# Patient Record
Sex: MALE | Race: WHITE | Employment: OTHER | URBAN - METROPOLITAN AREA
[De-identification: names, ages, dates, MRNs, and addresses within clinical notes are randomized per-mention and may not be internally consistent; named-entity substitution may affect disease eponyms.]

---

## 2017-05-09 ENCOUNTER — OFFICE VISIT (OUTPATIENT)
Dept: INTERNAL MEDICINE CLINIC | Age: 82
End: 2017-05-09

## 2017-05-09 VITALS
SYSTOLIC BLOOD PRESSURE: 136 MMHG | HEART RATE: 60 BPM | BODY MASS INDEX: 29.09 KG/M2 | OXYGEN SATURATION: 97 % | WEIGHT: 181 LBS | DIASTOLIC BLOOD PRESSURE: 80 MMHG | HEIGHT: 66 IN

## 2017-05-09 DIAGNOSIS — I25.10 CORONARY ARTERIOSCLEROSIS: ICD-10-CM

## 2017-05-09 DIAGNOSIS — Z95.0 PACEMAKER: ICD-10-CM

## 2017-05-09 DIAGNOSIS — I10 ESSENTIAL HYPERTENSION: ICD-10-CM

## 2017-05-09 DIAGNOSIS — M25.552 LEFT HIP PAIN: ICD-10-CM

## 2017-05-09 DIAGNOSIS — R00.1 BRADYCARDIA: ICD-10-CM

## 2017-05-09 DIAGNOSIS — N40.1 BPH WITH OBSTRUCTION/LOWER URINARY TRACT SYMPTOMS: ICD-10-CM

## 2017-05-09 DIAGNOSIS — K21.9 GASTROESOPHAGEAL REFLUX DISEASE, ESOPHAGITIS PRESENCE NOT SPECIFIED: ICD-10-CM

## 2017-05-09 DIAGNOSIS — N13.8 BPH WITH OBSTRUCTION/LOWER URINARY TRACT SYMPTOMS: ICD-10-CM

## 2017-05-09 LAB
A/G RATIO: 1.4 (ref 1.1–2.2)
ALBUMIN SERPL-MCNC: 4 G/DL (ref 3.4–5)
ALP BLD-CCNC: 72 U/L (ref 40–129)
ALT SERPL-CCNC: 12 U/L (ref 10–40)
ANION GAP SERPL CALCULATED.3IONS-SCNC: 19 MMOL/L (ref 3–16)
AST SERPL-CCNC: 22 U/L (ref 15–37)
BASOPHILS ABSOLUTE: 0.1 K/UL (ref 0–0.2)
BASOPHILS RELATIVE PERCENT: 0.7 %
BILIRUB SERPL-MCNC: 0.7 MG/DL (ref 0–1)
BUN BLDV-MCNC: 31 MG/DL (ref 7–20)
CALCIUM SERPL-MCNC: 9.1 MG/DL (ref 8.3–10.6)
CHLORIDE BLD-SCNC: 99 MMOL/L (ref 99–110)
CHOLESTEROL, TOTAL: 140 MG/DL (ref 0–199)
CO2: 20 MMOL/L (ref 21–32)
CREAT SERPL-MCNC: 1.4 MG/DL (ref 0.8–1.3)
EOSINOPHILS ABSOLUTE: 0.1 K/UL (ref 0–0.6)
EOSINOPHILS RELATIVE PERCENT: 1.2 %
GFR AFRICAN AMERICAN: 57
GFR NON-AFRICAN AMERICAN: 47
GLOBULIN: 2.9 G/DL
GLUCOSE BLD-MCNC: 103 MG/DL (ref 70–99)
HCT VFR BLD CALC: 45.7 % (ref 40.5–52.5)
HDLC SERPL-MCNC: 63 MG/DL (ref 40–60)
HEMOGLOBIN: 14.8 G/DL (ref 13.5–17.5)
LDL CHOLESTEROL CALCULATED: 56 MG/DL
LYMPHOCYTES ABSOLUTE: 2.2 K/UL (ref 1–5.1)
LYMPHOCYTES RELATIVE PERCENT: 18.8 %
MCH RBC QN AUTO: 28.6 PG (ref 26–34)
MCHC RBC AUTO-ENTMCNC: 32.4 G/DL (ref 31–36)
MCV RBC AUTO: 88.3 FL (ref 80–100)
MONOCYTES ABSOLUTE: 1 K/UL (ref 0–1.3)
MONOCYTES RELATIVE PERCENT: 8.9 %
NEUTROPHILS ABSOLUTE: 8.2 K/UL (ref 1.7–7.7)
NEUTROPHILS RELATIVE PERCENT: 70.4 %
PDW BLD-RTO: 15.7 % (ref 12.4–15.4)
PLATELET # BLD: 156 K/UL (ref 135–450)
PMV BLD AUTO: 10.9 FL (ref 5–10.5)
POTASSIUM SERPL-SCNC: 4.7 MMOL/L (ref 3.5–5.1)
RBC # BLD: 5.17 M/UL (ref 4.2–5.9)
SODIUM BLD-SCNC: 138 MMOL/L (ref 136–145)
TOTAL PROTEIN: 6.9 G/DL (ref 6.4–8.2)
TRIGL SERPL-MCNC: 104 MG/DL (ref 0–150)
TSH SERPL DL<=0.05 MIU/L-ACNC: 3.64 UIU/ML (ref 0.27–4.2)
VLDLC SERPL CALC-MCNC: 21 MG/DL
WBC # BLD: 11.7 K/UL (ref 4–11)

## 2017-05-09 PROCEDURE — 99205 OFFICE O/P NEW HI 60 MIN: CPT | Performed by: INTERNAL MEDICINE

## 2017-05-09 PROCEDURE — 1036F TOBACCO NON-USER: CPT | Performed by: INTERNAL MEDICINE

## 2017-05-09 PROCEDURE — G8598 ASA/ANTIPLAT THER USED: HCPCS | Performed by: INTERNAL MEDICINE

## 2017-05-09 PROCEDURE — G8420 CALC BMI NORM PARAMETERS: HCPCS | Performed by: INTERNAL MEDICINE

## 2017-05-09 PROCEDURE — G8427 DOCREV CUR MEDS BY ELIG CLIN: HCPCS | Performed by: INTERNAL MEDICINE

## 2017-05-09 PROCEDURE — 4040F PNEUMOC VAC/ADMIN/RCVD: CPT | Performed by: INTERNAL MEDICINE

## 2017-05-09 PROCEDURE — 1123F ACP DISCUSS/DSCN MKR DOCD: CPT | Performed by: INTERNAL MEDICINE

## 2017-05-09 RX ORDER — FAMOTIDINE 20 MG/1
20 TABLET, FILM COATED ORAL DAILY
COMMUNITY
End: 2017-06-23

## 2017-05-09 RX ORDER — MULTIVIT-MIN/FA/LYCOPEN/LUTEIN .4-300-25
1 TABLET ORAL DAILY
COMMUNITY

## 2017-05-09 RX ORDER — AMLODIPINE BESYLATE 5 MG/1
5 TABLET ORAL DAILY
COMMUNITY
End: 2020-01-06 | Stop reason: SDUPTHER

## 2017-05-09 RX ORDER — ACETAMINOPHEN 160 MG
2000 TABLET,DISINTEGRATING ORAL DAILY
COMMUNITY

## 2017-05-09 RX ORDER — SOLIFENACIN SUCCINATE 5 MG/1
5 TABLET, FILM COATED ORAL DAILY
COMMUNITY
End: 2018-06-29 | Stop reason: ALTCHOICE

## 2017-05-09 RX ORDER — TAMSULOSIN HYDROCHLORIDE 0.4 MG/1
CAPSULE ORAL
Qty: 90 CAPSULE | Refills: 3 | Status: SHIPPED | OUTPATIENT
Start: 2017-05-09 | End: 2018-04-10 | Stop reason: SDUPTHER

## 2017-05-09 RX ORDER — MONTELUKAST SODIUM 10 MG/1
10 TABLET ORAL NIGHTLY
COMMUNITY
End: 2018-01-08

## 2017-05-09 RX ORDER — OLMESARTAN MEDOXOMIL 40 MG/1
40 TABLET ORAL DAILY
Refills: 2 | COMMUNITY
Start: 2017-03-04 | End: 2018-06-11 | Stop reason: SDUPTHER

## 2017-05-09 RX ORDER — CALCIUM CARBONATE 500(1250)
600 TABLET ORAL DAILY
COMMUNITY

## 2017-05-09 RX ORDER — ATORVASTATIN CALCIUM 40 MG/1
40 TABLET, FILM COATED ORAL DAILY
COMMUNITY
End: 2019-02-22 | Stop reason: SDUPTHER

## 2017-06-23 ENCOUNTER — OFFICE VISIT (OUTPATIENT)
Dept: INTERNAL MEDICINE CLINIC | Age: 82
End: 2017-06-23

## 2017-06-23 VITALS
HEART RATE: 60 BPM | HEIGHT: 66 IN | DIASTOLIC BLOOD PRESSURE: 70 MMHG | WEIGHT: 183.6 LBS | BODY MASS INDEX: 29.51 KG/M2 | OXYGEN SATURATION: 96 % | SYSTOLIC BLOOD PRESSURE: 138 MMHG

## 2017-06-23 DIAGNOSIS — I10 BENIGN ESSENTIAL HTN: ICD-10-CM

## 2017-06-23 DIAGNOSIS — R06.09 DOE (DYSPNEA ON EXERTION): Primary | ICD-10-CM

## 2017-06-23 DIAGNOSIS — I25.118 CORONARY ARTERY DISEASE INVOLVING NATIVE CORONARY ARTERY WITH OTHER FORMS OF ANGINA PECTORIS: ICD-10-CM

## 2017-06-23 PROCEDURE — 93000 ELECTROCARDIOGRAM COMPLETE: CPT | Performed by: INTERNAL MEDICINE

## 2017-06-23 PROCEDURE — 99214 OFFICE O/P EST MOD 30 MIN: CPT | Performed by: INTERNAL MEDICINE

## 2017-06-23 PROCEDURE — 1036F TOBACCO NON-USER: CPT | Performed by: INTERNAL MEDICINE

## 2017-06-23 PROCEDURE — G8598 ASA/ANTIPLAT THER USED: HCPCS | Performed by: INTERNAL MEDICINE

## 2017-06-23 PROCEDURE — G8427 DOCREV CUR MEDS BY ELIG CLIN: HCPCS | Performed by: INTERNAL MEDICINE

## 2017-06-23 PROCEDURE — G8419 CALC BMI OUT NRM PARAM NOF/U: HCPCS | Performed by: INTERNAL MEDICINE

## 2017-06-23 PROCEDURE — 1123F ACP DISCUSS/DSCN MKR DOCD: CPT | Performed by: INTERNAL MEDICINE

## 2017-06-23 PROCEDURE — 4040F PNEUMOC VAC/ADMIN/RCVD: CPT | Performed by: INTERNAL MEDICINE

## 2017-06-23 RX ORDER — ISOSORBIDE MONONITRATE 30 MG/1
30 TABLET, EXTENDED RELEASE ORAL DAILY
Qty: 30 TABLET | Refills: 3 | Status: SHIPPED | OUTPATIENT
Start: 2017-06-23 | End: 2017-06-30 | Stop reason: ALTCHOICE

## 2017-06-23 RX ORDER — OMEPRAZOLE 20 MG/1
20 CAPSULE, DELAYED RELEASE ORAL DAILY
Qty: 30 CAPSULE | Refills: 0 | Status: SHIPPED | OUTPATIENT
Start: 2017-06-23 | End: 2017-07-21 | Stop reason: SDUPTHER

## 2017-06-23 RX ORDER — AZITHROMYCIN 250 MG/1
TABLET, FILM COATED ORAL
Qty: 1 PACKET | Refills: 0 | Status: SHIPPED | OUTPATIENT
Start: 2017-06-23 | End: 2017-07-03

## 2017-06-23 ASSESSMENT — ENCOUNTER SYMPTOMS
SPUTUM PRODUCTION: 1
SHORTNESS OF BREATH: 1

## 2017-06-29 ENCOUNTER — HOSPITAL ENCOUNTER (OUTPATIENT)
Dept: NON INVASIVE DIAGNOSTICS | Age: 82
Discharge: HOME | End: 2017-06-29
Attending: INTERNAL MEDICINE | Admitting: INTERNAL MEDICINE

## 2017-06-29 DIAGNOSIS — R06.09 DOE (DYSPNEA ON EXERTION): ICD-10-CM

## 2017-06-29 DIAGNOSIS — R06.09 OTHER FORMS OF DYSPNEA: ICD-10-CM

## 2017-06-29 LAB
LV EF: 55 %
LV EF: 70 %
LVEF MODALITY: NORMAL
LVEF MODALITY: NORMAL

## 2017-06-30 RX ORDER — TORSEMIDE 10 MG/1
10 TABLET ORAL EVERY MORNING
COMMUNITY
End: 2017-06-30 | Stop reason: SDUPTHER

## 2017-06-30 RX ORDER — TORSEMIDE 10 MG/1
10 TABLET ORAL EVERY MORNING
Qty: 30 TABLET | Refills: 3 | Status: SHIPPED | OUTPATIENT
Start: 2017-06-30 | End: 2018-02-21 | Stop reason: ALTCHOICE

## 2017-07-19 ENCOUNTER — TELEPHONE (OUTPATIENT)
Dept: INTERNAL MEDICINE CLINIC | Age: 82
End: 2017-07-19

## 2017-07-21 ENCOUNTER — OFFICE VISIT (OUTPATIENT)
Dept: INTERNAL MEDICINE CLINIC | Age: 82
End: 2017-07-21

## 2017-07-21 VITALS
OXYGEN SATURATION: 97 % | SYSTOLIC BLOOD PRESSURE: 120 MMHG | WEIGHT: 187.8 LBS | HEIGHT: 66 IN | TEMPERATURE: 98.2 F | HEART RATE: 61 BPM | DIASTOLIC BLOOD PRESSURE: 68 MMHG | BODY MASS INDEX: 30.18 KG/M2

## 2017-07-21 DIAGNOSIS — R05.8 PRODUCTIVE COUGH: Primary | ICD-10-CM

## 2017-07-21 DIAGNOSIS — K21.9 GASTROESOPHAGEAL REFLUX DISEASE, ESOPHAGITIS PRESENCE NOT SPECIFIED: ICD-10-CM

## 2017-07-21 DIAGNOSIS — N18.3 CKD (CHRONIC KIDNEY DISEASE), STAGE 3 (MODERATE): ICD-10-CM

## 2017-07-21 DIAGNOSIS — I25.10 CORONARY ARTERY DISEASE INVOLVING NATIVE CORONARY ARTERY OF NATIVE HEART WITHOUT ANGINA PECTORIS: ICD-10-CM

## 2017-07-21 LAB
ANION GAP SERPL CALCULATED.3IONS-SCNC: 15 MMOL/L (ref 3–16)
BASOPHILS ABSOLUTE: 0.1 K/UL (ref 0–0.2)
BASOPHILS RELATIVE PERCENT: 0.7 %
BUN BLDV-MCNC: 28 MG/DL (ref 7–20)
CALCIUM SERPL-MCNC: 9.1 MG/DL (ref 8.3–10.6)
CHLORIDE BLD-SCNC: 99 MMOL/L (ref 99–110)
CO2: 24 MMOL/L (ref 21–32)
CREAT SERPL-MCNC: 1.6 MG/DL (ref 0.8–1.3)
EOSINOPHILS ABSOLUTE: 0.2 K/UL (ref 0–0.6)
EOSINOPHILS RELATIVE PERCENT: 2.6 %
GFR AFRICAN AMERICAN: 49
GFR NON-AFRICAN AMERICAN: 41
GLUCOSE BLD-MCNC: 104 MG/DL (ref 70–99)
HCT VFR BLD CALC: 42.4 % (ref 40.5–52.5)
HEMOGLOBIN: 14.3 G/DL (ref 13.5–17.5)
LYMPHOCYTES ABSOLUTE: 2 K/UL (ref 1–5.1)
LYMPHOCYTES RELATIVE PERCENT: 21.2 %
MCH RBC QN AUTO: 30 PG (ref 26–34)
MCHC RBC AUTO-ENTMCNC: 33.6 G/DL (ref 31–36)
MCV RBC AUTO: 89.2 FL (ref 80–100)
MONOCYTES ABSOLUTE: 0.9 K/UL (ref 0–1.3)
MONOCYTES RELATIVE PERCENT: 9.9 %
NEUTROPHILS ABSOLUTE: 6.1 K/UL (ref 1.7–7.7)
NEUTROPHILS RELATIVE PERCENT: 65.6 %
PDW BLD-RTO: 15 % (ref 12.4–15.4)
PLATELET # BLD: 161 K/UL (ref 135–450)
PMV BLD AUTO: 11.2 FL (ref 5–10.5)
POTASSIUM SERPL-SCNC: 4.9 MMOL/L (ref 3.5–5.1)
RBC # BLD: 4.75 M/UL (ref 4.2–5.9)
SODIUM BLD-SCNC: 138 MMOL/L (ref 136–145)
WBC # BLD: 9.3 K/UL (ref 4–11)

## 2017-07-21 PROCEDURE — G8598 ASA/ANTIPLAT THER USED: HCPCS | Performed by: INTERNAL MEDICINE

## 2017-07-21 PROCEDURE — 4040F PNEUMOC VAC/ADMIN/RCVD: CPT | Performed by: INTERNAL MEDICINE

## 2017-07-21 PROCEDURE — G8428 CUR MEDS NOT DOCUMENT: HCPCS | Performed by: INTERNAL MEDICINE

## 2017-07-21 PROCEDURE — 1036F TOBACCO NON-USER: CPT | Performed by: INTERNAL MEDICINE

## 2017-07-21 PROCEDURE — 99214 OFFICE O/P EST MOD 30 MIN: CPT | Performed by: INTERNAL MEDICINE

## 2017-07-21 PROCEDURE — G8417 CALC BMI ABV UP PARAM F/U: HCPCS | Performed by: INTERNAL MEDICINE

## 2017-07-21 PROCEDURE — 1123F ACP DISCUSS/DSCN MKR DOCD: CPT | Performed by: INTERNAL MEDICINE

## 2017-07-21 RX ORDER — OMEPRAZOLE 20 MG/1
20 CAPSULE, DELAYED RELEASE ORAL DAILY
Qty: 30 CAPSULE | Refills: 5 | Status: SHIPPED | OUTPATIENT
Start: 2017-07-21 | End: 2018-01-23 | Stop reason: SDUPTHER

## 2017-08-02 ENCOUNTER — HOSPITAL ENCOUNTER (OUTPATIENT)
Dept: CT IMAGING | Age: 82
Discharge: HOME | End: 2017-08-02
Attending: INTERNAL MEDICINE | Admitting: INTERNAL MEDICINE

## 2017-08-02 DIAGNOSIS — R05.8 PRODUCTIVE COUGH: ICD-10-CM

## 2017-08-02 DIAGNOSIS — R05.9 COUGH: ICD-10-CM

## 2017-08-07 ENCOUNTER — OFFICE VISIT (OUTPATIENT)
Dept: INTERNAL MEDICINE CLINIC | Age: 82
End: 2017-08-07

## 2017-08-07 VITALS
BODY MASS INDEX: 30.6 KG/M2 | RESPIRATION RATE: 16 BRPM | HEART RATE: 66 BPM | HEIGHT: 66 IN | DIASTOLIC BLOOD PRESSURE: 62 MMHG | WEIGHT: 190.4 LBS | SYSTOLIC BLOOD PRESSURE: 148 MMHG

## 2017-08-07 DIAGNOSIS — N13.30 HYDRONEPHROSIS, RIGHT: ICD-10-CM

## 2017-08-07 DIAGNOSIS — R63.5 WEIGHT GAIN: ICD-10-CM

## 2017-08-07 DIAGNOSIS — N13.30 HYDRONEPHROSIS, RIGHT: Primary | ICD-10-CM

## 2017-08-07 DIAGNOSIS — J84.10 PULMONARY FIBROSIS (HCC): Primary | ICD-10-CM

## 2017-08-07 LAB
ANION GAP SERPL CALCULATED.3IONS-SCNC: 13 MMOL/L (ref 3–16)
BUN BLDV-MCNC: 26 MG/DL (ref 7–20)
CALCIUM SERPL-MCNC: 9.1 MG/DL (ref 8.3–10.6)
CHLORIDE BLD-SCNC: 102 MMOL/L (ref 99–110)
CO2: 25 MMOL/L (ref 21–32)
CREAT SERPL-MCNC: 1.5 MG/DL (ref 0.8–1.3)
GFR AFRICAN AMERICAN: 53
GFR NON-AFRICAN AMERICAN: 44
GLUCOSE BLD-MCNC: 104 MG/DL (ref 70–99)
POTASSIUM SERPL-SCNC: 4.8 MMOL/L (ref 3.5–5.1)
SODIUM BLD-SCNC: 140 MMOL/L (ref 136–145)

## 2017-08-07 PROCEDURE — 1123F ACP DISCUSS/DSCN MKR DOCD: CPT | Performed by: INTERNAL MEDICINE

## 2017-08-07 PROCEDURE — 4040F PNEUMOC VAC/ADMIN/RCVD: CPT | Performed by: INTERNAL MEDICINE

## 2017-08-07 PROCEDURE — G8427 DOCREV CUR MEDS BY ELIG CLIN: HCPCS | Performed by: INTERNAL MEDICINE

## 2017-08-07 PROCEDURE — 99213 OFFICE O/P EST LOW 20 MIN: CPT | Performed by: INTERNAL MEDICINE

## 2017-08-07 PROCEDURE — G8417 CALC BMI ABV UP PARAM F/U: HCPCS | Performed by: INTERNAL MEDICINE

## 2017-08-07 PROCEDURE — 1036F TOBACCO NON-USER: CPT | Performed by: INTERNAL MEDICINE

## 2017-08-07 PROCEDURE — G8598 ASA/ANTIPLAT THER USED: HCPCS | Performed by: INTERNAL MEDICINE

## 2017-08-07 ASSESSMENT — PATIENT HEALTH QUESTIONNAIRE - PHQ9
2. FEELING DOWN, DEPRESSED OR HOPELESS: 0
SUM OF ALL RESPONSES TO PHQ QUESTIONS 1-9: 0
SUM OF ALL RESPONSES TO PHQ9 QUESTIONS 1 & 2: 0
1. LITTLE INTEREST OR PLEASURE IN DOING THINGS: 0

## 2017-08-14 ENCOUNTER — HOSPITAL ENCOUNTER (OUTPATIENT)
Dept: PULMONOLOGY | Age: 82
Discharge: HOME | End: 2017-08-14
Admitting: INTERNAL MEDICINE

## 2017-08-14 DIAGNOSIS — N13.30 HYDRONEPHROSIS, RIGHT: ICD-10-CM

## 2017-08-14 DIAGNOSIS — J84.10 PULMONARY FIBROSIS (HCC): ICD-10-CM

## 2017-08-14 LAB
DLCO %PRED: NORMAL
DLCO PRE: NORMAL
FEF 25-75%-POST: NORMAL
FEF 25-75%-PRE: NORMAL
FEV1-POST: NORMAL
FEV1-PRE: NORMAL
FEV1/FVC-POST: NORMAL
FEV1/FVC-PRE: NORMAL
FVC-POST: NORMAL
FVC-PRE: NORMAL
MEP: NORMAL
MIP: NORMAL
MVV %PRED-PRE: NORMAL
MVV-PRE: NORMAL
TLC %PRED: NORMAL
TLC PRE: NORMAL

## 2017-08-14 PROCEDURE — 94729 DIFFUSING CAPACITY: CPT | Performed by: INTERNAL MEDICINE

## 2017-08-14 PROCEDURE — 94727 GAS DIL/WSHOT DETER LNG VOL: CPT | Performed by: INTERNAL MEDICINE

## 2017-08-14 PROCEDURE — 94060 EVALUATION OF WHEEZING: CPT | Performed by: INTERNAL MEDICINE

## 2017-08-14 RX ORDER — ALBUTEROL SULFATE 90 UG/1
4 AEROSOL, METERED RESPIRATORY (INHALATION) ONCE
Status: COMPLETED | OUTPATIENT
Start: 2017-08-14 | End: 2017-08-14

## 2017-08-14 RX ADMIN — ALBUTEROL SULFATE 4 PUFF: 90 AEROSOL, METERED RESPIRATORY (INHALATION) at 13:10

## 2017-08-23 ENCOUNTER — HOSPITAL ENCOUNTER (OUTPATIENT)
Dept: OTHER | Age: 82
Discharge: HOME | End: 2017-08-23
Attending: INTERNAL MEDICINE | Admitting: INTERNAL MEDICINE

## 2017-08-23 ENCOUNTER — OFFICE VISIT (OUTPATIENT)
Dept: INTERNAL MEDICINE CLINIC | Age: 82
End: 2017-08-23

## 2017-08-23 VITALS
SYSTOLIC BLOOD PRESSURE: 120 MMHG | WEIGHT: 186.6 LBS | DIASTOLIC BLOOD PRESSURE: 60 MMHG | RESPIRATION RATE: 16 BRPM | HEART RATE: 70 BPM | BODY MASS INDEX: 30.12 KG/M2

## 2017-08-23 DIAGNOSIS — M54.50 ACUTE RIGHT-SIDED LOW BACK PAIN WITHOUT SCIATICA: ICD-10-CM

## 2017-08-23 DIAGNOSIS — M54.50 ACUTE RIGHT-SIDED LOW BACK PAIN WITHOUT SCIATICA: Primary | ICD-10-CM

## 2017-08-23 PROCEDURE — 1036F TOBACCO NON-USER: CPT | Performed by: INTERNAL MEDICINE

## 2017-08-23 PROCEDURE — 1123F ACP DISCUSS/DSCN MKR DOCD: CPT | Performed by: INTERNAL MEDICINE

## 2017-08-23 PROCEDURE — G8427 DOCREV CUR MEDS BY ELIG CLIN: HCPCS | Performed by: INTERNAL MEDICINE

## 2017-08-23 PROCEDURE — 99215 OFFICE O/P EST HI 40 MIN: CPT | Performed by: INTERNAL MEDICINE

## 2017-08-23 PROCEDURE — G8417 CALC BMI ABV UP PARAM F/U: HCPCS | Performed by: INTERNAL MEDICINE

## 2017-08-23 PROCEDURE — G8598 ASA/ANTIPLAT THER USED: HCPCS | Performed by: INTERNAL MEDICINE

## 2017-08-23 PROCEDURE — 4040F PNEUMOC VAC/ADMIN/RCVD: CPT | Performed by: INTERNAL MEDICINE

## 2017-08-23 RX ORDER — TRAMADOL HYDROCHLORIDE 50 MG/1
TABLET ORAL
Qty: 60 TABLET | Refills: 0 | Status: SHIPPED | OUTPATIENT
Start: 2017-08-23 | End: 2018-02-21 | Stop reason: ALTCHOICE

## 2017-09-07 ENCOUNTER — OFFICE VISIT (OUTPATIENT)
Dept: INTERNAL MEDICINE CLINIC | Age: 82
End: 2017-09-07

## 2017-09-07 VITALS
WEIGHT: 188 LBS | HEART RATE: 98 BPM | BODY MASS INDEX: 30.22 KG/M2 | DIASTOLIC BLOOD PRESSURE: 70 MMHG | SYSTOLIC BLOOD PRESSURE: 150 MMHG | HEIGHT: 66 IN | RESPIRATION RATE: 16 BRPM

## 2017-09-07 DIAGNOSIS — J40 BRONCHITIS: Primary | ICD-10-CM

## 2017-09-07 PROCEDURE — 1036F TOBACCO NON-USER: CPT | Performed by: INTERNAL MEDICINE

## 2017-09-07 PROCEDURE — G8598 ASA/ANTIPLAT THER USED: HCPCS | Performed by: INTERNAL MEDICINE

## 2017-09-07 PROCEDURE — 1123F ACP DISCUSS/DSCN MKR DOCD: CPT | Performed by: INTERNAL MEDICINE

## 2017-09-07 PROCEDURE — 4040F PNEUMOC VAC/ADMIN/RCVD: CPT | Performed by: INTERNAL MEDICINE

## 2017-09-07 PROCEDURE — 99213 OFFICE O/P EST LOW 20 MIN: CPT | Performed by: INTERNAL MEDICINE

## 2017-09-07 PROCEDURE — G8417 CALC BMI ABV UP PARAM F/U: HCPCS | Performed by: INTERNAL MEDICINE

## 2017-09-07 PROCEDURE — G8427 DOCREV CUR MEDS BY ELIG CLIN: HCPCS | Performed by: INTERNAL MEDICINE

## 2017-09-07 RX ORDER — PREDNISONE 10 MG/1
TABLET ORAL
Qty: 22 TABLET | Refills: 0 | Status: SHIPPED | OUTPATIENT
Start: 2017-09-07 | End: 2018-01-08 | Stop reason: SDUPTHER

## 2017-09-07 RX ORDER — DOXYCYCLINE HYCLATE 100 MG
100 TABLET ORAL 2 TIMES DAILY
Qty: 14 TABLET | Refills: 0 | Status: SHIPPED | OUTPATIENT
Start: 2017-09-07 | End: 2017-09-14

## 2017-09-07 RX ORDER — GUAIFENESIN AND CODEINE PHOSPHATE 100; 10 MG/5ML; MG/5ML
5-10 SOLUTION ORAL EVERY 4 HOURS PRN
Qty: 180 ML | Refills: 0 | Status: SHIPPED | OUTPATIENT
Start: 2017-09-07 | End: 2018-01-08 | Stop reason: SDUPTHER

## 2018-01-02 ENCOUNTER — TELEPHONE (OUTPATIENT)
Dept: INTERNAL MEDICINE CLINIC | Age: 83
End: 2018-01-02

## 2018-01-02 RX ORDER — AZITHROMYCIN 250 MG/1
TABLET, FILM COATED ORAL
Qty: 1 PACKET | Refills: 0 | Status: SHIPPED | OUTPATIENT
Start: 2018-01-02 | End: 2018-01-12

## 2018-01-08 ENCOUNTER — OFFICE VISIT (OUTPATIENT)
Dept: INTERNAL MEDICINE CLINIC | Age: 83
End: 2018-01-08

## 2018-01-08 VITALS
WEIGHT: 189.4 LBS | HEIGHT: 66 IN | OXYGEN SATURATION: 98 % | SYSTOLIC BLOOD PRESSURE: 140 MMHG | BODY MASS INDEX: 30.44 KG/M2 | HEART RATE: 64 BPM | DIASTOLIC BLOOD PRESSURE: 72 MMHG | TEMPERATURE: 97.6 F

## 2018-01-08 DIAGNOSIS — R06.02 SOB (SHORTNESS OF BREATH): ICD-10-CM

## 2018-01-08 DIAGNOSIS — I48.20 CHRONIC ATRIAL FIBRILLATION (HCC): ICD-10-CM

## 2018-01-08 DIAGNOSIS — J20.9 ACUTE BRONCHITIS, UNSPECIFIED ORGANISM: Primary | ICD-10-CM

## 2018-01-08 PROCEDURE — G8598 ASA/ANTIPLAT THER USED: HCPCS | Performed by: INTERNAL MEDICINE

## 2018-01-08 PROCEDURE — G8484 FLU IMMUNIZE NO ADMIN: HCPCS | Performed by: INTERNAL MEDICINE

## 2018-01-08 PROCEDURE — 4040F PNEUMOC VAC/ADMIN/RCVD: CPT | Performed by: INTERNAL MEDICINE

## 2018-01-08 PROCEDURE — 99214 OFFICE O/P EST MOD 30 MIN: CPT | Performed by: INTERNAL MEDICINE

## 2018-01-08 PROCEDURE — G8417 CALC BMI ABV UP PARAM F/U: HCPCS | Performed by: INTERNAL MEDICINE

## 2018-01-08 PROCEDURE — 1123F ACP DISCUSS/DSCN MKR DOCD: CPT | Performed by: INTERNAL MEDICINE

## 2018-01-08 PROCEDURE — G8427 DOCREV CUR MEDS BY ELIG CLIN: HCPCS | Performed by: INTERNAL MEDICINE

## 2018-01-08 PROCEDURE — 1036F TOBACCO NON-USER: CPT | Performed by: INTERNAL MEDICINE

## 2018-01-08 RX ORDER — MONTELUKAST SODIUM 10 MG/1
10 TABLET ORAL NIGHTLY
Qty: 30 TABLET | Status: CANCELLED | OUTPATIENT
Start: 2018-01-08

## 2018-01-08 RX ORDER — GUAIFENESIN AND CODEINE PHOSPHATE 100; 10 MG/5ML; MG/5ML
5-10 SOLUTION ORAL EVERY 4 HOURS PRN
Qty: 180 ML | Refills: 0 | Status: SHIPPED | OUTPATIENT
Start: 2018-01-08 | End: 2018-02-21 | Stop reason: ALTCHOICE

## 2018-01-08 RX ORDER — PREDNISONE 10 MG/1
TABLET ORAL
Qty: 22 TABLET | Refills: 0 | Status: SHIPPED | OUTPATIENT
Start: 2018-01-08 | End: 2018-02-21 | Stop reason: ALTCHOICE

## 2018-01-08 ASSESSMENT — ENCOUNTER SYMPTOMS
COUGH: 1
SPUTUM PRODUCTION: 1

## 2018-01-24 RX ORDER — OMEPRAZOLE 20 MG/1
20 CAPSULE, DELAYED RELEASE ORAL DAILY
Qty: 30 CAPSULE | Refills: 5 | Status: SHIPPED | OUTPATIENT
Start: 2018-01-24 | End: 2018-04-26 | Stop reason: SDUPTHER

## 2018-02-03 DIAGNOSIS — R91.1 PULMONARY NODULE: Primary | ICD-10-CM

## 2018-02-05 ENCOUNTER — TELEPHONE (OUTPATIENT)
Dept: INTERNAL MEDICINE CLINIC | Age: 83
End: 2018-02-05

## 2018-02-08 ENCOUNTER — TELEPHONE (OUTPATIENT)
Dept: INTERNAL MEDICINE CLINIC | Age: 83
End: 2018-02-08

## 2018-02-21 ENCOUNTER — PROCEDURE VISIT (OUTPATIENT)
Dept: CARDIOLOGY CLINIC | Age: 83
End: 2018-02-21

## 2018-02-21 ENCOUNTER — OFFICE VISIT (OUTPATIENT)
Dept: CARDIOLOGY CLINIC | Age: 83
End: 2018-02-21

## 2018-02-21 VITALS
WEIGHT: 189 LBS | DIASTOLIC BLOOD PRESSURE: 78 MMHG | HEIGHT: 66 IN | OXYGEN SATURATION: 97 % | HEART RATE: 60 BPM | BODY MASS INDEX: 30.37 KG/M2 | SYSTOLIC BLOOD PRESSURE: 138 MMHG

## 2018-02-21 DIAGNOSIS — I10 ESSENTIAL HYPERTENSION: ICD-10-CM

## 2018-02-21 DIAGNOSIS — I49.5 SSS (SICK SINUS SYNDROME) (HCC): Primary | ICD-10-CM

## 2018-02-21 DIAGNOSIS — E78.2 MIXED HYPERLIPIDEMIA: ICD-10-CM

## 2018-02-21 DIAGNOSIS — I48.20 CHRONIC A-FIB (HCC): ICD-10-CM

## 2018-02-21 DIAGNOSIS — R00.1 BRADYCARDIA: ICD-10-CM

## 2018-02-21 DIAGNOSIS — I25.10 CORONARY ARTERY DISEASE INVOLVING NATIVE CORONARY ARTERY OF NATIVE HEART WITHOUT ANGINA PECTORIS: ICD-10-CM

## 2018-02-21 DIAGNOSIS — Z95.0 PACEMAKER: ICD-10-CM

## 2018-02-21 PROCEDURE — 1036F TOBACCO NON-USER: CPT | Performed by: INTERNAL MEDICINE

## 2018-02-21 PROCEDURE — G8417 CALC BMI ABV UP PARAM F/U: HCPCS | Performed by: INTERNAL MEDICINE

## 2018-02-21 PROCEDURE — G8484 FLU IMMUNIZE NO ADMIN: HCPCS | Performed by: INTERNAL MEDICINE

## 2018-02-21 PROCEDURE — 99204 OFFICE O/P NEW MOD 45 MIN: CPT | Performed by: INTERNAL MEDICINE

## 2018-02-21 PROCEDURE — 1123F ACP DISCUSS/DSCN MKR DOCD: CPT | Performed by: INTERNAL MEDICINE

## 2018-02-21 PROCEDURE — 93279 PRGRMG DEV EVAL PM/LDLS PM: CPT | Performed by: INTERNAL MEDICINE

## 2018-02-21 PROCEDURE — G8598 ASA/ANTIPLAT THER USED: HCPCS | Performed by: INTERNAL MEDICINE

## 2018-02-21 PROCEDURE — 4040F PNEUMOC VAC/ADMIN/RCVD: CPT | Performed by: INTERNAL MEDICINE

## 2018-02-21 PROCEDURE — G8427 DOCREV CUR MEDS BY ELIG CLIN: HCPCS | Performed by: INTERNAL MEDICINE

## 2018-02-21 RX ORDER — FINASTERIDE 5 MG/1
5 TABLET, FILM COATED ORAL DAILY
COMMUNITY
End: 2019-12-02 | Stop reason: SDUPTHER

## 2018-02-21 NOTE — PROGRESS NOTES
[address]   Electrophysiology Consultation   Date: 2/21/2018  Reason for Consultation: Establish care, device management  Consult Requesting Physician: Amy Billings MD    Chief Complaint: Establish care    HPI: Luis Fernando March is a 80 y.o. with a PMH significant for CAD, chronic Afib, HTN, CKD and SSS. He has a single chamber [employer] Von PPM that was placed 9/11/14. He is pacemaker dependent. Alonzo Horton was previously followed by [facility] in [place]. He recently moved to [place] to live with his daughter and now needs to establish care for device management. He reports that he is \"feeling reasonably good. \"  He walks 1 mile every other day outside and at the gym. He admits to feeling short of breath with walking a distance but it resides with rest. He attributes this to age. Patient denies lightheadedness, dizziness, chest pain, palpitations, orthopnea, edema, presyncope or syncope. Past Medical History:   Diagnosis Date    BPH with obstruction/lower urinary tract symptoms     nocturia 3-4 x per night    Chronic atrial fibrillation (HCC)     CKD (chronic kidney disease) 2017    level 3    Coronary arteriosclerosis 2009    2 stents/ Dr Cezar Zepeda in [facility] GERD (gastroesophageal reflux disease)     HTN (hypertension) 1990    Left hip pain     schrapnel in hip and intestines/chronic pains left hip impairs walking    Pacemaker 2015    Renal atrophy, right 2017    SOB (shortness of breath) 2017    terrell normal pft and ct chest ? smll amount of pulmonary fibrosis        Past Surgical History:   Procedure Laterality Date    CORONARY ANGIOPLASTY WITH STENT PLACEMENT  2009    awoke with indigestion    INGUINAL HERNIA REPAIR Right 07/2016    PACEMAKER INSERTION  2014    SMALL INTESTINE SURGERY  1945    schrapnel     TONSILLECTOMY  1960       Allergies:   Allergies   Allergen Reactions    Claritin [Loratadine]      Reduced urine flow    Zestril [Lisinopril] Other (See

## 2018-02-21 NOTE — PROGRESS NOTES
Patient comes in for programming evaluation for his pacemaker. The device is functioning within normal parameters. No changes need to be made at this time. Patient presents today as a new patient to our practice. His last device check was from Nov 2017 at [facility] in [facility]. He has no new episodes on interrogation today. Please see interrogation for more detail. Dr. Edward Garcia will review interrogation report. Patient wishes for us to start monitoring his device and home monitor. I have requested transfer from [facility]. Patient is to see Dr. Edward Garcia today in office. Patient will follow up in 3 months in office or remotely.

## 2018-02-22 NOTE — PATIENT INSTRUCTIONS
Patient Education        Learning About Coronary Artery Disease (CAD)  What is coronary artery disease? Coronary artery disease (CAD) occurs when plaque builds up in the arteries that bring oxygen-rich blood to your heart. Plaque is a fatty substance made of cholesterol, calcium, and other substances in the blood. This process is called hardening of the arteries, or atherosclerosis. What happens when you have coronary artery disease? · Plaque may narrow the coronary arteries. Narrowed arteries cause poor blood flow. This can lead to angina symptoms such as chest pain or discomfort. If blood flow is completely blocked, you could have a heart attack. · You can slow CAD and reduce the risk of future problems by making changes in your lifestyle. These include quitting smoking and eating heart-healthy foods. · Treatments for CAD, along with changes in your lifestyle, can help you live a longer and healthier life. How can you prevent coronary artery disease? · Do not smoke. It may be the best thing you can do to prevent heart disease. If you need help quitting, talk to your doctor about stop-smoking programs and medicines. These can increase your chances of quitting for good. · Be active. Get at least 30 minutes of exercise on most days of the week. Walking is a good choice. You also may want to do other activities, such as running, swimming, cycling, or playing tennis or team sports. · Eat heart-healthy foods. Eat more fruits and vegetables and less foods that contain saturated and trans fats. Limit alcohol, sodium, and sweets. · Stay at a healthy weight. Lose weight if you need to. · Manage other health problems such as diabetes, high blood pressure, and high cholesterol. · Manage stress. Stress can hurt your heart. To keep stress low, talk about your problems and feelings. Don't keep your feelings hidden. · If you have talked about it with your doctor, take a low-dose aspirin every day.  Aspirin can help certain people lower their risk of a heart attack or stroke. But taking aspirin isn't right for everyone, because it can cause serious bleeding. Do not start taking daily aspirin unless your doctor knows about it. How is coronary artery disease treated? · Your doctor will suggest that you make lifestyle changes. For example, your doctor may ask you to eat healthy foods, quit smoking, lose extra weight, and be more active. · You will have to take medicines. · Your doctor may suggest a procedure to open narrowed or blocked arteries. This is called angioplasty. Or your doctor may suggest using healthy blood vessels to create detours around narrowed or blocked arteries. This is called bypass surgery. Follow-up care is a key part of your treatment and safety. Be sure to make and go to all appointments, and call your doctor if you are having problems. It's also a good idea to know your test results and keep a list of the medicines you take. Where can you learn more? Go to https://[employer] org and sign in to your Owl[employer] account. Enter [phone] in the Yoyo box to learn more about \"Learning About Coronary Artery Disease (CAD). \"     If you do not have an account, please click on the \"Sign Up Now\" link. Current as of: September 21, 2016  Content Version: 11.5  © 3934-4940 Healthwise, Incorporated. Care instructions adapted under license by [facility] ([facility]). If you have questions about a medical condition or this instruction, always ask your healthcare professional. [place] [address] any warranty or liability for your use of this information.

## 2018-03-16 ENCOUNTER — OFFICE VISIT (OUTPATIENT)
Dept: INTERNAL MEDICINE CLINIC | Age: 83
End: 2018-03-16

## 2018-03-16 VITALS
BODY MASS INDEX: 30.08 KG/M2 | TEMPERATURE: 97.4 F | DIASTOLIC BLOOD PRESSURE: 82 MMHG | SYSTOLIC BLOOD PRESSURE: 138 MMHG | HEIGHT: 66 IN | HEART RATE: 61 BPM | WEIGHT: 187.2 LBS | OXYGEN SATURATION: 96 %

## 2018-03-16 DIAGNOSIS — I50.9 CONGESTIVE HEART FAILURE, UNSPECIFIED CONGESTIVE HEART FAILURE CHRONICITY, UNSPECIFIED CONGESTIVE HEART FAILURE TYPE: ICD-10-CM

## 2018-03-16 DIAGNOSIS — J20.9 ACUTE BRONCHITIS, UNSPECIFIED ORGANISM: Primary | ICD-10-CM

## 2018-03-16 DIAGNOSIS — M79.89 LEG SWELLING: ICD-10-CM

## 2018-03-16 LAB
ANION GAP SERPL CALCULATED.3IONS-SCNC: 16 MMOL/L (ref 3–16)
BUN BLDV-MCNC: 29 MG/DL (ref 7–20)
CALCIUM SERPL-MCNC: 8.8 MG/DL (ref 8.3–10.6)
CHLORIDE BLD-SCNC: 102 MMOL/L (ref 99–110)
CO2: 24 MMOL/L (ref 21–32)
CREAT SERPL-MCNC: 1.5 MG/DL (ref 0.8–1.3)
GFR AFRICAN AMERICAN: 53
GFR NON-AFRICAN AMERICAN: 44
GLUCOSE BLD-MCNC: 116 MG/DL (ref 70–99)
POTASSIUM SERPL-SCNC: 5.2 MMOL/L (ref 3.5–5.1)
PRO-BNP: 1170 PG/ML (ref 0–449)
SODIUM BLD-SCNC: 142 MMOL/L (ref 136–145)

## 2018-03-16 PROCEDURE — G8417 CALC BMI ABV UP PARAM F/U: HCPCS | Performed by: INTERNAL MEDICINE

## 2018-03-16 PROCEDURE — 1036F TOBACCO NON-USER: CPT | Performed by: INTERNAL MEDICINE

## 2018-03-16 PROCEDURE — G8482 FLU IMMUNIZE ORDER/ADMIN: HCPCS | Performed by: INTERNAL MEDICINE

## 2018-03-16 PROCEDURE — 99214 OFFICE O/P EST MOD 30 MIN: CPT | Performed by: INTERNAL MEDICINE

## 2018-03-16 PROCEDURE — G8427 DOCREV CUR MEDS BY ELIG CLIN: HCPCS | Performed by: INTERNAL MEDICINE

## 2018-03-16 PROCEDURE — 1123F ACP DISCUSS/DSCN MKR DOCD: CPT | Performed by: INTERNAL MEDICINE

## 2018-03-16 PROCEDURE — G8598 ASA/ANTIPLAT THER USED: HCPCS | Performed by: INTERNAL MEDICINE

## 2018-03-16 PROCEDURE — 4040F PNEUMOC VAC/ADMIN/RCVD: CPT | Performed by: INTERNAL MEDICINE

## 2018-03-16 RX ORDER — FUROSEMIDE 20 MG/1
TABLET ORAL
Qty: 30 TABLET | Refills: 3 | Status: SHIPPED | OUTPATIENT
Start: 2018-03-16 | End: 2018-03-23 | Stop reason: SDUPTHER

## 2018-03-16 RX ORDER — AZITHROMYCIN 250 MG/1
TABLET, FILM COATED ORAL
Qty: 1 PACKET | Refills: 0 | Status: SHIPPED | OUTPATIENT
Start: 2018-03-16 | End: 2018-03-20 | Stop reason: SDUPTHER

## 2018-03-16 RX ORDER — PROMETHAZINE HYDROCHLORIDE AND CODEINE PHOSPHATE 6.25; 1 MG/5ML; MG/5ML
5 SYRUP ORAL EVERY 4 HOURS PRN
Qty: 150 ML | Refills: 0 | Status: SHIPPED | OUTPATIENT
Start: 2018-03-16 | End: 2018-03-23

## 2018-03-16 NOTE — PROGRESS NOTES
facility-administered medications for this visit. Past Medical History:   Diagnosis Date    BPH with obstruction/lower urinary tract symptoms     nocturia 3-4 x per night    Chronic atrial fibrillation (HCC)     CKD (chronic kidney disease) 2017    level 3    Coronary arteriosclerosis 2009    2 stents/ Dr Tammy Duron in [facility] GERD (gastroesophageal reflux disease)     HTN (hypertension) 1990    Left hip pain     schrapnel in hip and intestines/chronic pains left hip impairs walking    Pacemaker 2015    Renal atrophy, right 2017    SOB (shortness of breath) 2017    terrell normal pft and ct chest ? smll amount of pulmonary fibrosis     Past Surgical History:   Procedure Laterality Date    CORONARY ANGIOPLASTY WITH STENT PLACEMENT  2009    awoke with indigestion    INGUINAL HERNIA REPAIR Right 07/2016    PACEMAKER INSERTION  2014    SMALL INTESTINE SURGERY  1945    schrapnel     TONSILLECTOMY  1960     Social History   Substance Use Topics    Smoking status: Never Smoker    Smokeless tobacco: Never Used    Alcohol use No     Family History   Problem Relation Age of Onset    Pacemaker Sister     Pacemaker Brother           ROS  Walks a mile when bhe exercises gets pain in hips and legs etc  Ankles swell  Objective:   Physical Exam   Constitutional: He is well-developed, well-nourished, and in no distress. HENT:   Head: Normocephalic and atraumatic. Right Ear: External ear normal.   Left Ear: External ear normal.   Nose: Nose normal.   Mouth/Throat: Oropharynx is clear and moist.   Sinuses nontender  Hearing aids in  Op pink nodes neg   Eyes: Conjunctivae and EOM are normal. Pupils are equal, round, and reactive to light. Right eye exhibits no discharge. Left eye exhibits no discharge. No scleral icterus. Neck: Normal range of motion. Neck supple. No JVD present. No tracheal deviation present. No thyromegaly present. Cardiovascular: Normal rate, regular rhythm and normal heart sounds.   Exam

## 2018-03-20 ENCOUNTER — TELEPHONE (OUTPATIENT)
Dept: INTERNAL MEDICINE CLINIC | Age: 83
End: 2018-03-20

## 2018-03-20 DIAGNOSIS — J20.9 ACUTE BRONCHITIS, UNSPECIFIED ORGANISM: ICD-10-CM

## 2018-03-20 RX ORDER — AZITHROMYCIN 250 MG/1
TABLET, FILM COATED ORAL
Qty: 1 PACKET | Refills: 0 | Status: SHIPPED | OUTPATIENT
Start: 2018-03-20 | End: 2018-03-30

## 2018-03-20 NOTE — TELEPHONE ENCOUNTER
Patient's daughter Hector Goodman called and said her father was prescribed a Z gary and codeine cough syrup at his appointment on 3/16/18    Today is the last day of Z gary and he is still coughing up mucous would like to get another round of antibiotics for him    [facility]   [phone]    Please Advise

## 2018-03-25 RX ORDER — FUROSEMIDE 20 MG/1
TABLET ORAL
Qty: 30 TABLET | Refills: 3 | Status: SHIPPED | OUTPATIENT
Start: 2018-03-25 | End: 2018-07-31 | Stop reason: SDUPTHER

## 2018-04-10 ENCOUNTER — OFFICE VISIT (OUTPATIENT)
Dept: INTERNAL MEDICINE CLINIC | Age: 83
End: 2018-04-10

## 2018-04-10 VITALS
HEIGHT: 66 IN | WEIGHT: 182 LBS | HEART RATE: 63 BPM | TEMPERATURE: 97.5 F | DIASTOLIC BLOOD PRESSURE: 72 MMHG | OXYGEN SATURATION: 98 % | BODY MASS INDEX: 29.25 KG/M2 | SYSTOLIC BLOOD PRESSURE: 134 MMHG

## 2018-04-10 DIAGNOSIS — N13.8 BPH WITH OBSTRUCTION/LOWER URINARY TRACT SYMPTOMS: ICD-10-CM

## 2018-04-10 DIAGNOSIS — I50.32 CHRONIC DIASTOLIC CONGESTIVE HEART FAILURE (HCC): ICD-10-CM

## 2018-04-10 DIAGNOSIS — I10 ESSENTIAL HYPERTENSION: ICD-10-CM

## 2018-04-10 DIAGNOSIS — N13.8 BPH WITH OBSTRUCTION/LOWER URINARY TRACT SYMPTOMS: Primary | ICD-10-CM

## 2018-04-10 DIAGNOSIS — J20.9 ACUTE BRONCHITIS, UNSPECIFIED ORGANISM: ICD-10-CM

## 2018-04-10 DIAGNOSIS — N40.1 BPH WITH OBSTRUCTION/LOWER URINARY TRACT SYMPTOMS: Primary | ICD-10-CM

## 2018-04-10 DIAGNOSIS — I25.10 CORONARY ARTERIOSCLEROSIS: ICD-10-CM

## 2018-04-10 DIAGNOSIS — N40.1 BPH WITH OBSTRUCTION/LOWER URINARY TRACT SYMPTOMS: ICD-10-CM

## 2018-04-10 DIAGNOSIS — N18.30 CKD (CHRONIC KIDNEY DISEASE) STAGE 3, GFR 30-59 ML/MIN (HCC): ICD-10-CM

## 2018-04-10 LAB
ANION GAP SERPL CALCULATED.3IONS-SCNC: 16 MMOL/L (ref 3–16)
BILIRUBIN, POC: NORMAL
BLOOD URINE, POC: NORMAL
BUN BLDV-MCNC: 44 MG/DL (ref 7–20)
CALCIUM SERPL-MCNC: 9.1 MG/DL (ref 8.3–10.6)
CHLORIDE BLD-SCNC: 98 MMOL/L (ref 99–110)
CLARITY, POC: NORMAL
CO2: 24 MMOL/L (ref 21–32)
COLOR, POC: NORMAL
CREAT SERPL-MCNC: 1.7 MG/DL (ref 0.8–1.3)
GFR AFRICAN AMERICAN: 46
GFR NON-AFRICAN AMERICAN: 38
GLUCOSE BLD-MCNC: 104 MG/DL (ref 70–99)
GLUCOSE URINE, POC: NORMAL
KETONES, POC: NORMAL
LEUKOCYTE EST, POC: NORMAL
NITRITE, POC: NORMAL
PH, POC: 5.5
POTASSIUM SERPL-SCNC: 5 MMOL/L (ref 3.5–5.1)
PRO-BNP: 1017 PG/ML (ref 0–449)
PROTEIN, POC: NORMAL
SODIUM BLD-SCNC: 138 MMOL/L (ref 136–145)
SPECIFIC GRAVITY, POC: 1.01
UROBILINOGEN, POC: 0.2

## 2018-04-10 PROCEDURE — 1123F ACP DISCUSS/DSCN MKR DOCD: CPT | Performed by: INTERNAL MEDICINE

## 2018-04-10 PROCEDURE — 99214 OFFICE O/P EST MOD 30 MIN: CPT | Performed by: INTERNAL MEDICINE

## 2018-04-10 PROCEDURE — 4040F PNEUMOC VAC/ADMIN/RCVD: CPT | Performed by: INTERNAL MEDICINE

## 2018-04-10 PROCEDURE — G8598 ASA/ANTIPLAT THER USED: HCPCS | Performed by: INTERNAL MEDICINE

## 2018-04-10 PROCEDURE — G8417 CALC BMI ABV UP PARAM F/U: HCPCS | Performed by: INTERNAL MEDICINE

## 2018-04-10 PROCEDURE — 81002 URINALYSIS NONAUTO W/O SCOPE: CPT | Performed by: INTERNAL MEDICINE

## 2018-04-10 PROCEDURE — G8428 CUR MEDS NOT DOCUMENT: HCPCS | Performed by: INTERNAL MEDICINE

## 2018-04-10 PROCEDURE — 1036F TOBACCO NON-USER: CPT | Performed by: INTERNAL MEDICINE

## 2018-04-10 RX ORDER — TAMSULOSIN HYDROCHLORIDE 0.4 MG/1
CAPSULE ORAL
Qty: 90 CAPSULE | Refills: 3 | Status: SHIPPED | OUTPATIENT
Start: 2018-04-10 | End: 2019-01-28 | Stop reason: SDUPTHER

## 2018-04-10 RX ORDER — FLUTICASONE PROPIONATE 50 MCG
1 SPRAY, SUSPENSION (ML) NASAL DAILY
Qty: 1 BOTTLE | Refills: 5 | Status: SHIPPED | OUTPATIENT
Start: 2018-04-10 | End: 2019-04-22 | Stop reason: SDUPTHER

## 2018-04-26 RX ORDER — OMEPRAZOLE 20 MG/1
20 CAPSULE, DELAYED RELEASE ORAL DAILY
Qty: 30 CAPSULE | Refills: 5 | Status: SHIPPED | OUTPATIENT
Start: 2018-04-26 | End: 2018-05-07 | Stop reason: SDUPTHER

## 2018-05-07 RX ORDER — OMEPRAZOLE 20 MG/1
20 CAPSULE, DELAYED RELEASE ORAL DAILY
Qty: 90 CAPSULE | Refills: 2 | Status: SHIPPED | OUTPATIENT
Start: 2018-05-07 | End: 2019-01-28 | Stop reason: SDUPTHER

## 2018-05-29 ENCOUNTER — NURSE ONLY (OUTPATIENT)
Dept: CARDIOLOGY CLINIC | Age: 83
End: 2018-05-29

## 2018-05-29 DIAGNOSIS — Z95.0 PACEMAKER: ICD-10-CM

## 2018-05-29 DIAGNOSIS — R00.1 BRADYCARDIA: ICD-10-CM

## 2018-05-30 PROCEDURE — 93296 REM INTERROG EVL PM/IDS: CPT | Performed by: INTERNAL MEDICINE

## 2018-05-30 PROCEDURE — 93294 REM INTERROG EVL PM/LDLS PM: CPT | Performed by: INTERNAL MEDICINE

## 2018-06-11 RX ORDER — OLMESARTAN MEDOXOMIL 40 MG/1
40 TABLET ORAL DAILY
Qty: 30 TABLET | Refills: 2 | Status: SHIPPED | OUTPATIENT
Start: 2018-06-11 | End: 2018-09-18 | Stop reason: SDUPTHER

## 2018-06-25 ENCOUNTER — TELEPHONE (OUTPATIENT)
Dept: INTERNAL MEDICINE CLINIC | Age: 83
End: 2018-06-25

## 2018-06-29 ENCOUNTER — OFFICE VISIT (OUTPATIENT)
Dept: INTERNAL MEDICINE CLINIC | Age: 83
End: 2018-06-29

## 2018-06-29 VITALS
OXYGEN SATURATION: 98 % | DIASTOLIC BLOOD PRESSURE: 66 MMHG | HEART RATE: 60 BPM | TEMPERATURE: 97.6 F | WEIGHT: 186.6 LBS | HEIGHT: 66 IN | BODY MASS INDEX: 29.99 KG/M2 | SYSTOLIC BLOOD PRESSURE: 132 MMHG

## 2018-06-29 DIAGNOSIS — M25.561 ACUTE PAIN OF RIGHT KNEE: Primary | ICD-10-CM

## 2018-06-29 PROCEDURE — 20610 DRAIN/INJ JOINT/BURSA W/O US: CPT | Performed by: INTERNAL MEDICINE

## 2018-06-29 RX ORDER — OXYBUTYNIN CHLORIDE 5 MG/1
5 TABLET ORAL DAILY
COMMUNITY
End: 2019-06-13 | Stop reason: SDUPTHER

## 2018-06-29 NOTE — PROGRESS NOTES
Vitamins-Minerals (CENTRUM SILVER ADULT 50+) TABS Take 1 tablet by mouth daily       No current facility-administered medications for this visit.       Past Medical History:   Diagnosis Date    BPH with obstruction/lower urinary tract symptoms     nocturia 3-4 x per night    Chronic atrial fibrillation (HCC)     CKD (chronic kidney disease) 2017    level 3    Coronary arteriosclerosis 2009    2 stents/ Dr Michael Beard in [facility] GERD (gastroesophageal reflux disease)     HTN (hypertension) 1990    Left hip pain     schrapnel in hip and intestines/chronic pains left hip impairs walking    Pacemaker 2015    Pulmonary fibrosis ([address] 08/2017    mild amount seen on chest ct    Pulmonary nodule, left 2017    noncalcified 7mm low risk repeat 8/2018 if patient willing    Renal atrophy, right 2017    SOB (shortness of breath) 2017    terrell normal pft and ct chest ? smll amount of pulmonary fibrosis     Past Surgical History:   Procedure Laterality Date    CORONARY ANGIOPLASTY WITH STENT PLACEMENT  2009    awoke with indigestion    INGUINAL HERNIA REPAIR Right 07/2016    PACEMAKER INSERTION  2014    SMALL INTESTINE SURGERY  1945    schrapnel     TONSILLECTOMY  1960     Social History   Substance Use Topics    Smoking status: Never Smoker    Smokeless tobacco: Never Used    Alcohol use No     Family History   Problem Relation Age of Onset   [facility] Pacemaker Sister     Pacemaker Brother           ROS    Objective:   Physical Exam   Musculoskeletal:   Walking with a limp favoring right knee which is flexed somewhat  Exam no effusion but bony enlargment and tender medially more than laterally       Obtained verbal consent and procedure explained  Patient injected with 4ml Lidocaine 1% and 80mg kenalog /2ml from medial perspective with success  Assessment/Plan     knee pain right and arthritis new onset no sign of infection   Proceeded with injection ,icing and can inject up to every 3 months      Estee Manjarrez MD

## 2018-07-25 ENCOUNTER — TELEPHONE (OUTPATIENT)
Dept: INTERNAL MEDICINE CLINIC | Age: 83
End: 2018-07-25

## 2018-07-25 NOTE — TELEPHONE ENCOUNTER
Patient is requesting a preop  preop/eye lid/Dr. Billy Ruiz 7/31/18    Patient just received date of service, there is no time slots for 30 minutes for preop  Offered Monday morning with Waynetta Frankel NP  Patient said that was cutting it too close      Please Advise

## 2018-07-26 ENCOUNTER — TELEPHONE (OUTPATIENT)
Dept: INTERNAL MEDICINE CLINIC | Age: 83
End: 2018-07-26

## 2018-07-26 NOTE — TELEPHONE ENCOUNTER
[address] [facility] #[phone]  Fax #[phone]    Patient has a preop on Monday July 30th @ 10 am  [facility] is requesting copy of preop physical be faxed     Patient's procedure is schedule for next Thursday    Please Advise

## 2018-07-27 ENCOUNTER — TELEPHONE (OUTPATIENT)
Dept: CARDIOLOGY CLINIC | Age: 83
End: 2018-07-27

## 2018-07-30 ENCOUNTER — OFFICE VISIT (OUTPATIENT)
Dept: INTERNAL MEDICINE CLINIC | Age: 83
End: 2018-07-30

## 2018-07-30 VITALS
BODY MASS INDEX: 29.89 KG/M2 | DIASTOLIC BLOOD PRESSURE: 64 MMHG | HEIGHT: 66 IN | SYSTOLIC BLOOD PRESSURE: 122 MMHG | OXYGEN SATURATION: 98 % | WEIGHT: 186 LBS | RESPIRATION RATE: 18 BRPM | HEART RATE: 92 BPM | TEMPERATURE: 97.9 F

## 2018-07-30 DIAGNOSIS — K21.9 GASTROESOPHAGEAL REFLUX DISEASE, ESOPHAGITIS PRESENCE NOT SPECIFIED: ICD-10-CM

## 2018-07-30 DIAGNOSIS — J84.10 PULMONARY FIBROSIS (HCC): ICD-10-CM

## 2018-07-30 DIAGNOSIS — I10 ESSENTIAL HYPERTENSION: ICD-10-CM

## 2018-07-30 DIAGNOSIS — I25.10 CORONARY ARTERIOSCLEROSIS: ICD-10-CM

## 2018-07-30 DIAGNOSIS — Z01.818 PRE-OP EVALUATION: Primary | ICD-10-CM

## 2018-07-30 PROCEDURE — 99214 OFFICE O/P EST MOD 30 MIN: CPT | Performed by: INTERNAL MEDICINE

## 2018-07-30 ASSESSMENT — ENCOUNTER SYMPTOMS
SHORTNESS OF BREATH: 0
TROUBLE SWALLOWING: 0
NAUSEA: 0
WHEEZING: 0
VOMITING: 0
DIARRHEA: 0
ABDOMINAL PAIN: 0
SORE THROAT: 0

## 2018-07-30 NOTE — PROGRESS NOTES
Department of Internal Medicine  Clinic Note    Date: 7/30/2018                                               Subjective/Objective:     Chief Complaint   Patient presents with    Pre-op Exam     having lower lid surgery b/l tomorrow at [facility] by Dr Baum Failing     HPI: Pt presents today for pre-op evaluation for eye-lid surgery. Pt is still active and states that he walked > 3 miles last week. Active Cardiac Conditions: A.fin (on Xarelto), currently on hold. Active Pulmonary Conditions: Pulmonary Fibrosis (on room air)  History of Cardiac Conditions: Pacemaker Placement, Stent placement (2009)  History of Pulmonary Conditions: Pulm Fibrosis    Previous surgeries requiring anesthesia: Hernia repair 2015'. No shawna-operative complications     Clinical Risk Factors: CAD: yes,  DM: no,  CHF: no,  CVA: no,  Renal Disease: CKD    ECHO 6/29/17:   Summary   Left ventricle size is normal. Normal left ventricular wall thickness.   Ejection fraction is visually estimated to be 55%. There is akinesis of the   mid anteroseptal wall.   Mild to moderate mitral regurgitation is present.   The left atrium is moderately dilated.   Mild aortic regurgitation is present.   The right ventricle appears normal in size.   There is mild tricuspid regurgitation with RVSP estimated at 31 mmHg. Based on the Revised Cardiac Risk Index the pt is Low risk for perioperative surgical complications and may proceed from my perspective          Patient Active Problem List    Diagnosis Date Noted    Pulmonary fibrosis ([address]     Left hip pain     BPH with obstruction/lower urinary tract symptoms     GERD (gastroesophageal reflux disease)     SOB (shortness of breath) 01/01/2017    Pacemaker 01/01/2015    Coronary arteriosclerosis 01/01/2009    HTN (hypertension) 01/01/1990    Bradycardia 01/01/1960     Social History:   TOBACCO:   reports that he has never smoked.  He has never used smokeless tobacco.     ETOH:   reports that he fluticasone (FLONASE) 50 MCG/ACT nasal spray 1 spray by Nasal route daily 1 Bottle 5    furosemide (LASIX) 20 MG tablet One a day prn leg swelling 30 tablet 3    finasteride (PROSCAR) 5 MG tablet Take 5 mg by mouth daily      atorvastatin (LIPITOR) 40 MG tablet Take 40 mg by mouth daily      amLODIPine (NORVASC) 5 MG tablet Take 5 mg by mouth daily      Cholecalciferol (VITAMIN D3) 2000 UNITS CAPS Take 2,000 Units by mouth daily      calcium carbonate (OSCAL) 500 MG TABS tablet Take 600 mg by mouth daily caltrate 600-D3 one daily      Multiple Vitamins-Minerals (CENTRUM SILVER ADULT 50+) TABS Take 1 tablet by mouth daily      omeprazole (PRILOSEC) 20 MG delayed release capsule Take 1 capsule by mouth daily Instead of pepcid for heart burn on an empty stomach 90 capsule 2    rivaroxaban (XARELTO) 15 MG TABS tablet Take 1 tablet by mouth daily (with breakfast) 90 tablet 5    aspirin 81 MG tablet Take 81 mg by mouth daily       No current facility-administered medications for this visit. Allergies   Allergen Reactions    Claritin [Loratadine]      Reduced urine flow    Zestril [Lisinopril] Other (See Comments)     coughing       Review of Systems   Constitutional: Negative for chills, fatigue and fever. HENT: Negative for ear pain, sore throat, tinnitus and trouble swallowing. Eyes: Negative for visual disturbance. Respiratory: Negative for shortness of breath and wheezing. Cardiovascular: Negative for chest pain and palpitations. Gastrointestinal: Negative for abdominal pain, diarrhea, nausea and vomiting. Endocrine: Negative for cold intolerance and heat intolerance. Genitourinary: Negative for difficulty urinating and dysuria. Neurological: Negative for dizziness, weakness and numbness. Psychiatric/Behavioral: Negative for agitation, decreased concentration and suicidal ideas. The patient is not nervous/anxious. All other systems reviewed and are negative.     Vitals:  BP software. Every effort was made to ensure accuracy; however, inadvertent unintentional computerized transcription errors may be present.

## 2018-07-31 RX ORDER — FUROSEMIDE 20 MG/1
TABLET ORAL
Qty: 30 TABLET | Refills: 0 | Status: SHIPPED | OUTPATIENT
Start: 2018-07-31 | End: 2018-09-11 | Stop reason: SDUPTHER

## 2018-09-04 ENCOUNTER — NURSE ONLY (OUTPATIENT)
Dept: CARDIOLOGY CLINIC | Age: 83
End: 2018-09-04

## 2018-09-04 DIAGNOSIS — Z95.0 PACEMAKER: ICD-10-CM

## 2018-09-04 DIAGNOSIS — R00.1 BRADYCARDIA: ICD-10-CM

## 2018-09-04 PROCEDURE — 93294 REM INTERROG EVL PM/LDLS PM: CPT | Performed by: INTERNAL MEDICINE

## 2018-09-04 PROCEDURE — 93296 REM INTERROG EVL PM/IDS: CPT | Performed by: INTERNAL MEDICINE

## 2018-09-05 NOTE — PROGRESS NOTES
Merlin remote transmission shows normal function.      -last interrogation: 5/30/18  No new episodes/arrhythmias noted. Dr. Ramirez Herb to review interrogation. See interrogation/Paceart report for further details.

## 2018-09-12 RX ORDER — FUROSEMIDE 20 MG/1
TABLET ORAL
Qty: 90 TABLET | Refills: 0 | Status: SHIPPED | OUTPATIENT
Start: 2018-09-12 | End: 2018-12-08 | Stop reason: SDUPTHER

## 2018-09-18 RX ORDER — OLMESARTAN MEDOXOMIL 40 MG/1
TABLET ORAL
Qty: 30 TABLET | Refills: 2 | Status: SHIPPED | OUTPATIENT
Start: 2018-09-18 | End: 2018-10-15 | Stop reason: SDUPTHER

## 2018-11-06 ENCOUNTER — APPOINTMENT (OUTPATIENT)
Dept: GENERAL RADIOLOGY | Age: 83
End: 2018-11-06
Payer: MEDICARE

## 2018-11-06 ENCOUNTER — APPOINTMENT (OUTPATIENT)
Dept: CT IMAGING | Age: 83
End: 2018-11-06
Payer: MEDICARE

## 2018-11-06 ENCOUNTER — HOSPITAL ENCOUNTER (EMERGENCY)
Age: 83
Discharge: HOME | End: 2018-11-06
Attending: EMERGENCY MEDICINE
Payer: MEDICARE

## 2018-11-06 VITALS
HEIGHT: 66 IN | SYSTOLIC BLOOD PRESSURE: 124 MMHG | OXYGEN SATURATION: 98 % | WEIGHT: 192.9 LBS | HEART RATE: 62 BPM | BODY MASS INDEX: 31 KG/M2 | RESPIRATION RATE: 16 BRPM | TEMPERATURE: 97.7 F | DIASTOLIC BLOOD PRESSURE: 74 MMHG

## 2018-11-06 DIAGNOSIS — S09.90XA INJURY OF HEAD, INITIAL ENCOUNTER: Primary | ICD-10-CM

## 2018-11-06 PROCEDURE — 70450 CT HEAD/BRAIN W/O DYE: CPT

## 2018-11-06 PROCEDURE — 73030 X-RAY EXAM OF SHOULDER: CPT

## 2018-11-06 PROCEDURE — 73562 X-RAY EXAM OF KNEE 3: CPT

## 2018-11-06 PROCEDURE — 99284 EMERGENCY DEPT VISIT MOD MDM: CPT

## 2018-11-06 PROCEDURE — 90471 IMMUNIZATION ADMIN: CPT | Performed by: EMERGENCY MEDICINE

## 2018-11-06 PROCEDURE — 6370000000 HC RX 637 (ALT 250 FOR IP): Performed by: EMERGENCY MEDICINE

## 2018-11-06 PROCEDURE — 6360000002 HC RX W HCPCS: Performed by: EMERGENCY MEDICINE

## 2018-11-06 PROCEDURE — 90715 TDAP VACCINE 7 YRS/> IM: CPT | Performed by: EMERGENCY MEDICINE

## 2018-11-06 RX ORDER — BACITRACIN ZINC AND POLYMYXIN B SULFATE 500; 1000 [USP'U]/G; [USP'U]/G
OINTMENT TOPICAL ONCE
Status: COMPLETED | OUTPATIENT
Start: 2018-11-06 | End: 2018-11-06

## 2018-11-06 RX ADMIN — TETANUS TOXOID, REDUCED DIPHTHERIA TOXOID AND ACELLULAR PERTUSSIS VACCINE, ADSORBED 0.5 ML: 5; 2.5; 8; 8; 2.5 SUSPENSION INTRAMUSCULAR at 16:52

## 2018-11-06 RX ADMIN — BACITRACIN ZINC AND POLYMYXIN B SULFATE: at 18:13

## 2018-11-06 ASSESSMENT — ENCOUNTER SYMPTOMS
BLOOD IN STOOL: 0
CONSTIPATION: 0
BACK PAIN: 0
APNEA: 0
ABDOMINAL PAIN: 0
WHEEZING: 0
SHORTNESS OF BREATH: 0
ABDOMINAL DISTENTION: 0
DIARRHEA: 0
CHEST TIGHTNESS: 0
PHOTOPHOBIA: 0
VOMITING: 0
COUGH: 0
EYE DISCHARGE: 0
NAUSEA: 0
CHOKING: 0
ANAL BLEEDING: 0
EYE ITCHING: 0
COLOR CHANGE: 0
RECTAL PAIN: 0
EYE REDNESS: 0
EYE PAIN: 0
STRIDOR: 0

## 2018-11-06 ASSESSMENT — PAIN DESCRIPTION - PAIN TYPE: TYPE: ACUTE PAIN

## 2018-11-06 ASSESSMENT — PAIN SCALES - GENERAL
PAINLEVEL_OUTOF10: 9
PAINLEVEL_OUTOF10: 5

## 2018-11-06 ASSESSMENT — PAIN DESCRIPTION - LOCATION: LOCATION: SHOULDER;FACE

## 2018-11-06 NOTE — ED PROVIDER NOTES
[facility]  eMERGENCY dEPARTMENT eNCOUnter      Pt Name: Alissa Sanz  MRN: [id number]  [place] 12/21/1925  Date of evaluation: 11/6/2018  Provider: Gianni Gold MD    CHIEF COMPLAINT       Chief Complaint   Patient presents with    Fall     Patient states he got out without his cane and fell and hit his forehead and face on concrete. Patient denies loc. patient takes xeralto. Patient was suppose to come here for an outpatient x-ray on left shoulder. HISTORY OF PRESENT ILLNESS    Alissa Sanz is a 80 y.o. male who presents to the emergency department with Fall. Patient with fall today. Patient is on anti-coagulation positive for head trauma. No loss of consciousness. Patient states he was walking outside with cane tripped over his feet and landed on his face. No presyncope or dizziness prior to fall. Patient states it was mechanical in nature. Patient versus right knee pain, left shoulder pain, as well as headache. No chest pain or shortness of breath. No other associated symptoms. Denies neck pain. Nursing Notes were reviewed. REVIEW OF SYSTEMS       Review of Systems   Constitutional: Negative for activity change, appetite change, chills, diaphoresis, fatigue, fever and unexpected weight change. HENT: Negative for congestion, dental problem, drooling, ear discharge and ear pain. Eyes: Negative for photophobia, pain, discharge, redness, itching and visual disturbance. Respiratory: Negative for apnea, cough, choking, chest tightness, shortness of breath, wheezing and stridor. Cardiovascular: Negative for chest pain, palpitations and leg swelling. Gastrointestinal: Negative for abdominal distention, abdominal pain, anal bleeding, blood in stool, constipation, diarrhea, nausea, rectal pain and vomiting. Endocrine: Negative for cold intolerance and heat intolerance. Genitourinary: Negative for decreased urine volume and urgency. of motion. No tracheal deviation present. No thyromegaly present. Cardiovascular: Normal rate and regular rhythm. No murmur heard. Pulmonary/Chest: He has no wheezes. He has no rales. He exhibits no tenderness. Abdominal: Soft. He exhibits no distension and no mass. There is no tenderness. There is no rebound and no guarding. Musculoskeletal: Normal range of motion. He exhibits no edema, tenderness or deformity. Neurological: He is alert. No cranial nerve deficit. He exhibits normal muscle tone. Coordination normal.   Skin: Skin is warm. No rash noted. He is not diaphoretic. No erythema. No pallor. DIAGNOSTIC RESULTS     EKG: All EKG's are interpreted by the Emergency Department Physician who either signs or Co-signs this chart in the absence of acardiologist.    None    RADIOLOGY:   Non-plain film images such as CT, Ultrasoundand MRI are read by the radiologist. Plain radiographic images are visualized and preliminarily interpreted by the emergency physician with the below findings:    Imaging reassuring    ED BEDSIDE ULTRASOUND:   Performed by ED Physician - none    LABS:  Labs Reviewed - No data to display    All other labs were withinnormal range or not returned as of this dictation. EMERGENCY DEPARTMENT COURSE and DIFFERENTIAL DIAGNOSIS/MDM:     Mechanical fall. Imaging reassuring. I discussed with patient the results of evaluation in the ED, diagnosis, care, and prognosis. The plan is to discharge to home. Patient is in agreement with plan and questions have been answered.      I also discussed with patient the reasons which may require a return visit and the importance of follow-up care. The patient is well-appearing, nontoxic, and improved at the time of discharge. Patient agrees to call to arrange follow-up care as directed. Patient understands to return immediately for worsening/change in symptoms.       CRITICAL CARE TIME   Total Critical Caretime was 0 minutes, excluding

## 2018-11-06 NOTE — ED NOTES
Patient with abrasions to forehead, right cheek, nose, right knee, bilateral hands. Abrasions cleaned. Patient tolerated well. No complaints. Call light within reach. Family at bedside. Will continue to monitor.       Cyndi Barboza RN  11/06/18 [id number]

## 2018-12-10 RX ORDER — FUROSEMIDE 20 MG/1
TABLET ORAL
Qty: 90 TABLET | Refills: 0 | Status: SHIPPED | OUTPATIENT
Start: 2018-12-10 | End: 2019-01-28 | Stop reason: SDUPTHER

## 2019-02-22 RX ORDER — ATORVASTATIN CALCIUM 40 MG/1
40 TABLET, FILM COATED ORAL DAILY
Qty: 90 TABLET | Refills: 2 | OUTPATIENT
Start: 2019-02-22 | End: 2020-01-01 | Stop reason: SDUPTHER

## 2019-02-25 PROBLEM — N28.1 RENAL CYST, RIGHT: Status: ACTIVE | Noted: 2017-08-01

## 2019-02-27 ENCOUNTER — OFFICE VISIT (OUTPATIENT)
Dept: ENT CLINIC | Age: 84
End: 2019-02-27
Payer: MEDICARE

## 2019-02-27 DIAGNOSIS — H61.22 IMPACTED CERUMEN OF LEFT EAR: ICD-10-CM

## 2019-02-27 PROCEDURE — 69210 REMOVE IMPACTED EAR WAX UNI: CPT | Performed by: OTOLARYNGOLOGY

## 2019-03-27 ENCOUNTER — HOSPITAL ENCOUNTER (OUTPATIENT)
Dept: GENERAL RADIOLOGY | Age: 84
Discharge: HOME | End: 2019-03-27
Payer: MEDICARE

## 2019-03-27 ENCOUNTER — HOSPITAL ENCOUNTER (OUTPATIENT)
Age: 84
Discharge: HOME | End: 2019-03-27
Payer: MEDICARE

## 2019-03-27 ENCOUNTER — HOSPITAL ENCOUNTER (OUTPATIENT)
Dept: PULMONOLOGY | Age: 84
Discharge: HOME | End: 2019-03-27
Payer: MEDICARE

## 2019-03-27 ENCOUNTER — HOSPITAL ENCOUNTER (OUTPATIENT)
Dept: CT IMAGING | Age: 84
Discharge: HOME | End: 2019-03-27
Payer: MEDICARE

## 2019-03-27 DIAGNOSIS — R42 DISEQUILIBRIUM: ICD-10-CM

## 2019-03-27 DIAGNOSIS — J84.10 PULMONARY FIBROSIS (HCC): ICD-10-CM

## 2019-03-27 DIAGNOSIS — R06.09 DOE (DYSPNEA ON EXERTION): ICD-10-CM

## 2019-03-27 DIAGNOSIS — R53.83 OTHER FATIGUE: ICD-10-CM

## 2019-03-27 DIAGNOSIS — R05.9 COUGH: ICD-10-CM

## 2019-03-27 DIAGNOSIS — R06.02 SOB (SHORTNESS OF BREATH): ICD-10-CM

## 2019-03-27 DIAGNOSIS — N28.1 RENAL CYST, RIGHT: ICD-10-CM

## 2019-03-27 LAB
ALBUMIN SERPL-MCNC: 3.6 G/DL (ref 3.4–5)
ANION GAP SERPL CALCULATED.3IONS-SCNC: 10 MMOL/L (ref 3–16)
BASOPHILS ABSOLUTE: 0 K/UL (ref 0–0.2)
BASOPHILS RELATIVE PERCENT: 0.5 %
BUN BLDV-MCNC: 29 MG/DL (ref 7–20)
CALCIUM SERPL-MCNC: 9.3 MG/DL (ref 8.3–10.6)
CHLORIDE BLD-SCNC: 106 MMOL/L (ref 99–110)
CO2: 25 MMOL/L (ref 21–32)
CREAT SERPL-MCNC: 1.6 MG/DL (ref 0.8–1.3)
DLCO %PRED: 67 %
DLCO PRED: NORMAL ML/MIN/MMHG
DLCO/VA %PRED: NORMAL %
DLCO/VA PRED: NORMAL ML/MIN/MMHG
DLCO/VA: NORMAL ML/MIN/MMHG
DLCO: NORMAL ML/MIN/MMHG
EOSINOPHILS ABSOLUTE: 0.1 K/UL (ref 0–0.6)
EOSINOPHILS RELATIVE PERCENT: 1 %
EXPIRATORY TIME-POST: NORMAL SEC
EXPIRATORY TIME: NORMAL SEC
FEF 25-75% %CHNG: NORMAL
FEF 25-75% %PRED-POST: NORMAL %
FEF 25-75% %PRED-PRE: NORMAL L/SEC
FEF 25-75% PRED: NORMAL L/SEC
FEF 25-75%-POST: NORMAL L/SEC
FEF 25-75%-PRE: NORMAL L/SEC
FEV1 %PRED-POST: 119 %
FEV1 %PRED-PRE: 119 %
FEV1 PRED: NORMAL L
FEV1-POST: NORMAL L
FEV1-PRE: NORMAL L
FEV1/FVC %PRED-POST: NORMAL %
FEV1/FVC %PRED-PRE: NORMAL %
FEV1/FVC PRED: NORMAL %
FEV1/FVC-POST: 121 %
FEV1/FVC-PRE: 115 %
FVC %PRED-POST: NORMAL L
FVC %PRED-PRE: NORMAL %
FVC PRED: NORMAL L
FVC-POST: NORMAL L
FVC-PRE: NORMAL L
GAW %PRED: NORMAL %
GAW PRED: NORMAL L/S/CMH2O
GAW: NORMAL L/S/CMH2O
GFR AFRICAN AMERICAN: 49
GFR NON-AFRICAN AMERICAN: 41
GLUCOSE BLD-MCNC: 112 MG/DL (ref 70–99)
HCT VFR BLD CALC: 41.9 % (ref 40.5–52.5)
HEMOGLOBIN: 14.1 G/DL (ref 13.5–17.5)
IC %PRED: NORMAL %
IC PRED: NORMAL L
IC: NORMAL L
LYMPHOCYTES ABSOLUTE: 2.1 K/UL (ref 1–5.1)
LYMPHOCYTES RELATIVE PERCENT: 20.7 %
MCH RBC QN AUTO: 31.1 PG (ref 26–34)
MCHC RBC AUTO-ENTMCNC: 33.7 G/DL (ref 31–36)
MCV RBC AUTO: 92.1 FL (ref 80–100)
MEP: NORMAL
MIP: NORMAL
MONOCYTES ABSOLUTE: 0.9 K/UL (ref 0–1.3)
MONOCYTES RELATIVE PERCENT: 9.3 %
MVV %PRED-PRE: NORMAL %
MVV PRED: NORMAL L/MIN
MVV-PRE: NORMAL L/MIN
NEUTROPHILS ABSOLUTE: 6.8 K/UL (ref 1.7–7.7)
NEUTROPHILS RELATIVE PERCENT: 68.5 %
PDW BLD-RTO: 17.7 % (ref 12.4–15.4)
PEF %PRED-POST: NORMAL %
PEF %PRED-PRE: NORMAL L/SEC
PEF PRED: NORMAL L/SEC
PEF%CHNG: NORMAL
PEF-POST: NORMAL L/SEC
PEF-PRE: NORMAL L/SEC
PHOSPHORUS: 2.4 MG/DL (ref 2.5–4.9)
PLATELET # BLD: 146 K/UL (ref 135–450)
PMV BLD AUTO: 9.4 FL (ref 5–10.5)
POTASSIUM SERPL-SCNC: 4.9 MMOL/L (ref 3.5–5.1)
RAW %PRED: NORMAL %
RAW PRED: NORMAL CMH2O/L/S
RAW: NORMAL CMH2O/L/S
RBC # BLD: 4.55 M/UL (ref 4.2–5.9)
RV %PRED: NORMAL %
RV PRED: NORMAL L
RV: NORMAL L
SODIUM BLD-SCNC: 141 MMOL/L (ref 136–145)
SVC %PRED: NORMAL %
SVC PRED: NORMAL L
SVC: NORMAL L
TLC %PRED: 76 %
TLC PRED: NORMAL L
TLC: NORMAL L
TSH REFLEX: 3.75 UIU/ML (ref 0.27–4.2)
VA %PRED: NORMAL %
VA PRED: NORMAL L
VA: NORMAL L
VTG %PRED: NORMAL %
VTG PRED: NORMAL L
VTG: NORMAL L
WBC # BLD: 10 K/UL (ref 4–11)

## 2019-03-27 PROCEDURE — 80069 RENAL FUNCTION PANEL: CPT

## 2019-03-27 PROCEDURE — 71250 CT THORAX DX C-: CPT

## 2019-03-27 PROCEDURE — 6370000000 HC RX 637 (ALT 250 FOR IP): Performed by: INTERNAL MEDICINE

## 2019-03-27 PROCEDURE — 94727 GAS DIL/WSHOT DETER LNG VOL: CPT | Performed by: INTERNAL MEDICINE

## 2019-03-27 PROCEDURE — 94729 DIFFUSING CAPACITY: CPT

## 2019-03-27 PROCEDURE — 94060 EVALUATION OF WHEEZING: CPT | Performed by: INTERNAL MEDICINE

## 2019-03-27 PROCEDURE — 71046 X-RAY EXAM CHEST 2 VIEWS: CPT

## 2019-03-27 PROCEDURE — 94664 DEMO&/EVAL PT USE INHALER: CPT

## 2019-03-27 PROCEDURE — 94726 PLETHYSMOGRAPHY LUNG VOLUMES: CPT

## 2019-03-27 PROCEDURE — 84443 ASSAY THYROID STIM HORMONE: CPT

## 2019-03-27 PROCEDURE — 85025 COMPLETE CBC W/AUTO DIFF WBC: CPT

## 2019-03-27 PROCEDURE — 94729 DIFFUSING CAPACITY: CPT | Performed by: INTERNAL MEDICINE

## 2019-03-27 PROCEDURE — 94640 AIRWAY INHALATION TREATMENT: CPT

## 2019-03-27 PROCEDURE — 94060 EVALUATION OF WHEEZING: CPT

## 2019-03-27 PROCEDURE — 36415 COLL VENOUS BLD VENIPUNCTURE: CPT

## 2019-03-27 RX ORDER — ALBUTEROL SULFATE 90 UG/1
4 AEROSOL, METERED RESPIRATORY (INHALATION) ONCE
Status: COMPLETED | OUTPATIENT
Start: 2019-03-27 | End: 2019-03-27

## 2019-03-27 RX ADMIN — ALBUTEROL SULFATE 4 PUFF: 90 AEROSOL, METERED RESPIRATORY (INHALATION) at 13:24

## 2019-03-27 ASSESSMENT — PULMONARY FUNCTION TESTS
FEV1/FVC_PRE: 115
FEV1/FVC_POST: 121
FEV1_PERCENT_PREDICTED_PRE: 119
FEV1_PERCENT_PREDICTED_POST: 119

## 2019-04-05 ENCOUNTER — OFFICE VISIT (OUTPATIENT)
Dept: PULMONOLOGY | Age: 84
End: 2019-04-05
Payer: MEDICARE

## 2019-04-05 VITALS
WEIGHT: 186 LBS | BODY MASS INDEX: 29.89 KG/M2 | SYSTOLIC BLOOD PRESSURE: 117 MMHG | DIASTOLIC BLOOD PRESSURE: 64 MMHG | OXYGEN SATURATION: 97 % | HEART RATE: 61 BPM | HEIGHT: 66 IN | RESPIRATION RATE: 16 BRPM | TEMPERATURE: 98.6 F

## 2019-04-05 DIAGNOSIS — R91.1 SOLITARY LUNG NODULE: ICD-10-CM

## 2019-04-05 DIAGNOSIS — J84.9 INTERSTITIAL LUNG DISEASE (HCC): Primary | ICD-10-CM

## 2019-04-05 DIAGNOSIS — J84.9 INTERSTITIAL LUNG DISEASE (HCC): ICD-10-CM

## 2019-04-05 LAB
ANTI-NUCLEAR ANTIBODY (ANA): NEGATIVE
RHEUMATOID FACTOR: <10 IU/ML

## 2019-04-05 PROCEDURE — G8598 ASA/ANTIPLAT THER USED: HCPCS | Performed by: INTERNAL MEDICINE

## 2019-04-05 PROCEDURE — 4040F PNEUMOC VAC/ADMIN/RCVD: CPT | Performed by: INTERNAL MEDICINE

## 2019-04-05 PROCEDURE — 1123F ACP DISCUSS/DSCN MKR DOCD: CPT | Performed by: INTERNAL MEDICINE

## 2019-04-05 PROCEDURE — 99204 OFFICE O/P NEW MOD 45 MIN: CPT | Performed by: INTERNAL MEDICINE

## 2019-04-05 PROCEDURE — G8417 CALC BMI ABV UP PARAM F/U: HCPCS | Performed by: INTERNAL MEDICINE

## 2019-04-05 PROCEDURE — 1036F TOBACCO NON-USER: CPT | Performed by: INTERNAL MEDICINE

## 2019-04-05 PROCEDURE — G8427 DOCREV CUR MEDS BY ELIG CLIN: HCPCS | Performed by: INTERNAL MEDICINE

## 2019-04-05 NOTE — ASSESSMENT & PLAN NOTE
-9 x 6 mm left lung base round nodule, first seen in 2017 at which time it was measured 7 mm. -It does appear that nodule has potentially enlarged minimally, and now appears to be at least partially calcified.  -Overall impression is that this is likely to be malignancy, given its partial calcification, slow growth, lower lobe location. -Given his advanced age, slow growth, underlying lung disease would favor conservative monitoring at this time. Repeat CT scan in one year.

## 2019-04-05 NOTE — PROGRESS NOTES
ENT: No discharge. Pharynx clear. External appearance of ears and nose normal.  Neck: Trachea midline. No obvious mass. Resp: No accessory muscle use. Mild left basilar crackles. No wheezes. No rhonchi. CV: Regular rate. Regular rhythm. No murmur or rub. No edema. GI: Non-tender. Non-distended. No hernia. Skin: Warm, dry, normal texture and turgor. No nodule on exposed extremities. Lymph: No cervical LAD. No supraclavicular LAD. M/S: No cyanosis. No clubbing. No joint deformity. Neuro: Moves all four extremities. Psych: Oriented x 3. No anxiety. Awake. Alert. Intact judgement and insight.     Current Outpatient Medications   Medication Sig Dispense Refill    atorvastatin (LIPITOR) 40 MG tablet Take 1 tablet by mouth daily 90 tablet 2    olmesartan (BENICAR) 40 MG tablet TAKE 1 TABLET BY MOUTH EVERY DAY 90 tablet 0    omeprazole (PRILOSEC) 20 MG delayed release capsule Take 1 capsule by mouth daily Instead of pepcid for heart burn on an empty stomach 90 capsule 2    furosemide (LASIX) 20 MG tablet TAKE 1 TABLET BY MOUTH EVERY DAY AS NEEDED FOR LEG SWELLING 90 tablet 0    tamsulosin (FLOMAX) 0.4 MG capsule One every night for your prostate 90 capsule 3    oxybutynin (DITROPAN) 5 MG tablet Take 5 mg by mouth daily      fluticasone (FLONASE) 50 MCG/ACT nasal spray 1 spray by Nasal route daily 1 Bottle 5    finasteride (PROSCAR) 5 MG tablet Take 5 mg by mouth daily      rivaroxaban (XARELTO) 15 MG TABS tablet Take 1 tablet by mouth daily (with breakfast) 90 tablet 5    amLODIPine (NORVASC) 5 MG tablet Take 5 mg by mouth daily      aspirin 81 MG tablet Take 81 mg by mouth daily      Cholecalciferol (VITAMIN D3) 2000 UNITS CAPS Take 2,000 Units by mouth daily      calcium carbonate (OSCAL) 500 MG TABS tablet Take 600 mg by mouth daily caltrate 600-D3 one daily      Multiple Vitamins-Minerals (CENTRUM SILVER ADULT 50+) TABS Take 1 tablet by mouth daily       Current Facility-Administered Medications   Medication Dose Route Frequency Provider Last Rate Last Dose    lidocaine-EPINEPHrine 2 percent-1:192809 injection 20 mL  20 mL Intradermal Once Oksana Diggs MD        lidocaine-EPINEPHrine 2 percent-1:040548 injection 20 mL  20 mL Intradermal Once Oksana Diggs MD           Data Reviewed:   CBC and Renal reviewed  Last CBC  Lab Results   Component Value Date    WBC 10.0 03/27/2019    RBC 4.55 03/27/2019    HGB 14.1 03/27/2019    MCV 92.1 03/27/2019     03/27/2019     Last Renal  Lab Results   Component Value Date     03/27/2019    K 4.9 03/27/2019     03/27/2019    CO2 25 03/27/2019    CO2 24 04/10/2018    CO2 24 03/16/2018    BUN 29 03/27/2019    CREATININE 1.6 03/27/2019    GLUCOSE 112 03/27/2019    CALCIUM 9.3 03/27/2019       Last ABG  POC Blood Gas: No results found for: POCPH, POCPCO2, POCPO2, POCHCO3, NBEA, [id number]  No results for input(s): PH, PCO2, PO2, HCO3, BE, O2SAT in the last 72 hours. Radiology Review:  Pertinent images / reports were reviewed as a part of this visit. CT Chest w/ contrast: No results found for this or any previous visit. CT Chest w/o contrast:   Results for orders placed during the hospital encounter of 03/27/19   CT CHEST WO CONTRAST    Narrative EXAMINATION:  CT OF THE CHEST WITHOUT CONTRAST 3/27/2019 12:41 pm    TECHNIQUE:  CT of the chest was performed without the administration of intravenous  contrast. Multiplanar reformatted images are provided for review. Dose  modulation, iterative reconstruction, and/or weight based adjustment of the  mA/kV was utilized to reduce the radiation dose to as low as reasonably  achievable.     COMPARISON:  08/02/2017 CT    HISTORY:  ORDERING SYSTEM PROVIDED HISTORY: Pulmonary fibrosis ([address]  TECHNOLOGIST PROVIDED HISTORY:  Reason for exam:->follow up on fibrosis, nodule and right renal cyst  Ordering Physician Provided Reason for Exam: follow up on fibrosis, nodule  and right renal cyst, hx stent and pacemaker  Acuity: Acute  Type of Exam: Initial    FINDINGS:  Mediastinum: Stable cardiomegaly with prominent atherosclerotic calcification  of the coronary arteries. Dual lead pacemaker stable. The aorta and  pulmonary arteries are normal.  There is no lymphadenopathy. Thyroid gland  is normal.  The esophagus is normal.  Several calcified granulomata in the  right mediastinum and bilateral pulmonary hilum. Lungs/pleura: The airways are patent. No pleural fluid. The lungs are  lucent with a pattern that is evidence of mild centrilobular emphysema. Peripheral ground-glass and diffuse reticular opacities have slightly  increased in both lungs since 2017 in a pattern that is evidence of  underlying interstitial lung disease. Several areas of slight honeycombing  suggest usual interstitial pneumonitis. No acute airspace abnormality. Stable pulmonary nodule at the left lung base measuring 9 x 6 mm. Upper Abdomen: The adrenal glands are normal.  Bilateral renal cysts are  partially visualized and there appears to be chronic right hydronephrosis  which was described on a prior abdominal CT. Soft Tissues/Bones: No skeletal abnormalities are appreciated within the  chest.      Impression 1. Chronic interstitial lung disease in a pattern that would favor  progressive UIP. 2. Stable appearance of a pulmonary nodule in the left lower lobe. Continued  CT follow-up is recommended. 3. Evidence of chronic right hydronephrosis. Recommend correlation with  urology history. 4. Cardiomegaly and coronary artery disease also appears stable. RECOMMENDATIONS:  Fleischner Society guidelines for follow-up and management of incidentally  detected pulmonary nodules:    Single Solid Nodule:    Nodule size equals 6-8 mm  In a low-risk patient, CT at 6-12 months, then consider CT at 18-24 months. In a high-risk patient, CT at 6-12 months, then CT at 18-24 months.     - Low risk patients include individuals with minimal or absent history of  smoking and other known risk factors. - High risk patients include individuals with a history or smoking or known  risk factors. Radiology 2017 http://pubs. rsna.org/doi/full/10.1148/radiol. 6389224372         CTPA: No results found for this or any previous visit. CXR PA/LAT:   Results for orders placed during the hospital encounter of 03/27/19   XR CHEST STANDARD (2 VW)    Narrative EXAMINATION:  TWO VIEWS OF THE CHEST    3/27/2019 12:35 pm    COMPARISON:  06/29/2017    HISTORY:  ORDERING SYSTEM PROVIDED HISTORY: SOB (shortness of breath)  TECHNOLOGIST PROVIDED HISTORY:  Reason for exam:->progressiv SOB with cough  Ordering Physician Provided Reason for Exam: Increased sob all the time  Acuity: Acute  Type of Exam: Initial    FINDINGS:  There is a left subclavian pacer. There is cardiomegaly. There is a  Coronary artery stent. Upper lobe vessels appear larger than the lower lobe  vessels. There are increased interstitial markings in the peripheral aspects  of both lungs similar to the prior study. There are no thickened  interlobular septa. There is no airspace disease. The costophrenic angles  are sharp      Impression 1. Cardiomegaly with probable pulmonary venous hypertension. No evidence of  pulmonary edema  2. Chronic interstitial lung disease         CXR portable: No results found for this or any previous visit. Assessment/Plan:       Diagnosis Orders   1. Interstitial lung disease (HCC)  6 Minute Walk Test    ANDRADE Reflex to Antibody [place]    ANTI-NEUTROPHILIC CYTOPLASMIC ANTIBODY    RHEUMATOID FACTOR    CYCLIC CITRUL PEPTIDE ANTIBODY, IGG    CT CHEST HIGH RESOLUTION   2.  Solitary lung nodule  6 Minute Walk Test    ANDRADE Reflex to Antibody [place]    ANTI-NEUTROPHILIC CYTOPLASMIC ANTIBODY    RHEUMATOID FACTOR    CYCLIC CITRUL PEPTIDE ANTIBODY, IGG    CT CHEST HIGH RESOLUTION         Problem List Items Addressed This Visit        Pulmonary Problems    Interstitial lung transcribed using [id number] [facility]. Please disregard any translational errors.     Lam Engel [address] [place] [facility]

## 2019-04-09 LAB — CCP IGG ANTIBODIES: 2 UNITS (ref 0–19)

## 2019-04-11 LAB — ANCA IFA: NORMAL

## 2019-04-16 ENCOUNTER — HOSPITAL ENCOUNTER (OUTPATIENT)
Dept: CARDIAC REHAB | Age: 84
Setting detail: THERAPIES SERIES
Discharge: HOME | End: 2019-04-16
Payer: MEDICARE

## 2019-04-16 DIAGNOSIS — R91.1 SOLITARY LUNG NODULE: ICD-10-CM

## 2019-04-16 DIAGNOSIS — J84.9 INTERSTITIAL LUNG DISEASE (HCC): ICD-10-CM

## 2019-04-16 PROCEDURE — 94618 PULMONARY STRESS TESTING: CPT | Performed by: INTERNAL MEDICINE

## 2019-04-22 RX ORDER — FLUTICASONE PROPIONATE 50 MCG
1 SPRAY, SUSPENSION (ML) NASAL DAILY
Qty: 1 BOTTLE | Refills: 4 | Status: SHIPPED | OUTPATIENT
Start: 2019-04-22 | End: 2019-10-23 | Stop reason: SDUPTHER

## 2019-05-09 ENCOUNTER — OFFICE VISIT (OUTPATIENT)
Dept: PULMONOLOGY | Age: 84
End: 2019-05-09
Payer: MEDICARE

## 2019-05-09 VITALS
BODY MASS INDEX: 29.86 KG/M2 | WEIGHT: 185 LBS | DIASTOLIC BLOOD PRESSURE: 68 MMHG | TEMPERATURE: 98.3 F | SYSTOLIC BLOOD PRESSURE: 128 MMHG | HEART RATE: 64 BPM | OXYGEN SATURATION: 95 %

## 2019-05-09 DIAGNOSIS — J84.10 PULMONARY FIBROSIS (HCC): Primary | ICD-10-CM

## 2019-05-09 DIAGNOSIS — R91.1 SOLITARY LUNG NODULE: ICD-10-CM

## 2019-05-09 PROCEDURE — G8598 ASA/ANTIPLAT THER USED: HCPCS | Performed by: INTERNAL MEDICINE

## 2019-05-09 PROCEDURE — G8427 DOCREV CUR MEDS BY ELIG CLIN: HCPCS | Performed by: INTERNAL MEDICINE

## 2019-05-09 PROCEDURE — 1036F TOBACCO NON-USER: CPT | Performed by: INTERNAL MEDICINE

## 2019-05-09 PROCEDURE — G8417 CALC BMI ABV UP PARAM F/U: HCPCS | Performed by: INTERNAL MEDICINE

## 2019-05-09 PROCEDURE — 99213 OFFICE O/P EST LOW 20 MIN: CPT | Performed by: INTERNAL MEDICINE

## 2019-05-09 PROCEDURE — 4040F PNEUMOC VAC/ADMIN/RCVD: CPT | Performed by: INTERNAL MEDICINE

## 2019-05-09 PROCEDURE — 1123F ACP DISCUSS/DSCN MKR DOCD: CPT | Performed by: INTERNAL MEDICINE

## 2019-05-09 NOTE — ASSESSMENT & PLAN NOTE
-Larger nodule, but  never smoker and lower lobe, overall lower suspicion for malignancy  -repeat CT in 1 year.

## 2019-05-09 NOTE — PROGRESS NOTES
REASON FOR CONSULTATION:  Chief Complaint   Patient presents with    Follow-up        Consult at request of Delta Trejo MD     PCP: Delta Trejo MD    HISTORY OF PRESENT ILLNESS: Yue Cohn is a 80y.o. year old male with a history of GERD, never smoker, symptomatic bradycardia status post pacemaker who presents for f/u of interstitial lung disease, questionable pulmonary fibrosis. As well as incidentally found solitary lung nodule. His recent pulmonary function test and likely show mild restrictive lung disease with a TLC of 76%, however his spirometry is normal and PFTs as a whole are unimpressive. Continues to have muñoz, but still exercising several times per week walking around a track. +cough. Past Medical History:   Diagnosis Date    BPH with obstruction/lower urinary tract symptoms     nocturia 3-4 x per night    Chronic atrial fibrillation (HCC)     CKD (chronic kidney disease) 2017    level 3    Coronary arteriosclerosis 2009    2 stents/ Dr Renata Almeida in [facility] GERD (gastroesophageal reflux disease)     HTN (hypertension) 1990    Left hip pain     schrapnel in hip and intestines/chronic pains left hip impairs walking    Pacemaker 2015    Pulmonary fibrosis ([address] 08/2017    mild amount seen on chest ct    Pulmonary nodule, left 2017    noncalcified 7mm low risk repeat 8/2018 if patient willing    Renal atrophy, right 2017    Renal cyst, right 08/2017    not clearly simple cyst    SOB (shortness of breath) 2017    terrell normal pft and ct chest ? smll amount of pulmonary fibrosis       Past Surgical History:   Procedure Laterality Date    CORONARY ANGIOPLASTY WITH STENT PLACEMENT  2009    awoke with indigestion    INGUINAL HERNIA REPAIR Right 07/2016    PACEMAKER INSERTION  2014    SMALL INTESTINE SURGERY  1945    schrapnel     TONSILLECTOMY  1960       family history includes No Known Problems in an other family member; Pacemaker in his brother and sister.       SOCIAL HISTORY: reports that he has never smoked. He has never used smokeless tobacco.      ALLERGIES:  Patient is allergic to claritin [loratadine] and zestril [lisinopril]. REVIEW OF SYSTEMS:  Constitutional: Negative for fever   HENT: Negative for sore throat  Eyes: Negative for redness   Respiratory: +dyspnea, +cough  Cardiovascular: Negative for chest pain  Gastrointestinal: Negative for vomiting, diarrhea   Genitourinary: Negative for hematuria   Musculoskeletal: Negative for arthralgias   Skin: Negative for rash  Neurological: Negative for syncope  Hematological: Negative for adenopathy  Psychiatric/Behavorial: Negative for anxiety    Objective:   PHYSICAL EXAM:  Blood pressure 128/68, pulse 64, temperature 98.3 °F (36.8 °C), temperature source Oral, weight 185 lb (83.9 kg), SpO2 95 %.'  Gen: No distress. Eyes: PERRL. No sclera icterus. No conjunctival injection. ENT: No discharge. Pharynx clear. External appearance of ears and nose normal.  Neck: Trachea midline. No obvious mass. Resp: No accessory muscle use. Faint basilar crackles. No wheezes. No rhonchi. CV: Regular rate. Regular rhythm. No murmur or rub. No edema. GI: Non-tender. Non-distended. No hernia. Skin: Warm, dry, normal texture and turgor. No nodule on exposed extremities. Lymph: No cervical LAD. No supraclavicular LAD. M/S: No cyanosis. No clubbing. No joint deformity. Neuro: Moves all four extremities. Psych: Oriented x 3. No anxiety. Awake. Alert. Intact judgement and insight.     Current Outpatient Medications   Medication Sig Dispense Refill    telmisartan (MICARDIS) 40 MG tablet Take 1 tablet by mouth daily 30 tablet 0    fluticasone (FLONASE) 50 MCG/ACT nasal spray 1 SPRAY BY NASAL ROUTE DAILY 1 Bottle 4    atorvastatin (LIPITOR) 40 MG tablet Take 1 tablet by mouth daily 90 tablet 2    olmesartan (BENICAR) 40 MG tablet TAKE 1 TABLET BY MOUTH EVERY DAY 90 tablet 0    omeprazole (PRILOSEC) 20 MG delayed release capsule Take 1 capsule by mouth daily Instead of pepcid for heart burn on an empty stomach 90 capsule 2    furosemide (LASIX) 20 MG tablet TAKE 1 TABLET BY MOUTH EVERY DAY AS NEEDED FOR LEG SWELLING 90 tablet 0    tamsulosin (FLOMAX) 0.4 MG capsule One every night for your prostate 90 capsule 3    oxybutynin (DITROPAN) 5 MG tablet Take 5 mg by mouth daily      finasteride (PROSCAR) 5 MG tablet Take 5 mg by mouth daily      rivaroxaban (XARELTO) 15 MG TABS tablet Take 1 tablet by mouth daily (with breakfast) 90 tablet 5    amLODIPine (NORVASC) 5 MG tablet Take 5 mg by mouth daily      aspirin 81 MG tablet Take 81 mg by mouth daily      Cholecalciferol (VITAMIN D3) 2000 UNITS CAPS Take 2,000 Units by mouth daily      calcium carbonate (OSCAL) 500 MG TABS tablet Take 600 mg by mouth daily caltrate 600-D3 one daily      Multiple Vitamins-Minerals (CENTRUM SILVER ADULT 50+) TABS Take 1 tablet by mouth daily       Current Facility-Administered Medications   Medication Dose Route Frequency Provider Last Rate Last Dose    lidocaine-EPINEPHrine 2 percent-1:109020 injection 20 mL  20 mL Intradermal Once Shellye Nageotte, MD        lidocaine-EPINEPHrine 2 percent-1:103784 injection 20 mL  20 mL Intradermal Once Shellye Nageotte, MD           Data Reviewed:   CBC and Renal reviewed  Last CBC  Lab Results   Component Value Date    WBC 10.0 03/27/2019    RBC 4.55 03/27/2019    HGB 14.1 03/27/2019    MCV 92.1 03/27/2019     03/27/2019     Last Renal  Lab Results   Component Value Date     03/27/2019    K 4.9 03/27/2019     03/27/2019    CO2 25 03/27/2019    CO2 24 04/10/2018    CO2 24 03/16/2018    BUN 29 03/27/2019    CREATININE 1.6 03/27/2019    GLUCOSE 112 03/27/2019    CALCIUM 9.3 03/27/2019       Last ABG  POC Blood Gas: No results found for: POCPH, POCPCO2, POCPO2, POCHCO3, NBEA, [id number]  No results for input(s): PH, PCO2, PO2, HCO3, BE, O2SAT in the last 72 hours.       Radiology Review:  Pertinent images / reports were reviewed as a part of this visit. CT Chest w/ contrast: No results found for this or any previous visit. CT Chest w/o contrast:   Results for orders placed during the hospital encounter of 03/27/19   CT CHEST WO CONTRAST    Narrative EXAMINATION:  CT OF THE CHEST WITHOUT CONTRAST 3/27/2019 12:41 pm    TECHNIQUE:  CT of the chest was performed without the administration of intravenous  contrast. Multiplanar reformatted images are provided for review. Dose  modulation, iterative reconstruction, and/or weight based adjustment of the  mA/kV was utilized to reduce the radiation dose to as low as reasonably  achievable. COMPARISON:  08/02/2017 CT    HISTORY:  ORDERING SYSTEM PROVIDED HISTORY: Pulmonary fibrosis ([address]  TECHNOLOGIST PROVIDED HISTORY:  Reason for exam:->follow up on fibrosis, nodule and right renal cyst  Ordering Physician Provided Reason for Exam: follow up on fibrosis, nodule  and right renal cyst, hx stent and pacemaker  Acuity: Acute  Type of Exam: Initial    FINDINGS:  Mediastinum: Stable cardiomegaly with prominent atherosclerotic calcification  of the coronary arteries. Dual lead pacemaker stable. The aorta and  pulmonary arteries are normal.  There is no lymphadenopathy. Thyroid gland  is normal.  The esophagus is normal.  Several calcified granulomata in the  right mediastinum and bilateral pulmonary hilum. Lungs/pleura: The airways are patent. No pleural fluid. The lungs are  lucent with a pattern that is evidence of mild centrilobular emphysema. Peripheral ground-glass and diffuse reticular opacities have slightly  increased in both lungs since 2017 in a pattern that is evidence of  underlying interstitial lung disease. Several areas of slight honeycombing  suggest usual interstitial pneumonitis. No acute airspace abnormality. Stable pulmonary nodule at the left lung base measuring 9 x 6 mm. Upper Abdomen:  The adrenal glands are normal.  Bilateral renal cysts peripheral aspects  of both lungs similar to the prior study. There are no thickened  interlobular septa. There is no airspace disease. The costophrenic angles  are sharp      Impression 1. Cardiomegaly with probable pulmonary venous hypertension. No evidence of  pulmonary edema  2. Chronic interstitial lung disease         CXR portable: No results found for this or any previous visit. Assessment/Plan:       Diagnosis Orders   1. Pulmonary fibrosis ([address]     2. Solitary lung nodule           Problem List Items Addressed This Visit     Pulmonary fibrosis ([address] - Primary     --Radiographic pattern of possible UIP. Connective tissue disease workup unremarkable. -Patient is still very functional, recent 6 minute walk test without O2 desaturation, and has had minimal progression since 2017.  -Think this most likely is UIP, he is a poor candidate for open lung biopsy, and edition he is not really interested in pursuing a diagnostic workup further, nor is he interested in taking esbriet or ofev. -Given demographic life expectancy, slow progression of disease thus far, and patient's concerns about quality of life interferences with proposed therapies I agree with the patient, at this point the best course of action is conservative management and continued surveillance. Solitary lung nodule     -Larger nodule, but  never smoker and lower lobe, overall lower suspicion for malignancy  -repeat CT in 1 year. This note was transcribed using [id number] [employer]. Please disregard any translational errors.     Lam Engel [address] [place] [facility]

## 2019-10-21 ENCOUNTER — HOSPITAL ENCOUNTER (OUTPATIENT)
Dept: CT IMAGING | Age: 84
Discharge: HOME | End: 2019-10-21
Payer: MEDICARE

## 2019-10-21 DIAGNOSIS — R91.1 SOLITARY LUNG NODULE: ICD-10-CM

## 2019-10-21 DIAGNOSIS — J84.9 INTERSTITIAL LUNG DISEASE (HCC): ICD-10-CM

## 2019-10-21 PROCEDURE — 71250 CT THORAX DX C-: CPT

## 2019-10-24 RX ORDER — FLUTICASONE PROPIONATE 50 MCG
SPRAY, SUSPENSION (ML) NASAL
Qty: 1 BOTTLE | Refills: 1 | Status: SHIPPED | OUTPATIENT
Start: 2019-10-24 | End: 2020-03-03 | Stop reason: SDUPTHER

## 2019-11-20 ENCOUNTER — OFFICE VISIT (OUTPATIENT)
Dept: PULMONOLOGY | Age: 84
End: 2019-11-20
Payer: MEDICARE

## 2019-11-20 VITALS
OXYGEN SATURATION: 96 % | TEMPERATURE: 97.2 F | SYSTOLIC BLOOD PRESSURE: 144 MMHG | HEIGHT: 66 IN | HEART RATE: 70 BPM | DIASTOLIC BLOOD PRESSURE: 80 MMHG | RESPIRATION RATE: 20 BRPM | BODY MASS INDEX: 29.73 KG/M2 | WEIGHT: 185 LBS

## 2019-11-20 DIAGNOSIS — J84.10 PULMONARY FIBROSIS (HCC): Primary | ICD-10-CM

## 2019-11-20 PROCEDURE — G8427 DOCREV CUR MEDS BY ELIG CLIN: HCPCS | Performed by: INTERNAL MEDICINE

## 2019-11-20 PROCEDURE — G8598 ASA/ANTIPLAT THER USED: HCPCS | Performed by: INTERNAL MEDICINE

## 2019-11-20 PROCEDURE — G8484 FLU IMMUNIZE NO ADMIN: HCPCS | Performed by: INTERNAL MEDICINE

## 2019-11-20 PROCEDURE — G8417 CALC BMI ABV UP PARAM F/U: HCPCS | Performed by: INTERNAL MEDICINE

## 2019-11-20 PROCEDURE — 1123F ACP DISCUSS/DSCN MKR DOCD: CPT | Performed by: INTERNAL MEDICINE

## 2019-11-20 PROCEDURE — 1036F TOBACCO NON-USER: CPT | Performed by: INTERNAL MEDICINE

## 2019-11-20 PROCEDURE — 99213 OFFICE O/P EST LOW 20 MIN: CPT | Performed by: INTERNAL MEDICINE

## 2019-11-20 PROCEDURE — 4040F PNEUMOC VAC/ADMIN/RCVD: CPT | Performed by: INTERNAL MEDICINE

## 2019-11-20 RX ORDER — PREDNISONE 20 MG/1
TABLET ORAL
Qty: 37 TABLET | Refills: 1 | Status: SHIPPED | OUTPATIENT
Start: 2019-11-20 | End: 2019-12-03

## 2019-12-03 PROBLEM — M17.0 ARTHRITIS OF BOTH KNEES: Status: ACTIVE | Noted: 2019-12-03

## 2019-12-03 PROBLEM — J84.9 INTERSTITIAL LUNG DISEASE (HCC): Chronic | Status: ACTIVE | Noted: 2019-04-05

## 2020-01-01 ENCOUNTER — TELEPHONE (OUTPATIENT)
Dept: ORTHOPEDIC SURGERY | Age: 85
End: 2020-01-01

## 2020-01-01 ENCOUNTER — HOSPITAL ENCOUNTER (INPATIENT)
Age: 85
LOS: 9 days | Discharge: HOME | DRG: 871 | End: 2020-11-20
Attending: EMERGENCY MEDICINE | Admitting: INTERNAL MEDICINE
Payer: MEDICARE

## 2020-01-01 ENCOUNTER — OFFICE VISIT (OUTPATIENT)
Dept: ORTHOPEDIC SURGERY | Age: 85
End: 2020-01-01
Payer: MEDICARE

## 2020-01-01 ENCOUNTER — APPOINTMENT (OUTPATIENT)
Dept: ULTRASOUND IMAGING | Age: 85
DRG: 871 | End: 2020-01-01
Payer: MEDICARE

## 2020-01-01 ENCOUNTER — CARE COORDINATION (OUTPATIENT)
Dept: CASE MANAGEMENT | Age: 85
End: 2020-01-01

## 2020-01-01 ENCOUNTER — APPOINTMENT (OUTPATIENT)
Dept: GENERAL RADIOLOGY | Age: 85
DRG: 291 | End: 2020-01-01
Payer: MEDICARE

## 2020-01-01 ENCOUNTER — HOSPITAL ENCOUNTER (OUTPATIENT)
Age: 85
Setting detail: OUTPATIENT SURGERY
Discharge: HOME | End: 2020-11-30
Attending: PHYSICAL MEDICINE & REHABILITATION | Admitting: PHYSICAL MEDICINE & REHABILITATION
Payer: MEDICARE

## 2020-01-01 ENCOUNTER — HOSPITAL ENCOUNTER (INPATIENT)
Age: 85
LOS: 3 days | Discharge: NURSING, REHAB OR LONG-TERM CARE | DRG: 291 | End: 2020-10-23
Attending: EMERGENCY MEDICINE | Admitting: INTERNAL MEDICINE
Payer: MEDICARE

## 2020-01-01 ENCOUNTER — APPOINTMENT (OUTPATIENT)
Dept: GENERAL RADIOLOGY | Age: 85
DRG: 871 | End: 2020-01-01
Payer: MEDICARE

## 2020-01-01 ENCOUNTER — APPOINTMENT (OUTPATIENT)
Dept: MRI IMAGING | Age: 85
DRG: 947 | End: 2020-01-01
Attending: PHYSICAL MEDICINE & REHABILITATION
Payer: MEDICARE

## 2020-01-01 ENCOUNTER — APPOINTMENT (OUTPATIENT)
Dept: CT IMAGING | Age: 85
DRG: 291 | End: 2020-01-01
Payer: MEDICARE

## 2020-01-01 ENCOUNTER — HOSPITAL ENCOUNTER (INPATIENT)
Age: 85
LOS: 11 days | Discharge: HOME HEALTH | DRG: 947 | End: 2020-11-03
Attending: PHYSICAL MEDICINE & REHABILITATION | Admitting: PHYSICAL MEDICINE & REHABILITATION
Payer: MEDICARE

## 2020-01-01 ENCOUNTER — OFFICE VISIT (OUTPATIENT)
Dept: PULMONOLOGY | Age: 85
End: 2020-01-01
Payer: MEDICARE

## 2020-01-01 ENCOUNTER — APPOINTMENT (OUTPATIENT)
Dept: GENERAL RADIOLOGY | Age: 85
End: 2020-01-01
Attending: PHYSICAL MEDICINE & REHABILITATION
Payer: MEDICARE

## 2020-01-01 ENCOUNTER — APPOINTMENT (OUTPATIENT)
Dept: CT IMAGING | Age: 85
DRG: 871 | End: 2020-01-01
Payer: MEDICARE

## 2020-01-01 VITALS
HEART RATE: 75 BPM | HEIGHT: 66 IN | WEIGHT: 180.6 LBS | SYSTOLIC BLOOD PRESSURE: 138 MMHG | TEMPERATURE: 98.5 F | OXYGEN SATURATION: 94 % | BODY MASS INDEX: 29.02 KG/M2 | DIASTOLIC BLOOD PRESSURE: 72 MMHG | RESPIRATION RATE: 16 BRPM

## 2020-01-01 VITALS
HEART RATE: 73 BPM | RESPIRATION RATE: 16 BRPM | WEIGHT: 168.43 LBS | BODY MASS INDEX: 27.07 KG/M2 | SYSTOLIC BLOOD PRESSURE: 111 MMHG | DIASTOLIC BLOOD PRESSURE: 64 MMHG | TEMPERATURE: 98 F | HEIGHT: 66 IN | OXYGEN SATURATION: 99 %

## 2020-01-01 VITALS
WEIGHT: 174.16 LBS | DIASTOLIC BLOOD PRESSURE: 66 MMHG | HEART RATE: 76 BPM | HEIGHT: 66 IN | BODY MASS INDEX: 27.99 KG/M2 | OXYGEN SATURATION: 97 % | TEMPERATURE: 97.4 F | SYSTOLIC BLOOD PRESSURE: 105 MMHG | RESPIRATION RATE: 16 BRPM

## 2020-01-01 VITALS
BODY MASS INDEX: 29.51 KG/M2 | RESPIRATION RATE: 17 BRPM | DIASTOLIC BLOOD PRESSURE: 82 MMHG | WEIGHT: 183.64 LBS | SYSTOLIC BLOOD PRESSURE: 161 MMHG | OXYGEN SATURATION: 94 % | TEMPERATURE: 97.6 F | HEART RATE: 74 BPM | HEIGHT: 66 IN

## 2020-01-01 VITALS
OXYGEN SATURATION: 94 % | HEART RATE: 71 BPM | SYSTOLIC BLOOD PRESSURE: 110 MMHG | RESPIRATION RATE: 16 BRPM | DIASTOLIC BLOOD PRESSURE: 58 MMHG | WEIGHT: 168 LBS | TEMPERATURE: 98.4 F | BODY MASS INDEX: 27 KG/M2 | HEIGHT: 66 IN

## 2020-01-01 VITALS — BODY MASS INDEX: 29.51 KG/M2 | TEMPERATURE: 98.1 F | RESPIRATION RATE: 14 BRPM | HEIGHT: 66 IN | WEIGHT: 183.64 LBS

## 2020-01-01 VITALS — RESPIRATION RATE: 14 BRPM | HEIGHT: 66 IN | BODY MASS INDEX: 27 KG/M2 | TEMPERATURE: 98.6 F | WEIGHT: 167.99 LBS

## 2020-01-01 LAB
A/G RATIO: 1.4 (ref 1.1–2.2)
ALBUMIN SERPL-MCNC: 3.8 G/DL (ref 3.4–5)
ALP BLD-CCNC: 50 U/L (ref 40–129)
ALT SERPL-CCNC: 14 U/L (ref 10–40)
ANION GAP SERPL CALCULATED.3IONS-SCNC: 10 MMOL/L (ref 3–16)
ANION GAP SERPL CALCULATED.3IONS-SCNC: 11 MMOL/L (ref 3–16)
ANION GAP SERPL CALCULATED.3IONS-SCNC: 13 MMOL/L (ref 3–16)
ANION GAP SERPL CALCULATED.3IONS-SCNC: 13 MMOL/L (ref 3–16)
ANION GAP SERPL CALCULATED.3IONS-SCNC: 14 MMOL/L (ref 3–16)
ANION GAP SERPL CALCULATED.3IONS-SCNC: 7 MMOL/L (ref 3–16)
ANION GAP SERPL CALCULATED.3IONS-SCNC: 8 MMOL/L (ref 3–16)
ANION GAP SERPL CALCULATED.3IONS-SCNC: 9 MMOL/L (ref 3–16)
ANION GAP SERPL CALCULATED.3IONS-SCNC: 9 MMOL/L (ref 6–18)
ANTI-JO1 IGG: <0.2 AI (ref 0–0.9)
ANTI-NUCLEAR ANTIBODY (ANA): NEGATIVE
ANTI-RNP IGG: <0.2 AI (ref 0–0.9)
APTT: 33.5 SEC (ref 24.2–36.2)
AST SERPL-CCNC: 19 U/L (ref 15–37)
BASE EXCESS VENOUS: -9 MMOL/L
BASE EXCESS VENOUS: 2.1 MMOL/L
BASE EXCESS VENOUS: 2.3 MMOL/L
BASOPHILS ABSOLUTE: 0 K/UL (ref 0–0.2)
BASOPHILS ABSOLUTE: 0.1 K/UL (ref 0–0.2)
BASOPHILS RELATIVE PERCENT: 0 %
BASOPHILS RELATIVE PERCENT: 0.1 %
BASOPHILS RELATIVE PERCENT: 0.2 %
BASOPHILS RELATIVE PERCENT: 0.3 %
BASOPHILS RELATIVE PERCENT: 0.3 %
BASOPHILS RELATIVE PERCENT: 0.4 %
BASOPHILS RELATIVE PERCENT: 0.5 %
BILIRUB SERPL-MCNC: 0.5 MG/DL (ref 0–1)
BILIRUBIN URINE: NEGATIVE
BILIRUBIN URINE: NEGATIVE
BLOOD CULTURE, ROUTINE: NORMAL
BLOOD, URINE: ABNORMAL
BLOOD, URINE: NEGATIVE
BUN BLDV-MCNC: 38 MG/DL (ref 7–20)
BUN BLDV-MCNC: 42 MG/DL (ref 7–20)
BUN BLDV-MCNC: 43 MG/DL (ref 7–20)
BUN BLDV-MCNC: 46 MG/DL (ref 8–26)
BUN BLDV-MCNC: 50 MG/DL (ref 7–20)
BUN BLDV-MCNC: 52 MG/DL (ref 7–20)
BUN BLDV-MCNC: 53 MG/DL (ref 7–20)
BUN BLDV-MCNC: 53 MG/DL (ref 7–20)
BUN BLDV-MCNC: 54 MG/DL (ref 7–20)
BUN BLDV-MCNC: 57 MG/DL (ref 7–20)
BUN BLDV-MCNC: 57 MG/DL (ref 7–20)
BUN BLDV-MCNC: 58 MG/DL (ref 7–20)
BUN BLDV-MCNC: 59 MG/DL (ref 7–20)
BUN BLDV-MCNC: 61 MG/DL (ref 7–20)
BUN BLDV-MCNC: 62 MG/DL (ref 7–20)
BUN BLDV-MCNC: 65 MG/DL (ref 7–20)
BUN BLDV-MCNC: 68 MG/DL (ref 7–20)
BUN BLDV-MCNC: 69 MG/DL (ref 7–20)
BUN BLDV-MCNC: 70 MG/DL (ref 7–20)
BUN BLDV-MCNC: 76 MG/DL (ref 7–20)
BUN BLDV-MCNC: 79 MG/DL (ref 7–20)
C-REACTIVE PROTEIN: 2.1 MG/L (ref 0–5.1)
CALCIUM SERPL-MCNC: 7.9 MG/DL (ref 8.3–10.6)
CALCIUM SERPL-MCNC: 8.3 MG/DL (ref 8.3–10.6)
CALCIUM SERPL-MCNC: 8.3 MG/DL (ref 8.5–10.4)
CALCIUM SERPL-MCNC: 8.4 MG/DL (ref 8.3–10.6)
CALCIUM SERPL-MCNC: 8.4 MG/DL (ref 8.3–10.6)
CALCIUM SERPL-MCNC: 8.5 MG/DL (ref 8.3–10.6)
CALCIUM SERPL-MCNC: 8.5 MG/DL (ref 8.3–10.6)
CALCIUM SERPL-MCNC: 8.6 MG/DL (ref 8.3–10.6)
CALCIUM SERPL-MCNC: 8.7 MG/DL (ref 8.3–10.6)
CALCIUM SERPL-MCNC: 8.7 MG/DL (ref 8.3–10.6)
CALCIUM SERPL-MCNC: 8.8 MG/DL (ref 8.3–10.6)
CALCIUM SERPL-MCNC: 8.9 MG/DL (ref 8.3–10.6)
CALCIUM SERPL-MCNC: 9 MG/DL (ref 8.3–10.6)
CALCIUM SERPL-MCNC: 9.2 MG/DL (ref 8.3–10.6)
CALCIUM SERPL-MCNC: 9.2 MG/DL (ref 8.3–10.6)
CARBOXYHEMOGLOBIN: 0.9 %
CHLORIDE BLD-SCNC: 100 MEQ/L (ref 98–111)
CHLORIDE BLD-SCNC: 100 MMOL/L (ref 99–110)
CHLORIDE BLD-SCNC: 101 MMOL/L (ref 99–110)
CHLORIDE BLD-SCNC: 101 MMOL/L (ref 99–110)
CHLORIDE BLD-SCNC: 102 MMOL/L (ref 99–110)
CHLORIDE BLD-SCNC: 103 MMOL/L (ref 99–110)
CHLORIDE BLD-SCNC: 104 MMOL/L (ref 99–110)
CHLORIDE BLD-SCNC: 104 MMOL/L (ref 99–110)
CHLORIDE BLD-SCNC: 95 MMOL/L (ref 99–110)
CHLORIDE BLD-SCNC: 96 MMOL/L (ref 99–110)
CHLORIDE BLD-SCNC: 96 MMOL/L (ref 99–110)
CHLORIDE BLD-SCNC: 97 MMOL/L (ref 99–110)
CHLORIDE BLD-SCNC: 97 MMOL/L (ref 99–110)
CHLORIDE BLD-SCNC: 98 MMOL/L (ref 99–110)
CHLORIDE BLD-SCNC: 99 MMOL/L (ref 99–110)
CHLORIDE BLD-SCNC: 99 MMOL/L (ref 99–110)
CLARITY: CLEAR
CLARITY: CLEAR
CO2: 19 MMOL/L (ref 21–32)
CO2: 21 MMOL/L (ref 21–32)
CO2: 21 MMOL/L (ref 21–32)
CO2: 22 MMOL/L (ref 21–31)
CO2: 22 MMOL/L (ref 21–32)
CO2: 23 MMOL/L (ref 21–32)
CO2: 24 MMOL/L (ref 21–32)
CO2: 25 MMOL/L (ref 21–32)
CO2: 27 MMOL/L (ref 21–32)
CO2: 28 MMOL/L (ref 21–32)
CO2: 29 MMOL/L (ref 21–32)
CO2: 32 MMOL/L (ref 21–32)
COLOR: YELLOW
COLOR: YELLOW
CREAT SERPL-MCNC: 1.5 MG/DL (ref 0.8–1.3)
CREAT SERPL-MCNC: 1.6 MG/DL (ref 0.8–1.3)
CREAT SERPL-MCNC: 1.7 MG/DL (ref 0.8–1.3)
CREAT SERPL-MCNC: 1.7 MG/DL (ref 0.8–1.3)
CREAT SERPL-MCNC: 1.8 MG/DL (ref 0.8–1.3)
CREAT SERPL-MCNC: 1.9 MG/DL (ref 0.8–1.3)
CREAT SERPL-MCNC: 1.97 MG/DL (ref 0.7–1.3)
CREAT SERPL-MCNC: 2 MG/DL (ref 0.8–1.3)
CREAT SERPL-MCNC: 2 MG/DL (ref 0.8–1.3)
CREAT SERPL-MCNC: 2.1 MG/DL (ref 0.8–1.3)
CREAT SERPL-MCNC: 2.2 MG/DL (ref 0.8–1.3)
CREAT SERPL-MCNC: 2.4 MG/DL (ref 0.8–1.3)
CULTURE, BLOOD 2: ABNORMAL
CULTURE, BLOOD 2: ABNORMAL
CULTURE, BLOOD 2: NORMAL
CULTURE, BLOOD 2: NORMAL
CULTURE, RESPIRATORY: NORMAL
CULTURE, RESPIRATORY: NORMAL
EKG ATRIAL RATE: 208 BPM
EKG ATRIAL RATE: 312 BPM
EKG ATRIAL RATE: 73 BPM
EKG DIAGNOSIS: NORMAL
EKG Q-T INTERVAL: 444 MS
EKG Q-T INTERVAL: 474 MS
EKG Q-T INTERVAL: 476 MS
EKG QRS DURATION: 160 MS
EKG QRS DURATION: 162 MS
EKG QRS DURATION: 174 MS
EKG QTC CALCULATION (BAZETT): 479 MS
EKG QTC CALCULATION (BAZETT): 517 MS
EKG QTC CALCULATION (BAZETT): 529 MS
EKG R AXIS: -52 DEGREES
EKG R AXIS: -67 DEGREES
EKG R AXIS: 125 DEGREES
EKG T AXIS: -29 DEGREES
EKG T AXIS: 18 DEGREES
EKG T AXIS: 69 DEGREES
EKG VENTRICULAR RATE: 70 BPM
EKG VENTRICULAR RATE: 71 BPM
EKG VENTRICULAR RATE: 75 BPM
EOSINOPHILS ABSOLUTE: 0 K/UL (ref 0–0.6)
EOSINOPHILS ABSOLUTE: 0.1 K/UL (ref 0–0.6)
EOSINOPHILS ABSOLUTE: 0.1 K/UL (ref 0–0.6)
EOSINOPHILS ABSOLUTE: 0.2 K/UL (ref 0–0.6)
EOSINOPHILS ABSOLUTE: 0.3 K/UL (ref 0–0.6)
EOSINOPHILS RELATIVE PERCENT: 0 %
EOSINOPHILS RELATIVE PERCENT: 0.2 %
EOSINOPHILS RELATIVE PERCENT: 0.2 %
EOSINOPHILS RELATIVE PERCENT: 0.4 %
EOSINOPHILS RELATIVE PERCENT: 1.2 %
EOSINOPHILS RELATIVE PERCENT: 1.3 %
EOSINOPHILS RELATIVE PERCENT: 1.5 %
EOSINOPHILS RELATIVE PERCENT: 1.5 %
EOSINOPHILS RELATIVE PERCENT: 1.7 %
EOSINOPHILS RELATIVE PERCENT: 1.9 %
EOSINOPHILS RELATIVE PERCENT: 2.1 %
EPITHELIAL CELLS, UA: 1 /HPF (ref 0–5)
GFR AFRICAN AMERICAN: 31
GFR AFRICAN AMERICAN: 32 ML/MIN/1.73 M2
GFR AFRICAN AMERICAN: 34
GFR AFRICAN AMERICAN: 36
GFR AFRICAN AMERICAN: 38
GFR AFRICAN AMERICAN: 38
GFR AFRICAN AMERICAN: 40
GFR AFRICAN AMERICAN: 43
GFR AFRICAN AMERICAN: 46
GFR AFRICAN AMERICAN: 46
GFR AFRICAN AMERICAN: 49
GFR AFRICAN AMERICAN: 53
GFR NON-AFRICAN AMERICAN: 25
GFR NON-AFRICAN AMERICAN: 27 ML/MIN/1.73 M2
GFR NON-AFRICAN AMERICAN: 28
GFR NON-AFRICAN AMERICAN: 29
GFR NON-AFRICAN AMERICAN: 31
GFR NON-AFRICAN AMERICAN: 31
GFR NON-AFRICAN AMERICAN: 33
GFR NON-AFRICAN AMERICAN: 35
GFR NON-AFRICAN AMERICAN: 38
GFR NON-AFRICAN AMERICAN: 38
GFR NON-AFRICAN AMERICAN: 40
GFR NON-AFRICAN AMERICAN: 44
GLOBULIN: 2.7 G/DL
GLUCOSE BLD-MCNC: 100 MG/DL (ref 70–99)
GLUCOSE BLD-MCNC: 102 MG/DL (ref 70–99)
GLUCOSE BLD-MCNC: 103 MG/DL (ref 70–99)
GLUCOSE BLD-MCNC: 103 MG/DL (ref 70–99)
GLUCOSE BLD-MCNC: 105 MG/DL (ref 70–99)
GLUCOSE BLD-MCNC: 106 MG/DL (ref 70–99)
GLUCOSE BLD-MCNC: 106 MG/DL (ref 70–99)
GLUCOSE BLD-MCNC: 107 MG/DL (ref 70–99)
GLUCOSE BLD-MCNC: 107 MG/DL (ref 70–99)
GLUCOSE BLD-MCNC: 109 MG/DL (ref 70–99)
GLUCOSE BLD-MCNC: 112 MG/DL (ref 70–99)
GLUCOSE BLD-MCNC: 112 MG/DL (ref 70–99)
GLUCOSE BLD-MCNC: 114 MG/DL (ref 70–99)
GLUCOSE BLD-MCNC: 115 MG/DL (ref 70–99)
GLUCOSE BLD-MCNC: 116 MG/DL (ref 70–99)
GLUCOSE BLD-MCNC: 124 MG/DL (ref 70–99)
GLUCOSE BLD-MCNC: 124 MG/DL (ref 70–99)
GLUCOSE BLD-MCNC: 125 MG/DL (ref 70–99)
GLUCOSE BLD-MCNC: 128 MG/DL (ref 70–99)
GLUCOSE BLD-MCNC: 130 MG/DL (ref 70–99)
GLUCOSE BLD-MCNC: 132 MG/DL (ref 70–99)
GLUCOSE BLD-MCNC: 132 MG/DL (ref 70–99)
GLUCOSE BLD-MCNC: 134 MG/DL (ref 70–99)
GLUCOSE BLD-MCNC: 137 MG/DL (ref 70–99)
GLUCOSE BLD-MCNC: 138 MG/DL (ref 70–99)
GLUCOSE BLD-MCNC: 138 MG/DL (ref 70–99)
GLUCOSE BLD-MCNC: 139 MG/DL (ref 70–99)
GLUCOSE BLD-MCNC: 140 MG/DL (ref 70–99)
GLUCOSE BLD-MCNC: 145 MG/DL (ref 70–99)
GLUCOSE BLD-MCNC: 149 MG/DL (ref 70–99)
GLUCOSE BLD-MCNC: 150 MG/DL (ref 70–99)
GLUCOSE BLD-MCNC: 152 MG/DL (ref 70–99)
GLUCOSE BLD-MCNC: 152 MG/DL (ref 70–99)
GLUCOSE BLD-MCNC: 154 MG/DL (ref 70–99)
GLUCOSE BLD-MCNC: 155 MG/DL (ref 70–99)
GLUCOSE BLD-MCNC: 157 MG/DL (ref 70–99)
GLUCOSE BLD-MCNC: 170 MG/DL (ref 70–99)
GLUCOSE BLD-MCNC: 171 MG/DL (ref 70–99)
GLUCOSE BLD-MCNC: 179 MG/DL (ref 70–99)
GLUCOSE BLD-MCNC: 183 MG/DL (ref 70–99)
GLUCOSE BLD-MCNC: 205 MG/DL (ref 70–99)
GLUCOSE BLD-MCNC: 212 MG/DL (ref 70–99)
GLUCOSE BLD-MCNC: 217 MG/DL (ref 70–99)
GLUCOSE BLD-MCNC: 248 MG/DL (ref 70–99)
GLUCOSE BLD-MCNC: 268 MG/DL (ref 70–99)
GLUCOSE BLD-MCNC: 268 MG/DL (ref 70–99)
GLUCOSE BLD-MCNC: 284 MG/DL (ref 70–99)
GLUCOSE BLD-MCNC: 295 MG/DL (ref 70–99)
GLUCOSE BLD-MCNC: 344 MG/DL (ref 70–99)
GLUCOSE BLD-MCNC: 367 MG/DL (ref 70–99)
GLUCOSE BLD-MCNC: 91 MG/DL (ref 70–99)
GLUCOSE BLD-MCNC: 97 MG/DL (ref 70–99)
GLUCOSE BLD-MCNC: 97 MG/DL (ref 70–99)
GLUCOSE BLD-MCNC: 98 MG/DL (ref 70–99)
GLUCOSE BLD-MCNC: 99 MG/DL (ref 70–99)
GLUCOSE URINE: NEGATIVE MG/DL
GLUCOSE URINE: NEGATIVE MG/DL
GRAM STAIN RESULT: NORMAL
GRAM STAIN RESULT: NORMAL
HCO3 VENOUS: 19 MMOL/L (ref 23–29)
HCO3 VENOUS: 26 MMOL/L (ref 23–29)
HCO3 VENOUS: 29 MMOL/L (ref 23–29)
HCT VFR BLD CALC: 30.6 % (ref 40.5–52.5)
HCT VFR BLD CALC: 31.5 % (ref 40.5–52.5)
HCT VFR BLD CALC: 31.5 % (ref 40.5–52.5)
HCT VFR BLD CALC: 31.6 % (ref 40.5–52.5)
HCT VFR BLD CALC: 31.7 % (ref 40.5–52.5)
HCT VFR BLD CALC: 32.1 % (ref 40.5–52.5)
HCT VFR BLD CALC: 32.4 % (ref 40.5–52.5)
HCT VFR BLD CALC: 32.6 % (ref 40.5–52.5)
HCT VFR BLD CALC: 33.3 % (ref 40.5–52.5)
HCT VFR BLD CALC: 34 % (ref 40.5–52.5)
HCT VFR BLD CALC: 34.7 % (ref 40.5–52.5)
HCT VFR BLD CALC: 35.6 % (ref 40.5–52.5)
HCT VFR BLD CALC: 35.9 % (ref 40.5–52.5)
HCT VFR BLD CALC: 36.3 % (ref 40.5–52.5)
HEMOGLOBIN: 10.1 G/DL (ref 13.5–17.5)
HEMOGLOBIN: 10.1 G/DL (ref 13.5–17.5)
HEMOGLOBIN: 10.2 G/DL (ref 13.5–17.5)
HEMOGLOBIN: 10.5 G/DL (ref 13.5–17.5)
HEMOGLOBIN: 10.7 G/DL (ref 13.5–17.5)
HEMOGLOBIN: 10.9 G/DL (ref 13.5–17.5)
HEMOGLOBIN: 11.3 G/DL (ref 13.5–17.5)
HEMOGLOBIN: 11.3 G/DL (ref 13.5–17.5)
HEMOGLOBIN: 11.5 G/DL (ref 13.5–17.5)
HEMOGLOBIN: 11.6 G/DL (ref 13.5–17.5)
HEMOGLOBIN: 9.7 G/DL (ref 13.5–17.5)
HYALINE CASTS: 2 /LPF (ref 0–8)
INR BLD: 1.37 (ref 0.86–1.14)
KETONES, URINE: NEGATIVE MG/DL
KETONES, URINE: NEGATIVE MG/DL
L. PNEUMOPHILA SEROGP 1 UR AG: NORMAL
LACTIC ACID, SEPSIS: 1 MMOL/L (ref 0.4–1.9)
LACTIC ACID, SEPSIS: 1 MMOL/L (ref 0.4–1.9)
LEUKOCYTE ESTERASE, URINE: ABNORMAL
LEUKOCYTE ESTERASE, URINE: NEGATIVE
LV EF: 50 %
LVEF MODALITY: NORMAL
LYMPHOCYTES ABSOLUTE: 0.6 K/UL (ref 1–5.1)
LYMPHOCYTES ABSOLUTE: 0.8 K/UL (ref 1–5.1)
LYMPHOCYTES ABSOLUTE: 0.9 K/UL (ref 1–5.1)
LYMPHOCYTES ABSOLUTE: 1.5 K/UL (ref 1–5.1)
LYMPHOCYTES ABSOLUTE: 1.8 K/UL (ref 1–5.1)
LYMPHOCYTES ABSOLUTE: 1.9 K/UL (ref 1–5.1)
LYMPHOCYTES ABSOLUTE: 2 K/UL (ref 1–5.1)
LYMPHOCYTES ABSOLUTE: 2.1 K/UL (ref 1–5.1)
LYMPHOCYTES ABSOLUTE: 2.3 K/UL (ref 1–5.1)
LYMPHOCYTES ABSOLUTE: 2.4 K/UL (ref 1–5.1)
LYMPHOCYTES ABSOLUTE: 2.6 K/UL (ref 1–5.1)
LYMPHOCYTES ABSOLUTE: 2.7 K/UL (ref 1–5.1)
LYMPHOCYTES ABSOLUTE: 2.7 K/UL (ref 1–5.1)
LYMPHOCYTES RELATIVE PERCENT: 11.1 %
LYMPHOCYTES RELATIVE PERCENT: 14.6 %
LYMPHOCYTES RELATIVE PERCENT: 16.1 %
LYMPHOCYTES RELATIVE PERCENT: 18.1 %
LYMPHOCYTES RELATIVE PERCENT: 18.9 %
LYMPHOCYTES RELATIVE PERCENT: 19 %
LYMPHOCYTES RELATIVE PERCENT: 19.7 %
LYMPHOCYTES RELATIVE PERCENT: 20.2 %
LYMPHOCYTES RELATIVE PERCENT: 22.4 %
LYMPHOCYTES RELATIVE PERCENT: 22.6 %
LYMPHOCYTES RELATIVE PERCENT: 6 %
LYMPHOCYTES RELATIVE PERCENT: 6 %
LYMPHOCYTES RELATIVE PERCENT: 8.4 %
MAGNESIUM: 2.2 MG/DL (ref 1.8–2.4)
MCH RBC QN AUTO: 25.7 PG (ref 26–34)
MCH RBC QN AUTO: 25.8 PG (ref 26–34)
MCH RBC QN AUTO: 25.9 PG (ref 26–34)
MCH RBC QN AUTO: 25.9 PG (ref 26–34)
MCH RBC QN AUTO: 26.1 PG (ref 26–34)
MCH RBC QN AUTO: 26.2 PG (ref 26–34)
MCH RBC QN AUTO: 26.4 PG (ref 26–34)
MCH RBC QN AUTO: 26.5 PG (ref 26–34)
MCH RBC QN AUTO: 26.5 PG (ref 26–34)
MCHC RBC AUTO-ENTMCNC: 31.4 G/DL (ref 31–36)
MCHC RBC AUTO-ENTMCNC: 31.8 G/DL (ref 31–36)
MCHC RBC AUTO-ENTMCNC: 31.9 G/DL (ref 31–36)
MCHC RBC AUTO-ENTMCNC: 32 G/DL (ref 31–36)
MCHC RBC AUTO-ENTMCNC: 32.1 G/DL (ref 31–36)
MCHC RBC AUTO-ENTMCNC: 32.4 G/DL (ref 31–36)
MCHC RBC AUTO-ENTMCNC: 32.4 G/DL (ref 31–36)
MCHC RBC AUTO-ENTMCNC: 32.5 G/DL (ref 31–36)
MCHC RBC AUTO-ENTMCNC: 32.5 G/DL (ref 31–36)
MCHC RBC AUTO-ENTMCNC: 32.6 G/DL (ref 31–36)
MCHC RBC AUTO-ENTMCNC: 32.7 G/DL (ref 31–36)
MCHC RBC AUTO-ENTMCNC: 33 G/DL (ref 31–36)
MCV RBC AUTO: 80.1 FL (ref 80–100)
MCV RBC AUTO: 80.4 FL (ref 80–100)
MCV RBC AUTO: 80.4 FL (ref 80–100)
MCV RBC AUTO: 80.5 FL (ref 80–100)
MCV RBC AUTO: 80.7 FL (ref 80–100)
MCV RBC AUTO: 80.9 FL (ref 80–100)
MCV RBC AUTO: 81.2 FL (ref 80–100)
MCV RBC AUTO: 81.3 FL (ref 80–100)
MCV RBC AUTO: 82.1 FL (ref 80–100)
METHEMOGLOBIN VENOUS: 0.6 %
METHEMOGLOBIN VENOUS: 0.9 %
METHEMOGLOBIN VENOUS: 1 %
MICROSCOPIC EXAMINATION: NORMAL
MICROSCOPIC EXAMINATION: YES
MONOCYTES ABSOLUTE: 0.4 K/UL (ref 0–1.3)
MONOCYTES ABSOLUTE: 0.5 K/UL (ref 0–1.3)
MONOCYTES ABSOLUTE: 0.8 K/UL (ref 0–1.3)
MONOCYTES ABSOLUTE: 0.9 K/UL (ref 0–1.3)
MONOCYTES ABSOLUTE: 0.9 K/UL (ref 0–1.3)
MONOCYTES ABSOLUTE: 1 K/UL (ref 0–1.3)
MONOCYTES ABSOLUTE: 1.1 K/UL (ref 0–1.3)
MONOCYTES ABSOLUTE: 1.4 K/UL (ref 0–1.3)
MONOCYTES ABSOLUTE: 1.4 K/UL (ref 0–1.3)
MONOCYTES RELATIVE PERCENT: 10.4 %
MONOCYTES RELATIVE PERCENT: 10.9 %
MONOCYTES RELATIVE PERCENT: 11 %
MONOCYTES RELATIVE PERCENT: 3.8 %
MONOCYTES RELATIVE PERCENT: 5.6 %
MONOCYTES RELATIVE PERCENT: 5.8 %
MONOCYTES RELATIVE PERCENT: 6.5 %
MONOCYTES RELATIVE PERCENT: 6.7 %
MONOCYTES RELATIVE PERCENT: 8 %
MONOCYTES RELATIVE PERCENT: 8.5 %
MONOCYTES RELATIVE PERCENT: 8.6 %
MONOCYTES RELATIVE PERCENT: 9.5 %
MONOCYTES RELATIVE PERCENT: 9.6 %
MRSA CULTURE ONLY: NORMAL
NEUTROPHILS ABSOLUTE: 10.2 K/UL (ref 1.7–7.7)
NEUTROPHILS ABSOLUTE: 12.5 K/UL (ref 1.7–7.7)
NEUTROPHILS ABSOLUTE: 14.4 K/UL (ref 1.7–7.7)
NEUTROPHILS ABSOLUTE: 7 K/UL (ref 1.7–7.7)
NEUTROPHILS ABSOLUTE: 7 K/UL (ref 1.7–7.7)
NEUTROPHILS ABSOLUTE: 7.4 K/UL (ref 1.7–7.7)
NEUTROPHILS ABSOLUTE: 7.6 K/UL (ref 1.7–7.7)
NEUTROPHILS ABSOLUTE: 7.8 K/UL (ref 1.7–7.7)
NEUTROPHILS ABSOLUTE: 7.9 K/UL (ref 1.7–7.7)
NEUTROPHILS ABSOLUTE: 8 K/UL (ref 1.7–7.7)
NEUTROPHILS ABSOLUTE: 8.5 K/UL (ref 1.7–7.7)
NEUTROPHILS ABSOLUTE: 9 K/UL (ref 1.7–7.7)
NEUTROPHILS ABSOLUTE: 9.2 K/UL (ref 1.7–7.7)
NEUTROPHILS RELATIVE PERCENT: 66.8 %
NEUTROPHILS RELATIVE PERCENT: 67.7 %
NEUTROPHILS RELATIVE PERCENT: 68.1 %
NEUTROPHILS RELATIVE PERCENT: 68.6 %
NEUTROPHILS RELATIVE PERCENT: 69.1 %
NEUTROPHILS RELATIVE PERCENT: 70.6 %
NEUTROPHILS RELATIVE PERCENT: 70.6 %
NEUTROPHILS RELATIVE PERCENT: 74.9 %
NEUTROPHILS RELATIVE PERCENT: 77.7 %
NEUTROPHILS RELATIVE PERCENT: 79 %
NEUTROPHILS RELATIVE PERCENT: 85.7 %
NEUTROPHILS RELATIVE PERCENT: 87.3 %
NEUTROPHILS RELATIVE PERCENT: 90.1 %
NITRITE, URINE: NEGATIVE
NITRITE, URINE: NEGATIVE
O2 CONTENT, VEN: 13 ML/DL
O2 CONTENT, VEN: 2 ML/DL
O2 CONTENT, VEN: 6 ML/DL
O2 SAT, VEN: 11 %
O2 SAT, VEN: 41 %
O2 SAT, VEN: 91 %
O2 THERAPY: ABNORMAL
ORGANISM: ABNORMAL
ORGANISM: ABNORMAL
PCO2, VEN: 38.2 MMHG (ref 40–50)
PCO2, VEN: 49.9 MMHG (ref 40–50)
PCO2, VEN: 54.5 MMHG (ref 40–50)
PDW BLD-RTO: 16.5 % (ref 12.4–15.4)
PDW BLD-RTO: 16.8 % (ref 12.4–15.4)
PDW BLD-RTO: 16.8 % (ref 12.4–15.4)
PDW BLD-RTO: 16.9 % (ref 12.4–15.4)
PDW BLD-RTO: 17 % (ref 12.4–15.4)
PDW BLD-RTO: 17.3 % (ref 12.4–15.4)
PDW BLD-RTO: 17.3 % (ref 12.4–15.4)
PDW BLD-RTO: 17.4 % (ref 12.4–15.4)
PERFORMED ON: ABNORMAL
PERFORMED ON: NORMAL
PH UA: 5.5 (ref 5–8)
PH UA: 6.5 (ref 5–8)
PH VENOUS: 7.19 (ref 7.35–7.45)
PH VENOUS: 7.33 (ref 7.35–7.45)
PH VENOUS: 7.45 (ref 7.35–7.45)
PLATELET # BLD: 138 K/UL (ref 135–450)
PLATELET # BLD: 155 K/UL (ref 135–450)
PLATELET # BLD: 155 K/UL (ref 135–450)
PLATELET # BLD: 159 K/UL (ref 135–450)
PLATELET # BLD: 161 K/UL (ref 135–450)
PLATELET # BLD: 161 K/UL (ref 135–450)
PLATELET # BLD: 169 K/UL (ref 135–450)
PLATELET # BLD: 169 K/UL (ref 135–450)
PLATELET # BLD: 173 K/UL (ref 135–450)
PLATELET # BLD: 176 K/UL (ref 135–450)
PLATELET # BLD: 179 K/UL (ref 135–450)
PLATELET # BLD: 182 K/UL (ref 135–450)
PLATELET # BLD: 187 K/UL (ref 135–450)
PLATELET # BLD: 199 K/UL (ref 135–450)
PMV BLD AUTO: 9.3 FL (ref 5–10.5)
PMV BLD AUTO: 9.4 FL (ref 5–10.5)
PMV BLD AUTO: 9.5 FL (ref 5–10.5)
PMV BLD AUTO: 9.6 FL (ref 5–10.5)
PMV BLD AUTO: 9.7 FL (ref 5–10.5)
PMV BLD AUTO: 9.7 FL (ref 5–10.5)
PMV BLD AUTO: 9.8 FL (ref 5–10.5)
PMV BLD AUTO: 9.8 FL (ref 5–10.5)
PMV BLD AUTO: 9.9 FL (ref 5–10.5)
PO2, VEN: 14 MMHG
PO2, VEN: 32 MMHG
PO2, VEN: 61 MMHG
POTASSIUM REFLEX MAGNESIUM: 3.5 MMOL/L (ref 3.5–5.1)
POTASSIUM REFLEX MAGNESIUM: 3.9 MMOL/L (ref 3.5–5.1)
POTASSIUM REFLEX MAGNESIUM: 4 MMOL/L (ref 3.5–5.1)
POTASSIUM REFLEX MAGNESIUM: 4.1 MMOL/L (ref 3.5–5.1)
POTASSIUM REFLEX MAGNESIUM: 4.3 MMOL/L (ref 3.5–5.1)
POTASSIUM REFLEX MAGNESIUM: 4.3 MMOL/L (ref 3.5–5.1)
POTASSIUM REFLEX MAGNESIUM: 4.5 MMOL/L (ref 3.5–5.1)
POTASSIUM REFLEX MAGNESIUM: 4.6 MMOL/L (ref 3.5–5.1)
POTASSIUM REFLEX MAGNESIUM: 4.7 MMOL/L (ref 3.5–5.1)
POTASSIUM REFLEX MAGNESIUM: 4.8 MMOL/L (ref 3.5–5.1)
POTASSIUM REFLEX MAGNESIUM: 4.9 MMOL/L (ref 3.5–5.1)
POTASSIUM REFLEX MAGNESIUM: 5.7 MMOL/L (ref 3.5–5.1)
POTASSIUM REFLEX MAGNESIUM: ABNORMAL MMOL/L (ref 3.5–5.1)
POTASSIUM SERPL-SCNC: 4.1 MMOL/L (ref 3.5–5.1)
POTASSIUM SERPL-SCNC: 4.6 MMOL/L (ref 3.5–5.1)
POTASSIUM SERPL-SCNC: 4.7 MMOL/L (ref 3.5–5.1)
POTASSIUM SERPL-SCNC: 5.4 MEQ/L (ref 3.6–5.1)
PREALBUMIN: 22.4 MG/DL (ref 20–40)
PRO-BNP: 1446 PG/ML (ref 0–449)
PRO-BNP: 1698 PG/ML (ref 0–449)
PRO-BNP: 3084 PG/ML (ref 0–449)
PRO-BNP: 3742 PG/ML (ref 0–449)
PRO-BNP: 4196 PG/ML (ref 0–449)
PRO-BNP: ABNORMAL PG/ML (ref 0–449)
PROCALCITONIN: 0.07 NG/ML (ref 0–0.15)
PROCALCITONIN: 0.92 NG/ML (ref 0–0.15)
PROTEIN UA: ABNORMAL MG/DL
PROTEIN UA: NEGATIVE MG/DL
PROTHROMBIN TIME: 15.9 SEC (ref 10–13.2)
RAPID INFLUENZA  B AGN: NEGATIVE
RAPID INFLUENZA A AGN: NEGATIVE
RBC # BLD: 3.79 M/UL (ref 4.2–5.9)
RBC # BLD: 3.9 M/UL (ref 4.2–5.9)
RBC # BLD: 3.91 M/UL (ref 4.2–5.9)
RBC # BLD: 3.93 M/UL (ref 4.2–5.9)
RBC # BLD: 3.96 M/UL (ref 4.2–5.9)
RBC # BLD: 3.96 M/UL (ref 4.2–5.9)
RBC # BLD: 4.02 M/UL (ref 4.2–5.9)
RBC # BLD: 4.04 M/UL (ref 4.2–5.9)
RBC # BLD: 4.1 M/UL (ref 4.2–5.9)
RBC # BLD: 4.2 M/UL (ref 4.2–5.9)
RBC # BLD: 4.26 M/UL (ref 4.2–5.9)
RBC # BLD: 4.36 M/UL (ref 4.2–5.9)
RBC # BLD: 4.41 M/UL (ref 4.2–5.9)
RBC # BLD: 4.48 M/UL (ref 4.2–5.9)
RBC UA: 355 /HPF (ref 0–4)
REPORT: NORMAL
SARS-COV-2, NAAT: NOT DETECTED
SARS-COV-2, NAAT: NOT DETECTED
SEDIMENTATION RATE, ERYTHROCYTE: 12 MM/HR (ref 0–20)
SODIUM BLD-SCNC: 130 MMOL/L (ref 136–145)
SODIUM BLD-SCNC: 131 MEQ/L (ref 135–145)
SODIUM BLD-SCNC: 133 MMOL/L (ref 136–145)
SODIUM BLD-SCNC: 134 MMOL/L (ref 136–145)
SODIUM BLD-SCNC: 135 MMOL/L (ref 136–145)
SODIUM BLD-SCNC: 136 MMOL/L (ref 136–145)
SODIUM BLD-SCNC: 137 MMOL/L (ref 136–145)
SODIUM BLD-SCNC: 138 MMOL/L (ref 136–145)
SODIUM BLD-SCNC: 139 MMOL/L (ref 136–145)
SPECIFIC GRAVITY UA: 1.01 (ref 1–1.03)
SPECIFIC GRAVITY UA: 1.01 (ref 1–1.03)
STREP PNEUMONIAE ANTIGEN, URINE: NORMAL
T4 FREE: 1.2 NG/DL (ref 0.9–1.8)
TCO2 CALC VENOUS: 21 MMOL/L
TCO2 CALC VENOUS: 28 MMOL/L
TCO2 CALC VENOUS: 31 MMOL/L
TOTAL PROTEIN: 6.5 G/DL (ref 6.4–8.2)
TROPONIN: 0.02 NG/ML
TROPONIN: 0.02 NG/ML
TROPONIN: 0.03 NG/ML
TROPONIN: 0.06 NG/ML
TROPONIN: 0.09 NG/ML
TROPONIN: 0.09 NG/ML
TSH SERPL DL<=0.05 MIU/L-ACNC: 2.31 UIU/ML (ref 0.27–4.2)
URINE REFLEX TO CULTURE: NORMAL
URINE TYPE: ABNORMAL
URINE TYPE: NORMAL
UROBILINOGEN, URINE: 0.2 E.U./DL
UROBILINOGEN, URINE: 0.2 E.U./DL
VANCOMYCIN RANDOM: 12.4 UG/ML
WBC # BLD: 10.2 K/UL (ref 4–11)
WBC # BLD: 10.4 K/UL (ref 4–11)
WBC # BLD: 10.5 K/UL (ref 4–11)
WBC # BLD: 11.2 K/UL (ref 4–11)
WBC # BLD: 11.6 K/UL (ref 4–11)
WBC # BLD: 11.9 K/UL (ref 4–11)
WBC # BLD: 12 K/UL (ref 4–11)
WBC # BLD: 12.5 K/UL (ref 4–11)
WBC # BLD: 13.2 K/UL (ref 4–11)
WBC # BLD: 13.5 K/UL (ref 4–11)
WBC # BLD: 14.3 K/UL (ref 4–11)
WBC # BLD: 18.2 K/UL (ref 4–11)
WBC # BLD: 9.1 K/UL (ref 4–11)
WBC # BLD: 9.9 K/UL (ref 4–11)
WBC UA: 6 /HPF (ref 0–5)

## 2020-01-01 PROCEDURE — 36415 COLL VENOUS BLD VENIPUNCTURE: CPT

## 2020-01-01 PROCEDURE — 6360000002 HC RX W HCPCS: Performed by: INTERNAL MEDICINE

## 2020-01-01 PROCEDURE — 84484 ASSAY OF TROPONIN QUANT: CPT

## 2020-01-01 PROCEDURE — 80048 BASIC METABOLIC PNL TOTAL CA: CPT

## 2020-01-01 PROCEDURE — 97116 GAIT TRAINING THERAPY: CPT

## 2020-01-01 PROCEDURE — 20610 DRAIN/INJ JOINT/BURSA W/O US: CPT | Performed by: PHYSICIAN ASSISTANT

## 2020-01-01 PROCEDURE — 96365 THER/PROPH/DIAG IV INF INIT: CPT

## 2020-01-01 PROCEDURE — 6370000000 HC RX 637 (ALT 250 FOR IP): Performed by: INTERNAL MEDICINE

## 2020-01-01 PROCEDURE — 2060000000 HC ICU INTERMEDIATE R&B

## 2020-01-01 PROCEDURE — 97535 SELF CARE MNGMENT TRAINING: CPT

## 2020-01-01 PROCEDURE — 72131 CT LUMBAR SPINE W/O DYE: CPT

## 2020-01-01 PROCEDURE — 85025 COMPLETE CBC W/AUTO DIFF WBC: CPT

## 2020-01-01 PROCEDURE — 6360000002 HC RX W HCPCS: Performed by: PHYSICAL MEDICINE & REHABILITATION

## 2020-01-01 PROCEDURE — 2580000003 HC RX 258: Performed by: INTERNAL MEDICINE

## 2020-01-01 PROCEDURE — 86140 C-REACTIVE PROTEIN: CPT

## 2020-01-01 PROCEDURE — 97530 THERAPEUTIC ACTIVITIES: CPT

## 2020-01-01 PROCEDURE — 87081 CULTURE SCREEN ONLY: CPT

## 2020-01-01 PROCEDURE — 87804 INFLUENZA ASSAY W/OPTIC: CPT

## 2020-01-01 PROCEDURE — 97110 THERAPEUTIC EXERCISES: CPT

## 2020-01-01 PROCEDURE — 97129 THER IVNTJ 1ST 15 MIN: CPT

## 2020-01-01 PROCEDURE — G8427 DOCREV CUR MEDS BY ELIG CLIN: HCPCS | Performed by: PHYSICIAN ASSISTANT

## 2020-01-01 PROCEDURE — 2580000003 HC RX 258: Performed by: PHYSICAL MEDICINE & REHABILITATION

## 2020-01-01 PROCEDURE — 94640 AIRWAY INHALATION TREATMENT: CPT

## 2020-01-01 PROCEDURE — 93005 ELECTROCARDIOGRAM TRACING: CPT | Performed by: PHYSICIAN ASSISTANT

## 2020-01-01 PROCEDURE — 1280000000 HC REHAB R&B

## 2020-01-01 PROCEDURE — 71250 CT THORAX DX C-: CPT

## 2020-01-01 PROCEDURE — 94760 N-INVAS EAR/PLS OXIMETRY 1: CPT

## 2020-01-01 PROCEDURE — 2700000000 HC OXYGEN THERAPY PER DAY

## 2020-01-01 PROCEDURE — 99232 SBSQ HOSP IP/OBS MODERATE 35: CPT | Performed by: NURSE PRACTITIONER

## 2020-01-01 PROCEDURE — 1036F TOBACCO NON-USER: CPT | Performed by: PHYSICIAN ASSISTANT

## 2020-01-01 PROCEDURE — 71045 X-RAY EXAM CHEST 1 VIEW: CPT

## 2020-01-01 PROCEDURE — G8482 FLU IMMUNIZE ORDER/ADMIN: HCPCS | Performed by: PHYSICAL MEDICINE & REHABILITATION

## 2020-01-01 PROCEDURE — 99223 1ST HOSP IP/OBS HIGH 75: CPT | Performed by: INTERNAL MEDICINE

## 2020-01-01 PROCEDURE — 99152 MOD SED SAME PHYS/QHP 5/>YRS: CPT | Performed by: PHYSICAL MEDICINE & REHABILITATION

## 2020-01-01 PROCEDURE — 99231 SBSQ HOSP IP/OBS SF/LOW 25: CPT | Performed by: INTERNAL MEDICINE

## 2020-01-01 PROCEDURE — 83880 ASSAY OF NATRIURETIC PEPTIDE: CPT

## 2020-01-01 PROCEDURE — 99204 OFFICE O/P NEW MOD 45 MIN: CPT | Performed by: PHYSICAL MEDICINE & REHABILITATION

## 2020-01-01 PROCEDURE — 86038 ANTINUCLEAR ANTIBODIES: CPT

## 2020-01-01 PROCEDURE — 83605 ASSAY OF LACTIC ACID: CPT

## 2020-01-01 PROCEDURE — 99232 SBSQ HOSP IP/OBS MODERATE 35: CPT | Performed by: INTERNAL MEDICINE

## 2020-01-01 PROCEDURE — G8417 CALC BMI ABV UP PARAM F/U: HCPCS | Performed by: PHYSICIAN ASSISTANT

## 2020-01-01 PROCEDURE — 99239 HOSP IP/OBS DSCHRG MGMT >30: CPT | Performed by: INTERNAL MEDICINE

## 2020-01-01 PROCEDURE — 6360000004 HC RX CONTRAST MEDICATION: Performed by: EMERGENCY MEDICINE

## 2020-01-01 PROCEDURE — 97530 THERAPEUTIC ACTIVITIES: CPT | Performed by: PHYSICAL THERAPIST

## 2020-01-01 PROCEDURE — 6370000000 HC RX 637 (ALT 250 FOR IP): Performed by: PHYSICAL MEDICINE & REHABILITATION

## 2020-01-01 PROCEDURE — 82803 BLOOD GASES ANY COMBINATION: CPT

## 2020-01-01 PROCEDURE — 84134 ASSAY OF PREALBUMIN: CPT

## 2020-01-01 PROCEDURE — 87449 NOS EACH ORGANISM AG IA: CPT

## 2020-01-01 PROCEDURE — 85610 PROTHROMBIN TIME: CPT

## 2020-01-01 PROCEDURE — G8427 DOCREV CUR MEDS BY ELIG CLIN: HCPCS | Performed by: PHYSICAL MEDICINE & REHABILITATION

## 2020-01-01 PROCEDURE — 1200000000 HC SEMI PRIVATE

## 2020-01-01 PROCEDURE — 99233 SBSQ HOSP IP/OBS HIGH 50: CPT | Performed by: INTERNAL MEDICINE

## 2020-01-01 PROCEDURE — 84439 ASSAY OF FREE THYROXINE: CPT

## 2020-01-01 PROCEDURE — 97166 OT EVAL MOD COMPLEX 45 MIN: CPT

## 2020-01-01 PROCEDURE — 87205 SMEAR GRAM STAIN: CPT

## 2020-01-01 PROCEDURE — 86235 NUCLEAR ANTIGEN ANTIBODY: CPT

## 2020-01-01 PROCEDURE — 93005 ELECTROCARDIOGRAM TRACING: CPT | Performed by: EMERGENCY MEDICINE

## 2020-01-01 PROCEDURE — 93306 TTE W/DOPPLER COMPLETE: CPT

## 2020-01-01 PROCEDURE — 97110 THERAPEUTIC EXERCISES: CPT | Performed by: PHYSICAL THERAPIST

## 2020-01-01 PROCEDURE — 6370000000 HC RX 637 (ALT 250 FOR IP): Performed by: NURSE PRACTITIONER

## 2020-01-01 PROCEDURE — 93010 ELECTROCARDIOGRAM REPORT: CPT | Performed by: INTERNAL MEDICINE

## 2020-01-01 PROCEDURE — 97162 PT EVAL MOD COMPLEX 30 MIN: CPT

## 2020-01-01 PROCEDURE — 1036F TOBACCO NON-USER: CPT | Performed by: INTERNAL MEDICINE

## 2020-01-01 PROCEDURE — 4040F PNEUMOC VAC/ADMIN/RCVD: CPT | Performed by: PHYSICIAN ASSISTANT

## 2020-01-01 PROCEDURE — 99213 OFFICE O/P EST LOW 20 MIN: CPT | Performed by: INTERNAL MEDICINE

## 2020-01-01 PROCEDURE — 3209999900 FLUORO FOR SURGICAL PROCEDURES

## 2020-01-01 PROCEDURE — 94761 N-INVAS EAR/PLS OXIMETRY MLT: CPT

## 2020-01-01 PROCEDURE — 3610000056 HC PAIN LEVEL 4 BASE (NON-OR): Performed by: PHYSICAL MEDICINE & REHABILITATION

## 2020-01-01 PROCEDURE — 97542 WHEELCHAIR MNGMENT TRAINING: CPT

## 2020-01-01 PROCEDURE — 1036F TOBACCO NON-USER: CPT | Performed by: PHYSICAL MEDICINE & REHABILITATION

## 2020-01-01 PROCEDURE — 84145 PROCALCITONIN (PCT): CPT

## 2020-01-01 PROCEDURE — 1111F DSCHRG MED/CURRENT MED MERGE: CPT | Performed by: PHYSICAL MEDICINE & REHABILITATION

## 2020-01-01 PROCEDURE — U0002 COVID-19 LAB TEST NON-CDC: HCPCS

## 2020-01-01 PROCEDURE — 94660 CPAP INITIATION&MGMT: CPT

## 2020-01-01 PROCEDURE — G8417 CALC BMI ABV UP PARAM F/U: HCPCS | Performed by: PHYSICAL MEDICINE & REHABILITATION

## 2020-01-01 PROCEDURE — 96375 TX/PRO/DX INJ NEW DRUG ADDON: CPT

## 2020-01-01 PROCEDURE — G8417 CALC BMI ABV UP PARAM F/U: HCPCS | Performed by: INTERNAL MEDICINE

## 2020-01-01 PROCEDURE — 81001 URINALYSIS AUTO W/SCOPE: CPT

## 2020-01-01 PROCEDURE — 4040F PNEUMOC VAC/ADMIN/RCVD: CPT | Performed by: PHYSICAL MEDICINE & REHABILITATION

## 2020-01-01 PROCEDURE — 2709999900 HC NON-CHARGEABLE SUPPLY: Performed by: PHYSICAL MEDICINE & REHABILITATION

## 2020-01-01 PROCEDURE — 1123F ACP DISCUSS/DSCN MKR DOCD: CPT | Performed by: INTERNAL MEDICINE

## 2020-01-01 PROCEDURE — C9113 INJ PANTOPRAZOLE SODIUM, VIA: HCPCS | Performed by: INTERNAL MEDICINE

## 2020-01-01 PROCEDURE — 81003 URINALYSIS AUTO W/O SCOPE: CPT

## 2020-01-01 PROCEDURE — 80202 ASSAY OF VANCOMYCIN: CPT

## 2020-01-01 PROCEDURE — 85652 RBC SED RATE AUTOMATED: CPT

## 2020-01-01 PROCEDURE — 87040 BLOOD CULTURE FOR BACTERIA: CPT

## 2020-01-01 PROCEDURE — 6370000000 HC RX 637 (ALT 250 FOR IP): Performed by: EMERGENCY MEDICINE

## 2020-01-01 PROCEDURE — 1111F DSCHRG MED/CURRENT MED MERGE: CPT | Performed by: PHYSICIAN ASSISTANT

## 2020-01-01 PROCEDURE — 2580000003 HC RX 258: Performed by: EMERGENCY MEDICINE

## 2020-01-01 PROCEDURE — 2500000003 HC RX 250 WO HCPCS: Performed by: EMERGENCY MEDICINE

## 2020-01-01 PROCEDURE — 76770 US EXAM ABDO BACK WALL COMP: CPT

## 2020-01-01 PROCEDURE — 99285 EMERGENCY DEPT VISIT HI MDM: CPT

## 2020-01-01 PROCEDURE — 85027 COMPLETE CBC AUTOMATED: CPT

## 2020-01-01 PROCEDURE — 84443 ASSAY THYROID STIM HORMONE: CPT

## 2020-01-01 PROCEDURE — G8427 DOCREV CUR MEDS BY ELIG CLIN: HCPCS | Performed by: INTERNAL MEDICINE

## 2020-01-01 PROCEDURE — 1123F ACP DISCUSS/DSCN MKR DOCD: CPT | Performed by: PHYSICIAN ASSISTANT

## 2020-01-01 PROCEDURE — 70496 CT ANGIOGRAPHY HEAD: CPT

## 2020-01-01 PROCEDURE — 2580000003 HC RX 258: Performed by: NURSE PRACTITIONER

## 2020-01-01 PROCEDURE — 83735 ASSAY OF MAGNESIUM: CPT

## 2020-01-01 PROCEDURE — 80053 COMPREHEN METABOLIC PANEL: CPT

## 2020-01-01 PROCEDURE — 87150 DNA/RNA AMPLIFIED PROBE: CPT

## 2020-01-01 PROCEDURE — 6360000002 HC RX W HCPCS: Performed by: EMERGENCY MEDICINE

## 2020-01-01 PROCEDURE — 99222 1ST HOSP IP/OBS MODERATE 55: CPT | Performed by: INTERNAL MEDICINE

## 2020-01-01 PROCEDURE — 4040F PNEUMOC VAC/ADMIN/RCVD: CPT | Performed by: INTERNAL MEDICINE

## 2020-01-01 PROCEDURE — 97116 GAIT TRAINING THERAPY: CPT | Performed by: PHYSICAL THERAPIST

## 2020-01-01 PROCEDURE — 87070 CULTURE OTHR SPECIMN AEROBIC: CPT

## 2020-01-01 PROCEDURE — 20610 DRAIN/INJ JOINT/BURSA W/O US: CPT | Performed by: PHYSICAL MEDICINE & REHABILITATION

## 2020-01-01 PROCEDURE — 87077 CULTURE AEROBIC IDENTIFY: CPT

## 2020-01-01 PROCEDURE — 1123F ACP DISCUSS/DSCN MKR DOCD: CPT | Performed by: PHYSICAL MEDICINE & REHABILITATION

## 2020-01-01 PROCEDURE — 70450 CT HEAD/BRAIN W/O DYE: CPT

## 2020-01-01 PROCEDURE — G8482 FLU IMMUNIZE ORDER/ADMIN: HCPCS | Performed by: PHYSICIAN ASSISTANT

## 2020-01-01 PROCEDURE — 99213 OFFICE O/P EST LOW 20 MIN: CPT | Performed by: PHYSICIAN ASSISTANT

## 2020-01-01 PROCEDURE — 85730 THROMBOPLASTIN TIME PARTIAL: CPT

## 2020-01-01 PROCEDURE — 2500000003 HC RX 250 WO HCPCS: Performed by: PHYSICAL MEDICINE & REHABILITATION

## 2020-01-01 PROCEDURE — 93005 ELECTROCARDIOGRAM TRACING: CPT | Performed by: INTERNAL MEDICINE

## 2020-01-01 RX ORDER — SODIUM CHLORIDE 0.9 % (FLUSH) 0.9 %
10 SYRINGE (ML) INJECTION EVERY 12 HOURS SCHEDULED
Status: CANCELLED | OUTPATIENT
Start: 2020-01-01

## 2020-01-01 RX ORDER — ATORVASTATIN CALCIUM 40 MG/1
40 TABLET, FILM COATED ORAL NIGHTLY
Status: DISCONTINUED | OUTPATIENT
Start: 2020-01-01 | End: 2020-01-01 | Stop reason: HOSPADM

## 2020-01-01 RX ORDER — ACETAMINOPHEN 650 MG/1
650 SUPPOSITORY RECTAL EVERY 6 HOURS PRN
Status: DISCONTINUED | OUTPATIENT
Start: 2020-01-01 | End: 2020-01-01

## 2020-01-01 RX ORDER — PREDNISONE 10 MG/1
10 TABLET ORAL DAILY
Status: DISCONTINUED | OUTPATIENT
Start: 2020-01-01 | End: 2020-01-01 | Stop reason: HOSPADM

## 2020-01-01 RX ORDER — ONDANSETRON 4 MG/1
4 TABLET, FILM COATED ORAL EVERY 8 HOURS PRN
Status: DISCONTINUED | OUTPATIENT
Start: 2020-01-01 | End: 2020-01-01 | Stop reason: HOSPADM

## 2020-01-01 RX ORDER — FUROSEMIDE 10 MG/ML
40 INJECTION INTRAMUSCULAR; INTRAVENOUS 2 TIMES DAILY
Status: DISCONTINUED | OUTPATIENT
Start: 2020-01-01 | End: 2020-01-01

## 2020-01-01 RX ORDER — SODIUM CHLORIDE 0.9 % (FLUSH) 0.9 %
10 SYRINGE (ML) INJECTION PRN
Status: DISCONTINUED | OUTPATIENT
Start: 2020-01-01 | End: 2020-01-01 | Stop reason: HOSPADM

## 2020-01-01 RX ORDER — FLUTICASONE PROPIONATE 50 MCG
2 SPRAY, SUSPENSION (ML) NASAL DAILY
Status: DISCONTINUED | OUTPATIENT
Start: 2020-01-01 | End: 2020-01-01 | Stop reason: HOSPADM

## 2020-01-01 RX ORDER — ONDANSETRON 2 MG/ML
4 INJECTION INTRAMUSCULAR; INTRAVENOUS EVERY 6 HOURS PRN
Status: DISCONTINUED | OUTPATIENT
Start: 2020-01-01 | End: 2020-01-01 | Stop reason: HOSPADM

## 2020-01-01 RX ORDER — FAMOTIDINE 20 MG/1
20 TABLET, FILM COATED ORAL DAILY
Status: DISCONTINUED | OUTPATIENT
Start: 2020-01-01 | End: 2020-01-01

## 2020-01-01 RX ORDER — CARVEDILOL 12.5 MG/1
12.5 TABLET ORAL ONCE
Status: DISCONTINUED | OUTPATIENT
Start: 2020-01-01 | End: 2020-01-01

## 2020-01-01 RX ORDER — SODIUM CHLORIDE 0.9 % (FLUSH) 0.9 %
10 SYRINGE (ML) INJECTION EVERY 12 HOURS SCHEDULED
Status: DISCONTINUED | OUTPATIENT
Start: 2020-01-01 | End: 2020-01-01 | Stop reason: HOSPADM

## 2020-01-01 RX ORDER — POLYETHYLENE GLYCOL 3350 17 G/17G
17 POWDER, FOR SOLUTION ORAL DAILY PRN
Status: DISCONTINUED | OUTPATIENT
Start: 2020-01-01 | End: 2020-01-01 | Stop reason: HOSPADM

## 2020-01-01 RX ORDER — TRAMADOL HYDROCHLORIDE 50 MG/1
50 TABLET ORAL 2 TIMES DAILY
Qty: 60 TABLET | Refills: 0 | Status: ON HOLD | OUTPATIENT
Start: 2020-01-01 | End: 2020-01-01 | Stop reason: HOSPADM

## 2020-01-01 RX ORDER — FUROSEMIDE 40 MG/1
40 TABLET ORAL DAILY
Status: DISCONTINUED | OUTPATIENT
Start: 2020-01-01 | End: 2020-01-01

## 2020-01-01 RX ORDER — ACETAMINOPHEN 500 MG
1000 TABLET ORAL EVERY 8 HOURS
Status: DISCONTINUED | OUTPATIENT
Start: 2020-01-01 | End: 2020-01-01 | Stop reason: HOSPADM

## 2020-01-01 RX ORDER — NIFEDIPINE 30 MG/1
30 TABLET, EXTENDED RELEASE ORAL 2 TIMES DAILY
Status: DISCONTINUED | OUTPATIENT
Start: 2020-01-01 | End: 2020-01-01

## 2020-01-01 RX ORDER — PREDNISONE 10 MG/1
10 TABLET ORAL DAILY
Status: DISCONTINUED | OUTPATIENT
Start: 2020-01-01 | End: 2020-01-01

## 2020-01-01 RX ORDER — CARVEDILOL 12.5 MG/1
12.5 TABLET ORAL 2 TIMES DAILY WITH MEALS
Status: DISCONTINUED | OUTPATIENT
Start: 2020-01-01 | End: 2020-01-01

## 2020-01-01 RX ORDER — TRIAMCINOLONE ACETONIDE 40 MG/ML
80 INJECTION, SUSPENSION INTRA-ARTICULAR; INTRAMUSCULAR ONCE
Status: COMPLETED | OUTPATIENT
Start: 2020-01-01 | End: 2020-01-01

## 2020-01-01 RX ORDER — FINASTERIDE 5 MG/1
5 TABLET, FILM COATED ORAL DAILY
Status: DISCONTINUED | OUTPATIENT
Start: 2020-01-01 | End: 2020-01-01 | Stop reason: HOSPADM

## 2020-01-01 RX ORDER — ATORVASTATIN CALCIUM 40 MG/1
40 TABLET, FILM COATED ORAL DAILY
Status: DISCONTINUED | OUTPATIENT
Start: 2020-01-01 | End: 2020-01-01 | Stop reason: HOSPADM

## 2020-01-01 RX ORDER — PREDNISONE 20 MG/1
20 TABLET ORAL DAILY
Status: COMPLETED | OUTPATIENT
Start: 2020-01-01 | End: 2020-01-01

## 2020-01-01 RX ORDER — LOSARTAN POTASSIUM 50 MG/1
50 TABLET ORAL DAILY
Qty: 30 TABLET | Refills: 0 | Status: ON HOLD | OUTPATIENT
Start: 2020-01-01 | End: 2020-01-01 | Stop reason: HOSPADM

## 2020-01-01 RX ORDER — TAMSULOSIN HYDROCHLORIDE 0.4 MG/1
0.4 CAPSULE ORAL DAILY
Status: CANCELLED | OUTPATIENT
Start: 2020-01-01

## 2020-01-01 RX ORDER — HYDROCODONE BITARTRATE AND ACETAMINOPHEN 5; 325 MG/1; MG/1
1 TABLET ORAL EVERY 12 HOURS PRN
Status: DISCONTINUED | OUTPATIENT
Start: 2020-01-01 | End: 2020-01-01

## 2020-01-01 RX ORDER — PANTOPRAZOLE SODIUM 40 MG/10ML
40 INJECTION, POWDER, LYOPHILIZED, FOR SOLUTION INTRAVENOUS DAILY
Status: DISCONTINUED | OUTPATIENT
Start: 2020-01-01 | End: 2020-01-01

## 2020-01-01 RX ORDER — TRAMADOL HYDROCHLORIDE 50 MG/1
50 TABLET ORAL 2 TIMES DAILY
Status: DISCONTINUED | OUTPATIENT
Start: 2020-01-01 | End: 2020-01-01

## 2020-01-01 RX ORDER — FUROSEMIDE 10 MG/ML
60 INJECTION INTRAMUSCULAR; INTRAVENOUS ONCE
Status: COMPLETED | OUTPATIENT
Start: 2020-01-01 | End: 2020-01-01

## 2020-01-01 RX ORDER — ASPIRIN 81 MG/1
81 TABLET, CHEWABLE ORAL DAILY
Status: CANCELLED | OUTPATIENT
Start: 2020-01-01

## 2020-01-01 RX ORDER — NIFEDIPINE 30 MG/1
30 TABLET, EXTENDED RELEASE ORAL DAILY
Status: DISCONTINUED | OUTPATIENT
Start: 2020-01-01 | End: 2020-01-01

## 2020-01-01 RX ORDER — BETAMETHASONE SODIUM PHOSPHATE AND BETAMETHASONE ACETATE 3; 3 MG/ML; MG/ML
INJECTION, SUSPENSION INTRA-ARTICULAR; INTRALESIONAL; INTRAMUSCULAR; SOFT TISSUE
Status: COMPLETED | OUTPATIENT
Start: 2020-01-01 | End: 2020-01-01

## 2020-01-01 RX ORDER — PREDNISONE 20 MG/1
40 TABLET ORAL EVERY MORNING
Status: DISCONTINUED | OUTPATIENT
Start: 2020-01-01 | End: 2020-01-01

## 2020-01-01 RX ORDER — ACETAMINOPHEN 325 MG/1
650 TABLET ORAL EVERY 6 HOURS PRN
Status: CANCELLED | OUTPATIENT
Start: 2020-01-01

## 2020-01-01 RX ORDER — FUROSEMIDE 10 MG/ML
80 INJECTION INTRAMUSCULAR; INTRAVENOUS 2 TIMES DAILY
Status: DISCONTINUED | OUTPATIENT
Start: 2020-01-01 | End: 2020-01-01

## 2020-01-01 RX ORDER — AZITHROMYCIN 250 MG/1
250 TABLET, FILM COATED ORAL DAILY
Status: DISCONTINUED | OUTPATIENT
Start: 2020-01-01 | End: 2020-01-01

## 2020-01-01 RX ORDER — HYDRALAZINE HYDROCHLORIDE 25 MG/1
25 TABLET, FILM COATED ORAL EVERY 6 HOURS PRN
Status: DISCONTINUED | OUTPATIENT
Start: 2020-01-01 | End: 2020-01-01

## 2020-01-01 RX ORDER — FUROSEMIDE 20 MG/1
20 TABLET ORAL DAILY
Status: DISCONTINUED | OUTPATIENT
Start: 2020-01-01 | End: 2020-01-01

## 2020-01-01 RX ORDER — FUROSEMIDE 20 MG/1
20 TABLET ORAL DAILY
Qty: 30 TABLET | Refills: 0 | Status: ON HOLD | OUTPATIENT
Start: 2020-01-01 | End: 2020-01-01 | Stop reason: HOSPADM

## 2020-01-01 RX ORDER — FUROSEMIDE 40 MG/1
40 TABLET ORAL DAILY
Status: COMPLETED | OUTPATIENT
Start: 2020-01-01 | End: 2020-01-01

## 2020-01-01 RX ORDER — ALOGLIPTIN 12.5 MG/1
6.25 TABLET, FILM COATED ORAL DAILY
Status: DISCONTINUED | OUTPATIENT
Start: 2020-01-01 | End: 2020-01-01 | Stop reason: HOSPADM

## 2020-01-01 RX ORDER — VITAMIN B COMPLEX
2000 TABLET ORAL DAILY
Status: DISCONTINUED | OUTPATIENT
Start: 2020-01-01 | End: 2020-01-01 | Stop reason: HOSPADM

## 2020-01-01 RX ORDER — LOSARTAN POTASSIUM 25 MG/1
50 TABLET ORAL DAILY
Status: DISCONTINUED | OUTPATIENT
Start: 2020-01-01 | End: 2020-01-01 | Stop reason: HOSPADM

## 2020-01-01 RX ORDER — METOPROLOL SUCCINATE 25 MG/1
12.5 TABLET, EXTENDED RELEASE ORAL DAILY
Status: DISCONTINUED | OUTPATIENT
Start: 2020-01-01 | End: 2020-01-01 | Stop reason: HOSPADM

## 2020-01-01 RX ORDER — LIDOCAINE HYDROCHLORIDE 10 MG/ML
INJECTION, SOLUTION INFILTRATION; PERINEURAL
Status: COMPLETED | OUTPATIENT
Start: 2020-01-01 | End: 2020-01-01

## 2020-01-01 RX ORDER — ACETAMINOPHEN 325 MG/1
650 TABLET ORAL EVERY 6 HOURS PRN
Status: DISCONTINUED | OUTPATIENT
Start: 2020-01-01 | End: 2020-01-01

## 2020-01-01 RX ORDER — ACETAMINOPHEN 500 MG
1000 TABLET ORAL EVERY 8 HOURS PRN
Status: DISCONTINUED | OUTPATIENT
Start: 2020-01-01 | End: 2020-01-01 | Stop reason: HOSPADM

## 2020-01-01 RX ORDER — PREDNISONE 20 MG/1
20 TABLET ORAL DAILY
Status: CANCELLED | OUTPATIENT
Start: 2020-01-01 | End: 2020-01-01

## 2020-01-01 RX ORDER — FENTANYL CITRATE 50 UG/ML
INJECTION, SOLUTION INTRAMUSCULAR; INTRAVENOUS
Status: DISCONTINUED | OUTPATIENT
Start: 2020-01-01 | End: 2020-01-01 | Stop reason: ALTCHOICE

## 2020-01-01 RX ORDER — SENNA AND DOCUSATE SODIUM 50; 8.6 MG/1; MG/1
1 TABLET, FILM COATED ORAL 2 TIMES DAILY
Status: CANCELLED | OUTPATIENT
Start: 2020-01-01

## 2020-01-01 RX ORDER — SODIUM CHLORIDE 0.9 % (FLUSH) 0.9 %
10 SYRINGE (ML) INJECTION PRN
Status: DISCONTINUED | OUTPATIENT
Start: 2020-01-01 | End: 2020-01-01

## 2020-01-01 RX ORDER — CALCIUM CARBONATE 500(1250)
500 TABLET ORAL DAILY
Status: DISCONTINUED | OUTPATIENT
Start: 2020-01-01 | End: 2020-01-01 | Stop reason: RX

## 2020-01-01 RX ORDER — ACETAMINOPHEN 325 MG/1
650 TABLET ORAL EVERY 6 HOURS PRN
Status: DISCONTINUED | OUTPATIENT
Start: 2020-01-01 | End: 2020-01-01 | Stop reason: HOSPADM

## 2020-01-01 RX ORDER — FINASTERIDE 5 MG/1
5 TABLET, FILM COATED ORAL DAILY
Status: CANCELLED | OUTPATIENT
Start: 2020-01-01

## 2020-01-01 RX ORDER — LEVOFLOXACIN 250 MG/1
250 TABLET ORAL DAILY
Status: CANCELLED | OUTPATIENT
Start: 2020-01-01 | End: 2020-01-01

## 2020-01-01 RX ORDER — TRIAMCINOLONE ACETONIDE 40 MG/ML
40 INJECTION, SUSPENSION INTRA-ARTICULAR; INTRAMUSCULAR ONCE
Status: COMPLETED | OUTPATIENT
Start: 2020-01-01 | End: 2020-01-01

## 2020-01-01 RX ORDER — NICOTINE POLACRILEX 4 MG
15 LOZENGE BUCCAL PRN
Status: DISCONTINUED | OUTPATIENT
Start: 2020-01-01 | End: 2020-01-01

## 2020-01-01 RX ORDER — ALBUTEROL SULFATE 2.5 MG/3ML
2.5 SOLUTION RESPIRATORY (INHALATION) EVERY 6 HOURS PRN
Status: DISCONTINUED | OUTPATIENT
Start: 2020-01-01 | End: 2020-01-01 | Stop reason: HOSPADM

## 2020-01-01 RX ORDER — TRAMADOL HYDROCHLORIDE 50 MG/1
25 TABLET ORAL EVERY 6 HOURS PRN
Status: DISCONTINUED | OUTPATIENT
Start: 2020-01-01 | End: 2020-01-01

## 2020-01-01 RX ORDER — SODIUM CHLORIDE 9 MG/ML
INJECTION, SOLUTION INTRAVENOUS CONTINUOUS
Status: ACTIVE | OUTPATIENT
Start: 2020-01-01 | End: 2020-01-01

## 2020-01-01 RX ORDER — CARVEDILOL 3.12 MG/1
3.12 TABLET ORAL 2 TIMES DAILY WITH MEALS
Status: DISCONTINUED | OUTPATIENT
Start: 2020-01-01 | End: 2020-01-01

## 2020-01-01 RX ORDER — LOSARTAN POTASSIUM 50 MG/1
50 TABLET ORAL DAILY
Status: DISCONTINUED | OUTPATIENT
Start: 2020-01-01 | End: 2020-01-01 | Stop reason: HOSPADM

## 2020-01-01 RX ORDER — PREDNISONE 20 MG/1
20 TABLET ORAL DAILY
Qty: 10 TABLET | Refills: 0 | Status: ON HOLD
Start: 2020-01-01 | End: 2020-01-01 | Stop reason: HOSPADM

## 2020-01-01 RX ORDER — FUROSEMIDE 40 MG/1
40 TABLET ORAL DAILY
Status: DISCONTINUED | OUTPATIENT
Start: 2020-01-01 | End: 2020-01-01 | Stop reason: HOSPADM

## 2020-01-01 RX ORDER — LIDOCAINE 4 G/G
1 PATCH TOPICAL DAILY
Status: DISCONTINUED | OUTPATIENT
Start: 2020-01-01 | End: 2020-01-01 | Stop reason: HOSPADM

## 2020-01-01 RX ORDER — BISACODYL 10 MG
10 SUPPOSITORY, RECTAL RECTAL DAILY PRN
Status: CANCELLED | OUTPATIENT
Start: 2020-01-01

## 2020-01-01 RX ORDER — ALBUTEROL SULFATE 90 UG/1
2 AEROSOL, METERED RESPIRATORY (INHALATION) EVERY 6 HOURS PRN
Qty: 1 INHALER | Refills: 5 | Status: SHIPPED | OUTPATIENT
Start: 2020-01-01 | End: 2020-01-01

## 2020-01-01 RX ORDER — TRAMADOL HYDROCHLORIDE 50 MG/1
50 TABLET ORAL 2 TIMES DAILY
Status: DISCONTINUED | OUTPATIENT
Start: 2020-01-01 | End: 2020-01-01 | Stop reason: HOSPADM

## 2020-01-01 RX ORDER — TAMSULOSIN HYDROCHLORIDE 0.4 MG/1
0.4 CAPSULE ORAL DAILY
Status: DISCONTINUED | OUTPATIENT
Start: 2020-01-01 | End: 2020-01-01 | Stop reason: HOSPADM

## 2020-01-01 RX ORDER — PANTOPRAZOLE SODIUM 40 MG/1
40 TABLET, DELAYED RELEASE ORAL NIGHTLY
Status: DISCONTINUED | OUTPATIENT
Start: 2020-01-01 | End: 2020-01-01 | Stop reason: HOSPADM

## 2020-01-01 RX ORDER — POTASSIUM CHLORIDE 20 MEQ/1
20 TABLET, EXTENDED RELEASE ORAL 2 TIMES DAILY WITH MEALS
Status: DISCONTINUED | OUTPATIENT
Start: 2020-01-01 | End: 2020-01-01

## 2020-01-01 RX ORDER — CARVEDILOL 25 MG/1
25 TABLET ORAL 2 TIMES DAILY WITH MEALS
Status: DISCONTINUED | OUTPATIENT
Start: 2020-01-01 | End: 2020-01-01

## 2020-01-01 RX ORDER — ATORVASTATIN CALCIUM 40 MG/1
40 TABLET, FILM COATED ORAL DAILY
Status: CANCELLED | OUTPATIENT
Start: 2020-01-01

## 2020-01-01 RX ORDER — PREDNISONE 20 MG/1
20 TABLET ORAL DAILY
Status: DISCONTINUED | OUTPATIENT
Start: 2020-01-01 | End: 2020-01-01

## 2020-01-01 RX ORDER — SODIUM CHLORIDE 0.9 % (FLUSH) 0.9 %
10 SYRINGE (ML) INJECTION PRN
Status: CANCELLED | OUTPATIENT
Start: 2020-01-01

## 2020-01-01 RX ORDER — METHYLPREDNISOLONE SODIUM SUCCINATE 125 MG/2ML
125 INJECTION, POWDER, LYOPHILIZED, FOR SOLUTION INTRAMUSCULAR; INTRAVENOUS ONCE
Status: COMPLETED | OUTPATIENT
Start: 2020-01-01 | End: 2020-01-01

## 2020-01-01 RX ORDER — DEXTROSE MONOHYDRATE 25 G/50ML
12.5 INJECTION, SOLUTION INTRAVENOUS PRN
Status: DISCONTINUED | OUTPATIENT
Start: 2020-01-01 | End: 2020-01-01

## 2020-01-01 RX ORDER — HYDRALAZINE HYDROCHLORIDE 25 MG/1
25 TABLET, FILM COATED ORAL EVERY 6 HOURS PRN
Status: CANCELLED | OUTPATIENT
Start: 2020-01-01

## 2020-01-01 RX ORDER — ACETAMINOPHEN 650 MG/1
650 SUPPOSITORY RECTAL EVERY 6 HOURS PRN
Status: DISCONTINUED | OUTPATIENT
Start: 2020-01-01 | End: 2020-01-01 | Stop reason: HOSPADM

## 2020-01-01 RX ORDER — CARVEDILOL 12.5 MG/1
12.5 TABLET ORAL 2 TIMES DAILY WITH MEALS
Qty: 60 TABLET | Refills: 0 | Status: ON HOLD | OUTPATIENT
Start: 2020-01-01 | End: 2020-01-01 | Stop reason: HOSPADM

## 2020-01-01 RX ORDER — POTASSIUM CHLORIDE 20 MEQ/1
20 TABLET, EXTENDED RELEASE ORAL
Status: DISCONTINUED | OUTPATIENT
Start: 2020-01-01 | End: 2020-01-01

## 2020-01-01 RX ORDER — ACETAMINOPHEN 500 MG
1000 TABLET ORAL EVERY 8 HOURS
Status: CANCELLED | OUTPATIENT
Start: 2020-01-01

## 2020-01-01 RX ORDER — POLYETHYLENE GLYCOL 3350 17 G/17G
17 POWDER, FOR SOLUTION ORAL DAILY PRN
Status: CANCELLED | OUTPATIENT
Start: 2020-01-01

## 2020-01-01 RX ORDER — POTASSIUM CHLORIDE 20 MEQ/1
20 TABLET, EXTENDED RELEASE ORAL
Status: DISCONTINUED | OUTPATIENT
Start: 2020-01-01 | End: 2020-01-01 | Stop reason: HOSPADM

## 2020-01-01 RX ORDER — PANTOPRAZOLE SODIUM 40 MG/1
40 TABLET, DELAYED RELEASE ORAL NIGHTLY
Qty: 30 TABLET | Refills: 3 | Status: ON HOLD
Start: 2020-01-01 | End: 2020-01-01

## 2020-01-01 RX ORDER — PREDNISONE 10 MG/1
TABLET ORAL
Qty: 30 TABLET | Refills: 1 | Status: ON HOLD | OUTPATIENT
Start: 2020-01-01 | End: 2020-01-01 | Stop reason: HOSPADM

## 2020-01-01 RX ORDER — LEVOFLOXACIN 250 MG/1
250 TABLET ORAL DAILY
Status: COMPLETED | OUTPATIENT
Start: 2020-01-01 | End: 2020-01-01

## 2020-01-01 RX ORDER — PREDNISONE 10 MG/1
10 TABLET ORAL DAILY
Qty: 30 TABLET | Refills: 0 | Status: SHIPPED | OUTPATIENT
Start: 2020-01-01 | End: 2020-01-01 | Stop reason: SDUPTHER

## 2020-01-01 RX ORDER — OXYBUTYNIN CHLORIDE 5 MG/1
5 TABLET ORAL DAILY
Status: DISCONTINUED | OUTPATIENT
Start: 2020-01-01 | End: 2020-01-01 | Stop reason: HOSPADM

## 2020-01-01 RX ORDER — CALCIUM CARBONATE 500(1250)
500 TABLET ORAL DAILY
Status: CANCELLED | OUTPATIENT
Start: 2020-01-01

## 2020-01-01 RX ORDER — BISACODYL 10 MG
10 SUPPOSITORY, RECTAL RECTAL DAILY PRN
Status: DISCONTINUED | OUTPATIENT
Start: 2020-01-01 | End: 2020-01-01 | Stop reason: HOSPADM

## 2020-01-01 RX ORDER — METOPROLOL SUCCINATE 25 MG/1
12.5 TABLET, EXTENDED RELEASE ORAL DAILY
Qty: 30 TABLET | Refills: 3 | Status: SHIPPED | OUTPATIENT
Start: 2020-01-01 | End: 2020-01-01 | Stop reason: SDUPTHER

## 2020-01-01 RX ORDER — ALOGLIPTIN 6.25 MG/1
6.25 TABLET, FILM COATED ORAL DAILY
Qty: 60 TABLET | Refills: 3 | Status: SHIPPED | OUTPATIENT
Start: 2020-01-01 | End: 2021-01-01

## 2020-01-01 RX ORDER — PREDNISONE 20 MG/1
20 TABLET ORAL DAILY
Status: DISCONTINUED | OUTPATIENT
Start: 2020-01-01 | End: 2020-01-01 | Stop reason: HOSPADM

## 2020-01-01 RX ORDER — LANOLIN ALCOHOL/MO/W.PET/CERES
3 CREAM (GRAM) TOPICAL NIGHTLY PRN
Status: DISCONTINUED | OUTPATIENT
Start: 2020-01-01 | End: 2020-01-01 | Stop reason: HOSPADM

## 2020-01-01 RX ORDER — FUROSEMIDE 40 MG/1
40 TABLET ORAL DAILY
Qty: 30 TABLET | Refills: 0 | Status: ON HOLD
Start: 2020-01-01 | End: 2020-01-01 | Stop reason: SDUPTHER

## 2020-01-01 RX ORDER — TRAMADOL HYDROCHLORIDE 50 MG/1
50 TABLET ORAL EVERY 6 HOURS PRN
Status: DISCONTINUED | OUTPATIENT
Start: 2020-01-01 | End: 2020-01-01

## 2020-01-01 RX ORDER — LANOLIN ALCOHOL/MO/W.PET/CERES
3 CREAM (GRAM) TOPICAL NIGHTLY PRN
Status: CANCELLED | OUTPATIENT
Start: 2020-01-01

## 2020-01-01 RX ORDER — PREDNISONE 10 MG/1
10 TABLET ORAL DAILY
Status: CANCELLED | OUTPATIENT
Start: 2020-01-01

## 2020-01-01 RX ORDER — PANTOPRAZOLE SODIUM 40 MG/1
40 TABLET, DELAYED RELEASE ORAL NIGHTLY
Status: DISCONTINUED | OUTPATIENT
Start: 2020-01-01 | End: 2020-01-01

## 2020-01-01 RX ORDER — OMEPRAZOLE 20 MG/1
20 CAPSULE, DELAYED RELEASE ORAL DAILY
COMMUNITY
End: 2021-01-01 | Stop reason: SDUPTHER

## 2020-01-01 RX ORDER — LOSARTAN POTASSIUM 50 MG/1
50 TABLET ORAL DAILY
Status: DISCONTINUED | OUTPATIENT
Start: 2020-01-01 | End: 2020-01-01

## 2020-01-01 RX ORDER — IPRATROPIUM BROMIDE AND ALBUTEROL SULFATE 2.5; .5 MG/3ML; MG/3ML
1 SOLUTION RESPIRATORY (INHALATION) ONCE
Status: COMPLETED | OUTPATIENT
Start: 2020-01-01 | End: 2020-01-01

## 2020-01-01 RX ORDER — CARVEDILOL 6.25 MG/1
6.25 TABLET ORAL 2 TIMES DAILY WITH MEALS
Status: DISCONTINUED | OUTPATIENT
Start: 2020-01-01 | End: 2020-01-01

## 2020-01-01 RX ORDER — FAMOTIDINE 20 MG/1
10 TABLET, FILM COATED ORAL DAILY
Status: DISCONTINUED | OUTPATIENT
Start: 2020-01-01 | End: 2020-01-01 | Stop reason: HOSPADM

## 2020-01-01 RX ORDER — DEXTROSE MONOHYDRATE 50 MG/ML
100 INJECTION, SOLUTION INTRAVENOUS PRN
Status: DISCONTINUED | OUTPATIENT
Start: 2020-01-01 | End: 2020-01-01

## 2020-01-01 RX ORDER — SODIUM CHLORIDE 9 MG/ML
10 INJECTION INTRAVENOUS DAILY
Status: DISCONTINUED | OUTPATIENT
Start: 2020-01-01 | End: 2020-01-01

## 2020-01-01 RX ORDER — LOSARTAN POTASSIUM 50 MG/1
50 TABLET ORAL DAILY
Status: CANCELLED | OUTPATIENT
Start: 2020-01-01

## 2020-01-01 RX ORDER — CALCIUM CARBONATE 200(500)MG
500 TABLET,CHEWABLE ORAL DAILY
Status: DISCONTINUED | OUTPATIENT
Start: 2020-01-01 | End: 2020-01-01 | Stop reason: HOSPADM

## 2020-01-01 RX ORDER — ALBUTEROL SULFATE 1.25 MG/3ML
0.63 SOLUTION RESPIRATORY (INHALATION) 3 TIMES DAILY
Status: DISCONTINUED | OUTPATIENT
Start: 2020-01-01 | End: 2020-01-01

## 2020-01-01 RX ORDER — FUROSEMIDE 20 MG/1
20 TABLET ORAL DAILY
Status: DISCONTINUED | OUTPATIENT
Start: 2020-01-01 | End: 2020-01-01 | Stop reason: HOSPADM

## 2020-01-01 RX ORDER — FUROSEMIDE 40 MG/1
TABLET ORAL
Qty: 60 TABLET | Refills: 3 | Status: SHIPPED | OUTPATIENT
Start: 2020-01-01 | End: 2021-01-01

## 2020-01-01 RX ORDER — PREDNISONE 10 MG/1
30 TABLET ORAL ONCE
Status: COMPLETED | OUTPATIENT
Start: 2020-01-01 | End: 2020-01-01

## 2020-01-01 RX ORDER — OXYBUTYNIN CHLORIDE 5 MG/1
5 TABLET ORAL DAILY
Status: DISCONTINUED | OUTPATIENT
Start: 2020-01-01 | End: 2020-01-01

## 2020-01-01 RX ORDER — METHYLPREDNISOLONE SODIUM SUCCINATE 40 MG/ML
40 INJECTION, POWDER, LYOPHILIZED, FOR SOLUTION INTRAMUSCULAR; INTRAVENOUS EVERY 12 HOURS
Status: DISCONTINUED | OUTPATIENT
Start: 2020-01-01 | End: 2020-01-01

## 2020-01-01 RX ORDER — POTASSIUM CHLORIDE 20 MEQ/1
20 TABLET, EXTENDED RELEASE ORAL
Status: CANCELLED | OUTPATIENT
Start: 2020-01-01

## 2020-01-01 RX ORDER — CALCIUM CARBONATE 500(1250)
500 TABLET ORAL DAILY
Status: DISCONTINUED | OUTPATIENT
Start: 2020-01-01 | End: 2020-01-01 | Stop reason: HOSPADM

## 2020-01-01 RX ORDER — FUROSEMIDE 40 MG/1
40 TABLET ORAL DAILY
Status: DISCONTINUED | OUTPATIENT
Start: 2020-01-01 | End: 2020-01-01 | Stop reason: ALTCHOICE

## 2020-01-01 RX ORDER — ASPIRIN 81 MG/1
81 TABLET, CHEWABLE ORAL DAILY
Status: DISCONTINUED | OUTPATIENT
Start: 2020-01-01 | End: 2020-01-01 | Stop reason: HOSPADM

## 2020-01-01 RX ORDER — SENNA AND DOCUSATE SODIUM 50; 8.6 MG/1; MG/1
1 TABLET, FILM COATED ORAL 2 TIMES DAILY
Status: DISCONTINUED | OUTPATIENT
Start: 2020-01-01 | End: 2020-01-01 | Stop reason: HOSPADM

## 2020-01-01 RX ORDER — UMECLIDINIUM BROMIDE AND VILANTEROL TRIFENATATE 62.5; 25 UG/1; UG/1
1 POWDER RESPIRATORY (INHALATION) DAILY
Qty: 1 EACH | Refills: 0 | COMMUNITY
Start: 2020-01-01 | End: 2021-01-01

## 2020-01-01 RX ORDER — LOSARTAN POTASSIUM 25 MG/1
25 TABLET ORAL DAILY
Qty: 30 TABLET | Refills: 0 | Status: ON HOLD
Start: 2020-01-01 | End: 2020-01-01 | Stop reason: SDUPTHER

## 2020-01-01 RX ORDER — PANTOPRAZOLE SODIUM 40 MG/1
40 TABLET, DELAYED RELEASE ORAL NIGHTLY
Status: CANCELLED | OUTPATIENT
Start: 2020-01-01

## 2020-01-01 RX ORDER — FLUTICASONE PROPIONATE 50 MCG
2 SPRAY, SUSPENSION (ML) NASAL DAILY
Status: CANCELLED | OUTPATIENT
Start: 2020-01-01

## 2020-01-01 RX ORDER — ASPIRIN 81 MG/1
81 TABLET, CHEWABLE ORAL DAILY
Status: DISCONTINUED | OUTPATIENT
Start: 2020-01-01 | End: 2020-01-01

## 2020-01-01 RX ORDER — PANTOPRAZOLE SODIUM 40 MG/1
40 TABLET, DELAYED RELEASE ORAL
Status: DISCONTINUED | OUTPATIENT
Start: 2020-01-01 | End: 2020-01-01

## 2020-01-01 RX ORDER — PROMETHAZINE HYDROCHLORIDE 25 MG/1
12.5 TABLET ORAL EVERY 6 HOURS PRN
Status: DISCONTINUED | OUTPATIENT
Start: 2020-01-01 | End: 2020-01-01 | Stop reason: HOSPADM

## 2020-01-01 RX ORDER — CEFDINIR 300 MG/1
300 CAPSULE ORAL DAILY
Status: DISCONTINUED | OUTPATIENT
Start: 2020-01-01 | End: 2020-01-01

## 2020-01-01 RX ORDER — ALBUTEROL SULFATE 2.5 MG/3ML
2.5 SOLUTION RESPIRATORY (INHALATION) EVERY 6 HOURS PRN
Status: CANCELLED | OUTPATIENT
Start: 2020-01-01

## 2020-01-01 RX ORDER — CARVEDILOL 12.5 MG/1
12.5 TABLET ORAL 2 TIMES DAILY WITH MEALS
Status: DISCONTINUED | OUTPATIENT
Start: 2020-01-01 | End: 2020-01-01 | Stop reason: HOSPADM

## 2020-01-01 RX ORDER — BUPIVACAINE HYDROCHLORIDE 5 MG/ML
INJECTION, SOLUTION EPIDURAL; INTRACAUDAL
Status: COMPLETED | OUTPATIENT
Start: 2020-01-01 | End: 2020-01-01

## 2020-01-01 RX ORDER — LEVOFLOXACIN 250 MG/1
250 TABLET ORAL DAILY
Status: DISCONTINUED | OUTPATIENT
Start: 2020-01-01 | End: 2020-01-01 | Stop reason: HOSPADM

## 2020-01-01 RX ORDER — FUROSEMIDE 40 MG/1
40 TABLET ORAL DAILY
Status: CANCELLED | OUTPATIENT
Start: 2020-01-01

## 2020-01-01 RX ORDER — MIDAZOLAM HYDROCHLORIDE 1 MG/ML
INJECTION INTRAMUSCULAR; INTRAVENOUS
Status: COMPLETED | OUTPATIENT
Start: 2020-01-01 | End: 2020-01-01

## 2020-01-01 RX ORDER — ONDANSETRON 4 MG/1
4 TABLET, FILM COATED ORAL EVERY 8 HOURS PRN
Status: CANCELLED | OUTPATIENT
Start: 2020-01-01

## 2020-01-01 RX ORDER — VITAMIN B COMPLEX
2000 TABLET ORAL DAILY
Status: CANCELLED | OUTPATIENT
Start: 2020-01-01

## 2020-01-01 RX ORDER — LEVOFLOXACIN 250 MG/1
250 TABLET ORAL DAILY
Qty: 10 TABLET | Refills: 0 | Status: ON HOLD
Start: 2020-01-01 | End: 2020-01-01 | Stop reason: HOSPADM

## 2020-01-01 RX ORDER — SODIUM CHLORIDE 0.9 % (FLUSH) 0.9 %
10 SYRINGE (ML) INJECTION EVERY 12 HOURS SCHEDULED
Status: DISCONTINUED | OUTPATIENT
Start: 2020-01-01 | End: 2020-01-01

## 2020-01-01 RX ORDER — POTASSIUM CHLORIDE 20 MEQ/1
20 TABLET, EXTENDED RELEASE ORAL DAILY
Qty: 30 TABLET | Refills: 3 | Status: ON HOLD
Start: 2020-01-01 | End: 2020-01-01 | Stop reason: HOSPADM

## 2020-01-01 RX ADMIN — PREDNISONE 20 MG: 20 TABLET ORAL at 07:43

## 2020-01-01 RX ADMIN — INSULIN LISPRO 1 UNITS: 100 INJECTION, SOLUTION INTRAVENOUS; SUBCUTANEOUS at 17:35

## 2020-01-01 RX ADMIN — GLYCOPYRROLATE AND FORMOTEROL FUMARATE 2 PUFF: 9; 4.8 AEROSOL, METERED RESPIRATORY (INHALATION) at 07:45

## 2020-01-01 RX ADMIN — SODIUM CHLORIDE, PRESERVATIVE FREE 10 ML: 5 INJECTION INTRAVENOUS at 20:43

## 2020-01-01 RX ADMIN — DICLOFENAC 4 G: 10 GEL TOPICAL at 07:34

## 2020-01-01 RX ADMIN — TRAMADOL HYDROCHLORIDE 50 MG: 50 TABLET, FILM COATED ORAL at 14:04

## 2020-01-01 RX ADMIN — Medication 2 PUFF: at 09:18

## 2020-01-01 RX ADMIN — TAMSULOSIN HYDROCHLORIDE 0.4 MG: 0.4 CAPSULE ORAL at 10:19

## 2020-01-01 RX ADMIN — ASPIRIN 81 MG: 81 TABLET, CHEWABLE ORAL at 09:51

## 2020-01-01 RX ADMIN — CARVEDILOL 6.25 MG: 6.25 TABLET, FILM COATED ORAL at 09:05

## 2020-01-01 RX ADMIN — FAMOTIDINE 10 MG: 20 TABLET, FILM COATED ORAL at 08:59

## 2020-01-01 RX ADMIN — GLYCOPYRROLATE AND FORMOTEROL FUMARATE 2 PUFF: 9; 4.8 AEROSOL, METERED RESPIRATORY (INHALATION) at 08:45

## 2020-01-01 RX ADMIN — Medication 2 PUFF: at 07:59

## 2020-01-01 RX ADMIN — ACETAMINOPHEN 1000 MG: 500 TABLET ORAL at 17:26

## 2020-01-01 RX ADMIN — ATORVASTATIN CALCIUM 40 MG: 40 TABLET, FILM COATED ORAL at 20:16

## 2020-01-01 RX ADMIN — ACETAMINOPHEN 1000 MG: 500 TABLET ORAL at 10:16

## 2020-01-01 RX ADMIN — TRIAMCINOLONE ACETONIDE 40 MG: 40 INJECTION, SUSPENSION INTRA-ARTICULAR; INTRAMUSCULAR at 15:02

## 2020-01-01 RX ADMIN — POTASSIUM CHLORIDE 20 MEQ: 1500 TABLET, EXTENDED RELEASE ORAL at 08:18

## 2020-01-01 RX ADMIN — TAMSULOSIN HYDROCHLORIDE 0.4 MG: 0.4 CAPSULE ORAL at 09:18

## 2020-01-01 RX ADMIN — GLYCOPYRROLATE AND FORMOTEROL FUMARATE 2 PUFF: 9; 4.8 AEROSOL, METERED RESPIRATORY (INHALATION) at 19:43

## 2020-01-01 RX ADMIN — Medication 2000 UNITS: at 07:49

## 2020-01-01 RX ADMIN — APIXABAN 2.5 MG: 2.5 TABLET, FILM COATED ORAL at 20:36

## 2020-01-01 RX ADMIN — ALOGLIPTIN 6.25 MG: 12.5 TABLET, FILM COATED ORAL at 12:32

## 2020-01-01 RX ADMIN — NIFEDIPINE 30 MG: 30 TABLET, FILM COATED, EXTENDED RELEASE ORAL at 11:44

## 2020-01-01 RX ADMIN — CALCIUM 500 MG: 500 TABLET ORAL at 08:01

## 2020-01-01 RX ADMIN — SODIUM CHLORIDE, PRESERVATIVE FREE 10 ML: 5 INJECTION INTRAVENOUS at 09:00

## 2020-01-01 RX ADMIN — PREDNISONE 10 MG: 10 TABLET ORAL at 07:18

## 2020-01-01 RX ADMIN — GLYCOPYRROLATE AND FORMOTEROL FUMARATE 2 PUFF: 9; 4.8 AEROSOL, METERED RESPIRATORY (INHALATION) at 09:31

## 2020-01-01 RX ADMIN — PREDNISONE 20 MG: 20 TABLET ORAL at 07:34

## 2020-01-01 RX ADMIN — PREDNISONE 10 MG: 10 TABLET ORAL at 08:18

## 2020-01-01 RX ADMIN — Medication 10 ML: at 21:37

## 2020-01-01 RX ADMIN — Medication 2000 UNITS: at 09:28

## 2020-01-01 RX ADMIN — TAMSULOSIN HYDROCHLORIDE 0.4 MG: 0.4 CAPSULE ORAL at 10:51

## 2020-01-01 RX ADMIN — ATORVASTATIN CALCIUM 40 MG: 40 TABLET, FILM COATED ORAL at 20:24

## 2020-01-01 RX ADMIN — ACETAMINOPHEN 1000 MG: 500 TABLET ORAL at 02:59

## 2020-01-01 RX ADMIN — Medication 3 MG: at 21:40

## 2020-01-01 RX ADMIN — INSULIN LISPRO 1 UNITS: 100 INJECTION, SOLUTION INTRAVENOUS; SUBCUTANEOUS at 17:46

## 2020-01-01 RX ADMIN — LOSARTAN POTASSIUM 50 MG: 50 TABLET, FILM COATED ORAL at 07:49

## 2020-01-01 RX ADMIN — Medication 10 ML: at 09:27

## 2020-01-01 RX ADMIN — LOSARTAN POTASSIUM 50 MG: 50 TABLET, FILM COATED ORAL at 09:52

## 2020-01-01 RX ADMIN — RIVAROXABAN 15 MG: 15 TABLET, FILM COATED ORAL at 07:50

## 2020-01-01 RX ADMIN — GLYCOPYRROLATE AND FORMOTEROL FUMARATE 2 PUFF: 9; 4.8 AEROSOL, METERED RESPIRATORY (INHALATION) at 21:09

## 2020-01-01 RX ADMIN — CALCIUM 500 MG: 500 TABLET ORAL at 07:34

## 2020-01-01 RX ADMIN — LEVOFLOXACIN 250 MG: 250 TABLET, FILM COATED ORAL at 14:03

## 2020-01-01 RX ADMIN — CALCIUM 500 MG: 500 TABLET ORAL at 07:51

## 2020-01-01 RX ADMIN — GLYCOPYRROLATE AND FORMOTEROL FUMARATE 2 PUFF: 9; 4.8 AEROSOL, METERED RESPIRATORY (INHALATION) at 19:29

## 2020-01-01 RX ADMIN — ACETAMINOPHEN 1000 MG: 500 TABLET, FILM COATED ORAL at 09:49

## 2020-01-01 RX ADMIN — TAMSULOSIN HYDROCHLORIDE 0.4 MG: 0.4 CAPSULE ORAL at 08:01

## 2020-01-01 RX ADMIN — FUROSEMIDE 20 MG: 20 TABLET ORAL at 09:05

## 2020-01-01 RX ADMIN — LOSARTAN POTASSIUM 50 MG: 50 TABLET, FILM COATED ORAL at 07:17

## 2020-01-01 RX ADMIN — RIVAROXABAN 15 MG: 15 TABLET, FILM COATED ORAL at 08:02

## 2020-01-01 RX ADMIN — FLUTICASONE PROPIONATE 2 SPRAY: 50 SPRAY, METERED NASAL at 09:18

## 2020-01-01 RX ADMIN — FUROSEMIDE 80 MG: 10 INJECTION, SOLUTION INTRAMUSCULAR; INTRAVENOUS at 17:33

## 2020-01-01 RX ADMIN — TRAMADOL HYDROCHLORIDE 25 MG: 50 TABLET, FILM COATED ORAL at 09:13

## 2020-01-01 RX ADMIN — ACETAMINOPHEN 1000 MG: 500 TABLET ORAL at 03:59

## 2020-01-01 RX ADMIN — LOSARTAN POTASSIUM 50 MG: 50 TABLET, FILM COATED ORAL at 07:32

## 2020-01-01 RX ADMIN — OXYBUTYNIN CHLORIDE 5 MG: 5 TABLET ORAL at 09:27

## 2020-01-01 RX ADMIN — FUROSEMIDE 80 MG: 10 INJECTION, SOLUTION INTRAMUSCULAR; INTRAVENOUS at 16:47

## 2020-01-01 RX ADMIN — SODIUM CHLORIDE, PRESERVATIVE FREE 10 ML: 5 INJECTION INTRAVENOUS at 10:14

## 2020-01-01 RX ADMIN — POTASSIUM BICARBONATE 20 MEQ: 782 TABLET, EFFERVESCENT ORAL at 10:19

## 2020-01-01 RX ADMIN — PANTOPRAZOLE SODIUM 40 MG: 40 INJECTION, POWDER, FOR SOLUTION INTRAVENOUS at 09:22

## 2020-01-01 RX ADMIN — FAMOTIDINE 10 MG: 20 TABLET, FILM COATED ORAL at 09:34

## 2020-01-01 RX ADMIN — OXYBUTYNIN CHLORIDE 5 MG: 5 TABLET ORAL at 09:39

## 2020-01-01 RX ADMIN — ACETAMINOPHEN 1000 MG: 500 TABLET ORAL at 17:41

## 2020-01-01 RX ADMIN — SODIUM CHLORIDE: 9 INJECTION, SOLUTION INTRAVENOUS at 11:16

## 2020-01-01 RX ADMIN — FLUTICASONE PROPIONATE 2 SPRAY: 50 SPRAY, METERED NASAL at 10:27

## 2020-01-01 RX ADMIN — TRAMADOL HYDROCHLORIDE 50 MG: 50 TABLET ORAL at 20:13

## 2020-01-01 RX ADMIN — FLUTICASONE PROPIONATE 2 SPRAY: 50 SPRAY, METERED NASAL at 09:52

## 2020-01-01 RX ADMIN — DICLOFENAC 4 G: 10 GEL TOPICAL at 13:38

## 2020-01-01 RX ADMIN — RIVAROXABAN 15 MG: 15 TABLET, FILM COATED ORAL at 09:52

## 2020-01-01 RX ADMIN — PANTOPRAZOLE SODIUM 40 MG: 40 TABLET, DELAYED RELEASE ORAL at 21:47

## 2020-01-01 RX ADMIN — DOCUSATE SODIUM 50 MG AND SENNOSIDES 8.6 MG 1 TABLET: 8.6; 5 TABLET, FILM COATED ORAL at 07:42

## 2020-01-01 RX ADMIN — APIXABAN 2.5 MG: 2.5 TABLET, FILM COATED ORAL at 21:07

## 2020-01-01 RX ADMIN — LEVOFLOXACIN 250 MG: 250 TABLET, FILM COATED ORAL at 14:00

## 2020-01-01 RX ADMIN — DOCUSATE SODIUM 50 MG AND SENNOSIDES 8.6 MG 1 TABLET: 8.6; 5 TABLET, FILM COATED ORAL at 21:40

## 2020-01-01 RX ADMIN — GLYCOPYRROLATE AND FORMOTEROL FUMARATE 2 PUFF: 9; 4.8 AEROSOL, METERED RESPIRATORY (INHALATION) at 19:42

## 2020-01-01 RX ADMIN — SODIUM CHLORIDE, PRESERVATIVE FREE 10 ML: 5 INJECTION INTRAVENOUS at 20:28

## 2020-01-01 RX ADMIN — FAMOTIDINE 10 MG: 20 TABLET, FILM COATED ORAL at 09:39

## 2020-01-01 RX ADMIN — FINASTERIDE 5 MG: 5 TABLET, FILM COATED ORAL at 08:24

## 2020-01-01 RX ADMIN — LOSARTAN POTASSIUM 50 MG: 50 TABLET, FILM COATED ORAL at 07:42

## 2020-01-01 RX ADMIN — Medication 2000 UNITS: at 09:05

## 2020-01-01 RX ADMIN — ANTACID TABLETS 500 MG: 500 TABLET, CHEWABLE ORAL at 07:17

## 2020-01-01 RX ADMIN — AZITHROMYCIN MONOHYDRATE 500 MG: 500 INJECTION, POWDER, LYOPHILIZED, FOR SOLUTION INTRAVENOUS at 03:13

## 2020-01-01 RX ADMIN — Medication 2000 UNITS: at 08:02

## 2020-01-01 RX ADMIN — ASPIRIN 81 MG: 81 TABLET, CHEWABLE ORAL at 07:50

## 2020-01-01 RX ADMIN — PREDNISONE 10 MG: 10 TABLET ORAL at 08:59

## 2020-01-01 RX ADMIN — APIXABAN 2.5 MG: 2.5 TABLET, FILM COATED ORAL at 22:34

## 2020-01-01 RX ADMIN — ACETAMINOPHEN 1000 MG: 500 TABLET ORAL at 02:16

## 2020-01-01 RX ADMIN — DICLOFENAC 4 G: 10 GEL TOPICAL at 21:30

## 2020-01-01 RX ADMIN — ATORVASTATIN CALCIUM 40 MG: 40 TABLET, FILM COATED ORAL at 08:01

## 2020-01-01 RX ADMIN — ACETAMINOPHEN 1000 MG: 500 TABLET ORAL at 17:42

## 2020-01-01 RX ADMIN — DICLOFENAC 4 G: 10 GEL TOPICAL at 21:54

## 2020-01-01 RX ADMIN — ACETAMINOPHEN 650 MG: 325 TABLET ORAL at 08:54

## 2020-01-01 RX ADMIN — FLUTICASONE PROPIONATE 2 SPRAY: 50 SPRAY, METERED NASAL at 09:06

## 2020-01-01 RX ADMIN — POTASSIUM CHLORIDE 20 MEQ: 1500 TABLET, EXTENDED RELEASE ORAL at 07:34

## 2020-01-01 RX ADMIN — Medication 2000 UNITS: at 07:32

## 2020-01-01 RX ADMIN — TRAMADOL HYDROCHLORIDE 50 MG: 50 TABLET, FILM COATED ORAL at 07:17

## 2020-01-01 RX ADMIN — TAMSULOSIN HYDROCHLORIDE 0.4 MG: 0.4 CAPSULE ORAL at 09:39

## 2020-01-01 RX ADMIN — INSULIN LISPRO 4 UNITS: 100 INJECTION, SOLUTION INTRAVENOUS; SUBCUTANEOUS at 11:26

## 2020-01-01 RX ADMIN — APIXABAN 2.5 MG: 2.5 TABLET, FILM COATED ORAL at 08:58

## 2020-01-01 RX ADMIN — POTASSIUM CHLORIDE 20 MEQ: 1500 TABLET, EXTENDED RELEASE ORAL at 07:51

## 2020-01-01 RX ADMIN — CARVEDILOL 12.5 MG: 12.5 TABLET, FILM COATED ORAL at 07:18

## 2020-01-01 RX ADMIN — FLUTICASONE PROPIONATE 2 SPRAY: 50 SPRAY, METERED NASAL at 08:00

## 2020-01-01 RX ADMIN — DOCUSATE SODIUM 50 MG AND SENNOSIDES 8.6 MG 1 TABLET: 8.6; 5 TABLET, FILM COATED ORAL at 21:52

## 2020-01-01 RX ADMIN — Medication 2000 UNITS: at 09:52

## 2020-01-01 RX ADMIN — FINASTERIDE 5 MG: 5 TABLET, FILM COATED ORAL at 08:27

## 2020-01-01 RX ADMIN — POTASSIUM CHLORIDE 20 MEQ: 1500 TABLET, EXTENDED RELEASE ORAL at 10:51

## 2020-01-01 RX ADMIN — PREDNISONE 20 MG: 20 TABLET ORAL at 08:00

## 2020-01-01 RX ADMIN — DOCUSATE SODIUM 50 MG AND SENNOSIDES 8.6 MG 1 TABLET: 8.6; 5 TABLET, FILM COATED ORAL at 21:01

## 2020-01-01 RX ADMIN — ASPIRIN 81 MG: 81 TABLET, CHEWABLE ORAL at 08:02

## 2020-01-01 RX ADMIN — ACETAMINOPHEN 1000 MG: 500 TABLET, FILM COATED ORAL at 05:15

## 2020-01-01 RX ADMIN — APIXABAN 2.5 MG: 2.5 TABLET, FILM COATED ORAL at 09:18

## 2020-01-01 RX ADMIN — ASPIRIN 81 MG: 81 TABLET, CHEWABLE ORAL at 07:18

## 2020-01-01 RX ADMIN — GLYCOPYRROLATE AND FORMOTEROL FUMARATE 2 PUFF: 9; 4.8 AEROSOL, METERED RESPIRATORY (INHALATION) at 07:42

## 2020-01-01 RX ADMIN — DICLOFENAC 4 G: 10 GEL TOPICAL at 21:52

## 2020-01-01 RX ADMIN — CARVEDILOL 12.5 MG: 12.5 TABLET, FILM COATED ORAL at 10:07

## 2020-01-01 RX ADMIN — Medication 2 PUFF: at 20:06

## 2020-01-01 RX ADMIN — FLUTICASONE PROPIONATE 2 SPRAY: 50 SPRAY, METERED NASAL at 10:02

## 2020-01-01 RX ADMIN — Medication 2 PUFF: at 07:43

## 2020-01-01 RX ADMIN — TAMSULOSIN HYDROCHLORIDE 0.4 MG: 0.4 CAPSULE ORAL at 07:50

## 2020-01-01 RX ADMIN — CEFEPIME HYDROCHLORIDE 2 G: 2 INJECTION, POWDER, FOR SOLUTION INTRAVENOUS at 09:33

## 2020-01-01 RX ADMIN — GLYCOPYRROLATE AND FORMOTEROL FUMARATE 2 PUFF: 9; 4.8 AEROSOL, METERED RESPIRATORY (INHALATION) at 08:00

## 2020-01-01 RX ADMIN — ATORVASTATIN CALCIUM 40 MG: 40 TABLET, FILM COATED ORAL at 07:18

## 2020-01-01 RX ADMIN — ALBUTEROL SULFATE 0.63 MG: 1.25 SOLUTION RESPIRATORY (INHALATION) at 19:48

## 2020-01-01 RX ADMIN — PREDNISONE 30 MG: 10 TABLET ORAL at 10:08

## 2020-01-01 RX ADMIN — PANTOPRAZOLE SODIUM 40 MG: 40 TABLET, DELAYED RELEASE ORAL at 21:01

## 2020-01-01 RX ADMIN — ALOGLIPTIN 6.25 MG: 12.5 TABLET, FILM COATED ORAL at 08:59

## 2020-01-01 RX ADMIN — OXYBUTYNIN CHLORIDE 5 MG: 5 TABLET ORAL at 09:23

## 2020-01-01 RX ADMIN — ATORVASTATIN CALCIUM 40 MG: 40 TABLET, FILM COATED ORAL at 20:42

## 2020-01-01 RX ADMIN — FINASTERIDE 5 MG: 5 TABLET, FILM COATED ORAL at 09:23

## 2020-01-01 RX ADMIN — GLYCOPYRROLATE AND FORMOTEROL FUMARATE 2 PUFF: 9; 4.8 AEROSOL, METERED RESPIRATORY (INHALATION) at 19:48

## 2020-01-01 RX ADMIN — PREDNISONE 10 MG: 10 TABLET ORAL at 09:26

## 2020-01-01 RX ADMIN — PANTOPRAZOLE SODIUM 40 MG: 40 TABLET, DELAYED RELEASE ORAL at 20:51

## 2020-01-01 RX ADMIN — ACETAMINOPHEN 1000 MG: 500 TABLET ORAL at 17:16

## 2020-01-01 RX ADMIN — DOCUSATE SODIUM 50 MG AND SENNOSIDES 8.6 MG 1 TABLET: 8.6; 5 TABLET, FILM COATED ORAL at 07:34

## 2020-01-01 RX ADMIN — INSULIN LISPRO 2 UNITS: 100 INJECTION, SOLUTION INTRAVENOUS; SUBCUTANEOUS at 13:13

## 2020-01-01 RX ADMIN — FLUTICASONE PROPIONATE 2 SPRAY: 50 SPRAY, METERED NASAL at 09:29

## 2020-01-01 RX ADMIN — CARVEDILOL 12.5 MG: 12.5 TABLET, FILM COATED ORAL at 09:30

## 2020-01-01 RX ADMIN — Medication 2000 UNITS: at 08:24

## 2020-01-01 RX ADMIN — FAMOTIDINE 10 MG: 20 TABLET, FILM COATED ORAL at 09:49

## 2020-01-01 RX ADMIN — FUROSEMIDE 20 MG: 20 TABLET ORAL at 08:19

## 2020-01-01 RX ADMIN — LOSARTAN POTASSIUM 50 MG: 25 TABLET, FILM COATED ORAL at 08:46

## 2020-01-01 RX ADMIN — LOSARTAN POTASSIUM 50 MG: 50 TABLET, FILM COATED ORAL at 09:22

## 2020-01-01 RX ADMIN — FUROSEMIDE 20 MG: 20 TABLET ORAL at 07:31

## 2020-01-01 RX ADMIN — SODIUM CHLORIDE, PRESERVATIVE FREE 10 ML: 5 INJECTION INTRAVENOUS at 20:36

## 2020-01-01 RX ADMIN — DOCUSATE SODIUM 50 MG AND SENNOSIDES 8.6 MG 1 TABLET: 8.6; 5 TABLET, FILM COATED ORAL at 21:53

## 2020-01-01 RX ADMIN — TAMSULOSIN HYDROCHLORIDE 0.4 MG: 0.4 CAPSULE ORAL at 09:22

## 2020-01-01 RX ADMIN — RIVAROXABAN 15 MG: 15 TABLET, FILM COATED ORAL at 09:05

## 2020-01-01 RX ADMIN — SODIUM CHLORIDE, PRESERVATIVE FREE 10 ML: 5 INJECTION INTRAVENOUS at 20:16

## 2020-01-01 RX ADMIN — FLUTICASONE PROPIONATE 2 SPRAY: 50 SPRAY, METERED NASAL at 07:19

## 2020-01-01 RX ADMIN — LEVOFLOXACIN 250 MG: 250 TABLET, FILM COATED ORAL at 14:07

## 2020-01-01 RX ADMIN — FUROSEMIDE 20 MG: 20 TABLET ORAL at 08:01

## 2020-01-01 RX ADMIN — TAMSULOSIN HYDROCHLORIDE 0.4 MG: 0.4 CAPSULE ORAL at 09:23

## 2020-01-01 RX ADMIN — LOSARTAN POTASSIUM 50 MG: 25 TABLET, FILM COATED ORAL at 10:51

## 2020-01-01 RX ADMIN — PREDNISONE 10 MG: 10 TABLET ORAL at 08:27

## 2020-01-01 RX ADMIN — POTASSIUM CHLORIDE 20 MEQ: 1500 TABLET, EXTENDED RELEASE ORAL at 09:52

## 2020-01-01 RX ADMIN — Medication 2 PUFF: at 07:48

## 2020-01-01 RX ADMIN — FUROSEMIDE 40 MG: 10 INJECTION, SOLUTION INTRAMUSCULAR; INTRAVENOUS at 17:20

## 2020-01-01 RX ADMIN — ASPIRIN 81 MG: 81 TABLET, CHEWABLE ORAL at 10:51

## 2020-01-01 RX ADMIN — SODIUM ZIRCONIUM CYCLOSILICATE 10 G: 5 POWDER, FOR SUSPENSION ORAL at 12:13

## 2020-01-01 RX ADMIN — APIXABAN 2.5 MG: 2.5 TABLET, FILM COATED ORAL at 20:15

## 2020-01-01 RX ADMIN — PREDNISONE 10 MG: 10 TABLET ORAL at 09:23

## 2020-01-01 RX ADMIN — ASPIRIN 81 MG: 81 TABLET, CHEWABLE ORAL at 07:34

## 2020-01-01 RX ADMIN — FUROSEMIDE 40 MG: 40 TABLET ORAL at 07:50

## 2020-01-01 RX ADMIN — APIXABAN 2.5 MG: 2.5 TABLET, FILM COATED ORAL at 09:26

## 2020-01-01 RX ADMIN — ATORVASTATIN CALCIUM 40 MG: 40 TABLET, FILM COATED ORAL at 22:04

## 2020-01-01 RX ADMIN — GLYCOPYRROLATE AND FORMOTEROL FUMARATE 2 PUFF: 9; 4.8 AEROSOL, METERED RESPIRATORY (INHALATION) at 07:30

## 2020-01-01 RX ADMIN — INSULIN LISPRO 2 UNITS: 100 INJECTION, SOLUTION INTRAVENOUS; SUBCUTANEOUS at 12:31

## 2020-01-01 RX ADMIN — PREDNISONE 10 MG: 10 TABLET ORAL at 09:48

## 2020-01-01 RX ADMIN — POLYETHYLENE GLYCOL 3350 17 G: 17 POWDER, FOR SOLUTION ORAL at 17:24

## 2020-01-01 RX ADMIN — FUROSEMIDE 40 MG: 10 INJECTION, SOLUTION INTRAMUSCULAR; INTRAVENOUS at 17:46

## 2020-01-01 RX ADMIN — FAMOTIDINE 10 MG: 20 TABLET, FILM COATED ORAL at 08:55

## 2020-01-01 RX ADMIN — ATORVASTATIN CALCIUM 40 MG: 40 TABLET, FILM COATED ORAL at 07:51

## 2020-01-01 RX ADMIN — DICLOFENAC 4 G: 10 GEL TOPICAL at 14:04

## 2020-01-01 RX ADMIN — ACETAMINOPHEN 1000 MG: 500 TABLET ORAL at 10:31

## 2020-01-01 RX ADMIN — FINASTERIDE 5 MG: 5 TABLET, FILM COATED ORAL at 09:27

## 2020-01-01 RX ADMIN — ATORVASTATIN CALCIUM 40 MG: 40 TABLET, FILM COATED ORAL at 20:13

## 2020-01-01 RX ADMIN — OXYBUTYNIN CHLORIDE 5 MG: 5 TABLET ORAL at 08:46

## 2020-01-01 RX ADMIN — DOCUSATE SODIUM 50 MG AND SENNOSIDES 8.6 MG 1 TABLET: 8.6; 5 TABLET, FILM COATED ORAL at 09:05

## 2020-01-01 RX ADMIN — PREDNISONE 20 MG: 20 TABLET ORAL at 10:51

## 2020-01-01 RX ADMIN — FINASTERIDE 5 MG: 5 TABLET, FILM COATED ORAL at 09:22

## 2020-01-01 RX ADMIN — SODIUM CHLORIDE, PRESERVATIVE FREE 10 ML: 5 INJECTION INTRAVENOUS at 07:36

## 2020-01-01 RX ADMIN — Medication 2000 UNITS: at 09:00

## 2020-01-01 RX ADMIN — SODIUM BICARBONATE 50 MEQ: 84 INJECTION, SOLUTION INTRAVENOUS at 06:12

## 2020-01-01 RX ADMIN — LOSARTAN POTASSIUM 50 MG: 50 TABLET, FILM COATED ORAL at 08:27

## 2020-01-01 RX ADMIN — LEVOFLOXACIN 250 MG: 250 TABLET, FILM COATED ORAL at 15:50

## 2020-01-01 RX ADMIN — ANTACID TABLETS 500 MG: 500 TABLET, CHEWABLE ORAL at 08:26

## 2020-01-01 RX ADMIN — RIVAROXABAN 15 MG: 15 TABLET, FILM COATED ORAL at 07:32

## 2020-01-01 RX ADMIN — FUROSEMIDE 40 MG: 40 TABLET ORAL at 07:41

## 2020-01-01 RX ADMIN — TRAMADOL HYDROCHLORIDE 25 MG: 50 TABLET, FILM COATED ORAL at 10:09

## 2020-01-01 RX ADMIN — ACETAMINOPHEN 1000 MG: 500 TABLET ORAL at 02:18

## 2020-01-01 RX ADMIN — DOCUSATE SODIUM 50 MG AND SENNOSIDES 8.6 MG 1 TABLET: 8.6; 5 TABLET, FILM COATED ORAL at 20:51

## 2020-01-01 RX ADMIN — FINASTERIDE 5 MG: 5 TABLET, FILM COATED ORAL at 07:17

## 2020-01-01 RX ADMIN — FINASTERIDE 5 MG: 5 TABLET, FILM COATED ORAL at 10:51

## 2020-01-01 RX ADMIN — TAMSULOSIN HYDROCHLORIDE 0.4 MG: 0.4 CAPSULE ORAL at 08:27

## 2020-01-01 RX ADMIN — Medication 2 PUFF: at 20:35

## 2020-01-01 RX ADMIN — ACETAMINOPHEN 1000 MG: 500 TABLET ORAL at 17:25

## 2020-01-01 RX ADMIN — POTASSIUM CHLORIDE 20 MEQ: 1500 TABLET, EXTENDED RELEASE ORAL at 08:01

## 2020-01-01 RX ADMIN — RIVAROXABAN 15 MG: 15 TABLET, FILM COATED ORAL at 07:42

## 2020-01-01 RX ADMIN — PREDNISONE 10 MG: 10 TABLET ORAL at 09:05

## 2020-01-01 RX ADMIN — IPRATROPIUM BROMIDE AND ALBUTEROL SULFATE 1 AMPULE: .5; 3 SOLUTION RESPIRATORY (INHALATION) at 05:50

## 2020-01-01 RX ADMIN — GLYCOPYRROLATE AND FORMOTEROL FUMARATE 2 PUFF: 9; 4.8 AEROSOL, METERED RESPIRATORY (INHALATION) at 08:25

## 2020-01-01 RX ADMIN — FINASTERIDE 5 MG: 5 TABLET, FILM COATED ORAL at 08:01

## 2020-01-01 RX ADMIN — SODIUM CHLORIDE, PRESERVATIVE FREE 10 ML: 5 INJECTION INTRAVENOUS at 09:50

## 2020-01-01 RX ADMIN — CEFTRIAXONE 1 G: 1 INJECTION, POWDER, FOR SOLUTION INTRAMUSCULAR; INTRAVENOUS at 02:32

## 2020-01-01 RX ADMIN — POTASSIUM CHLORIDE 20 MEQ: 1500 TABLET, EXTENDED RELEASE ORAL at 14:49

## 2020-01-01 RX ADMIN — CEFEPIME HYDROCHLORIDE 2 G: 2 INJECTION, POWDER, FOR SOLUTION INTRAVENOUS at 09:32

## 2020-01-01 RX ADMIN — CARVEDILOL 12.5 MG: 12.5 TABLET, FILM COATED ORAL at 08:02

## 2020-01-01 RX ADMIN — FLUTICASONE PROPIONATE 2 SPRAY: 50 SPRAY, METERED NASAL at 13:02

## 2020-01-01 RX ADMIN — TRAMADOL HYDROCHLORIDE 50 MG: 50 TABLET ORAL at 09:21

## 2020-01-01 RX ADMIN — AZITHROMYCIN MONOHYDRATE 500 MG: 500 INJECTION, POWDER, LYOPHILIZED, FOR SOLUTION INTRAVENOUS at 02:56

## 2020-01-01 RX ADMIN — Medication 2 PUFF: at 08:01

## 2020-01-01 RX ADMIN — LOSARTAN POTASSIUM 50 MG: 50 TABLET, FILM COATED ORAL at 09:05

## 2020-01-01 RX ADMIN — FUROSEMIDE 10 MG/HR: 10 INJECTION, SOLUTION INTRAMUSCULAR; INTRAVENOUS at 20:49

## 2020-01-01 RX ADMIN — ACETAMINOPHEN 1000 MG: 500 TABLET ORAL at 04:13

## 2020-01-01 RX ADMIN — LOSARTAN POTASSIUM 50 MG: 50 TABLET, FILM COATED ORAL at 08:01

## 2020-01-01 RX ADMIN — FUROSEMIDE 40 MG: 40 TABLET ORAL at 10:51

## 2020-01-01 RX ADMIN — CARVEDILOL 3.12 MG: 3.12 TABLET, FILM COATED ORAL at 17:47

## 2020-01-01 RX ADMIN — FLUTICASONE PROPIONATE 2 SPRAY: 50 SPRAY, METERED NASAL at 08:47

## 2020-01-01 RX ADMIN — DICLOFENAC 4 G: 10 GEL TOPICAL at 07:19

## 2020-01-01 RX ADMIN — OXYBUTYNIN CHLORIDE 5 MG: 5 TABLET ORAL at 09:18

## 2020-01-01 RX ADMIN — FAMOTIDINE 10 MG: 20 TABLET, FILM COATED ORAL at 09:27

## 2020-01-01 RX ADMIN — FINASTERIDE 5 MG: 5 TABLET, FILM COATED ORAL at 07:34

## 2020-01-01 RX ADMIN — ACETAMINOPHEN 1000 MG: 500 TABLET ORAL at 03:32

## 2020-01-01 RX ADMIN — SODIUM CHLORIDE, PRESERVATIVE FREE 10 ML: 5 INJECTION INTRAVENOUS at 13:03

## 2020-01-01 RX ADMIN — ATORVASTATIN CALCIUM 40 MG: 40 TABLET, FILM COATED ORAL at 09:05

## 2020-01-01 RX ADMIN — CARVEDILOL 12.5 MG: 12.5 TABLET, FILM COATED ORAL at 08:24

## 2020-01-01 RX ADMIN — GLYCOPYRROLATE AND FORMOTEROL FUMARATE 2 PUFF: 9; 4.8 AEROSOL, METERED RESPIRATORY (INHALATION) at 20:18

## 2020-01-01 RX ADMIN — CARVEDILOL 3.12 MG: 3.12 TABLET, FILM COATED ORAL at 11:44

## 2020-01-01 RX ADMIN — CARVEDILOL 12.5 MG: 12.5 TABLET, FILM COATED ORAL at 17:17

## 2020-01-01 RX ADMIN — FLUTICASONE PROPIONATE 2 SPRAY: 50 SPRAY, METERED NASAL at 11:23

## 2020-01-01 RX ADMIN — Medication 2 PUFF: at 20:08

## 2020-01-01 RX ADMIN — Medication 2000 UNITS: at 07:34

## 2020-01-01 RX ADMIN — FINASTERIDE 5 MG: 5 TABLET, FILM COATED ORAL at 09:51

## 2020-01-01 RX ADMIN — ACETAMINOPHEN 1000 MG: 500 TABLET ORAL at 10:34

## 2020-01-01 RX ADMIN — LOSARTAN POTASSIUM 50 MG: 50 TABLET, FILM COATED ORAL at 08:02

## 2020-01-01 RX ADMIN — LEVOFLOXACIN 250 MG: 250 TABLET, FILM COATED ORAL at 14:12

## 2020-01-01 RX ADMIN — ACETAMINOPHEN 1000 MG: 500 TABLET ORAL at 09:52

## 2020-01-01 RX ADMIN — GLYCOPYRROLATE AND FORMOTEROL FUMARATE 2 PUFF: 9; 4.8 AEROSOL, METERED RESPIRATORY (INHALATION) at 20:15

## 2020-01-01 RX ADMIN — INSULIN LISPRO 3 UNITS: 100 INJECTION, SOLUTION INTRAVENOUS; SUBCUTANEOUS at 12:33

## 2020-01-01 RX ADMIN — POTASSIUM BICARBONATE 20 MEQ: 782 TABLET, EFFERVESCENT ORAL at 16:47

## 2020-01-01 RX ADMIN — DOCUSATE SODIUM 50 MG AND SENNOSIDES 8.6 MG 1 TABLET: 8.6; 5 TABLET, FILM COATED ORAL at 08:28

## 2020-01-01 RX ADMIN — POTASSIUM CHLORIDE 20 MEQ: 1500 TABLET, EXTENDED RELEASE ORAL at 08:46

## 2020-01-01 RX ADMIN — SODIUM ZIRCONIUM CYCLOSILICATE 10 G: 5 POWDER, FOR SUSPENSION ORAL at 22:32

## 2020-01-01 RX ADMIN — PREDNISONE 20 MG: 20 TABLET ORAL at 09:53

## 2020-01-01 RX ADMIN — Medication 2 PUFF: at 20:55

## 2020-01-01 RX ADMIN — APIXABAN 2.5 MG: 2.5 TABLET, FILM COATED ORAL at 08:55

## 2020-01-01 RX ADMIN — TRAMADOL HYDROCHLORIDE 25 MG: 50 TABLET, FILM COATED ORAL at 07:17

## 2020-01-01 RX ADMIN — FLUTICASONE PROPIONATE 2 SPRAY: 50 SPRAY, METERED NASAL at 09:35

## 2020-01-01 RX ADMIN — OXYBUTYNIN CHLORIDE 5 MG: 5 TABLET ORAL at 09:22

## 2020-01-01 RX ADMIN — TAMSULOSIN HYDROCHLORIDE 0.4 MG: 0.4 CAPSULE ORAL at 08:19

## 2020-01-01 RX ADMIN — FUROSEMIDE 20 MG: 20 TABLET ORAL at 08:02

## 2020-01-01 RX ADMIN — METHYLPREDNISOLONE SODIUM SUCCINATE 40 MG: 40 INJECTION, POWDER, FOR SOLUTION INTRAMUSCULAR; INTRAVENOUS at 18:19

## 2020-01-01 RX ADMIN — ATORVASTATIN CALCIUM 40 MG: 40 TABLET, FILM COATED ORAL at 21:07

## 2020-01-01 RX ADMIN — INSULIN LISPRO 1 UNITS: 100 INJECTION, SOLUTION INTRAVENOUS; SUBCUTANEOUS at 09:23

## 2020-01-01 RX ADMIN — INSULIN LISPRO 5 UNITS: 100 INJECTION, SOLUTION INTRAVENOUS; SUBCUTANEOUS at 12:36

## 2020-01-01 RX ADMIN — Medication 2 PUFF: at 20:02

## 2020-01-01 RX ADMIN — TAMSULOSIN HYDROCHLORIDE 0.4 MG: 0.4 CAPSULE ORAL at 07:18

## 2020-01-01 RX ADMIN — DICLOFENAC 4 G: 10 GEL TOPICAL at 14:38

## 2020-01-01 RX ADMIN — Medication 2000 UNITS: at 08:18

## 2020-01-01 RX ADMIN — ACETAMINOPHEN 1000 MG: 500 TABLET ORAL at 02:50

## 2020-01-01 RX ADMIN — OXYBUTYNIN CHLORIDE 5 MG: 5 TABLET ORAL at 10:18

## 2020-01-01 RX ADMIN — LIDOCAINE HYDROCHLORIDE: 20 SOLUTION ORAL; TOPICAL at 05:27

## 2020-01-01 RX ADMIN — DICLOFENAC 4 G: 10 GEL TOPICAL at 21:08

## 2020-01-01 RX ADMIN — CARVEDILOL 6.25 MG: 6.25 TABLET, FILM COATED ORAL at 08:26

## 2020-01-01 RX ADMIN — ATORVASTATIN CALCIUM 40 MG: 40 TABLET, FILM COATED ORAL at 08:02

## 2020-01-01 RX ADMIN — DOCUSATE SODIUM 50 MG AND SENNOSIDES 8.6 MG 1 TABLET: 8.6; 5 TABLET, FILM COATED ORAL at 08:01

## 2020-01-01 RX ADMIN — CARVEDILOL 12.5 MG: 12.5 TABLET, FILM COATED ORAL at 17:24

## 2020-01-01 RX ADMIN — Medication 2000 UNITS: at 07:18

## 2020-01-01 RX ADMIN — PREDNISONE 10 MG: 10 TABLET ORAL at 08:02

## 2020-01-01 RX ADMIN — DOCUSATE SODIUM 50 MG AND SENNOSIDES 8.6 MG 1 TABLET: 8.6; 5 TABLET, FILM COATED ORAL at 20:27

## 2020-01-01 RX ADMIN — TAMSULOSIN HYDROCHLORIDE 0.4 MG: 0.4 CAPSULE ORAL at 08:46

## 2020-01-01 RX ADMIN — FUROSEMIDE 5 MG/HR: 10 INJECTION, SOLUTION INTRAMUSCULAR; INTRAVENOUS at 18:03

## 2020-01-01 RX ADMIN — PANTOPRAZOLE SODIUM 40 MG: 40 TABLET, DELAYED RELEASE ORAL at 20:46

## 2020-01-01 RX ADMIN — Medication 2000 UNITS: at 09:23

## 2020-01-01 RX ADMIN — PREDNISONE 10 MG: 10 TABLET ORAL at 09:39

## 2020-01-01 RX ADMIN — Medication 2 PUFF: at 09:48

## 2020-01-01 RX ADMIN — FINASTERIDE 5 MG: 5 TABLET, FILM COATED ORAL at 09:18

## 2020-01-01 RX ADMIN — SODIUM CHLORIDE, PRESERVATIVE FREE 10 ML: 5 INJECTION INTRAVENOUS at 21:07

## 2020-01-01 RX ADMIN — ASPIRIN 81 MG: 81 TABLET, CHEWABLE ORAL at 07:32

## 2020-01-01 RX ADMIN — LEVOFLOXACIN 250 MG: 250 TABLET, FILM COATED ORAL at 13:18

## 2020-01-01 RX ADMIN — POTASSIUM CHLORIDE 20 MEQ: 1500 TABLET, EXTENDED RELEASE ORAL at 07:42

## 2020-01-01 RX ADMIN — Medication 2000 UNITS: at 10:51

## 2020-01-01 RX ADMIN — APIXABAN 2.5 MG: 2.5 TABLET, FILM COATED ORAL at 20:42

## 2020-01-01 RX ADMIN — TAMSULOSIN HYDROCHLORIDE 0.4 MG: 0.4 CAPSULE ORAL at 07:34

## 2020-01-01 RX ADMIN — HYDROCODONE BITARTRATE AND ACETAMINOPHEN 1 TABLET: 5; 325 TABLET ORAL at 13:03

## 2020-01-01 RX ADMIN — FUROSEMIDE 40 MG: 10 INJECTION, SOLUTION INTRAMUSCULAR; INTRAVENOUS at 09:22

## 2020-01-01 RX ADMIN — Medication 2 PUFF: at 08:07

## 2020-01-01 RX ADMIN — ATORVASTATIN CALCIUM 40 MG: 40 TABLET, FILM COATED ORAL at 07:35

## 2020-01-01 RX ADMIN — RIVAROXABAN 15 MG: 15 TABLET, FILM COATED ORAL at 08:00

## 2020-01-01 RX ADMIN — TAMSULOSIN HYDROCHLORIDE 0.4 MG: 0.4 CAPSULE ORAL at 09:56

## 2020-01-01 RX ADMIN — ACETAMINOPHEN 1000 MG: 500 TABLET ORAL at 10:24

## 2020-01-01 RX ADMIN — LEVOFLOXACIN 250 MG: 250 TABLET, FILM COATED ORAL at 14:38

## 2020-01-01 RX ADMIN — ATORVASTATIN CALCIUM 40 MG: 40 TABLET, FILM COATED ORAL at 07:42

## 2020-01-01 RX ADMIN — PANTOPRAZOLE SODIUM 40 MG: 40 TABLET, DELAYED RELEASE ORAL at 21:52

## 2020-01-01 RX ADMIN — CALCIUM 500 MG: 500 TABLET ORAL at 09:52

## 2020-01-01 RX ADMIN — METHYLPREDNISOLONE SODIUM SUCCINATE 40 MG: 40 INJECTION, POWDER, FOR SOLUTION INTRAMUSCULAR; INTRAVENOUS at 17:20

## 2020-01-01 RX ADMIN — Medication 2000 UNITS: at 08:46

## 2020-01-01 RX ADMIN — PANTOPRAZOLE SODIUM 40 MG: 40 INJECTION, POWDER, FOR SOLUTION INTRAVENOUS at 09:19

## 2020-01-01 RX ADMIN — FUROSEMIDE 20 MG: 20 TABLET ORAL at 08:26

## 2020-01-01 RX ADMIN — FINASTERIDE 5 MG: 5 TABLET, FILM COATED ORAL at 07:32

## 2020-01-01 RX ADMIN — FLUTICASONE PROPIONATE 2 SPRAY: 50 SPRAY, METERED NASAL at 07:36

## 2020-01-01 RX ADMIN — ACETAMINOPHEN 650 MG: 325 TABLET ORAL at 07:42

## 2020-01-01 RX ADMIN — NIFEDIPINE 30 MG: 30 TABLET, FILM COATED, EXTENDED RELEASE ORAL at 07:32

## 2020-01-01 RX ADMIN — LEVOFLOXACIN 250 MG: 250 TABLET, FILM COATED ORAL at 14:04

## 2020-01-01 RX ADMIN — FINASTERIDE 5 MG: 5 TABLET, FILM COATED ORAL at 09:48

## 2020-01-01 RX ADMIN — FAMOTIDINE 10 MG: 20 TABLET, FILM COATED ORAL at 13:13

## 2020-01-01 RX ADMIN — FUROSEMIDE 80 MG: 10 INJECTION, SOLUTION INTRAMUSCULAR; INTRAVENOUS at 08:24

## 2020-01-01 RX ADMIN — ACETAMINOPHEN 1000 MG: 500 TABLET ORAL at 08:27

## 2020-01-01 RX ADMIN — LOSARTAN POTASSIUM 50 MG: 25 TABLET, FILM COATED ORAL at 08:01

## 2020-01-01 RX ADMIN — ATORVASTATIN CALCIUM 40 MG: 40 TABLET, FILM COATED ORAL at 10:51

## 2020-01-01 RX ADMIN — Medication 2000 UNITS: at 09:39

## 2020-01-01 RX ADMIN — LOSARTAN POTASSIUM 50 MG: 50 TABLET, FILM COATED ORAL at 08:18

## 2020-01-01 RX ADMIN — Medication 2000 UNITS: at 09:17

## 2020-01-01 RX ADMIN — SODIUM CHLORIDE, PRESERVATIVE FREE 10 ML: 5 INJECTION INTRAVENOUS at 21:42

## 2020-01-01 RX ADMIN — Medication 2000 UNITS: at 09:22

## 2020-01-01 RX ADMIN — FUROSEMIDE 60 MG: 10 INJECTION, SOLUTION INTRAMUSCULAR; INTRAVENOUS at 06:12

## 2020-01-01 RX ADMIN — SODIUM ZIRCONIUM CYCLOSILICATE 10 G: 5 POWDER, FOR SUSPENSION ORAL at 09:22

## 2020-01-01 RX ADMIN — INSULIN LISPRO 3 UNITS: 100 INJECTION, SOLUTION INTRAVENOUS; SUBCUTANEOUS at 12:24

## 2020-01-01 RX ADMIN — FINASTERIDE 5 MG: 5 TABLET, FILM COATED ORAL at 07:43

## 2020-01-01 RX ADMIN — DOCUSATE SODIUM 50 MG AND SENNOSIDES 8.6 MG 1 TABLET: 8.6; 5 TABLET, FILM COATED ORAL at 08:02

## 2020-01-01 RX ADMIN — APIXABAN 2.5 MG: 2.5 TABLET, FILM COATED ORAL at 22:04

## 2020-01-01 RX ADMIN — FLUTICASONE PROPIONATE 2 SPRAY: 50 SPRAY, METERED NASAL at 15:50

## 2020-01-01 RX ADMIN — RIVAROXABAN 15 MG: 15 TABLET, FILM COATED ORAL at 10:51

## 2020-01-01 RX ADMIN — INSULIN LISPRO 1 UNITS: 100 INJECTION, SOLUTION INTRAVENOUS; SUBCUTANEOUS at 21:06

## 2020-01-01 RX ADMIN — VANCOMYCIN HYDROCHLORIDE 1500 MG: 10 INJECTION, POWDER, LYOPHILIZED, FOR SOLUTION INTRAVENOUS at 10:09

## 2020-01-01 RX ADMIN — CEFTRIAXONE 1 G: 1 INJECTION, POWDER, FOR SOLUTION INTRAMUSCULAR; INTRAVENOUS at 02:12

## 2020-01-01 RX ADMIN — Medication 10 ML: at 08:01

## 2020-01-01 RX ADMIN — FUROSEMIDE 40 MG: 10 INJECTION, SOLUTION INTRAMUSCULAR; INTRAVENOUS at 08:46

## 2020-01-01 RX ADMIN — NIFEDIPINE 30 MG: 30 TABLET, FILM COATED, EXTENDED RELEASE ORAL at 09:05

## 2020-01-01 RX ADMIN — PANTOPRAZOLE SODIUM 40 MG: 40 TABLET, DELAYED RELEASE ORAL at 06:41

## 2020-01-01 RX ADMIN — APIXABAN 2.5 MG: 2.5 TABLET, FILM COATED ORAL at 09:48

## 2020-01-01 RX ADMIN — GLYCOPYRROLATE AND FORMOTEROL FUMARATE 2 PUFF: 9; 4.8 AEROSOL, METERED RESPIRATORY (INHALATION) at 07:39

## 2020-01-01 RX ADMIN — FINASTERIDE 5 MG: 5 TABLET, FILM COATED ORAL at 08:46

## 2020-01-01 RX ADMIN — GLYCOPYRROLATE AND FORMOTEROL FUMARATE 2 PUFF: 9; 4.8 AEROSOL, METERED RESPIRATORY (INHALATION) at 08:02

## 2020-01-01 RX ADMIN — Medication 2 PUFF: at 20:32

## 2020-01-01 RX ADMIN — ACETAMINOPHEN 1000 MG: 500 TABLET ORAL at 10:00

## 2020-01-01 RX ADMIN — Medication 2000 UNITS: at 07:42

## 2020-01-01 RX ADMIN — Medication 2 PUFF: at 21:03

## 2020-01-01 RX ADMIN — PREDNISONE 10 MG: 10 TABLET ORAL at 09:22

## 2020-01-01 RX ADMIN — CEFEPIME 2 G: 2 INJECTION, POWDER, FOR SOLUTION INTRAVENOUS at 06:40

## 2020-01-01 RX ADMIN — FLUTICASONE PROPIONATE 2 SPRAY: 50 SPRAY, METERED NASAL at 09:00

## 2020-01-01 RX ADMIN — PANTOPRAZOLE SODIUM 40 MG: 40 TABLET, DELAYED RELEASE ORAL at 06:57

## 2020-01-01 RX ADMIN — LEVOFLOXACIN 250 MG: 250 TABLET, FILM COATED ORAL at 13:33

## 2020-01-01 RX ADMIN — ANTACID TABLETS 500 MG: 500 TABLET, CHEWABLE ORAL at 11:44

## 2020-01-01 RX ADMIN — RIVAROXABAN 15 MG: 15 TABLET, FILM COATED ORAL at 07:18

## 2020-01-01 RX ADMIN — PREDNISONE 10 MG: 10 TABLET ORAL at 08:55

## 2020-01-01 RX ADMIN — CALCIUM 500 MG: 500 TABLET ORAL at 10:52

## 2020-01-01 RX ADMIN — CARVEDILOL 3.12 MG: 3.12 TABLET, FILM COATED ORAL at 09:39

## 2020-01-01 RX ADMIN — PANTOPRAZOLE SODIUM 40 MG: 40 TABLET, DELAYED RELEASE ORAL at 21:58

## 2020-01-01 RX ADMIN — Medication 2000 UNITS: at 08:27

## 2020-01-01 RX ADMIN — LEVOFLOXACIN 250 MG: 250 TABLET, FILM COATED ORAL at 16:12

## 2020-01-01 RX ADMIN — APIXABAN 2.5 MG: 2.5 TABLET, FILM COATED ORAL at 08:24

## 2020-01-01 RX ADMIN — OXYBUTYNIN CHLORIDE 5 MG: 5 TABLET ORAL at 09:49

## 2020-01-01 RX ADMIN — ATORVASTATIN CALCIUM 40 MG: 40 TABLET, FILM COATED ORAL at 08:18

## 2020-01-01 RX ADMIN — GLYCOPYRROLATE AND FORMOTEROL FUMARATE 2 PUFF: 9; 4.8 AEROSOL, METERED RESPIRATORY (INHALATION) at 21:29

## 2020-01-01 RX ADMIN — DICLOFENAC 4 G: 10 GEL TOPICAL at 08:04

## 2020-01-01 RX ADMIN — CARVEDILOL 6.25 MG: 6.25 TABLET, FILM COATED ORAL at 17:41

## 2020-01-01 RX ADMIN — FUROSEMIDE 40 MG: 40 TABLET ORAL at 09:51

## 2020-01-01 RX ADMIN — ASPIRIN 81 MG: 81 TABLET, CHEWABLE ORAL at 08:01

## 2020-01-01 RX ADMIN — TRAMADOL HYDROCHLORIDE 50 MG: 50 TABLET, FILM COATED ORAL at 08:03

## 2020-01-01 RX ADMIN — APIXABAN 2.5 MG: 2.5 TABLET, FILM COATED ORAL at 09:23

## 2020-01-01 RX ADMIN — ACETAMINOPHEN 650 MG: 325 TABLET ORAL at 21:41

## 2020-01-01 RX ADMIN — ACETAMINOPHEN 1000 MG: 500 TABLET, FILM COATED ORAL at 20:42

## 2020-01-01 RX ADMIN — FLUTICASONE PROPIONATE 2 SPRAY: 50 SPRAY, METERED NASAL at 09:22

## 2020-01-01 RX ADMIN — FAMOTIDINE 10 MG: 20 TABLET, FILM COATED ORAL at 08:24

## 2020-01-01 RX ADMIN — ACETAMINOPHEN 1000 MG: 500 TABLET ORAL at 10:51

## 2020-01-01 RX ADMIN — TAMSULOSIN HYDROCHLORIDE 0.4 MG: 0.4 CAPSULE ORAL at 08:55

## 2020-01-01 RX ADMIN — SODIUM CHLORIDE, PRESERVATIVE FREE 10 ML: 5 INJECTION INTRAVENOUS at 22:04

## 2020-01-01 RX ADMIN — FUROSEMIDE 40 MG: 10 INJECTION, SOLUTION INTRAMUSCULAR; INTRAVENOUS at 08:55

## 2020-01-01 RX ADMIN — LOSARTAN POTASSIUM 50 MG: 50 TABLET, FILM COATED ORAL at 07:34

## 2020-01-01 RX ADMIN — ACETAMINOPHEN 1000 MG: 500 TABLET ORAL at 09:24

## 2020-01-01 RX ADMIN — ASPIRIN 81 MG: 81 TABLET, CHEWABLE ORAL at 08:46

## 2020-01-01 RX ADMIN — SODIUM CHLORIDE, PRESERVATIVE FREE 10 ML: 5 INJECTION INTRAVENOUS at 20:52

## 2020-01-01 RX ADMIN — POLYVINYL ALCOHOL, POVIDONE 1 DROP: .5; .6 LIQUID OPHTHALMIC at 09:52

## 2020-01-01 RX ADMIN — PREDNISONE 40 MG: 20 TABLET ORAL at 07:32

## 2020-01-01 RX ADMIN — SODIUM CHLORIDE, PRESERVATIVE FREE 10 ML: 5 INJECTION INTRAVENOUS at 09:19

## 2020-01-01 RX ADMIN — Medication 2 PUFF: at 19:55

## 2020-01-01 RX ADMIN — ALOGLIPTIN 6.25 MG: 12.5 TABLET, FILM COATED ORAL at 09:24

## 2020-01-01 RX ADMIN — LEVOFLOXACIN 250 MG: 250 TABLET, FILM COATED ORAL at 14:19

## 2020-01-01 RX ADMIN — CARVEDILOL 3.12 MG: 3.12 TABLET, FILM COATED ORAL at 07:32

## 2020-01-01 RX ADMIN — TAMSULOSIN HYDROCHLORIDE 0.4 MG: 0.4 CAPSULE ORAL at 09:24

## 2020-01-01 RX ADMIN — METHYLPREDNISOLONE SODIUM SUCCINATE 125 MG: 125 INJECTION, POWDER, FOR SOLUTION INTRAMUSCULAR; INTRAVENOUS at 06:12

## 2020-01-01 RX ADMIN — GLYCOPYRROLATE AND FORMOTEROL FUMARATE 2 PUFF: 9; 4.8 AEROSOL, METERED RESPIRATORY (INHALATION) at 19:31

## 2020-01-01 RX ADMIN — PREDNISONE 10 MG: 10 TABLET ORAL at 08:24

## 2020-01-01 RX ADMIN — FINASTERIDE 5 MG: 5 TABLET, FILM COATED ORAL at 08:02

## 2020-01-01 RX ADMIN — NIFEDIPINE 30 MG: 30 TABLET, FILM COATED, EXTENDED RELEASE ORAL at 08:27

## 2020-01-01 RX ADMIN — INSULIN LISPRO 1 UNITS: 100 INJECTION, SOLUTION INTRAVENOUS; SUBCUTANEOUS at 20:36

## 2020-01-01 RX ADMIN — ATORVASTATIN CALCIUM 40 MG: 40 TABLET, FILM COATED ORAL at 08:03

## 2020-01-01 RX ADMIN — TRAMADOL HYDROCHLORIDE 50 MG: 50 TABLET ORAL at 20:22

## 2020-01-01 RX ADMIN — TRAMADOL HYDROCHLORIDE 50 MG: 50 TABLET ORAL at 09:22

## 2020-01-01 RX ADMIN — ATORVASTATIN CALCIUM 40 MG: 40 TABLET, FILM COATED ORAL at 08:26

## 2020-01-01 RX ADMIN — METHYLPREDNISOLONE SODIUM SUCCINATE 40 MG: 40 INJECTION, POWDER, FOR SOLUTION INTRAMUSCULAR; INTRAVENOUS at 03:57

## 2020-01-01 RX ADMIN — DICLOFENAC 4 G: 10 GEL TOPICAL at 08:28

## 2020-01-01 RX ADMIN — DOCUSATE SODIUM 50 MG AND SENNOSIDES 8.6 MG 1 TABLET: 8.6; 5 TABLET, FILM COATED ORAL at 07:50

## 2020-01-01 RX ADMIN — ACETAMINOPHEN 1000 MG: 500 TABLET ORAL at 17:17

## 2020-01-01 RX ADMIN — DICLOFENAC 4 G: 10 GEL TOPICAL at 09:06

## 2020-01-01 RX ADMIN — AZITHROMYCIN MONOHYDRATE 500 MG: 500 INJECTION, POWDER, LYOPHILIZED, FOR SOLUTION INTRAVENOUS at 03:56

## 2020-01-01 RX ADMIN — CALCIUM 500 MG: 500 TABLET ORAL at 08:46

## 2020-01-01 RX ADMIN — GLYCOPYRROLATE AND FORMOTEROL FUMARATE 2 PUFF: 9; 4.8 AEROSOL, METERED RESPIRATORY (INHALATION) at 08:35

## 2020-01-01 RX ADMIN — FLUTICASONE PROPIONATE 2 SPRAY: 50 SPRAY, METERED NASAL at 08:18

## 2020-01-01 RX ADMIN — DOCUSATE SODIUM 50 MG AND SENNOSIDES 8.6 MG 1 TABLET: 8.6; 5 TABLET, FILM COATED ORAL at 07:32

## 2020-01-01 RX ADMIN — ATORVASTATIN CALCIUM 40 MG: 40 TABLET, FILM COATED ORAL at 09:52

## 2020-01-01 RX ADMIN — Medication 10 ML: at 20:18

## 2020-01-01 RX ADMIN — Medication 2000 UNITS: at 08:55

## 2020-01-01 RX ADMIN — DOCUSATE SODIUM 50 MG AND SENNOSIDES 8.6 MG 1 TABLET: 8.6; 5 TABLET, FILM COATED ORAL at 09:52

## 2020-01-01 RX ADMIN — ASPIRIN 81 MG: 81 TABLET, CHEWABLE ORAL at 09:05

## 2020-01-01 RX ADMIN — DOCUSATE SODIUM 50 MG AND SENNOSIDES 8.6 MG 1 TABLET: 8.6; 5 TABLET, FILM COATED ORAL at 21:58

## 2020-01-01 RX ADMIN — PREDNISONE 10 MG: 10 TABLET ORAL at 09:18

## 2020-01-01 RX ADMIN — ACETAMINOPHEN 1000 MG: 500 TABLET ORAL at 09:05

## 2020-01-01 RX ADMIN — Medication 10 ML: at 08:47

## 2020-01-01 RX ADMIN — FINASTERIDE 5 MG: 5 TABLET, FILM COATED ORAL at 08:55

## 2020-01-01 RX ADMIN — IOPAMIDOL 75 ML: 755 INJECTION, SOLUTION INTRAVENOUS at 18:01

## 2020-01-01 RX ADMIN — INSULIN LISPRO 1 UNITS: 100 INJECTION, SOLUTION INTRAVENOUS; SUBCUTANEOUS at 17:25

## 2020-01-01 RX ADMIN — TAMSULOSIN HYDROCHLORIDE 0.4 MG: 0.4 CAPSULE ORAL at 08:59

## 2020-01-01 RX ADMIN — INSULIN LISPRO 1 UNITS: 100 INJECTION, SOLUTION INTRAVENOUS; SUBCUTANEOUS at 20:38

## 2020-01-01 RX ADMIN — FLUTICASONE PROPIONATE 2 SPRAY: 50 SPRAY, METERED NASAL at 07:33

## 2020-01-01 RX ADMIN — ACETAMINOPHEN 1000 MG: 500 TABLET ORAL at 17:23

## 2020-01-01 RX ADMIN — ANTACID TABLETS 500 MG: 500 TABLET, CHEWABLE ORAL at 07:32

## 2020-01-01 RX ADMIN — OXYBUTYNIN CHLORIDE 5 MG: 5 TABLET ORAL at 08:55

## 2020-01-01 RX ADMIN — TRAMADOL HYDROCHLORIDE 25 MG: 50 TABLET, FILM COATED ORAL at 09:24

## 2020-01-01 RX ADMIN — TAMSULOSIN HYDROCHLORIDE 0.4 MG: 0.4 CAPSULE ORAL at 09:05

## 2020-01-01 RX ADMIN — DOCUSATE SODIUM 50 MG AND SENNOSIDES 8.6 MG 1 TABLET: 8.6; 5 TABLET, FILM COATED ORAL at 21:48

## 2020-01-01 RX ADMIN — RIVAROXABAN 10 MG: 10 TABLET, FILM COATED ORAL at 17:24

## 2020-01-01 RX ADMIN — POTASSIUM CHLORIDE 20 MEQ: 1500 TABLET, EXTENDED RELEASE ORAL at 17:20

## 2020-01-01 RX ADMIN — OXYBUTYNIN CHLORIDE 5 MG: 5 TABLET ORAL at 08:58

## 2020-01-01 RX ADMIN — Medication 2000 UNITS: at 08:01

## 2020-01-01 RX ADMIN — FINASTERIDE 5 MG: 5 TABLET, FILM COATED ORAL at 07:50

## 2020-01-01 RX ADMIN — FUROSEMIDE 10 MG/HR: 10 INJECTION, SOLUTION INTRAMUSCULAR; INTRAVENOUS at 07:22

## 2020-01-01 RX ADMIN — OXYBUTYNIN CHLORIDE 5 MG: 5 TABLET ORAL at 08:02

## 2020-01-01 RX ADMIN — GLYCOPYRROLATE AND FORMOTEROL FUMARATE 2 PUFF: 9; 4.8 AEROSOL, METERED RESPIRATORY (INHALATION) at 21:08

## 2020-01-01 RX ADMIN — FINASTERIDE 5 MG: 5 TABLET, FILM COATED ORAL at 08:19

## 2020-01-01 RX ADMIN — INSULIN LISPRO 1 UNITS: 100 INJECTION, SOLUTION INTRAVENOUS; SUBCUTANEOUS at 12:33

## 2020-01-01 RX ADMIN — ATORVASTATIN CALCIUM 40 MG: 40 TABLET, FILM COATED ORAL at 22:35

## 2020-01-01 RX ADMIN — INSULIN LISPRO 1 UNITS: 100 INJECTION, SOLUTION INTRAVENOUS; SUBCUTANEOUS at 20:37

## 2020-01-01 RX ADMIN — PANTOPRAZOLE SODIUM 40 MG: 40 TABLET, DELAYED RELEASE ORAL at 21:53

## 2020-01-01 RX ADMIN — INSULIN LISPRO 1 UNITS: 100 INJECTION, SOLUTION INTRAVENOUS; SUBCUTANEOUS at 22:04

## 2020-01-01 RX ADMIN — TAMSULOSIN HYDROCHLORIDE 0.4 MG: 0.4 CAPSULE ORAL at 08:02

## 2020-01-01 RX ADMIN — GLYCOPYRROLATE AND FORMOTEROL FUMARATE 2 PUFF: 9; 4.8 AEROSOL, METERED RESPIRATORY (INHALATION) at 20:12

## 2020-01-01 RX ADMIN — PANTOPRAZOLE SODIUM 40 MG: 40 TABLET, DELAYED RELEASE ORAL at 20:27

## 2020-01-01 RX ADMIN — SODIUM CHLORIDE, PRESERVATIVE FREE 10 ML: 5 INJECTION INTRAVENOUS at 09:32

## 2020-01-01 RX ADMIN — FUROSEMIDE 40 MG: 40 TABLET ORAL at 08:01

## 2020-01-01 RX ADMIN — ASPIRIN 81 MG: 81 TABLET, CHEWABLE ORAL at 08:27

## 2020-01-01 RX ADMIN — SODIUM CHLORIDE, PRESERVATIVE FREE 10 ML: 5 INJECTION INTRAVENOUS at 09:24

## 2020-01-01 RX ADMIN — ACETAMINOPHEN 1000 MG: 500 TABLET ORAL at 10:38

## 2020-01-01 RX ADMIN — FUROSEMIDE 20 MG: 20 TABLET ORAL at 07:18

## 2020-01-01 RX ADMIN — FINASTERIDE 5 MG: 5 TABLET, FILM COATED ORAL at 09:05

## 2020-01-01 RX ADMIN — RIVAROXABAN 15 MG: 15 TABLET, FILM COATED ORAL at 08:46

## 2020-01-01 RX ADMIN — CARVEDILOL 12.5 MG: 12.5 TABLET, FILM COATED ORAL at 08:55

## 2020-01-01 RX ADMIN — DICLOFENAC 4 G: 10 GEL TOPICAL at 08:19

## 2020-01-01 RX ADMIN — FINASTERIDE 5 MG: 5 TABLET, FILM COATED ORAL at 09:39

## 2020-01-01 RX ADMIN — ATORVASTATIN CALCIUM 40 MG: 40 TABLET, FILM COATED ORAL at 07:32

## 2020-01-01 RX ADMIN — TAMSULOSIN HYDROCHLORIDE 0.4 MG: 0.4 CAPSULE ORAL at 07:43

## 2020-01-01 RX ADMIN — TAMSULOSIN HYDROCHLORIDE 0.4 MG: 0.4 CAPSULE ORAL at 09:49

## 2020-01-01 RX ADMIN — PANTOPRAZOLE SODIUM 40 MG: 40 TABLET, DELAYED RELEASE ORAL at 21:30

## 2020-01-01 RX ADMIN — FINASTERIDE 5 MG: 5 TABLET, FILM COATED ORAL at 08:00

## 2020-01-01 RX ADMIN — FLUTICASONE PROPIONATE 2 SPRAY: 50 SPRAY, METERED NASAL at 08:28

## 2020-01-01 RX ADMIN — FLUTICASONE PROPIONATE 2 SPRAY: 50 SPRAY, METERED NASAL at 08:13

## 2020-01-01 RX ADMIN — METHYLPREDNISOLONE SODIUM SUCCINATE 40 MG: 40 INJECTION, POWDER, FOR SOLUTION INTRAMUSCULAR; INTRAVENOUS at 04:46

## 2020-01-01 RX ADMIN — SODIUM CHLORIDE, PRESERVATIVE FREE 10 ML: 5 INJECTION INTRAVENOUS at 09:28

## 2020-01-01 RX ADMIN — Medication 10 ML: at 09:19

## 2020-01-01 RX ADMIN — TRAMADOL HYDROCHLORIDE 50 MG: 50 TABLET ORAL at 09:19

## 2020-01-01 RX ADMIN — TRIAMCINOLONE ACETONIDE 80 MG: 40 INJECTION, SUSPENSION INTRA-ARTICULAR; INTRAMUSCULAR at 13:39

## 2020-01-01 RX ADMIN — Medication 10 ML: at 10:52

## 2020-01-01 RX ADMIN — ACETAMINOPHEN 1000 MG: 500 TABLET ORAL at 17:47

## 2020-01-01 RX ADMIN — FUROSEMIDE 40 MG: 40 TABLET ORAL at 07:34

## 2020-01-01 RX ADMIN — ACETAMINOPHEN 1000 MG: 500 TABLET ORAL at 10:08

## 2020-01-01 RX ADMIN — FINASTERIDE 5 MG: 5 TABLET, FILM COATED ORAL at 08:59

## 2020-01-01 RX ADMIN — INSULIN LISPRO 1 UNITS: 100 INJECTION, SOLUTION INTRAVENOUS; SUBCUTANEOUS at 16:47

## 2020-01-01 RX ADMIN — CALCIUM 500 MG: 500 TABLET ORAL at 07:42

## 2020-01-01 RX ADMIN — ACETAMINOPHEN 1000 MG: 500 TABLET ORAL at 02:45

## 2020-01-01 RX ADMIN — TAMSULOSIN HYDROCHLORIDE 0.4 MG: 0.4 CAPSULE ORAL at 07:32

## 2020-01-01 RX ADMIN — APIXABAN 2.5 MG: 2.5 TABLET, FILM COATED ORAL at 09:39

## 2020-01-01 RX ADMIN — PANTOPRAZOLE SODIUM 40 MG: 40 TABLET, DELAYED RELEASE ORAL at 05:27

## 2020-01-01 RX ADMIN — ACETAMINOPHEN 650 MG: 325 TABLET ORAL at 17:11

## 2020-01-01 RX ADMIN — SODIUM CHLORIDE, PRESERVATIVE FREE 10 ML: 5 INJECTION INTRAVENOUS at 22:35

## 2020-01-01 RX ADMIN — INSULIN LISPRO 2 UNITS: 100 INJECTION, SOLUTION INTRAVENOUS; SUBCUTANEOUS at 12:51

## 2020-01-01 RX ADMIN — GLYCOPYRROLATE AND FORMOTEROL FUMARATE 2 PUFF: 9; 4.8 AEROSOL, METERED RESPIRATORY (INHALATION) at 12:13

## 2020-01-01 RX ADMIN — Medication 2 PUFF: at 07:53

## 2020-01-01 RX ADMIN — ASPIRIN 81 MG: 81 TABLET, CHEWABLE ORAL at 07:42

## 2020-01-01 RX ADMIN — RIVAROXABAN 15 MG: 15 TABLET, FILM COATED ORAL at 08:19

## 2020-01-01 RX ADMIN — RIVAROXABAN 15 MG: 15 TABLET, FILM COATED ORAL at 07:34

## 2020-01-01 RX ADMIN — SODIUM CHLORIDE, PRESERVATIVE FREE 10 ML: 5 INJECTION INTRAVENOUS at 09:40

## 2020-01-01 RX ADMIN — FLUTICASONE PROPIONATE 2 SPRAY: 50 SPRAY, METERED NASAL at 08:03

## 2020-01-01 RX ADMIN — DICLOFENAC 4 G: 10 GEL TOPICAL at 14:12

## 2020-01-01 RX ADMIN — CEFTRIAXONE 1 G: 1 INJECTION, POWDER, FOR SOLUTION INTRAMUSCULAR; INTRAVENOUS at 01:50

## 2020-01-01 RX ADMIN — PANTOPRAZOLE SODIUM 40 MG: 40 TABLET, DELAYED RELEASE ORAL at 21:40

## 2020-01-01 RX ADMIN — ATORVASTATIN CALCIUM 40 MG: 40 TABLET, FILM COATED ORAL at 20:36

## 2020-01-01 RX ADMIN — ANTACID TABLETS 500 MG: 500 TABLET, CHEWABLE ORAL at 08:02

## 2020-01-01 RX ADMIN — ATORVASTATIN CALCIUM 40 MG: 40 TABLET, FILM COATED ORAL at 08:46

## 2020-01-01 RX ADMIN — FUROSEMIDE 10 MG/HR: 10 INJECTION, SOLUTION INTRAMUSCULAR; INTRAVENOUS at 12:13

## 2020-01-01 RX ADMIN — Medication 2000 UNITS: at 09:51

## 2020-01-01 RX ADMIN — FINASTERIDE 5 MG: 5 TABLET, FILM COATED ORAL at 09:21

## 2020-01-01 RX ADMIN — ACETAMINOPHEN 1000 MG: 500 TABLET ORAL at 01:22

## 2020-01-01 RX ADMIN — Medication 2 PUFF: at 21:08

## 2020-01-01 RX ADMIN — DICLOFENAC 4 G: 10 GEL TOPICAL at 20:47

## 2020-01-01 RX ADMIN — PREDNISONE 20 MG: 20 TABLET ORAL at 07:50

## 2020-01-01 RX ADMIN — RIVAROXABAN 15 MG: 15 TABLET, FILM COATED ORAL at 08:27

## 2020-01-01 RX ADMIN — ANTACID TABLETS 500 MG: 500 TABLET, CHEWABLE ORAL at 09:05

## 2020-01-01 RX ADMIN — SODIUM ZIRCONIUM CYCLOSILICATE 10 G: 5 POWDER, FOR SUSPENSION ORAL at 20:25

## 2020-01-01 RX ADMIN — TRAMADOL HYDROCHLORIDE 50 MG: 50 TABLET, FILM COATED ORAL at 14:13

## 2020-01-01 RX ADMIN — ASPIRIN 81 MG: 81 TABLET, CHEWABLE ORAL at 08:18

## 2020-01-01 RX ADMIN — Medication 10 ML: at 09:35

## 2020-01-01 RX ADMIN — Medication 2 PUFF: at 21:05

## 2020-01-01 RX ADMIN — INSULIN LISPRO 1 UNITS: 100 INJECTION, SOLUTION INTRAVENOUS; SUBCUTANEOUS at 17:33

## 2020-01-01 ASSESSMENT — PAIN DESCRIPTION - FREQUENCY
FREQUENCY: INTERMITTENT
FREQUENCY: CONTINUOUS
FREQUENCY: INTERMITTENT
FREQUENCY: CONTINUOUS
FREQUENCY: INTERMITTENT
FREQUENCY: CONTINUOUS
FREQUENCY: INTERMITTENT
FREQUENCY: CONTINUOUS
FREQUENCY: INTERMITTENT
FREQUENCY: INTERMITTENT
FREQUENCY: CONTINUOUS
FREQUENCY: INTERMITTENT
FREQUENCY: CONTINUOUS
FREQUENCY: INTERMITTENT
FREQUENCY: INTERMITTENT
FREQUENCY: CONTINUOUS
FREQUENCY: INTERMITTENT
FREQUENCY: CONTINUOUS
FREQUENCY: CONTINUOUS
FREQUENCY: INTERMITTENT
FREQUENCY: CONTINUOUS
FREQUENCY: CONTINUOUS
FREQUENCY: INTERMITTENT

## 2020-01-01 ASSESSMENT — PAIN DESCRIPTION - PROGRESSION
CLINICAL_PROGRESSION: NOT CHANGED
CLINICAL_PROGRESSION: GRADUALLY WORSENING
CLINICAL_PROGRESSION: NOT CHANGED
CLINICAL_PROGRESSION: GRADUALLY WORSENING
CLINICAL_PROGRESSION: NOT CHANGED

## 2020-01-01 ASSESSMENT — PAIN SCALES - GENERAL
PAINLEVEL_OUTOF10: 0
PAINLEVEL_OUTOF10: 5
PAINLEVEL_OUTOF10: 0
PAINLEVEL_OUTOF10: 4
PAINLEVEL_OUTOF10: 3
PAINLEVEL_OUTOF10: 1
PAINLEVEL_OUTOF10: 2
PAINLEVEL_OUTOF10: 0
PAINLEVEL_OUTOF10: 3
PAINLEVEL_OUTOF10: 5
PAINLEVEL_OUTOF10: 0
PAINLEVEL_OUTOF10: 3
PAINLEVEL_OUTOF10: 0
PAINLEVEL_OUTOF10: 5
PAINLEVEL_OUTOF10: 4
PAINLEVEL_OUTOF10: 2
PAINLEVEL_OUTOF10: 0
PAINLEVEL_OUTOF10: 5
PAINLEVEL_OUTOF10: 0
PAINLEVEL_OUTOF10: 5
PAINLEVEL_OUTOF10: 0
PAINLEVEL_OUTOF10: 1
PAINLEVEL_OUTOF10: 0
PAINLEVEL_OUTOF10: 3
PAINLEVEL_OUTOF10: 3
PAINLEVEL_OUTOF10: 5
PAINLEVEL_OUTOF10: 0
PAINLEVEL_OUTOF10: 7
PAINLEVEL_OUTOF10: 3
PAINLEVEL_OUTOF10: 5
PAINLEVEL_OUTOF10: 4
PAINLEVEL_OUTOF10: 6
PAINLEVEL_OUTOF10: 0
PAINLEVEL_OUTOF10: 2
PAINLEVEL_OUTOF10: 0
PAINLEVEL_OUTOF10: 1
PAINLEVEL_OUTOF10: 5
PAINLEVEL_OUTOF10: 2
PAINLEVEL_OUTOF10: 0
PAINLEVEL_OUTOF10: 0
PAINLEVEL_OUTOF10: 2
PAINLEVEL_OUTOF10: 0
PAINLEVEL_OUTOF10: 5
PAINLEVEL_OUTOF10: 0
PAINLEVEL_OUTOF10: 3
PAINLEVEL_OUTOF10: 0
PAINLEVEL_OUTOF10: 5
PAINLEVEL_OUTOF10: 0
PAINLEVEL_OUTOF10: 3
PAINLEVEL_OUTOF10: 0
PAINLEVEL_OUTOF10: 3
PAINLEVEL_OUTOF10: 0
PAINLEVEL_OUTOF10: 2
PAINLEVEL_OUTOF10: 0
PAINLEVEL_OUTOF10: 0
PAINLEVEL_OUTOF10: 5
PAINLEVEL_OUTOF10: 0
PAINLEVEL_OUTOF10: 0
PAINLEVEL_OUTOF10: 5
PAINLEVEL_OUTOF10: 0
PAINLEVEL_OUTOF10: 5
PAINLEVEL_OUTOF10: 0
PAINLEVEL_OUTOF10: 0
PAINLEVEL_OUTOF10: 5
PAINLEVEL_OUTOF10: 0
PAINLEVEL_OUTOF10: 0
PAINLEVEL_OUTOF10: 5
PAINLEVEL_OUTOF10: 0
PAINLEVEL_OUTOF10: 1
PAINLEVEL_OUTOF10: 0
PAINLEVEL_OUTOF10: 3
PAINLEVEL_OUTOF10: 0
PAINLEVEL_OUTOF10: 7
PAINLEVEL_OUTOF10: 0
PAINLEVEL_OUTOF10: 5
PAINLEVEL_OUTOF10: 4
PAINLEVEL_OUTOF10: 5
PAINLEVEL_OUTOF10: 0
PAINLEVEL_OUTOF10: 4
PAINLEVEL_OUTOF10: 0
PAINLEVEL_OUTOF10: 2
PAINLEVEL_OUTOF10: 4
PAINLEVEL_OUTOF10: 0
PAINLEVEL_OUTOF10: 2
PAINLEVEL_OUTOF10: 1
PAINLEVEL_OUTOF10: 4
PAINLEVEL_OUTOF10: 0
PAINLEVEL_OUTOF10: 5
PAINLEVEL_OUTOF10: 0
PAINLEVEL_OUTOF10: 3
PAINLEVEL_OUTOF10: 5
PAINLEVEL_OUTOF10: 5
PAINLEVEL_OUTOF10: 0
PAINLEVEL_OUTOF10: 4

## 2020-01-01 ASSESSMENT — PAIN DESCRIPTION - PAIN TYPE
TYPE: CHRONIC PAIN

## 2020-01-01 ASSESSMENT — PAIN DESCRIPTION - DESCRIPTORS
DESCRIPTORS: ACHING;DISCOMFORT
DESCRIPTORS: ACHING
DESCRIPTORS: ACHING;DISCOMFORT
DESCRIPTORS: ACHING
DESCRIPTORS: ACHING;DISCOMFORT
DESCRIPTORS: ACHING
DESCRIPTORS: ACHING;DISCOMFORT
DESCRIPTORS: ACHING
DESCRIPTORS: SORE
DESCRIPTORS: SORE
DESCRIPTORS: ACHING
DESCRIPTORS: ACHING;SORE
DESCRIPTORS: ACHING
DESCRIPTORS: ACHING
DESCRIPTORS: ACHING;DISCOMFORT
DESCRIPTORS: ACHING
DESCRIPTORS: ACHING;DISCOMFORT
DESCRIPTORS: ACHING
DESCRIPTORS: ACHING;DULL

## 2020-01-01 ASSESSMENT — PAIN DESCRIPTION - ORIENTATION
ORIENTATION: LOWER
ORIENTATION: LEFT
ORIENTATION: LOWER
ORIENTATION: MID
ORIENTATION: LOWER;MID
ORIENTATION: LOWER
ORIENTATION: LEFT
ORIENTATION: LOWER
ORIENTATION: MID
ORIENTATION: LOWER
ORIENTATION: MID
ORIENTATION: MID
ORIENTATION: LOWER
ORIENTATION: MID
ORIENTATION: LOWER

## 2020-01-01 ASSESSMENT — PAIN SCALES - WONG BAKER
WONGBAKER_NUMERICALRESPONSE: 2
WONGBAKER_NUMERICALRESPONSE: 0
WONGBAKER_NUMERICALRESPONSE: 4
WONGBAKER_NUMERICALRESPONSE: 0
WONGBAKER_NUMERICALRESPONSE: 2

## 2020-01-01 ASSESSMENT — PAIN - FUNCTIONAL ASSESSMENT
PAIN_FUNCTIONAL_ASSESSMENT: ACTIVITIES ARE NOT PREVENTED
PAIN_FUNCTIONAL_ASSESSMENT: ACTIVITIES ARE NOT PREVENTED
PAIN_FUNCTIONAL_ASSESSMENT: PREVENTS OR INTERFERES SOME ACTIVE ACTIVITIES AND ADLS
PAIN_FUNCTIONAL_ASSESSMENT: 0-10
PAIN_FUNCTIONAL_ASSESSMENT: PREVENTS OR INTERFERES SOME ACTIVE ACTIVITIES AND ADLS
PAIN_FUNCTIONAL_ASSESSMENT: 0-10
PAIN_FUNCTIONAL_ASSESSMENT: PREVENTS OR INTERFERES SOME ACTIVE ACTIVITIES AND ADLS
PAIN_FUNCTIONAL_ASSESSMENT: PREVENTS OR INTERFERES SOME ACTIVE ACTIVITIES AND ADLS
PAIN_FUNCTIONAL_ASSESSMENT: ACTIVITIES ARE NOT PREVENTED
PAIN_FUNCTIONAL_ASSESSMENT: PREVENTS OR INTERFERES SOME ACTIVE ACTIVITIES AND ADLS
PAIN_FUNCTIONAL_ASSESSMENT: ACTIVITIES ARE NOT PREVENTED
PAIN_FUNCTIONAL_ASSESSMENT: PREVENTS OR INTERFERES SOME ACTIVE ACTIVITIES AND ADLS
PAIN_FUNCTIONAL_ASSESSMENT: ACTIVITIES ARE NOT PREVENTED
PAIN_FUNCTIONAL_ASSESSMENT: PREVENTS OR INTERFERES SOME ACTIVE ACTIVITIES AND ADLS
PAIN_FUNCTIONAL_ASSESSMENT: ACTIVITIES ARE NOT PREVENTED
PAIN_FUNCTIONAL_ASSESSMENT: ACTIVITIES ARE NOT PREVENTED

## 2020-01-01 ASSESSMENT — PAIN DESCRIPTION - ONSET
ONSET: ON-GOING
ONSET: OTHER (COMMENT)
ONSET: ON-GOING
ONSET: GRADUAL
ONSET: ON-GOING

## 2020-01-01 ASSESSMENT — PAIN DESCRIPTION - LOCATION
LOCATION: BACK
LOCATION: GENERALIZED
LOCATION: BACK
LOCATION: SHOULDER
LOCATION: BACK
LOCATION: SHOULDER
LOCATION: BACK
LOCATION: BACK

## 2020-01-01 ASSESSMENT — PAIN DESCRIPTION - DIRECTION: RADIATING_TOWARDS: NON-RADIATING

## 2020-01-01 ASSESSMENT — ENCOUNTER SYMPTOMS
BACK PAIN: 1
ABDOMINAL PAIN: 0
SORE THROAT: 0
NAUSEA: 0
RHINORRHEA: 0
EYE DISCHARGE: 0
COUGH: 0
WHEEZING: 0
EYE REDNESS: 0
VOMITING: 0
DIARRHEA: 0
EYE PAIN: 0
SHORTNESS OF BREATH: 0

## 2020-02-11 ENCOUNTER — OFFICE VISIT (OUTPATIENT)
Dept: PULMONOLOGY | Age: 85
End: 2020-02-11
Payer: MEDICARE

## 2020-02-11 VITALS
WEIGHT: 186 LBS | TEMPERATURE: 98.3 F | RESPIRATION RATE: 16 BRPM | SYSTOLIC BLOOD PRESSURE: 124 MMHG | OXYGEN SATURATION: 95 % | DIASTOLIC BLOOD PRESSURE: 70 MMHG | BODY MASS INDEX: 29.89 KG/M2 | HEART RATE: 70 BPM | HEIGHT: 66 IN

## 2020-02-11 PROCEDURE — 99213 OFFICE O/P EST LOW 20 MIN: CPT | Performed by: INTERNAL MEDICINE

## 2020-02-11 PROCEDURE — 4040F PNEUMOC VAC/ADMIN/RCVD: CPT | Performed by: INTERNAL MEDICINE

## 2020-02-11 PROCEDURE — G8482 FLU IMMUNIZE ORDER/ADMIN: HCPCS | Performed by: INTERNAL MEDICINE

## 2020-02-11 PROCEDURE — G8417 CALC BMI ABV UP PARAM F/U: HCPCS | Performed by: INTERNAL MEDICINE

## 2020-02-11 PROCEDURE — 1123F ACP DISCUSS/DSCN MKR DOCD: CPT | Performed by: INTERNAL MEDICINE

## 2020-02-11 PROCEDURE — 1036F TOBACCO NON-USER: CPT | Performed by: INTERNAL MEDICINE

## 2020-02-11 PROCEDURE — G8427 DOCREV CUR MEDS BY ELIG CLIN: HCPCS | Performed by: INTERNAL MEDICINE

## 2020-02-11 NOTE — PROGRESS NOTES
REASON FOR CONSULTATION:  Chief Complaint   Patient presents with    Other     f/u pulmonary fibrosis        Consult at request of Marika Redman MD     PCP: Marika Redman MD    HISTORY OF PRESENT ILLNESS: Suhail Ray is a 80y.o. year old male with a history of GERD, never smoker, symptomatic bradycardia status post pacemaker who presents for f/u of interstitial lung disease        -ILD- working diagnosis of IPF based on radiographic findings. PFTTLC of 76%, however his spirometry is normal and PFTs as a whole are unimpressive. 6mwt w/o exertional hypoxemia. -exercises 4 days a week  -not interested in antifibrotic therapies but thinks low dose prednisone has improved symptoms somewhat.       Past Medical History:   Diagnosis Date    BPH with obstruction/lower urinary tract symptoms     nocturia 3-4 x per night    Chronic atrial fibrillation     CKD (chronic kidney disease) 2017    level 3    Coronary arteriosclerosis 2009    2 stents/ Dr Ai Mayberry in [facility] GERD (gastroesophageal reflux disease)     HTN (hypertension) 1990    Left hip pain     schrapnel in hip and intestines/chronic pains left hip impairs walking    Pacemaker 2015    Pulmonary fibrosis ([address] 08/2017    mild amount seen on chest ct    Pulmonary nodule, left 2017    noncalcified 7mm low risk repeat 8/2018 if patient willing    Renal atrophy, right 2017    Renal cyst, right 08/2017    not clearly simple cyst    SOB (shortness of breath) 2017    terrell normal pft and ct chest ? smll amount of pulmonary fibrosis       Past Surgical History:   Procedure Laterality Date    CORONARY ANGIOPLASTY WITH STENT PLACEMENT  2009    awoke with indigestion    INGUINAL HERNIA REPAIR Right 07/2016    PACEMAKER INSERTION  2014    SMALL INTESTINE SURGERY  1945    schrapnel as ww II vet, part of small intestine moved   [address]       family history includes No Known Problems in an other family member; Pacemaker in his brother and sister. SOCIAL HISTORY:   reports that he has never smoked. He has never used smokeless tobacco.      ALLERGIES:  Patient is allergic to claritin [loratadine] and zestril [lisinopril]. REVIEW OF SYSTEMS:  Constitutional: Negative for fever   HENT: Negative for sore throat  Eyes: Negative for redness   Respiratory: +dyspnea, -cough  Cardiovascular: Negative for chest pain  Gastrointestinal: Negative for vomiting, diarrhea   Genitourinary: Negative for hematuria   Musculoskeletal: Negative for arthralgias   Skin: Negative for rash  Neurological: Negative for syncope  Hematological: Negative for adenopathy  Psychiatric/Behavorial: Negative for anxiety    Objective:   PHYSICAL EXAM:  Blood pressure 124/70, pulse 70, temperature 98.3 °F (36.8 °C), temperature source Oral, resp. rate 16, height 5' 6\" (1.676 m), weight 186 lb (84.4 kg), SpO2 95 %.'  Gen: No distress. Eyes: PERRL. No sclera icterus. No conjunctival injection. ENT: No discharge. Pharynx clear. External appearance of ears and nose normal.  Neck: Trachea midline. No obvious mass. Resp: No accessory muscle use.  +bibasilar crackles. No wheezes. No rhonchi. No dullness to percussion. CV: Regular rate. Regular rhythm. No murmur or rub. No edema. GI: Non-tender. Non-distended. No hernia. Skin: Warm, dry, normal texture and turgor. No nodule on exposed extremities. Lymph: No cervical LAD. No supraclavicular LAD. M/S: No cyanosis. No clubbing. No joint deformity. Neuro: Moves all four extremities. Psych: Oriented x 3. No anxiety. Awake. Alert. Intact judgement and insight.     Current Outpatient Medications   Medication Sig Dispense Refill    omeprazole (PRILOSEC) 20 MG delayed release capsule TAKE 1 CAPSULE BY MOUTH DAILY INSTEAD OF PEPCID FOR HEART BURN ON AN EMPTY STOMACH 90 capsule 1    amLODIPine (NORVASC) 5 MG tablet Take 1 tablet by mouth daily 30 tablet 1    finasteride (PROSCAR) 5 MG tablet TAKE 1 TABLET BY MOUTH EVERY DAY 90 tablet 4    olmesartan (BENICAR) 40 MG tablet TAKE 1 TABLET BY MOUTH EVERY DAY 90 tablet 0    oxybutynin (DITROPAN) 5 MG tablet TAKE 1 TABLET BY MOUTH EVERY DAY 90 tablet 1    fluticasone (FLONASE) 50 MCG/ACT nasal spray SPRAY 1 SPRAY INTO EACH NOSTRIL EVERY DAY 1 Bottle 1    valsartan (DIOVAN) 160 MG tablet Take 1 tablet by mouth daily 30 tablet 3    furosemide (LASIX) 20 MG tablet TAKE 1 TABLET BY MOUTH EVERY DAY AS NEEDED FOR LEG SWELLING 90 tablet 3    atorvastatin (LIPITOR) 40 MG tablet Take 1 tablet by mouth daily 90 tablet 2    tamsulosin (FLOMAX) 0.4 MG capsule One every night for your prostate 90 capsule 3    rivaroxaban (XARELTO) 15 MG TABS tablet Take 1 tablet by mouth daily (with breakfast) 90 tablet 5    aspirin 81 MG tablet Take 81 mg by mouth daily      Cholecalciferol (VITAMIN D3) 2000 UNITS CAPS Take 2,000 Units by mouth daily      calcium carbonate (OSCAL) 500 MG TABS tablet Take 600 mg by mouth daily caltrate 600-D3 one daily      Multiple Vitamins-Minerals (CENTRUM SILVER ADULT 50+) TABS Take 1 tablet by mouth daily       Current Facility-Administered Medications   Medication Dose Route Frequency Provider Last Rate Last Dose    lidocaine-EPINEPHrine 2 percent-1:164323 injection 20 mL  20 mL Intradermal Once Vanna Aguilar MD        lidocaine-EPINEPHrine 2 percent-1:965291 injection 20 mL  20 mL Intradermal Once Vanna Aguilar MD           Data Reviewed:   CBC and Renal reviewed  Last CBC  Lab Results   Component Value Date    WBC 10.0 03/27/2019    RBC 4.55 03/27/2019    HGB 14.1 03/27/2019    MCV 92.1 03/27/2019     03/27/2019     Last Renal  Lab Results   Component Value Date     03/27/2019    K 4.9 03/27/2019     03/27/2019    CO2 25 03/27/2019    CO2 24 04/10/2018    CO2 24 03/16/2018    BUN 29 03/27/2019    CREATININE 1.6 03/27/2019    GLUCOSE 112 03/27/2019    CALCIUM 9.3 03/27/2019       Last ABG  POC Blood Gas: No results found for: POCPH, POCPCO2, POCPO2, POCHCO3, NBEA, MRWK2LMU  No results for input(s): PH, PCO2, PO2, HCO3, BE, O2SAT in the last 72 hours. Radiology Review:  Pertinent images / reports were reviewed as a part of this visit. CT Chest w/ contrast: No results found for this or any previous visit. CT Chest w/o contrast:   Results for orders placed during the hospital encounter of 03/27/19   CT CHEST WO CONTRAST    Narrative EXAMINATION:  CT OF THE CHEST WITHOUT CONTRAST 3/27/2019 12:41 pm    TECHNIQUE:  CT of the chest was performed without the administration of intravenous  contrast. Multiplanar reformatted images are provided for review. Dose  modulation, iterative reconstruction, and/or weight based adjustment of the  mA/kV was utilized to reduce the radiation dose to as low as reasonably  achievable. COMPARISON:  08/02/2017 CT    HISTORY:  ORDERING SYSTEM PROVIDED HISTORY: Pulmonary fibrosis ([facility] [address]  TECHNOLOGIST PROVIDED HISTORY:  Reason for exam:->follow up on fibrosis, nodule and right renal cyst  Ordering Physician Provided Reason for Exam: follow up on fibrosis, nodule  and right renal cyst, hx stent and pacemaker  Acuity: Acute  Type of Exam: Initial    FINDINGS:  Mediastinum: Stable cardiomegaly with prominent atherosclerotic calcification  of the coronary arteries. Dual lead pacemaker stable. The aorta and  pulmonary arteries are normal.  There is no lymphadenopathy. Thyroid gland  is normal.  The esophagus is normal.  Several calcified granulomata in the  right mediastinum and bilateral pulmonary hilum. Lungs/pleura: The airways are patent. No pleural fluid. The lungs are  lucent with a pattern that is evidence of mild centrilobular emphysema. Peripheral ground-glass and diffuse reticular opacities have slightly  increased in both lungs since 2017 in a pattern that is evidence of  underlying interstitial lung disease. Several areas of slight honeycombing  suggest usual interstitial pneumonitis.   No acute airspace abnormality. Stable pulmonary nodule at the left lung base measuring 9 x 6 mm. Upper Abdomen: The adrenal glands are normal.  Bilateral renal cysts are  partially visualized and there appears to be chronic right hydronephrosis  which was described on a prior abdominal CT. Soft Tissues/Bones: No skeletal abnormalities are appreciated within the  chest.      Impression 1. Chronic interstitial lung disease in a pattern that would favor  progressive UIP. 2. Stable appearance of a pulmonary nodule in the left lower lobe. Continued  CT follow-up is recommended. 3. Evidence of chronic right hydronephrosis. Recommend correlation with  urology history. 4. Cardiomegaly and coronary artery disease also appears stable. RECOMMENDATIONS:  Fleischner Society guidelines for follow-up and management of incidentally  detected pulmonary nodules:    Single Solid Nodule:    Nodule size equals 6-8 mm  In a low-risk patient, CT at 6-12 months, then consider CT at 18-24 months. In a high-risk patient, CT at 6-12 months, then CT at 18-24 months. - Low risk patients include individuals with minimal or absent history of  smoking and other known risk factors. - High risk patients include individuals with a history or smoking or known  risk factors. Radiology 2017 http://pubs. rsna.org/doi/full/10.1148/radiol. 7343737629         CTPA: No results found for this or any previous visit. CXR PA/LAT:   Results for orders placed during the hospital encounter of 03/27/19   XR CHEST STANDARD (2 VW)    Narrative EXAMINATION:  TWO VIEWS OF THE CHEST    3/27/2019 12:35 pm    COMPARISON:  06/29/2017    HISTORY:  ORDERING SYSTEM PROVIDED HISTORY: SOB (shortness of breath)  TECHNOLOGIST PROVIDED HISTORY:  Reason for exam:->progressiv SOB with cough  Ordering Physician Provided Reason for Exam: Increased sob all the time  Acuity: Acute  Type of Exam: Initial    FINDINGS:  There is a left subclavian pacer. There is cardiomegaly. There is a  Coronary artery stent. Upper lobe vessels appear larger than the lower lobe  vessels. There are increased interstitial markings in the peripheral aspects  of both lungs similar to the prior study. There are no thickened  interlobular septa. There is no airspace disease. The costophrenic angles  are sharp      Impression 1. Cardiomegaly with probable pulmonary venous hypertension. No evidence of  pulmonary edema  2. Chronic interstitial lung disease         CXR portable: No results found for this or any previous visit. PFT 3/2019  Overall Interpretation  PFT does not demonstrates obstruction with FEV1 of 119 %  without   bronchodilator response. There is Mild restriction by Total lung capacity assessment with   associated decrease in diffusion capacity.  Air trapping is not   present. Ronold Gilding is not present. This consistent with Interstitial Lung Disease  (ILD) vs mild   reduction secondary to weigh with decreased ERV.  Clinical   correlation needed. Assessment/Plan:       Diagnosis Orders   1. Pulmonary fibrosis ([address]           Problem List Items Addressed This Visit        Pulmonary Problems    Pulmonary fibrosis ([address] - Primary     -presumed IPF/UIP. Not a candidate for OLBx, and pt preference not on antifibrotics due to QOL concerns due to ADEs  -on ppi  On 10mg prednisone daily  -continue exercise  -previous ct suggestive of POD, will continue to monitor. This note was transcribed using [id number] [employer]. Please disregard any translational errors.     Lam Engel [address] [place] [facility] No

## 2020-02-11 NOTE — ASSESSMENT & PLAN NOTE
-presumed IPF/UIP. Not a candidate for OLBx, and pt preference not on antifibrotics due to QOL concerns due to ADEs  -on ppi  On 10mg prednisone daily  -continue exercise  -previous ct suggestive of POD, will continue to monitor.

## 2020-03-31 RX ORDER — PREDNISONE 10 MG/1
10 TABLET ORAL DAILY
Qty: 30 TABLET | Refills: 1 | Status: SHIPPED | OUTPATIENT
Start: 2020-03-31 | End: 2020-06-01

## 2020-06-01 RX ORDER — PREDNISONE 10 MG/1
TABLET ORAL
Qty: 30 TABLET | Refills: 1 | Status: SHIPPED | OUTPATIENT
Start: 2020-06-01 | End: 2020-07-28 | Stop reason: SDUPTHER

## 2020-07-28 RX ORDER — PREDNISONE 10 MG/1
TABLET ORAL
Qty: 30 TABLET | Refills: 1 | Status: SHIPPED | OUTPATIENT
Start: 2020-07-28 | End: 2020-01-01

## 2020-08-27 NOTE — PROGRESS NOTES
REASON FOR CONSULTATION:  Chief Complaint   Patient presents with    Follow-up     pulmonary fibrosis         Consult at request of Devonte Stallworth MD     PCP: Devonte Stallworth MD    HISTORY OF PRESENT ILLNESS: Barrie  is a 80y.o. year old male with a history of GERD, never smoker, symptomatic bradycardia status post pacemaker who presents for f/u of presumed IPF        -ILD- working diagnosis of IPF based on radiographic findings. PFT TLC of 76%, however his spirometry is normal and PFTs as a whole are unimpressive. 6mwt w/o exertional hypoxemia. -exercises 4 days a week  -not interested in antifibrotic therapies but thinks low dose prednisone has improved symptoms somewhat.   -CT with concern for possible POD        Past Medical History:   Diagnosis Date    BPH with obstruction/lower urinary tract symptoms     nocturia 3-4 x per night    Chronic atrial fibrillation     CKD (chronic kidney disease) 2017    level 3    Coronary arteriosclerosis 2009    2 stents/ Dr Anne Gold in [facility] GERD (gastroesophageal reflux disease)     HTN (hypertension) 1990    Left hip pain     schrapnel in hip and intestines/chronic pains left hip impairs walking    Pacemaker 2015    Pulmonary fibrosis ([address] 08/2017    mild amount seen on chest ct    Pulmonary nodule, left 2017    noncalcified 7mm low risk repeat 8/2018 if patient willing    Renal atrophy, right 2017    Renal cyst, right 08/2017    not clearly simple cyst    SOB (shortness of breath) 2017    terrell normal pft and ct chest ? smll amount of pulmonary fibrosis       Past Surgical History:   Procedure Laterality Date    CORONARY ANGIOPLASTY WITH STENT PLACEMENT  2009    awoke with indigestion    INGUINAL HERNIA REPAIR Right 07/2016    PACEMAKER INSERTION  2014    SMALL INTESTINE SURGERY  1945    schrapnel as ww II vet, part of small intestine moved   Artur Glatter       family history includes No Known Problems in an other family member; Pacemaker in his olmesartan (BENICAR) 40 MG tablet TAKE 1 TABLET BY MOUTH EVERY DAY 90 tablet 0    predniSONE (DELTASONE) 10 MG tablet TAKE 1 TABLET BY MOUTH EVERY DAY 30 tablet 1    furosemide (LASIX) 20 MG tablet TAKE 1 TABLET BY MOUTH EVERY DAY AS NEEDED FOR LEG SWELLING 90 tablet 3    oxybutynin (DITROPAN) 5 MG tablet TAKE 1 TABLET BY MOUTH EVERY DAY 90 tablet 1    tamsulosin (FLOMAX) 0.4 MG capsule ONE EVERY NIGHT FOR YOUR PROSTATE 90 capsule 3    fluticasone (FLONASE) 50 MCG/ACT nasal spray SPRAY 2 SPRAY INTO EACH NOSTRIL EVERY DAY 3 Bottle 1    amLODIPine (NORVASC) 5 MG tablet Take 1 tablet by mouth daily 90 tablet 1    omeprazole (PRILOSEC) 20 MG delayed release capsule TAKE 1 CAPSULE BY MOUTH DAILY INSTEAD OF PEPCID FOR HEART BURN ON AN EMPTY STOMACH 90 capsule 1    finasteride (PROSCAR) 5 MG tablet TAKE 1 TABLET BY MOUTH EVERY DAY 90 tablet 4    atorvastatin (LIPITOR) 40 MG tablet Take 1 tablet by mouth daily 90 tablet 2    rivaroxaban (XARELTO) 15 MG TABS tablet Take 1 tablet by mouth daily (with breakfast) 90 tablet 5    aspirin 81 MG tablet Take 81 mg by mouth daily      Cholecalciferol (VITAMIN D3) 2000 UNITS CAPS Take 2,000 Units by mouth daily      calcium carbonate (OSCAL) 500 MG TABS tablet Take 600 mg by mouth daily caltrate 600-D3 one daily      Multiple Vitamins-Minerals (CENTRUM SILVER ADULT 50+) TABS Take 1 tablet by mouth daily       Current Facility-Administered Medications   Medication Dose Route Frequency Provider Last Rate Last Dose    lidocaine-EPINEPHrine 2 percent-1:406032 injection 20 mL  20 mL Intradermal Once Talon Rod MD        lidocaine-EPINEPHrine 2 percent-1:868576 injection 20 mL  20 mL Intradermal Once Talon Rod MD           Data Reviewed:   CBC and Renal reviewed  Last CBC  Lab Results   Component Value Date    WBC 9.2 03/03/2020    RBC 4.38 03/03/2020    HGB 12.9 03/03/2020    MCV 89.4 03/03/2020     03/03/2020     Last Renal  Lab Results   Component Value Date  03/03/2020    K 4.7 03/03/2020    CL 99 03/03/2020    CO2 23 03/03/2020    CO2 25 03/27/2019    CO2 24 04/10/2018    BUN 32 03/03/2020    CREATININE 1.7 03/03/2020    GLUCOSE 138 03/03/2020    CALCIUM 9.3 03/03/2020       Last ABG  POC Blood Gas: No results found for: POCPH, POCPCO2, POCPO2, POCHCO3, NBEA, EQDK2FEV  No results for input(s): PH, PCO2, PO2, HCO3, BE, O2SAT in the last 72 hours. Radiology Review:  Pertinent images / reports were reviewed as a part of this visit. CT Chest w/ contrast: No results found for this or any previous visit. CT Chest w/o contrast:   Results for orders placed during the hospital encounter of 03/27/19   CT CHEST WO CONTRAST    Narrative EXAMINATION:  CT OF THE CHEST WITHOUT CONTRAST 3/27/2019 12:41 pm    TECHNIQUE:  CT of the chest was performed without the administration of intravenous  contrast. Multiplanar reformatted images are provided for review. Dose  modulation, iterative reconstruction, and/or weight based adjustment of the  mA/kV was utilized to reduce the radiation dose to as low as reasonably  achievable. COMPARISON:  08/02/2017 CT    HISTORY:  ORDERING SYSTEM PROVIDED HISTORY: Pulmonary fibrosis ([facility] [address]  TECHNOLOGIST PROVIDED HISTORY:  Reason for exam:->follow up on fibrosis, nodule and right renal cyst  Ordering Physician Provided Reason for Exam: follow up on fibrosis, nodule  and right renal cyst, hx stent and pacemaker  Acuity: Acute  Type of Exam: Initial    FINDINGS:  Mediastinum: Stable cardiomegaly with prominent atherosclerotic calcification  of the coronary arteries. Dual lead pacemaker stable. The aorta and  pulmonary arteries are normal.  There is no lymphadenopathy. Thyroid gland  is normal.  The esophagus is normal.  Several calcified granulomata in the  right mediastinum and bilateral pulmonary hilum. Lungs/pleura: The airways are patent. No pleural fluid.   The lungs are  lucent with a pattern that is evidence of mild centrilobular emphysema. Peripheral ground-glass and diffuse reticular opacities have slightly  increased in both lungs since 2017 in a pattern that is evidence of  underlying interstitial lung disease. Several areas of slight honeycombing  suggest usual interstitial pneumonitis. No acute airspace abnormality. Stable pulmonary nodule at the left lung base measuring 9 x 6 mm. Upper Abdomen: The adrenal glands are normal.  Bilateral renal cysts are  partially visualized and there appears to be chronic right hydronephrosis  which was described on a prior abdominal CT. Soft Tissues/Bones: No skeletal abnormalities are appreciated within the  chest.      Impression 1. Chronic interstitial lung disease in a pattern that would favor  progressive UIP. 2. Stable appearance of a pulmonary nodule in the left lower lobe. Continued  CT follow-up is recommended. 3. Evidence of chronic right hydronephrosis. Recommend correlation with  urology history. 4. Cardiomegaly and coronary artery disease also appears stable. RECOMMENDATIONS:  Fleischner Society guidelines for follow-up and management of incidentally  detected pulmonary nodules:    Single Solid Nodule:    Nodule size equals 6-8 mm  In a low-risk patient, CT at 6-12 months, then consider CT at 18-24 months. In a high-risk patient, CT at 6-12 months, then CT at 18-24 months. - Low risk patients include individuals with minimal or absent history of  smoking and other known risk factors. - High risk patients include individuals with a history or smoking or known  risk factors. Radiology 2017 http://pubs. rsna.org/doi/full/10.1148/radiol. 7610913049         CTPA: No results found for this or any previous visit.     CXR PA/LAT:   Results for orders placed during the hospital encounter of 03/27/19   XR CHEST STANDARD (2 VW)    Narrative EXAMINATION:  TWO VIEWS OF THE CHEST    3/27/2019 12:35 pm    COMPARISON:  06/29/2017    HISTORY:  ORDERING SYSTEM PROVIDED HISTORY: SOB (shortness of breath)  TECHNOLOGIST PROVIDED HISTORY:  Reason for exam:->progressiv SOB with cough  Ordering Physician Provided Reason for Exam: Increased sob all the time  Acuity: Acute  Type of Exam: Initial    FINDINGS:  There is a left subclavian pacer. There is cardiomegaly. There is a  Coronary artery stent. Upper lobe vessels appear larger than the lower lobe  vessels. There are increased interstitial markings in the peripheral aspects  of both lungs similar to the prior study. There are no thickened  interlobular septa. There is no airspace disease. The costophrenic angles  are sharp      Impression 1. Cardiomegaly with probable pulmonary venous hypertension. No evidence of  pulmonary edema  2. Chronic interstitial lung disease         CXR portable: No results found for this or any previous visit. PFT 3/2019  Overall Interpretation  PFT does not demonstrates obstruction with FEV1 of 119 %  without   bronchodilator response. There is Mild restriction by Total lung capacity assessment with   associated decrease in diffusion capacity.  Air trapping is not   present. Arbon Henlawson is not present. This consistent with Interstitial Lung Disease  (ILD) vs mild   reduction secondary to weigh with decreased ERV.  Clinical   correlation needed. Assessment/Plan:       Diagnosis Orders   1. Pulmonary fibrosis (HCC)  albuterol sulfate  (90 Base) MCG/ACT inhaler    umeclidinium-vilanterol (ANORO ELLIPTA) 62.5-25 MCG/INH AEPB inhaler         Problem List Items Addressed This Visit        Pulmonary Problems    Pulmonary fibrosis ([address] - Primary     -CT consistent with IPF, some concern for some progression of disease.  -He does have an increase in symptoms notably a new modestly productive cough which is somewhat atypical for IPF.   Fortunately, he is very active, he still walks several days a week at the gym  -Can continue low-dose chronic steroids we again reviewed the long-term side effect profile  -We will start a trial of Anoro, to see if this helps with his chronic cough. If there is improvement we can prescribe this for him as a standing medication.  -Albuterol as needed         Relevant Medications    albuterol sulfate  (90 Base) MCG/ACT inhaler    umeclidinium-vilanterol (ANORO ELLIPTA) 62.5-25 MCG/INH AEPB inhaler           This note was transcribed using [id number] [employer]. Please disregard any translational errors.     Lam Engel [address] [place] [facility]

## 2020-08-27 NOTE — ASSESSMENT & PLAN NOTE
-CT consistent with IPF, some concern for some progression of disease.  -He does have an increase in symptoms notably a new modestly productive cough which is somewhat atypical for IPF. Fortunately, he is very active, he still walks several days a week at the gym  -Can continue low-dose chronic steroids we again reviewed the long-term side effect profile  -We will start a trial of Anoro, to see if this helps with his chronic cough.   If there is improvement we can prescribe this for him as a standing medication.  -Albuterol as needed

## 2020-10-20 PROBLEM — J16.0 COMMUNITY ACQUIRED PNEUMONIA DUE TO CHLAMYDIA SPECIES: Status: ACTIVE | Noted: 2020-01-01

## 2020-10-20 PROBLEM — G20.A1 PARKINSON DISEASE: Status: ACTIVE | Noted: 2020-01-01

## 2020-10-20 PROBLEM — I48.0 PAROXYSMAL ATRIAL FIBRILLATION (HCC): Status: ACTIVE | Noted: 2020-01-01

## 2020-10-20 PROBLEM — G20 PARKINSON DISEASE (HCC): Status: ACTIVE | Noted: 2020-01-01

## 2020-10-20 NOTE — ED PROVIDER NOTES
[facility]  eMERGENCY dEPARTMENT eNCOUnter        Pt Name: Denia Melo  MRN: [id number]  [place] 12/21/1925  Date of evaluation: 10/20/2020  Provider: Violet Nolen MD  PCP: Annabel Herman MD      [address] [place] [address]       Chief Complaint   Patient presents with    Extremity Weakness     left sided weakness. pt sent from Ed for possible cva.  left sided weakness has increased over the last two weaks. HISTORY OFPRESENT ILLNESS   (Location/Symptom, Timing/Onset, Context/Setting, Quality, Duration, Modifying Factors,Severity)  Note limiting factors. Denia Melo is a 80 y.o. male       Location/Symptom: Left-sided weakness  Timing/Onset: This is a bit unclear. The patient tends to tell stories in response to questions. As best I can tell this has been going on over the past week. Context/Setting: Patient states that when he walks he gets weakness in the left leg and it will not cooperate. Is also been having some back pain with this. No dysarthria or facial drooping or upper extremity symptoms. Quality: States his leg just will not work  Duration: Again he does not have it at this very moment but it seems to been coming and going over the past few days. Modifying Factors: It does sound related to exertion and when he tries to walk    Nursing Noteswere all reviewed and agreed with or any disagreements were addressed  in the HPI. REVIEW OF SYSTEMS    (2-9 systems for level 4, 10 or more for level 5)     Review of Systems   Constitutional: Negative for chills, fatigue and fever. HENT: Negative for ear pain, rhinorrhea and sore throat. Eyes: Negative for pain, discharge, redness and visual disturbance. Respiratory: Negative for cough, shortness of breath and wheezing. Cardiovascular: Negative for chest pain, palpitations and leg swelling. Gastrointestinal: Negative for abdominal pain, diarrhea, nausea and vomiting.    Genitourinary: Negative for difficulty urinating and dysuria. Musculoskeletal: Positive for back pain and gait problem. Negative for arthralgias and myalgias. Skin: Negative for rash. Allergic/Immunologic: Negative for environmental allergies. Neurological: Positive for weakness. Negative for dizziness, seizures, syncope and headaches. Hematological: Negative for adenopathy. Psychiatric/Behavioral: Negative for suicidal ideas. The patient is not nervous/anxious.           PAST MEDICAL HISTORY     Past Medical History:   Diagnosis Date    BPH with obstruction/lower urinary tract symptoms     nocturia 3-4 x per night    Chronic atrial fibrillation (HCC)     CKD (chronic kidney disease) 2017    level 3    Coronary arteriosclerosis 2009    2 stents/ Dr Miguel Orellana in [facility] GERD (gastroesophageal reflux disease)     HTN (hypertension) 1990    Left hip pain     schrapnel in hip and intestines/chronic pains left hip impairs walking    Pacemaker 2015    Pulmonary fibrosis ([address] 08/2017    mild amount seen on chest ct    Pulmonary nodule, left 2017    noncalcified 7mm low risk repeat 8/2018 if patient willing    Renal atrophy, right 2017    Renal cyst, right 08/2017    not clearly simple cyst    SOB (shortness of breath) 2017    terrell normal pft and ct chest ? smll amount of pulmonary fibrosis         SURGICAL HISTORY     Past Surgical History:   Procedure Laterality Date    CORONARY ANGIOPLASTY WITH STENT PLACEMENT  2009    awoke with indigestion    INGUINAL HERNIA REPAIR Right 07/2016    PACEMAKER INSERTION  2014    SMALL INTESTINE SURGERY  1945    schrapnel as ww II vet, part of small intestine moved   [id number]  [address]       Previous Medications    ALBUTEROL SULFATE  (90 BASE) MCG/ACT INHALER    Inhale 2 puffs into the lungs every 6 hours as needed for Wheezing or Shortness of Breath    ASPIRIN 81 MG TABLET    Take 81 mg by mouth daily    ATORVASTATIN (LIPITOR) 40 MG TABLET Take 1 tablet by mouth daily    CALCIUM CARBONATE (OSCAL) 500 MG TABS TABLET    Take 600 mg by mouth daily caltrate 600-D3 one daily    CHOLECALCIFEROL (VITAMIN D3) 2000 UNITS CAPS    Take 2,000 Units by mouth daily    FINASTERIDE (PROSCAR) 5 MG TABLET    TAKE 1 TABLET BY MOUTH EVERY DAY    FLUTICASONE (FLONASE) 50 MCG/ACT NASAL SPRAY    SPRAY 2 SPRAY INTO EACH NOSTRIL EVERY DAY    FUROSEMIDE (LASIX) 20 MG TABLET    TAKE 1 TABLET BY MOUTH EVERY DAY AS NEEDED FOR LEG SWELLING    MULTIPLE VITAMINS-MINERALS (CENTRUM SILVER ADULT 50+) TABS    Take 1 tablet by mouth daily    OLMESARTAN (BENICAR) 40 MG TABLET    TAKE 1 TABLET BY MOUTH EVERY DAY    OMEPRAZOLE (PRILOSEC) 20 MG DELAYED RELEASE CAPSULE    Take 1 capsule by mouth daily Instead of pepcid for heart burn on an empty stomach    OXYBUTYNIN (DITROPAN) 5 MG TABLET    TAKE 1 TABLET BY MOUTH EVERY DAY    PREDNISONE (DELTASONE) 10 MG TABLET    TAKE 1 TABLET BY MOUTH EVERY DAY    RIVAROXABAN (XARELTO) 15 MG TABS TABLET    Take 1 tablet by mouth daily (with breakfast)    TAMSULOSIN (FLOMAX) 0.4 MG CAPSULE    ONE EVERY NIGHT FOR YOUR PROSTATE    UMECLIDINIUM-VILANTEROL (ANORO ELLIPTA) 62.5-25 MCG/INH AEPB INHALER    Inhale 1 puff into the lungs daily       ALLERGIES     Claritin [loratadine] and Zestril [lisinopril]    FAMILY HISTORY       Family History   Problem Relation Age of Onset    Pacemaker Sister     Pacemaker Brother     No Known Problems Other         lung disease          SOCIAL HISTORY       Social History     Socioeconomic History    Marital status:      Spouse name: None    Number of children: None    Years of education: None    Highest education level: None   Occupational History    Occupation: soil conservastional   Social Needs    Financial resource strain: Patient refused    Food insecurity     Worry: Patient refused     Inability: Patient refused    Transportation needs     Medical: Patient refused     Non-medical: Patient refused Tobacco Use    Smoking status: Never Smoker    Smokeless tobacco: Never Used   Substance and Sexual Activity    Alcohol use: No    Drug use: Not Currently    Sexual activity: Not Currently   Lifestyle    Physical activity     Days per week: None     Minutes per session: None    Stress: None   Relationships    Social connections     Talks on phone: None     Gets together: None     Attends Sikh service: None     Active member of club or organization: None     Attends meetings of clubs or organizations: None     Relationship status: None    Intimate partner violence     Fear of current or ex partner: None     Emotionally abused: None     Physically abused: None     Forced sexual activity: None   Other Topics Concern    None   Social History Narrative    None       SCREENINGS   NIH Stroke Scale  Interval: Baseline  Level of Consciousness (1a. ): Alert  LOC Questions (1b. ): Answers both correctly  Best Gaze (2. ): Normal  Facial Palsy (4. ): Normal symmetrical movement  Motor Arm, Left (5a. ): No drift  Motor Arm, Right (5b. ): No drift  Motor Leg, Left (6a. ): No drift  Motor Leg, Right (6b. ): No drift  Limb Ataxia (7. ): Absent  Sensory (8. ): Normal  Best Language (9. ): No aphasia  Dysarthria (10. ): Normal  Extinction and Inattention (11): No abnormality         PHYSICAL EXAM    (up to 7 for level 4, 8 or more for level 5)     ED Triage Vitals [10/20/20 1657]   BP Temp Temp Source Pulse Resp SpO2 Height Weight   (!) 149/70 99.5 °F (37.5 °C) Tympanic 74 18 97 % 5' 6\" (1.676 m) --      height is 5' 6\" (1.676 m). His tympanic temperature is 99.5 °F (37.5 °C). His blood pressure is 148/79 (abnormal) and his pulse is 70. His respiration is 20 and oxygen saturation is 97%. Physical Exam  Constitutional:       Appearance: He is well-developed. He is not diaphoretic. HENT:      Head: Normocephalic and atraumatic.       Right Ear: External ear normal.      Left Ear: External ear normal.   Eyes: General: No scleral icterus. Right eye: No discharge. Left eye: No discharge. Neck:      Musculoskeletal: Normal range of motion. Thyroid: No thyromegaly. Vascular: No JVD. Trachea: No tracheal deviation. Cardiovascular:      Rate and Rhythm: Normal rate and regular rhythm. Heart sounds: Murmur present. Systolic murmur present with a grade of 1/6. No friction rub. No gallop. Pulmonary:      Effort: Pulmonary effort is normal. No respiratory distress. Breath sounds: Normal breath sounds. No stridor. No wheezing or rales. Abdominal:      General: There is no distension. Palpations: Abdomen is soft. Tenderness: There is no abdominal tenderness. There is no guarding or rebound. Musculoskeletal:         General: No tenderness. Right lower leg: Edema present. Left lower leg: Edema present. Comments: He has 1+ symmetric pitting pedal edema he has also very good dorsalis pedis pulses in both feet. Skin:     General: Skin is warm and dry. Findings: No rash (On exposed body surfaces). Neurological:      General: No focal deficit present. Mental Status: He is alert and oriented to person, place, and time. GCS: GCS eye subscore is 4. GCS verbal subscore is 5. GCS motor subscore is 6. Cranial Nerves: Cranial nerves are intact. Sensory: Sensation is intact. Motor: Motor function is intact. Coordination: Coordination is intact. Coordination normal. Finger-Nose-Finger Test normal.      Comments: At this moment the patient has an NIH stroke scale of 0. He was able to lift his left leg off the bed and keep it there as well as his right. There were no focal deficits that I can appreciate. Psychiatric:         Behavior: Behavior normal.         Thought Content:  Thought content normal.         DIAGNOSTIC RESULTS   LABS:    Results for orders placed or performed during the hospital encounter of 10/20/20   CBC Auto Differential   Result Value Ref Range    WBC 9.1 4.0 - 11.0 K/uL    RBC 4.26 4.20 - 5.90 M/uL    Hemoglobin 11.3 (L) 13.5 - 17.5 g/dL    Hematocrit 34.7 (L) 40.5 - 52.5 %    MCV 81.3 80.0 - 100.0 fL    MCH 26.5 26.0 - 34.0 pg    MCHC 32.6 31.0 - 36.0 g/dL    RDW 16.5 (H) 12.4 - 15.4 %    Platelets 566 911 - 878 K/uL    MPV 9.3 5.0 - 10.5 fL    Neutrophils % 85.7 %    Lymphocytes % 8.4 %    Monocytes % 5.6 %    Eosinophils % 0.0 %    Basophils % 0.3 %    Neutrophils Absolute 7.8 (H) 1.7 - 7.7 K/uL    Lymphocytes Absolute 0.8 (L) 1.0 - 5.1 K/uL    Monocytes Absolute 0.5 0.0 - 1.3 K/uL    Eosinophils Absolute 0.0 0.0 - 0.6 K/uL    Basophils Absolute 0.0 0.0 - 0.2 K/uL   Comprehensive Metabolic Panel w/ Reflex to MG   Result Value Ref Range    Sodium 135 (L) 136 - 145 mmol/L    Potassium reflex Magnesium 4.9 3.5 - 5.1 mmol/L    Chloride 100 99 - 110 mmol/L    CO2 21 21 - 32 mmol/L    Anion Gap 14 3 - 16    Glucose 132 (H) 70 - 99 mg/dL    BUN 42 (H) 7 - 20 mg/dL    CREATININE 1.8 (H) 0.8 - 1.3 mg/dL    GFR Non-African American 35 (A) >60    GFR  43 (A) >60    Calcium 9.2 8.3 - 10.6 mg/dL    Total Protein 6.5 6.4 - 8.2 g/dL    Alb 3.8 3.4 - 5.0 g/dL    Albumin/Globulin Ratio 1.4 1.1 - 2.2    Total Bilirubin 0.5 0.0 - 1.0 mg/dL    Alkaline Phosphatase 50 40 - 129 U/L    ALT 14 10 - 40 U/L    AST 19 15 - 37 U/L    Globulin 2.7 g/dL   Protime-INR   Result Value Ref Range    Protime 15.9 (H) 10.0 - 13.2 sec    INR 1.37 (H) 0.86 - 1.14   APTT   Result Value Ref Range    aPTT 33.5 24.2 - 36.2 sec   Brain Natriuretic Peptide   Result Value Ref Range    Pro-BNP 1,446 (H) 0 - 449 pg/mL   Troponin   Result Value Ref Range    Troponin 0.03 (H) <0.01 ng/mL   Urinalysis Reflex to Culture    Specimen: Urine, clean catch   Result Value Ref Range    Color, UA YELLOW Straw/Yellow    Clarity, UA Clear Clear    Glucose, Ur Negative Negative mg/dL    Bilirubin Urine Negative Negative    Ketones, Urine Negative Negative mg/dL Specific Gravity, UA 1.014 1.005 - 1.030    Blood, Urine Negative Negative    pH, UA 6.5 5.0 - 8.0    Protein, UA Negative Negative mg/dL    Urobilinogen, Urine 0.2 <2.0 E.U./dL    Nitrite, Urine Negative Negative    Leukocyte Esterase, Urine Negative Negative    Microscopic Examination Not Indicated     Urine Type NotGiven     Urine Reflex to Culture Not Indicated    POCT Glucose   Result Value Ref Range    POC Glucose 132 (H) 70 - 99 mg/dl    Performed on ACCU-CHEK        All other labs were within normal range or not returned as of this dictation. EKG: All EKG's are interpreted by the Emergency Department Physician who either signs orCo-signs this chart in the absence of a cardiologist.    EKG visualized preliminarily interpreted by myself shows sinus rhythm at a rate of 71. It is a left bundle branch block pattern with a ventricular pacemaker. RADIOLOGY:   Non-plain film images such as CT, Ultrasound and MRI are read by the radiologist. Zoila Quintana radiographic images are visualized and preliminarily interpreted by the  EDProvider with the below findings:    Ct Head Wo Contrast    Result Date: 10/20/2020  EXAMINATION: CT OF THE HEAD WITHOUT CONTRAST  10/20/2020 6:00 pm TECHNIQUE: CT of the head was performed without the administration of intravenous contrast. Dose modulation, iterative reconstruction, and/or weight based adjustment of the mA/kV was utilized to reduce the radiation dose to as low as reasonably achievable. COMPARISON: November 6, 2018 HISTORY: ORDERING SYSTEM PROVIDED HISTORY: intermittent left leg weakness TECHNOLOGIST PROVIDED HISTORY: Reason for exam:->intermittent left leg weakness Has a \"code stroke\" or \"stroke alert\" been called? ->No Reason for Exam: intermittent left leg weakness Acuity: Acute Type of Exam: Initial FINDINGS: BRAIN/VENTRICLES: There is no acute intracranial hemorrhage, mass effect or midline shift. No abnormal extra-axial fluid collection.   The gray-white differentiation is maintained without evidence of an acute infarct. There is no evidence of hydrocephalus. Mild volume loss. White matter changes in the periventricular white matter subcortical white. These are stable compared to prior study ORBITS: The visualized portion of the orbits demonstrate no acute abnormality. SINUSES: The visualized paranasal sinuses and mastoid air cells demonstrate no acute abnormality. SOFT TISSUES/SKULL:  No acute abnormality of the visualized skull or soft tissues. No acute intracranial abnormality. No significant change from prior study     Ct Lumbar Spine Wo Contrast    Result Date: 10/20/2020  EXAMINATION: CT OF THE LUMBAR SPINE WITHOUT CONTRAST  10/20/2020 TECHNIQUE: CT of the lumbar spine was performed without the administration of intravenous contrast. Multiplanar reformatted images are provided for review. Dose modulation, iterative reconstruction, and/or weight based adjustment of the mA/kV was utilized to reduce the radiation dose to as low as reasonably achievable. COMPARISON: CT abdomen and pelvis, 08/14/2017 HISTORY: ORDERING SYSTEM PROVIDED HISTORY: Back pain and intermittent left leg weakness TECHNOLOGIST PROVIDED HISTORY: Reason for exam:->Back pain and intermittent left leg weakness Reason for Exam: Back pain and intermittent left leg weakness, Back pain and intermittent left leg weakness Acuity: Acute Type of Exam: Initial FINDINGS: BONES/ALIGNMENT: At L4-5 there is mild anterolisthesis associated with facet arthropathy. No acute fractures are identified. DEGENERATIVE CHANGES: Anterolateral spur formation is noted at multiple levels. 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 At T12-L1, L1-2 and L2-3, there is no significant central or foraminal stenosis. At L3-4, there is a posterior disc bulge, combined with severe facet and ligamentum flavum hypertrophy, greater on the right. This results in severe central stenosis.   There is moderate bilateral foraminal narrowing. At L4-5, anterolisthesis is again noted, associated with posterior disc bulge and facet arthropathy. There is moderate central stenosis and no significant foraminal stenosis. At L5-S1, the central canal and neural foramina remain patent. SOFT TISSUES/RETROPERITONEUM: There is severe aortoiliac calcification. No aneurysm is seen. Chronic right hydronephrosis is noted. A previously noted left simple renal cyst is partially visualized. No follow-up is recommended. No acute fracture or subluxation. 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 L3-4 severe central and moderate bilateral foraminal stenosis. L4-5 grade 1 spondylolisthesis with additional degenerative changes, resulting in moderate central stenosis. Xr Chest Portable    Result Date: 10/20/2020  EXAMINATION: ONE XRAY VIEW OF THE CHEST 10/20/2020 5:39 pm COMPARISON: None. HISTORY: ORDERING SYSTEM PROVIDED HISTORY: Left leg weakness TECHNOLOGIST PROVIDED HISTORY: Reason for exam:->Left leg weakness Reason for Exam: Left leg weakness Acuity: Acute Type of Exam: Initial FINDINGS: Patchy bilateral infiltrates in the left and right lung which are new compared to prior studies. Pacing device noted over the left hemithorax. Prominence of the cardiac silhouette again noted. No evidence for effusions. Patchy infiltrates suggested bilaterally new from prior study. Cta Head Neck W Contrast    Result Date: 10/20/2020  EXAMINATION: CTA OF THE HEAD AND NECK WITH CONTRAST 10/20/2020 6:00 pm: TECHNIQUE: CTA of the head and neck was performed with the administration of intravenous contrast. Multiplanar reformatted images are provided for review. MIP images are provided for review. Stenosis of the internal carotid arteries measured using NASCET criteria. Dose modulation, iterative reconstruction, and/or weight based adjustment of the mA/kV was utilized to reduce the radiation dose to as low as reasonably achievable. COMPARISON: None. HISTORY: ORDERING SYSTEM PROVIDED HISTORY: intermittent left leg weakness TECHNOLOGIST PROVIDED HISTORY: Reason for exam:->intermittent left leg weakness Reason for Exam: intermittent left leg weakness Acuity: Acute Type of Exam: Initial FINDINGS: CTA NECK: AORTIC ARCH/ARCH VESSELS: No dissection or arterial injury. No significant stenosis of the brachiocephalic or subclavian arteries. CAROTID ARTERIES: No evidence of carotid dissection is seen. Bilateral carotid bifurcation atherosclerotic plaque is noted resulting in approximately bilateral 40% stenosis of the origin of the internal carotid artery identified. VERTEBRAL ARTERIES: Approximately 50% arterial stenosis is noted involving the bilateral vertebral body origin secondary to encroachment by atherosclerotic calcification. No evidence of vertebral dissection is seen. SOFT TISSUES: The lung apices are clear. No cervical or superior mediastinal lymphadenopathy. The larynx and pharynx are unremarkable. No acute abnormality of the salivary and thyroid glands. BONES: No acute osseous abnormality. CTA HEAD: ANTERIOR CIRCULATION: Bilateral internal carotid artery cavernous segment scattered mild atherosclerotic calcification is present without associated significant arterial stenosis identified. No significant stenosis of the intracranial internal carotid, anterior cerebral, or middle cerebral arteries. No aneurysm. POSTERIOR CIRCULATION: Focal high-grade stenosis of the mid basilar artery is noted. Right vertebral artery termination as the posteroinferior cerebellar artery is identified. OTHER: No dural venous sinus thrombosis on this non-dedicated study. BRAIN: No mass effect or midline shift. No extra-axial fluid collection. The gray-white differentiation is maintained. Ill-defined hypoattenuation is present within cerebral white matter consistent with mild microvascular ischemic change.      1. Bilateral carotid bifurcation atherosclerotic plaque resulting in approximately 40% bilateral stenosis of the origin of the internal carotid artery. Finding is compatible with 16-49% stenosis per sonographic NASCET index criteria. 2. 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 3. Right vertebral artery termination as the posteroinferior cerebellar artery (PICA). 4. Bilateral internal carotid artery cavernous segment atherosclerotic calcification without significant arterial stenosis. 5. Approximately 50% arterial stenosis of the bilateral vertebral body origin secondary to encroachment by atherosclerotic calcification. 6. Mild cerebral white matter chronic microvascular ischemic disease. PROCEDURES   Unless otherwise noted below, none     Procedures    CRITICAL CARE TIME   N/A    CONSULTS:  IP CONSULT TO STROKE TEAM  IP CONSULT TO PRIMARY CARE PROVIDER    EMERGENCY DEPARTMENT COURSE and DIFFERENTIAL DIAGNOSIS/MDM:   Vitals:    Vitals:    10/20/20 1657 10/20/20 1745 10/20/20 1845 10/20/20 1930   BP: (!) 149/70 123/70 (!) 145/73 (!) 148/79   Pulse: 74 70 71 70   Resp: 18 18 19 20   Temp: 99.5 °F (37.5 °C)      TempSrc: Tympanic      SpO2: 97% 96% 96% 97%   Height: 5' 6\" (1.676 m)          Patient was given the following medications:  Medications   iopamidol (ISOVUE-370) 76 % injection 75 mL (75 mLs Intravenous Given 10/20/20 1801)       I was able to get a bit more of a precise history from the patient's daughter. Apparently when he is sitting or lying down he has good strength in all 4 extremities but it is only when he tries to walk that he has no control over his left leg. Patient is on Xarelto. The CTA findings although certainly not normal do not warrant emergent transfer for interventional neuroradiology. The abnormal chest x-ray is of unclear etiology and obviously in the current setting of the pandemic perhaps this is coronavirus related so I have just ordered that test now.   I did discuss the case with the

## 2020-10-20 NOTE — ED NOTES
Saw bloodwork not being resulted; called lab to verify that lab work was added on.       Marcy Cook RN  10/20/20 [id number]

## 2020-10-21 PROBLEM — J18.9 COMMUNITY ACQUIRED PNEUMONIA: Status: RESOLVED | Noted: 2020-01-01 | Resolved: 2020-01-01

## 2020-10-21 PROBLEM — J81.0 ACUTE PULMONARY EDEMA (HCC): Status: ACTIVE | Noted: 2020-01-01

## 2020-10-21 PROBLEM — J18.9 COMMUNITY ACQUIRED PNEUMONIA: Status: ACTIVE | Noted: 2020-01-01

## 2020-10-21 PROBLEM — J16.0 COMMUNITY ACQUIRED PNEUMONIA DUE TO CHLAMYDIA SPECIES: Status: RESOLVED | Noted: 2020-01-01 | Resolved: 2020-01-01

## 2020-10-21 NOTE — ED NOTES
Report given to Joshua Calderon RN to assume care once the patient is transported to room 4111. Pt has no pain at this time.       Darion House RN  10/20/20 [id number]

## 2020-10-21 NOTE — CONSULTS
Patient is seen at the request of Dr. Britney Andrade for pulmonary fibrosis     PCP: Joshua Potts MD    HISTORY OF PRESENT ILLNESS: 80y.o. year old male  who was admitted on 10/20/20 for leg weakness. We are consulted regarding cough and shortness of breath. Patient reports that he has had exertional shortness of breath for several years, however, over the past 2 weeks he thinks that this has increased. He goes to the mall, a park or the gym and walks for about 45 minutes. He has noticed in the past few weeks that he is able to walk shorter distances before becoming short of breath. His breathing improves when he is at rest.  He has postnasal drainage and a chronic cough. He denies fevers or chills. He has a history of pulmonary fibrosis for which he is on low-dose prednisone.       PAST MEDICAL HISTORY:  Past Medical History:   Diagnosis Date    Arthritis     BPH with obstruction/lower urinary tract symptoms     nocturia 3-4 x per night    Chronic atrial fibrillation (HCC)     CKD (chronic kidney disease) 2017    level 3    Coronary arteriosclerosis 2009    2 stents/ Dr Korina Schmitz in [facility] GERD (gastroesophageal reflux disease)     History of blood transfusion     1945   During deployment    HTN (hypertension) 1990    Left hip pain     schrapnel in hip and intestines/chronic pains left hip impairs walking    Pacemaker 2015    Pulmonary fibrosis ([address] 08/2017    mild amount seen on chest ct    Pulmonary nodule, left 2017    noncalcified 7mm low risk repeat 8/2018 if patient willing    Renal atrophy, right 2017    Renal cyst, right 08/2017    not clearly simple cyst    SOB (shortness of breath) 2017    terrell normal pft and ct chest ? smll amount of pulmonary fibrosis       PAST SURGICAL HISTORY:  Past Surgical History:   Procedure Laterality Date    ABDOMEN SURGERY      Scrapnel removed in [facility]  2009    awoke with indigestion    EYE SURGERY Bilateral     cataract    INGUINAL HERNIA REPAIR Right 07/2016    PACEMAKER INSERTION  2014    PACEMAKER PLACEMENT      SMALL INTESTINE SURGERY  1945    jelly rodriguez ww II vet, part of small intestine moved    TONSILLECTOMY  1960         MEDICATIONS:  Current Facility-Administered Medications: aspirin chewable tablet 81 mg, 81 mg, Oral, Daily  atorvastatin (LIPITOR) tablet 40 mg, 40 mg, Oral, Daily  calcium elemental (OSCAL) tablet 500 mg, 500 mg, Oral, Daily  Vitamin D (CHOLECALCIFEROL) tablet 2,000 Units, 2,000 Units, Oral, Daily  finasteride (PROSCAR) tablet 5 mg, 5 mg, Oral, Daily  fluticasone (FLONASE) 50 MCG/ACT nasal spray 2 spray, 2 spray, Each Nostril, Daily  furosemide (LASIX) tablet 20 mg, 20 mg, Oral, Daily  losartan (COZAAR) tablet 50 mg, 50 mg, Oral, Daily  pantoprazole (PROTONIX) tablet 40 mg, 40 mg, Oral, QAM AC  oxybutynin (DITROPAN) tablet 5 mg, 5 mg, Oral, Daily  predniSONE (DELTASONE) tablet 10 mg, 10 mg, Oral, Daily  rivaroxaban (XARELTO) tablet 15 mg, 15 mg, Oral, Daily with breakfast  tamsulosin (FLOMAX) capsule 0.4 mg, 0.4 mg, Oral, Daily  glycopyrrolate-formoterol (BEVESPI) 9-4.8 MCG/ACT inhaler 2 puff, 2 puff, Inhalation, BID  albuterol (ACCUNEB) nebulizer solution 0.63 mg, 0.63 mg, Nebulization, TID  sodium chloride flush 0.9 % injection 10 mL, 10 mL, Intravenous, 2 times per day  sodium chloride flush 0.9 % injection 10 mL, 10 mL, Intravenous, PRN  acetaminophen (TYLENOL) tablet 650 mg, 650 mg, Oral, Q6H PRN **OR** acetaminophen (TYLENOL) suppository 650 mg, 650 mg, Rectal, Q6H PRN  cefTRIAXone (ROCEPHIN) 1 g IVPB in 50 mL D5W minibag, 1 g, Intravenous, Q24H  azithromycin (ZITHROMAX) 500 mg in D5W 250ml addavial, 500 mg, Intravenous, Q24H      ALLERGIES:  Patient is allergic to claritin [loratadine] and zestril [lisinopril]. FAMILY HISTORY:  family history includes No Known Problems in an other family member; Pacemaker in his brother and sister.     SOCIAL HISTORY:  Social History Socioeconomic History    Marital status:      Spouse name: Not on file    Number of children: Not on file    Years of education: Not on file    Highest education level: Not on file   Occupational History    Occupation: soil conservastional   Social Needs    Financial resource strain: Patient refused    Food insecurity     Worry: Patient refused     Inability: Patient refused    Transportation needs     Medical: Patient refused     Non-medical: Patient refused   Tobacco Use    Smoking status: Never Smoker    Smokeless tobacco: Never Used   Substance and Sexual Activity    Alcohol use: No    Drug use: Not Currently    Sexual activity: Not Currently   Lifestyle    Physical activity     Days per week: Not on file     Minutes per session: Not on file    Stress: Not on file   Relationships    Social connections     Talks on phone: Not on file     Gets together: Not on file     Attends [employer] service: Not on file     Active member of club or organization: Not on file     Attends meetings of clubs or organizations: Not on file     Relationship status: Not on file    Intimate partner violence     Fear of current or ex partner: Not on file     Emotionally abused: Not on file     Physically abused: Not on file     Forced sexual activity: Not on file   Other Topics Concern    Not on file   Social History Narrative    Not on file      reports that he has never smoked.  He has never used smokeless tobacco.    REVIEW OF SYSTEMS:  Constitutional: Negative for fever  HENT: Negative for sore throat  Eyes: Negative for redness   Respiratory: + for dyspnea, +cough  Cardiovascular: Negative for chest pain  Gastrointestinal: Negative for vomiting, diarrhea   Genitourinary: Negative for hematuria   Musculoskeletal: Negative for arthralgias   Skin: Negative for rash  Neurological: Negative for syncope  Hematological: Negative for adenopathy  Psychiatric/Behavorial: Negative for anxiety    Objective: present. The left atrium is moderately dilated. Mild aortic regurgitation is present. The right ventricle appears normal in size. There is mild tricuspid regurgitation with RVSP estimated at 31 mmHg. Assessment:     Pulmonary infiltrates  Pulmonary fibrosis  Shortness of breath    Plan:      Pulmonary infiltrates  -Chest CT shows slight increase in ground glass opacities compared to 2019. This could represent some degree of disease progression or perhaps a flare of the fibrosis. Will change prednisone to Solu-Medrol 40 mg IV twice daily  -Continue ceftriaxone and azithromycin  -Lasix twice daily  -Send urine strep and legionella antigens  -Send sputum culture if able  -Send rheumatologic studies  -Repeat BNP in a.m. Pulmonary fibrosis  -Change prednisone to Solu-Medrol twice daily    Shortness of breath  -In the setting of increased ground glass opacities  -Solu-Medrol twice daily  -Continue antibiotics  -Lasix twice daily  -Repeat BNP in a.m. Thank you for allowing me to participate in the care of this patient. Will follow.      Larissa Delgado MD  [facility]

## 2020-10-21 NOTE — H&P
Internal Medicine History and Physical  CC:left leg weakness  HPI:89 yo male with HTN, Pulmonary fibrosis and history of CAF who presented to the office yesterday with inability to walk for 2 days. He reported that on standing for a \"minute\" his low back would hurt and he wouldn't be able to put weight on his left leg. He did not fall. He managed to make it with a walker w seat. In addition he reported worsening breathing sob muñoz and cough with clear phlegm sometimes blood tinged. Denied f/c or covid exposure . Also reported long history of urinary incontinence which also seemed to get worse in the last 2 days. No dysuria f/c etc.     Principal Problem:    Acute pulmonary edema (HCC)  Active Problems:    Pacemaker    HTN (hypertension)    Coronary arteriosclerosis    BPH with obstruction/lower urinary tract symptoms    Pulmonary fibrosis (HCC)    Parkinson disease (HCC)    Paroxysmal atrial fibrillation ([address]    Community acquired pneumonia due to Chlamydia species  Resolved Problems:    * No resolved hospital problems.  *    Past Medical History:   Diagnosis Date    Arthritis     BPH with obstruction/lower urinary tract symptoms     nocturia 3-4 x per night    Chronic atrial fibrillation (HCC)     CKD (chronic kidney disease) 2017    level 3    Coronary arteriosclerosis 2009    2 stents/ Dr Evon Kwan in [facility] GERD (gastroesophageal reflux disease)     History of blood transfusion     1945   During deployment    HTN (hypertension) 1990    Left hip pain     schrapnel in hip and intestines/chronic pains left hip impairs walking    Pacemaker 2015    Pulmonary fibrosis ([facility] 08/2017    mild amount seen on chest ct    Pulmonary nodule, left 2017    noncalcified 7mm low risk repeat 8/2018 if patient willing    Renal atrophy, right 2017    Renal cyst, right 08/2017    not clearly simple cyst    SOB (shortness of breath) 2017    terrell normal pft and ct chest ? smll amount of pulmonary fibrosis      Past Surgical History:   Procedure Laterality Date    ABDOMEN SURGERY      Scrapnel removed in [facility]  2009    awoke with indigestion    EYE SURGERY Bilateral     cataract    INGUINAL HERNIA REPAIR Right 07/2016    PACEMAKER INSERTION  2014    PACEMAKER PLACEMENT      SMALL INTESTINE SURGERY  1945    schrapnhoney as ww II vet, part of small intestine moved    TONSILLECTOMY  1960      Medications Prior to Admission: omeprazole (PRILOSEC) 20 MG delayed release capsule, Take 1 capsule by mouth daily Instead of pepcid for heart burn on an empty stomach  fluticasone (FLONASE) 50 MCG/ACT nasal spray, SPRAY 2 SPRAY INTO EACH NOSTRIL EVERY DAY  predniSONE (DELTASONE) 10 MG tablet, TAKE 1 TABLET BY MOUTH EVERY DAY  albuterol sulfate  (90 Base) MCG/ACT inhaler, Inhale 2 puffs into the lungs every 6 hours as needed for Wheezing or Shortness of Breath  umeclidinium-vilanterol (ANORO ELLIPTA) 62.5-25 MCG/INH AEPB inhaler, Inhale 1 puff into the lungs daily  olmesartan (BENICAR) 40 MG tablet, TAKE 1 TABLET BY MOUTH EVERY DAY  furosemide (LASIX) 20 MG tablet, TAKE 1 TABLET BY MOUTH EVERY DAY AS NEEDED FOR LEG SWELLING  oxybutynin (DITROPAN) 5 MG tablet, TAKE 1 TABLET BY MOUTH EVERY DAY  tamsulosin (FLOMAX) 0.4 MG capsule, ONE EVERY NIGHT FOR YOUR PROSTATE  finasteride (PROSCAR) 5 MG tablet, TAKE 1 TABLET BY MOUTH EVERY DAY  atorvastatin (LIPITOR) 40 MG tablet, Take 1 tablet by mouth daily  rivaroxaban (XARELTO) 15 MG TABS tablet, Take 1 tablet by mouth daily (with breakfast)  aspirin 81 MG tablet, Take 81 mg by mouth daily  Cholecalciferol (VITAMIN D3) 2000 UNITS CAPS, Take 2,000 Units by mouth daily  calcium carbonate (OSCAL) 500 MG TABS tablet, Take 600 mg by mouth daily caltrate 600-D3 one daily  Multiple Vitamins-Minerals (CENTRUM SILVER ADULT 50+) TABS, Take 1 tablet by mouth daily  Allergies   Allergen Reactions    Claritin mg by mouth daily   Yes Historical Provider, MD   Cholecalciferol (VITAMIN D3) 2000 UNITS CAPS Take 2,000 Units by mouth daily   Yes Historical Provider, MD   calcium carbonate (OSCAL) 500 MG TABS tablet Take 600 mg by mouth daily caltrate 600-D3 one daily   Yes Historical Provider, MD   Multiple Vitamins-Minerals (CENTRUM SILVER ADULT 50+) TABS Take 1 tablet by mouth daily   Yes Historical Provider, MD     Review of Systems  A comprehensive review of systems was negative except for: cough sob    Objective:     Patient Vitals for the past 8 hrs:   SpO2   10/21/20 0807 97 %     I/O last 3 completed shifts:   In: 18 [P.O.:480]  Out: 300 [Urine:300]  I/O this shift:  In: 10 [I.V.:10]  Out: -     VITALS:  BP (!) 160/82   Pulse 71   Temp 97.7 °F (36.5 °C) (Oral)   Resp 16   Ht 5' 6\" (1.676 m)   Wt 174 lb 2.6 oz (79 kg)   SpO2 97%   BMI 28.11 kg/m²   General Appearance: alert and oriented to person, place and time, well-developed and well-nourished, in no acute distress  Skin: warm and dry, no rash or erythema  Head: normocephalic and atraumatic  Eyes: conjunctivae normal and sclera anicteric  ENT: [place] and sinuses non-tender  Neck: neck supple and non tender without mass, no thyromegaly or thyroid nodules, no cervical lymphadenopathy   Pulmonary/Chest: insp crackles 1/2 way up bilat paninsp crackles  Cardiovascular: normal rate, normal S1 and S2, no gallops and no carotid bruits   Abdomen: soft, non-tender, non-distended, normal bowel sounds, no masses or organomegaly  Extremities: no cyanosis, clubbing or trace edema bilat  Musculoskeletal: bony swelling knees  Neurologic: coordination normal and speech slow moves all 4 extremities  When lying in bed but when stood him up in the office he could not advance his left leg    ECG: afib paced  Data Review:     Recent Results (from the past 24 hour(s))   POCT Glucose    Collection Time: 10/20/20  4:55 PM   Result Value Ref Range    POC Glucose 132 (H) 70 - 99 mg/dl Performed on [facility]    EKG 12 Lead    Collection Time: 10/20/20  5:05 PM   Result Value Ref Range    Ventricular Rate 71 BPM    Atrial Rate 73 BPM    QRS Duration 162 ms    Q-T Interval 476 ms    QTc Calculation (Bazett) 517 ms    R Axis -67 degrees    T Axis 69 degrees    Diagnosis       Atrial fibrillation with paced ventricular complexesAbnormal ECGNo previous ECGs availableConfirmed by Nicolas ARAYA ([id number]) on 10/21/2020 12:03:58 PM   Comprehensive Metabolic Panel w/ Reflex to MG    Collection Time: 10/20/20  5:10 PM   Result Value Ref Range    Sodium 135 (L) 136 - 145 mmol/L    Potassium reflex Magnesium 4.9 3.5 - 5.1 mmol/L    Chloride 100 99 - 110 mmol/L    CO2 21 21 - 32 mmol/L    Anion Gap 14 3 - 16    Glucose 132 (H) 70 - 99 mg/dL    BUN 42 (H) 7 - 20 mg/dL    CREATININE 1.8 (H) 0.8 - 1.3 mg/dL    GFR Non-African American 35 (A) >60    GFR  43 (A) >60    Calcium 9.2 8.3 - 10.6 mg/dL    Total Protein 6.5 6.4 - 8.2 g/dL    Alb 3.8 3.4 - 5.0 g/dL    Albumin/Globulin Ratio 1.4 1.1 - 2.2    Total Bilirubin 0.5 0.0 - 1.0 mg/dL    Alkaline Phosphatase 50 40 - 129 U/L    ALT 14 10 - 40 U/L    AST 19 15 - 37 U/L    Globulin 2.7 g/dL   CBC Auto Differential    Collection Time: 10/20/20  5:11 PM   Result Value Ref Range    WBC 9.1 4.0 - 11.0 K/uL    RBC 4.26 4.20 - 5.90 M/uL    Hemoglobin 11.3 (L) 13.5 - 17.5 g/dL    Hematocrit 34.7 (L) 40.5 - 52.5 %    MCV 81.3 80.0 - 100.0 fL    MCH 26.5 26.0 - 34.0 pg    MCHC 32.6 31.0 - 36.0 g/dL    RDW 16.5 (H) 12.4 - 15.4 %    Platelets 043 763 - 807 K/uL    MPV 9.3 5.0 - 10.5 fL    Neutrophils % 85.7 %    Lymphocytes % 8.4 %    Monocytes % 5.6 %    Eosinophils % 0.0 %    Basophils % 0.3 %    Neutrophils Absolute 7.8 (H) 1.7 - 7.7 K/uL    Lymphocytes Absolute 0.8 (L) 1.0 - 5.1 K/uL    Monocytes Absolute 0.5 0.0 - 1.3 K/uL    Eosinophils Absolute 0.0 0.0 - 0.6 K/uL    Basophils Absolute 0.0 0.0 - 0.2 K/uL   Protime-INR    Collection Time: 10/20/20  5:11 PM Result Value Ref Range    Protime 15.9 (H) 10.0 - 13.2 sec    INR 1.37 (H) 0.86 - 1.14   APTT    Collection Time: 10/20/20  5:11 PM   Result Value Ref Range    aPTT 33.5 24.2 - 36.2 sec   Brain Natriuretic Peptide    Collection Time: 10/20/20  5:11 PM   Result Value Ref Range    Pro-BNP 1,446 (H) 0 - 449 pg/mL   Troponin    Collection Time: 10/20/20  5:11 PM   Result Value Ref Range    Troponin 0.03 (H) <0.01 ng/mL   Urinalysis Reflex to Culture    Collection Time: 10/20/20  6:39 PM    Specimen: Urine, clean catch   Result Value Ref Range    Color, UA YELLOW Straw/Yellow    Clarity, UA Clear Clear    Glucose, Ur Negative Negative mg/dL    Bilirubin Urine Negative Negative    Ketones, Urine Negative Negative mg/dL    Specific Gravity, UA 1.014 1.005 - 1.030    Blood, Urine Negative Negative    pH, UA 6.5 5.0 - 8.0    Protein, UA Negative Negative mg/dL    Urobilinogen, Urine 0.2 <2.0 E.U./dL    Nitrite, Urine Negative Negative    Leukocyte Esterase, Urine Negative Negative    Microscopic Examination Not Indicated     Urine Type NotGiven     Urine Reflex to Culture Not Indicated    COVID-19    Collection Time: 10/20/20  7:32 PM   Result Value Ref Range    SARS-CoV-2, NAAT Not Detected Not Detected   Lactate, Sepsis    Collection Time: 10/20/20  7:43 PM   Result Value Ref Range    Lactic Acid, Sepsis 1.0 0.4 - 1.9 mmol/L   Lactate, Sepsis    Collection Time: 10/21/20  6:20 AM   Result Value Ref Range    Lactic Acid, Sepsis 1.0 0.4 - 1.9 mmol/L   CBC auto differential    Collection Time: 10/21/20  6:20 AM   Result Value Ref Range    WBC 9.9 4.0 - 11.0 K/uL    RBC 4.20 4. 20 - 5.90 M/uL    Hemoglobin 10.9 (L) 13.5 - 17.5 g/dL    Hematocrit 34.0 (L) 40.5 - 52.5 %    MCV 80.9 80.0 - 100.0 fL    MCH 25.9 (L) 26.0 - 34.0 pg    MCHC 32.0 31.0 - 36.0 g/dL    RDW 17.0 (H) 12.4 - 15.4 %    Platelets 866 719 - 388 K/uL    MPV 9.3 5.0 - 10.5 fL    Neutrophils % 70.6 %    Lymphocytes % 18.1 %    Monocytes % 9.5 %    Eosinophils % changes, resulting in moderate central stenosis. Xr Chest Portable    Result Date: 10/20/2020  Patchy infiltrates suggested bilaterally new from prior study. Cta Head Neck W Contrast    Result Date: 10/20/2020  1. Bilateral carotid bifurcation atherosclerotic plaque resulting in approximately 40% bilateral stenosis of the origin of the internal carotid artery. Finding is compatible with 16-49% stenosis per sonographic NASCET index criteria. 2. 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 3. Right vertebral artery termination as the posteroinferior cerebellar artery (PICA). 4. Bilateral internal carotid artery cavernous segment atherosclerotic calcification without significant arterial stenosis. 5. Approximately 50% arterial stenosis of the bilateral vertebral body origin secondary to encroachment by atherosclerotic calcification. 6. Mild cerebral white matter chronic microvascular ischemic disease.              Assessment:     Principal Problem:    Acute pulmonary edema (HCC)  Plan: will change lasix to ivp and check ECHO and thyroids  Active Problems:    Pacemaker  Plan: ongoing, obs    HTN (hypertension)  Plan: controlled , obs    Coronary arteriosclerosis  Plan: slight elevation troponin, follow, treat medically    BPH with obstruction/lower urinary tract symptoms  Plan: cont meds, check pvrs    Pulmonary fibrosis ([address]  Plan: known history pulmonary consult appreciated    Parkinson disease ([address]  Plan: not sure of diagnosis, defer to neuro eval    Paroxysmal atrial fibrillation ([address]  Plan: rate controlled, observe    Community acquired pneumonia  Plan: on treatment but not certain its indicated, defer to pulmonary            Birdhomer Mcallister MD

## 2020-10-21 NOTE — PLAN OF CARE
Problem: Falls - Risk of: Bed alarm in place and call light within reach  Goal: Will remain free from falls  Description: Will remain free from falls  Outcome: Ongoing  Goal: Absence of physical injury  Description: Absence of physical injury  Outcome: Ongoing     Problem: Skin Integrity:  Goal: Will show no infection signs and symptoms  Description: Will show no infection signs and symptoms  Outcome: Ongoing  Goal: Absence of new skin breakdown  Description: Absence of new skin breakdown  Outcome: Ongoing

## 2020-10-21 NOTE — PLAN OF CARE
Problem: Falls - Risk of:  Goal: Will remain free from falls  Description: Will remain free from falls  10/21/2020 1444 by Ramya Foster RN  Outcome: Ongoing  10/21/2020 0109 by Fifi Walters RN  Outcome: Ongoing     Problem: Falls - Risk of:  Goal: Absence of physical injury  Description: Absence of physical injury  10/21/2020 1444 by Ramya Foster RN  Outcome: Ongoing  10/21/2020 0109 by Fifi Walters RN  Outcome: Ongoing

## 2020-10-21 NOTE — CONSULTS
Consulting Physician: Dr. Gardner Gosselin    Reason for Consult: urinary incontinence, on multiple meds - does he really need all of these? History of Present Illness: Mono Carlson is a 80 y.o.  male admitted for inabililty to control his left leg x 1 day. He was a patient of Dr. Rosenda Swann and Dr. José Shields last seen in office 6/2018 for elevated PSA and urinary incontinence. He has been on finasteride, flomax and initially vesicare until insurance stopped covering this in 2018. He was then switched to oxybutynin. At that time, he was changing his briefs about 5x/day and voiding every 2 hours. PVR's were appropriate at that time. He now changes his depends 1-2x/day. Still with urge incontinence and voiding about q2h. He cannot recall if he is taking oxybutynin or not but does remember taking vesicare in the past. I would not make any changes to his medications. Check PVR today.     Past Medical History:   Past Medical History:   Diagnosis Date    Arthritis     BPH with obstruction/lower urinary tract symptoms     nocturia 3-4 x per night    Chronic atrial fibrillation (HCC)     CKD (chronic kidney disease) 2017    level 3    Coronary arteriosclerosis 2009    2 stents/ Dr Loistine Fothergill in [facility] GERD (gastroesophageal reflux disease)     History of blood transfusion     1945   During deployment    HTN (hypertension) 1990    Left hip pain     schrapnel in hip and intestines/chronic pains left hip impairs walking    Pacemaker 2015    Pulmonary fibrosis ([address] 08/2017    mild amount seen on chest ct    Pulmonary nodule, left 2017    noncalcified 7mm low risk repeat 8/2018 if patient willing    Renal atrophy, right 2017    Renal cyst, right 08/2017    not clearly simple cyst    SOB (shortness of breath) 2017    terrell normal pft and ct chest ? smll amount of pulmonary fibrosis       Past Surgical History:  Past Surgical History:   Procedure Laterality Date    ABDOMEN SURGERY      Scrapnel removed in [address] WITH STENT PLACEMENT  2009    awoke with indigestion    EYE SURGERY Bilateral     cataract    INGUINAL HERNIA REPAIR Right 07/2016    PACEMAKER INSERTION  2014    PACEMAKER PLACEMENT      SMALL INTESTINE SURGERY  1945    jelly as ww II vet, part of small intestine moved    TONSILLECTOMY  1960       Social History:  Social History     Socioeconomic History    Marital status:      Spouse name: Not on file    Number of children: Not on file    Years of education: Not on file    Highest education level: Not on file   Occupational History    Occupation: soil conservastional   Social Needs    Financial resource strain: Patient refused    Food insecurity     Worry: Patient refused     Inability: Patient refused    Transportation needs     Medical: Patient refused     Non-medical: Patient refused   Tobacco Use    Smoking status: Never Smoker    Smokeless tobacco: Never Used   Substance and Sexual Activity    Alcohol use: No    Drug use: Not Currently    Sexual activity: Not Currently   Lifestyle    Physical activity     Days per week: Not on file     Minutes per session: Not on file    Stress: Not on file   Relationships    Social connections     Talks on phone: Not on file     Gets together: Not on file     Attends Hinduism service: Not on file     Active member of club or organization: Not on file     Attends meetings of clubs or organizations: Not on file     Relationship status: Not on file    Intimate partner violence     Fear of current or ex partner: Not on file     Emotionally abused: Not on file     Physically abused: Not on file     Forced sexual activity: Not on file   Other Topics Concern    Not on file   Social History Narrative    Not on file       Family History:  Family History   Problem Relation Age of Onset    Pacemaker Sister     Pacemaker Brother     No Known Problems Other         lung disease       Meds:   Current Facility-Administered Medications: aspirin chewable tablet 81 mg, 81 mg, Oral, Daily  atorvastatin (LIPITOR) tablet 40 mg, 40 mg, Oral, Daily  calcium elemental (OSCAL) tablet 500 mg, 500 mg, Oral, Daily  Vitamin D (CHOLECALCIFEROL) tablet 2,000 Units, 2,000 Units, Oral, Daily  finasteride (PROSCAR) tablet 5 mg, 5 mg, Oral, Daily  fluticasone (FLONASE) 50 MCG/ACT nasal spray 2 spray, 2 spray, Each Nostril, Daily  losartan (COZAAR) tablet 50 mg, 50 mg, Oral, Daily  pantoprazole (PROTONIX) tablet 40 mg, 40 mg, Oral, QAM AC  oxybutynin (DITROPAN) tablet 5 mg, 5 mg, Oral, Daily  predniSONE (DELTASONE) tablet 10 mg, 10 mg, Oral, Daily  rivaroxaban (XARELTO) tablet 15 mg, 15 mg, Oral, Daily with breakfast  tamsulosin (FLOMAX) capsule 0.4 mg, 0.4 mg, Oral, Daily  glycopyrrolate-formoterol (BEVESPI) 9-4.8 MCG/ACT inhaler 2 puff, 2 puff, Inhalation, BID  albuterol (PROVENTIL) nebulizer solution 0.63 mg, 0.63 mg, Nebulization, TID  sodium chloride flush 0.9 % injection 10 mL, 10 mL, Intravenous, 2 times per day  sodium chloride flush 0.9 % injection 10 mL, 10 mL, Intravenous, PRN  acetaminophen (TYLENOL) tablet 650 mg, 650 mg, Oral, Q6H PRN **OR** acetaminophen (TYLENOL) suppository 650 mg, 650 mg, Rectal, Q6H PRN  cefTRIAXone (ROCEPHIN) 1 g IVPB in 50 mL D5W minibag, 1 g, Intravenous, Q24H  azithromycin (ZITHROMAX) 500 mg in D5W 250ml addavial, 500 mg, Intravenous, Q24H  furosemide (LASIX) injection 40 mg, 40 mg, Intravenous, BID  potassium chloride (KLOR-CON M) extended release tablet 20 mEq, 20 mEq, Oral, BID WC    Review of Systems:  10 Systems were reviewed and negative except as in HPI    Vitals:  BP (!) 160/82   Pulse 71   Temp 97.7 °F (36.5 °C) (Oral)   Resp 16   Ht 5' 6\" (1.676 m)   Wt 174 lb 2.6 oz (79 kg)   SpO2 97%   BMI 28.11 kg/m²     Intake/Output Summary (Last 24 hours) at 10/21/2020 0856  Last data filed at 10/21/2020 0801  Gross per 24 hour   Intake 490 ml   Output 300 ml   Net 190 ml       Physical Exam:  General Appearance: Alert and oriented, cooperative, no distress, appears stated age  Head: Normocephalic, without obvious abnormality, atraumatic  Back: no CVA tenderness  Lungs: respirations unlabored, no wheezing  Heart: Regular rate and rhythm, no lower extremity edema noted  Abdomen: Soft, non-tender, non-distended, no masses  Skin: Skin color, texture, turgor normal, no rashes or lesions  Neurologic: no gross deficits  Male :   Nonpalpable bladder   No CVA tenderness   Spontaneously voiding   Penis appears normal and uncircumcised   Urethral meatus is normal in size and location   Scrotum appears normal and both testicles appear normal in size and location   ANNA Not indicated    Labs:  CBC   Lab Results   Component Value Date    WBC 9.9 10/21/2020    RBC 4.20 10/21/2020    HGB 10.9 10/21/2020    HCT 34.0 10/21/2020    MCV 80.9 10/21/2020    MCH 25.9 10/21/2020    MCHC 32.0 10/21/2020    RDW 17.0 10/21/2020     10/21/2020    MPV 9.3 10/21/2020     BMP   Lab Results   Component Value Date     10/21/2020    K 3.9 10/21/2020     10/21/2020    CO2 28 10/21/2020    BUN 38 10/21/2020    CREATININE 1.5 10/21/2020    GLUCOSE 112 10/21/2020    CALCIUM 8.9 10/21/2020       Urinalysis:   Lab Results   Component Value Date    COLORU YELLOW 10/20/2020    GLUCOSEU Negative 10/20/2020    BLOODU Negative 10/20/2020    NITRU Negative 10/20/2020    LEUKOCYTESUR Negative 10/20/2020       Imaging:  NA     Impression/Plan:   - 89y. o. male admitted for left leg weakness. Consulted for urge incontinence - on multiple meds and clarify necessity.   - Incontinence is not bothersome to patient. HE does not recall being on oxybutynin, this was continued here and will see if he benefits from this. Would not recommend increasing dosage or changing due to side effects of other medications. - Continue Flomax and finasteride.  - Check PVR today.      KAYCE Juarez - CNP 10/21/72470:56 AM

## 2020-10-21 NOTE — CONSULTS
Neurology Consult Note  Reason for Consult: inability to lift L leg or walk    Chief complaint: shortness of breath, weakness    Dr Brianne Church MD asked me to see Babak Lopez in consultation for evaluation of inability to lift left leg or walk    History of Present Illness:  Babak Lopez is a 80 y.o. male who presents with shortness of breath, weakness. I obtained my information via interview w/ the patient, supplemented by chart review. The patient tells me that he has had lower back discomfort/pain for at least the past 20 years or so. He also tells me that back in [facility] in [address] [place] [address] he had a piece of shrapnel strike him in the upper L leg and hip area which caused him to be in a brace for several months. He says he typically ambulates w/ a cane, even so still having to hold onto objects/walls w/ his other arm to help stabilize him. When he exercises (says he walks often in the park), he starts to feel more short of breath and feels worn out having to take a rest after short intervals. He feels like his back and legs may not support him. About a week ago, he started to notice some soreness in both of his hips. He was having more trouble w/ his cane, so his daughter got him a walker ~ 3 days ago. Unfortunately he continued to have trouble ambulating despite the walker and could barely make it across the room w/out feeling weak and short of breath. Two days ago he says she started to notice that he was having more difficulty using his LLE. He says he was having a tough time even elevating the leg to get up steps needing to have someone assist him. He says that he ended up slipping yesterday and fell to the ground w/out LOC. His grandson had to come get him up off of the ground and he was later transported to his PCP appointment, subsequently advised to come to the ED to be evaluated. BP was 149/70. No fevers. CT head was w/out any acute abnormalities.   CTA head/neck no LVO (see full report below). Documented NIH per ED staff was 0. He was noted to have some pulmonary edema on chest imaging. Currently, he feels OK though still feels like his LLE is not quite back to normal and concerned about stability. He has no worsening back pain. No new bowel or bladder symptoms. He does have atrial fibrillation, on anticoagulation. He does have a pacemaker as well.     Medical History:  Past Medical History:   Diagnosis Date    Arthritis     BPH with obstruction/lower urinary tract symptoms     nocturia 3-4 x per night    Chronic atrial fibrillation (HCC)     CKD (chronic kidney disease) 2017    level 3    Coronary arteriosclerosis 2009    2 stents/ Dr Willia Brunner [facility] GERD (gastroesophageal reflux disease)     History of blood transfusion     1945   During deployment    HTN (hypertension) 1990    Left hip pain     schrapnel in hip and intestines/chronic pains left hip impairs walking    Pacemaker 2015    Pulmonary fibrosis ([address] 08/2017    mild amount seen on chest ct    Pulmonary nodule, left 2017    noncalcified 7mm low risk repeat 8/2018 if patient willing    Renal atrophy, right 2017    Renal cyst, right 08/2017    not clearly simple cyst    SOB (shortness of breath) 2017    terrell normal pft and ct chest ? smll amount of pulmonary fibrosis     Past Surgical History:   Procedure Laterality Date    ABDOMEN SURGERY      [facility]rapnel removed in [facility]  2009    awoke with indigestion    EYE SURGERY Bilateral     cataract    INGUINAL HERNIA REPAIR Right 07/2016    PACEMAKER INSERTION  2014    PACEMAKER PLACEMENT      SMALL INTESTINE SURGERY  1945    schrapnel as ww II vet, part of small intestine moved    TONSILLECTOMY  1960     Scheduled Meds:   aspirin  81 mg Oral Daily    atorvastatin  40 mg Oral Daily    calcium elemental  500 mg Oral Daily    Vitamin D  2,000 Units Oral Daily    finasteride  5 mg Oral Daily    fluticasone  2 spray Each Nostril Daily    losartan  50 mg Oral Daily    pantoprazole  40 mg Oral QAM AC    oxybutynin  5 mg Oral Daily    predniSONE  10 mg Oral Daily    rivaroxaban  15 mg Oral Daily with breakfast    tamsulosin  0.4 mg Oral Daily    glycopyrrolate-formoterol  2 puff Inhalation BID    albuterol  0.63 mg Nebulization TID    sodium chloride flush  10 mL Intravenous 2 times per day    cefTRIAXone (ROCEPHIN) IV  1 g Intravenous Q24H    azithromycin  500 mg Intravenous Q24H    furosemide  40 mg Intravenous BID    potassium chloride  20 mEq Oral BID      Medications Prior to Admission:   omeprazole (PRILOSEC) 20 MG delayed release capsule, Take 1 capsule by mouth daily Instead of pepcid for heart burn on an empty stomach  fluticasone (FLONASE) 50 MCG/ACT nasal spray, SPRAY 2 SPRAY INTO EACH NOSTRIL EVERY DAY  predniSONE (DELTASONE) 10 MG tablet, TAKE 1 TABLET BY MOUTH EVERY DAY  albuterol sulfate  (90 Base) MCG/ACT inhaler, Inhale 2 puffs into the lungs every 6 hours as needed for Wheezing or Shortness of Breath  umeclidinium-vilanterol (ANORO ELLIPTA) 62.5-25 MCG/INH AEPB inhaler, Inhale 1 puff into the lungs daily  olmesartan (BENICAR) 40 MG tablet, TAKE 1 TABLET BY MOUTH EVERY DAY  furosemide (LASIX) 20 MG tablet, TAKE 1 TABLET BY MOUTH EVERY DAY AS NEEDED FOR LEG SWELLING  oxybutynin (DITROPAN) 5 MG tablet, TAKE 1 TABLET BY MOUTH EVERY DAY  tamsulosin (FLOMAX) 0.4 MG capsule, ONE EVERY NIGHT FOR YOUR PROSTATE  finasteride (PROSCAR) 5 MG tablet, TAKE 1 TABLET BY MOUTH EVERY DAY  atorvastatin (LIPITOR) 40 MG tablet, Take 1 tablet by mouth daily  rivaroxaban (XARELTO) 15 MG TABS tablet, Take 1 tablet by mouth daily (with breakfast)  aspirin 81 MG tablet, Take 81 mg by mouth daily  Cholecalciferol (VITAMIN D3) 2000 UNITS CAPS, Take 2,000 Units by mouth daily  calcium carbonate (OSCAL) 500 MG TABS tablet, Take 600 mg by mouth daily caltrate 600-D3 one daily  Multiple Vitamins-Minerals (CENTRUM SILVER ADULT 50+) TABS, Take 1 tablet by mouth daily    Allergies   Allergen Reactions    Claritin [Loratadine]      Reduced urine flow    Zestril [Lisinopril] Other (See Comments)     coughing     Family History   Problem Relation Age of Onset    Pacemaker Sister     Pacemaker Brother     No Known Problems Other         lung disease     Social History     Tobacco Use   Smoking Status Never Smoker   Smokeless Tobacco Never Used     Social History     Substance and Sexual Activity   Drug Use Not Currently     Social History     Substance and Sexual Activity   Alcohol Use No     ROS:  Constitutional- No weight loss or fevers  Eyes- No diplopia. No photophobia. Ears/nose/throat- No dysphagia. No Dysarthria  Cardiovascular- No palpitations. No chest pain  Respiratory- + dyspnea. No Cough  Gastrointestinal- No Abdominal pain. No Vomiting. Genitourinary- No incontinence. No urinary retention  Musculoskeletal- No myalgia. + arthralgia  Skin- No rash. No easy bruising. Psychiatric- No depression. No anxiety  Endocrine- No diabetes. No thyroid issues. Hematologic- No bleeding difficulty. No fatigue  Neurologic- + weakness. No Headache. Exam:  Blood pressure (!) 160/82, pulse 71, temperature 97.7 °F (36.5 °C), temperature source Oral, resp. rate 16, height 5' 6\" (1.676 m), weight 174 lb 2.6 oz (79 kg), SpO2 97 %. Constitutional    Vital signs: BP, HR, and RR reviewed   General alert, no distress, well-nourished  Eyes: unable to visualize the fundi  Cardiovascular: pulses symmetric in all 4 extremities. Psychiatric: cooperative with examination, no psychotic behavior noted. Neurologic  Mental status:   orientation to person, place, time, situation.     General fund of knowledge grossly intact   Memory grossly intact   Attention intact as able to attend well to the exam     Language fluent in conversation   Comprehension intact; follows simple commands  Cranial nerves:   CN2: visual fields full  CN 3,4,6: extraocular muscles intact. Pupils are equal, round, reactive bilaterally. CN5: facial sensation symmetric   CN7: face symmetric without dysarthria  CN8: hearing grossly intact  CN9: palate elevated symmetrically  CN11: trap full strength on shoulder shrug  CN12: tongue midline with protrusion  Strength: slight LLE weakness/drift. Otherwise no focal weakness. Deep tendon reflexes: diminished in all 4 extremities  Sensory: light touch intact in all 4 extremities. Cerebellar/coordination: finger nose finger normal without ataxia  Tone: normal in all 4 extremities  Gait: deferred at this time given reported symptoms. Labs  Glucose 112  Na 137  K 3.9  BUN 42  Cr 1.8  Lactic acid 1.0    BNP 1446  Troponin 0.03    WBC 9.9K  Hg 10.9  Platelets 917    COVID negative  UA negative  Blood cultures pending    Studies  CT head w/o 10/20/20, independently reviewed  No acute intracranial abnormality. CTA head/neck 10/20/20, independently reviewed  1. Bilateral carotid bifurcation atherosclerotic plaque resulting in    approximately 40% bilateral stenosis of the origin of the internal carotid    artery.  Finding is compatible with 16-49% stenosis per sonographic NASCET    index criteria. 2. 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 3. Right vertebral artery termination as the posteroinferior cerebellar    artery (PICA). 4. Bilateral internal carotid artery cavernous segment atherosclerotic    calcification without significant arterial stenosis. 5. Approximately 50% arterial stenosis of the bilateral vertebral body origin    secondary to encroachment by atherosclerotic calcification. CT lumbar spine w/o 10/20/20  1. No acute fracture or subluxation. 2. 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 3. L3-4 severe central and moderate bilateral foraminal stenosis. 4.  L4-5 grade 1 spondylolisthesis with additional degenerative changes,

## 2020-10-21 NOTE — PROGRESS NOTES
Occupational Therapy   Occupational Therapy Initial Assessment  Date: 10/21/2020   Patient Name: Shanelle Howard  MRN: [id number]     : 1925    Date of Service: 10/21/2020    Discharge Recommendations:  Patient would benefit from continued therapy after discharge, 5-7 sessions per week, Continue to assess pending progress  OT Equipment Recommendations  Other: if goes home would benefit from shower chair vs TTB  Shanelle Howard scored a 17/24 on the AM-PAC ADL Inpatient form. Current research shows that an AM-PAC score of 17 or less is typically not associated with a discharge to the patient's home setting. Based on the patient's AM-PAC score and their current ADL deficits, it is recommended that the patient have 5-7 sessions per week of Occupational Therapy at d/c to increase the patient's independence. At this time, this patient demonstrates the endurance, and/or tolerance for 3 hours of therapy each day, with a treatment frequency of 5-7x/wk. Please see assessment section for further patient specific details. If patient discharges prior to next session this note will serve as a discharge summary. Please see below for the latest assessment towards goals. Assessment   Performance deficits / Impairments: Decreased functional mobility ; Decreased ADL status; Decreased strength;Decreased endurance;Decreased balance;Decreased high-level IADLs    Assessment: Pt is a 79 yo M admitted from PCP office with c/o several days sudden left leg weakness and difficulty ambulating. PMHx: pulmonary fibrosis, arthritis, BPH, atrial fibrilation, CKD, HTN. PTA, pt lives with his dtr, reports he was highly independent in all self-care, fxl transfers mobility with [facility], ambulating multiple miles per day until recently. Pt is currently functioning below his baseline secondary to decreased strength and activity tolerance. Pt completed sit<>stand transfers CGA.  Pt was able to ambulate short distance from recliner chair > bathroom with close CGA but required extended seated rest break once at the sink. Pt completed standing grooming with CGA. Anticipate min-mod A for LB ADL needs. Will cont to see pt on acute to address the above limitations and maximize pt's fxl performance. Pt was highly independent PTA, and anticipate he would benefit from and be able to tolerate high frequency therapy upon d/c. Prognosis: Good  Decision Making: Medium Complexity  History: see above  Exam: fxl transfers, ROM/MMT, ADLs  Assistance / Modification: RW, CGA fxl transfers/mobility, min-mod A ADLs  OT Education: OT Role;Plan of Care;Transfer Training;Energy Conservation  Barriers to Learning: hearing  REQUIRES OT FOLLOW UP: Yes  Activity Tolerance  Activity Tolerance: Patient limited by fatigue  Safety Devices  Safety Devices in place: Yes  Type of devices: Left in chair;Call light within reach; Chair alarm in place;Gait belt;Patient at risk for falls           Patient Diagnosis(es): The primary encounter diagnosis was Pneumonia due to organism. A diagnosis of Unable to walk was also pertinent to this visit. has a past medical history of Arthritis, BPH with obstruction/lower urinary tract symptoms, Chronic atrial fibrillation ([address] CKD (chronic kidney disease), Coronary arteriosclerosis, GERD (gastroesophageal reflux disease), History of blood transfusion, HTN (hypertension), Left hip pain, Pacemaker, Pulmonary fibrosis ([address] Pulmonary nodule, left, Renal atrophy, right, Renal cyst, right, and SOB (shortness of breath). has a past surgical history that includes Pacemaker insertion (2014); Tonsillectomy (1960); Small intestine surgery (1945); Inguinal hernia repair (Right, 07/2016); Coronary angioplasty with stent (2009); pacemaker placement; Colonoscopy; Abdomen surgery; and eye surgery (Bilateral).            Restrictions  Restrictions/Precautions  Restrictions/Precautions: Fall Risk    Subjective   General  Chart Reviewed: Yes  Patient assessed for rehabilitation services?: Yes  Additional Pertinent Hx: Per Dr Au  outpatient note: \"With arthritis knees and HTN and Afib who has not been able to lift his left leg since yesterday. He can move the right leg but not control the left leg. His daughter bought a wheeled walker with seat and he is getting around that way. He has low back pain. Denies arm weakness or problems with speech swallow etc\"  Family / Caregiver Present: No  Referring Practitioner: Kirk Gonzalez  Diagnosis: sudden onset left leg weakness  Subjective  Subjective: pt met b/s for OT eval/tx. Pt agreeable to therapy, no c/o pain. Pt very talkative and requires frequent redirection to task  Vital Signs  Temp: 97.3 °F (36.3 °C)  Temp Source: Oral  Pulse: 72  Heart Rate Source: Monitor  Resp: 16  BP: (!) 94/58  BP Location: Left Arm  BP Upper/Lower: Upper  MAP (mmHg): 70  Oxygen Therapy  SpO2: 95 %  O2 Device: None (Room air)  Social/Functional History  Social/Functional History  Lives With: Daughter  Type of Home: House(condo)  Home Layout: One level  Home Access: Stairs to enter with rails  Entrance Stairs - Number of Steps: 1 R rail. + 6 steps down with B far spaced rails. Bathroom Shower/Tub: Tub/Shower unit  Bathroom Toilet: Handicap height(vanity near)  [employer]: Grab bars in shower(on wall into tub)  Home Equipment: 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 Leisure & Hobbies: reading. Additional Comments: Rarely alone. Home with dtr. Up until last week, was walking a couple miles a couple times a week.        Objective   Vision: Impaired  Vision Exceptions: Wears glasses for reading;Wears glasses for distance  Hearing: Exceptions to [facility]  Hearing Exceptions: Bilateral hearing aid(only had 1 hearing aid in)    Orientation  Overall Orientation Status: Within Functional Limits     Balance  Sitting Balance: Stand by assistance  Standing Balance: Contact guard assistance  Functional Mobility  Functional - Mobility Device: Rolling Walker  Activity: To/from bathroom  Assist Level: Contact guard assistance  Functional Mobility Comments: pt completed fxl mobility from recliner > sink; then took seated rest break at the sink before amb back to recliner CGA using RW. slightly unsteady but no LOB, cues for RW safety  ADL  Grooming: Contact guard assistance(pt soaked dentures and brushed teeth standing at the sink, chair right behind for safety.  required seated rest break upon completion)  Additional Comments: anticipate SBA for UB bathing/dressing, min-mod A for LB bathing/dressing, min A for toileting based on ROM, strength, endurance this session  Tone RUE  RUE Tone: Normotonic  Tone LUE  LUE Tone: Normotonic  Coordination  Movements Are Fluid And Coordinated: Yes        Transfers  Sit to stand: Contact guard assistance  Stand to sit: Contact guard assistance  Transfer Comments: RW     Cognition  Overall Cognitive Status: WFL  Cognition Comment: very conversational, requires redirection to task                 LUE AROM (degrees)  LUE AROM : WFL  Left Hand AROM (degrees)  Left Hand AROM: WFL  RUE AROM (degrees)  RUE AROM : WFL  Right Hand AROM (degrees)  Right Hand AROM: WFL  LUE Strength  Gross LUE Strength: WFL  RUE Strength  Gross RUE Strength: WFL        Plan   Plan  Times per week: 3-5  Times per day: Daily  Current Treatment Recommendations: Strengthening, Functional Mobility Training, Endurance Training, Balance Training, Self-Care / ADL, Safety Education & Training, Patient/Caregiver Education & Training, Equipment Evaluation, Education, & procurement      AM-PAC Score        AM-[facility] Inpatient Daily Activity Raw Score: 17 (10/21/20 5562)  AM-PAC Inpatient ADL T-Scale Score : 37.26 (10/21/20 1622)  ADL Inpatient CMS 0-100% Score: 50.11 (10/21/20 1622)  ADL Inpatient CMS G-Code Modifier : CK (10/21/20 1622)    Goals  Short term goals  Time Frame for Short term goals: LTG=STG  Short term goal 1: Pt will complete fxl transfers <> ADL surfaces with supervision  Short term goal 2: Pt will groom in stance at the sink with supervision  Short term goal 3: Pt will toilet with supervision  Short term goal 4: Pt will bathe with supervision  Short term goal 5: Pt will participate in 5+ mins B UE therex in order to increase strength/endurance needed for fxl tasks  Long term goals  Time Frame for Long term goals : LTG=STG  Patient Goals   Patient goals : \"get back to walking around as much as I used to\"       Therapy Time   Individual Concurrent Group Co-treatment   Time In 1505         Time Out 1600         Minutes [address], OTR/L [id number]

## 2020-10-21 NOTE — PROGRESS NOTES
Physical Therapy    Facility/Department: [address] MED SURG  Initial Assessment   This note serves as patient discharge summary if pt discharges prior to next PT visit      NAME: Duane Michaels  : 1925  MRN: [id number]    Date of Service: 10/21/2020    Discharge Recommendations:  Continue to assess pending progress   Duane Michaels scored a 17/24 on the AM-PAC short mobility form. PT Equipment Recommendations  Equipment Needed: Yes  Mobility Devices: Charlott Goon: Rolling    Assessment   Body structures, Functions, Activity limitations: Decreased functional mobility ; Decreased endurance;Decreased strength;Decreased balance  Assessment: 80 y.o. male presented to ED 10/20/2020 with complaints of LLE weakness leaving him unable to walk. Pt also reported SOB with activity and found to have pneumonia. History of hip pain and back pain. Current status 10/21/2020: bed mobility SBA, transfers CGA/min A, ambulation short distances ~10' CGA/min A. Pt easily fatigued and quick onset of LE weakness. Pt reported back and buttock pain, although did have recent fall prior to ED visit without LOC or fractures. Dtr/caregiver observes session and is very supportive. Patient seems to be functioning at level a great deal lower than stated prior level of function. Dtr states feeling she will be able to care for patient. This writer recommends 2 wh walker vs rollator that dtr purchased for patient, as patient is likely not using it safely/correctly (given his observed fair use of 2 wh walker 10-) and therefore is more likely to fall. Therapist discusses these concerns with dtr/patient. Uncertain if DC to home is realistic at current patient presentation, although patient and dtr express strongly this is their plan. Will continue to see and assess. Trial rollator to assess patient stability/safety with use.   Treatment Diagnosis: Impaired functional mobility  Prognosis: Good  Decision Making: Medium Complexity  History: as noted  Clinical Presentation: Evolving  PT Education: Goals;PT Role;Plan of Care;Transfer Training;Gait Training  Patient Education: correct use of 2 wh and 4 wh walker. Therapist's concern regarding increased fall risk to patient if he uses 4 wh walker. Patient and dtr acknowledge, and state disagreement with,  Barriers to Learning: Hearing  REQUIRES PT FOLLOW UP: Yes  Activity Tolerance  Activity Tolerance: Patient limited by endurance; Patient Tolerated treatment well;Patient limited by fatigue  Activity Tolerance: Pt willing to participate in all activities, but stated he was \"woozy\" at start and end of session. Reported he had some pain in back/buttocks when standing and after activity, but this did not seem to be limiting. Quick onset of LE weakness due to poor muscular endurance/fatigue. Patient Diagnosis(es): The primary encounter diagnosis was Pneumonia due to organism. A diagnosis of Unable to walk was also pertinent to this visit. has a past medical history of Arthritis, BPH with obstruction/lower urinary tract symptoms, Chronic atrial fibrillation ([address] CKD (chronic kidney disease), Coronary arteriosclerosis, GERD (gastroesophageal reflux disease), History of blood transfusion, HTN (hypertension), Left hip pain, Pacemaker, Pulmonary fibrosis ([address] Pulmonary nodule, left, Renal atrophy, right, Renal cyst, right, and SOB (shortness of breath). has a past surgical history that includes Pacemaker insertion (2014); Tonsillectomy (1960); Small intestine surgery (1945); Inguinal hernia repair (Right, 07/2016); Coronary angioplasty with stent (2009); pacemaker placement; Colonoscopy; Abdomen surgery; and eye surgery (Bilateral).     Restrictions  Restrictions/Precautions  Restrictions/Precautions: Fall Risk  Position Activity Restriction  Other position/activity restrictions: [place]- B hearing aides     Vision/Hearing  Vision: Impaired  Vision Exceptions: Wears glasses for reading;Wears glasses for distance  Hearing: Exceptions to [facility]  Hearing Exceptions: Bilateral hearing aid(only had 1 hearing aid in)       Subjective  General  Chart Reviewed: Yes  Patient assessed for rehabilitation services?: Yes  Additional Pertinent Hx: 80 y.o. male presented to ED 10/20/2020 with complaints of LLE weakness leaving him unable to walk. Pt also reported SOB with activity and found to have pneumonia. History of hip pain and back pain. Response To Previous Treatment: Not applicable  Family / Caregiver Present: Yes(daughter present)  Referring Practitioner: KAYCE Qureshi CNP  Referral Date : 10/21/20  Follows Commands: Within Functional Limits  Subjective  Subjective: Pt supine in bed, awake and alert. Agreeable to PT. Required assistance from daughter to provide accurate subjective history due to pt being hard of hearing. Reported mild back and buttock pain, which increases with transfers and gait. Seated in BS chair at end of session. Orientation  Orientation  Overall Orientation Status: Within Functional Limits     Social/Functional History  Social/Functional History  Lives With: Daughter  Type of Home: House(condo)  Home Layout: One level  Home Access: Stairs to enter with rails  Entrance Stairs - Number of Steps: 1 R rail. + 6 steps down with B far spaced rails. Bathroom Shower/Tub: Tub/Shower unit  Bathroom Toilet: Handicap height(vanity near)  [employer]: Grab bars in shower(on wall into tub)  Home Equipment: 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 Leisure & Hobbies: reading. Additional Comments: Rarely alone. Home with dtr. Per dtr, up until last week, was walking a couple miles a couple times a week using a cane.  Dtr states she purchased a rollator for patient use due to his inability to ambulate, and due to patient need to spontaneously sit as he fatigues. Cognition   Cognition  Overall Cognitive Status: WFL  Cognition Comment: very conversational, requires redirection to task    Objective  AROM RLE (degrees)  RLE AROM: WFL  RLE General AROM: observed via functional mobility tasks  AROM LLE (degrees)  LLE AROM : WFL  LLE General AROM: observed via functional mobility tasks  Strength RLE  Strength RLE: WFL  Comment: Generally 4/5 throughout except R hip flexion 3+/5  Strength LLE  Strength LLE: Exception  Comment: 3+/5 hip ABduction, hip flexion, hamstrings, quad  Sensation  Overall Sensation Status: WFL(L great toe tender when wearing GENESIS hose)  Bed mobility  Supine to Sit: Stand by assistance(increased time to complete task, HOB elevated)  Sit to Supine: Unable to assess(Pt seated in BS chair at end of session.)  Transfers  Sit to Stand: Contact guard assistance;Minimal Assistance(from bed x1, from toilet x1)  Stand to sit: Contact guard assistance;Minimal Assistance(to toilet x1, to BS chair x1)  Comment: Pt demonstrates poor eccentric control as he sits in BS chair following second bout of ambulation. He appears moderately fatigued. Ambulation  Ambulation?: Yes  Ambulation 1  Surface: level tile  Device: Rolling Walker  Assistance: Contact guard assistance;Minimal assistance  Quality of Gait: Stooped posture, small step length, decreased step height, partial step through pattern, shuffling, decreased gait speed. Cues to stay within base of wh walker, which he does not correct. Distance: 10' EOB to toilet, 15' toilet to [facility] chair (after about 10' pt stated he was feeling weak and so BS chair was brought closer to him)  Comments: Daughter Mo Wolf states pt typically walks with forward flexed posture; pt had tendency to allow walker to get a little too far in front of him due to posture.   Stairs/Curb  Stairs?: No  Balance  Posture: Fair(fwd flexed posture)  Sitting - Static: Good;-  Sitting - Dynamic: Fair; -(Difficulty maintaining upright posture in midline with MMT EOB)  Standing - Static: Fair(@ RW)  Standing - Dynamic: Fair(@ RW)  Comments: limited stance and gait tolerance, with patient reporting back pain and fatigue. Plan   Safety Devices  Type of devices: Call light within reach, Chair alarm in place, Gait belt, Left in chair, Nurse notified(RN Jovana informed)    AM-PAC Score  AM-PAC Inpatient Mobility Raw Score : 17 (10/21/20 1725)  AM-PAC Inpatient T-Scale Score : 42.13 (10/21/20 1725)  Mobility Inpatient CMS 0-100% Score: 50.57 (10/21/20 1725)  Mobility Inpatient CMS G-Code Modifier : CK (10/21/20 1725)     Goals  Short term goals  Time Frame for Short term goals: by acute D/C  Short term goal 1: Bed mobility supervision  Short term goal 2: Transfers CGA  Short term goal 3: Ambulation 21' with wh walker and CGA  Short term goal 4: Progress to stairs as able.   Patient Goals   Patient goals : \"to get up and walk without anything\"       Therapy Time   Individual Concurrent Group Co-treatment   Time In 2569         Time Out 1445         Minutes 61            Tejal Anthony PT  Electronically signed by Marta Sarmiento, [address] [place] [address] Drive [phone])  on 10/21/2020 at 5:53 PM

## 2020-10-22 NOTE — PROGRESS NOTES
Department of Internal Medicine  General Internal Medicine  Attending Progress Note    Chief Complaint:low back pain and leg weakness      HPI:   SUBJECTIVE:  81 yo male with lumbar djd and left leg weakness, PAF and ILD who is on his second hospital day. He has made some progress walking and reports his back pain is pretty severe and associated with leg weakness improved with bending forward over his walker. He has fallen and is afraid of falling again. Has continued urgency incontinence and started using the urinal to avoid accidents. Seen by all the consultants. Main issue is can he return to home safely reports spends a lot of his time alone  Breathing has gotten a little better still bringing up a lot of discolored phlegm.       Past Medical History:   Diagnosis Date    Arthritis     BPH with obstruction/lower urinary tract symptoms     nocturia 3-4 x per night    Chronic atrial fibrillation (HCC)     CKD (chronic kidney disease) 2017    level 3    Coronary arteriosclerosis 2009    2 stents/ Dr Natalia Dyson in [facility] GERD (gastroesophageal reflux disease)     History of blood transfusion     1945   During deployment    HTN (hypertension) 1990    Left hip pain     schrapnel in hip and intestines/chronic pains left hip impairs walking    Pacemaker 2015    Pulmonary fibrosis ([address] 08/2017    mild amount seen on chest ct    Pulmonary nodule, left 2017    noncalcified 7mm low risk repeat 8/2018 if patient willing    Renal atrophy, right 2017    Renal cyst, right 08/2017    not clearly simple cyst    SOB (shortness of breath) 2017    terrell normal pft and ct chest ? smll amount of pulmonary fibrosis     Past Surgical History:   Procedure Laterality Date    ABDOMEN SURGERY      Scrapnel removed in [facility]  2009    awoke with indigestion    EYE SURGERY Bilateral     cataract    INGUINAL HERNIA REPAIR Right 07/2016    PACEMAKER INSERTION  2014  PACEMAKER PLACEMENT      SMALL INTESTINE SURGERY  1945    jelly as ww II vet, part of small intestine moved    TONSILLECTOMY  1960      Family History   Problem Relation Age of Onset    Pacemaker Sister     Pacemaker Brother     No Known Problems Other         lung disease     Social History     Tobacco Use    Smoking status: Never Smoker    Smokeless tobacco: Never Used   Substance Use Topics    Alcohol use: No    Drug use: Not Currently       Prior to Admission medications    Medication Sig Start Date End Date Taking?  Authorizing Provider   omeprazole (PRILOSEC) 20 MG delayed release capsule Take 1 capsule by mouth daily Instead of pepcid for heart burn on an empty stomach 10/9/20  Yes Nicanor Holland MD   fluticasone [facility]) 50 MCG/ACT nasal spray SPRAY 2 SPRAY INTO EACH NOSTRIL EVERY DAY 10/9/20  Yes Nicanor Holland MD   predniSONE (DELTASONE) 10 MG tablet TAKE 1 TABLET BY MOUTH EVERY DAY 9/22/20  Yes KAYCE Hurt - CNP   albuterol sulfate  (90 Base) MCG/ACT inhaler Inhale 2 puffs into the lungs every 6 hours as needed for Wheezing or Shortness of Breath 8/27/20  Yes Álvaro Browning MD   umeclidinium-vilanterol (ANORO ELLIPTA) 62.5-25 MCG/INH AEPB inhaler Inhale 1 puff into the lungs daily 8/27/20  Yes Álvaro Browning MD   olmesartan (BENICAR) 40 MG tablet TAKE 1 TABLET BY MOUTH EVERY DAY 8/21/20  Yes Nicanor Holland MD   furosemide (LASIX) 20 MG tablet TAKE 1 TABLET BY MOUTH EVERY DAY AS NEEDED FOR LEG SWELLING 5/6/20  Yes Nicanor Holland MD   tamsulosin (FLOMAX) 0.4 MG capsule ONE EVERY NIGHT FOR YOUR PROSTATE 4/22/20  Yes Nicanor Holland MD   finasteride (PROSCAR) 5 MG tablet TAKE 1 TABLET BY MOUTH EVERY DAY 12/2/19  Yes Nicanor Holland MD   atorvastatin (LIPITOR) 40 MG tablet Take 1 tablet by mouth daily 2/22/19  Yes Annabel Tracey MD   rivaroxaban (XARELTO) 15 MG TABS tablet Take 1 tablet by mouth daily (with breakfast) 1/8/18  Yes Niacnor Holland MD   aspirin 81 MG tablet Take 81 mg by mouth daily   Yes Historical Provider, MD   Cholecalciferol (VITAMIN D3) 2000 UNITS CAPS Take 2,000 Units by mouth daily   Yes Historical Provider, MD   calcium carbonate (OSCAL) 500 MG TABS tablet Take 600 mg by mouth daily caltrate 600-D3 one daily   Yes Historical Provider, MD   Multiple Vitamins-Minerals (CENTRUM SILVER ADULT 50+) TABS Take 1 tablet by mouth daily   Yes Historical Provider, MD   oxybutynin (DITROPAN) 5 MG tablet TAKE 1 TABLET BY MOUTH EVERY DAY 10/21/20   Ami Alonzo MD         ROS:  A comprehensive review of systems was negative except for: back pain , trouble with balance    OBJECTIVE:      PHYSICAL:  Intake/Output:   Patient Vitals for the past 8 hrs:   BP Temp Temp src Pulse Resp SpO2 Weight   10/22/20 1003 (!) 145/66 97.6 °F (36.4 °C) Oral 73 12 94 % --   10/22/20 0835 -- -- -- -- -- 95 % --   10/22/20 0818 138/70 98.4 °F (36.9 °C) Oral 72 18 95 % --   10/22/20 0431 135/74 98.5 °F (36.9 °C) Oral 89 16 95 % 174 lb 2.6 oz (79 kg)     I/O last 3 completed shifts: In: 600 [P.O.:240;  I.V.:10; IV Piggyback:350]  Out: 292 [Urine:775]  I/O this shift:  In: 240 [P.O.:240]  Out: 150 [Urine:150]  VITALS:    BP (!) 145/66   Pulse 73   Temp 97.6 °F (36.4 °C) (Oral)   Resp 12   Ht 5' 6\" (1.676 m)   Wt 174 lb 2.6 oz (79 kg)   SpO2 94%   BMI 28.11 kg/m²     General Appearance: alert and oriented to person, place and time, well-developed and well-nourished, in no acute distress  Skin: warm and dry, no rash or erythema  Head: normocephalic and atraumatic  Eyes: conjunctivae normal and sclera anicteric  ENT: hearing grossly normal bilaterally and sinuses non-tender  Neck: neck supple and non tender without mass, no thyromegaly or thyroid nodules, no cervical lymphadenopathy   Pulmonary/Chest: clear to auscultation bilaterally- exept for basilar crackles  Cardiovascular: normal rate, normal S1 and S2, no gallops and no carotid bruits: paced  Abdomen: soft, non-tender, non-distended, normal bowel sounds, no masses or organomegaly  Extremities: no cyanosis, clubbing or edema  Musculoskeletal: bony swelling and tenderness both knees  Neurologic: coordination normal and speech normal, moves all 4 extremities    DATA:   Labs:  CBC:   Recent Labs     10/20/20  1711 10/21/20  0620 10/22/20  0537   WBC 9.1 9.9 10.2   HGB 11.3* 10.9* 10.5*    169 155     BMP:    Recent Labs     10/20/20  1710 10/21/20  0620 10/22/20  0537   * 137 136   K 4.9 3.9 4.8    102 101   CO2 21 28 24   BUN 42* 38* 43*   CREATININE 1.8* 1.5* 1.9*   GLUCOSE 132* 112* 179*     POC GLUCOSE:    Recent Labs     10/20/20  1655   POCGLU 132*     Ca/Mg/Phos:   Recent Labs     10/20/20  1710 10/21/20  0620 10/22/20  0537   CALCIUM 9.2 8.9 8.7     Hepatic:   Recent Labs     10/20/20  1710   AST 19   ALT 14   BILITOT 0.5   ALKPHOS 50     Troponin:   Recent Labs     10/20/20  1711 10/21/20  0620   TROPONINI 0.03* 0.03*     ProBNP:   Recent Labs     10/20/20  1711 10/22/20  0537   PROBNP 1,446* 1,698*     Lipids: No results for input(s): CHOL, TRIG, HDL, LDLCALC, LABVLDL in the last 72 hours. ABGs: No results for input(s): PHART, PO2ART, JJX0ILA in the last 72 hours. PT/INR:   Recent Labs     10/20/20  1711   PROTIME 15.9*   INR 1.37*     APTT:   Recent Labs     10/20/20  1711   APTT 33.5       DIAGNOSTICS:  Ct Head Wo Contrast    Result Date: 10/20/2020  No acute intracranial abnormality. No significant change from prior study     Ct Chest Wo Contrast    Result Date: 10/20/2020  Pulmonary interstitial opacities, most likely pulmonary edema superimposed on chronic interstitial lung disease. Indeterminate 42 mm right renal mass may represent a proteinaceous cyst or neoplasm.  RECOMMENDATIONS: Only if deemed clinically important to the patient's medical care after considering advanced patient age and comorbidities, further workup of the renal lesion could be obtained with nonemergent/outpatient renal mass protocol CT or MRI     Ct Lumbar Spine Wo Contrast    Result Date: 10/20/2020  No acute fracture or subluxation. 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 L3-4 severe central and moderate bilateral foraminal stenosis. L4-5 grade 1 spondylolisthesis with additional degenerative changes, resulting in moderate central stenosis. Xr Chest Portable    Result Date: 10/20/2020  Patchy infiltrates suggested bilaterally new from prior study. Cta Head Neck W Contrast    Result Date: 10/20/2020  1. Bilateral carotid bifurcation atherosclerotic plaque resulting in approximately 40% bilateral stenosis of the origin of the internal carotid artery. Finding is compatible with 16-49% stenosis per sonographic NASCET index criteria. 2. 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 3. Right vertebral artery termination as the posteroinferior cerebellar artery (PICA). 4. Bilateral internal carotid artery cavernous segment atherosclerotic calcification without significant arterial stenosis. 5. Approximately 50% arterial stenosis of the bilateral vertebral body origin secondary to encroachment by atherosclerotic calcification. 6. Mild cerebral white matter chronic microvascular ischemic disease.          ASSESSMENT AND PLAN    Principal Problem:    Acute pulmonary edema (HCC)  Plan: seems to have improved do not want to overdiurese, Echo showed mod to severe MR and preserved EF, to continue medical managment  Active Problems:    Pacemaker  Plan: ongoing cardio fu    HTN (hypertension)  Plan: running a little higher on steroids    Coronary arteriosclerosis  Plan: stable    BPH with obstruction/lower urinary tract symptoms  Plan: ongoing severe sxs    Pulmonary fibrosis ([address]  Plan: ongoing issue, awaiting pulmonary's guidance, oxygen levels good on room air    Parkinson disease ([address]  Plan: perhaps some cogwheeling in the wrists no where else, not being treated for this, had a brother who  of parkinsons    Paroxysmal atrial fibrillation [facility])  Plan: ongoing, cont same    Pulmonary infiltrates  Plan: question is whether this was infectious or not, terrell in progress        Appropriate diagnostic studies and consults ordered  Moe Navas MD

## 2020-10-22 NOTE — PROGRESS NOTES
tablet 5 mg, 5 mg, Oral, Daily  rivaroxaban (XARELTO) tablet 15 mg, 15 mg, Oral, Daily with breakfast  tamsulosin (FLOMAX) capsule 0.4 mg, 0.4 mg, Oral, Daily  glycopyrrolate-formoterol (BEVESPI) 9-4.8 MCG/ACT inhaler 2 puff, 2 puff, Inhalation, BID  albuterol (PROVENTIL) nebulizer solution 0.63 mg, 0.63 mg, Nebulization, TID  sodium chloride flush 0.9 % injection 10 mL, 10 mL, Intravenous, 2 times per day  sodium chloride flush 0.9 % injection 10 mL, 10 mL, Intravenous, PRN  acetaminophen (TYLENOL) tablet 650 mg, 650 mg, Oral, Q6H PRN **OR** acetaminophen (TYLENOL) suppository 650 mg, 650 mg, Rectal, Q6H PRN  cefTRIAXone (ROCEPHIN) 1 g IVPB in 50 mL D5W minibag, 1 g, Intravenous, Q24H  azithromycin (ZITHROMAX) 500 mg in D5W 250ml addavial, 500 mg, Intravenous, Q24H  methylPREDNISolone sodium (SOLU-MEDROL) injection 40 mg, 40 mg, Intravenous, Q12H    Data reviewed:  Labs:  CBC:   Recent Labs     10/20/20  1711 10/21/20  0620 10/22/20  0537   WBC 9.1 9.9 10.2   HGB 11.3* 10.9* 10.5*   HCT 34.7* 34.0* 31.7*   MCV 81.3 80.9 80.1    169 155     BMP:   Recent Labs     10/20/20  1710 10/21/20  0620 10/22/20  0537   * 137 136   K 4.9 3.9 4.8    102 101   CO2 21 28 24   BUN 42* 38* 43*   CREATININE 1.8* 1.5* 1.9*     LIVER PROFILE:   Recent Labs     10/20/20  1710   AST 19   ALT 14   BILITOT 0.5   ALKPHOS 50     PT/INR:   Recent Labs     10/20/20  1711   PROTIME 15.9*   INR 1.37*     APTT:   Recent Labs     10/20/20  1711   APTT 33.5       Cultures:   Blood culture (10/22): pending  Urine strep and legionella antigens: Negative      Films:  Chest images and reports were reviewed by me. My interpretation is:  No new images    Assessment:     Pulmonary infiltrates  Pulmonary fibrosis  Shortness of breath      Plan:    Pulmonary infiltrates  -Chest CT shows slight increase in ground glass opacities compared to 2019.   This could represent some degree of disease progression, a flare of the fibrosis or pulmonary edema  -Hold diuretics today  -Rheumatologic studies are negative. Continue Solu-Medrol 40 mg IV twice daily  -Continue ceftriaxone and azithromycin (day #2)  -Repeat chest x-ray in a.m.     Pulmonary fibrosis  -Continue Solu-Medrol twice daily     Shortness of breath  -In the setting of increased ground glass opacities  -Solu-Medrol twice daily  -Continue antibiotics  -Hold diuretics for today        Thank you for allowing me to participate in the care of this patient. Will follow.      Maik Perez MD  [facility] Pulmonary, Critical Care and Sleep

## 2020-10-22 NOTE — CONSULTS
Cardiology Consultation   Referring Physician: Dr. Guajardo Payment  Reason for Consultation: dyspnea  Chief Complaint:   Chief Complaint   Patient presents with    Extremity Weakness     left sided weakness. pt sent from [facility] for possible cva.  left sided weakness has increased over the last two weaks. Subjective:   History of Present Illness:     Carlos Mcmullen is a 80 y.o. male with significant history of permanent afib w/ CHB ( VVI pacing 100%), moderate MR, HTN who presents with left leg pain. Typically is mobile with a walker however became increasingly weak and unable to walk, thus presenting to the [facility] ER for w/u. Incidental finding of increasing pulmonary congestion on CT scan. He denies chest pain, PND, orthopnea, dyspnea at rest, palpitations, syncope or edema. Echo ordered by primary team c/w moderate to severe MR. Prior cardiac testing:    EKG:     Echo:   Summary   Left ventricle size is normal. Normal left ventricular wall thickness. Ejection fraction is visually estimated to be 55%. There is akinesis of the   mid anteroseptal wall. Mild to moderate mitral regurgitation is present. The left atrium is moderately dilated. Mild aortic regurgitation is present. The right ventricle appears normal in size. There is mild tricuspid regurgitation with RVSP estimated at 31 mmHg. Stress Test:     Cath:     Past Medical History:   has a past medical history of Arthritis, BPH with obstruction/lower urinary tract symptoms, Chronic atrial fibrillation ([address] CKD (chronic kidney disease), Coronary arteriosclerosis, GERD (gastroesophageal reflux disease), History of blood transfusion, HTN (hypertension), Left hip pain, Pacemaker, Pulmonary fibrosis ([address] Pulmonary nodule, left, Renal atrophy, right, Renal cyst, right, and SOB (shortness of breath). Surgical History:   has a past surgical history that includes Pacemaker insertion (2014); Tonsillectomy (1960);  Small intestine surgery (1945); Inguinal hernia repair (Right, 07/2016); Coronary angioplasty with stent (2009); pacemaker placement; Colonoscopy; Abdomen surgery; and eye surgery (Bilateral). Social History:   reports that he has never smoked. He has never used smokeless tobacco. He reports previous drug use. He reports that he does not drink alcohol. Family History:  family history includes No Known Problems in an other family member; Pacemaker in his brother and sister. Home Medications:  Were reviewed and are listed in nursing record and/or below  Prior to Admission medications    Medication Sig Start Date End Date Taking?  Authorizing Provider   omeprazole (PRILOSEC) 20 MG delayed release capsule Take 1 capsule by mouth daily Instead of pepcid for heart burn on an empty stomach 10/9/20  Yes Candy Levy MD   fluticasone [facility]) 50 MCG/ACT nasal spray SPRAY 2 SPRAY INTO EACH NOSTRIL EVERY DAY 10/9/20  Yes Candy Levy MD   predniSONE (DELTASONE) 10 MG tablet TAKE 1 TABLET BY MOUTH EVERY DAY 9/22/20  Yes KAYCE Regalado - CNP   albuterol sulfate  (90 Base) MCG/ACT inhaler Inhale 2 puffs into the lungs every 6 hours as needed for Wheezing or Shortness of Breath 8/27/20  Yes Hema Sharma MD   umeclidinium-vilanterol (ANORO ELLIPTA) 62.5-25 MCG/INH AEPB inhaler Inhale 1 puff into the lungs daily 8/27/20  Yes Hema Sharma MD   olmesartan (BENICAR) 40 MG tablet TAKE 1 TABLET BY MOUTH EVERY DAY 8/21/20  Yes Candy Levy MD   furosemide (LASIX) 20 MG tablet TAKE 1 TABLET BY MOUTH EVERY DAY AS NEEDED FOR LEG SWELLING 5/6/20  Yes Candy Levy MD   tamsulosin (FLOMAX) 0.4 MG capsule ONE EVERY NIGHT FOR YOUR PROSTATE 4/22/20  Yes Candy Levy MD   finasteride (PROSCAR) 5 MG tablet TAKE 1 TABLET BY MOUTH EVERY DAY 12/2/19  Yes Candy Levy MD   atorvastatin (LIPITOR) 40 MG tablet Take 1 tablet by mouth daily 2/22/19  Yes Dayo Chamorro MD   rivaroxaban (XARELTO) 15 MG TABS tablet Take 1 tablet by mouth daily (with breakfast) 1/8/18  Yes Jeri Lopez MD   aspirin 81 MG tablet Take 81 mg by mouth daily   Yes Historical Provider, MD   Cholecalciferol (VITAMIN D3) 2000 UNITS CAPS Take 2,000 Units by mouth daily   Yes Historical Provider, MD   calcium carbonate (OSCAL) 500 MG TABS tablet Take 600 mg by mouth daily caltrate 600-D3 one daily   Yes Historical Provider, MD   Multiple Vitamins-Minerals (CENTRUM SILVER ADULT 50+) TABS Take 1 tablet by mouth daily   Yes Historical Provider, MD   oxybutynin (DITROPAN) 5 MG tablet TAKE 1 TABLET BY MOUTH EVERY DAY 10/21/20   Jeri Lopez MD          Allergies:  Claritin [loratadine] and Zestril [lisinopril]     Review of Systems:   · Constitutional: no unanticipated weight loss. There's been no change in energy level, sleep pattern, or activity level. No fevers, chills. · Eyes: No visual changes or diplopia. No scleral icterus. · ENT: No Headaches, hearing loss or vertigo. No mouth sores or sore throat. · Cardiovascular: as reviewed in HPI  · Respiratory: No cough or wheezing, no sputum production. No hemoptysis. · Gastrointestinal: No abdominal pain, appetite loss, blood in stools. No change in bowel or bladder habits. · Genitourinary: No dysuria, trouble voiding, or hematuria. · Musculoskeletal:  No gait disturbance, no joint complaints. · Integumentary: No rash or pruritis. · Neurological: No headache, diplopia, change in muscle strength, numbness or tingling. · Psychiatric: No anxiety or depression. · Endocrine: No temperature intolerance. No excessive thirst, fluid intake, or urination. No tremor. · Hematologic/Lymphatic: No abnormal bruising or bleeding, blood clots or swollen lymph nodes. · Allergic/Immunologic: No nasal congestion or hives. Objective:   PHYSICAL EXAM:    Vitals:    10/21/20 1941   BP: 138/77   Pulse: 71   Resp: 16   Temp: 97.9 °F (36.6 °C)   SpO2: 94%    Weight: 174 lb 2.6 oz (79 kg)       General Appearance:  Alert, cooperative, no distress, appears stated age.    Head: Normocephalic, without obvious abnormality, atraumatic. Eyes:  Pupils equal and round. No scleral icterus. Mouth: Moist mucosa, no pharyngeal erythema. Nose: Nares normal. No drainage or sinus tenderness. Neck: Supple, symmetrical, trachea midline. No adenopathy. No tenderness/mass/nodules. No carotid bruit or elevated JVD. Lungs:   Clear to auscultation bilaterally, respirations unlabored. No wheeze, rales, or rhonchi. Chest Wall:  No tenderness or deformity. Heart:  Regular rate. S1/S2 normal. YEN 3/6 apex , rub, or gallop. Abdomen:   Soft, non-tender, bowel sounds active. Musculoskeletal: No muscle wasting or digital clubbing. Extremities: Extremities normal, atraumatic. No cyanosis or edema. Pulses: 2+ radial and carotid pulses, symmetric. Skin: No rashes or lesions. Pysch: Normal mood and affect. Alert and oriented x 4.    Neurologic: Normal gross motor and sensory exam.       Labs     CBC:   Lab Results   Component Value Date    WBC 9.9 10/21/2020    RBC 4.20 10/21/2020    HGB 10.9 10/21/2020    HCT 34.0 10/21/2020    MCV 80.9 10/21/2020    RDW 17.0 10/21/2020     10/21/2020     CMP:  Lab Results   Component Value Date     10/21/2020    K 3.9 10/21/2020     10/21/2020    CO2 28 10/21/2020    BUN 38 10/21/2020    CREATININE 1.5 10/21/2020    GFRAA 53 10/21/2020    AGRATIO 1.4 10/20/2020    LABGLOM 44 10/21/2020    GLUCOSE 112 10/21/2020    PROT 6.5 10/20/2020    CALCIUM 8.9 10/21/2020    BILITOT 0.5 10/20/2020    ALKPHOS 50 10/20/2020    AST 19 10/20/2020    ALT 14 10/20/2020     PT/INR:  No results found for: PTINR  HgBA1c:No results found for: LABA1C  @RESUFAST(CKTOTAL,CKMB,CKMBINDEX,TROPONINI      CURRENT Medications:  Current Facility-Administered Medications: aspirin chewable tablet 81 mg, 81 mg, Oral, Daily  atorvastatin (LIPITOR) tablet 40 mg, 40 mg, Oral, Daily  calcium elemental (OSCAL) tablet 500 mg, 500 mg, Oral, Daily  Vitamin D (CHOLECALCIFEROL) tablet 2,000 Units, 2,000 Units, Oral, Daily  finasteride (PROSCAR) tablet 5 mg, 5 mg, Oral, Daily  fluticasone (FLONASE) 50 MCG/ACT nasal spray 2 spray, 2 spray, Each Nostril, Daily  losartan (COZAAR) tablet 50 mg, 50 mg, Oral, Daily  pantoprazole (PROTONIX) tablet 40 mg, 40 mg, Oral, QAM AC  oxybutynin (DITROPAN) tablet 5 mg, 5 mg, Oral, Daily  rivaroxaban (XARELTO) tablet 15 mg, 15 mg, Oral, Daily with breakfast  tamsulosin (FLOMAX) capsule 0.4 mg, 0.4 mg, Oral, Daily  glycopyrrolate-formoterol (BEVESPI) 9-4.8 MCG/ACT inhaler 2 puff, 2 puff, Inhalation, BID  albuterol (PROVENTIL) nebulizer solution 0.63 mg, 0.63 mg, Nebulization, TID  sodium chloride flush 0.9 % injection 10 mL, 10 mL, Intravenous, 2 times per day  sodium chloride flush 0.9 % injection 10 mL, 10 mL, Intravenous, PRN  acetaminophen (TYLENOL) tablet 650 mg, 650 mg, Oral, Q6H PRN **OR** acetaminophen (TYLENOL) suppository 650 mg, 650 mg, Rectal, Q6H PRN  cefTRIAXone (ROCEPHIN) 1 g IVPB in 50 mL D5W minibag, 1 g, Intravenous, Q24H  azithromycin (ZITHROMAX) 500 mg in D5W 250ml addavial, 500 mg, Intravenous, Q24H  furosemide (LASIX) injection 40 mg, 40 mg, Intravenous, BID  potassium chloride (KLOR-CON M) extended release tablet 20 mEq, 20 mEq, Oral, BID WC  methylPREDNISolone sodium (SOLU-MEDROL) injection 40 mg, 40 mg, Intravenous, Q12H     Cardiac testing     EKG:     Echo:     Stress Test:     Cath:      All above diagnostic testing was independently visualized and reviewed by me (not simply review of report)     Patient Active Problem List   Diagnosis    Left hip pain    Pacemaker    HTN (hypertension)    Coronary arteriosclerosis    Bradycardia    BPH with obstruction/lower urinary tract symptoms    GERD (gastroesophageal reflux disease)    Pulmonary fibrosis (HCC)    SOB (shortness of breath)    Renal cyst, right    Interstitial lung disease (HCC)    Solitary lung nodule    Arthritis of both knees    Parkinson disease [facility]    Paroxysmal atrial fibrillation (HCC)    Acute pulmonary edema (HCC)    Pulmonary infiltrates         Assessment and Plan   1) Moderate to severe mitral regurgitation  - okay to switch to PO lasix tomorrow, would d/c on 40mg PO BID   - not a candidate for any intervention thus would treat medically w/ BP control and diuretics     2) chronic afib   - xarelto   - PPM for CHB     3) Mixed hyperlipidemia  - statin       Thank you for allowing us to participate in the care of Paul Jackson.     Elaine Shi MD [address] [place] [address],  Interventional Cardiology, and Peripheral Vascular Disease   [facility] [address]   (C): [phone]  Belfast Payment): [phone]

## 2020-10-22 NOTE — PROGRESS NOTES
Progress Note  Pt followed for left leg weakness and difficulty walking. He began PT yesterday and feels his strength and balance have slightly improved since admission. He continues to experience low back pain with any prolonged standing or walking that is alleviated with stooping over the countertop/walker or sitting/laying down. He reports bilateral knee pain that is chronic and further limits his mobility. He denies any falls. He is using a walker currently with ambulating. Updates  Stable with minor improvement.     Active Ambulatory Problems     Diagnosis Date Noted    Left hip pain     Pacemaker 01/01/2015    HTN (hypertension) 01/01/1990    Coronary arteriosclerosis 01/01/2009    Bradycardia 01/01/1960    BPH with obstruction/lower urinary tract symptoms     GERD (gastroesophageal reflux disease)     Pulmonary fibrosis (HCC)     SOB (shortness of breath) 01/01/2017    Renal cyst, right 08/01/2017    Interstitial lung disease ([facility] [address] 04/05/2019    Solitary lung nodule 04/05/2019    Arthritis of both knees 12/03/2019    Parkinson disease ([facility] [address] 10/20/2020    Paroxysmal atrial fibrillation ([facility] 10/20/2020     Resolved Ambulatory Problems     Diagnosis Date Noted    No Resolved Ambulatory Problems     Past Medical History:   Diagnosis Date    Arthritis     Chronic atrial fibrillation (HCC)     CKD (chronic kidney disease) 2017    History of blood transfusion     Pulmonary nodule, left 2017    Renal atrophy, right 2017       Current Facility-Administered Medications:     aspirin chewable tablet 81 mg, 81 mg, Oral, Daily, Scott Gregory MD, 81 mg at 10/22/20 0846    atorvastatin (LIPITOR) tablet 40 mg, 40 mg, Oral, Daily, Scott Gregory MD, 40 mg at 10/22/20 0846    calcium elemental (OSCAL) tablet 500 mg, 500 mg, Oral, Daily, Scott Gregory MD, 500 mg at 10/22/20 0846    Vitamin D (CHOLECALCIFEROL) tablet 2,000 Units, 2,000 Units, Oral, Daily, Scott Gregory MD, 2,000 Units at 10/22/20 1287    finasteride (PROSCAR) tablet 5 mg, 5 mg, Oral, Daily, Alvarez Beltre MD, 5 mg at 10/22/20 0846    fluticasone (FLONASE) 50 MCG/ACT nasal spray 2 spray, 2 spray, Each Nostril, Daily, Alvarez Beltre MD, 2 spray at 10/22/20 0847    losartan (COZAAR) tablet 50 mg, 50 mg, Oral, Daily, Alvarez Beltre MD, 50 mg at 10/22/20 0846    pantoprazole (PROTONIX) tablet 40 mg, 40 mg, Oral, QAM AC, Alvarez Beltre MD, 40 mg at 10/22/20 0657    oxybutynin (DITROPAN) tablet 5 mg, 5 mg, Oral, Daily, Alvarez Beltre MD, 5 mg at 10/22/20 0846    rivaroxaban (XARELTO) tablet 15 mg, 15 mg, Oral, Daily with breakfast, Alvarez Beltre MD, 15 mg at 10/22/20 0846    tamsulosin (FLOMAX) capsule 0.4 mg, 0.4 mg, Oral, Daily, Alvarez Beltre MD, 0.4 mg at 10/22/20 0846    glycopyrrolate-formoterol (BEVESPI) 9-4.8 MCG/ACT inhaler 2 puff, 2 puff, Inhalation, BID, Alvarez Beltre MD, 2 puff at 10/22/20 0835    albuterol (PROVENTIL) nebulizer solution 0.63 mg, 0.63 mg, Nebulization, TID, Alvarez Beltre MD, Stopped at 10/22/20 9316    sodium chloride flush 0.9 % injection 10 mL, 10 mL, Intravenous, 2 times per day, Alvarez Beltre MD, 10 mL at 10/22/20 0847    sodium chloride flush 0.9 % injection 10 mL, 10 mL, Intravenous, PRN, Alvarez Beltre MD    acetaminophen (TYLENOL) tablet 650 mg, 650 mg, Oral, Q6H PRN **OR** acetaminophen (TYLENOL) suppository 650 mg, 650 mg, Rectal, Q6H PRN, Alvarez Beltre MD    cefTRIAXone (ROCEPHIN) 1 g IVPB in 50 mL D5W minibag, 1 g, Intravenous, Q24H, Alvarez Beltre MD, Stopped at 10/22/20 0307    azithromycin (ZITHROMAX) 500 mg in D5W 250ml addavial, 500 mg, Intravenous, Q24H, Alvarez Beltre MD, Stopped at 10/22/20 0506    potassium chloride (KLOR-CON M) extended release tablet 20 mEq, 20 mEq, Oral, BID WC, Alvarez Beltre MD, 20 mEq at 10/22/20 0846    methylPREDNISolone sodium (SOLU-MEDROL) injection 40 mg, 40 mg, Intravenous, Q12H, Bala Shipley MD, 40 mg at 10/22/20 0357    Exam  Blood pressure 138/70, pulse 72, temperature 98.4 °F (36.9 °C), temperature source Oral, resp. rate 18, height 5' 6\" (1.676 m), weight 174 lb 2.6 oz (79 kg), SpO2 95 %. Constitutional    Vital signs: BP, HR, and RR reviewed   General Alert, no distress, well-nourished  Eyes: fundoscopic not performed  Cardiovascular: pulses symmetric in all 4 extremities. No peripheral edema. Psychiatric: cooperative with examination, no  psychotic behavior noted. Neurologic  Mental status:   orientation to person and place   General fund of knowledge grossly intact   Memory grossly intact   Attention intact as able to attend well to the exam     Language fluent in conversation   Comprehension intact; follows simple commands  Cranial nerves:   CN 3,4,6: extraocular muscles intact,  CN7:face symmetric without dysarthria,   CN8: hearing grossly intact  CN11: trap full strength on shoulder shrug  CN12: tongue midline with protrusion  Strength: BUEs 5/5, RLE 5/5, LLE 4+/5  Deep tendon reflexes: symmetric, but reduced throughout  Sensory: light touch intact in all 4 extremities. Tone: normal in all 4 extremities  Gait: deferred at this time for safety    ROS -   Constitutional- No weight loss or fevers  Eyes- No diplopia. No photophobia. Ears/nose/throat- No dysphagia. No Dysarthria  Cardiovascular- No palpitations. No chest pain  Respiratory- + dyspnea. No Cough  Gastrointestinal- No Abdominal pain. No Vomiting. Genitourinary- No incontinence. No urinary retention  Musculoskeletal- No myalgia. + arthralgia  Skin- No rash. No easy bruising. Psychiatric- No depression. No anxiety  Endocrine- No diabetes. No thyroid issues. Hematologic- No bleeding difficulty. No fatigue  Neurologic- + weakness. No Headache.     Labs  WBC 10.2  RBC 3.96  Hb 10.5  Hct 31.7  Platelets 635    Na 985  K 4.8  Cl 101  CO2 24  AG 11  Glucose 179  BUN 43  Cr 1.9  GFR 33    BNP 1,698  Sed Rate 12  CRP 2.1    Studies  Echo 10/20/20  Summary   Left ventricular cavity size is normal.   Ejection fraction is visually event. Recommendations  1. Continue rehabilitation efforts with PT and OT. 2. Continue stroke preventative measures (anticoagulation, antiplatelet, statin). 3. Discussed outpatient management for chronic low back pain in an effort to improve mobility and maintain LE strength. Patient is likely a poor candidate for lumbar spine surgery, but may benefit from injections in the low back (given cardiology clearance for temporarily stopping anticoagulation therapy). 4. No further neurologic recommendations at this time. Will sign off. Please contact with any further concerns or questions.     Electronically signed by Kike Ramírez PA-C on 10/22/2020 at 9:26 AM   [facility]    A copy of this note was provided for Dr Alvarez Beltre MD

## 2020-10-22 NOTE — PROGRESS NOTES
Physical Therapy  [facility]  Daily Treatment Note  NAME: Shanelle Howard  : 1925  MRN: [id number]    Date of Service: 10/22/2020  Shanelle Howard scored a 17/24 on the AM-PAC short mobility form. Current research shows that an AM-PAC score of 17 or less is typically not associated with a discharge to the patient's home setting. Based on the patient's AM-PAC score and their current functional mobility deficits, it is recommended that the patient have 5-7 sessions per week of Physical Therapy at d/c to increase the patient's independence. At this time, this patient demonstrates the endurance, and/or tolerance for 3 hours of therapy each day, with a treatment frequency of 5-7x/wk. Please see assessment section for further patient specific details. If patient discharges prior to next session this note will serve as a discharge summary. Please see below for the latest assessment towards goals. Discharge Recommendations:  5-7 sessions per week   PT Equipment Recommendations  Equipment Needed: Yes  [employer]: Rolling  Other: Rolling walker if goes home, unsafe to use a rollator at this time    Assessment   Assessment: Pt. who presents with L LE weakness leaving him unable to safely walk and also reported SOB and found to have pneumonia. History of back and hip pain, was injured in the war in 1942 with shrapnel through pelvis and intestines followed by an aparent long recovery. CT of spine L3-4 severe central and moderate bilateral foraminal stenosis andL4-5 grade 1 spondylolisthesis with additional degenerative changes, resulting in moderate central stenosis. MD notes report Pt. is not likely a surgical candidate, considering injection if Cardiology approves off blood thinner. Pt. Fluctuates from Contact to Min A with transfers and ambulation d/t weakens quickly especially L LE. Can ambulate only 10' 4x this date. Very willing to participate.   Will benefit from continued therapy  General Comment  Comments: Denies pain at rest.  Pain with standing          Orientation  Orientation  Overall Orientation Status: Within Functional Limits  Cognition      Objective   Bed mobility  Comment: Unable to assess d/t up in chair pre/post session  Transfers  Sit to Stand: Contact guard assistance  Stand to sit: Minimal Assistance(Pt. getting a bit weak and required increased assist to turn and sit safely)  Ambulation  Ambulation?: Yes  Ambulation 1  Surface: level tile  Device: Rolling Walker  Assistance: Minimal assistance  Quality of Gait: Stooped posture, small step length, decreased step height, partial step through pattern, shuffling, decreased gait speed. Cues to stay within base of  walker. Distance: 10' around bed  x 4 That is the safe dstance at this time as Pt. gets weak. Needs assist to move walker while turning     Balance  Posture: Fair(Forward flexed posture)  Sitting - Static: Good;-  Sitting - Dynamic: Fair  Standing - Static: Fair  Standing - Dynamic: Fair;-  Comments: limited stance and gait tolerance, with patient reporting back pain and fatigue. Exercises  Hip Flexion: x 20 B LE  Knee Long Arc Quad: x 20 B LE  Ankle Pumps: x 20 B LE         Comment: Set up for comfort in the BS chair with LE's elevated. Goals  Short term goals  Time Frame for Short term goals: by acute D/C - ongoing  Short term goal 1: Bed mobility supervision  Short term goal 2: Transfers CGA  Short term goal 3: Ambulation 21' with  walker and CGA  Short term goal 4: Progress to stairs as able.   Patient Goals   Patient goals : \"to get up and walk without anything\"    Plan    Safety Devices  Type of devices: Call light within reach, Chair alarm in place, Gait belt, Left in chair, Nurse notified     Therapy Time   Individual Concurrent Group Co-treatment   Time In 1330         Time Out 1410         Minutes 40 Timed Code Treatment Minutes: [address] [facility], [address] [place] [address], [id number]

## 2020-10-22 NOTE — H&P
Internal Medicine H and P  CC:left leg weakness  HPI:89 yo male with HTN, Pulmonary fibrosis and history of CAF who presented to the office yesterday with inability to walk for 2 days. He reported that on standing for a \"minute\" his low back would hurt and he wouldn't be able to put weight on his left leg. He did not fall. He managed to make it with a walker w seat. In addition he reported worsening breathing sob muñoz and cough with clear phlegm sometimes blood tinged. Denied f/c or covid exposure . Also reported long history of urinary incontinence which also seemed to get worse in the last 2 days.   No dysuria f/c etc.     Principal Problem:    Acute pulmonary edema (HCC)  Active Problems:    Pacemaker    HTN (hypertension)    Coronary arteriosclerosis    BPH with obstruction/lower urinary tract symptoms    Pulmonary fibrosis (HCC)    Parkinson disease (HCC)    Paroxysmal atrial fibrillation ([address]    Pulmonary infiltrates  Resolved Problems:    Community acquired pneumonia    Past Medical History:   Diagnosis Date    Arthritis     BPH with obstruction/lower urinary tract symptoms     nocturia 3-4 x per night    Chronic atrial fibrillation ([address]     CKD (chronic kidney disease) 2017    level 3    Coronary arteriosclerosis 2009    2 stents/ Dr Jean Pham in [facility] GERD (gastroesophageal reflux disease)     History of blood transfusion     1945   During deployment    HTN (hypertension) 1990    Left hip pain     schrapnel in hip and intestines/chronic pains left hip impairs walking    Pacemaker 2015    Pulmonary fibrosis ([address] 08/2017    mild amount seen on chest ct    Pulmonary nodule, left 2017    noncalcified 7mm low risk repeat 8/2018 if patient willing    Renal atrophy, right 2017    Renal cyst, right 08/2017    not clearly simple cyst    SOB (shortness of breath) 2017    terrell normal pft and ct chest ? smll amount of pulmonary fibrosis      Past Surgical History: Procedure Laterality Date    ABDOMEN SURGERY      Scrapnel removed in [facility]  2009    awoke with indigestion    EYE SURGERY Bilateral     cataract    INGUINAL HERNIA REPAIR Right 07/2016    PACEMAKER INSERTION  2014    PACEMAKER PLACEMENT      SMALL INTESTINE SURGERY  1945    jelly as ww II vet, part of small intestine moved    TONSILLECTOMY  1960      Medications Prior to Admission: omeprazole (PRILOSEC) 20 MG delayed release capsule, Take 1 capsule by mouth daily Instead of pepcid for heart burn on an empty stomach  fluticasone (FLONASE) 50 MCG/ACT nasal spray, SPRAY 2 SPRAY INTO EACH NOSTRIL EVERY DAY  predniSONE (DELTASONE) 10 MG tablet, TAKE 1 TABLET BY MOUTH EVERY DAY  albuterol sulfate  (90 Base) MCG/ACT inhaler, Inhale 2 puffs into the lungs every 6 hours as needed for Wheezing or Shortness of Breath  umeclidinium-vilanterol (ANORO ELLIPTA) 62.5-25 MCG/INH AEPB inhaler, Inhale 1 puff into the lungs daily  olmesartan (BENICAR) 40 MG tablet, TAKE 1 TABLET BY MOUTH EVERY DAY  furosemide (LASIX) 20 MG tablet, TAKE 1 TABLET BY MOUTH EVERY DAY AS NEEDED FOR LEG SWELLING  tamsulosin (FLOMAX) 0.4 MG capsule, ONE EVERY NIGHT FOR YOUR PROSTATE  [DISCONTINUED] oxybutynin (DITROPAN) 5 MG tablet, TAKE 1 TABLET BY MOUTH EVERY DAY  finasteride (PROSCAR) 5 MG tablet, TAKE 1 TABLET BY MOUTH EVERY DAY  atorvastatin (LIPITOR) 40 MG tablet, Take 1 tablet by mouth daily  rivaroxaban (XARELTO) 15 MG TABS tablet, Take 1 tablet by mouth daily (with breakfast)  aspirin 81 MG tablet, Take 81 mg by mouth daily  Cholecalciferol (VITAMIN D3) 2000 UNITS CAPS, Take 2,000 Units by mouth daily  calcium carbonate (OSCAL) 500 MG TABS tablet, Take 600 mg by mouth daily caltrate 600-D3 one daily  Multiple Vitamins-Minerals (CENTRUM SILVER ADULT 50+) TABS, Take 1 tablet by mouth daily  Allergies   Allergen Reactions    Claritin [Loratadine]      Reduced urine flow    Zestril [Lisinopril] Other (See Comments)     coughing      Social History     Tobacco Use    Smoking status: Never Smoker    Smokeless tobacco: Never Used   Substance Use Topics    Alcohol use: No      Family History   Problem Relation Age of Onset    Pacemaker Sister     Pacemaker Brother     No Known Problems Other         lung disease        Prior to Admission medications    Medication Sig Start Date End Date Taking?  Authorizing Provider   omeprazole (PRILOSEC) 20 MG delayed release capsule Take 1 capsule by mouth daily Instead of pepcid for heart burn on an empty stomach 10/9/20  Yes Scott Gregory MD   fluticasone Peg Slight) 50 MCG/ACT nasal spray SPRAY 2 SPRAY INTO EACH NOSTRIL EVERY DAY 10/9/20  Yes Scott Gregory MD   predniSONE (DELTASONE) 10 MG tablet TAKE 1 TABLET BY MOUTH EVERY DAY 9/22/20  Yes KAYCE Gee CNP   albuterol sulfate  (90 Base) MCG/ACT inhaler Inhale 2 puffs into the lungs every 6 hours as needed for Wheezing or Shortness of Breath 8/27/20  Yes Amelie Torres MD   umeclidinium-vilanterol (ANORO ELLIPTA) 62.5-25 MCG/INH AEPB inhaler Inhale 1 puff into the lungs daily 8/27/20  Yes Amelie Torres MD   olmesartan (BENICAR) 40 MG tablet TAKE 1 TABLET BY MOUTH EVERY DAY 8/21/20  Yes Scott Gregory MD   furosemide (LASIX) 20 MG tablet TAKE 1 TABLET BY MOUTH EVERY DAY AS NEEDED FOR LEG SWELLING 5/6/20  Yes Scott Gregory MD   tamsulosin (FLOMAX) 0.4 MG capsule ONE EVERY NIGHT FOR YOUR PROSTATE 4/22/20  Yes Scott Gregory MD   finasteride (PROSCAR) 5 MG tablet TAKE 1 TABLET BY MOUTH EVERY DAY 12/2/19  Yes Scott Gregory MD   atorvastatin (LIPITOR) 40 MG tablet Take 1 tablet by mouth daily 2/22/19  Yes Therese Salazar MD   rivaroxaban (XARELTO) 15 MG TABS tablet Take 1 tablet by mouth daily (with breakfast) 1/8/18  Yes Scott Gregory MD   aspirin 81 MG tablet Take 81 mg by mouth daily   Yes Historical Provider, MD   Cholecalciferol (VITAMIN D3) 2000 UNITS CAPS Take 2,000 Units by mouth daily Yes Historical Provider, MD   calcium carbonate (OSCAL) 500 MG TABS tablet Take 600 mg by mouth daily caltrate 600-D3 one daily   Yes Historical Provider, MD   Multiple Vitamins-Minerals (CENTRUM SILVER ADULT 50+) TABS Take 1 tablet by mouth daily   Yes Historical Provider, MD   oxybutynin (DITROPAN) 5 MG tablet TAKE 1 TABLET BY MOUTH EVERY DAY 10/21/20   Niru Baptiste MD     Review of Systems  A comprehensive review of systems was negative except for: cough sob    Objective:     Patient Vitals for the past 8 hrs:   BP Temp Temp src Pulse Resp SpO2 Weight   10/22/20 1003 (!) 145/66 97.6 °F (36.4 °C) Oral 73 12 94 % --   10/22/20 0835 -- -- -- -- -- 95 % --   10/22/20 0818 138/70 98.4 °F (36.9 °C) Oral 72 18 95 % --   10/22/20 0431 135/74 98.5 °F (36.9 °C) Oral 89 16 95 % 174 lb 2.6 oz (79 kg)   10/22/20 0303 128/74 98.3 °F (36.8 °C) Oral 70 16 92 % --     I/O last 3 completed shifts: In: 600 [P.O.:240;  I.V.:10; IV Piggyback:350]  Out: 091 [Urine:775]  I/O this shift:  In: 240 [P.O.:240]  Out: 150 [Urine:150]    VITALS:  BP (!) 145/66   Pulse 73   Temp 97.6 °F (36.4 °C) (Oral)   Resp 12   Ht 5' 6\" (1.676 m)   Wt 174 lb 2.6 oz (79 kg)   SpO2 94%   BMI 28.11 kg/m²   General Appearance: alert and oriented to person, place and time, well-developed and well-nourished, in no acute distress  Skin: warm and dry, no rash or erythema  Head: normocephalic and atraumatic  Eyes: conjunctivae normal and sclera anicteric  ENT: Platinum and sinuses non-tender  Neck: neck supple and non tender without mass, no thyromegaly or thyroid nodules, no cervical lymphadenopathy   Pulmonary/Chest: insp crackles 1/2 way up bilat paninsp crackles  Cardiovascular: normal rate, normal S1 and S2, no gallops and no carotid bruits   Abdomen: soft, non-tender, non-distended, normal bowel sounds, no masses or organomegaly  Extremities: no cyanosis, clubbing or trace edema bilat  Musculoskeletal: bony swelling knees  Neurologic: coordination normal and speech slow moves all 4 extremities  When lying in bed but when stood him up in the office he could not advance his left leg    ECG: afib paced  Data Review:     Recent Results (from the past 24 hour(s))   Legionella antigen, urine    Collection Time: 10/22/20  2:30 AM    Specimen: Urine, clean catch   Result Value Ref Range    L. pneumophila Serogp 1 Ur Ag       Presumptive Negative  No Legionella pneumophila serogroup 1 antigens detected. A negative result does not exclude infection with  Legionella pneumophila serogroup 1 nor does it rule out  other microbial-caused respiratory infections or  disease caused by other serogroups of  Legionella pneumophila. Normal Range: Presumptive Negative     Strep Pneumoniae Antigen    Collection Time: 10/22/20  2:30 AM    Specimen: Urine, clean catch   Result Value Ref Range    STREP PNEUMONIAE ANTIGEN, URINE       Presumptive Negative  Presumptive negative suggests no current or recent  pneumococcal infection.  Infection due to Strep pneumoniae  cannot be ruled out since the antigen present in the sample  may be below the detection limit of the test.  Normal Range:Presumptive Negative     Basic Metabolic Panel w/ Reflex to MG    Collection Time: 10/22/20  5:37 AM   Result Value Ref Range    Sodium 136 136 - 145 mmol/L    Potassium reflex Magnesium 4.8 3.5 - 5.1 mmol/L    Chloride 101 99 - 110 mmol/L    CO2 24 21 - 32 mmol/L    Anion Gap 11 3 - 16    Glucose 179 (H) 70 - 99 mg/dL    BUN 43 (H) 7 - 20 mg/dL    CREATININE 1.9 (H) 0.8 - 1.3 mg/dL    GFR Non- 33 (A) >60    GFR  40 (A) >60    Calcium 8.7 8.3 - 10.6 mg/dL   CBC auto differential    Collection Time: 10/22/20  5:37 AM   Result Value Ref Range    WBC 10.2 4.0 - 11.0 K/uL    RBC 3.96 (L) 4.20 - 5.90 M/uL    Hemoglobin 10.5 (L) 13.5 - 17.5 g/dL    Hematocrit 31.7 (L) 40.5 - 52.5 %    MCV 80.1 80.0 - 100.0 fL    MCH 26.4 26.0 - 34.0 pg    MCHC 33.0 31.0 - 36.0 g/dL    RDW 17.0 (H) 12.4 - 15.4 %    Platelets 644 387 - 062 K/uL    MPV 9.7 5.0 - 10.5 fL    Neutrophils % 90.1 %    Lymphocytes % 6.0 %    Monocytes % 3.8 %    Eosinophils % 0.0 %    Basophils % 0.1 %    Neutrophils Absolute 9.2 (H) 1.7 - 7.7 K/uL    Lymphocytes Absolute 0.6 (L) 1.0 - 5.1 K/uL    Monocytes Absolute 0.4 0.0 - 1.3 K/uL    Eosinophils Absolute 0.0 0.0 - 0.6 K/uL    Basophils Absolute 0.0 0.0 - 0.2 K/uL   Brain Natriuretic Peptide    Collection Time: 10/22/20  5:37 AM   Result Value Ref Range    Pro-BNP 1,698 (H) 0 - 449 pg/mL   Sedimentation Rate    Collection Time: 10/22/20  5:37 AM   Result Value Ref Range    Sed Rate 12 0 - 20 mm/Hr   C-Reactive Protein    Collection Time: 10/22/20  5:37 AM   Result Value Ref Range    CRP 2.1 0.0 - 5.1 mg/L   ANDRADE    Collection Time: 10/22/20  5:37 AM   Result Value Ref Range    ANDRADE Negative Negative   Anti-Tasneem 1 Antibody, IgG    Collection Time: 10/22/20  5:37 AM   Result Value Ref Range    Anti-JO1 IgG <0.2 0.0 - 0.9 AI   RIBONUCLEIC PROTEIN ANTIBODY    Collection Time: 10/22/20  5:37 AM   Result Value Ref Range    Anti-RNP IgG <0.2 0.0 - 0.9 AI      Ct Head Wo Contrast    Result Date: 10/20/2020  No acute intracranial abnormality. No significant change from prior study     Ct Chest Wo Contrast    Result Date: 10/20/2020  Pulmonary interstitial opacities, most likely pulmonary edema superimposed on chronic interstitial lung disease. Indeterminate 42 mm right renal mass may represent a proteinaceous cyst or neoplasm. RECOMMENDATIONS: Only if deemed clinically important to the patient's medical care after considering advanced patient age and comorbidities, further workup of the renal lesion could be obtained with nonemergent/outpatient renal mass protocol CT or MRI     Ct Lumbar Spine Wo Contrast    Result Date: 10/20/2020  No acute fracture or subluxation. 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 L3-4 severe central and moderate bilateral foraminal stenosis.  L4-5 grade 1 spondylolisthesis with additional degenerative changes, resulting in moderate central stenosis. Xr Chest Portable    Result Date: 10/20/2020  Patchy infiltrates suggested bilaterally new from prior study. Cta Head Neck W Contrast    Result Date: 10/20/2020  1. Bilateral carotid bifurcation atherosclerotic plaque resulting in approximately 40% bilateral stenosis of the origin of the internal carotid artery. Finding is compatible with 16-49% stenosis per sonographic NASCET index criteria. 2. 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 3. Right vertebral artery termination as the posteroinferior cerebellar artery (PICA). 4. Bilateral internal carotid artery cavernous segment atherosclerotic calcification without significant arterial stenosis. 5. Approximately 50% arterial stenosis of the bilateral vertebral body origin secondary to encroachment by atherosclerotic calcification. 6. Mild cerebral white matter chronic microvascular ischemic disease.              Assessment:     Principal Problem:    Acute pulmonary edema (HCC)  Plan: will change lasix to ivp and check ECHO and thyroids  Active Problems:    Pacemaker  Plan: ongoing, obs    HTN (hypertension)  Plan: controlled , obs    Coronary arteriosclerosis  Plan: slight elevation troponin, follow, treat medically    BPH with obstruction/lower urinary tract symptoms  Plan: cont meds, check pvrs    Pulmonary fibrosis ([address]  Plan: known history pulmonary consult appreciated    Parkinson disease ([address]  Plan: not sure of diagnosis, defer to neuro eval    Paroxysmal atrial fibrillation ([address]  Plan: rate controlled, observe    Community acquired pneumonia  Plan: on treatment but not certain its indicated, defer to pulmonary            Guillaume Cabrera MD

## 2020-10-23 PROBLEM — G20 PARKINSON DISEASE (HCC): Status: RESOLVED | Noted: 2020-01-01 | Resolved: 2020-01-01

## 2020-10-23 PROBLEM — I50.31 ACUTE DIASTOLIC CHF (CONGESTIVE HEART FAILURE) (HCC): Status: ACTIVE | Noted: 2020-01-01

## 2020-10-23 PROBLEM — R53.81 DEBILITY: Status: ACTIVE | Noted: 2020-01-01

## 2020-10-23 PROBLEM — G20.A1 PARKINSON DISEASE: Status: RESOLVED | Noted: 2020-01-01 | Resolved: 2020-01-01

## 2020-10-23 PROBLEM — N18.32 CHRONIC KIDNEY DISEASE, STAGE 3B (HCC): Status: ACTIVE | Noted: 2020-01-01

## 2020-10-23 PROBLEM — J81.0 ACUTE PULMONARY EDEMA (HCC): Status: RESOLVED | Noted: 2020-01-01 | Resolved: 2020-01-01

## 2020-10-23 NOTE — PROGRESS NOTES
Department of Internal Medicine  General Internal Medicine  Attending Progress Note    Chief Complaint:low back pain and leg weakness      HPI:   SUBJECTIVE:  79 yo male with lumbar djd and left leg weakness, PAF and ILD who is on his second hospital day. He has made some progress walking and reports his back pain is pretty severe and associated with leg weakness improved with bending forward over his walker. He has fallen and is afraid of falling again. Has continued urgency incontinence and started using the urinal to avoid accidents. Seen by all the consultants. Main issue is can he return to home safely reports spends a lot of his time alone  Breathing has gotten a little better still bringing up a lot of discolored phlegm. Last night had pressure in chest seemed to be associated with eructation and relieved by gi cocktail. Troponins and ekg done, feels better today   Reports that he has been walking better and pain in low back not as bad. Thinks moving to rehab today.       Past Medical History:   Diagnosis Date    Arthritis     BPH with obstruction/lower urinary tract symptoms     nocturia 3-4 x per night    Chronic atrial fibrillation (HCC)     CKD (chronic kidney disease) 2017    level 3    Coronary arteriosclerosis 2009    2 [facility] GERD (gastroesophageal reflux disease)     History of blood transfusion     1945   During deployment    HTN (hypertension) 1990    Left hip pain     schrapnel in hip and intestines/chronic pains left hip impairs walking    Pacemaker 2015    Pulmonary fibrosis ([address] 08/2017    mild amount seen on chest ct    Pulmonary nodule, left 2017    noncalcified 7mm low risk repeat 8/2018 if patient willing    Renal atrophy, right 2017    Renal cyst, right 08/2017    not clearly simple cyst    SOB (shortness of breath) 2017    terrell normal pft and ct chest ? smll amount of pulmonary fibrosis     Past Surgical History:   Procedure Laterality Date  ABDOMEN SURGERY      Scrapnel removed in [facility]  2009    awoke with indigestion    EYE SURGERY Bilateral     cataract    INGUINAL HERNIA REPAIR Right 07/2016    PACEMAKER INSERTION  2014    PACEMAKER PLACEMENT      SMALL INTESTINE SURGERY  1945    schchipnel as ww II vet, part of small intestine moved    TONSILLECTOMY  1960      Family History   Problem Relation Age of Onset    Pacemaker Sister     Pacemaker Brother     No Known Problems Other         lung disease     Social History     Tobacco Use    Smoking status: Never Smoker    Smokeless tobacco: Never Used   Substance Use Topics    Alcohol use: No    Drug use: Not Currently       Prior to Admission medications    Medication Sig Start Date End Date Taking?  Authorizing Provider   omeprazole (PRILOSEC) 20 MG delayed release capsule Take 1 capsule by mouth daily Instead of pepcid for heart burn on an empty stomach 10/9/20  Yes Arpit Anderson MD   fluticasone [facility]) 50 MCG/ACT nasal spray SPRAY 2 SPRAY INTO EACH NOSTRIL EVERY DAY 10/9/20  Yes Arpit Anderson MD   predniSONE (DELTASONE) 10 MG tablet TAKE 1 TABLET BY MOUTH EVERY DAY 9/22/20  Yes KAYCE Huang - FLACO   albuterol sulfate  (90 Base) MCG/ACT inhaler Inhale 2 puffs into the lungs every 6 hours as needed for Wheezing or Shortness of Breath 8/27/20  Yes Soo Lopez MD   umeclidinium-vilanterol (ANORO ELLIPTA) 62.5-25 MCG/INH AEPB inhaler Inhale 1 puff into the lungs daily 8/27/20  Yes Soo Lopez MD   olmesartan (BENICAR) 40 MG tablet TAKE 1 TABLET BY MOUTH EVERY DAY 8/21/20  Yes Arpit Anderson MD   furosemide (LASIX) 20 MG tablet TAKE 1 TABLET BY MOUTH EVERY DAY AS NEEDED FOR LEG SWELLING 5/6/20  Yes Arpit Anderson MD   tamsulosin (FLOMAX) 0.4 MG capsule ONE EVERY NIGHT FOR YOUR PROSTATE 4/22/20  Yes Arpit Anderson MD   finasteride (PROSCAR) 5 MG tablet TAKE 1 TABLET BY MOUTH EVERY DAY 12/2/19  Yes Arpit Anderson MD atorvastatin (LIPITOR) 40 MG tablet Take 1 tablet by mouth daily 2/22/19  Yes Kenisha Bullock MD   rivaroxaban (XARELTO) 15 MG TABS tablet Take 1 tablet by mouth daily (with breakfast) 1/8/18  Yes Alvarez Beltre MD   aspirin 81 MG tablet Take 81 mg by mouth daily   Yes Historical Provider, MD   Cholecalciferol (VITAMIN D3) 2000 UNITS CAPS Take 2,000 Units by mouth daily   Yes Historical Provider, MD   calcium carbonate (OSCAL) 500 MG TABS tablet Take 600 mg by mouth daily caltrate 600-D3 one daily   Yes Historical Provider, MD   Multiple Vitamins-Minerals (CENTRUM SILVER ADULT 50+) TABS Take 1 tablet by mouth daily   Yes Historical Provider, MD   oxybutynin (DITROPAN) 5 MG tablet TAKE 1 TABLET BY MOUTH EVERY DAY 10/21/20   Alvarez Beltre MD         ROS:  A comprehensive review of systems was negative except for: back pain , trouble with balance    OBJECTIVE:      PHYSICAL:  Intake/Output:   Patient Vitals for the past 8 hrs:   BP Temp Temp src Pulse Resp SpO2 Weight   10/23/20 0415 116/72 97.7 °F (36.5 °C) Oral 73 22 95 % 174 lb 2.6 oz (79 kg)     I/O last 3 completed shifts: In: 9287 [P.O.:1080; IV Piggyback:300]  Out: 1125 [Urine:1125]  No intake/output data recorded.   VITALS:    /72   Pulse 73   Temp 97.7 °F (36.5 °C) (Oral)   Resp 22   Ht 5' 6\" (1.676 m)   Wt 174 lb 2.6 oz (79 kg)   SpO2 95%   BMI 28.11 kg/m²     General Appearance: alert and oriented to person, place and time, well-developed and well-nourished, in no acute distress  Skin: warm and dry, no rash or erythema  Head: normocephalic and atraumatic  Eyes: conjunctivae normal and sclera anicteric  ENT: hearing grossly normal bilaterally and sinuses non-tender  Neck: neck supple and non tender without mass, no thyromegaly or thyroid nodules, no cervical lymphadenopathy   Pulmonary/Chest: clear to auscultation bilaterally- exept for basilar crackles  Cardiovascular: normal rate, normal S1 and S2, no gallops and no carotid bruits: paced  Abdomen: soft, non-tender, non-distended, normal bowel sounds, no masses or organomegaly  Extremities: no cyanosis, clubbing or edema  Musculoskeletal: bony swelling and tenderness both knees moving around better  Neurologic: coordination normal and speech normal, moves all 4 extremities    DATA:   Labs:  CBC:   Recent Labs     10/21/20  0620 10/22/20  0537 10/23/20  0524   WBC 9.9 10.2 14.3*   HGB 10.9* 10.5* 10.1*    155 169     BMP:    Recent Labs     10/21/20  0620 10/22/20  0537 10/23/20  0524    136 134*   K 3.9 4.8 4.8    101 99   CO2 28 24 24   BUN 38* 43* 50*   CREATININE 1.5* 1.9* 1.8*   GLUCOSE 112* 179* 138*     POC GLUCOSE:    Recent Labs     10/20/20  1655   POCGLU 132*     Ca/Mg/Phos:   Recent Labs     10/21/20  0620 10/22/20  0537 10/23/20  0524   CALCIUM 8.9 8.7 8.9     Hepatic:   Recent Labs     10/20/20  1710   AST 19   ALT 14   BILITOT 0.5   ALKPHOS 50     Troponin:   Recent Labs     10/20/20  1711 10/21/20  0620 10/23/20  0524   TROPONINI 0.03* 0.03* 0.02*     ProBNP:   Recent Labs     10/20/20  1711 10/22/20  0537   PROBNP 1,446* 1,698*     PT/INR:   Recent Labs     10/20/20  1711   PROTIME 15.9*   INR 1.37*     APTT:   Recent Labs     10/20/20  1711   APTT 33.5       DIAGNOSTICS:  Ct Head Wo Contrast    Result Date: 10/20/2020  No acute intracranial abnormality. No significant change from prior study     Ct Chest Wo Contrast    Result Date: 10/20/2020  Pulmonary interstitial opacities, most likely pulmonary edema superimposed on chronic interstitial lung disease. Indeterminate 42 mm right renal mass may represent a proteinaceous cyst or neoplasm.  RECOMMENDATIONS: Only if deemed clinically important to the patient's medical care after considering advanced patient age and comorbidities, further workup of the renal lesion could be obtained with nonemergent/outpatient renal mass protocol CT or MRI     Ct Lumbar Spine Wo Contrast    Result Date: 10/20/2020  No acute fracture or subluxation. 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 L3-4 severe central and moderate bilateral foraminal stenosis. L4-5 grade 1 spondylolisthesis with additional degenerative changes, resulting in moderate central stenosis. Xr Chest Portable    Result Date: 10/20/2020  Patchy infiltrates suggested bilaterally new from prior study. Cta Head Neck W Contrast    Result Date: 10/20/2020  1. Bilateral carotid bifurcation atherosclerotic plaque resulting in approximately 40% bilateral stenosis of the origin of the internal carotid artery. Finding is compatible with 16-49% stenosis per sonographic NASCET index criteria. 2. 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 3. Right vertebral artery termination as the posteroinferior cerebellar artery (PICA). 4. Bilateral internal carotid artery cavernous segment atherosclerotic calcification without significant arterial stenosis. 5. Approximately 50% arterial stenosis of the bilateral vertebral body origin secondary to encroachment by atherosclerotic calcification. 6. Mild cerebral white matter chronic microvascular ischemic disease.          ASSESSMENT AND PLAN    Principal Problem:    Acute pulmonary edema (HCC)  Plan: seems to have improved do not want to overdiurese, Echo showed mod to severe MR and preserved EF, to continue medical managment  Active Problems:    Pacemaker  Plan: ongoing cardio fu    HTN (hypertension)  Plan: running a little higher on steroids    Coronary arteriosclerosis  Plan: stable    BPH with obstruction/lower urinary tract symptoms  Plan: ongoing severe sxs    Pulmonary fibrosis ([address]  Plan: ongoing issue, awaiting pulmonary's guidance, oxygen levels good on room air    Parkinson disease ([address]  Plan: perhaps some cogwheeling in the wrists no where else, not being treated for this, had a brother who  of parkinsons    Paroxysmal atrial fibrillation ([address]  Plan: ongoing, cont same    Pulmonary

## 2020-10-23 NOTE — PROGRESS NOTES
Pt complained of \"not feeling well and he had pain in his chest and a lot of pressure\". He pointed to his sternum. VSS stable. Ordered EKG stat. Hospitalist reviewed as pt is a patient of Dr. Eduardo Aviles, who is not on sight. Hospitalist felt he was in atrial flutter when comparing to old EKG but unsure. Pt has a pacemaker. Called Dr. Lee Davidson on-call service. Spoke with Dr. Umair Patterson. She ordered 2 troponin lab draws 2 hours apart as well as a GI cocktail. Then follow up with results. Patient said he is still feels the discomfort but it feels a little better. Will continue to monitor.

## 2020-10-23 NOTE — CONSULTS
Consult Note  Physical Medicine and Rehabilitation    Patient: Lily Saavedra  [id number]  Date: 10/23/2020      Chief Complaint: weakness and fatigue    History of Present Illness/Hospital Course:  Patient is a 81 yo M with pmh CAD, CHF, Afib, HTN, CKD, pulmonary fibrosis, and lumbar DDD who initially presented 10/20/2020 with difficulty ambulating. Patient reports progressive weakness and shortness of breath over several days. Also with back pain and difficulty putting weight through his left leg. Found to have pulmonary edema, acute on chronic dCHF, and pneumonia. Work-up for stroke or acute spinal cord abnormality was negative. Back and LLE pain/weakness thought to be related to chronic degenerative changes. Currently, patient reports improvement in his breathing and left leg strength. He still feels generally weak and fatigued. He denies back pain currently. States he gets lpow back pain with radiation into the LLE with prolonged standing/ambulating. Denies tingling/numbness. He is relatively active at baseline (goes on exercise walks/to gym 2 days/week). Would like to come to ARU to improve his function prior to returning home.      Prior Level of Function:  Independent for mobility (cane), ADLs    Current Level of Function:  Josue for mobility  Min-modA for ADLs    Pertinent Social History:  Support: Lives with daughter  Home set-up: 1 level home with 6 steps to enter    Past Medical History:   Diagnosis Date    Acute diastolic CHF (congestive heart failure) ([facility] [address] 10/23/2020    Arthritis     BPH with obstruction/lower urinary tract symptoms     nocturia 3-4 x per night    Chronic atrial fibrillation (HCC)     CKD (chronic kidney disease) 2017    level 3    Coronary arteriosclerosis 2009    2 stents/ Dr Odette Saleem in [place]    GERD (gastroesophageal reflux disease)     History of blood transfusion     1945   During deployment    HTN (hypertension) 1990    Left hip pain     schrapnel in hip and intestines/chronic pains left hip impairs walking    Pacemaker 2015    Pulmonary fibrosis ([address] 08/2017    mild amount seen on chest ct    Pulmonary nodule, left 2017    noncalcified 7mm low risk repeat 8/2018 if patient willing    Renal atrophy, right 2017    Renal cyst, right 08/2017    not clearly simple cyst    SOB (shortness of breath) 2017    terrell normal pft and ct chest ? smll amount of pulmonary fibrosis       Past Surgical History:   Procedure Laterality Date    ABDOMEN SURGERY      Scrapnel removed in [address] [employer] [place]  2009    awoke with indigestion    EYE SURGERY Bilateral     cataract    INGUINAL HERNIA REPAIR Right 07/2016    PACEMAKER INSERTION  2014    PACEMAKER PLACEMENT      SMALL INTESTINE SURGERY  1945    [facility] as ww II vet, part of small intestine moved    TONSILLECTOMY  1960       Family History   Problem Relation Age of Onset    Pacemaker Sister     Pacemaker Brother     No Known Problems Other         lung disease       Social History     Socioeconomic History    Marital status:      Spouse name: None    Number of children: None    Years of education: None    Highest education level: None   Occupational History    Occupation: soil conservastional   Social Needs    Financial resource strain: Patient refused    Food insecurity     Worry: Patient refused     Inability: Patient refused    Transportation needs     Medical: Patient refused     Non-medical: Patient refused   Tobacco Use    Smoking status: Never Smoker    Smokeless tobacco: Never Used   Substance and Sexual Activity    Alcohol use: No    Drug use: Not Currently    Sexual activity: Not Currently   Lifestyle    Physical activity     Days per week: None     Minutes per session: None    Stress: None   Relationships    Social connections     Talks on phone: None     Gets together: None     Attends [employer] service: None     Active member of club or organization: None     Attends meetings of clubs or organizations: None     Relationship status: None    Intimate partner violence     Fear of current or ex partner: None     Emotionally abused: None     Physically abused: None     Forced sexual activity: None   Other Topics Concern    None   Social History Narrative    None           REVIEW OF SYSTEMS:   CONSTITUTIONAL: negative for fevers, chills, diaphoresis, appetite change, night sweats, unexpected weight change, +fatigue  EYES: negative for blurred vision, eye discharge, visual disturbance and icterus  HEENT: negative for hearing loss, tinnitus, ear drainage, sinus pressure, nasal congestion, epistaxis and snoring  RESPIRATORY: Negative for hemoptysis; +RAIN and productive cough  CARDIOVASCULAR: negative for chest pain, palpitations, exertional chest pressure/discomfort, syncope, +edema  GASTROINTESTINAL: negative for nausea, vomiting, diarrhea, blood in stool, abdominal pain, constipation  GENITOURINARY: negative for frequency, dysuria, urinary incontinence, decreased urine volume, and hematuria  HEMATOLOGIC/LYMPHATIC: negative for easy bruising, bleeding and lymphadenopathy  ALLERGIC/IMMUNOLOGIC: negative for recurrent infections, angioedema, anaphylaxis and drug reactions  ENDOCRINE: negative for weight changes and diabetic symptoms including polyuria, polydipsia and polyphagia  MUSCULOSKELETAL: refer to HPI  NEUROLOGICAL: refer ot HPI  PSYCHIATRIC/BEHAVIORAL: negative for hallucinations, behavioral problems, confusion and agitation.      Physical Examination:  Vitals:   Patient Vitals for the past 24 hrs:   BP Temp Temp src Pulse Resp SpO2 Weight   10/23/20 1216 -- -- -- -- -- 97 % --   10/23/20 0943 118/71 98 °F (36.7 °C) Oral 73 16 97 % --   10/23/20 0415 116/72 97.7 °F (36.5 °C) Oral 73 22 95 % 174 lb 2.6 oz (79 kg)   10/23/20 0009 124/63 98.3 °F (36.8 °C) Oral 72 18 93 % --   10/22/20 2039 123/71 97.7 °F (36.5 °C) -- 71 18 95 % --   10/22/20 1946 -- -- -- -- -- 95 % --   10/22/20 1741 126/77 98.7 °F (37.1 °C) Oral 71 16 96 % --     Const: Alert. WDWN. No distress  Eyes: Conjunctiva noninjected, no icterus noted; pupils equal, round, and reactive to light. HENT: Atraumatic, normocephalic; Oral mucosa moist  Neck: Trachea midline, neck supple. No thyromegaly noted. CV: Regular rate and rhythm. + systolic murmur. No rub or gallop noted  Resp: Lungs with basilar crackles. No wheezes or rhonchi, no retractions. Respirations unlabored. GI: Soft, nontender, nondistended. Normal bowel sounds. No palpable masses. Skin: Normal temperature and turgor. No rashes or breakdown noted. Ext: +Edema. No varicosities. MSK: Kyphotic posture, decreased spine ROM. Arthritic changes in [eripheral joints. No joint tenderness, erythema, warmth noted. Neuro: Alert, oriented, appropriate. No cranial nerve deficits appreciated. Sensation intact to light touch. Motor examination reveals strength 5-/5 in BUE and BLE except 4/5 hip flexion. No abnormalities with finger/nose noted. Reflexes diminished, symmetric. Psych: Stable mood, normal judgement, normal affect     Lab Results   Component Value Date    WBC 14.3 (H) 10/23/2020    HGB 10.1 (L) 10/23/2020    HCT 31.5 (L) 10/23/2020    MCV 80.9 10/23/2020     10/23/2020     Lab Results   Component Value Date    INR 1.37 (H) 10/20/2020    PROTIME 15.9 (H) 10/20/2020     Lab Results   Component Value Date    CREATININE 1.8 (H) 10/23/2020    BUN 50 (H) 10/23/2020     (L) 10/23/2020    K 4.8 10/23/2020    CL 99 10/23/2020    CO2 24 10/23/2020     Lab Results   Component Value Date    ALT 14 10/20/2020    AST 19 10/20/2020    ALKPHOS 50 10/20/2020    BILITOT 0.5 10/20/2020       Most recent echocardiogram revealed:  Left ventricular cavity size is normal.   Ejection fraction is visually estimated to be 50%. At least moderate-Severe mitral regurgitation. No evidence of mitral stenosis.    Systolic flow reversal in pulmonary veins. Most recent EKG revealed:  V pacingConfirmed by NANCY VARGAS, Bryn Coleman ([id number]) on 10/23/2020 4:15:55 PM     Most recent imaging studies revealed:    CT chest  Pulmonary interstitial opacities, most likely pulmonary edema superimposed on    chronic interstitial lung disease.         Indeterminate 42 mm right renal mass may represent a proteinaceous cyst or    neoplasm. CT lumbar spine  No acute fracture or subluxation.         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         L3-4 severe central and moderate bilateral foraminal stenosis.         L4-5 grade 1 spondylolisthesis with additional degenerative changes,    resulting in moderate central stenosis. CTA head and neck  1. Bilateral carotid bifurcation atherosclerotic plaque resulting in    approximately 40% bilateral stenosis of the origin of the internal carotid    artery.  Finding is compatible with 16-49% stenosis per sonographic NASCET    index criteria. 2. 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 3. Right vertebral artery termination as the posteroinferior cerebellar    artery (PICA). 4. Bilateral internal carotid artery cavernous segment atherosclerotic    calcification without significant arterial stenosis. 5. Approximately 50% arterial stenosis of the bilateral vertebral body origin    secondary to encroachment by atherosclerotic calcification. 6. Mild cerebral white matter chronic microvascular ischemic disease. CT head  BRAIN/VENTRICLES: There is no acute intracranial hemorrhage, mass effect or    midline shift.  No abnormal extra-axial fluid collection.  The gray-white    differentiation is maintained without evidence of an acute infarct.  There is    no evidence of hydrocephalus.  Mild volume loss.  White matter changes in the    periventricular white matter subcortical white.  These are stable compared to    prior study         ORBITS: The visualized portion of the

## 2020-10-23 NOTE — PROGRESS NOTES
[facility]   Daily Progress Note      Admit Date:  10/20/2020    Reason for follow up visit: Dyspnea    CC: \"I'm feeling better. I'm still SOB, but I've had this for a long time. \"    79 y/o male with PMH significant for permanent atrial fib with CHB and S/P PPM, mitral regurgitation, HTN, CKD and CAD with prior stent who was admitted with increased weakness, decreased ability to ambulate and SOB. Work up in ED demonstrated pulmonary congestion on CT scan. Echo showed moderate to severe MR. Asked to evaluate. Follows with Dr. Jhon Crum and Dr. Christine Adames at [facility] for cardiology    Interval History:  Pt. seen and examined; records reviewed  Atypical chest pain overnight: ECG shows paced rhythm (underlying atrial fib) and troponin stable at 0.02  Different from any chest pain he had in the past  SOB persistent but with some improvement  + cough  Cr 1.8 today  BP well controlled    Subjective:  Pt with no acute overnight events. No further chest pain  SOB persistent but improving    Review of Systems:   · Constitutional: no unanticipated weight loss. There's been no change in energy level, sleep pattern, or activity level. No fevers, chills. · Eyes: No visual changes or diplopia. No scleral icterus. · ENT: No Headaches, hearing loss or vertigo. No mouth sores or sore throat. · Cardiovascular: as reviewed in HPI  · Respiratory: + productive cough No hematemesis. · Gastrointestinal: No abdominal pain, appetite loss, blood in stools. No change in bowel or bladder habits. · Genitourinary: No dysuria, trouble voiding, or hematuria. · Musculoskeletal:  No gait disturbance, no joint complaints. · Integumentary: No rash or pruritis. · Neurological: No headache, diplopia, change in muscle strength, numbness or tingling. · Psychiatric: No anxiety or depression. · Endocrine: No temperature intolerance. No excessive thirst, fluid intake, or urination. No tremor.   · Hematologic/Lymphatic: No abnormal bruising or bleeding, blood clots or swollen lymph nodes. · Allergic/Immunologic: No nasal congestion or hives. Objective:   /71   Pulse 73   Temp 98 °F (36.7 °C) (Oral)   Resp 16   Ht 5' 6\" (1.676 m)   Wt 174 lb 2.6 oz (79 kg)   SpO2 97%   BMI 28.11 kg/m²       Intake/Output Summary (Last 24 hours) at 10/23/2020 1016  Last data filed at 10/23/2020 0941  Gross per 24 hour   Intake 1320 ml   Output 1425 ml   Net -105 ml     Wt Readings from Last 3 Encounters:   10/23/20 174 lb 2.6 oz (79 kg)   10/20/20 180 lb (81.6 kg)   08/27/20 180 lb 9.6 oz (81.9 kg)       Physical Exam:  General: In no acute distress. Awake, alert, and oriented X4. Up in chair  Skin:  Warm and dry. No new appearing rashes or lesions. Neck:  Supple. No JVD  appreciated. Chest: Lungs with crackles in bases bilaterally  Cardiovascular:  RRR. Normal S1 and S2. I/VI systolic murmur  Abdomen:  soft, nontender, +bowel sounds. Extremities:  No LE edema. No clubbing or cyanosis. 2+ bilateral radial/DP/PT pulses. Cap refill brisk.     Medications:    [START ON 10/24/2020] pantoprazole  40 mg Oral Nightly    acetaminophen  1,000 mg Oral Q8H    predniSONE  20 mg Oral Daily    levoFLOXacin  250 mg Oral Daily    furosemide  40 mg Oral Daily    [START ON 10/30/2020] predniSONE  10 mg Oral Daily    potassium chloride  20 mEq Oral Daily with breakfast    aspirin  81 mg Oral Daily    atorvastatin  40 mg Oral Daily    calcium elemental  500 mg Oral Daily    Vitamin D  2,000 Units Oral Daily    finasteride  5 mg Oral Daily    fluticasone  2 spray Each Nostril Daily    losartan  50 mg Oral Daily    rivaroxaban  15 mg Oral Daily with breakfast    tamsulosin  0.4 mg Oral Daily    glycopyrrolate-formoterol  2 puff Inhalation BID    sodium chloride flush  10 mL Intravenous 2 times per day       albuterol, sodium chloride flush    Lab Data:  CBC:   Recent Labs     10/21/20  0620 10/22/20  0537 10/23/20  0524   WBC 9.9 10.2 14.3*   HGB 10.9* 10.5* 10.1*    155 169     BMP:    Recent Labs     10/21/20  0620 10/22/20  0537 10/23/20  0524    136 134*   K 3.9 4.8 4.8   CO2 28 24 24   BUN 38* 43* 50*   CREATININE 1.5* 1.9* 1.8*     LIVR:   Recent Labs     10/20/20  1710   AST 19   ALT 14     INR:    Recent Labs     10/20/20  1711   INR 1.37*     APTT:   Recent Labs     10/20/20  1711   APTT 33.5     Results for Steffen Spence (MRN [id number]) as of 10/23/2020 10:21   Ref. Range 10/20/2020 17:11 10/21/2020 06:20 10/22/2020 05:37   Pro-BNP Latest Ref Range: 0 - 449 pg/mL 1,446 (H)  1,698 (H)     Results for Steffen Spence (MRN [id number]) as of 10/23/2020 10:21   Ref. Range 10/20/2020 17:11 10/21/2020 06:20 10/22/2020 05:37 10/23/2020 05:24 10/23/2020 07:44   Troponin Latest Ref Range: <0.01 ng/mL 0.03 (H) 0.03 (H)  0.02 (H) 0.02 (H)     10/23/2020 CXR:  Improved inspiratory effort with ongoing interstitial opacities which could    reflect edema and/or chronic interstitial lung disease based on prior imaging. 10/22/2020 CT chest  Pulmonary interstitial opacities, most likely pulmonary edema superimposed on    chronic interstitial lung disease.         Indeterminate 42 mm right renal mass may represent a proteinaceous cyst or    neoplasm. 10/20/2020 CTA head/neck:  1. Bilateral carotid bifurcation atherosclerotic plaque resulting in    approximately 40% bilateral stenosis of the origin of the internal carotid    artery.  Finding is compatible with 16-49% stenosis per sonographic NASCET    index criteria. 2. 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 3. Right vertebral artery termination as the posteroinferior cerebellar    artery (PICA). 4. Bilateral internal carotid artery cavernous segment atherosclerotic    calcification without significant arterial stenosis. 5. Approximately 50% arterial stenosis of the bilateral vertebral body origin    secondary to encroachment by atherosclerotic calcification.     6. Mild cerebral white matter chronic microvascular ischemic disease. ECG 10/23/2020:  Paced; underlying atrial fib    Echo 10/21/2020:   Left ventricular cavity size is normal.   Ejection fraction is visually estimated to be 50%. At least moderate-Severe mitral regurgitation. No evidence of mitral stenosis. Systolic flow reversal in pulmonary veins. 6/2017 Echo:   Left ventricle size is normal. Normal left ventricular wall thickness. Ejection fraction is visually estimated to be 55%. There is akinesis of the   mid anteroseptal wall. Mild to moderate mitral regurgitation is present. The left atrium is moderately dilated. Mild aortic regurgitation is present. The right ventricle appears normal in size. There is mild tricuspid regurgitation with RVSP estimated at 31 mmHg. 6/2017 Lexiscan-Myoview:  Pharmacological Stress/MPI Results:          1. Technically a satisfactory study.     2. Normal pharmacological stress portion of the study.     3. No evidence of Ischemia by Myocardial Perfusion Imaging.     4. Gated Study shows normal LV size and Systolic function; EF is 70 %. Telemetry: Paced    Assessment/Plan:    1. Moderate to severe mitral regurgitation (Nonrheumatic)  -continue po lasix   -continue losartan  -low sodium diet   -LVEF 50%    2. Pulmonary fibrosis  -Rx per pulmonary    3. Chronic atrial fibrillation  -continue Xarelto  -S/P PPM for CHB    4. Mixed hyperlipidemia  -on statin    5. Chest pain  -atypical for angina  -continue medical management of CAD    6. Chronic kidney disease  -Cr stable    Stable from cardiac standpoint and ok to transfer to rehab from cardiac standpoint.      Continue ASA, statin, lasix, losartan, Klor Con and Xarelto      Electronically signed by KAYCE Urbina - CNP on 10/23/2020 at 10:16 AM

## 2020-10-23 NOTE — PROGRESS NOTES
Pulmonary Progress Note    CC: Pulmonary infiltrates  Pulmonary fibrosis  Shortness of breath    24 hr events:  Shortness of breath is improving. He walked with physical therapy yesterday. He has an intermittent cough. ROS:  +RAIN- decreased  +chronic cough  No vomiting    PHYSICAL EXAM:  Blood pressure 118/71, pulse 73, temperature 98 °F (36.7 °C), temperature source Oral, resp. rate 16, height 5' 6\" (1.676 m), weight 174 lb 2.6 oz (79 kg), SpO2 97 %. on RA    Intake/Output Summary (Last 24 hours) at 10/23/2020 0944  Last data filed at 10/23/2020 0941  Gross per 24 hour   Intake 1320 ml   Output 1425 ml   Net -105 ml       Gen: Well developed; well nourished  Eyes: No scleral icterus. No conjunctival injection. ENT: External appearance of ears and nose normal.  Neck: Trachea midline. No obvious mass. No visible thyroid enlargement    Respiratory: Crackles bilaterally, no accessory muscle use  Cardiovascular: Regular rate and rhythm, no appreciable murmurs. No edema. Skin: Warm and dry. No rashes or ulcers on visible areas. Normal texture and turgor  Musculoskeletal: No cyanosis, clubbing or joint deformity.     Psychiatric: Normal mood and affect; exhibits normal insight and judgement     Medications:  Current Facility-Administered Medications: albuterol (PROVENTIL) nebulizer solution 2.5 mg, 2.5 mg, Nebulization, Q6H PRN  [START ON 10/24/2020] pantoprazole (PROTONIX) tablet 40 mg, 40 mg, Oral, Nightly  acetaminophen (TYLENOL) tablet 1,000 mg, 1,000 mg, Oral, Q8H  cefdinir (OMNICEF) capsule 300 mg, 300 mg, Oral, Daily  [START ON 10/24/2020] predniSONE (DELTASONE) tablet 20 mg, 20 mg, Oral, Daily  levoFLOXacin (LEVAQUIN) tablet 250 mg, 250 mg, Oral, Daily  furosemide (LASIX) tablet 20 mg, 20 mg, Oral, Daily  potassium chloride (KLOR-CON M) extended release tablet 20 mEq, 20 mEq, Oral, Daily with breakfast  aspirin chewable tablet 81 mg, 81 mg, Oral, Daily  atorvastatin (LIPITOR) tablet 40 mg, 40 mg, Oral,

## 2020-10-23 NOTE — PLAN OF CARE
Problem: Falls - Risk of:  Goal: Will remain free from falls  Description: Will remain free from falls  10/23/2020 1222 by Tr Young RN  Outcome: Ongoing  10/23/2020 0320 by Ellen Romero RN  Outcome: Ongoing  Goal: Absence of physical injury  Description: Absence of physical injury  10/23/2020 0320 by Ellen Romero RN  Outcome: Ongoing     Problem: Skin Integrity:  Goal: Will show no infection signs and symptoms  Description: Will show no infection signs and symptoms  10/23/2020 1222 by Tr Young RN  Outcome: Ongoing  10/23/2020 0320 by Ellen Romero RN  Outcome: Ongoing  Goal: Absence of new skin breakdown  Description: Absence of new skin breakdown  10/23/2020 0320 by Ellen Romero RN  Outcome: Ongoing     Problem: Pain:  Goal: Pain level will decrease  Description: Pain level will decrease  Outcome: Ongoing

## 2020-10-23 NOTE — PROGRESS NOTES
Patient admitted to room 3276 per wheelchair. Transferred to recliner  with walker, min assist. Patient was oriented to the Call Light, Phone, TV, Thermostat, Bed Controls, Bathroom and Emergency Cord. Patient verbalized and demonstrated understanding of all. Patient was also given an over view of Unit Routines for Acute Rehab, including what to wear for therapy. The patient's role in goal setting was reviewed along with an explanation of the Interdisciplinary Team meeting, the 's role in coordinating services and the Discharge Planning/Continuum of Care process. Patient Rights and Responsibilities were reviewed. Meal times were explained, including how to order food. The white board (used for communication) was pointed out emphasizing  the 3 hours/day Therapy Schedule (posted most evenings), the number (and process) for reporting grievances, and the Doctor's, Nurse's, and PCA's names. It was recommended that any family that will be care givers or any care givers the patient has, take part in therapy. There are no set visiting hours, and it was suggested that non-caregiver friends and family visitors come after therapy (at 4 PM or later) to allow patient to rest in between sessions.

## 2020-10-23 NOTE — PROGRESS NOTES
Pt discharged to ARU. IV remains in place. Discussed with rehab RN. Pt wheeled to room 3276 per transportation with belongings.

## 2020-10-23 NOTE — DISCHARGE SUMMARY
Physician Discharge Summary     Patient ID:   Shanelle Howard  [id number]  89 y.o.  12/21/1925    Admit date: 10/20/2020    Discharge date and time: 10/23/2020    Admitting Physician: Franklin Deleon MD     Discharge Physician: Krystal Aguilar MD    Discharge Diagnoses:   Pulmonary infiltrates  Acute on chronic diastolic failure  Pulmonary fibrosis  HTN  CAF/Pacer  Lumbar DJD  OA knees  CKD 3b  Mod to severe MR        Discharged Condition: fair      Hospital Course: 79 yo male with above medical problems who was sent to the ER from the office with new onset inability to walk. He had chronic lbp but in the prior 2 days he could not put any weight on his left leg nor could he lift it. Full terrell in the ER revolved no evidence of stroke or cord lesion, mainly spondylosis in the LS spine  He was also found to have pulmonary infiltrates on CT chest in addition to his baseline pulmonary fibrosis  Pattern was thought to be cw CHF and bnp was elevated although not above his baseline. Troponin was also slightly elevated 0.02 to 0.03 with no peak. Cardiology was not concerned about any ACS and signed off. Pulmonary followed and wanted to treat for pneumonia, worsening ILD and cardiogenic pulmonary edema  He seemed to improve with those treatments and his back pain and walking improved possibly with the steroids. Unfortunately his bun /cr worsened on the diuresis. To 50/1.8. Weight did not drop. He continued to have a productive cough and was to continue on abx and steroids. He did have a repeat echo during his hospitalization which showed lvef 50% and mod to severe MR which would be treated medically only. In addition his neuro terrell revealed 40% bilat ica stenosis and 50% VBA stenosis and focal high-grade stenosis mid basilar artery. WMD mild atrophy no old infarct. Nothing pursued, just risk factor mod. Code status discussed with patient and daughter, his POA, not changed, still full code.    He was having a lot of problems with urinary incont/urgency incont and his treatments were maintained. PVR ? Not available at this time. Patient was followed by PT/OT and to move to acute rehab to continue working on mobility so he could return home with his daughter more safely.     One pos bc staph epi and diptheroids thought secondary contamination    Consults:   IP CONSULT TO STROKE TEAM  IP CONSULT TO PRIMARY CARE PROVIDER  IP CONSULT TO PULMONOLOGY  IP CONSULT TO NEUROLOGY  IP CONSULT TO UROLOGY  IP CONSULT TO CARDIOLOGY  IP CONSULT TO PHYSICAL MEDICINE REHAB    Discharge Exam:  General Appearance: alert and oriented to person, place and time, well-developed and well-nourished, in no acute distress  Skin: warm and dry, no rash or erythema  Head: normocephalic and atraumatic  Eyes: conjunctivae normal and sclera anicteric  ENT: hearing grossly normal bilaterally and sinuses non-tender  Neck: neck supple and non tender without mass, no thyromegaly or thyroid nodules, no cervical lymphadenopathy   Pulmonary/Chest: paninsp crackles   Cardiovascular: normal rate, normal S1 and S2, no gallops and no carotid bruits  Abdomen: soft, non-tender, non-distended, normal bowel sounds, no masses or organomegaly  Extremities: no cyanosis, clubbing or edema  Musculoskeletal: no swollen joints and no tender joints,  Neurologic: coordination normal and speech normal, moves all 4 extremities    Disposition: acute rehab  Hospital Meds:  Current Facility-Administered Medications   Medication Dose Route Frequency Provider Last Rate Last Dose    albuterol (PROVENTIL) nebulizer solution 2.5 mg  2.5 mg Nebulization Q6H PRN Bari Celeste MD        [START ON 10/24/2020] pantoprazole (PROTONIX) tablet 40 mg  40 mg Oral Nightly Bari Celeste MD        acetaminophen (TYLENOL) tablet 1,000 mg  1,000 mg Oral Q8H Bari Celeste MD   1,000 mg at 10/23/20 1051    predniSONE (DELTASONE) tablet 20 mg  20 mg Oral Daily Placido Morales MD   20 mg at 10/23/20 1051    levoFLOXacin (LEVAQUIN) tablet 250 mg  250 mg Oral Daily Fahad Anguiano MD        furosemide (LASIX) tablet 40 mg  40 mg Oral Daily Fahad Anguiano MD   40 mg at 10/23/20 1051    [START ON 10/30/2020] predniSONE (DELTASONE) tablet 10 mg  10 mg Oral Daily Fahad Anguiano MD        potassium chloride (KLOR-CON M) extended release tablet 20 mEq  20 mEq Oral Daily with breakfast Esthela Varner MD   20 mEq at 10/23/20 1051    aspirin chewable tablet 81 mg  81 mg Oral Daily Esthela Varner MD   81 mg at 10/23/20 1051    atorvastatin (LIPITOR) tablet 40 mg  40 mg Oral Daily Esthela Varner MD   40 mg at 10/23/20 1051    calcium elemental (OSCAL) tablet 500 mg  500 mg Oral Daily Esthela Varner MD   500 mg at 10/23/20 1052    Vitamin D (CHOLECALCIFEROL) tablet 2,000 Units  2,000 Units Oral Daily Esthela Varner MD   2,000 Units at 10/23/20 1051    finasteride (PROSCAR) tablet 5 mg  5 mg Oral Daily Esthela Varner MD   5 mg at 10/23/20 1051    fluticasone (FLONASE) 50 MCG/ACT nasal spray 2 spray  2 spray Each Nostril Daily Esthela Varner MD   2 spray at 10/22/20 0847    losartan (COZAAR) tablet 50 mg  50 mg Oral Daily Etshela Varner MD   50 mg at 10/23/20 1051    rivaroxaban (XARELTO) tablet 15 mg  15 mg Oral Daily with breakfast Esthela Varner MD   15 mg at 10/23/20 1051    tamsulosin (FLOMAX) capsule 0.4 mg  0.4 mg Oral Daily Esthela Varner MD   0.4 mg at 10/23/20 1051    glycopyrrolate-formoterol (BEVESPI) 9-4.8 MCG/ACT inhaler 2 puff  2 puff Inhalation BID Esthela Varner MD   2 puff at 10/23/20 1213    sodium chloride flush 0.9 % injection 10 mL  10 mL Intravenous 2 times per day Esthela Varner MD   10 mL at 10/23/20 1052    sodium chloride flush 0.9 % injection 10 mL  10 mL Intravenous PRN Esthela Varner MD           Take Home Meds:  Current Discharge Medication List      START taking these medications    Details   losartan (COZAAR) 25 MG tablet Take 1 tablet by mouth daily Hold for systolic less 811  Qty: 30 tablet, Refills: 0      levoFLOXacin (LEVAQUIN) 250 MG tablet Take 1 tablet by mouth daily for 10 days  Qty: 10 tablet, Refills: 0      pantoprazole (PROTONIX) 40 MG tablet Take 1 tablet by mouth nightly  Qty: 30 tablet, Refills: 3      potassium chloride (KLOR-CON M) 20 MEQ extended release tablet Take 1 tablet by mouth daily  Qty: 30 tablet, Refills: 3         CONTINUE these medications which have CHANGED    Details   predniSONE (DELTASONE) 20 MG tablet Take 1 tablet by mouth daily for 10 days Then resume 10mg a day  Qty: 10 tablet, Refills: 0      furosemide (LASIX) 40 MG tablet Take 1 tablet by mouth daily  Qty: 30 tablet, Refills: 0         CONTINUE these medications which have NOT CHANGED    Details   fluticasone (FLONASE) 50 MCG/ACT nasal spray SPRAY 2 SPRAY INTO EACH NOSTRIL EVERY DAY  Qty: 3 Bottle, Refills: 1      albuterol sulfate  (90 Base) MCG/ACT inhaler Inhale 2 puffs into the lungs every 6 hours as needed for Wheezing or Shortness of Breath  Qty: 1 Inhaler, Refills: 5    Associated Diagnoses: Pulmonary fibrosis (HCC)      umeclidinium-vilanterol (ANORO ELLIPTA) 62.5-25 MCG/INH AEPB inhaler Inhale 1 puff into the lungs daily  Qty: 1 each, Refills: 0    Comments: Lot [id number]  05/2021  Associated Diagnoses: Pulmonary fibrosis (HCC)      tamsulosin (FLOMAX) 0.4 MG capsule ONE EVERY NIGHT FOR YOUR PROSTATE  Qty: 90 capsule, Refills: 3      finasteride (PROSCAR) 5 MG tablet TAKE 1 TABLET BY MOUTH EVERY DAY  Qty: 90 tablet, Refills: 4      atorvastatin (LIPITOR) 40 MG tablet Take 1 tablet by mouth daily  Qty: 90 tablet, Refills: 2      rivaroxaban (XARELTO) 15 MG TABS tablet Take 1 tablet by mouth daily (with breakfast)  Qty: 90 tablet, Refills: 5      aspirin 81 MG tablet Take 81 mg by mouth daily      Cholecalciferol (VITAMIN D3) 2000 UNITS CAPS Take 2,000 Units by mouth daily      calcium carbonate (OSCAL) 500 MG TABS tablet Take 600 mg by mouth daily caltrate 600-D3 one daily      Multiple Vitamins-Minerals (CENTRUM SILVER ADULT 50+) TABS Take 1 tablet by mouth daily      oxybutynin (DITROPAN) 5 MG tablet TAKE 1 TABLET BY MOUTH EVERY DAY  Qty: 90 tablet, Refills: 1         STOP taking these medications       omeprazole (PRILOSEC) 20 MG delayed release capsule Comments:   Reason for Stopping:         olmesartan (BENICAR) 40 MG tablet Comments:   Reason for Stopping:                 Activity: activity as tolerated  Diet: regular diet  Wound Care: none needed    Follow-up with [place] and Dr Leesa Davis   Time Spent: over 35 minutes spent performing hospital discharge  Signed:  Flaquito Guerra MD  10/23/2020  2:45 PM

## 2020-10-24 NOTE — PROGRESS NOTES
Physical Therapy    Facility/Department: [address] IP REHAB  Initial Assessment    NAME: Jamel Rowan  : 1925  MRN: [id number]    Date of Service: 10/24/2020    Discharge Recommendations:  Continue to assess pending progress, Home with assist PRN, Home with Home health PT        Assessment   Body structures, Functions, Activity limitations: Decreased functional mobility ; Decreased endurance;Decreased strength;Decreased balance  Assessment: Pt. who presents with L LE weakness leaving him unable to safely walk and also reported SOB and found to have pneumonia. History of back and hip pain, was injured in the war in 1942 with shrapnel through pelvis and intestines followed by an aparent long recovery. CT of spine L3-4 severe central and moderate bilateral foraminal stenosis andL4-5 grade 1 spondylolisthesis with additional degenerative changes, resulting in moderate central stenosis. MD notes report Pt. is not likely a surgical candidate, considering injection if Cardiology approves off blood thinner. Pt. Fluctuates from Contact to Min A with transfers and ambulation d/t weakens quickly especially L LE. Can ambulate 36' this date. Fatigues too quickly to be safe on steps. Very willing to participate. Will benefit from continued skilled PT at d/c. Treatment Diagnosis: Impaired functional mobility  Prognosis: Good  Decision Making: Medium Complexity  History: see above  Exam: see above  Clinical Presentation: evolving  PT Education: Goals;Plan of Care;Transfer Training;General Safety;Gait Training;Functional Mobility Training  Barriers to Learning: Hearing  REQUIRES PT FOLLOW UP: Yes  Activity Tolerance  Activity Tolerance: Patient limited by fatigue;Patient limited by endurance       Patient Diagnosis(es): There were no encounter diagnoses.      has a past medical history of Acute diastolic CHF (congestive heart failure) ([address] Arthritis, BPH with obstruction/lower urinary tract symptoms, Chronic atrial fibrillation ([facility] [address] CKD (chronic kidney disease), Coronary arteriosclerosis, GERD (gastroesophageal reflux disease), History of blood transfusion, HTN (hypertension), Left hip pain, Pacemaker, Pulmonary fibrosis ([address] Pulmonary nodule, left, Renal atrophy, right, Renal cyst, right, and SOB (shortness of breath). has a past surgical history that includes Pacemaker insertion (2014); Tonsillectomy (1960); Small intestine surgery (1945); Inguinal hernia repair (Right, 07/2016); Coronary angioplasty with stent (2009); pacemaker placement; Colonoscopy; Abdomen surgery; and eye surgery (Bilateral). Restrictions  Restrictions/Precautions  Restrictions/Precautions: Fall Risk  Position Activity Restriction  Other position/activity restrictions: [place]- B hearing aides, sabino hose  Vision/Hearing  Vision: Impaired  Vision Exceptions: Wears glasses for reading;Wears glasses for distance  Hearing: Exceptions to [facility]  Hearing Exceptions: Bilateral hearing aid     Subjective  General  Chart Reviewed: Yes  Patient assessed for rehabilitation services?: Yes  Additional Pertinent Hx: 80 y.o. male presented to ED 10/20/2020 with complaints of LLE weakness leaving him unable to walk. Pt also reported SOB with activity and found to have pneumonia. History of hip pain and back pain. Response To Previous Treatment: Patient with no complaints from previous session. Family / Caregiver Present: No  Referral Date : 10/23/20  Follows Commands: Within Functional Limits  General Comment  Comments: Denies pain at rest.  Pain with standing  Subjective  Subjective:  In bed, agreeable to therapy  Pain Screening  Patient Currently in Pain: Denies  Vital Signs  Patient Currently in Pain: Denies       Orientation  Orientation  Overall Orientation Status: Within Functional Limits  Social/Functional History  Social/Functional History  Lives With: Daughter(Dtr Trinidad Nunez)  Type of Home: House(condo)  Home Layout: One level  Home Access: Stairs to enter with rails  Entrance Stairs - Number of Steps: 1 R rail. (uses R going up, L going down) + 6 steps down with B far spaced rails. Bathroom Shower/Tub: Tub/Shower unit, Shower chair without back, Shower chair with back(has a stool outside of shower to sit on and put feet into shower, then a shower chair in tub to sit on: pt's daughter has purchased this since he's been in the hospital and he has not used it, 2 grab bars on front & sidewall in shower)  Bathroom Toilet: Handicap height(vanity near on R & tub on L for support)  Bathroom Equipment: Grab bars in shower, Shower chair(on wall into tub)  Bathroom Accessibility: [facility] accessible(RW will but not 4WW he has)  Home Equipment: Cane, 4 wheeled walker(Jacinda, pt just started using wh walker a few days prior to admit)  ADL Assistance: Independent  Homemaking Assistance: (dtr assists)  Homemaking Responsibilities: No  Ambulation Assistance: Independent(R str cane,)  Transfer Assistance: Independent(w/cane, 6JC recently)  Active : No  Mode of Transportation: [facility]  Education: Dtr drives pt  Occupation: Retired  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 Worked for [employer]. Leisure & Hobbies: reading. Additional Comments: Rarely alone. Home with dtr. Per dtr, up until last week, was walking a couple miles a couple times a week using a cane. Dtr states she purchased a rollator for patient use due to his inability to ambulate, and due to patient need to spontaneously sit as he fatigues.   Cognition        Objective          AROM RLE (degrees)  RLE AROM: WFL  AROM LLE (degrees)  LLE AROM : WFL  Strength RLE  Comment: Generally 4/5 throughout  Strength LLE  Comment: 3+/5 hip ABduction, hip flexion, hamstrings, quad  Tone RLE  RLE Tone: Normotonic  Tone LLE  LLE Tone: Normotonic  Motor Control  Gross Motor?: WNL  Sensation  Overall Sensation Status: WFL(lt touch and proprioception)  Bed mobility  Rolling to Left: Stand by assistance  Rolling to Right: Stand by assistance  Supine to Sit: Stand by assistance  Sit to Supine: Stand by assistance  Scooting: Stand by assistance  Comment: flat bed, no rail  Transfers  Sit to Stand: Minimal Assistance(cues for hand placement)  Stand to sit: Contact guard assistance(cues for hand placement)  Car transfer min A. Ambulation  Ambulation?: Yes  More Ambulation?: No  Ambulation 1  Surface: level tile  Device: Rolling Walker  Assistance: Minimal assistance;Contact guard assistance  Quality of Gait: Stooped posture, small step length, decreased step height, partial step through pattern, shuffling, decreased gait speed. Cues to stay within base of wh walker. Distance: 36' max distance due to fatigue, 20' to bathroom, 5' x 2 to and from car  Stairs/Curb  Stairs?: No(unsafe at this time due to quick fatigue)     Balance  Posture: Fair  Sitting - Static: Good  Sitting - Dynamic: Good  Standing - Static: Fair  Standing - Dynamic: Fair  Exercises  Hip Flexion: x 20 B LE  Knee Long Arc Quad: x 20 B LE  Ankle Pumps: x 20 B LE     Plan   Plan  Times per week: 5 to 6  Times per day: Twice a day  Plan weeks: 2 to 3  Current Treatment Recommendations: Gait Training, Balance Training, Endurance Training, Stair training  Safety Devices  Type of devices: All fall risk precautions in place, Gait belt(OT in to begin ADL session, care transferred)    Goals  Short term goals  Time Frame for Short term goals: 7 to 10 days  Short term goal 1: Bed mobility supervision  Short term goal 2: Transfers CGA  Short term goal 3: Ambulation 48' with wh walker and CGA  Short term goal 4: Progress to stairs as able.   Long term goals  Time Frame for Long term goals : 14 to 18 days  Long term goal 1: all bed mobility modif I  Long term goal 2: all transfers modif I  Long term goal 3: amb 80' with wh walker S to modif I  Long term goal 4: 8 steps R rail ascending L descending S  Patient Goals   Patient goals : \"to get up and walk without anything\" Therapy Time   Individual Concurrent Group Co-treatment   Time In 0820         Time Out 0950         Minutes 90         Timed Code Treatment Minutes: [address], [facility]

## 2020-10-24 NOTE — PROGRESS NOTES
Pt resting quietly in bed, has slept well overnight. Admitted with debility r/t pneumonia and CHF exacerbation. On RA. Transfers with a walker x1, CGA. Lungs diminished with some wheezes to the LLL. HR regular. Abdomen non tender with active bowel sounds. LBm 10/23. Has been incontinent of urine, toileting encouraged. C/o chronic back pain and medicated per  PRN orders. All needs assessed with Q2H rounding, alarms active. Will continue to monitor.  Paulino Martell RN

## 2020-10-24 NOTE — PROGRESS NOTES
Occupational Therapy   Occupational Therapy Initial Assessment  Date: 10/24/2020   Patient Name: Hipolito Ruiz  MRN: [id number]     : 1925    Date of Service: 10/24/2020    Discharge Recommendations:  Patient would benefit from continued therapy after discharge, Continue to assess pending progress, S Level 1, Home with Home health OT, Home with assist PRN  OT Equipment Recommendations  Other: Pt has shower stood outside of tub(uses like a TTB to sit & swing LEs into tub)then has new shower chair inside fo tub, horizontal grab bar front wall of tub & on long side wall of tub, handicapped ht toilet w/BUE support, 4WW, straight cane    Assessment   Performance deficits / Impairments: Decreased functional mobility ; Decreased ADL status; Decreased strength;Decreased endurance;Decreased balance;Decreased high-level IADLs;Decreased coordination;Decreased safe awareness  Assessment: Pt is a 81 yo M admitted with sudden left leg weakness and difficulty ambulating. PMHx: pulmonary fibrosis, arthritis, BPH, atrial fibrilation, CKD, HTN. Prior to admit pt lived with his dtr Yair Arboleda whom performs almost all the IADL tasks, but  pt was independent in all self-care, functional transfers/mobility with straight cane. Pt is currently functioning below his baseline D/T decreased activity tolerance/balance/strength/pain. Pt performed ADL tasks with mod A for lower body given increased time/cues, and transfers min A to CGA with use of RW/grab bars. Pt will benefit from inpt OT tx on rehab to facilitate return to mod ind level of function to allow for safe D/C home with dtr, and restore prior occupations/quality of life.   Treatment Diagnosis: Impaired: ADL/IADL function, transfers/functional mobility, balance, activity tolerance, safety awareness, L shoulder strength, coordination  Prognosis: Good  Decision Making: Medium Complexity  History: \"Patient is a 81 yo M with pmh CAD, CHF, Afib, HTN, CKD, pulmonary fibrosis, and lumbar DDD who initially presented 10/20/2020 with difficulty ambulating. Patient reports progressive weakness and shortness of breath over several days. Also with back pain and difficulty putting weight through his left leg. Found to have pulmonary edema, acute on chronic dCHF, and pneumonia. Work-up for stroke or acute spinal cord abnormality was negative. Back and LLE pain/weakness thought to be related to chronic degenerative changes. Patient now presents to ARU with impaired mobility and self-care below his baseline. Currently, patient reports improvement in his breathing and left leg strength. He still feels generally weak and fatigued. He denies back pain currently. States he gets low back pain with radiation into the LLE with prolonged standing/ambulating. Denies tingling/numbness. \" Copied per Dr Frank Starks 10/23/20 H & P note. Pt lives with dtr in [address] [place] [address] and was ind for all his ADL tasks/simple IADL tasks, including walking at the park regulary for exercise. Exam: ADL/IADL function, transfers/functional mobility, balance, activity tolerance, UE assessment: ROM/strength/coord, cognition  Assistance / Modification: Pt required cues and mod A for bathing/toileting, mod A for lower body dressing, and min A for upper body dressing, SBA for grooming as attempted in stance, too fatigued so had to sit to complete. Required several rest brakes to complete tasks D/T fatigued & dizzy, therefore required increased time to complete. OT Education: OT Role;Plan of Care;Transfer Training;Energy Conservation; ADL Adaptive Strategies  REQUIRES OT FOLLOW UP: Yes  Activity Tolerance  Activity Tolerance: Patient limited by fatigue  Activity Tolerance: Pt dizzy when stood from shower chiar, had to sit back down & rest, dizzy after bending over for LE dressing and when attemptd to stand for teeth so asked to sit, mostly resolved after seated rest. Pt's BP was 111/75, O2 sat was 96%, pulse 70 BPM.  Safety Devices  Safety Devices in place: Yes  Type of devices: Left in chair;Call light within reach; Chair alarm in place;Gait belt;Patient at risk for falls;Nurse notified           Patient Diagnosis(es): There were no encounter diagnoses. has a past medical history of Acute diastolic CHF (congestive heart failure) ([address] Arthritis, BPH with obstruction/lower urinary tract symptoms, Chronic atrial fibrillation ([address] CKD (chronic kidney disease), Coronary arteriosclerosis, GERD (gastroesophageal reflux disease), History of blood transfusion, HTN (hypertension), Left hip pain, Pacemaker, Pulmonary fibrosis ([address] Pulmonary nodule, left, Renal atrophy, right, Renal cyst, right, and SOB (shortness of breath). has a past surgical history that includes Pacemaker insertion (2014); Tonsillectomy (1960); Small intestine surgery (1945); Inguinal hernia repair (Right, 07/2016); Coronary angioplasty with stent (2009); pacemaker placement; Colonoscopy; Abdomen surgery; and eye surgery (Bilateral). Treatment Diagnosis: Impaired: ADL/IADL function, transfers/functional mobility, balance, activity tolerance, safety awareness, L shoulder strength, coordination      Restrictions  Restrictions/Precautions  Restrictions/Precautions: Fall Risk  Position Activity Restriction  Other position/activity restrictions: [employer]- B hearing aides, sabino caruso    Subjective   General  Chart Reviewed: Yes, Orders, Progress Notes, History and Physical  Patient assessed for rehabilitation services?: Yes  Additional Pertinent Hx: \"Patient is a 81 yo M with pmh CAD, CHF, Afib, HTN, CKD, pulmonary fibrosis, and lumbar DDD who initially presented 10/20/2020 with difficulty ambulating. Patient reports progressive weakness and shortness of breath over several days. Also with back pain and difficulty putting weight through his left leg. Found to have pulmonary edema, acute on chronic dCHF, and pneumonia. Work-up for stroke or acute spinal cord abnormality was negative.  Back and LLE pain/weakness thought to be related to chronic degenerative changes. Patient now presents to ARU with impaired mobility and self-care below his baseline. Currently, patient reports improvement in his breathing and left leg strength. He still feels generally weak and fatigued. He denies back pain currently. States he gets low back pain with radiation into the LLE with prolonged standing/ambulating. Denies tingling/numbness. \" Copied per Dr Anitra Valles 10/23/20 H & P note. Response to previous treatment: Patient with no complaints from previous session  Family / Caregiver Present: No  Referring Practitioner: Dr Luz Maria Plummer  Diagnosis: Debility  Subjective  Subjective: Pt met in room for OT eval/tx. Pt agreeable to ADL session for shower. Pt C/O pain 5/10 in low back(chronic). Pt pleasant & talkative and required some redirection to tasks    Social/Functional History  Social/Functional History  Lives With: Daughter(Dtr Micheline North)  Type of Home: House(condo)  Home Layout: One level  Home Access: Stairs to enter with rails  Entrance Stairs - Number of Steps: 1 R rail. (uses R going up, L going down) + 6 steps down with B far spaced rails.   Bathroom Shower/Tub: Tub/Shower unit, Shower chair without back, Shower chair with back(has a stool outside of shower to sit on and put feet into shower, then a shower chair in tub to sit on: pt's daughter has purchased this since he's been in the hospital and he has not used it, 2 grab bars on front & sidewall in shower)  Bathroom Toilet: Handicap height(vanity near on R & tub on L for support)  Bathroom Equipment: Grab bars in shower, Shower chair(on wall into tub)  Bathroom Accessibility: Joanna Mosquera accessible(RW will but not 4WW he has)  Home Equipment: Cane, 4 wheeled walker(Jacinda, pt just started using wh walker a few days prior to admit)  ADL Assistance: [address]: (dtr assists)  Homemaking Responsibilities: No  Ambulation Assistance: Independent(R str cane,)  Transfer Assistance: Independent(w/cane, 5JF recently)  Active : No  Mode of Transportation: SUV  Education: Dtr drives pt  Occupation: Retired  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 Worked for [employer]. Leisure & Hobbies: reading. Additional Comments: Rarely alone. Home with dtr. Per dtr, up until last week, was walking a couple miles a couple times a week using a cane. Dtr states she purchased a rollator for patient use due to his inability to ambulate, and due to patient need to spontaneously sit as he fatigues. Objective   Vision: Impaired  Vision Exceptions: Wears glasses for reading;Wears glasses for distance  Hearing: Exceptions to [facility] [employer]  Hearing Exceptions: Bilateral hearing aid    Orientation  Overall Orientation Status: Within Functional Limits     Balance  Sitting Balance: Supervision(on shower chair)  Standing Balance: Contact guard assistance(RW/grab bars for support)  Standing Balance  Time: not timed(~ 1-2  minutes each time)  Activity: ADL/mob tasks  Functional Mobility  Functional - Mobility Device: Rolling Walker  Activity: To/from bathroom  Assist Level: Contact guard assistance  Functional Mobility Comments: Pt amb with RW from toilet>< shower chair in stall, >< arm chair @ sink, > recliner chiar @ window all CGA & cues for hand placement. Toilet Transfers  Toilet - Technique: Ambulating(RW)  Equipment Used: Grab bars(comfort ht toilet)  Toilet Transfer: Contact guard assistance  Toilet Transfers Comments: pt seated on toilet with PT & OT took over care, pt able to stand from comfort ht toilet with B grab bars CGA, reported he has high toilet @ home with R side counter & L side tub he pushes up on  Tub Transfers  Tub Transfers Comments: TBE in future session  Shower Transfers  Shower - Transfer From: [employer])  Shower - Transfer To:  Shower seat with back  Shower - Technique: Ambulating  Shower Transfers: Minimal assistance  Shower Transfers Comments: from RW required min A to sit safely onto chair w/cues for sit & swing technique & for hand placement; sit >< stand during shower min A also, stood from chair to come out CGA/min A, (did not use grab bars but seat & back of chair)  Wheelchair Bed Transfers  Wheelchair/Bed - Technique: Ambulating(RW)  Equipment Used: Other(arm chair, recliner chair)  Level of Asssistance: Contact guard assistance  Wheelchair Transfers Comments: RW >< arm chair @ sink, 2x, > recliner CGA + cues for hand placement, to sit safely  ADL  Feeding: Setup; Dentures  Grooming: Setup;Stand by assistance(attempted in stance but pt fatigued/unsteady so OT pulled up chair & pt sat for all tasks)  UE Bathing: Minimal assistance;Setup; Increased time to complete;Verbal cueing(seated on shower chair, assist to manage hand held shower, assist for back and hair, required min cues for sequencing & to use soap initially. IV covered & kept dry/intact)  LE Bathing: Moderate assistance;Setup; Increased time to complete;Verbal cueing(assist to bathe L foot, and to dry both feet, & to bathe/rinse/dry buttocks in stance @ grab bar)  UE Dressing: Minimal assistance;Setup(T-shirt set-up, long sleeve shirt in a to pull down/straighten as tight(typically wears undershirt))  LE Dressing: Moderate assistance;Setup; Increased time to complete(assist to don footies to come out of shower, pt able to doff; OT measured for/donned sabino hose, assist for 1 leg in pull up/to pull up, managed pants CGA, assist for L shoe, able to don/tie R shoe)  Toileting: Moderate assistance(per PT assist for pull ups down, voided, OT too over care & pt pulled up pull ups CGA w/increased time/use of grab bar)  Additional Comments: Pt left reclined in recliner, needs in reach, ( waffle cushion w/non skid pad placed in seat for pt). Pt became dizzy during lower body dressing tasks, made nurse Martina Solano aware.  Pt expressed quite fatigued after showering/dressing tasks & required rests to complete all tasks.        Bed mobility  Comment: NT as up @ start & finish of session(see PT notes)        Cognition  Overall Cognitive Status: WFL  Cognition Comment: Generally WFL. Noted pt repeats the same stories, comments, may have mild decreased STM vs decreased attn; very conversational, required redirection to task  Perception  Overall Perceptual Status: WFL     Sensation  Overall Sensation Status: WFL        LUE AROM (degrees)  LUE AROM : WFL  LUE General AROM: Hx of L shoulder injury several years ago, gets a \"catch\" in shoulder frequently, AROM is WFL  Left Hand AROM (degrees)  Left Hand AROM: WNL  RUE AROM (degrees)  RUE AROM : WNL  Right Hand AROM (degrees)  Right Hand AROM: WNL  LUE Strength  Gross LUE Strength: Exceptions to [facility]  LUE Strength Comment: shoulder only tested to 3+/5 D/T painful from old injury, distally 4/5  RUE Strength  Gross RUE Strength: WFL  RUE Strength Comment: grossly 4/5 for age                   Plan   Plan  Times per week: 5-6  Times per day: Twice a day  Plan weeks: 1-2 weeks from [facility] 10/24/20  Current Treatment Recommendations: Strengthening, Functional Mobility Training, Endurance Training, Balance Training, Self-Care / ADL, Safety Education & Training, Patient/Caregiver Education & Training, Equipment Evaluation, Education, & procurement, Home Management Training  Plan Comment: Wednesday conference            Goals  Short term goals  Time Frame for Short term goals: 1 week from eval on 10/24/20  Short term goal 1: Pt will bathe CGA to SBA with shower chair/grab bars/long sponge. Short term goal 2: Pt will dress with assist for sabino hose only using AE as needed.   Short term goal 3: Pt will toilet with CGA to SBA with use of RW/counter/grab bar for support  Short term goal 4: Pt will perform functional transfers/mobility with RW SBA and demo good safety skills  Short term goal 5: Pt will perform ther ex/HEP to increase activity tolerance to stand for 5-6 minutes for ADL/simple IADL tasks with min cues for walker safety skills. Long term goals  Time Frame for Long term goals : 2 weeks from eval on 10/24/20  Long term goal 1: Pt will bathe mod ind with shower chair/hand held shower/grab bars/long sponge. Long term goal 2: Pt will dress mod ind including sabino hose/own compression hose with AE as needed. Long term goal 3: Pt will toilet mod ind with RW/elevated toilet with rails  Long term goal 4: Pt will perform functional transfers/mobility mod ind with RW  Long term goal 5: Pt will perform ther ex/HEP to increase activity tolerance to stand for 8-10 minutes for ADL/simple IADL tasks. Long term goals 6: Pt will improve walker safety/attn to task to perform simple IADL tasks such as simple food prep/clothing set-up mod ind from RW. Patient Goals   Patient goals : Pt stated \"I want to be able to get around my house w/o being afraid of falling, take care of myself, be able to go to the park and walk by myself, so I don't hold my daughter back. \"       Therapy Time   Individual Concurrent Group Co-treatment   Time In 0945         Time Out 1130         Minutes 105         Timed Code Treatment Minutes: 90 Minutes+15 eval       Myesha Montelongo OT   Electronically signed by Marlin Hugo OTR/L  License # [id number]

## 2020-10-24 NOTE — H&P
Department of Physical Medicine & Rehabilitation  History & Physical      Patient Identification:  Loise Ganser  : 1925  Admit date: 10/23/2020   Attending provider: Ashlyn Myers MD        Primary care provider: Ken Bianchi MD     Chief Complaint: weakness and fatigue     History of Present Illness/Hospital Course:  Patient is a 79 yo M with pmh CAD, CHF, Afib, HTN, CKD, pulmonary fibrosis, and lumbar DDD who initially presented 10/20/2020 with difficulty ambulating. Patient reports progressive weakness and shortness of breath over several days. Also with back pain and difficulty putting weight through his left leg. Found to have pulmonary edema, acute on chronic dCHF, and pneumonia. Work-up for stroke or acute spinal cord abnormality was negative. Back and LLE pain/weakness thought to be related to chronic degenerative changes. Patient now presents to ARU with impaired mobility and self-care below his baseline. Currently, patient reports improvement in his breathing and left leg strength. He still feels generally weak and fatigued. He denies back pain currently. States he gets low back pain with radiation into the LLE with prolonged standing/ambulating. Denies tingling/numbness. He is relatively active at baseline (goes on exercise walks/to gym 2 days/week).  He is motivated to start inpatient rehab program.      Prior Level of Function:  Independent for mobility (cane), ADLs     Current Level of Function:  Josue for mobility  Min-modA for ADLs     Pertinent Social History:  Support: Lives with daughter  Home set-up: 1 level home with 6 steps to enter    Past Medical History:   Diagnosis Date    Acute diastolic CHF (congestive heart failure) ([facility] [address] 10/23/2020    Arthritis     BPH with obstruction/lower urinary tract symptoms     nocturia 3-4 x per night    Chronic atrial fibrillation ([address]     CKD (chronic kidney disease) 2017    level 3    Coronary arteriosclerosis     2 stents/  [place] in [place] GERD (gastroesophageal reflux disease)     History of blood transfusion     1945   During deployment    HTN (hypertension) 1990    Left hip pain     schrapnel in hip and intestines/chronic pains left hip impairs walking    Pacemaker 2015    Pulmonary fibrosis ([address] 08/2017    mild amount seen on chest ct    Pulmonary nodule, left 2017    noncalcified 7mm low risk repeat 8/2018 if patient willing    Renal atrophy, right 2017    Renal cyst, right 08/2017    not clearly simple cyst    SOB (shortness of breath) 2017    terrell normal pft and ct chest ? smll amount of pulmonary fibrosis       Past Surgical History:   Procedure Laterality Date    ABDOMEN SURGERY      Scrapnel removed in [place]  2009    awoke with indigestion    EYE SURGERY Bilateral     cataract    INGUINAL HERNIA REPAIR Right 07/2016    PACEMAKER INSERTION  2014    PACEMAKER PLACEMENT      SMALL INTESTINE SURGERY  1945    schrapnel as [employer] vet, part of small intestine moved    TONSILLECTOMY  1960       Family History   Problem Relation Age of Onset    Pacemaker Sister     Pacemaker Brother     No Known Problems Other         lung disease       Social History     Socioeconomic History    Marital status:      Spouse name: Not on file    Number of children: Not on file    Years of education: Not on file    Highest education level: Not on file   Occupational History    Occupation: soil conservastional   Social Needs    Financial resource strain: Patient refused    Food insecurity     Worry: Patient refused     Inability: Patient refused    Transportation needs     Medical: Patient refused     Non-medical: Patient refused   Tobacco Use    Smoking status: Never Smoker    Smokeless tobacco: Never Used   Substance and Sexual Activity    Alcohol use: No    Drug use: Not Currently    Sexual activity: Not Currently   Lifestyle    Physical activity Days per week: Not on file     Minutes per session: Not on file    Stress: Not on file   Relationships    Social connections     Talks on phone: Not on file     Gets together: Not on file     Attends [employer] service: Not on file     Active member of club or organization: Not on file     Attends meetings of clubs or organizations: Not on file     Relationship status: Not on file    Intimate partner violence     Fear of current or ex partner: Not on file     Emotionally abused: Not on file     Physically abused: Not on file     Forced sexual activity: Not on file   Other Topics Concern    Not on file   Social History Narrative    Not on file       Allergies   Allergen Reactions    Claritin [Loratadine]      Reduced urine flow    Zestril [Lisinopril] Other (See Comments)     coughing         Current Facility-Administered Medications   Medication Dose Route Frequency Provider Last Rate Last Dose    sodium chloride flush 0.9 % injection 10 mL  10 mL Intravenous 2 times per day Maxwell Blackwell MD   10 mL at 10/23/20 2142    sodium chloride flush 0.9 % injection 10 mL  10 mL Intravenous PRN Maxwell Blackwell MD        acetaminophen (TYLENOL) tablet 650 mg  650 mg Oral Q6H PRN Maxwell Blackwell MD   650 mg at 10/23/20 2141    polyethylene glycol (GLYCOLAX) packet 17 g  17 g Oral Daily PRN Maxwell Blackwell MD        sennosides-docusate sodium (SENOKOT-S) 8.6-50 MG tablet 1 tablet  1 tablet Oral BID Maxwell Blackwell MD        [START ON 10/24/2020] acetaminophen (TYLENOL) tablet 1,000 mg  1,000 mg Oral Q8H Maxwell Blackwell MD        albuterol (PROVENTIL) nebulizer solution 2.5 mg  2.5 mg Nebulization Q6H PRN Maxwell Blackwell MD        [START ON 10/24/2020] aspirin chewable tablet 81 mg  81 mg Oral Daily Maxwell Blackwell MD        [START ON 10/24/2020] atorvastatin (LIPITOR) tablet 40 mg  40 mg Oral Daily Maxwell Blackwell MD        bisacodyl (DULCOLAX) suppository 10 mg  10 mg Rectal Daily PRN Eliecer See MD        [START ON 10/24/2020] calcium elemental (OSCAL) tablet 500 mg  500 mg Oral Daily Eliecer See MD        [START ON 10/24/2020] finasteride (PROSCAR) tablet 5 mg  5 mg Oral Daily Eliecer See MD        [START ON 10/24/2020] fluticasone [facility]) 50 MCG/ACT nasal spray 2 spray  2 spray Each Nostril Daily Eliecer See MD        [START ON 10/24/2020] furosemide (LASIX) tablet 40 mg  40 mg Oral Daily Eliecer See MD        glycopyrrolate-formoterol (BEVESPI) 9-4.8 MCG/ACT inhaler 2 puff  2 puff Inhalation BID Eliecer See MD   2 puff at 10/23/20 2002    hydrALAZINE (APRESOLINE) tablet 25 mg  25 mg Oral Q6H PRN Eliecer See MD        [START ON 10/24/2020] levoFLOXacin [facility]) tablet 250 mg  250 mg Oral Daily Eliecer See MD        [START ON 10/24/2020] losartan (COZAAR) tablet 50 mg  50 mg Oral Daily Eliecer See MD        melatonin tablet 3 mg  3 mg Oral Nightly PRN Eliecer See MD        [facility]dansetron [facility]) tablet 4 mg  4 mg Oral Q8H PRN Eliecer See MD        [START ON 10/24/2020] pantoprazole (PROTONIX) tablet 40 mg  40 mg Oral Nightly Eliecer See MD        [START ON 10/24/2020] potassium chloride (KLOR-CON M) extended release tablet 20 mEq  20 mEq Oral Daily with breakfast MD Oumou Arzate ON 10/30/2020] predniSONE (DELTASONE) tablet 10 mg  10 mg Oral Daily MD Oumou Arzate ON 10/24/2020] predniSONE (DELTASONE) tablet 20 mg  20 mg Oral Daily MD Oumou Arzate ON 10/24/2020] rivaroxaban (XARELTO) tablet 15 mg  15 mg Oral Daily with breakfast Eliecer See MD        [START ON 10/24/2020] tamsulosin [facility]) capsule 0.4 mg  0.4 mg Oral Daily Eliecer See MD        [START ON 10/24/2020] Vitamin D (CHOLECALCIFEROL) tablet 2,000 Units  2,000 Units Oral Daily Eliecer See MD             REVIEW OF SYSTEMS:   CONSTITUTIONAL: negative for fevers, chills, diaphoresis, appetite change, night sweats, unexpected weight change, +fatigue  EYES: negative for blurred vision, eye discharge, visual disturbance and icterus  HEENT: negative for hearing loss, tinnitus, ear drainage, sinus pressure, nasal congestion, epistaxis and snoring  RESPIRATORY: Negative for hemoptysis; +RAIN and productive cough  CARDIOVASCULAR: negative for chest pain, palpitations, exertional chest pressure/discomfort, syncope, +edema  GASTROINTESTINAL: negative for nausea, vomiting, diarrhea, blood in stool, abdominal pain, constipation  GENITOURINARY: negative for frequency, dysuria, urinary incontinence, decreased urine volume, and hematuria  HEMATOLOGIC/LYMPHATIC: negative for easy bruising, bleeding and lymphadenopathy  ALLERGIC/IMMUNOLOGIC: negative for recurrent infections, angioedema, anaphylaxis and drug reactions  ENDOCRINE: negative for weight changes and diabetic symptoms including polyuria, polydipsia and polyphagia  MUSCULOSKELETAL: refer to HPI  NEUROLOGICAL: refer ot HPI  PSYCHIATRIC/BEHAVIORAL: negative for hallucinations, behavioral problems, confusion and agitation.      All pertinent positives are noted in the HPI. Physical Examination:  Vitals:   Patient Vitals for the past 24 hrs:   BP Temp Temp src Pulse Resp SpO2   10/23/20 2035 139/62 97.3 °F (36.3 °C) Oral 73 17 96 %   10/23/20 2003 -- -- -- -- 16 95 %   10/23/20 1830 113/66 98.5 °F (36.9 °C) Oral 73 18 97 %       Const: Alert. WDWN. No distress  Eyes: Conjunctiva noninjected, no icterus noted; pupils equal, round, and reactive to light. HENT: Atraumatic, normocephalic; Oral mucosa moist  Neck: Trachea midline, neck supple. No thyromegaly noted. CV: Regular rate and rhythm. + systolic murmur. No rub or gallop noted  Resp: Lungs with basilar crackles. No wheezes or rhonchi, no retractions. Respirations unlabored. GI: Soft, nontender, nondistended. Normal bowel sounds. No palpable masses.    Skin: Normal temperature and turgor. No rashes or breakdown noted. Ext: +Edema. No varicosities. MSK: Kyphotic posture, decreased spine ROM. Arthritic changes in [eripheral joints. No joint tenderness, erythema, warmth noted. Neuro: Alert, oriented, appropriate. No cranial nerve deficits appreciated. Sensation intact to light touch. Motor examination reveals strength 5-/5 in BUE and BLE except 4/5 hip flexion. No abnormalities with finger/nose noted. Reflexes diminished, symmetric. Psych: Stable mood, normal judgement, normal affect        Lab Results   Component Value Date    WBC 14.3 (H) 10/23/2020    HGB 10.1 (L) 10/23/2020    HCT 31.5 (L) 10/23/2020    MCV 80.9 10/23/2020     10/23/2020     Lab Results   Component Value Date    INR 1.37 (H) 10/20/2020    PROTIME 15.9 (H) 10/20/2020     Lab Results   Component Value Date    CREATININE 1.8 (H) 10/23/2020    BUN 50 (H) 10/23/2020     (L) 10/23/2020    K 4.8 10/23/2020    CL 99 10/23/2020    CO2 24 10/23/2020     Lab Results   Component Value Date    ALT 14 10/20/2020    AST 19 10/20/2020    ALKPHOS 50 10/20/2020    BILITOT 0.5 10/20/2020       Most recent echocardiogram revealed:  Left ventricular cavity size is normal.   Ejection fraction is visually estimated to be 50%.   At least moderate-Severe mitral regurgitation.   No evidence of mitral stenosis.   Systolic flow reversal in pulmonary veins.     Most recent EKG revealed:  V pacingConfirmed by NANCY VARGAS, Silvio De Los Santos ([id number]) on 10/23/2020 4:15:55 PM      Most recent imaging studies revealed:     CT chest  Pulmonary interstitial opacities, most likely pulmonary edema superimposed on    chronic interstitial lung disease.         Indeterminate 42 mm right renal mass may represent a proteinaceous cyst or    neoplasm.         CT lumbar spine  No acute fracture or subluxation.         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         L3-4 severe central and moderate bilateral foraminal stenosis.      L4-5 grade 1 spondylolisthesis with additional degenerative changes,    resulting in moderate central stenosis.       CTA head and neck  1. Bilateral carotid bifurcation atherosclerotic plaque resulting in    approximately 40% bilateral stenosis of the origin of the internal carotid    artery.  Finding is compatible with 16-49% stenosis per sonographic NASCET    index criteria. 2. 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 3. Right vertebral artery termination as the posteroinferior cerebellar    artery (PICA). 4. Bilateral internal carotid artery cavernous segment atherosclerotic    calcification without significant arterial stenosis. 5. Approximately 50% arterial stenosis of the bilateral vertebral body origin    secondary to encroachment by atherosclerotic calcification. 6. Mild cerebral white matter chronic microvascular ischemic disease.          CT head  BRAIN/VENTRICLES: There is no acute intracranial hemorrhage, mass effect or    midline shift.  No abnormal extra-axial fluid collection.  The gray-white    differentiation is maintained without evidence of an acute infarct.  There is    no evidence of hydrocephalus. Mild volume loss.  White matter changes in the    periventricular white matter subcortical white.  These are stable compared to    prior study         ORBITS: The visualized portion of the orbits demonstrate no acute abnormality.         SINUSES: The visualized paranasal sinuses and mastoid air cells demonstrate    no acute abnormality.         SOFT TISSUES/SKULL:  No acute abnormality of the visualized skull or soft    tissues.                  On my review, CXR displays improved aeration, +interstitial opacities. The above laboratory data have been reviewed. The above imaging data have been reviewed. The above medical testing have been reviewed. There is no height or weight on file to calculate BMI.     POST ADMISSION PHYSICIAN EVALUATION  The patient has agreed to being admitted to our comprehensive inpatient rehabilitation facility and can tolerate the intensity of service consisting of at least:  --180 minutes of therapy a day, 5 out of 7 days a week. OR  --15 hours of intensive therapy within a 7 consecutive day period. The patient/family has a good understanding of our discharge process and will benefit from an interdisciplinary inpatient rehabilitation program. The patient has potential to make improvement and is in need of at least two of the following multidisciplinary therapies including but not limited to physical, occupational, respiratory, and speech, nutritional services, wound care, and prosthetics and orthotics. Given the patients complex condition and risk of further medical complications, rehabilitation services cannot be safely provided at a lower level of care such as a skilled nursing facility. All of the goals listed below were reviewed with the patient and he/she is in agreement. I have compared the patients medical and functional status at the time of the preadmission screening and the same on this date, and there are no significant changes. By signing this document, I acknowledge that I have personally performed a full physical examination on this patient within 24 hours of admission to this inpatient rehabilitation facility and have determined the patient to be able to tolerate the above course of treatment at an intensive level for a reasonable period of time. I will be completing a detailed individualized  Plan of Care for this patient by day four of the patients stay based upon the Preadmission Screen, this Post-Admission Evaluation, and the therapy evaluations.      Barriers: generalized weakness, decreased endurance, balance, respiratory insufficiency, medical comorbidities  Services Required: PT, OT  Goals: Lucio for mobility and ADLs  Prognosis: Good  Anticipated Dispo: home with daughter  ELOS: 10-14

## 2020-10-24 NOTE — PLAN OF CARE
Problem: Falls - Risk of:  Goal: Will remain free from falls  Description: Will remain free from falls  Outcome: Ongoing  Note: Pt educated on falls precautions and safety. Call light and personal belongings within reach at all times. Non skid socks in use when up. Hourly rounding and alarms active.        Problem: Daily Care:  Goal: Daily care needs are met  Description: Daily care needs are met  Outcome: Ongoing

## 2020-10-24 NOTE — CONSULTS
Comprehensive Nutrition Assessment    Type and Reason for Visit:  Initial, Consult    Nutrition Recommendations/Plan:     Continue general diet    Nutrition Assessment:  RD consulted per rehab protocol. Pt currently on general diet and eating well. Denies N/V/D at this time. Pt here following difficulty ambulating. PMHx includes CAD, CHF, afib, HTN, CKD, pulmonary fibrosis, and lumbar DDD. Pt is at low nutritional risk at this time. Will continue to monitor nutritional status as stay progresses. Malnutrition Assessment:  Malnutrition Status:  No malnutrition     Nutrition Related Findings:  BM 10/23; no edema; glucose eelvated at 100, renal labs elevated      Wounds:  None       Current Nutrition Therapies:    DIET GENERAL; Anthropometric Measures:  · Height: 5' 6\" (167.6 cm)  · Current Body Weight: 174 lb (78.9 kg)   · Usual Body Weight: (180-185 lbs per EMR)     · Ideal Body Weight: 142 lbs  · BMI: 28.1  · BMI Categories: Overweight (BMI 25.0-29. 9)       Nutrition Diagnosis:   No nutrition diagnosis at this time     Nutrition Interventions:   Food and/or Nutrient Delivery:  Continue Current Diet  Nutrition Education/Counseling:  No recommendation at this time   Coordination of Nutrition Care:  Continued Inpatient Monitoring    Goals:  Consume >50% of meals       Nutrition Monitoring and Evaluation:  Food/Nutrient Intake Outcomes:  Food and Nutrient Intake  Physical Signs/Symptoms Outcomes:  Biochemical Data, Weight, Nausea or Vomiting     Discharge Planning:    Continue current diet     Electronically signed by Ayanna Moss RD, LD on 10/24/20 at 1:15 PM EDT    Contact: [phone]

## 2020-10-24 NOTE — PROGRESS NOTES
[address] [facility] Internal Medicine Note      Chief Complaint: I feel ok    Subjective/Interval History:    Patient is seen today in the therapy gym, now on rehab. His and the model car working on getting in and out of the car. He is comfortable at this moment, on room air. He reports no new problems overnight. Shortness of breath is chronically stable. Does not appear to be any different than baseline. Denies chest pain. Therapy states his biggest problem is endurance. No chest pain. No nausea, vomiting, diarrhea. No abdominal pain. No dysuria. The remainder of the review of systems is negative. PMH, PSH, FH/SH reviewed and unchanged as documented in the H&P 10/23/2020    Medication list reviewed    Objective:    BP (!) 166/76   Pulse 73   Temp 98.3 °F (36.8 °C) (Oral)   Resp 16   Ht 5' 6\" (1.676 m)   Wt 174 lb 2.6 oz (79 kg)   SpO2 97%   BMI 28.11 kg/m²   Temp  Av.9 °F (36.6 °C)  Min: 97.3 °F (36.3 °C)  Max: 98.5 °F (36.9 °C)    RRR  Chest-respirations easy at rest.  No wheezes or rhonchi. Bibasilar posterior crackles.   Abd-bowel sounds positive, soft, nontender, nondistended  Ext-no edema    The Following Labs Were Reviewed Today:    Recent Results (from the past 24 hour(s))   Basic Metabolic Panel w/ Reflex to MG    Collection Time: 10/24/20  7:19 AM   Result Value Ref Range    Sodium 138 136 - 145 mmol/L    Potassium reflex Magnesium 4.7 3.5 - 5.1 mmol/L    Chloride 104 99 - 110 mmol/L    CO2 25 21 - 32 mmol/L    Anion Gap 9 3 - 16    Glucose 100 (H) 70 - 99 mg/dL    BUN 58 (H) 7 - 20 mg/dL    CREATININE 1.8 (H) 0.8 - 1.3 mg/dL    GFR Non-African American 35 (A) >60    GFR  43 (A) >60    Calcium 8.4 8.3 - 10.6 mg/dL   CBC auto differential    Collection Time: 10/24/20  7:19 AM   Result Value Ref Range    WBC 13.2 (H) 4.0 - 11.0 K/uL    RBC 3.93 (L) 4.20 - 5.90 M/uL    Hemoglobin 10.1 (L) 13.5 - 17.5 g/dL    Hematocrit 31.6 (L) 40.5 - 52.5 %    MCV 80.4 80.0 - 100.0 fL MCH 25.8 (L) 26.0 - 34.0 pg    MCHC 32.1 31.0 - 36.0 g/dL    RDW 17.0 (H) 12.4 - 15.4 %    Platelets 134 428 - 061 K/uL    MPV 9.6 5.0 - 10.5 fL    Neutrophils % 77.7 %    Lymphocytes % 11.1 %    Monocytes % 10.9 %    Eosinophils % 0.2 %    Basophils % 0.1 %    Neutrophils Absolute 10.2 (H) 1.7 - 7.7 K/uL    Lymphocytes Absolute 1.5 1.0 - 5.1 K/uL    Monocytes Absolute 1.4 (H) 0.0 - 1.3 K/uL    Eosinophils Absolute 0.0 0.0 - 0.6 K/uL    Basophils Absolute 0.0 0.0 - 0.2 K/uL   Prealbumin    Collection Time: 10/24/20  7:19 AM   Result Value Ref Range    Prealbumin 22.4 20.0 - 40.0 mg/dL       ASSESSMENT/PLAN:      Principal Problem:    Debility-continues on the inpatient rehab unit. Active Problems:    Pulmonary fibrosis/Pulmonary infiltrates-completing 7 days of biotics total, on Levaquin along with prednisone. Leukocytosis-afebrile. Likely due to prednisone therapy. Coronary arteriosclerosis-asymptomatic. Chronic atrial fibrillation-regular on exam today. Rate is controlled. Continue Xarelto.     Time > 25 minutes reviewing chart and patient data, examining and interviewing patient, and discussing with nursing staff, family, etc.      Anne Marie Gonzalez MD, [id number]  9:46 AM  10/24/2020

## 2020-10-24 NOTE — PLAN OF CARE
ARU PATIENT TREATMENT PLAN  [facility] [id number]  [phone]    Franck Linn    : 1925  [facility]  MRN: [id number]   PHYSICIAN:  Shea Dailey MD    Rehabilitation Diagnosis:    Debility  -PT/OT     Gait dysfunction, LLE pain/weakness  -Work-up for stroke/acute SCI abnormality negative  -Etiology likely radicular in setting of spondylosis and DDD  -PT/OT     Acute on chronic dCHF, moderate-severe mitral regurgitation  -EF 50%  -Lasix 40 daily, losartan  -Daily wt     Pneumonia  -Levofloxacin to complete 7 day course     Pulmonary fibrosis  -Per Pulmonology, prednisone 20mg x 7 days, then resume home dose 10 mg  -Bevespi, prn albuterol     Chronic Afib  -s/p PPM for CHB  -Xarelto     Hyperlipidemia  -statin     CKD  -Avoid nephrotoxins, renally dose meds  -Monitor BUN/Cr     Bladder   -High risk retention, h/o BPH   -Monitor PVRs, SC prn >300cc  -Finasteride, Flomax     Bowel   -High risk constipation   -senna+colace BID, PRN miralax, MoM, and bisacodyl supp.     Pain control  -Acetaminophen     PPx  -DVT: Xarelto  -GI: pantoprazole     ADMIT DATE:10/23/2020    Patient Goals: \"to get up and walk without anything\"    Admitting Impairments: generalized weakness, decreased endurance, balance, respiratory insufficiency    Barriers: generalized weakness, decreased endurance, balance, respiratory insufficiency, medical co-morbidities    Participation: patient lives with daughter and is independent with cane, recently 1FT- Rarely alone. Home with dtr. Per dtr, up until last week, was walking a couple miles a couple times a week using a cane.  Dtr states she purchased a rollator for patient use due to his inability to ambulate, and due to patient need to spontaneously sit as he fatigues     CARE PLAN     NURSING:  Franck Linn while on this unit will:     [x] Be continent of bowel and bladder     [x] Have an adequate number of bowel movements  [x] Urinate with no urinary retention >300ml in bladder  [] Complete bladder protocol with kauffman removal  [x] Maintain O2 SATs at 92%  [x] Have pain managed while on ARU       [] Be pain free by discharge   [x] Have no skin breakdown while on ARU  [] Have improved skin integrity via wound measurements  [] Have no signs/symptoms of infection at the wound site  [x] Be free from injury during hospitalization   [x] Complete education with patient/family with understanding demonstrated for:  [x] Adjustment   [x] Other: CHF  Nursing interventions may include bowel/bladder training, education for medical assistive devices, medication education, O2 saturation management, energy conservation, stress management techniques, fall prevention, alarms protocol, seating and positioning, skin/wound care, pressure relief instruction,dressing changes,  infection protection, DVT prophylaxis, and/or assistance with in room safety with transfers to bed, toilet, wheelchair, shower as well as bathroom activities and hygiene. Patient/caregiver education for:   [x] Disease/sustained injury/management      [x] Medication Use   [] Surgical intervention   [x] Safety   [x] Body mechanics and or joint protection   [x] Health maintenance         PHYSICAL THERAPY:  Goals:                  Short term goals  Time Frame for Short term goals: 7 to 10 days  Short term goal 1: Bed mobility supervision  Short term goal 2: Transfers CGA  Short term goal 3: Ambulation 48' with  walker and CGA  Short term goal 4: Progress to stairs as able. Long term goals  Time Frame for Long term goals : 14 to 18 days  Long term goal 1: all bed mobility modif I  Long term goal 2: all transfers modif I  Long term goal 3: amb 80' with  walker S to modif I  Long term goal 4: 8 steps R rail ascending L descending S  These goals were reviewed with this patient at the time of assessment and River Roe is in agreement.      Plan of Care: Pt to be seen 90   mins per day for 5-6 day/week 2-2.5 weeks. Current Treatment Recommendations: Gait Training, Balance Training, Endurance Training, Stair training      OCCUPATIONAL THERAPY:  Goals:             Short term goals  Time Frame for Short term goals: 1 week from eval on 10/24/20  Short term goal 1: Pt will bathe CGA to SBA with shower chair/grab bars/long sponge. Short term goal 2: Pt will dress with assist for sabino hose only using AE as needed. Short term goal 3: Pt will toilet with CGA to SBA with use of RW/counter/grab bar for support  Short term goal 4: Pt will perform functional transfers/mobility with RW SBA and demo good safety skills  Short term goal 5: Pt will perform ther ex/HEP to increase activity tolerance to stand for 5-6 minutes for ADL/simple IADL tasks with min cues for walker safety skills. :  Long term goals  Time Frame for Long term goals : 2 weeks from eval on 10/24/20  Long term goal 1: Pt will bathe mod ind with shower chair/hand held shower/grab bars/long sponge. Long term goal 2: Pt will dress mod ind including sabino hose/own compression hose with AE as needed. Long term goal 3: Pt will toilet mod ind with RW/elevated toilet with rails  Long term goal 4: Pt will perform functional transfers/mobility mod ind with RW  Long term goal 5: Pt will perform ther ex/HEP to increase activity tolerance to stand for 8-10 minutes for ADL/simple IADL tasks. Long term goals 6: Pt will improve walker safety/attn to task to perform simple IADL tasks such as simple food prep/clothing set-up mod ind from RW. :    These goals were reviewed with this patient at the time of assessment and Aman Lopes is in agreement    Plan of Care:  Pt to be seen 90 mins per day for 5 days/week 2 weeks. SPEECH THERAPY: Goals will be left blank if speech is not following this patient.   Goals:                                                                         CASE MANAGEMENT:  Goals:   Assist patient/family with discharge planning, patient/family counseling,   and coordination with insurance during ARU stay. Admission Period/Goal QIM CODES   QIM  Admit/Goal Score    Eating  5/6   Oral Hygiene  4/6   [address]  3/6   Shower/Bathe Self  3/6   Upper Body Dressing  3/6   Lower Body Dressing  3/6   Putting on/Taking off Footwear  2/6   Roll Left and Right  4/6   Sit to Lying  4/6   Lying to Sitting on Side of Bed  4/6   Sit to Stand  3/6   Chair/Bed to Chair Transfer  3/6   Toilet Transfer  3/6   Car Transfer  3/6   Walk 10 feet  4/6   Walk 50 feet with 2 Turns  88/4   Walk 150 feet with 2 turns  88/4   Walk 10 feet on Uneven Surfaces  88/4   1 Step  88/4   4 Steps  88/4   12 Steps  88/4   Picking up Object  88/4   Wheel 50 feet with 2 Turns   Type?  na   Wheel 150 feet with 2 Turns   Type?  na          Randy Coe will be seen a minimum of 3 hours of therapy per day, a minimum of 5 out of 7 days per week. [] In this rare instance due to the nature of this patient's medical involvement, this patient will be seen 15 hours per week (900 minutes within a 7 day period). Treatments may include therapeutic exercises, gait training, neuromuscular re-ed, transfer training, community reintegration, bed mobility, w/c mobility and training, self care, home mgmt, cognitive training, energy conservation,dysphagia tx, speech/language/communication therapy, group therapy, and patient/family education. In addition, dietician/nutritionist may monitor calorie count as well as intake and collaboratively work with SLP on dietary upgrades. Neuropsychology/Psychology may evaluate and provide necessary support.     Medical issues being managed closely and that require 24 hour availability of a physician:   [] Swallowing Precautions  [x] Bowel/Bladder Fx  [] Weight bearing precautions   [] Wound Care    [x] Pain Mgmt   [x] Infection Protection   [x] DVT Prophylaxis   [x] Fall Precautions  [x] Fluid/Electrolyte/Nutrition Balance   [] Voice Protection   [x] Respiratory  [] Other:    Medical Prognosis: [x] Good  [] Fair    [] Guarded   Total expected IRF days 10-14  Anticipated discharge destination:    [] Home Independently     [x] Home with supervision    []SNF     [] Other                                           Physician anticipated functional outcomes:  Improve gait, transfers, self-care to supervision-independent with DME as needed to allow safe return home with daughter     IPOC brief synthesis: Patient is a 81 yo M with pmh CAD, CHF, Afib, HTN, CKD, pulmonary fibrosis, and lumbar DDD who initially presented 10/20/2020 with difficulty ambulating. Patient reports progressive weakness and shortness of breath over several days. Also with back pain and difficulty putting weight through his left leg. Found to have pulmonary edema, acute on chronic dCHF, and pneumonia. Work-up for stroke or acute spinal cord abnormality was negative. Back and LLE pain/weakness thought to be related to chronic degenerative changes. Patient now presents to ARU with impaired mobility and self-care below his baseline. Deficits include: generalized weakness, decreased endurance, balance, respiratory insufficiency. He requires comprehensive inpatient rehab program in order to return to community setting. I have reviewed this initial plan of care and agree with its contents:    Title   Name    Date    Time    Physician: Valentine Arreguin.  Katy Sharma MD 10/26/2020, 4:26 PM      [facility]     Case Management   [phone]    10/26/2020  4:25 PM      OT: Electronically signed by Gaby Shrestha OT on 10/24/2020 at 1:37 PM    PT:Electronically signed by Marielena Portillo PT on 10/24/20 at 1:54 PM EDT    RN: Jh Marion RN, CRRN 10/26/2020 11:17 am    ST:    :  RANDELL Dawson  10/26/2020  11:58    Other:

## 2020-10-24 NOTE — PROGRESS NOTES
Patient here with debility r/t pneumonia. Alert and oriented x4, [place] uses bilat hearing aids. Utilizes walker x1 assist. C/o pain in bilat knees when up walking, routine tylenol given and patient states that it \"helps make the pain tolerable\". Skin intact w/ scattered bruising. Oral care performed with OT this am during bathing and dressing session.  here to see patient today. Currently working with PT in therapy gym. Will cont to monitor for changes.

## 2020-10-25 NOTE — PROGRESS NOTES
07/2016    PACEMAKER INSERTION  2014    PACEMAKER PLACEMENT      SMALL INTESTINE SURGERY  1945    jelly as ww II vet, part of small intestine moved    TONSILLECTOMY  1960      Family History   Problem Relation Age of Onset    Pacemaker Sister     Pacemaker Brother     No Known Problems Other         lung disease     Social History     Tobacco Use    Smoking status: Never Smoker    Smokeless tobacco: Never Used   Substance Use Topics    Alcohol use: No    Drug use: Not Currently       Prior to Admission medications    Medication Sig Start Date End Date Taking?  Authorizing Provider   losartan (COZAAR) 25 MG tablet Take 1 tablet by mouth daily Hold for systolic less 791 98/42/53  Yes Gardner Gosselin, MD   furosemide (LASIX) 40 MG tablet Take 1 tablet by mouth daily 10/27/20  Yes Gardner Gosselin, MD   pantoprazole (PROTONIX) 40 MG tablet Take 1 tablet by mouth nightly 10/24/20  Yes Gardner Gosselin, MD   potassium chloride (KLOR-CON M) 20 MEQ extended release tablet Take 1 tablet by mouth daily 10/27/20  Yes Gardner Gosselin, MD   oxybutynin (DITROPAN) 5 MG tablet TAKE 1 TABLET BY MOUTH EVERY DAY 10/21/20  Yes Gardner Gosselin, MD   fluticasone [facility]) 50 MCG/ACT nasal spray SPRAY 2 SPRAY INTO EACH NOSTRIL EVERY DAY 10/9/20  Yes Gardner Gosselin, MD   albuterol sulfate  (90 Base) MCG/ACT inhaler Inhale 2 puffs into the lungs every 6 hours as needed for Wheezing or Shortness of Breath 8/27/20  Yes Anastasiya Dow MD   umeclidinium-vilanterol (ANORO ELLIPTA) 62.5-25 MCG/INH AEPB inhaler Inhale 1 puff into the lungs daily 8/27/20  Yes Anastasiya Dow MD   tamsulosin (FLOMAX) 0.4 MG capsule ONE EVERY NIGHT FOR YOUR PROSTATE 4/22/20  Yes Gardner Gosselin, MD   atorvastatin (LIPITOR) 40 MG tablet Take 1 tablet by mouth daily 2/22/19  Yes Tong Sidhu MD   rivaroxaban (XARELTO) 15 MG TABS tablet Take 1 tablet by mouth daily (with breakfast) 1/8/18  Yes Gardner Gosselin, MD   aspirin 81 MG tablet Take 81 mg by mouth daily   Yes Historical Provider, MD sounds, no masses or organomegaly  Extremities: no cyanosis, clubbing or edema  Musculoskeletal: bony swelling and tenderness both knees moving around better  Neurologic: coordination normal and speech normal, moves all 4 extremities    DATA:   Labs:  CBC:   Recent Labs     10/23/20  0524 10/24/20  0719 10/25/20  0651   WBC 14.3* 13.2* 12.5*   HGB 10.1* 10.1* 10.7*    161 155     BMP:    Recent Labs     10/23/20  0524 10/24/20  0719 10/25/20  0650   * 138 138   K 4.8 4.7 4.9   CL 99 104 103   CO2 24 25 24   BUN 50* 58* 53*   CREATININE 1.8* 1.8* 1.9*   GLUCOSE 138* 100* 91     POC GLUCOSE:    No results for input(s): POCGLU in the last 72 hours. Ca/Mg/Phos:   Recent Labs     10/23/20  0524 10/24/20  0719 10/25/20  0650   CALCIUM 8.9 8.4 8.5     Hepatic:   No results for input(s): AST, ALT, ALB, BILITOT, ALKPHOS in the last 72 hours. Troponin:   Recent Labs     10/23/20  0524 10/23/20  0744   TROPONINI 0.02* 0.02*       DIAGNOSTICS:  Ct Head Wo Contrast    Result Date: 10/20/2020  No acute intracranial abnormality. No significant change from prior study     Ct Chest Wo Contrast    Result Date: 10/20/2020  Pulmonary interstitial opacities, most likely pulmonary edema superimposed on chronic interstitial lung disease. Indeterminate 42 mm right renal mass may represent a proteinaceous cyst or neoplasm. RECOMMENDATIONS: Only if deemed clinically important to the patient's medical care after considering advanced patient age and comorbidities, further workup of the renal lesion could be obtained with nonemergent/outpatient renal mass protocol CT or MRI     Ct Lumbar Spine Wo Contrast    Result Date: 10/20/2020  No acute fracture or subluxation. 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 L3-4 severe central and moderate bilateral foraminal stenosis. L4-5 grade 1 spondylolisthesis with additional degenerative changes, resulting in moderate central stenosis.      Xr Chest Portable    Result Date: 10/20/2020  Patchy infiltrates suggested bilaterally new from prior study. Cta Head Neck W Contrast    Result Date: 10/20/2020  1. Bilateral carotid bifurcation atherosclerotic plaque resulting in approximately 40% bilateral stenosis of the origin of the internal carotid artery. Finding is compatible with 16-49% stenosis per sonographic NASCET index criteria. 2. 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 3. Right vertebral artery termination as the posteroinferior cerebellar artery (PICA). 4. Bilateral internal carotid artery cavernous segment atherosclerotic calcification without significant arterial stenosis. 5. Approximately 50% arterial stenosis of the bilateral vertebral body origin secondary to encroachment by atherosclerotic calcification. 6. Mild cerebral white matter chronic microvascular ischemic disease. ASSESSMENT AND PLAN    Principal Problem:    Acute pulmonary edema (HCC)  Plan: seems to have improved do not want to overdiurese, Echo showed mod to severe MR and preserved EF, to continue medical managment  Active Problems:    Pacemaker  Plan: ongoing cardio fu    HTN (hypertension)  Plan: running a little higher on steroids    Coronary arteriosclerosis  Plan: stable    BPH with obstruction/lower urinary tract symptoms  Plan: ongoing severe sxs    Pulmonary fibrosis ([address]  Plan: ongoing issue, awaiting pulmonary's guidance, oxygen levels good on room air    Parkinson disease ([address]  Plan: perhaps some cogwheeling in the wrists no where else, not being treated for this, had a brother who  of parkinsons  I do NOT think he has parkinsons.  ,more likely essential tremor    Paroxysmal atrial fibrillation (HCC)  Plan: ongoing hx. cont same    Pulmonary infiltrates  Plan: question is whether this was infectious or not, terrell in progress  Sputum cx still pending        Appropriate diagnostic studies and consults ordered  Arsen Canchola MD

## 2020-10-25 NOTE — PLAN OF CARE
Problem: Falls - Risk of:  Goal: Will remain free from falls  Description: Will remain free from falls  Outcome: Ongoing   Patient remained free of any falls this shift. Call light in reach at all times. Non skid footwear on. Patient encouraged to call for help when needed. Room free of clutter. Alarms on. Problem: Infection:  Goal: Will remain free from infection  Description: Will remain free from infection  Outcome: Ongoing     Problem: Daily Care:  Goal: Daily care needs are met  Description: Daily care needs are met  Outcome: Ongoing     Problem: Pain:  Goal: Patient's pain/discomfort is manageable  Description: Patient's pain/discomfort is manageable  Outcome: Ongoing   Pain assessed using 0-10 scale. Offer PRN medication as ordered by MD. Tiff Westfall 30 minutes after giving.

## 2020-10-25 NOTE — PLAN OF CARE
Problem: Falls - Risk of:  Goal: Will remain free from falls  Description: Will remain free from falls  10/25/2020 1000 by Aleta Fernandez RN  Note: Fall risk band on patient. Yellow light on outside of room. Non skid footwear in place. Alarms used appropriately. Patient instructed to call and wait for staff before getting up. Rounding done to anticipate needs. Appropriate safety devices used for transfers. Problem: Skin Integrity:  Goal: Skin integrity will stabilize  Description: Skin integrity will stabilize  10/25/2020 1000 by Aleta Fernandez RN  Note: Skin assessment completed on admission and every shift. Barrier wipes used in the event of incontinence. Pressure relief techniques used as needed while in chair and in bed. Position changes encouraged at least every two hours while in bed.

## 2020-10-26 NOTE — PROGRESS NOTES
Department of Physical Medicine & Rehabilitation  Progress Note    Patient Identification:  Jt Goel  [id number]  : 1925  Admit date: 10/23/2020    Chief Complaint: Debility    Subjective:   No acute events overnight. Patient seen this afternoon sitting up in room. Reports therapy going well thus far. Fatigued, otherwise no new concerns. ROS: No f/c, n/v, cp     Objective:  Patient Vitals for the past 24 hrs:   BP Temp Temp src Pulse Resp SpO2 Weight   10/26/20 1615 116/69 97.7 °F (36.5 °C) Oral 71 16 96 % --   10/26/20 0750 -- -- -- -- -- 95 % --   10/26/20 0402 137/71 97.8 °F (36.6 °C) Oral 74 16 -- 176 lb 9.4 oz (80.1 kg)   10/25/20 2008 -- -- -- -- -- 95 % --     Const: Alert. No distress, pleasant. HEENT: Normocephalic, atraumatic. Normal sclera/conjunctiva. MMM. CV: Regular rate and rhythm. Resp: No respiratory distress. Lungs with bibasilar crackles   Abd: Soft, nontender, nondistended, NABS+   Ext: +edema. Neuro: Alert, oriented, appropriately interactive. Psych: Cooperative, appropriate mood and affect    Laboratory data: Available via EMR.    Last 24 hour lab  Recent Results (from the past 24 hour(s))   Basic Metabolic Panel w/ Reflex to MG    Collection Time: 10/26/20  6:32 AM   Result Value Ref Range    Sodium 137 136 - 145 mmol/L    Potassium reflex Magnesium 4.6 3.5 - 5.1 mmol/L    Chloride 104 99 - 110 mmol/L    CO2 24 21 - 32 mmol/L    Anion Gap 9 3 - 16    Glucose 98 70 - 99 mg/dL    BUN 57 (H) 7 - 20 mg/dL    CREATININE 1.8 (H) 0.8 - 1.3 mg/dL    GFR Non-African American 35 (A) >60    GFR  43 (A) >60    Calcium 8.5 8.3 - 10.6 mg/dL   CBC auto differential    Collection Time: 10/26/20  6:32 AM   Result Value Ref Range    WBC 10.4 4.0 - 11.0 K/uL    RBC 3.91 (L) 4.20 - 5.90 M/uL    Hemoglobin 10.2 (L) 13.5 - 17.5 g/dL    Hematocrit 31.5 (L) 40.5 - 52.5 %    MCV 80.5 80.0 - 100.0 fL    MCH 26.2 26.0 - 34.0 pg    MCHC 32.5 31.0 - 36.0 g/dL    RDW 17.4 (H) 12.4 - 15.4 %    Platelets 276 780 - 171 K/uL    MPV 9.5 5.0 - 10.5 fL    Neutrophils % 67.7 %    Lymphocytes % 19.7 %    Monocytes % 10.4 %    Eosinophils % 1.9 %    Basophils % 0.3 %    Neutrophils Absolute 7.0 1.7 - 7.7 K/uL    Lymphocytes Absolute 2.1 1.0 - 5.1 K/uL    Monocytes Absolute 1.1 0.0 - 1.3 K/uL    Eosinophils Absolute 0.2 0.0 - 0.6 K/uL    Basophils Absolute 0.0 0.0 - 0.2 K/uL   Basic Metabolic Panel    Collection Time: 10/26/20  6:32 AM   Result Value Ref Range    Potassium 4.6 3.5 - 5.1 mmol/L       Therapy progress:  PT  Position Activity Restriction  Other position/activity restrictions: [place]- [place] hearing aides, sabino hose  Objective     Sit to Stand: Contact guard assistance  Stand to sit: Contact guard assistance(verbal cues for hand placement)  Device: Rolling Walker  Other Apparatus: Wheelchair follow  Assistance: Contact guard assistance  Distance: 16', 50'  OT  PT Equipment Recommendations  Other: Patient has a rollator at home. Toilet - Technique: Ambulating  Equipment Used: Grab bars(comfort height commode)  Toilet Transfers Comments: cues for hand placement  Assessment        SLP          Body mass index is 28.5 kg/m².     Assessment and Plan:    Impairments: generalized weakness, decreased endurance, balance, respiratory insufficiency     Debility  -PT/OT     Gait dysfunction, LLE pain/weakness  -Work-up for stroke/acute SCI abnormality negative  -Etiology likely radicular in setting of spondylosis and DDD  -Steroid taper  -PT/OT     Acute on chronic dCHF, moderate-severe mitral regurgitation  -EF 50%  -Lasix 40 daily, losartan  -Daily wt     Pneumonia  -Levofloxacin to complete 7 day course     Pulmonary fibrosis  -Per Pulmonology, prednisone 20mg x 7 days, then resume home dose 10 mg  -Bevespi, prn albuterol     Chronic Afib  -s/p PPM for CHB  -Xarelto     Hyperlipidemia  -statin     CKD  -Avoid nephrotoxins, renally dose meds  -Monitor BUN/Cr     Bladder   -High risk retention, h/o BPH -Monitor PVRs, SC prn >300cc  -Finasteride, Flomax     Bowel   -High risk constipation   -senna+colace BID, PRN miralax, MoM, and bisacodyl supp.     Pain control  -Acetaminophen scheduled     PPx  -DVT: Xarelto  -GI: pantoprazole       Rehab Progress: tolerating therapy thus far. Working on endurance, strength, balance  Anticipated Dispo: home with daughter  Services/DME: TBD  ELOS: 10-14 days      Sherley Osman MD 10/26/2020, 4:27 PM

## 2020-10-26 NOTE — PROGRESS NOTES
Physician Progress Note      PATIENTMurpnancy Giraldo  CSN #:                  [id number]  :                       1925  ADMIT DATE:       10/20/2020 5:07 PM  [address] DATE:        10/23/2020 6:31 PM  RESPONDING  PROVIDER #:        Loc Bradley MD          QUERY TEXT:    Dear Dr. Devorah Prescott,  Pt admitted with Acute pulmonary edema. Pt noted to have ECHO with EF of 50%   and moderate to severe mitral regurg, Pro-BNP=1,446. If possible, please   document in progress notes and discharge summary if you are evaluating and/or   treating any of the following: The medical record reflects the following:  Risk Factors: Hx A-fib, PPM,HTN, CAD, Pulmonary fibrosis  Clinical Indicators: 10/20 in ED Pro-BNP=1,446, CT scan-Pulmonary interstitial   opacities, most likely pulmonary edema superimposed on chronic interstitial   lung disease. ECHO with EF=50% and noted moderate-Severe mitral regurgitation. H and P notes \"reported worsening breathing sob muñoz and cough \" 10/21   Cardiology notes \"treat medically w/ BP control and diuretics\" 10/22   Pro-BNP=1,698  Treatment: Lasix, IV and changed to po, monitor labs, Cardiology and Pulmonary   consult  Thank you,  Renita Slade RN, [email or web address]  Options provided:  -- Acute on Chronic Diastolic CHF/HFpEF  -- Acute Diastolic CHF/HFpEF  -- Acute on Chronic Systolic CHF/HFrEF  -- Acute on Chronic Systolic and Diastolic CHF  -- Acute Systolic CHF/HFrEF  -- Acute Systolic and Diastolic CHF  -- Other - I will add my own diagnosis  -- Disagree - Not applicable / Not valid  -- Disagree - Clinically unable to determine / Unknown  -- Refer to Clinical Documentation Reviewer    PROVIDER RESPONSE TEXT:    This patient is in diastolic CHF/HFpEF exacerbation. Query created by:  [id number] Keith on 10/22/2020 12:23 PM      Electronically signed by:  Loc Bradley MD 10/26/2020 1:56 PM

## 2020-10-26 NOTE — PROGRESS NOTES
Occupational Therapy  [facility]  Daily Treatment Note  NAME: Julio Cesar Torres  : 1925  MRN: [id number]    Date of Service: 10/26/2020    Discharge Recommendations:  Patient would benefit from continued therapy after discharge, Continue to assess pending progress, S Level 1, Home with Home health OT, Home with assist PRN        Patient Diagnosis(es): There were no encounter diagnoses. has a past medical history of Acute diastolic CHF (congestive heart failure) ([address] Arthritis, BPH with obstruction/lower urinary tract symptoms, Chronic atrial fibrillation ([address] CKD (chronic kidney disease), Coronary arteriosclerosis, GERD (gastroesophageal reflux disease), History of blood transfusion, HTN (hypertension), Left hip pain, Pacemaker, Pulmonary fibrosis ([address] Pulmonary nodule, left, Renal atrophy, right, Renal cyst, right, and SOB (shortness of breath). has a past surgical history that includes Pacemaker insertion (); Tonsillectomy (); Small intestine surgery (); Inguinal hernia repair (Right, 2016); Coronary angioplasty with stent (); pacemaker placement; Colonoscopy; Abdomen surgery; and eye surgery (Bilateral). Restrictions  Restrictions/Precautions  Restrictions/Precautions: Fall Risk  Position Activity Restriction  Other position/activity restrictions: [place]- B hearing aides, sabino caruso  Subjective   General  Chart Reviewed: Yes  Patient assessed for rehabilitation services?: Yes  Additional Pertinent Hx: \"Patient is a 79 yo M with pmh CAD, CHF, Afib, HTN, CKD, pulmonary fibrosis, and lumbar DDD who initially presented 10/20/2020 with difficulty ambulating. Patient reports progressive weakness and shortness of breath over several days. Also with back pain and difficulty putting weight through his left leg. Found to have pulmonary edema, acute on chronic dCHF, and pneumonia. Work-up for stroke or acute spinal cord abnormality was negative.  Back and LLE pain/weakness thought to be related to chronic degenerative changes. Patient now presents to ARU with impaired mobility and self-care below his baseline. Currently, patient reports improvement in his breathing and left leg strength. He still feels generally weak and fatigued. He denies back pain currently. States he gets low back pain with radiation into the LLE with prolonged standing/ambulating. Denies tingling/numbness. \" Copied per Dr Jaelyn Fuentes 10/23/20 H & P note. Response to previous treatment: Patient reporting fatigue but able to participate  Family / Caregiver Present: No  Referring Practitioner: Dr Jose Osman  Diagnosis: Debility  Subjective  Subjective: Patient seen in therapy department. Agreeable to therapy. Reports fatigue, no reports of pain      Orientation  Orientation  Overall Orientation Status: Within Functional Limits  Objective    ADL  Equipment Provided: Reacher;Sock aid;Long-handled sponge  LE Dressing: Minimal assistance(seated in w/c don/doffed slipper socks and pants over feet to knees with Min A with cues for tech)  Toileting: Minimal assistance; Moderate assistance(Min/Mod A for clothing management)        Balance  Sitting Balance: Supervision  Standing Balance: Contact guard assistance  Standing Balance  Activity: Static standing with RW  Functional Mobility  Functional - Mobility Device: Rolling Walker  Activity: To/from bathroom  Assist Level: Contact guard assistance  Functional Mobility Comments: Functional mobility with RW with CGA, slow steady gait with no overt LOB noted.  Fatigues quickly  Toilet Transfers  Toilet - Technique: Ambulating  Equipment Used: Grab bars(comfort height commode)  Toilet Transfer: Contact guard assistance  Toilet Transfers Comments: cues for hand placement     Transfers  Sit to stand: Contact guard assistance  Stand to sit: Contact guard assistance  Transfer Comments: CGA for sit<>stand from w/c to RW to w/c        Cognition  Overall Cognitive Status: [facility] Assessment   Performance deficits / Impairments: Decreased functional mobility ; Decreased ADL status; Decreased strength;Decreased endurance;Decreased balance;Decreased high-level IADLs;Decreased coordination;Decreased safe awareness  Assessment: Patient tolerated session fair. Limited by fatigue. Able to complete functional mobility with RW slow steady gait with no overt LOB noted. CGA for sit<>stand from w/c to RW to w/c with cues for hand placement. Seated to focus on lower body dressing with AE. Continue with POC. OT Education: OT Role;Plan of Care;Transfer Training;Energy Conservation; ADL Adaptive Strategies  REQUIRES OT FOLLOW UP: Yes  Activity Tolerance  Activity Tolerance: Patient limited by fatigue  Safety Devices  Safety Devices in place: Yes  Type of devices: Gait belt        Plan   Plan  Times per week: 5-6  Times per day: Twice a day  Plan weeks: 1-2 weeks from [facility] 10/24/20  Current Treatment Recommendations: Strengthening, Functional Mobility Training, Endurance Training, Balance Training, Self-Care / ADL, Safety Education & Training, Patient/Caregiver Education & Training, Equipment Evaluation, Education, & procurement, Home Management Training  Plan Comment: Wednesday conference    Goals  Short term goals  Time Frame for Short term goals: 1 week from eval on 10/24/20  Short term goal 1: Pt will bathe CGA to SBA with shower chair/grab bars/long sponge. Short term goal 2: Pt will dress with assist for sabino hose only using AE as needed. Short term goal 3: Pt will toilet with CGA to SBA with use of RW/counter/grab bar for support  Short term goal 4: Pt will perform functional transfers/mobility with RW SBA and demo good safety skills  Short term goal 5: Pt will perform ther ex/HEP to increase activity tolerance to stand for 5-6 minutes for ADL/simple IADL tasks with min cues for walker safety skills.   Long term goals  Time Frame for Long term goals : 2 weeks from eval on 10/24/20  Long term goal 1: Pt will bathe mod ind with shower chair/hand held shower/grab bars/long sponge. Long term goal 2: Pt will dress mod ind including sabino hose/own compression hose with AE as needed. Long term goal 3: Pt will toilet mod ind with RW/elevated toilet with rails  Long term goal 4: Pt will perform functional transfers/mobility mod ind with RW  Long term goal 5: Pt will perform ther ex/HEP to increase activity tolerance to stand for 8-10 minutes for ADL/simple IADL tasks. Long term goals 6: Pt will improve walker safety/attn to task to perform simple IADL tasks such as simple food prep/clothing set-up mod ind from RW. Patient Goals   Patient goals : Pt stated \"I want to be able to get around my house w/o being afraid of falling, take care of myself, be able to go to the park and walk by myself, so I don't hold my daughter back. \"       Therapy Time   Individual Concurrent Group Co-treatment   Time In 0945         Time Out 1030         Minutes 45                 Electronically signed by SOLO [id number] on 10/26/2020 at 12:54 PM

## 2020-10-26 NOTE — PROGRESS NOTES
Physical Therapy  Facility/Department: [address] REHAB  Daily Treatment Note  NAME: Kaitlin Helm  : 1925  MRN: [id number]    Date of Service: 10/26/2020    Discharge Recommendations:  Continue to assess pending progress, Home with assist PRN, Home with Home health PT   PT Equipment Recommendations  Other: Patient has a rollator at home. Assessment   Body structures, Functions, Activity limitations: Decreased functional mobility ; Decreased endurance;Decreased strength;Decreased balance  Assessment: Patient very limited by his SOB, but was able to go 48' with wheeled walker. He did require a wheelchair follow secondary to poor endurance, but was CGA for transfers and mobility. Patient will benefit from continued skilled therapy for strengthening, endurance training, and gait training to allow for return to home safely with daughter's assistance. Treatment Diagnosis: Impaired functional mobility  Prognosis: Good  PT Education: Plan of Care;Transfer Training;General Safety;Gait Training;Functional Mobility Training; Adaptive Device Training  Barriers to Learning: Hearing  REQUIRES PT FOLLOW UP: Yes  Activity Tolerance  Activity Tolerance: Patient limited by fatigue;Patient limited by endurance     Patient Diagnosis(es): There were no encounter diagnoses. has a past medical history of Acute diastolic CHF (congestive heart failure) ([address] Arthritis, BPH with obstruction/lower urinary tract symptoms, Chronic atrial fibrillation ([address] CKD (chronic kidney disease), Coronary arteriosclerosis, GERD (gastroesophageal reflux disease), History of blood transfusion, HTN (hypertension), Left hip pain, Pacemaker, Pulmonary fibrosis ([address] Pulmonary nodule, left, Renal atrophy, right, Renal cyst, right, and SOB (shortness of breath). has a past surgical history that includes Pacemaker insertion (); Tonsillectomy (); Small intestine surgery (); Inguinal hernia repair (Right, 2016);  Coronary angioplasty with stent (2009); pacemaker placement; Colonoscopy; Abdomen surgery; and eye surgery (Bilateral). Social/Functional History  Lives With: Daughter(Dtr Prema Brooks)  Type of Home: House(condo)  Home Layout: One level  Home Access: Stairs to enter with rails  Entrance Stairs - Number of Steps: 1 R rail. (uses R going up, L going down) + 6 steps down with B far spaced rails. Bathroom Shower/Tub: Tub/Shower unit, Shower chair without back, Shower chair with back(has a stool outside of shower to sit on and put feet into shower, then a shower chair in tub to sit on: pt's daughter has purchased this since he's been in the hospital and he has not used it, 2 grab bars on front & sidewall in shower)  Bathroom Toilet: Handicap height(vanity near on R & tub on L for support)  Bathroom Equipment: Grab bars in shower, Shower chair(on wall into tub)  Bathroom Accessibility: Emely Ply accessible(RW will but not 4WW he has)  Home Equipment: Cane, 4 wheeled walker(Jacinda, pt just started using wh walker a few days prior to admit)  ADL Assistance: Independent  Homemaking Assistance: (dtr assists)  Homemaking Responsibilities: No  Ambulation Assistance: Independent(R str cane,)  Transfer Assistance: Independent(w/cane, 2YH recently)  Active : No  Mode of Transportation: SUV  Education: Dtr drives pt  Occupation: Retired  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 Worked for [employer]. Leisure & Hobbies: reading. Additional Comments: Rarely alone. Home with dtr. Per dtr, up until last week, was walking a couple miles a couple times a week using a cane. Dtr states she purchased a rollator for patient use due to his inability to ambulate, and due to patient need to spontaneously sit as he fatigues.     Restrictions  Restrictions/Precautions  Restrictions/Precautions: Fall Risk  Position Activity Restriction  Other position/activity restrictions: [place]- B hearing aides, sabino hose     Subjective General  Chart Reviewed: Yes  Additional Pertinent Hx: Per Dr. Shabana Osborn note, \"Patient is a 81 yo M with pmh CAD, CHF, Afib, HTN, CKD, pulmonary fibrosis, and lumbar DDD who initially presented 10/20/2020 with difficulty ambulating. Patient reports progressive weakness and shortness of breath over several days. Also with back pain and difficulty putting weight through his left leg. Found to have pulmonary edema, acute on chronic dCHF, and pneumonia. Work-up for stroke or acute spinal cord abnormality was negative. Back and LLE pain/weakness thought to be related to chronic degenerative changes. \"  Response To Previous Treatment: Patient with no complaints from previous session. Family / Caregiver Present: No  Referring Practitioner: Dr. Emmy Olszewski: Patient very pleasant and agreeable to therapy. Patient reports he continues to be very limited by his SOB and poor endurance. General Comment  Comments: goes by \"nu\"            Objective      Transfers  Sit to Stand: Contact guard assistance  Stand to sit: Contact guard assistance(verbal cues for hand placement)    Ambulation  Ambulation?: Yes  More Ambulation?: No  Ambulation 1  Surface: level tile  Device: Rolling Walker  Other Apparatus: Wheelchair follow  Assistance: Contact guard assistance  Quality of Gait: Began with slightly flexed posture, but quickly progressed to Stooped posture secondary to chronic back problems.  bilateral step length, decreased step height, step through pattern, decreased gait speed. Distance: 16', 48'  Comments: Patient became SOB with first 16', but recovered quickly with seated rest break. Patient was able to push himself to make it 48' with the second trial, but was very SOB with this.     Stairs/Curb  Stairs?: No(unsafe at this time due to quick fatigue)       Balance  Posture: Fair  Sitting - Static: Good  Sitting - Dynamic: Good  Standing - Static: Fair  Standing - Dynamic: Fair    Exercises  Hip Flexion: x15 marching  Hip Abduction: x15 with medium resistance band, x15 hip add sets with three second hold  Knee Long Arc Quad: x15 alternating. reduced knee extension, right more limited then left. Ankle Pumps: x20 heel/toe raises  Comments: bilateral LE ther ex done while seated in wheelchair. patient limited by SOB, and required multiple rest breaks secondary to SOB. PM Session  Patient seated in wheelchair with aide present upon PT's arrival.  Patient reporting he was able to take a nap, but still feels tired. Patient reporting his pain continues to be present, but he works through it. Sit>stand with CGA. Ambulated 39' with wheeled walker with CGA. He was able to turn twice with wheeled walker with CGA. Patient lost his balance backwards when getting to the chair and required min assist to safely sit back into wheelchair. Sit>stand with CGA. Ambulated 36' with rollator with CGA. Patient with excessive SOB and increased trunk flexion. He had difficulty getting back to the chair secondary to fatigue and SOB, but no LOB occurred. He was very guarded and stayed very close to the walker to prevent if from getting away from him. Patient was beginning to flex more at arms and hips, causing increased fatigue. SpO2 was 97% upon return to chair. Patient required seated rest break secondary to fatigue and SOB. Patient reports he feels better with the regular wheeled walker than the rollator. Daughter, Nicole Valenzuela, arrived and was discussing rollators with this PT. They report they have one that Nicole Valenzuela just purchased, but she wants to take it back secondary to width of the rollator. She reports she plans to exchange the rollator, but PT highly recommended that she not purchase a rollator at this time. Patient is very guarded with the use of a four wheeled walker and it may not be very safe or energy efficient for him.   Plan to continue to assess and make a recommendation when he gets closer to discharge. Patient Ascended/Descended 4 stairs with bilateral rails, using a non-reciprocal pattern (ascending with right LE first, and descending with left LE first). Patient very fatigued and SOB with this activity, but was able to safely turn to sit back into wheelchair to sit and take a breather without physical assistance. PT returned patient back to the room and patient transferred wheelchair>recliner chair with wheeled walker with CGA. Patient with improving activity tolerance, but still limited by SOB and weakness. Safety Device - Type of devices:  [x]  All fall risk precautions in place [] Bed alarm in place  [x] Call light within reach [x] Chair alarm in place [] Positioning belt [x] Gait belt [] Patient at risk for falls [] Left in bed [x] Left in chair [] Telesitter in use [] Sitter present [] Nurse notified []  None            Goals  Short term goals  Time Frame for Short term goals: 7 to 10 days  Short term goal 1: Bed mobility supervision  Short term goal 2: Transfers CGA  Short term goal 3: Ambulation 48' with wh walker and CGA  Short term goal 4: Progress to stairs as able. Long term goals  Time Frame for Long term goals : 14 to 18 days  Long term goal 1: all bed mobility modif I  Long term goal 2: all transfers modif I  Long term goal 3: amb 80' with wh walker S to modif I  Long term goal 4: 8 steps R rail ascending L descending S  Patient Goals   Patient goals : \"to get up and walk without anything\"    Plan    Plan  Times per week: 5 to 6  Times per day: Twice a day  Plan weeks: 2 to 3  Current Treatment Recommendations: Gait Training, Balance Training, Endurance Training, Stair training  Safety Devices  Type of devices:  All fall risk precautions in place, Gait belt, Left in chair(in wheelchair with care transitioned to Nelda Henry)     Therapy Time   Individual Concurrent Group Co-treatment   Time In 0900         Time Out 0945         Minutes 45 Second Session Therapy Time     Individual Co-treatment   Time In 1300     Time Out 1345     Minutes 45          Electronically signed by Claudia Villalpando PT on 10/26/2020 at 9:55 AM

## 2020-10-26 NOTE — PROGRESS NOTES
Admitted on 10/23 w/ dx of debility. A/O x 4. Uses walker x 1. On general diet. Has chronic back pain. Continent of bowel and bladder w/ periods of incontinence. Skin intact and has scattered bruising. Pleasant and cooperative with care. Able to make all needs known.

## 2020-10-26 NOTE — PROGRESS NOTES
Occupational Therapy  OCCUPATIONAL THERAPY  Progress Note   Second Session    Patient Name: [facility] Record Number: [id number]    Treatment Diagnosis: Impaired functional mobility    General  Chart Reviewed: Yes  Patient assessed for rehabilitation services?: Yes  Additional Pertinent Hx: \"Patient is a 81 yo M with pmh CAD, CHF, Afib, HTN, CKD, pulmonary fibrosis, and lumbar DDD who initially presented 10/20/2020 with difficulty ambulating. Patient reports progressive weakness and shortness of breath over several days. Also with back pain and difficulty putting weight through his left leg. Found to have pulmonary edema, acute on chronic dCHF, and pneumonia. Work-up for stroke or acute spinal cord abnormality was negative. Back and LLE pain/weakness thought to be related to chronic degenerative changes. Patient now presents to ARU with impaired mobility and self-care below his baseline. Currently, patient reports improvement in his breathing and left leg strength. He still feels generally weak and fatigued. He denies back pain currently. States he gets low back pain with radiation into the LLE with prolonged standing/ambulating. Denies tingling/numbness. \" Copied per Dr Shazia Kaiser 10/23/20 H & P note. Response to previous treatment: Patient reporting fatigue but able to participate  Family / Caregiver Present: No  Referring Practitioner: Dr Babak Sharma  Diagnosis: Debility     Restrictions/Precautions  Restrictions/Precautions: Fall Risk        Position Activity Restriction  Other position/activity restrictions: [place]- B hearing aides, sabino hose    Subjective: Pt seen in the department. Objective: Pt ambulated approximately 10 feet to the bathroom with CGA. Pt reports he has a \"stool\" that his daughter bought for his tub but is unsure if it is a chair or tub bench. He completed a transfer to the tub using a shower chair with min assist for sit-stand from the chair.   Switched with a tub bench and he was able to complete with CGA. Pt was more steady using the tub bench and this would be safer for him to use at home as the shower chair may tip when scooting. Will need to verify with his daughter which type she purchased and if has the chair if she can return and purchase a tub bench. He ambulated back out of the bathroom to the wheelchair with CGA. He completed a transfer to the wheelchair with CGA. To increase his activity tolerance to tolerate further distances with mobility and ADL's he completed sandbox for 3 minutes. Assessment: will need to verify which type of seat he has for his tub as a tub bench may be more beneficial at home. He was able to stand from the shower chair with min assist and he stood from the tub bench with CGA.       Safety Device - Type of devices:  []  All fall risk precautions in place [] Bed alarm in place  [] Call light within reach [] Chair alarm in place [] Positioning belt [x] Gait belt [] Patient at risk for falls [] Left in bed [] Left in chair [] Telesitter in use [] Sitter present [] Nurse notified []  None      Therapy Time   Individual Co-treatment   Time In 1430     Time Out 1515     Minutes 45       Electronically signed by Chandu Nunez, [address]   on 10/26/2020 at 3:19 PM

## 2020-10-26 NOTE — PLAN OF CARE
Problem: Falls - Risk of:  Goal: Will remain free from falls  Description: Will remain free from falls  10/26/2020 1452 by Israel Cui RN  Outcome: Ongoing  10/26/2020 0147 by Faraz Jasso RN  Outcome: Ongoing  Goal: Absence of physical injury  Description: Absence of physical injury  Outcome: Ongoing     Problem: Infection:  Goal: Will remain free from infection  Description: Will remain free from infection  Outcome: Ongoing     Problem: Daily Care:  Goal: Daily care needs are met  Description: Daily care needs are met  Outcome: Ongoing     Problem: Pain:  Goal: Patient's pain/discomfort is manageable  Description: Patient's pain/discomfort is manageable  10/26/2020 0147 by Faraz Jasso RN  Outcome: Ongoing     Problem: Skin Integrity:  Goal: Skin integrity will stabilize  Description: Skin integrity will stabilize  10/26/2020 1452 by Israel Cui RN  Outcome: Ongoing  10/26/2020 0147 by Faraz Jasso RN  Outcome: Ongoing     Problem: Skin Integrity:  Goal: Will show no infection signs and symptoms  Description: Will show no infection signs and symptoms  10/26/2020 0147 by Faraz Jasso RN  Outcome: Ongoing

## 2020-10-26 NOTE — PLAN OF CARE
Problem: Falls - Risk of:  Goal: Will remain free from falls  Description: Will remain free from falls  Outcome: Ongoing   Patient remained free of any falls this shift. Call light in reach at all times. Non skid footwear on. Patient encouraged to call for help when needed. Room free of clutter. Alarms on. Problem: Pain:  Goal: Patient's pain/discomfort is manageable  Description: Patient's pain/discomfort is manageable  Outcome: Ongoing   Pain assessed using 0-10 scale. Offer PRN medication as ordered by MD. Matias Davis 30 minutes after giving. Problem: Skin Integrity:  Goal: Will show no infection signs and symptoms  Description: Will show no infection signs and symptoms  Outcome: Ongoing   Patient is able to shift weight without assistance. Reposition every two hours. Skin assessed every shift. No new area of breakdown. Skin warm and dry to touch.

## 2020-10-26 NOTE — PROGRESS NOTES
Admitted to rehab on 10/23 with debility r/t pneumonia. Shift assessment completed. Ambulates using walker/gait belt. Patient does well, but does get short of breath with short distances. On room air. On scheduled Tylenol for chronic lower back pain. No PRN pain medication needed this shift. Takes medications whole with thins with no complications. Alert/oriented, pleasant. Continent, uses call light appropriately to use bathroom. Last BM 10/25. Will continue to monitor.

## 2020-10-27 NOTE — PLAN OF CARE
Problem: Falls - Risk of:  Goal: Will remain free from falls  Description: Will remain free from falls  Outcome: Ongoing  Goal: Absence of physical injury  Description: Absence of physical injury  Outcome: Ongoing     Problem: Infection:  Goal: Will remain free from infection  Description: Will remain free from infection  Outcome: Ongoing     Problem: Discharge Planning:  Goal: Patients continuum of care needs are met  Description: Patients continuum of care needs are met  Outcome: Ongoing     Problem: Skin Integrity:  Goal: Will show no infection signs and symptoms  Description: Will show no infection signs and symptoms  Outcome: Ongoing

## 2020-10-27 NOTE — PROGRESS NOTES
Pleasant and cooperative with all care. No complaints voiced t/o shift. Fall precautions in place. Transfers with walker x 1. Calls appropriately for assistance as needed.

## 2020-10-27 NOTE — PROGRESS NOTES
Sleeping quietly in bed. Pt admitted with debility and pneumonia. Transfers with a walker CGA, tolerates well. Lungs CTA. On RA. Pt does have SOB with activity. HR regular. Abdomen non tender with active bowel sounds. Has been continent of urine but can have periods of incontinence. Toileting encouraged. C/o minimal pain at bedtime but declined need for PRN medication. Call light remains in reach at all times, alarms active. Will continue to monitor.  Mike Garcia RN

## 2020-10-27 NOTE — PROGRESS NOTES
Department of Physical Medicine & Rehabilitation  Progress Note    Patient Identification:  Julio Cesar Torres  [id number]  : 1925  Admit date: 10/23/2020    Chief Complaint: Debility    Subjective:   No acute events overnight. Patient seen this afternoon sitting up in room. No new concerns. ROS: No f/c, n/v, cp     Objective:  Patient Vitals for the past 24 hrs:   BP Temp Temp src Pulse Resp SpO2 Weight   10/27/20 0354 (!) 170/70 98.3 °F (36.8 °C) Oral 74 20 97 % 175 lb 4.3 oz (79.5 kg)   10/26/20 2157 (!) 144/74 97.6 °F (36.4 °C) Oral 72 16 96 % --   10/26/20 2056 -- -- -- -- 16 95 % --   10/26/20 1615 116/69 97.7 °F (36.5 °C) Oral 71 16 96 % --     Const: Alert. No distress, pleasant. HEENT: Normocephalic, atraumatic. Normal sclera/conjunctiva. MMM. CV: Regular rate and rhythm. Resp: No respiratory distress. Lungs with bibasilar crackles   Abd: Soft, nontender, nondistended, NABS+   Ext: +edema. Neuro: Alert, oriented, appropriately interactive. Psych: Cooperative, appropriate mood and affect    Laboratory data: Available via EMR.    Last 24 hour lab  Recent Results (from the past 24 hour(s))   Basic Metabolic Panel w/ Reflex to MG    Collection Time: 10/27/20  7:39 AM   Result Value Ref Range    Sodium 138 136 - 145 mmol/L    Potassium reflex Magnesium 4.6 3.5 - 5.1 mmol/L    Chloride 103 99 - 110 mmol/L    CO2 25 21 - 32 mmol/L    Anion Gap 10 3 - 16    Glucose 124 (H) 70 - 99 mg/dL    BUN 52 (H) 7 - 20 mg/dL    CREATININE 1.8 (H) 0.8 - 1.3 mg/dL    GFR Non-African American 35 (A) >60    GFR  43 (A) >60    Calcium 8.9 8.3 - 10.6 mg/dL   CBC auto differential    Collection Time: 10/27/20  7:39 AM   Result Value Ref Range    WBC 11.9 (H) 4.0 - 11.0 K/uL    RBC 4.41 4.20 - 5.90 M/uL    Hemoglobin 11.5 (L) 13.5 - 17.5 g/dL    Hematocrit 35.6 (L) 40.5 - 52.5 %    MCV 80.7 80.0 - 100.0 fL    MCH 26.1 26.0 - 34.0 pg    MCHC 32.4 31.0 - 36.0 g/dL    RDW 17.4 (H) 12.4 - 15.4 % Platelets 054 363 - 635 K/uL    MPV 9.5 5.0 - 10.5 fL    Neutrophils % 66.8 %    Lymphocytes % 22.6 %    Monocytes % 8.0 %    Eosinophils % 2.1 %    Basophils % 0.5 %    Neutrophils Absolute 7.9 (H) 1.7 - 7.7 K/uL    Lymphocytes Absolute 2.7 1.0 - 5.1 K/uL    Monocytes Absolute 1.0 0.0 - 1.3 K/uL    Eosinophils Absolute 0.3 0.0 - 0.6 K/uL    Basophils Absolute 0.1 0.0 - 0.2 K/uL   Basic Metabolic Panel    Collection Time: 10/27/20  7:39 AM   Result Value Ref Range    Potassium 4.6 3.5 - 5.1 mmol/L       Therapy progress:  PT  Position Activity Restriction  Other position/activity restrictions: [place]- B hearing aides, sabino hose  Objective     Sit to Stand: Contact guard assistance  Stand to sit: Contact guard assistance  Bed to Chair: Contact guard assistance(with wheeled walker)  Device: Rolling Walker  Other Apparatus: Wheelchair follow  Assistance: Contact guard assistance  Distance: 54' with three turns (most turns patient takes very wide)  OT  PT Equipment Recommendations  Equipment Needed: Yes  Mobility Devices: Manuela Bongo: Rolling  Other: Patient has a rollator at home. Toilet - Technique: Ambulating(RW)  Equipment Used: Grab bars(Comfort height toilet)  Toilet Transfers Comments: cues for hand placement  Assessment        SLP          Body mass index is 28.29 kg/m².     Assessment and Plan:    Impairments: generalized weakness, decreased endurance, balance, respiratory insufficiency     Debility  -PT/OT     Gait dysfunction, LLE pain/weakness  -Work-up for stroke/acute SCI abnormality negative  -Etiology likely radicular in setting of spondylosis and DDD  -Steroid taper  -PT/OT     Acute on chronic dCHF, moderate-severe mitral regurgitation  -EF 50%  -Lasix 40 daily, losartan  -Daily wt     Pneumonia  -Levofloxacin to complete 7 day course     Pulmonary fibrosis  -Per Pulmonology, prednisone 20mg x 7 days, then resume home dose 10 mg  -Bevespi, prn albuterol     Chronic Afib  -s/p PPM for CHB  -Xarelto     Hyperlipidemia  -statin     CKD  -Avoid nephrotoxins, renally dose meds  -Monitor BUN/Cr     Bladder   -High risk retention, h/o BPH   -Monitor PVRs, SC prn >300cc  -Finasteride, Flomax     Bowel   -High risk constipation   -senna+colace BID, PRN miralax, MoM, and bisacodyl supp.     Pain control  -Acetaminophen scheduled     PPx  -DVT: Xarelto  -GI: pantoprazole       Rehab Progress: tolerating therapy thus far. Working on endurance, strength, balance  Anticipated Dispo: home with daughter  Services/DME: TBD  ELOS: 10-14 days      Sherley Gonzalez MD 10/27/2020, 1:42 PM

## 2020-10-27 NOTE — PROGRESS NOTES
Physical Therapy  Facility/Department: [address] IP REHAB  Daily Treatment Note  NAME: Paul Jackson  : 1925  MRN: [id number]    Date of Service: 10/27/2020    Discharge Recommendations:  Continue to assess pending progress, Home with assist PRN, Home with Home health PT   PT Equipment Recommendations  Equipment Needed: Yes  Mobility Devices: Nobie Coffey: Rolling  Other: Patient has a rollator at home. Assessment   Body structures, Functions, Activity limitations: Decreased functional mobility ; Decreased endurance;Decreased strength;Decreased balance  Assessment: Patient continues with limitations from SOB. He is able to complete household distance with wheeled walker with CGA. Also limited by left knee pain that worsened with increased distance. Patient was unable to complete stairs this PM secondary to fatigue and pain in left knee. Patient will benefit from continued skilled therapy for strengthening, gait training, and endurance training to allow for safe return to home with daughter. Treatment Diagnosis: Impaired functional mobility  Prognosis: Good  PT Education: Plan of Care;Transfer Training;General Safety;Gait Training;Functional Mobility Training; Adaptive Device Training  Barriers to Learning: Hearing  REQUIRES PT FOLLOW UP: Yes  Activity Tolerance  Activity Tolerance: Patient limited by fatigue;Patient limited by endurance; Patient limited by pain  Activity Tolerance: Patient with increased left knee pain that caused reduced tolerance to on his feet activity. Patient Diagnosis(es): There were no encounter diagnoses.      has a past medical history of Acute diastolic CHF (congestive heart failure) ([facility] [place] [address] Arthritis, BPH with obstruction/lower urinary tract symptoms, Chronic atrial fibrillation (HCC), CKD (chronic kidney disease), Coronary arteriosclerosis, GERD (gastroesophageal reflux disease), History of blood transfusion, HTN (hypertension), Left hip pain, Pacemaker, Pulmonary fibrosis ([address] Pulmonary nodule, left, Renal atrophy, right, Renal cyst, right, and SOB (shortness of breath). has a past surgical history that includes Pacemaker insertion (2014); Tonsillectomy (1960); Small intestine surgery (1945); Inguinal hernia repair (Right, 07/2016); Coronary angioplasty with stent (2009); pacemaker placement; Colonoscopy; Abdomen surgery; and eye surgery (Bilateral). Social/Functional History  Lives With: Daughter(Dtr [place])  Type of Home: House(condo)  Home Layout: One level  Home Access: Stairs to enter with rails  Entrance Stairs - Number of Steps: 1 R rail. (uses R going up, L going down) + 6 steps down with B far spaced rails. Bathroom Shower/Tub: Tub/Shower unit, Shower chair without back, Shower chair with back(has a stool outside of shower to sit on and put feet into shower, then a shower chair in tub to sit on: pt's daughter has purchased this since he's been in the hospital and he has not used it, 2 grab bars on front & sidewall in shower)  Bathroom Toilet: Handicap height(vanity near on R & tub on L for support)  Bathroom Equipment: Grab bars in shower, Shower chair(on wall into tub)  Bathroom Accessibility: Malika Ponce accessible(RW will but not 4WW he has)  Home Equipment: Cane, 4 wheeled walker(Jacinda, pt just started using wh walker a few days prior to admit)  ADL Assistance: Independent  Homemaking Assistance: (dtr assists)  Homemaking Responsibilities: No  Ambulation Assistance: Independent(R str cane,)  Transfer Assistance: Independent(w/cane, 0MW recently)  Active : No  Mode of Transportation: SUV  Education: Dtr drives pt  Occupation: Retired  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 Worked for [employer]. Leisure & Hobbies: reading. Additional Comments: Rarely alone. Home with dtr. Per dtr, up until last week, was walking a couple miles a couple times a week using a cane.  Dtr states she purchased a rollator for patient use due to his inability to ambulate, and due to patient need to spontaneously sit as he fatigues. Restrictions  Restrictions/Precautions  Restrictions/Precautions: Fall Risk  Position Activity Restriction  Other position/activity restrictions: [place]- B hearing aides, sabino caruso     Subjective   General  Chart Reviewed: Yes  Additional Pertinent Hx: Per Dr. Velez Servant note, \"Patient is a 79 yo M with pmh CAD, CHF, Afib, HTN, CKD, pulmonary fibrosis, and lumbar DDD who initially presented 10/20/2020 with difficulty ambulating. Patient reports progressive weakness and shortness of breath over several days. Also with back pain and difficulty putting weight through his left leg. Found to have pulmonary edema, acute on chronic dCHF, and pneumonia. Work-up for stroke or acute spinal cord abnormality was negative. Back and LLE pain/weakness thought to be related to chronic degenerative changes. \"  Response To Previous Treatment: Patient with no complaints from previous session. Family / Caregiver Present: No  Referring Practitioner: Dr. John Campo: Patient reporting that he is very tired and having increased SOB following ADL session with OT earlier today despite having an hour rest break. General Comment  Comments: goes by \"nu\"            Objective   Bed mobility  Supine to Sit: Stand by assistance  Sit to Supine: Stand by assistance(difficulty lifting bilateral LE into the bed, but, with increased time, able to complete without assistance)  Scooting: Stand by assistance  Comment: bed mobility done on flat therapy mat. Transfers  Sit to Stand: Contact guard assistance  Stand to sit: Contact guard assistance  Bed to Chair: Contact guard assistance(with wheeled walker)  Car Transfer: Contact guard assistance(mock car transfer done with wheeled walker. He required cueing for hand placement when coming out of the car.   Difficulty standing from car seat, but able to with no more than CGA.)    Ambulation  Ambulation?: Yes  More Ambulation?: No  Ambulation 1  Surface: level tile  Device: Rolling Walker  Assistance: Contact guard assistance  Quality of Gait: Began with slightly flexed posture, but quickly progressed to Stooped posture secondary to chronic back problems.  bilateral step length, decreased step height, step through pattern, decreased gait speed. Distance: 54' with three turns (most turns patient takes very wide)  Comments: Patient continues to report that he feels the rollator moves to freely and he does not feel comfortable with it. He prefers to use a regular wheeled walker at this time. Stairs/Curb  Stairs?: No(Patient with increased pain in left knee and unable to tolerate stairs this AM. Will try this PM if able)          Exercises  Heelslides: x15 while supine on mat, bilaterally. Patient with increased pain in right knee after completing. PM Session  Patient reports his back continues to be achy and he is still breathing heavy. He does report that he was able to get a rest break over lunch time. Sit>stand with CGA. Patient ascended/descended 4 steps with bilateral rails, using a non-reciprocal pattern with CGA. Patient with increased SOB and required seated rest break to recover. PT demonstrated use of walker on curb step. He reports that he has a small step to go out back door of condo and he does not feel it is even 4\". Patient was agreeable to trying 4\" step. Patient able to complete 4\" curb with wheeled walker with CGA and verbal cues during step. Patient with SOB, but quickly resolved with seated rest break. Patient reporting concern with care conference tomorrow and worried he will be sent home soon. PT discussed that at this time, PT does not feel patient is safe to go home this week and it will definitely be into next week. Patient reporting this made him feel better and he agrees that he is not ready to go home this week.    Sit<>stand with CGA. Patient ambulated 54' with wheeled walker with CGA. Patient reporting increased fatigue and back pain causing more stooped posturing. He was able to make it back to his chair and turn to sit safely with cueing for safety. Patient very SOB, but recovered with seated rest break. SpO2 was 97% despite heavy SOB. Patient continues to be limited by excessive SOB. He does not experience drops in SpO2 with this SOB. Safety Device - Type of devices:  [x]  All fall risk precautions in place [] Bed alarm in place  [] Call light within reach [] Chair alarm in place [] Positioning belt [x] Gait belt [] Patient at risk for falls [] Left in bed [x] Left in chair (in wheelchair to return to room with transportation) [] Telesitter in use [] Sitter present [] Nurse notified []  None              Goals  Short term goals  Time Frame for Short term goals: 7 to 10 days  Short term goal 1: Bed mobility supervision  Short term goal 2: Transfers CGA  Short term goal 3: Ambulation 48' with wh walker and CGA  Short term goal 4: Progress to stairs as able. Long term goals  Time Frame for Long term goals : 14 to 18 days  Long term goal 1: all bed mobility modif I  Long term goal 2: all transfers modif I  Long term goal 3: amb 80' with wh walker S to modif I  Long term goal 4: 8 steps R rail ascending L descending S  Patient Goals   Patient goals : \"to get up and walk without anything\"    Plan    Plan  Times per week: 5 to 6  Times per day: Twice a day  Plan weeks: 2 to 3  Current Treatment Recommendations: Gait Training, Balance Training, Endurance Training, Stair training  Safety Devices  Type of devices:  All fall risk precautions in place, Gait belt, Left in chair(in wheelchair to return to room with transportation)     Therapy Time   Individual Concurrent Group Co-treatment   Time In 1030         Time Out 1115         Minutes 39                Second Session Therapy Time     Individual Co-treatment   Time In 1345 Time Out 1430     Minutes 45            Electronically signed by Vinayak Taylor PT on 10/27/2020 at 11:18 AM

## 2020-10-27 NOTE — PLAN OF CARE
Problem: Falls - Risk of:  Goal: Will remain free from falls  Description: Will remain free from falls  10/27/2020 0405 by Frances Orr RN  Outcome: Ongoing  Note: Pt educated on falls precautions and safety. Call light and personal belongings within reach at all times. Non skid socks in use when up. Hourly rounding and alarms active. Problem: Pain:  Goal: Patient's pain/discomfort is manageable  Description: Patient's pain/discomfort is manageable  Outcome: Ongoing  Note: Able to rate pain using a 1-10 scale, medicated per prn orders, see MAR. Able to verbalize a reduction in pain and/or able to fall asleep and remain asleep without any s/s of pain       Problem: Skin Integrity:  Goal: Skin integrity will stabilize  Description: Skin integrity will stabilize  10/27/2020 0405 by Frances Orr RN  Outcome: Ongoing  Note: Able to change positions in bed without assist, no evidence of skin breakdown noted. Waffle cushion in place to chair.

## 2020-10-27 NOTE — PROGRESS NOTES
Occupational Therapy  Facility/Department: [address] IP REHAB  Daily Treatment Note  AM & PM Session  NAME: Tarsha Chapa  : 1925  MRN: [id number]    Date of Service: 10/27/2020    Discharge Recommendations:  Patient would benefit from continued therapy after discharge, Continue to assess pending progress, S Level 1, Home with Home health OT, Home with assist PRN  OT Equipment Recommendations  Other: Pt has shower stood outside of tub(uses like a TTB to sit & swing LEs into tub)then has new shower chair inside fo tub, horizontal grab bar front wall of tub & on long side wall of tub, handicapped ht toilet w/BUE support, 4WW, straight cane    Assessment   Performance deficits / Impairments: Decreased functional mobility ; Decreased ADL status; Decreased strength;Decreased endurance;Decreased balance;Decreased high-level IADLs;Decreased coordination;Decreased safe awareness  Assessment: Pt able to use reacher for LB dressing w/ assist to thread pants & to bathe buttocks in stance D/T pt relied heavily on sink for support, appeared unsteady & became fatigued. Pt O2 sat remained at 98% when checked throughout session, but pt appeared SOB after bathing & dressing tasks. Cont OT tx per POC. Treatment Diagnosis: Impaired: ADL/IADL function, transfers/functional mobility, balance, activity tolerance, safety awareness, L shoulder strength, coordination  Prognosis: Good  OT Education: OT Role;Plan of Care;Transfer Training;Energy Conservation; ADL Adaptive Strategies; Equipment  Patient Education: Reacher & long handle sponge. Barriers to Learning: hearing  REQUIRES OT FOLLOW UP: Yes  Activity Tolerance  Activity Tolerance: Patient limited by fatigue  Activity Tolerance: Pt O2 sat taken four times throughout session and was 98% each time. Pt stated he was winded and appeared SOB after bathing and dressing tasks. Pt stated he felt \"woozy\" by the end of the session.   Safety Devices  Safety Devices in place: Yes  Type of devices: Gait belt;Patient at risk for falls; Left in chair;Chair alarm in place;Call light within reach         Patient Diagnosis(es): There were no encounter diagnoses. has a past medical history of Acute diastolic CHF (congestive heart failure) ([address] Arthritis, BPH with obstruction/lower urinary tract symptoms, Chronic atrial fibrillation ([address] CKD (chronic kidney disease), Coronary arteriosclerosis, GERD (gastroesophageal reflux disease), History of blood transfusion, HTN (hypertension), Left hip pain, Pacemaker, Pulmonary fibrosis ([address] Pulmonary nodule, left, Renal atrophy, right, Renal cyst, right, and SOB (shortness of breath). has a past surgical history that includes Pacemaker insertion (2014); Tonsillectomy (1960); Small intestine surgery (1945); Inguinal hernia repair (Right, 07/2016); Coronary angioplasty with stent (2009); pacemaker placement; Colonoscopy; Abdomen surgery; and eye surgery (Bilateral). Restrictions  Restrictions/Precautions  Restrictions/Precautions: Fall Risk  Position Activity Restriction  Other position/activity restrictions: [place]- [place] hearing aides, sabino caruso  Subjective   General  Chart Reviewed: Yes  Patient assessed for rehabilitation services?: Yes  Additional Pertinent Hx: \"Patient is a 79 yo M with pmh CAD, CHF, Afib, HTN, CKD, pulmonary fibrosis, and lumbar DDD who initially presented 10/20/2020 with difficulty ambulating. Patient reports progressive weakness and shortness of breath over several days. Also with back pain and difficulty putting weight through his left leg. Found to have pulmonary edema, acute on chronic dCHF, and pneumonia. Work-up for stroke or acute spinal cord abnormality was negative. Back and LLE pain/weakness thought to be related to chronic degenerative changes. Patient now presents to ARU with impaired mobility and self-care below his baseline. Currently, patient reports improvement in his breathing and left leg strength.  He still feels generally weak and fatigued. He denies back pain currently. States he gets low back pain with radiation into the LLE with prolonged standing/ambulating. Denies tingling/numbness. \" Copied per Dr Kevyn Lawson 10/23/20 H & P note. Response to previous treatment: Patient reporting fatigue but able to participate  Family / Caregiver Present: No  Referring Practitioner: Dr Katherine Bain  Diagnosis: Debility  Subjective  Subjective: Pt met in room seated in recliner, physician present. Pt reports no pain, but that his back is weak. At end of session pt C/O pain in L his shoulder, knee and back, but did not rate. Objective    ADL  Equipment Provided: Reacher;Sock aid;Long-handled sponge  Feeding: Setup; Dentures  Grooming: Setup;Supervision(Seated in w/c, pt combed hair, pt soaked dentures.)  UE Bathing: Setup;Stand by assistance;Verbal cueing; Increased time to complete(Seated in w/c, for sponge bathe @ sink. Pt bathed all parts with cues to initiate.)  LE Bathing: Setup; Increased time to complete;Verbal cueing;Minimal assistance(Pt used able to reach below knees, cues to use long handle sponge for feet. In stance @ sink counter, pt bathed front shawna area, leaned significantly on counter when attempting to bathe buttocks. Required assist to get rest of buttocks area.)  UE Dressing: Increased time to complete;Setup;Minimal assistance(Doffed/donne undershirt & long sleeve, assist to bring shirt down a little bit in the back. Pt shirts appear tight.)  LE Dressing: Increased time to complete; Moderate assistance;Setup(Pt used reacher to doff/don depends & pants, assist to thread LLE as he put leg in wrong hole of pants. In stance @ sink counter, assist to pull depends & pants up to waist D/T fatigue. Pt donned both shoes. OT donned Claudette Johns.)  Toileting: Minimal assistance; Increased time to complete(Pt voided, performed own hygiene.  Able to pull pants down, assist for pants up D/T fatigue.)  Additional Comments: Pt left in recliner, needs in reach. Pt appeared slightly SOB, C/O light headed momentarily after bending over to don/tie shoes. Balance  Sitting Balance: Supervision  Standing Balance: Contact guard assistance  Standing Balance  Time: ~1 minute x2 to bathe buttocks. 30 seconds to pull pants up/down. Activity: Dynamic standing with grab bras & sink counter. Comment: Pt leaning signficnantly on sink counter when bathing buttocks  Functional Mobility  Functional - Mobility Device: Rolling Walker  Activity: To/from bathroom  Assist Level: Contact guard assistance  Functional Mobility Comments: Functional mobility with RW, CGA, slow steady gait. No over LOB noted. Toilet Transfers  Toilet - Technique: Ambulating(RW)  Equipment Used: Grab bars(Comfort height toilet)  Toilet Transfer: Contact guard assistance  Toilet Transfers Comments: cues for hand placement  Wheelchair Bed Transfers  Wheelchair/Bed - Technique: Ambulating(RW)  Equipment Used: Wheelchair; Other(Recliner)  Level of Asssistance: Contact guard assistance  Wheelchair Transfers Comments: Recliner><RW, pt unable to stand first try. Pt educated to push up from recliner. Pt able to stand second try. RW><w/c, pt remembered forgot to reach back for arm rests first time. Pt educated to reach back before sitting and was able to perform this rest of session. Cognition  Overall Cognitive Status: [facility]  Cognition Comment: Needs some cues to initiate tasks and redirection to stay on tasks as pt likes to tell stories, taking increased time during session. Second Session    Pt met in [facility], seated in w/c. Pt rates his pain 5/10 in his back. States his L knee is sore when he moves and gets \"out of breath just thinking about it. \"    Exercises:    Pt issued a resistive band BUE HEP to strengthen UB in order to participate in ADL/IADL tasks and increase activity tolerance. Pt educated to stop if experiencing any pain and to perform once a day.  Pt performed 5 exercises for 15 repetitions each. Pt appeared SOB after each exercise and was given a rest break in between each one. Pt stated he did not feel pain during exercises. O2 sat was taken after third exercise and was 91%. Therapeutic Activity:    Pt stood & pushed up from w/c, CGA. In stance at table, pt participated in game of connect four. Pt used table to stabilize himself with one hand, while placing pieces in the top of board with the other. Pt demonstrated good posture in stance at table. Pt stood for 2:11 minutes before sitting. Pt stated he was a little tired afterwards and his O2 sat was 98%. Pt stated his back ached a little bit, but that this was normal every time he stands and happened prior to admission. Pt in stance at table, participated in game again for 2:16 minutes before sitting down. Pt appeared SOB, O2 sat 99%. Assessment:  Pt demonstrates low activity tolerance as he became SOB during theraband exercises, O2 sat at 91%. Pt demonstrated good balance & posture while participating in game of connect four, standing for ~2 minutes x2 with rest break in between, O2 sat not dropping below 98%. Decreased endurance appears to limiting factor in pt participation of activities. Cont OT tx per POC. Pt transported to room via w/c by S/OT. W/c>RW>recliner, CGA. No LOB noted. Pt left in recliner with needs in reach.     Safety Device - Type of devices:  []  All fall risk precautions in place [] Bed alarm in place  [x] Call light within reach [x] Chair alarm in place [] Positioning belt [x] Gait belt [x] Patient at risk for falls [] Left in bed [x] Left in chair [] Telesitter in use [] Sitter present [] Nurse notified []  None                            Plan   Plan  Times per week: 5-6  Times per day: Twice a day  Plan weeks: 1-2 weeks from eval 10/24/20  Current Treatment Recommendations: Strengthening, Functional Mobility Training, Endurance Training, Balance Training, Self-Care / ADL, Safety Education & Training, Patient/Caregiver Education & Training, Equipment Evaluation, Education, & procurement, Home Management Training  Plan Comment: Wednesday conference               Goals  Short term goals  Time Frame for Short term goals: 1 week from eval on 10/24/20  Short term goal 1: Pt will bathe CGA to SBA with shower chair/grab bars/long sponge. Short term goal 2: Pt will dress with assist for sabino hose only using AE as needed. Short term goal 3: Pt will toilet with CGA to SBA with use of RW/counter/grab bar for support  Short term goal 4: Pt will perform functional transfers/mobility with RW SBA and demo good safety skills  Short term goal 5: Pt will perform ther ex/HEP to increase activity tolerance to stand for 5-6 minutes for ADL/simple IADL tasks with min cues for walker safety skills. Long term goals  Time Frame for Long term goals : 2 weeks from eval on 10/24/20  Long term goal 1: Pt will bathe mod ind with shower chair/hand held shower/grab bars/long sponge. Long term goal 2: Pt will dress mod ind including sabino hose/own compression hose with AE as needed. Long term goal 3: Pt will toilet mod ind with RW/elevated toilet with rails  Long term goal 4: Pt will perform functional transfers/mobility mod ind with RW  Long term goal 5: Pt will perform ther ex/HEP to increase activity tolerance to stand for 8-10 minutes for ADL/simple IADL tasks. Long term goals 6: Pt will improve walker safety/attn to task to perform simple IADL tasks such as simple food prep/clothing set-up mod ind from RW. Patient Goals   Patient goals : Pt stated \"I want to be able to get around my house w/o being afraid of falling, take care of myself, be able to go to the park and walk by myself, so I don't hold my daughter back. \"       Therapy Time   Individual Concurrent Group Co-treatment   Time In 0815         Time Out 0920         Minutes 65         Timed Code Treatment Minutes: 65 Minutes     Therapy Time Individual Co-treatment   Time In 5337     Time Out 1600     Minutes [address] S/OT  Therapist was present, directed patients care, made skilled judgement, and was responsible for assessment and treatment of the patient.     Lily Yap OTR/JAVIER #[id number]

## 2020-10-27 NOTE — PATIENT CARE CONFERENCE
[facility]  Inpatient Rehabilitation  Weekly Team Conference Note      Date: 10/28/2020  Patient Name:  Austin Arreaga    MRN: [id number]  : 1925  Gender: male  Physician: Dr William Klein  Diagnosis: Debility [R53.81]    CASE MANAGEMENT  Assessment:    Goal is home. PHYSICAL THERAPY    Bed mobility  Rolling to Left: Stand by assistance  Rolling to Right: Stand by assistance  Supine to Sit: Stand by assistance  Sit to Supine: Stand by assistance(difficulty lifting bilateral LE into the bed, but, with increased time, able to complete without assistance)  Scooting: Stand by assistance  Comment: bed mobility done on flat therapy mat. Transfers:  Sit to Stand: Contact guard assistance  Stand to sit: Contact guard assistance  Bed to Chair: Contact guard assistance(with wheeled walker)    Ambulation 1  Surface: level tile  Device: Rolling Walker  Other Apparatus: Wheelchair follow  Assistance: Contact guard assistance  Quality of Gait: Began with slightly flexed posture, but quickly progressed to Stooped posture secondary to chronic back problems.  bilateral step length, decreased step height, step through pattern, decreased gait speed. Distance: 54' with three turns (most turns patient takes very wide)  Comments: Patient continues to report that he feels the rollator moves to freely and he does not feel comfortable with it. He prefers to use a regular wheeled walker at this time. Stairs  # Steps : 4  Stairs Height: 6\"  Rails: Bilateral  Device: No Device  Assistance: Contact guard assistance  Comment: ascended/descended 4 steps with bilateral rails, using a non-reciprocal pattern with CGA. Patient with increased SOB and required seated rest break to recover. Curbs: 4\"(Patient able to complete 4\" curb with wheeled walker with CGA and verbal cues during step.   Patient with SOB, but quickly resolved with seated rest break.)    Car Transfer: Contact guard assistance(mock car transfer done with wheeled walker. He required cueing for hand placement when coming out of the car. Dfificulty standing from car seat, but able to with no more than CGA.)      Assessment: Patient continues with limitations from SOB. He is able to complete household distance with wheeled walker with CGA. Also limited by left knee pain that worsened with increased distance. Patient was unable to complete stairs this PM secondary to fatigue and pain in left knee. Patient will benefit from continued skilled therapy for strengthening, gait training, and endurance training to allow for safe return to home with daughter. SPEECH THERAPY (intentionally left blank if not actively being seen by this service):      OCCUPATIONAL THERAPY    ADL  Equipment Provided: Reacher, Sock aid, Long-handled sponge  Feeding: Setup, Dentures  Grooming: Setup, Supervision(Seated in w/c, pt combed hair, pt soaked dentures.)  UE Bathing: Setup, Stand by assistance, Verbal cueing, Increased time to complete(Seated in w/c, for sponge bathe @ sink. Pt bathed all parts with cues to initiate.)  LE Bathing: Setup, Increased time to complete, Verbal cueing, Minimal assistance(Pt used able to reach below knees, cues to use long handle sponge for feet. In stance @ sink counter, pt bathed front shawna area, leaned significantly on counter when attempting to bathe buttocks. Required assist to get rest of buttocks area.)  UE Dressing: Increased time to complete, Setup, Minimal assistance(Doffed/donne undershirt & long sleeve, assist to bring shirt down a little bit in the back. Pt shirts appear tight.)  LE Dressing: Increased time to complete, Moderate assistance, Setup(Pt used reacher to doff/don depends & pants, assist to thread LLE as he put leg in wrong hole of pants. In stance @ sink counter, assist to pull depends & pants up to waist D/T fatigue. Pt donned both shoes.  OT donned Jed Loth.)  Toileting: Minimal assistance, Increased time to complete(Pt voided, performed own hygiene. Able to pull pants down, assist for pants up D/T fatigue.)  Additional Comments: Pt left in recliner, needs in reach. Bed mobility  Rolling to Left: Stand by assistance  Rolling to Right: Stand by assistance  Supine to Sit: Stand by assistance  Sit to Supine: Stand by assistance(difficulty lifting bilateral LE into the bed, but, with increased time, able to complete without assistance)  Scooting: Stand by assistance  Comment: bed mobility done on flat therapy mat. Functional Transfers: Toilet Transfers  Toilet - Technique: Ambulating(RW)  Equipment Used: Grab bars(Comfort height toilet)  Toilet Transfer: Contact guard assistance  Toilet Transfers Comments: cues for hand placement  Tub Transfers  Tub Transfers Comments: TBE in future session  Shower Transfers  Shower - Transfer From: [employer])  Shower - Transfer To: Shower seat with back  Shower - Technique: Ambulating  Shower Transfers: Minimal assistance  Shower Transfers Comments: from RW required min A to sit safely onto chair w/cues for sit & swing technique & for hand placement; sit >< stand during shower min A also, stood from chair to come out CGA/min A, (did not use grab bars but seat & back of chair)    Perception  Overall Perceptual Status: WFL       UE Function:  L shoulder pain:Hx of L shoulder injury several years ago, gets a \"catch\" in shoulder frequently, AROM is WFL shoulder only tested to 3+/5 D/T painful from old injury, distally 4/5  RUE WFL    DME: Pt has shower stool outside of tub(uses like a TTB to sit & swing LEs into tub)then has new shower chair inside fo tub, horizontal grab bar front wall of tub & on long side wall of tub, handicapped ht toilet w/BUE support, 4WW, straight cane    Assessment: Pt able to use reacher for LB dressing w/ assist to thread pants & to bathe buttocks in stance D/T pt relied heavily on sink for support, appeared unsteady & became fatigued.  Pt O2 sat remained at 98% when checked throughout session, but pt appeared SOB after bathing & dressing tasks. Cont OT tx per POC. Rec cont inpt rehab another week & home OT post D/C.    NUTRITION  Most recent weightWeight: 175 lb 4.3 oz (79.5 kg)  BSA (Calculated - sq m): 1.92 sq meters  BMI (Calculated): 28.3  Please see nutrition note for details. NURSING  Incontinent less than daily of bladder, continent of bowel. Monitor and maintain skin integrity. Family Education: Patient education: safety and fall prevention, medications, pain control, CHF, skin care and prevention. MEDICAL  Treating for suspected pneumonia, monitoring WBC  Pain control for back and radicular pain  Monitoring Hgb in setting of anemia    TEAM SUMMARY AND DISCHARGE PLAN  Estimated Length of Stay:DC 11/4/20  Destination: home with daughter (with whom he lives) and home care   · Anticipated Services at Discharge:    [x] OT  [x] PT   [] SLP    [x] RN   [] Home Health aide []   Community Resources: _______________________________  Equipment recommendations:  [] Hospital bed [] Tub bench  [] Shower chair [] Hand held shower  [] Raised toilet seat [] Toilet safety frame [] Bedside commode   [] W/C: _____  [x] Rolling Walker [] Standard walker [] Gait belt [] cane: _________  [] Sliding board [] Alternate seating/furniture [] O2 [] Hip Kit: _______  [] Life Line [] Other: _possibly walker basket reacher_____  Factors facilitating achievement of predicted outcomes: Family support, Caregiver support, Cooperative and Pleasant  Barriers to the achievement of predicted outcomes/Interventions: poor endurance, left knee pain- strengthening, conditioning, activity pacing and energy conservation, adaptive equipment and compensatory strategies for self care and mobility, pharma and nonpharma methods of pain control         Interdisciplinary Individualized Plan of Care Review:    · Continue Current Plan of Care:  Yes

## 2020-10-28 NOTE — PLAN OF CARE
Problem: Falls - Risk of:  Goal: Will remain free from falls  Description: Will remain free from falls  10/28/2020 0507 by Irais Lackey RN  Outcome: Ongoing  Note: Patient free from falls this shift. Fall precautions in place at all times. Bed in lowest position with two side rails up and wheels locked. Call light within reach. Patient able and agreeable to contact for safety appropriately. Up with walker and 1 assist, tolerates well. Problem: Pain:  Description: Pain management should include both nonpharmacologic and pharmacologic interventions. Goal: Patient's pain/discomfort is manageable  Description: Patient's pain/discomfort is manageable  Outcome: Ongoing     Problem: Skin Integrity:  Goal: Absence of new skin breakdown  Description: Absence of new skin breakdown  Outcome: Ongoing  Note: Skin assessment performed each shift per protocol. Patient turns and repositions self in bed. Has remained continent of bowel and bladder overnight. Problem: FLUID AND ELECTROLYTE IMBALANCE  Goal: Fluid and electrolyte balance are achieved/maintained  Outcome: Ongoing  Note: VSS. Peripheral pulse palpable. Skin pink, dry and warm. I&O monitor. Daily weight. Medication given as ordered. Education provided. Will continue to monitor.

## 2020-10-28 NOTE — PROGRESS NOTES
OCCUPATIONAL THERAPY  Progress Note   Second Session    Patient Name: [facility] Record Number: [id number]    Treatment Diagnosis:     General  Chart Reviewed: Yes  Patient assessed for rehabilitation services?: Yes  Additional Pertinent Hx: \"Patient is a 81 yo M with pmh CAD, CHF, Afib, HTN, CKD, pulmonary fibrosis, and lumbar DDD who initially presented 10/20/2020 with difficulty ambulating. Patient reports progressive weakness and shortness of breath over several days. Also with back pain and difficulty putting weight through his left leg. Found to have pulmonary edema, acute on chronic dCHF, and pneumonia. Work-up for stroke or acute spinal cord abnormality was negative. Back and LLE pain/weakness thought to be related to chronic degenerative changes. Patient now presents to ARU with impaired mobility and self-care below his baseline. Currently, patient reports improvement in his breathing and left leg strength. He still feels generally weak and fatigued. He denies back pain currently. States he gets low back pain with radiation into the LLE with prolonged standing/ambulating. Denies tingling/numbness. \" Copied per Dr Trudy Leiva 10/23/20 H & P note. Response to previous treatment: Patient reporting fatigue but able to participate(pain)  Family / Caregiver Present: No  Referring Practitioner: Dr Moncho Bryant  Diagnosis: Debility     Restrictions/Precautions  Restrictions/Precautions: Fall Risk        Position Activity Restriction  Other position/activity restrictions: [place]- B hearing aides, sabino hose    Subjective: Pt met in [facility], seated in w/c. Pt rated pain in his back 5/10. Objective:     Transfer:  Discussed pt shower set-up at home. Pt reports that he has a mat that goes down on the floor of his tub shower so he does not slip. Pt reports that there are two grab bars by his tub shower for him to hold onto.  Pt reports that his grab bars are on the back wall of his shower and on the side wall of the shower & that he has a shower chair for the tub shower and a stool on the other side of the tub. Pt is not sure what the set up looks like, and says his daughter set it up. S/OT did not simulate this set up D/T pt has not done it before, therefore asked pt to see if daughter could send him a picture of what the set up looks like. W/c><RW, CGA, pt amb short distance into bathroom with RW & pt knee buckled during ambulation and had LOB, but recovered. Pt attempted to step in shower over tub using grab bar, but appeared unsafe as he could not get his leg high enough to get over the tub. Second person present to bring up w/c behind pt for him to sit as he appeared SOB and unable to stand much longer. ADL:  Discussed replacing pt shoe laces with elastic shoe laces D/T pt appears SOB when tying his shoes & to relieve pain from his back by not having to bend so far to tie shoes. Pt agreed to this, therefore S/OT replaced with elastic laces. Pt attempted to don shoes using shoe horn but struggled to do so as tread on socks was making this difficult, therefore OT turned socks inside out and pt was able to don shoes easier  with shoe horn with cues. Noted that pt was not wearing [employer] prior to session, (OT ed pt to ask nursing staff to have them donned, as are supposed to be on since he was wearing @ home). Pt appeared SOB after doing this, but was assured it will get easier with practice as he appeared discouraged from struggling to perform task. Pt needed redirection & took increased time to complete as he is talkative and likes to tell stories about his past.       Assessment: Pt had LOB D/T knee buckling when amb short distance into bathroom with RW. Pt unable to step over tub into shower using grab bar, and is unsafe doing this transfer. Pt shoe laces replaced with elastic laces for energy conservation. Pt appeared SOB when practicing using shoe horn to don shoes.  Continue to implement energy conservation and adaptive strategies. Cont tx per POC. Pt left in care of PT, Hieu Strickland. Safety Device - Type of devices:  []  All fall risk precautions in place [] Bed alarm in place  [] Call light within reach [] Chair alarm in place [] Positioning belt [x] Gait belt [] Patient at risk for falls [] Left in bed [] Left in chair [] Telesitter in use [] Sitter present [] Nurse notified []  None    Therapy Time     Individual Co-treatment   Time In 1430     Time Out 1515     Minutes 45         Electronically signed by [place] S/OT on 10/28/2020 at 2:35 PM      Therapist was present, directed patients care, made skilled judgement, and was responsible for assessment and treatment of the patient.       Wilbert Shay, OTR/L #[id number]

## 2020-10-28 NOTE — PLAN OF CARE
Problem: Falls - Risk of:  Goal: Will remain free from falls  Description: Will remain free from falls  10/28/2020 1037 by Manas Nichole RN  Outcome: Ongoing     Problem: Infection:  Goal: Will remain free from infection  Description: Will remain free from infection  10/28/2020 1037 by Manas Nichole RN  Outcome: Ongoing     Problem: Daily Care:  Goal: Daily care needs are met  Description: Daily care needs are met  10/28/2020 1037 by Manas Nichole RN  Outcome: Ongoing     Problem: Pain:  Goal: Pain level will decrease  Description: Pain level will decrease  10/28/2020 1037 by Manas Nichole RN  Outcome: Ongoing     Problem: Pain:  Goal: Control of chronic pain  Description: Control of chronic pain  10/28/2020 1037 by Manas Nichole RN  Outcome: Ongoing     Problem: ACTIVITY INTOLERANCE/IMPAIRED MOBILITY  Goal: Mobility/activity is maintained at optimum level for patient  10/28/2020 1037 by Manas Nichole RN  Outcome: Ongoing

## 2020-10-28 NOTE — PROGRESS NOTES
Department of Internal Medicine  General Internal Medicine  Attending Progress Note    Chief Complaint:low back pain and leg weakness      HPI:   SUBJECTIVE:  81 yo male with lumbar djd and left leg weakness, PAF and ILD who is in acute rehab . He has made some progress walking and reports his back pain is pretty severe and associated with leg weakness improved with bending forward over his walker. He has fallen and is afraid of falling again. Has continued urgency incontinence and started using the urinal to avoid accidents. Seen by all the consultants. Main issue is when can he be dc'd to home safely  Breathing is quite short with a little exertion still bringing up a lot of discolored phlegm. While on rehab he has made gradual progress, is able to walk further with the walker although he still hurts a lot in his low back. The sob muñoz has not improved on the higher dose of lasix. Therapy follows him and his oxygen sat has never gone lower than 89% with exercise.           Past Medical History:   Diagnosis Date    Acute diastolic CHF (congestive heart failure) ([facility] [address] 10/23/2020    Arthritis     BPH with obstruction/lower urinary tract symptoms     nocturia 3-4 x per night    Chronic atrial fibrillation (HCC)     CKD (chronic kidney disease) 2017    level 3    Coronary arteriosclerosis 2009    2 stents/ Dr Caty Guido [facility] GERD (gastroesophageal reflux disease)     History of blood transfusion     1945   During deployment    HTN (hypertension) 1990    Left hip pain     schrapnel in hip and intestines/chronic pains left hip impairs walking    Pacemaker 2015    Pulmonary fibrosis ([facility] [address] 08/2017    mild amount seen on chest ct    Pulmonary nodule, left 2017    noncalcified 7mm low risk repeat 8/2018 if patient willing    Renal atrophy, right 2017    Renal cyst, right 08/2017    not clearly simple cyst    SOB (shortness of breath) 2017    terrell normal pft and ct chest ? smll amount of pulmonary fibrosis     Past Surgical History:   Procedure Laterality Date    ABDOMEN SURGERY      Scrapnel removed in [facility]  2009    awoke with indigestion    EYE SURGERY Bilateral     cataract    INGUINAL HERNIA REPAIR Right 07/2016    PACEMAKER INSERTION  2014    PACEMAKER PLACEMENT      SMALL INTESTINE SURGERY  1945    jelly as ww II vet, part of small intestine moved    TONSILLECTOMY  1960      Family History   Problem Relation Age of Onset    Pacemaker Sister     Pacemaker Brother     No Known Problems Other         lung disease     Social History     Tobacco Use    Smoking status: Never Smoker    Smokeless tobacco: Never Used   Substance Use Topics    Alcohol use: No    Drug use: Not Currently       Prior to Admission medications    Medication Sig Start Date End Date Taking?  Authorizing Provider   losartan (COZAAR) 25 MG tablet Take 1 tablet by mouth daily Hold for systolic less 013 57/27/23  Yes Giovana Alvarez MD   furosemide (LASIX) 40 MG tablet Take 1 tablet by mouth daily 10/27/20  Yes Giovana Alvarez MD   pantoprazole (PROTONIX) 40 MG tablet Take 1 tablet by mouth nightly 10/24/20  Yes Giovana Alvarez MD   potassium chloride (KLOR-CON M) 20 MEQ extended release tablet Take 1 tablet by mouth daily 10/27/20  Yes Giovana Alvarez MD   oxybutynin (DITROPAN) 5 MG tablet TAKE 1 TABLET BY MOUTH EVERY DAY 10/21/20  Yes Giovana Alvarez MD   fluticasone [facility]) 50 MCG/ACT nasal spray SPRAY 2 SPRAY INTO EACH NOSTRIL EVERY DAY 10/9/20  Yes Giovana Alvarez MD   albuterol sulfate  (90 Base) MCG/ACT inhaler Inhale 2 puffs into the lungs every 6 hours as needed for Wheezing or Shortness of Breath 8/27/20  Yes Buck Hyman MD   umeclidinium-vilanterol (ANORO ELLIPTA) 62.5-25 MCG/INH AEPB inhaler Inhale 1 puff into the lungs daily 8/27/20  Yes Buck Hyman MD   tamsulosin (FLOMAX) 0.4 MG capsule ONE EVERY NIGHT FOR YOUR PROSTATE 4/22/20  Yes Giovana Alvarez MD atorvastatin (LIPITOR) 40 MG tablet Take 1 tablet by mouth daily 2/22/19  Yes Corky Salazar MD   rivaroxaban (XARELTO) 15 MG TABS tablet Take 1 tablet by mouth daily (with breakfast) 1/8/18  Yes Shade Camejo MD   aspirin 81 MG tablet Take 81 mg by mouth daily   Yes Historical Provider, MD   Cholecalciferol (VITAMIN D3) 2000 UNITS CAPS Take 2,000 Units by mouth daily   Yes Historical Provider, MD   calcium carbonate (OSCAL) 500 MG TABS tablet Take 600 mg by mouth daily caltrate 600-D3 one daily   Yes Historical Provider, MD   Multiple Vitamins-Minerals (CENTRUM SILVER ADULT 50+) TABS Take 1 tablet by mouth daily   Yes Historical Provider, MD   predniSONE (DELTASONE) 20 MG tablet Take 1 tablet by mouth daily for 10 days Then resume 10mg a day 10/24/20 11/3/20  Shade Camejo MD   levoFLOXacin (LEVAQUIN) 250 MG tablet Take 1 tablet by mouth daily for 10 days 10/23/20 11/2/20  Shade Camejo MD   finasteride (PROSCAR) 5 MG tablet TAKE 1 TABLET BY MOUTH EVERY DAY 12/2/19   Shade Camejo MD         ROS:  A comprehensive review of systems was negative except for: back pain , trouble with balance    OBJECTIVE:      PHYSICAL:  Intake/Output:   Patient Vitals for the past 8 hrs:   BP Temp Temp src Pulse Resp SpO2   10/27/20 2037 -- -- -- -- 16 98 %   10/27/20 1957 (!) 144/76 97.3 °F (36.3 °C) Oral 71 16 97 %   10/27/20 1618 123/66 98.5 °F (36.9 °C) Oral 70 16 94 %     I/O last 3 completed shifts:   In: 200 [P.O.:980]  Out: -   I/O this shift:  In: 240 [P.O.:240]  Out: -   VITALS:    BP (!) 144/76   Pulse 71   Temp 97.3 °F (36.3 °C) (Oral)   Resp 16   Ht 5' 6\" (1.676 m)   Wt 175 lb 4.3 oz (79.5 kg)   SpO2 98%   BMI 28.29 kg/m²     General Appearance: alert and oriented to person, place and time, well-developed and well-nourished, in no acute distress  Skin: warm and dry, no rash or erythema  Head: normocephalic and atraumatic  Eyes: conjunctivae normal and sclera anicteric  ENT: hearing grossly normal bilaterally and sinuses non-tender  Neck: neck supple and non tender without mass, no thyromegaly or thyroid nodules, no cervical lymphadenopathy   Pulmonary/Chest: paninsp crackles all the way up  Cardiovascular: normal rate, normal S1 and S2, no gallops and no carotid bruits: paced  Abdomen: soft, non-tender, non-distended, normal bowel sounds, no masses or organomegaly  Extremities: no cyanosis, clubbing or edema  Musculoskeletal: bony swelling and tenderness both knees moving around better  Neurologic: coordination normal and speech normal, moves all 4 extremities    DATA:   Labs:  CBC:   Recent Labs     10/25/20  0651 10/26/20  0632 10/27/20  0739   WBC 12.5* 10.4 11.9*   HGB 10.7* 10.2* 11.5*    159 179     BMP:    Recent Labs     10/25/20  0650 10/26/20  0632 10/27/20  0739    137 138   K 4.9 4.6  4.6 4.6  4.6    104 103   CO2 24 24 25   BUN 53* 57* 52*   CREATININE 1.9* 1.8* 1.8*   GLUCOSE 91 98 124*     POC GLUCOSE:    No results for input(s): POCGLU in the last 72 hours. Ca/Mg/Phos:   Recent Labs     10/25/20  0650 10/26/20  0632 10/27/20  0739   CALCIUM 8.5 8.5 8.9     Hepatic:   No results for input(s): AST, ALT, ALB, BILITOT, ALKPHOS in the last 72 hours. Troponin:   No results for input(s): TROPONINI in the last 72 hours. DIAGNOSTICS:  Ct Head Wo Contrast    Result Date: 10/20/2020  No acute intracranial abnormality. No significant change from prior study     Ct Chest Wo Contrast    Result Date: 10/20/2020  Pulmonary interstitial opacities, most likely pulmonary edema superimposed on chronic interstitial lung disease. Indeterminate 42 mm right renal mass may represent a proteinaceous cyst or neoplasm.  RECOMMENDATIONS: Only if deemed clinically important to the patient's medical care after considering advanced patient age and comorbidities, further workup of the renal lesion could be obtained with nonemergent/outpatient renal mass protocol CT or MRI     Ct Lumbar Spine Wo Contrast    Result Date: 10/20/2020  No acute fracture or subluxation. 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 L3-4 severe central and moderate bilateral foraminal stenosis. L4-5 grade 1 spondylolisthesis with additional degenerative changes, resulting in moderate central stenosis. Xr Chest Portable    Result Date: 10/20/2020  Patchy infiltrates suggested bilaterally new from prior study. Cta Head Neck W Contrast    Result Date: 10/20/2020  1. Bilateral carotid bifurcation atherosclerotic plaque resulting in approximately 40% bilateral stenosis of the origin of the internal carotid artery. Finding is compatible with 16-49% stenosis per sonographic NASCET index criteria. 2. 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 3. Right vertebral artery termination as the posteroinferior cerebellar artery (PICA). 4. Bilateral internal carotid artery cavernous segment atherosclerotic calcification without significant arterial stenosis. 5. Approximately 50% arterial stenosis of the bilateral vertebral body origin secondary to encroachment by atherosclerotic calcification. 6. Mild cerebral white matter chronic microvascular ischemic disease. ASSESSMENT AND PLAN    Principal Problem:    Acute pulmonary edema (HCC)  Plan: seems to have improved do not want to overdiurese, Echo showed mod to severe MR and preserved EF, to continue medical managment  Active Problems:    Pacemaker  Plan: ongoing cardio fu    HTN (hypertension)  Plan: running a little higher on steroids    Coronary arteriosclerosis  Plan: stable    BPH with obstruction/lower urinary tract symptoms  Plan: ongoing severe sxs    Pulmonary fibrosis ([address]  Plan: ongoing issue, awaiting pulmonary's guidance, oxygen levels good on room air    Parkinson disease ([address]  Plan: perhaps some cogwheeling in the wrists no where else, not being treated for this, had a brother who  of parkinsons  I do NOT think he has parkinsons.  ,more likely essential tremor    Paroxysmal atrial fibrillation (HCC)  Plan: ongoing hx. cont same    Pulmonary infiltrates  Plan: question is whether this was infectious or not, terrell in progress  Sputum cx still pending was normal marisol        Appropriate diagnostic studies and consults ordered  Mello Harvey MD

## 2020-10-28 NOTE — PROGRESS NOTES
Physical Therapy  Facility/Department: [address] IP REHAB  Daily Treatment Note  NAME: Erik Denver  : 1925  MRN: [id number]    Date of Service: 10/28/2020    Discharge Recommendations:  Continue to assess pending progress, Home with assist PRN, Home with Home health PT   PT Equipment Recommendations  Equipment Needed: Yes  Mobility Devices: Mendel Ayala: Rolling  Other: Patient has a rollator at home. Assessment   Body structures, Functions, Activity limitations: Decreased functional mobility ; Decreased endurance;Decreased strength;Decreased balance  Assessment: Patient with increased knee pain this AM causing difficulty with ther ex. He also has limiting back pain with ambulation that causes patient to be unable to ambulate more than 50'. Patient with increasing flexed posture with regular walker and weakness in LE. When patient able to stand more upright, he has increased sharp pains that greatly limit pain and stop him from the pain. Patient will benefit from continued skilled therapy for strengthening, gait training, and endurance training to allow for safe return to home with daughter. Treatment Diagnosis: Impaired functional mobility  Prognosis: Good  PT Education: Plan of Care;Transfer Training;General Safety;Gait Training;Functional Mobility Training; Adaptive Device Training  Patient Education: plans to discuss knee status with MD  Barriers to Learning: Hearing  REQUIRES PT FOLLOW UP: Yes  Activity Tolerance  Activity Tolerance: Patient limited by fatigue;Patient limited by endurance; Patient limited by pain  Activity Tolerance: continued increased left knee pain that causes reduced tolerance to on his feet activity. back pain also very limiting     Patient Diagnosis(es): There were no encounter diagnoses.      has a past medical history of Acute diastolic CHF (congestive heart failure) ([address] Arthritis, BPH with obstruction/lower urinary tract symptoms, Chronic atrial fibrillation ([address] CKD (chronic kidney disease), Coronary arteriosclerosis, GERD (gastroesophageal reflux disease), History of blood transfusion, HTN (hypertension), Left hip pain, Pacemaker, Pulmonary fibrosis ([address] Pulmonary nodule, left, Renal atrophy, right, Renal cyst, right, and SOB (shortness of breath). has a past surgical history that includes Pacemaker insertion (2014); Tonsillectomy (1960); Small intestine surgery (1945); Inguinal hernia repair (Right, 07/2016); Coronary angioplasty with stent (2009); pacemaker placement; Colonoscopy; Abdomen surgery; and eye surgery (Bilateral). Social/Functional History  Lives With: Daughter(Dtr Santo Montgomery)  Type of Home: House(condo)  Home Layout: One level  Home Access: Stairs to enter with rails  Entrance Stairs - Number of Steps: 1 R rail. (uses R going up, L going down) + 6 steps down with B far spaced rails. Bathroom Shower/Tub: Tub/Shower unit, Shower chair without back, Shower chair with back(has a stool outside of shower to sit on and put feet into shower, then a shower chair in tub to sit on: pt's daughter has purchased this since he's been in the hospital and he has not used it, 2 grab bars on front & sidewall in shower)  Bathroom Toilet: Handicap height(vanity near on R & tub on L for support)  Bathroom Equipment: Grab bars in shower, Shower chair(on wall into tub)  Bathroom Accessibility: Charna Andria accessible(RW will but not 4WW he has)  Home Equipment: Cane, 4 wheeled walker(Jacinda, pt just started using wh walker a few days prior to admit)  ADL Assistance: Independent  Homemaking Assistance: (dtr assists)  Homemaking Responsibilities: No  Ambulation Assistance: Independent(R str cane,)  Transfer Assistance: Independent(w/cane, 7SG recently)  Active : No  Mode of Transportation: SUV  Education: Dtr drives pt  Occupation: Retired  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 Worked for [employer]. Leisure & Hobbies: reading.   Additional Comments: Rarely alone. Home with dtr. Per dtr, up until last week, was walking a couple miles a couple times a week using a cane. Dtr states she purchased a rollator for patient use due to his inability to ambulate, and due to patient need to spontaneously sit as he fatigues. Restrictions  Restrictions/Precautions  Restrictions/Precautions: Fall Risk  Position Activity Restriction  Other position/activity restrictions: [place]- B hearing aides, sabino hose     Subjective   General  Chart Reviewed: Yes  Additional Pertinent Hx: Per Dr. Von Melara note, \"Patient is a 79 yo M with pmh CAD, CHF, Afib, HTN, CKD, pulmonary fibrosis, and lumbar DDD who initially presented 10/20/2020 with difficulty ambulating. Patient reports progressive weakness and shortness of breath over several days. Also with back pain and difficulty putting weight through his left leg. Found to have pulmonary edema, acute on chronic dCHF, and pneumonia. Work-up for stroke or acute spinal cord abnormality was negative. Back and LLE pain/weakness thought to be related to chronic degenerative changes. \"  Response To Previous Treatment: Patient with no complaints from previous session. Family / Caregiver Present: No  Referring Practitioner: Dr. Seth Aguero: Patient reporting he is more achy this AM and states his left knee is getting worse. patient rates it at a 4/10 at rest and increases with weight bearing and ROM activities  General Comment  Comments: Patient reports he has had knee injection approx 8 months ago. Per chart, this occurred on March 3rd of 2020. He also states he uses \"blue emu\" topical anti-inflammatory at home.   Plan to discuss with Dr. Myla Dong to Stand: Contact guard assistance  Stand to sit: Contact guard assistance     Ambulation  Ambulation?: Yes  More Ambulation?: Yes  Ambulation 1  Surface: level tile  Device: Rolling Walker  Assistance: Contact guard assistance  Quality of Gait: Began with slightly flexed posture, but quickly progressed to Stooped posture secondary to chronic back problems.  bilateral step length, decreased step height, step through pattern, decreased gait speed. Distance: 48' with two turns  Comments: Patient reporting pain increasing greatly with ambulation and unable to stand any taller. He reports pain stays in low back and he does have considerable increased weakness in low back and in LEs. Patient felt his Jodine Sieve was giving out on him. \" No buckling occurred, but patient had difficulty getting back to wheelchair. He has great increase in SOB that greatly improves when he sits to rest. He reports it may be more due to the pain increasing. Ambulation 2  Surface - 2: level tile  Device 2: (THOMAS walker)  Assistance 2: Contact guard assistance  Quality of Gait 2: patient with more upright posture and able to improve bilateral step height and length. He had sharp pains that occurred in back that would cause him to stop and grimace. He rated them a 6/10, but they appeared to be very bad. Gait Deviations: Slow Lia  Distance: 48' with two turns  Comments: patient with improved fluidity and less weakness noted, but patient with much more severe pain in low back. Stairs/Curb  Stairs?: No        Exercises  Hip Flexion: x15 marching, causes increased SOB and required short rest break. Hip Abduction: x15 with medium resistance band, x15 hip add sets with three second hold (patient reporting back discomfort with repeated hip add sets)  Knee Long Arc Quad: x15 alternating. Patient reporting increased pain in left knee with active knee extension and lowering to floor. Not painful with PROM. Ankle Pumps: x20 heel/toe raises  Comments: bilateral LE ther ex done while seated in wheelchair. patient limited by pain in left knee. PT also assessed muscle length of left hamstring and quad. No excessive shortening noted.      PM Session  Patient reporting his back pain is increasing and it is taking his strength away from him. Patient agreeable to trying activity with PT.  PT set up THOMAS walker to be shorter than would be regularly recommended to allow for flexed trunk, as patient reports his pain is better with flexion at trunk. Sit>stand with CGA. Patient ambulated 36' with lower THOMAS walker and CGA. Patient reporting no increased pain with this position, but he did report weakness in bilateral UE and bilateral LE during ambulation. Patient required seated rest break after ambulation. Patient performed 5 reps sit<>stand from wheelchair with CGA. Patient only came to full upright stand 2/5 times secondary to posterior lean with bilateral UE support on arm rests. Last stand, patient was encouraged to transfer bilateral UE onto platforms of THOMAS walker in front of him. After seated rest break. Patient performed 4 more reps of sit<>stand to THOMAS walker. Patient with improved ability to stand fully upright, but fatigued quicker secondary to standing fully upright. Patient reporting increased fatigue in bilateral UE. Patient performed bilateral LE ther ex while seated in wheelchair, 15 reps: heel/toe rails, alternating LAQ, and marching. Patient very limited this PM by fatigue, but less pain with ambulation when able to use bilateral platform on THOMAS walker in more of a flexed posture.       Safety Device - Type of devices:  [x]  All fall risk precautions in place [] Bed alarm in place  [] Call light within reach [] Chair alarm in place [] Positioning belt [x] Gait belt [] Patient at risk for falls [] Left in bed [x] Left in chair (in wheelchair to return to room with transportation) [] Telesitter in use [] Sitter present [] Nurse notified []  None          Goals  Short term goals  Time Frame for Short term goals: 7 to 10 days  Short term goal 1: Bed mobility supervision  Short term goal 2: Transfers CGA  Short term goal 3: Ambulation 48' with wh walker and CGA  Short term goal 4: Progress to stairs as able. Long term goals  Time Frame for Long term goals : 14 to 18 days  Long term goal 1: all bed mobility modif I  Long term goal 2: all transfers modif I  Long term goal 3: amb 80' with wh walker S to modif I  Long term goal 4: 8 steps R rail ascending L descending S  Patient Goals   Patient goals : \"to get up and walk without anything\"    Plan    Plan  Times per week: 5 to 6  Times per day: Twice a day  Plan weeks: 2 to 3  Current Treatment Recommendations: Gait Training, Balance Training, Endurance Training, Stair training  Safety Devices  Type of devices:  All fall risk precautions in place, Gait belt, Left in chair(in wheelchair to return to room with transportation)     Therapy Time   Individual Concurrent Group Co-treatment   Time In 0815         Time Out 0900         Minutes [address]   Time In 1515     Time Out 1600     Minutes 45          Electronically signed by Raphael Pruitt PT on 10/28/2020 at 9:01 AM

## 2020-10-28 NOTE — PROGRESS NOTES
Occupational Therapy  [facility]  Daily Treatment Note  NAME: Tarsha Chapa  : 1925  MRN: [id number]    Date of Service: 10/28/2020    Discharge Recommendations:  Patient would benefit from continued therapy after discharge, Continue to assess pending progress, S Level 1, Home with Home health OT, Home with assist PRN  OT Equipment Recommendations  Other: Pt has shower stool outside of tub(uses like a TTB to sit & swing LEs into tub)then has new shower chair inside fo tub, horizontal grab bar front wall of tub & on long side wall of tub, handicapped ht toilet w/BUE support, 4WW, straight cane    Assessment   Performance deficits / Impairments: Decreased functional mobility ; Decreased ADL status; Decreased strength;Decreased endurance;Decreased balance;Decreased high-level IADLs;Decreased coordination;Decreased safe awareness  Assessment: Pt tolerated tx fair, though limited by low back pain. Pt required close CGA for safety during fxl transfers and mobility d/t becoming unsteady with pain and fatigue. Pt participated in simple homemaking activity but needed chair brought up quickly for seated rest break after x1-2 mins standing activity. Pt practiced using reacher in prep for using for LB dressing with good success. Pt is very pleasant and hardworking. Cont per POC  Treatment Diagnosis: Impaired: ADL/IADL function, transfers/functional mobility, balance, activity tolerance, safety awareness, L shoulder strength, coordination  Prognosis: Good  OT Education: OT Role;Plan of Care;Transfer Training;Equipment; Energy Conservation;IADL Safety  Barriers to Learning: hearing  REQUIRES OT FOLLOW UP: Yes  Activity Tolerance  Activity Tolerance: Patient limited by pain  Activity Tolerance: having increased low back pain  Safety Devices  Safety Devices in place: Not Applicable(returned to room with transporter)  Type of devices: Gait belt         Patient Diagnosis(es): There were no Denies tingling/numbness. \" Copied per Dr Blanquita Woo 10/23/20 H & P note. Response to previous treatment: Patient reporting fatigue but able to participate(pain)  Family / Caregiver Present: No  Referring Practitioner: Dr Mitzi Costa  Diagnosis: Debility  Subjective  Subjective: pt met in dept ffor OT. pt agreeable to therapy, speaking with Dr Mitzi Costa at start. pt c/o back pain and pain in right middle finger but did not rate      Orientation     Objective       Instrumental ADL's  Instrumental ADLs: Yes  Light Housekeeping  Light Housekeeping Level: [employer] Level of Assistance: Minimal assistance  Light Housekeeping: pt performed fxl mobility in ADL suite, retrieved shirts from chair and transported to closet by draping over RW. pt very fatigued while standing at the closet ~x1.5 mins and becoming unsteady so OT pulled up BSC so pt could sit and take rest break. pt put shirts on hangers while seated, then stood x1 min and hung in closet CGA     Balance  Sitting Balance: Supervision  Standing Balance: Contact guard assistance  Standing Balance  Time: 1 min x3 times  Activity: homemaking activities, playing [employer]  Comment: using RW for balance, decreased dynamic standing balance becoming unsteady quickly  Functional Mobility  Functional - Mobility Device: Rolling Walker  Activity: Other  Assist Level: Contact guard assistance  Functional Mobility Comments: short distances in dept, in ADL suite using RW. pt becomes unsteady as he fatigues, but no overt LOB  Tub Transfers  Tub - Transfer From: Rolling walker  Tub - Transfer Type: To and From  Tub - Transfer To: Transfer tub bench  Tub - Technique: Ambulating  Tub Transfers: Contact guard  Tub Transfers Comments: DRY transfer. pt ambulated in/out of BR using RW CGA and completed sit and swing technique CGA.  pt typically uses stool outside the shower to swing his LEs into the tub, then has shower chair to sit on - will try in future session to assess pt's safety with this method  Wheelchair Bed Transfers  Wheelchair/Bed - Technique: Ambulating(RW)  Equipment Used: Wheelchair; Other(BSC (used for rest break))  Level of Asssistance: Contact guard assistance  Wheelchair Transfers Comments: cues for safety and hand placement     Transfers  Sit to stand: Contact guard assistance  Stand to sit: Contact guard assistance                       Cognition  Overall Cognitive Status: [facility]              Additional Activities Comment  Additional Activities: pt played cornhole seated in w/c, then several bean bags in standing -slightly unsteady but no LOB. pt sat and used reacher to  bean bags off the floor and place in bucket, in order to practice using reacher for LB dressing and object retrieval     Type of ROM/Therapeutic Exercise  Type of ROM/Therapeutic Exercise: Free weights  Comment: pt participated in B UE therex using 2# weights for some exercises in order to increase strength/endurance needed for ADLs/IADLs. pt tolerated well with rest breaks prn  Exercises  Shoulder Flexion: x10 no weights  Shoulder Extension: x10 no weights  Elbow Flexion: x10  Elbow Extension: x10  Supination: x10  Pronation: x10  Wrist Flexion: x10 no weights  Wrist Extension: x10 no weights  Finger Flexion: x10 no weights  Finger Extension: x10 no weights  Other: x10 chest press no weights. pt c/o left shoulder pain so ended up completing most exercises AROM without weights                    Plan   Plan  Times per week: 5-6  Times per day: Twice a day  Plan weeks: 1-2 weeks from [facility] 10/24/20  Current Treatment Recommendations: Strengthening, Functional Mobility Training, Endurance Training, Balance Training, Self-Care / ADL, Safety Education & Training, Patient/Caregiver Education & Training, Equipment Evaluation, Education, & procurement, Home Management Training  Plan Comment:  Wednesday conference    Goals  Short term goals  Time Frame for Short term goals: 1 week from eval on 10/24/20  Short term goal 1: Pt will bathe CGA to SBA with shower chair/grab bars/long sponge. Short term goal 2: Pt will dress with assist for sabino hose only using AE as needed. Short term goal 3: Pt will toilet with CGA to SBA with use of RW/counter/grab bar for support  Short term goal 4: Pt will perform functional transfers/mobility with RW SBA and demo good safety skills  Short term goal 5: Pt will perform ther ex/HEP to increase activity tolerance to stand for 5-6 minutes for ADL/simple IADL tasks with min cues for walker safety skills. Long term goals  Time Frame for Long term goals : 2 weeks from eval on 10/24/20  Long term goal 1: Pt will bathe mod ind with shower chair/hand held shower/grab bars/long sponge. Long term goal 2: Pt will dress mod ind including sabino hose/own compression hose with AE as needed. Long term goal 3: Pt will toilet mod ind with RW/elevated toilet with rails  Long term goal 4: Pt will perform functional transfers/mobility mod ind with RW  Long term goal 5: Pt will perform ther ex/HEP to increase activity tolerance to stand for 8-10 minutes for ADL/simple IADL tasks. Long term goals 6: Pt will improve walker safety/attn to task to perform simple IADL tasks such as simple food prep/clothing set-up mod ind from RW. Patient Goals   Patient goals : Pt stated \"I want to be able to get around my house w/o being afraid of falling, take care of myself, be able to go to the park and walk by myself, so I don't hold my daughter back. \"       Therapy Time   Individual Concurrent Group Co-treatment   Time In 1115         Time Out Sonia [address], OTR/L [id number]

## 2020-10-28 NOTE — PROGRESS NOTES
Patient admitted to rehab with debility d/t pnuemonia. A/A/O x4. Transfers with walker x1. On general diet. Medications taken whole in water. On ASA for DVT prophylaxis. Skin warm, dry. On room air. Has been continent of bowel and bladder. LBM 10/28. Chair/bed alarms in use and call light in reach. Will monitor for safety.

## 2020-10-28 NOTE — PROGRESS NOTES
Department of Physical Medicine & Rehabilitation  Progress Note    Patient Identification:  Michelet Lund  [id number]  : 1925  Admit date: 10/23/2020    Chief Complaint: Debility    Subjective:   No acute events overnight. Patient seen this afternoon sitting up in gym. Having some increased in chronic knee pain. Ambulation distances still limited by back pain. Improves with flexion. ROS: No f/c, n/v, cp     Objective:  Patient Vitals for the past 24 hrs:   BP Temp Temp src Pulse Resp SpO2 Weight   10/28/20 0743 -- -- -- -- -- 97 % --   10/28/20 0413 (!) 164/76 97.4 °F (36.3 °C) Oral 74 16 98 % 176 lb 5.9 oz (80 kg)   10/27/20 2037 -- -- -- -- 16 98 % --   10/27/20 1957 (!) 144/76 97.3 °F (36.3 °C) Oral 71 16 97 % --   10/27/20 1618 123/66 98.5 °F (36.9 °C) Oral 70 16 94 % --     Const: Alert. No distress, pleasant. HEENT: Normocephalic, atraumatic. Normal sclera/conjunctiva. MMM. CV: Regular rate and rhythm. Resp: No respiratory distress. Lungs with bibasilar crackles   Abd: Soft, nontender, nondistended, NABS+   Ext: +edema. Neuro: Alert, oriented, appropriately interactive. Psych: Cooperative, appropriate mood and affect    Laboratory data: Available via EMR.    Last 24 hour lab  Recent Results (from the past 24 hour(s))   Basic Metabolic Panel w/ Reflex to MG    Collection Time: 10/28/20  5:59 AM   Result Value Ref Range    Sodium 137 136 - 145 mmol/L    Potassium reflex Magnesium NA (AA) 3.5 - 5.1 mmol/L    Chloride 103 99 - 110 mmol/L    CO2 24 21 - 32 mmol/L    Anion Gap 10 3 - 16    Glucose 97 70 - 99 mg/dL    BUN 52 (H) 7 - 20 mg/dL    CREATININE 1.9 (H) 0.8 - 1.3 mg/dL    GFR Non- 33 (A) >60    GFR  40 (A) >60    Calcium 8.8 8.3 - 10.6 mg/dL   CBC auto differential    Collection Time: 10/28/20  5:59 AM   Result Value Ref Range    WBC 11.2 (H) 4.0 - 11.0 K/uL    RBC 4.02 (L) 4.20 - 5.90 M/uL    Hemoglobin 10.5 (L) 13.5 - 17.5 g/dL    Hematocrit 32.4 resume home dose 10 mg  -Bevespi, prn albuterol     Chronic Afib  -s/p PPM for CHB  -Xarelto     Hyperlipidemia  -statin     CKD  -Avoid nephrotoxins, renally dose meds  -Monitor BUN/Cr     Bladder   -High risk retention, h/o BPH   -Monitor PVRs, SC prn >300cc  -Finasteride, Flomax     Bowel   -High risk constipation   -senna+colace BID, PRN miralax, MoM, and bisacodyl supp.     Pain control  -Acetaminophen scheduled, add diclofenac gel     PPx  -DVT: Xarelto  -GI: pantoprazole       Rehab Progress: Interdisciplinary team conference was held today with entire rehab treatment team including PT, OT, SLP, Dietician, RN, and SW. Discussion focused on progress toward rehab goals and discharge planning. Making gradual progress. Working on endurance, strength, balance. Limited by back pain. Separate conference then held with patient/family, questions answered and concerns addressed. Total treatment time >35 min with greater than 50% spent in care coordination. Anticipated Dispo: home with daughter  Services:  PT, OT, RN, Aide  DME: JUAN R, mariella RW  ELOS: 11/4      Brock Ceron.  Brittney Negrete MD 10/28/2020, 4:00 PM

## 2020-10-29 NOTE — PROGRESS NOTES
surgical history that includes Pacemaker insertion (2014); Tonsillectomy (1960); Small intestine surgery (1945); Inguinal hernia repair (Right, 07/2016); Coronary angioplasty with stent (2009); pacemaker placement; Colonoscopy; Abdomen surgery; and eye surgery (Bilateral). Restrictions  Restrictions/Precautions  Restrictions/Precautions: Fall Risk  Position Activity Restriction  Other position/activity restrictions: [place]- B hearing aides, sabino hose  Subjective   General  Chart Reviewed: Yes  Additional Pertinent Hx: Per Dr. Russ Basket note, \"Patient is a 79 yo M with pmh CAD, CHF, Afib, HTN, CKD, pulmonary fibrosis, and lumbar DDD who initially presented 10/20/2020 with difficulty ambulating. Patient reports progressive weakness and shortness of breath over several days. Also with back pain and difficulty putting weight through his left leg. Found to have pulmonary edema, acute on chronic dCHF, and pneumonia. Work-up for stroke or acute spinal cord abnormality was negative. Back and LLE pain/weakness thought to be related to chronic degenerative changes. \"  Response To Previous Treatment: Patient with no complaints from previous session. Family / Caregiver Present: No  Subjective  Subjective: Pt. sitting in w/c and agreeable to therapy. c/o 5/10 lower back and L knee. Denies numbness, burning or tingling          Orientation     Cognition      Objective      Transfers  Sit to Stand: Contact guard assistance;Supervision  Stand to sit: Supervision;Contact guard assistance  Comment: Pt demonstrates poor eccentric control as he sits in w/c but improved with encouargement. Completed 4 w/c to/from arm chair with Walker with Supervision/CGA and improved sequencing.   Ambulation  Ambulation?: Yes  Ambulation 1  Surface: level tile  Device: Rolling Walker  Other Apparatus: Wheelchair follow(As Pt. deteriorates quickly and requires sitting)  Assistance: Contact guard assistance;Minimal assistance  Quality of Gait: Began within reach, Chair alarm in place  Restraints  Initially in place: No     Therapy Time   Individual Concurrent Group Co-treatment   Time In 0900         Time Out 0945         Minutes 45         Timed Code Treatment Minutes: [address], [place], [id number]

## 2020-10-29 NOTE — PROGRESS NOTES
Physician Progress Note      Alejandra Padilla  CSN #:                  [id number]  :                       1925  ADMIT DATE:       10/20/2020 5:07 PM  [address] DATE:        10/23/2020 6:31 PM  RESPONDING  PROVIDER #:        Drea Campbell MD          QUERY TEXT:    Dear Dr. Vinod Serrano,  Pt admitted with Acute on Chronic diastolic CHF . Pt noted to also have HTN   and severe mitral regurgitation. If possible, please document in progress   notes and discharge summary the etiology of CHF, if able to be determined. The medical record reflects the following:  Risk Factors: Hx HTN, Chronic A-fib, Pulmonary fibrosis  Clinical Indicators: Presents with weakness, increased exertional SOB, Chest   CT shows slight increase in ground glass opacities, noted to have Acute   Pulmonary edema and clarified to be Acute on chronic diastolic CHF, ECHO shows   EF=50% and moderate-Severe mitral regurgitation  Treatment: Lasix, ECHO  Thank you,  Hair Leal RN, CDS  Options provided:  -- CHF due to Hypertensive Heart Disease  -- CHF due to Hypertensive Heart Disease and Valvular Heart Disease  -- CHF not due to Hypertension but due to valvular heart disease  -- Other - I will add my own diagnosis  -- Disagree - Not applicable / Not valid  -- Disagree - Clinically unable to determine / Unknown  -- Refer to Clinical Documentation Reviewer    PROVIDER RESPONSE TEXT:    This patient has CHF due to hypertensive heart disease and valvular heart   disease. Query created by:  [id number] on 10/29/2020 8:35 AM      Electronically signed by:  Drea Campbell MD 10/29/2020 1:09 PM

## 2020-10-29 NOTE — PROGRESS NOTES
Department of Physical Medicine & Rehabilitation  Progress Note    Patient Identification:  Jeremy Reyna  [id number]  : 1925  Admit date: 10/23/2020    Chief Complaint: Debility    Subjective:   No acute events overnight. Patient seen this afternoon sitting up in room. Still with significant back pain which limits his ambulation/standing tolerance. ROS: No f/c, n/v, cp     Objective:  Patient Vitals for the past 24 hrs:   BP Temp Temp src Pulse Resp SpO2 Weight   10/29/20 1630 110/64 97.9 °F (36.6 °C) Oral 71 16 96 % --   10/29/20 1144 103/61 -- -- 72 -- -- --   10/29/20 0802 -- -- -- -- 18 96 % --   10/29/20 0506 (!) 166/77 98.7 °F (37.1 °C) Oral 91 16 97 % 176 lb 5.9 oz (80 kg)   10/28/20 1947 (!) 165/65 97.9 °F (36.6 °C) Oral 70 16 -- --     Const: Alert. No distress, pleasant. HEENT: Normocephalic, atraumatic. Normal sclera/conjunctiva. MMM. CV: Regular rate and rhythm. Resp: No respiratory distress. Lungs with bibasilar crackles   Abd: Soft, nontender, nondistended, NABS+   Ext: +edema. Neuro: Alert, oriented, appropriately interactive. Psych: Cooperative, appropriate mood and affect    Laboratory data: Available via EMR.    Last 24 hour lab  Recent Results (from the past 24 hour(s))   Basic Metabolic Panel w/ Reflex to MG    Collection Time: 10/29/20  8:16 AM   Result Value Ref Range    Sodium 139 136 - 145 mmol/L    Potassium reflex Magnesium 4.1 3.5 - 5.1 mmol/L    Chloride 103 99 - 110 mmol/L    CO2 22 21 - 32 mmol/L    Anion Gap 14 3 - 16    Glucose 152 (H) 70 - 99 mg/dL    BUN 52 (H) 7 - 20 mg/dL    CREATININE 1.7 (H) 0.8 - 1.3 mg/dL    GFR Non- 38 (A) >60    GFR  46 (A) >60    Calcium 8.9 8.3 - 10.6 mg/dL   CBC auto differential    Collection Time: 10/29/20  8:16 AM   Result Value Ref Range    WBC 11.6 (H) 4.0 - 11.0 K/uL    RBC 4.48 4.20 - 5.90 M/uL    Hemoglobin 11.6 (L) 13.5 - 17.5 g/dL    Hematocrit 36.3 (L) 40.5 - 52.5 %    MCV 80.9 80.0 - 100.0 fL    MCH 25.8 (L) 26.0 - 34.0 pg    MCHC 31.9 31.0 - 36.0 g/dL    RDW 17.3 (H) 12.4 - 15.4 %    Platelets 727 870 - 386 K/uL    MPV 9.4 5.0 - 10.5 fL    Neutrophils % 69.1 %    Lymphocytes % 22.4 %    Monocytes % 6.5 %    Eosinophils % 1.5 %    Basophils % 0.5 %    Neutrophils Absolute 8.0 (H) 1.7 - 7.7 K/uL    Lymphocytes Absolute 2.6 1.0 - 5.1 K/uL    Monocytes Absolute 0.8 0.0 - 1.3 K/uL    Eosinophils Absolute 0.2 0.0 - 0.6 K/uL    Basophils Absolute 0.1 0.0 - 0.2 K/uL   Basic Metabolic Panel    Collection Time: 10/29/20  8:16 AM   Result Value Ref Range    Potassium 4.1 3.5 - 5.1 mmol/L       Therapy progress:  PT  Position Activity Restriction  Other position/activity restrictions: [place]- B hearing aides, sabino hose  Objective     Sit to Stand: Contact guard assistance, Supervision  Stand to sit: Supervision, Contact guard assistance  Bed to Chair: Contact guard assistance(with wheeled walker)  Device: Rolling Walker  Other Apparatus: Wheelchair follow(As Pt. deteriorates quickly and requires sitting)  Assistance: Contact guard assistance, Minimal assistance  Distance: 30' x 1 with 2 turns and  OT  PT Equipment Recommendations  Equipment Needed: Yes  Mobility Devices: Mendel Ayala: Rolling  Other: Patient has a rollator at home. Toilet - Technique: Ambulating  Equipment Used: Grab bars(comfort ht)  Toilet Transfers Comments: RW, cues for safe hand placement  Assessment        SLP          Body mass index is 28.47 kg/m².     Assessment and Plan:    Impairments: generalized weakness, decreased endurance, balance, respiratory insufficiency     Debility  -PT/OT     Gait dysfunction, LLE pain/weakness  -Work-up for stroke/acute SCI abnormality negative  -Etiology likely radicular in setting of spondylosis and DDD  -Steroid taper  -PT/OT     Acute on chronic dCHF, moderate-severe mitral regurgitation  -EF 50%  -Lasix 40 daily, losartan  -Daily wt     Pneumonia  -Levofloxacin to complete 7 day course     Pulmonary fibrosis  -Per Pulmonology, prednisone 20mg x 7 days, then resume home dose 10 mg  -Bevespi, prn albuterol     Chronic Afib  -s/p PPM for CHB  -Xarelto     Hyperlipidemia  -statin     CKD  -Avoid nephrotoxins, renally dose meds  -Monitor BUN/Cr     Bladder   -High risk retention, h/o BPH   -Monitor PVRs, SC prn >300cc  -Finasteride, Flomax     Bowel   -High risk constipation   -senna+colace BID, PRN miralax, MoM, and bisacodyl supp.     Pain control  -Acetaminophen scheduled, added diclofenac gel, trial low dose tramadol     PPx  -DVT: Xarelto  -GI: pantoprazole       Rehab Progress: Making gradual progress. Working on endurance, strength, balance. Limited by back pain. Anticipated Dispo: home with daughter  Services: HH PT, OT, RN, Aide  DME: TBD, possible RW  ELOS: 11/4      Brock Ceron.  William Klein MD 10/29/2020, 6:17 PM

## 2020-10-29 NOTE — PROGRESS NOTES
Physical Therapy  PHYSICAL THERAPY  Progress Note   Second Session    Patient Name: [facility] Record Number: [id number]    Treatment Diagnosis: Impaired functional mobility      Chart Reviewed: Yes   Restrictions/Precautions: Fall Risk Other position/activity restrictions: [place]- B hearing aides, sabino hose   Additional Pertinent Hx: Per Dr. Valerie Hernandez note, \"Patient is a 79 yo M with pmh CAD, CHF, Afib, HTN, CKD, pulmonary fibrosis, and lumbar DDD who initially presented 10/20/2020 with difficulty ambulating. Patient reports progressive weakness and shortness of breath over several days. Also with back pain and difficulty putting weight through his left leg. Found to have pulmonary edema, acute on chronic dCHF, and pneumonia. Work-up for stroke or acute spinal cord abnormality was negative. Back and LLE pain/weakness thought to be related to chronic degenerative changes. \"        Subjective: Pt. sitting in w/c and agreeable to therapy. Reports being tired this afternoon. Objective  Sitting AP, TKE, hip flex, add sets, red theraband abduction x 15. Sit to stand CGA, amb 50' with wh walker CGA, increased stooped posture and SOB with distance, O2 sat = 98%. W/c prop 90' x 2 with occasional cues for steering. Pt SOB and fatigues. Rested. Amb with Verlyn Schanz walker 80' but R foot began to adduct across path of L foot after about 36' and pt needed increased assist to min A for safety. Discussed safety of Verlyn Schanz walker compared to rollator but discussed upright rollator as an option. Assessment: Patient has limiting back pain with ambulation that causes patient to be unable to ambulate more than 30 - 40'today. Patient with increasing flexed posture with regular walker and weakness in LE. When patient able to stand more upright, he has increased sharp pains.   Patient will benefit from continued skilled therapy for strengthening, gait training, and endurance training to allow for safe return to home with daughter.       Safety Device - Type of devices:  []  All fall risk precautions in place [] Bed alarm in place  [] Call light within reach [] Chair alarm in place [] Positioning belt [] Gait belt [] Patient at risk for falls [] Left in bed [] Left in chair [] Telesitter in use [] Sitter present [] Nurse notified []  None      Therapy Time   Individual Co-treatment   Time In 1515     Time Out 1600     Minutes 45         Electronically signed by Garrick Gauthier PT on 10/29/2020 at 3:34 PM

## 2020-10-29 NOTE — PROGRESS NOTES
OCCUPATIONAL THERAPY  Progress Note   Second Session    Patient Name: [facility] Record Number: [id number]    Treatment Diagnosis: Impaired functional mobility    General  Chart Reviewed: Yes, Progress Notes  Patient assessed for rehabilitation services?: Yes  Additional Pertinent Hx: \"Patient is a 81 yo M with pmh CAD, CHF, Afib, HTN, CKD, pulmonary fibrosis, and lumbar DDD who initially presented 10/20/2020 with difficulty ambulating. Patient reports progressive weakness and shortness of breath over several days. Also with back pain and difficulty putting weight through his left leg. Found to have pulmonary edema, acute on chronic dCHF, and pneumonia. Work-up for stroke or acute spinal cord abnormality was negative. Back and LLE pain/weakness thought to be related to chronic degenerative changes. Patient now presents to ARU with impaired mobility and self-care below his baseline. Currently, patient reports improvement in his breathing and left leg strength. He still feels generally weak and fatigued. He denies back pain currently. States he gets low back pain with radiation into the LLE with prolonged standing/ambulating. Denies tingling/numbness. \" Copied per Dr Charu Kemp 10/23/20 H & P note. Response to previous treatment: Patient reporting fatigue but able to participate(pain)  Family / Caregiver Present: No  Referring Practitioner: Dr Mariana Zazueta  Diagnosis: Debility     Restrictions/Precautions  Restrictions/Precautions: Fall Risk        Position Activity Restriction  Other position/activity restrictions: [place]- B hearing aides, sabino hose    Subjective: pt met in dept for OT. Pt agreeable to therapy, c/o low back pain 4/10    Objective: Therex: Pt used 2# weights for x10 reps elbow flexion/extension, supination/pronation. AROM: x10 reps shoulder flexion/extension, abduction/adduction, horizontal abduction/adduction, wrist flexion/extension, finger flexion/extension.  Tolerated well with rest breaks prn.    fxl transfers/mobility: pt completed sit<>stand SBA/CGA throughout session. Pt completed fxl mobility ~30 feet > bathroom CGA using RW, slightly unsteady and very effortful but no LOB. Pt completed toilet transfer to comfort ht toilet with TSF CGA. Pt required extended seated rest break, then ambulated back to w/c with extra time, close CGA d/t pt fatigue. IADL: pt reports he has 2 daily pillboxes-1 for AM and 1 for PM. Pt practiced filling up pillbox with 3 pills for [place]-Sat 100% accuracy, but with some difficulty with fine motor coordination and dropped 1 pill. Standing tolerance: pt stood x1.5 mins SBA at tabletop and worked on [employer]. Limited by back pain and leaning forward over the table, then sat in w/c for rest break. Assessment: pt tolerated tx well. He is very hardworking and motivated, will benefit from continued OT to progress back to PLOF.     Safety Device - Type of devices:  []  All fall risk precautions in place [] Bed alarm in place  [] Call light within reach [] Chair alarm in place [] Positioning belt [] Gait belt [] Patient at risk for falls [] Left in bed [] Left in chair [] Telesitter in use [] Sitter present [] Nurse notified []  None    Therapy Time   Individual Co-treatment   Time In 1430     Time Out 1515     Minutes 45           Electronically signed by Lakshmi Hirsch OT on 10/29/2020 at 2:39 PM

## 2020-10-29 NOTE — PROGRESS NOTES
Department of Internal Medicine  General Internal Medicine  Attending Progress Note    Chief Complaint:low back pain and leg weakness      HPI:   SUBJECTIVE:  81 yo male with lumbar djd and left leg weakness, PAF and ILD who is in acute rehab . He has made some progress walking and reports his back pain is pretty severe and associated with leg weakness improved with bending forward over his walker. He has fallen and is afraid of falling again. Has continued urgency incontinence and started using the urinal to avoid accidents. Seen by all the consultants. Main issue is when can he be dc'd to home safely  Breathing is quite short with a little exertion still bringing up a lot of discolored phlegm. While on rehab he has made gradual progress, is able to walk further with the walker although he still hurts a lot in his low back. The sob muñoz has not improved on the higher dose of lasix. Therapy follows him and his oxygen sat has never gone lower than 89% with exercise. Observed in therapy and today he can only make it 30 ft max before his back pain becomes intolerable and his legs almost give out.   He would take anything for the pain at this point         Past Medical History:   Diagnosis Date    Acute diastolic CHF (congestive heart failure) ([facility] [address] 10/23/2020    Arthritis     BPH with obstruction/lower urinary tract symptoms     nocturia 3-4 x per night    Chronic atrial fibrillation (HCC)     CKD (chronic kidney disease) 2017    level 3    Coronary arteriosclerosis 2009    2 stents/ Dr Ele Cornelius in [facility] GERD (gastroesophageal reflux disease)     History of blood transfusion     1945   During deployment    HTN (hypertension) 1990    Left hip pain     schrapnel in hip and intestines/chronic pains left hip impairs walking    Pacemaker 2015    Pulmonary fibrosis ([facility] [address] 08/2017    mild amount seen on chest ct    Pulmonary nodule, left 2017    noncalcified 7mm low risk repeat 8/2018 if patient willing    Renal atrophy, right 2017    Renal cyst, right 08/2017    not clearly simple cyst    SOB (shortness of breath) 2017    terrell normal pft and ct chest ? smll amount of pulmonary fibrosis     Past Surgical History:   Procedure Laterality Date    ABDOMEN SURGERY      Scrapnel removed in [facility]  2009    awoke with indigestion    EYE SURGERY Bilateral     cataract    INGUINAL HERNIA REPAIR Right 07/2016    PACEMAKER INSERTION  2014    PACEMAKER PLACEMENT      SMALL INTESTINE SURGERY  1945    schrapnel as ww II vet, part of small intestine moved    TONSILLECTOMY  1960      Family History   Problem Relation Age of Onset    Pacemaker Sister     Pacemaker Brother     No Known Problems Other         lung disease     Social History     Tobacco Use    Smoking status: Never Smoker    Smokeless tobacco: Never Used   Substance Use Topics    Alcohol use: No    Drug use: Not Currently       Prior to Admission medications    Medication Sig Start Date End Date Taking?  Authorizing Provider   losartan (COZAAR) 25 MG tablet Take 1 tablet by mouth daily Hold for systolic less 279 71/82/71  Yes Jesus Brooks MD   furosemide (LASIX) 40 MG tablet Take 1 tablet by mouth daily 10/27/20  Yes Jesus Brooks MD   pantoprazole (PROTONIX) 40 MG tablet Take 1 tablet by mouth nightly 10/24/20  Yes Jesus Brooks MD   potassium chloride (KLOR-CON M) 20 MEQ extended release tablet Take 1 tablet by mouth daily 10/27/20  Yes Jesus Brooks MD   oxybutynin (DITROPAN) 5 MG tablet TAKE 1 TABLET BY MOUTH EVERY DAY 10/21/20  Yes Jesus Brooks MD   fluticasone [facility]) 50 MCG/ACT nasal spray SPRAY 2 SPRAY INTO EACH NOSTRIL EVERY DAY 10/9/20  Yes Jesus Brooks MD   albuterol sulfate  (90 Base) MCG/ACT inhaler Inhale 2 puffs into the lungs every 6 hours as needed for Wheezing or Shortness of Breath 8/27/20  Yes Judy Chen MD   umeclidinium-vilanterol (ANORO ELLIPTA) 62.5-25 no rash or erythema  Head: normocephalic and atraumatic  Eyes: conjunctivae normal and sclera anicteric  ENT: hearing grossly normal bilaterally and sinuses non-tender  Neck: neck supple and non tender without mass, no thyromegaly or thyroid nodules, no cervical lymphadenopathy   Pulmonary/Chest: paninsp crackles all the way up  Cardiovascular: normal rate, normal S1 and S2, no gallops and no carotid bruits: paced  Abdomen: soft, non-tender, non-distended, normal bowel sounds, no masses or organomegaly  Extremities: no cyanosis, clubbing or edema  Musculoskeletal: bony swelling and tenderness both knees moving around better  Neurologic: coordination normal and speech normal, moves all 4 extremities    DATA:   Labs:  CBC:   Recent Labs     10/27/20  0739 10/28/20  0559 10/29/20  0816   WBC 11.9* 11.2* 11.6*   HGB 11.5* 10.5* 11.6*    161 199     BMP:    Recent Labs     10/27/20  0739 10/28/20  0559 10/29/20  0816    137 139   K 4.6  4.6 4.7  NA* 4.1    103 103   CO2 25 24 22   BUN 52* 52* 52*   CREATININE 1.8* 1.9* 1.7*   GLUCOSE 124* 97 152*     POC GLUCOSE:    No results for input(s): POCGLU in the last 72 hours. Ca/Mg/Phos:   Recent Labs     10/27/20  0739 10/28/20  0559 10/29/20  0816   CALCIUM 8.9 8.8 8.9     Hepatic:   No results for input(s): AST, ALT, ALB, BILITOT, ALKPHOS in the last 72 hours. Troponin:   No results for input(s): TROPONINI in the last 72 hours. DIAGNOSTICS:  Ct Head Wo Contrast    Result Date: 10/20/2020  No acute intracranial abnormality. No significant change from prior study     Ct Chest Wo Contrast    Result Date: 10/20/2020  Pulmonary interstitial opacities, most likely pulmonary edema superimposed on chronic interstitial lung disease. Indeterminate 42 mm right renal mass may represent a proteinaceous cyst or neoplasm.  RECOMMENDATIONS: Only if deemed clinically important to the patient's medical care after considering advanced patient age and comorbidities, further workup of the renal lesion could be obtained with nonemergent/outpatient renal mass protocol CT or MRI     Ct Lumbar Spine Wo Contrast    Result Date: 10/20/2020  No acute fracture or subluxation. 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 L3-4 severe central and moderate bilateral foraminal stenosis. L4-5 grade 1 spondylolisthesis with additional degenerative changes, resulting in moderate central stenosis. Xr Chest Portable    Result Date: 10/20/2020  Patchy infiltrates suggested bilaterally new from prior study. Cta Head Neck W Contrast    Result Date: 10/20/2020  1. Bilateral carotid bifurcation atherosclerotic plaque resulting in approximately 40% bilateral stenosis of the origin of the internal carotid artery. Finding is compatible with 16-49% stenosis per sonographic NASCET index criteria. 2. 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 3. Right vertebral artery termination as the posteroinferior cerebellar artery (PICA). 4. Bilateral internal carotid artery cavernous segment atherosclerotic calcification without significant arterial stenosis. 5. Approximately 50% arterial stenosis of the bilateral vertebral body origin secondary to encroachment by atherosclerotic calcification. 6. Mild cerebral white matter chronic microvascular ischemic disease.          ASSESSMENT AND PLAN    Principal Problem:    Acute pulmonary edema (HCC)  Plan: seems to have improved do not want to overdiurese, Echo showed mod to severe MR and preserved EF, to continue medical managment  Active Problems:    Pacemaker  Plan: ongoing cardio fu    HTN (hypertension)  Plan: running a little higher will add coreg and procardia xl    Coronary arteriosclerosis  Plan: stable    BPH with obstruction/lower urinary tract symptoms  Plan: ongoing severe sxs    Pulmonary fibrosis ([address]  Plan: ongoing issue, awaiting pulmonary's guidance, oxygen levels good on room air  Need to avoid fluid overload which would worsen his pulmonary pressures, try changing back to lasix 20mg and improve bp control     Parkinson disease ([address]  Plan: perhaps some cogwheeling in the wrists no where else, not being treated for this, had a brother who  of parkinsons  I do NOT think he has parkinsons.  ,more likely essential tremor    Paroxysmal atrial fibrillation (HCC)  Plan: ongoing hx. cont same, add coreg    Pulmonary infiltrates  Plan: question is whether this was infectious or not, terrell in progress  Sputum cx still pending was normal marisol  Completing levaquin per Dr Brown Askew advice        Appropriate diagnostic studies and consults ordered  Annabel Herman MD     Spent 35 minutes evaluating and discussing case with patient and nursing and planning treatment for today

## 2020-10-29 NOTE — PROGRESS NOTES
Occupational Therapy  [facility]  Daily Treatment Note  NAME: Wing Lovett  : 1925  MRN: [id number]    Date of Service: 10/29/2020    Discharge Recommendations:  Patient would benefit from continued therapy after discharge, Continue to assess pending progress, S Level 1, Home with Home health OT, Home with assist PRN  OT Equipment Recommendations  Other: Pt has shower stool outside of tub(uses like a TTB to sit & swing LEs into tub)then has new shower chair inside fo tub, horizontal grab bar front wall of tub & on long side wall of tub, handicapped ht toilet w/BUE support, 4WW, straight cane    Assessment   Performance deficits / Impairments: Decreased functional mobility ; Decreased ADL status; Decreased strength;Decreased endurance;Decreased balance;Decreased high-level IADLs;Decreased coordination;Decreased safe awareness  Assessment: Discussed with OTR: Pt tolerated session fairly well. Pt motivated for tx. Pt required Min A for don of sabino hose (daughter brought new pair in), and shoes. Pt standing briefly for ADL's, standing table, task, SBA/CGA, limited by back pain, decreased endurance. Pt CGA for transfers/mob short distance with walker. Pt easily SOB with activities, requiring frequent rests. Cont poc. Treatment Diagnosis: Impaired: ADL/IADL function, transfers/functional mobility, balance, activity tolerance, safety awareness, L shoulder strength, coordination  Prognosis: Good  History: \"Patient is a 79 yo M with pmh CAD, CHF, Afib, HTN, CKD, pulmonary fibrosis, and lumbar DDD who initially presented 10/20/2020 with difficulty ambulating. Patient reports progressive weakness and shortness of breath over several days. Also with back pain and difficulty putting weight through his left leg. Found to have pulmonary edema, acute on chronic dCHF, and pneumonia. Work-up for stroke or acute spinal cord abnormality was negative.  Back and LLE pain/weakness thought to be related restrictions: [place] hearing aides, sabino hose  Subjective   General  Chart Reviewed: Yes, Progress Notes  Patient assessed for rehabilitation services?: Yes  Additional Pertinent Hx: \"Patient is a 81 yo M with pmh CAD, CHF, Afib, HTN, CKD, pulmonary fibrosis, and lumbar DDD who initially presented 10/20/2020 with difficulty ambulating. Patient reports progressive weakness and shortness of breath over several days. Also with back pain and difficulty putting weight through his left leg. Found to have pulmonary edema, acute on chronic dCHF, and pneumonia. Work-up for stroke or acute spinal cord abnormality was negative. Back and LLE pain/weakness thought to be related to chronic degenerative changes. Patient now presents to ARU with impaired mobility and self-care below his baseline. Currently, patient reports improvement in his breathing and left leg strength. He still feels generally weak and fatigued. He denies back pain currently. States he gets low back pain with radiation into the LLE with prolonged standing/ambulating. Denies tingling/numbness. \" Copied per Dr Dominguez Shoulder 10/23/20 H & P note. Response to previous treatment: Patient reporting fatigue but able to participate(pain)  Family / Caregiver Present: No  Referring Practitioner: Dr Lily Angel  Diagnosis: Debility  Subjective  Subjective: Pt met in dept. Pt reporting back pain, going down L leg, when going to stand 5/10). Pt agreeable to therapy. Orientation  Orientation  Overall Orientation Status: Within Functional Limits  Objective    ADL  LE Dressing: Minimal assistance(seated from edge of OT bed in dept, pt able to asist w/don of his sabino hose(black pair w/o toe part), bringing LE's up or crossing LE's. Donned shoes w/tylastic laces, w/assist only for tongue of shoes.)  Toileting: Minimal assistance; Moderate assistance(assist for clothes management only.  pt voided urine on commode.)        Standing Balance  Time: 1 min or less  Activity: functional mob, transfers  Functional Mobility  Functional - Mobility Device: Rolling Walker  Activity: To/from bathroom  Assist Level: Contact guard assistance  Functional Mobility Comments: Pt amb short distances between transfer surfaces, with RW and cues. Pt with increased forward lean adn knees bending, the longer he is standing  Toilet Transfers  Toilet - Technique: Ambulating  Equipment Used: Grab bars(comfort ht)  Toilet Transfer: Contact guard assistance  Toilet Transfers Comments: RW, cues for safe hand placement  Wheelchair Bed Transfers  Wheelchair/Bed - Technique: Ambulating  Equipment Used: Wheelchair;Bed(bed in OT dept)  Level of Asssistance: Contact guard assistance  Wheelchair Transfers Comments: RW, cues for safe hand placement         Cognition  Overall Cognitive Status: WFL            Type of ROM/Therapeutic Exercise  Comment: BUE UBE bike, at low reach, for LUE-shoulder. Pt completing 25 reps x2. Plan   Plan  Times per week: 5-6  Times per day: Twice a day  Plan weeks: 1-2 weeks from [facility] 10/24/20  Current Treatment Recommendations: Strengthening, Functional Mobility Training, Endurance Training, Balance Training, Self-Care / ADL, Safety Education & Training, Patient/Caregiver Education & Training, Equipment Evaluation, Education, & procurement, Home Management Training  Plan Comment: Wednesday conference        Goals  Short term goals  Time Frame for Short term goals: 1 week from [facility] on 10/24/20  Short term goal 1: Pt will bathe CGA to SBA with shower chair/grab bars/long sponge. Short term goal 2: Pt will dress with assist for sabino hose only using AE as needed.   Short term goal 3: Pt will toilet with CGA to SBA with use of RW/counter/grab bar for support  Short term goal 4: Pt will perform functional transfers/mobility with RW SBA and demo good safety skills  Short term goal 5: Pt will perform ther ex/HEP to increase activity tolerance to stand for 5-6 minutes for ADL/simple IADL tasks with min cues for walker safety skills. Long term goals  Time Frame for Long term goals : 2 weeks from eval on 10/24/20  Long term goal 1: Pt will bathe mod ind with shower chair/hand held shower/grab bars/long sponge. Long term goal 2: Pt will dress mod ind including sabino hose/own compression hose with AE as needed. Long term goal 3: Pt will toilet mod ind with RW/elevated toilet with rails  Long term goal 4: Pt will perform functional transfers/mobility mod ind with RW  Long term goal 5: Pt will perform ther ex/HEP to increase activity tolerance to stand for 8-10 minutes for ADL/simple IADL tasks. Long term goals 6: Pt will improve walker safety/attn to task to perform simple IADL tasks such as simple food prep/clothing set-up mod ind from RW. Patient Goals   Patient goals : Pt stated \"I want to be able to get around my house w/o being afraid of falling, take care of myself, be able to go to the park and walk by myself, so I don't hold my daughter back. \"       Therapy Time   Individual Concurrent Group Co-treatment   Time In 1030         Time Out 1115         Minutes [id number]

## 2020-10-30 NOTE — PROGRESS NOTES
Consult to arrange Lumbar ELIAS per Dr Gene Gant. Best to consult interventional radiology to do so or pt may need to be done as outpt. Ortho does not have a provider who does ELIAS.

## 2020-10-30 NOTE — PROGRESS NOTES
Occupational Therapy  Facility/Department: [address] IP REHAB  Daily Treatment Note  NAME: Paul Jackson  : 1925  MRN: [id number]    Date of Service: 10/30/2020    Discharge Recommendations:  Patient would benefit from continued therapy after discharge, Continue to assess pending progress, S Level 1, Home with Home health OT, Home with assist PRN  OT Equipment Recommendations  Other: Pt has TTB,  horizontal grab bar front wall of tub & on long side wall of tub, handicapped ht toilet w/BUE support, 4WW, straight cane    Assessment   Performance deficits / Impairments: Decreased functional mobility ; Decreased ADL status; Decreased strength;Decreased endurance;Decreased balance;Decreased high-level IADLs;Decreased coordination;Decreased safe awareness  Assessment: Pt performed shower ADL session min A to shower on chair w/use of grab bar, and min A for lower body dressing given increased time/min cues with AE, including he donned own stockings w/mod A. Pt toileted to void CGA w/grab bars. Pt performed ADL transfers/mob short distance with walker min cues. Pt easily SOB with activities, required frequent rests. Plan to cont tx per POC. Treatment Diagnosis: Impaired: ADL/IADL function, transfers/functional mobility, balance, activity tolerance, safety awareness, L shoulder strength, coordination  Prognosis: Good  History: \"Patient is a 79 yo M with pmh CAD, CHF, Afib, HTN, CKD, pulmonary fibrosis, and lumbar DDD who initially presented 10/20/2020 with difficulty ambulating. Patient reports progressive weakness and shortness of breath over several days. Also with back pain and difficulty putting weight through his left leg. Found to have pulmonary edema, acute on chronic dCHF, and pneumonia. Work-up for stroke or acute spinal cord abnormality was negative. Back and LLE pain/weakness thought to be related to chronic degenerative changes.  Patient now presents to ARU with impaired mobility and self-care below his baseline. Currently, patient reports improvement in his breathing and left leg strength. He still feels generally weak and fatigued. He denies back pain currently. States he gets low back pain with radiation into the LLE with prolonged standing/ambulating. Denies tingling/numbness. \" Copied per Dr Prieto Page 10/23/20 H & P note. Pt lives with dtr in [address] [place] [address] and was ind for all his ADL tasks/simple IADL tasks, including walking at the park regulary for exercise. OT Education: OT Role;Plan of Care;Transfer Training;Equipment; Energy Conservation; ADL Adaptive Strategies  REQUIRES OT FOLLOW UP: Yes  Activity Tolerance  Activity Tolerance: Patient limited by pain; Patient limited by fatigue  Activity Tolerance: easily SOB with ADL's-LB ADL tasks. O2 sats @ 94-96% throughout session when checked  Safety Devices  Safety Devices in place: Yes  Type of devices: Gait belt;Patient at risk for falls; Left in chair;Chair alarm in place;Call light within reach;Nurse notified         Patient Diagnosis(es): There were no encounter diagnoses. has a past medical history of Acute diastolic CHF (congestive heart failure) ([address] Arthritis, BPH with obstruction/lower urinary tract symptoms, Chronic atrial fibrillation ([address] CKD (chronic kidney disease), Coronary arteriosclerosis, GERD (gastroesophageal reflux disease), History of blood transfusion, HTN (hypertension), Left hip pain, Pacemaker, Pulmonary fibrosis ([address] Pulmonary nodule, left, Renal atrophy, right, Renal cyst, right, and SOB (shortness of breath). has a past surgical history that includes Pacemaker insertion (2014); Tonsillectomy (1960); Small intestine surgery (1945); Inguinal hernia repair (Right, 07/2016); Coronary angioplasty with stent (2009); pacemaker placement; Colonoscopy; Abdomen surgery; and eye surgery (Bilateral).     Restrictions  Restrictions/Precautions  Restrictions/Precautions: Fall Risk  Position Activity Restriction  Other position/activity restrictions: [place]- B hearing aides, sabino zuly  Subjective   General  Chart Reviewed: Yes, Progress Notes, Orders  Patient assessed for rehabilitation services?: Yes  Additional Pertinent Hx: \"Patient is a 81 yo M with pmh CAD, CHF, Afib, HTN, CKD, pulmonary fibrosis, and lumbar DDD who initially presented 10/20/2020 with difficulty ambulating. Patient reports progressive weakness and shortness of breath over several days. Also with back pain and difficulty putting weight through his left leg. Found to have pulmonary edema, acute on chronic dCHF, and pneumonia. Work-up for stroke or acute spinal cord abnormality was negative. Back and LLE pain/weakness thought to be related to chronic degenerative changes. Patient now presents to ARU with impaired mobility and self-care below his baseline. Currently, patient reports improvement in his breathing and left leg strength. He still feels generally weak and fatigued. He denies back pain currently. States he gets low back pain with radiation into the LLE with prolonged standing/ambulating. Denies tingling/numbness. \" Copied per Dr Verito Ly 10/23/20 H & P note. Response to previous treatment: Patient reporting fatigue but able to participate  Family / Caregiver Present: No  Referring Practitioner: Dr Seun Arboleda  Diagnosis: Debility  Subjective  Subjective: Pt met in room seatted in w/c. Pt reporting back pain 5-6/10. Pt agreeable to OT ADL session for shower. Orientation  Orientation  Overall Orientation Status: Within Functional Limits  Objective    ADL  Equipment Provided: Reacher;Sock aid;Long-handled sponge  Grooming: Setup(seated @ sink for all tasks)  UE Bathing: Setup;Stand by assistance;Verbal cueing; Increased time to complete(OT assisted pt to bath back/hair & pt required cues to manage gettting liquid soap on cloth & to manage hand held shower)  LE Bathing: Setup; Increased time to complete;Verbal cueing;Minimal assistance(Pt used long sponge for below knees with cues/set-up, bathed own buttocks w/ use of grab bar/hand held shower but min A to dry off thoroughly)  UE Dressing: Setup;Stand by assistance; Increased time to complete(wore T-shirt & longe sleeve shirt(looser shirt today) was able to doff/don both)  LE Dressing: Minimal assistance;Setup;Verbal cueing; Increased time to complete(used reacher for R side of pants+cues, but not pull ups over feet, pulled up @grabbar CGA. Mod A to don own compression socks(tried w/o sock aid 1st w/stool, but too difficult). Donned L stocking w/sock aid, assist for R/cues. Donned own shoesw/shoe horn)  Toileting: Contact guard assistance(able to pull clothes down/up but no hygiene(voided))  Additional Comments: Pt left in recliner, needs in reach. Balance  Sitting Balance: Supervision  Standing Balance: Contact guard assistance(RW/grab bar)  Functional Mobility  Functional - Mobility Device: Wheelchair  Activity: To/from bathroom  Functional Mobility Comments: OT assisted >< bathroom w/ w/c for energy conservation/D/T time limit. Pt did amb in bathroom short distances during session in bathroom ><toilet>< shower chair> w/c @ sink, ~ 7' to recliner all CGA with RW + cues. Toilet Transfers  Toilet - Technique: Ambulating  Equipment Used: Grab bars  Toilet Transfer: Contact guard assistance  Toilet Transfers Comments: RW>< toilet with bilateral grab bars  Shower Transfers  Shower - Transfer From: [facility])  Shower - Transfer Type: To and From  Shower - Transfer To: Shower seat with back  Shower - Technique: Ambulating  Shower Transfers: Contact Guard  Shower Transfers Comments: RW >< shower chair in stall with use of grab bars & cues  Wheelchair Bed Transfers  Wheelchair/Bed - Technique: Ambulating(RW)  Equipment Used: Wheelchair; Other(recliner)  Level of Asssistance: Contact guard assistance  Wheelchair Transfers Comments: RW, cues for safe hand placement>< w/c & recliner                             Cognition  Overall Cognitive Status: Exceptions  Attention Span: Difficulty dividing attention  Memory: Decreased short term memory(repeats stories many times)  Safety Judgement: Decreased awareness of need for safety  Problem Solving: Assistance required to implement solutions  Sequencing: Requires cues for some  Cognition Comment: Pt requires min cues for ADL tasks, increased time required also. Type of ROM/Therapeutic Exercise  Type of ROM/Therapeutic Exercise: Resistive Bands  Comment: Yellow T-band d HEP performed x 15 reps for 5 sets following hand out w/rests between sets. Ther ex performed to increase activity tolerance for ADL/mob tasks. Plan   Plan  Times per week: 5-6  Times per day: Twice a day  Plan weeks: 1-2 weeks from [facility] 10/24/20  Current Treatment Recommendations: Strengthening, Functional Mobility Training, Endurance Training, Balance Training, Self-Care / ADL, Safety Education & Training, Patient/Caregiver Education & Training, Equipment Evaluation, Education, & procurement, Home Management Training  Plan Comment: Wednesday conference          Goals  Short term goals  Time Frame for Short term goals: 1 week from [facility] on 10/24/20  Short term goal 1: Pt will bathe CGA to SBA with shower chair/grab bars/long sponge. Short term goal 2: Pt will dress with assist for sabino hose only using AE as needed. Short term goal 3: Pt will toilet with CGA to SBA with use of RW/counter/grab bar for support  Short term goal 4: Pt will perform functional transfers/mobility with RW SBA and demo good safety skills  Short term goal 5: Pt will perform ther ex/HEP to increase activity tolerance to stand for 5-6 minutes for ADL/simple IADL tasks with min cues for walker safety skills. Long term goals  Time Frame for Long term goals : 2 weeks from [facility] on 10/24/20  Long term goal 1: Pt will bathe mod ind with shower chair/hand held shower/grab bars/long sponge.   Long term goal 2: Pt will dress mod ind including sabino hose/own compression hose with AE as needed. Long term goal 3: Pt will toilet mod ind with RW/elevated toilet with rails  Long term goal 4: Pt will perform functional transfers/mobility mod ind with RW  Long term goal 5: Pt will perform ther ex/HEP to increase activity tolerance to stand for 8-10 minutes for ADL/simple IADL tasks. Long term goals 6: Pt will improve walker safety/attn to task to perform simple IADL tasks such as simple food prep/clothing set-up mod ind from RW. Patient Goals   Patient goals : Pt stated \"I want to be able to get around my house w/o being afraid of falling, take care of myself, be able to go to the park and walk by myself, so I don't hold my daughter back. \"       Therapy Time   Individual Concurrent Group Co-treatment   Time In 0920         Time Out 1050         Minutes 90         Timed Code Treatment Minutes: Danis Zuñiga OT   Electronically signed by Alisson Hastings OTR/L  License # [id number]

## 2020-10-30 NOTE — PROGRESS NOTES
Nutrition Assessment     Type and Reason for Visit: Reassess    Nutrition Recommendations/Plan:   Continue General diet. Nutrition Assessment:  Foolow-up. Pt continues on General diet with good intakes >75% during admission. No nutritional needs at this time. Malnutrition Assessment:  Malnutrition Status: No malnutrition    Nutrition Related Findings: BM 10/28      Current Nutrition Therapies:    DIET GENERAL;     Anthropometric Measures:  · Height: 5' 6\" (167.6 cm)  · Current Body Wt: 176 lb (79.8 kg)   · BMI: 28.4    Nutrition Diagnosis:   No nutrition diagnosis at this time     Nutrition Interventions:   Food and/or Nutrient Delivery:  Continue Current Diet  Nutrition Education/Counseling:  No recommendation at this time   Coordination of Nutrition Care:  Continued Inpatient Monitoring  Discharge Planning:    No discharge needs at this time     Electronically signed by Vanessa Lee RD, MANUEL on 10/30/20 at 9:12 AM EDT    Contact: [phone]

## 2020-10-30 NOTE — PROGRESS NOTES
Department of Physical Medicine & Rehabilitation  Progress Note    Patient Identification:  Moiz Najera  [id number]  : 1925  Admit date: 10/23/2020    Chief Complaint: Debility    Subjective:   No acute events overnight. Patient seen this afternoon sitting up in gym. Reports improvement in back pain with tramadol. ROS: No f/c, n/v, cp     Objective:  Patient Vitals for the past 24 hrs:   BP Temp Temp src Pulse Resp SpO2 Height Weight   10/30/20 1403 136/73 97.2 °F (36.2 °C) Oral 73 16 98 % -- --   10/30/20 0910 -- -- -- -- -- -- 5' 6\" (1.676 m) --   10/30/20 0803 -- -- -- -- -- 99 % -- --   10/30/20 0731 (!) 174/83 -- -- 72 -- -- -- --   10/30/20 0445 (!) 168/63 97.5 °F (36.4 °C) Oral 75 16 97 % -- 176 lb 8.7 oz (80.1 kg)   10/29/20 2108 -- -- -- -- 16 97 % -- --   10/29/20 1925 109/64 97.4 °F (36.3 °C) Oral 82 16 97 % -- --   10/29/20 1630 110/64 97.9 °F (36.6 °C) Oral 71 16 96 % -- --     Const: Alert. No distress, pleasant. HEENT: Normocephalic, atraumatic. Normal sclera/conjunctiva. MMM. CV: Regular rate and rhythm. Resp: No respiratory distress. Lungs with bibasilar crackles   Abd: Soft, nontender, nondistended, NABS+   Ext: +edema. Neuro: Alert, oriented, appropriately interactive. Psych: Cooperative, appropriate mood and affect    Laboratory data: Available via EMR.    Last 24 hour lab  Recent Results (from the past 24 hour(s))   Basic Metabolic Panel w/ Reflex to MG    Collection Time: 10/30/20  6:57 AM   Result Value Ref Range    Sodium 136 136 - 145 mmol/L    Potassium reflex Magnesium 4.6 3.5 - 5.1 mmol/L    Chloride 102 99 - 110 mmol/L    CO2 23 21 - 32 mmol/L    Anion Gap 11 3 - 16    Glucose 130 (H) 70 - 99 mg/dL    BUN 54 (H) 7 - 20 mg/dL    CREATININE 1.7 (H) 0.8 - 1.3 mg/dL    GFR Non- 38 (A) >60    GFR  46 (A) >60    Calcium 8.4 8.3 - 10.6 mg/dL   CBC auto differential    Collection Time: 10/30/20  6:57 AM   Result Value Ref Range    WBC 12.0 (H) 4.0 - 11.0 K/uL    RBC 4.04 (L) 4.20 - 5.90 M/uL    Hemoglobin 10.5 (L) 13.5 - 17.5 g/dL    Hematocrit 32.6 (L) 40.5 - 52.5 %    MCV 80.7 80.0 - 100.0 fL    MCH 26.1 26.0 - 34.0 pg    MCHC 32.4 31.0 - 36.0 g/dL    RDW 16.9 (H) 12.4 - 15.4 %    Platelets 814 519 - 949 K/uL    MPV 9.5 5.0 - 10.5 fL    Neutrophils % 74.9 %    Lymphocytes % 16.1 %    Monocytes % 8.5 %    Eosinophils % 0.4 %    Basophils % 0.1 %    Neutrophils Absolute 9.0 (H) 1.7 - 7.7 K/uL    Lymphocytes Absolute 1.9 1.0 - 5.1 K/uL    Monocytes Absolute 1.0 0.0 - 1.3 K/uL    Eosinophils Absolute 0.1 0.0 - 0.6 K/uL    Basophils Absolute 0.0 0.0 - 0.2 K/uL   Basic Metabolic Panel    Collection Time: 10/30/20  6:57 AM   Result Value Ref Range    Potassium 4.6 3.5 - 5.1 mmol/L       Therapy progress:  PT  Position Activity Restriction  Other position/activity restrictions: [place]- B hearing aides, sabino caruso  Objective     Sit to Stand: Stand by assistance  Stand to sit: Contact guard assistance(CGA secondary to increased weakness and pain after ambulation)  Bed to Chair: Contact guard assistance(with wheeled walker)  Device: Rolling Walker  Other Apparatus: Wheelchair follow(As Pt. deteriorates quickly and requires sitting)  Assistance: Contact guard assistance  Distance: 39' with two turns, 25' x2 with one turn (instructed to ambulate to chair, turn, and sit)  OT  PT Equipment Recommendations  Equipment Needed: Yes  Mobility Devices: [place]: Rolling  Other: Patient has a rollator at home. Toilet - Technique: Ambulating  Equipment Used: Grab bars  Toilet Transfers Comments: RW>< toilet with bilateral grab bars  Assessment        SLP          Body mass index is 28.5 kg/m².     Assessment and Plan:    Impairments: generalized weakness, decreased endurance, balance, respiratory insufficiency     Debility  -PT/OT     Gait dysfunction, LLE pain/weakness  -Work-up for stroke/acute SCI abnormality negative  -Etiology likely radicular in setting of spondylosis and DDD  -Steroid taper  -PT/OT     Acute on chronic dCHF, moderate-severe mitral regurgitation  -EF 50%  -Lasix 40 daily, losartan  -Daily wt     Pneumonia  -Levofloxacin to complete 7 day course     Pulmonary fibrosis  -Per Pulmonology, prednisone 20mg x 7 days, then resume home dose 10 mg  -Bevespi, prn albuterol     Chronic Afib  -s/p PPM for CHB  -Xarelto     Hyperlipidemia  -statin     CKD  -Avoid nephrotoxins, renally dose meds  -Monitor BUN/Cr     Bladder   -High risk retention, h/o BPH   -Monitor PVRs, SC prn >300cc  -Finasteride, Flomax     Bowel   -High risk constipation   -senna+colace BID, PRN miralax, MoM, and bisacodyl supp.     Pain control  -Acetaminophen scheduled, added diclofenac gel, prn low dose tramadol - reports improvement     PPx  -DVT: Xarelto  -GI: pantoprazole       Rehab Progress: Making gradual progress. Working on endurance, strength, balance. Limited by back pain. Anticipated Dispo: home with daughter  Services:  PT, OT, RN, Aide  DME: JUAN R, possible RW  ELOS: 11/4      [address] MD 10/30/2020, 4:18 PM

## 2020-10-30 NOTE — PLAN OF CARE
Problem: Falls - Risk of:  Goal: Will remain free from falls  Description: Will remain free from falls  Outcome: Ongoing  Note: Patient free from falls this shift. Fall precautions in place at all times. Bed in lowest position with two side rails up and wheels locked. Call light within reach. Patient able and agreeable to contact for safety appropriately. Up with walker and 1 assist, tolerates well. Problem: Pain:  Description: Pain management should include both nonpharmacologic and pharmacologic interventions. Goal: Patient's pain/discomfort is manageable  Description: Patient's pain/discomfort is manageable  Outcome: Ongoing     Problem: Skin Integrity:  Goal: Absence of new skin breakdown  Description: Absence of new skin breakdown  Outcome: Ongoing  Note: Skin assessment performed each shift per protocol. Patient turns and repositions self in bed. Has remained continent of bowel and bladder overnight. Problem: FLUID AND ELECTROLYTE IMBALANCE  Goal: Fluid and electrolyte balance are achieved/maintained  Outcome: Ongoing  Note: VSS. Peripheral pulse palpable. Skin pink, dry and warm. I&O monitor. Daily weight. Medication given as ordered. Education provided. Will continue to monitor.

## 2020-10-30 NOTE — PROGRESS NOTES
Patient admitted to rehab with debility r/t pneumonia. A/A/O x 4. Transfers with RW, gaitbelt and 1 staff asssit. On general diet, tolerating well. Medications taken whole with thin liquids. On ASA for DVT prophylaxis. Skin with scattered bruising. On room air. Has been continent of bladder. LBM 10/28. Chair/bed alarms in use. Call light and needed items within reach.

## 2020-10-30 NOTE — PROGRESS NOTES
Physical Therapy  Facility/Department: [address] IP REHAB  Daily Treatment Note  NAME: Shanelle Howard  : 1925  MRN: [id number]    Date of Service: 10/30/2020    Discharge Recommendations:  Continue to assess pending progress, Home with assist PRN, Home with Home health PT   PT Equipment Recommendations  Equipment Needed: Yes  Walker: Rolling  Other: Patient has a rollator at home. Assessment   Body structures, Functions, Activity limitations: Decreased functional mobility ; Decreased endurance;Decreased strength;Decreased balance  Assessment: Patient continues to be limited with ambulation secondary to increasing back pain causing weakness in bilateral LE. Patient presenting with mild trendelenburg gait now and narrowing of TANYA secondary to increased adduction during swing phase. Patient also having episodes of buckling in bilateral knees during gait when he attempts to stand taller. Patient was unable to go household distances this AM, but states he feels he is going the distance that would get him to his bathroom. PT concerned with increasing fall risk and discussed possible use of wheelchair at home. Patient is receptive to this. Patient will benefit from continued skilled therapy for strengthening, gait training, and endurance training to allow for safe return to home with daughter. Treatment Diagnosis: Impaired functional mobility  Prognosis: Good  PT Education: Plan of Care;Transfer Training;General Safety;Gait Training;Functional Mobility Training; Adaptive Device Training  Barriers to Learning: Hearing  REQUIRES PT FOLLOW UP: Yes     Patient Diagnosis(es): There were no encounter diagnoses.      has a past medical history of Acute diastolic CHF (congestive heart failure) ([facility] [address] Arthritis, BPH with obstruction/lower urinary tract symptoms, Chronic atrial fibrillation ([address] CKD (chronic kidney disease), Coronary arteriosclerosis, GERD (gastroesophageal reflux disease), History of blood transfusion, HTN (hypertension), Left hip pain, Pacemaker, Pulmonary fibrosis ([address] Pulmonary nodule, left, Renal atrophy, right, Renal cyst, right, and SOB (shortness of breath). has a past surgical history that includes Pacemaker insertion (2014); Tonsillectomy (1960); Small intestine surgery (1945); Inguinal hernia repair (Right, 07/2016); Coronary angioplasty with stent (2009); pacemaker placement; Colonoscopy; Abdomen surgery; and eye surgery (Bilateral). Social/Functional History  Lives With: Daughter(Dtr Minerva Davis)  Type of Home: House(condo)  Home Layout: One level  Home Access: Stairs to enter with rails  Entrance Stairs - Number of Steps: 1 R rail. (uses R going up, L going down) + 6 steps down with B far spaced rails. Bathroom Shower/Tub: Tub/Shower unit, Shower chair without back, Shower chair with back(has a stool outside of shower to sit on and put feet into shower, then a shower chair in tub to sit on: pt's daughter has purchased this since he's been in the hospital and he has not used it, 2 grab bars on front & sidewall in shower)  Bathroom Toilet: Handicap height(vanity near on R & tub on L for support)  Bathroom Equipment: Grab bars in shower, Shower chair(on wall into tub)  Bathroom Accessibility: Dasia Spence accessible(RW will but not 4WW he has)  Home Equipment: Cane, 4 wheeled walker(Jacinda, pt just started using wh walker a few days prior to admit)  ADL Assistance: Independent  Homemaking Assistance: (dtr assists)  Homemaking Responsibilities: No  Ambulation Assistance: Independent(R str cane,)  Transfer Assistance: Independent(w/cane, 0SP recently)  Active : No  Mode of Transportation: SUV  Education: Dtr drives pt  Occupation: Retired  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 Worked for [employer]. Leisure & Hobbies: reading. Additional Comments: Rarely alone. Home with dtr.  Per dtr, up until last week, was walking a couple miles a couple times a week using a cane. Dtr states she purchased a rollator for patient use due to his inability to ambulate, and due to patient need to spontaneously sit as he fatigues. Restrictions  Restrictions/Precautions  Restrictions/Precautions: Fall Risk  Position Activity Restriction  Other position/activity restrictions: [place]- B hearing aides, sabino caruso     Subjective   General  Chart Reviewed: Yes  Additional Pertinent Hx: Per Dr. Von Melara note, \"Patient is a 81 yo M with pmh CAD, CHF, Afib, HTN, CKD, pulmonary fibrosis, and lumbar DDD who initially presented 10/20/2020 with difficulty ambulating. Patient reports progressive weakness and shortness of breath over several days. Also with back pain and difficulty putting weight through his left leg. Found to have pulmonary edema, acute on chronic dCHF, and pneumonia. Work-up for stroke or acute spinal cord abnormality was negative. Back and LLE pain/weakness thought to be related to chronic degenerative changes. \"  Response To Previous Treatment: Patient with no complaints from previous session. Family / Caregiver Present: No  Referring Practitioner: Dr. Seth Aguero: Patient reporting he slept well last evening and reports that helped him feel a little better. He has no complaints of pain at rest.  General Comment  Comments: Patient reporting he is worried about narcotic dependence and PT reviewed with patient he has only taken tramadol once last evening when he felt he got the best sleep he has had in a while, and all other times have been tylenol. Patient reports he is more open to taking something more than tylenol because his pain has not been improving.             Objective      Transfers  Sit to Stand: Stand by assistance  Stand to sit: Contact guard assistance(CGA secondary to increased weakness and pain after ambulation)    Ambulation  Ambulation?: Yes  More Ambulation?: No  Ambulation 1  Surface: level tile  Device: Rolling Walker  Assistance: Contact guard assistance  Quality of Gait: Began with slightly flexed posture, but quickly progressed to Stooped posture secondary to chronic back problems.  bilateral step length, decreased step height, step through pattern, decreased gait speed. patient now with mild trendelenburg pattern in left hip. also increasing adduction with ambulation. Distance: 39' with two turns, 25' x2 with one turn (instructed to ambulate to chair, turn, and sit)  Comments: Patient continues to have increased pain with ambulation and the pain almost immediately starts. He also complains of sharp pains going down legs intermittently, but not frequently. Pain in low back remains when returns to sitting and takes increased time to improve. Patient had increased SOB as pain increases but quickly improves with sitting. Less SOB and less increase in pain with shorter distances. Other exercises  Other exercises?: Yes  Other exercises 1: Stood at walker and performed bean bag toss to target. 1st attempt, patient was CGA and with third bag he attempted to toss, he had a severe posterior LOB and he did not let go of bag or attempt to correct balance with right LE. He was unable to recover and was mod assist to safely sit back into the chair. this occurred after approx 16 seconds of standing. With second trial, patient was able to stand approx 1 minute, but trunk flexion greatly increased and he required cueing to stop tossing the bags and stand up straighter before continuing. Pain was increasing and knees began to have buckling like tremors. Patient wanted to try and go longer, but due to clear signs of pain and fatigue, PT encouraged patient to reach back and have a seat. PM Session  Patient reporting he went down for an MRI, but could not get it secondary to his pacemaker. Patient reports he has had some urgency for urination, but was able to go before he came to therapy.   He reports his pain continues to be controlled at rest.    Sit>stand with CGA. Patient ambulated 10' NuStep with wheeled walker and CGA. Patient with continued left trendelenburg gait and increased pain with ambulation. PT assisted patient with being set up on NuStep. He completed 10 minutes with resistance at 2, seat at 9, bilateral UE at 10, and average SPM at 73. Patient reporting minor increase in back pain on NuStep, but able to complete activity. No complaints of pain down into legs. He did report some discomfort in his left knee, but reports it is minor. Short seated rest break secondary to SOB but recovered quickly. Sit>stand with CGA. Ambulated 64' with THOMAS walker on a lower setting to allow for increased trunk flexion. PT reviewed purpose of the THOMAS walker allowing for un-weighting lower body. Patient was agreeable to this. He has improved step length and was able to eliminate trendelenburg gait pattern with the support of the THOMAS walker. Minor increase in pain. Upon return to chair, patient had difficulty aligning with the chair secondary to bulkiness of the base of the THOMAS walker. Patient required min assist to safely sit back into chair. Sit>stand with CGA. Ambulated 22' x2 with wheeled walker to arm chair across the room with CGA. PT providing facilitation to let hip abductors secondary to increased trendelenburg gait pattern and increased hip adduction. Patient was CGA to turn and sit in chair. He reported increasing pain, but less intense than this AM.  Seated rest break given between bouts. Patient with less weakness this PM and no buckling noted. Tremor of right hand not noted this PM.  Patient continues to be limited with distance ambulating, but is able to get to household distance with use of THOMAS walker. It is clear that patient will not purchase an upright walker, but using intermittently in therapy to unweight LE and allow for improve ambulation and strengthening of bilateral LE.       Safety Device - Type of devices:  [x]  All fall risk precautions in place [] Bed alarm in place  [] Call light within reach [] Chair alarm in place [] Positioning belt [x] Gait belt [] Patient at risk for falls [] Left in bed [x] Left in chair (in wheelchair to return to room with transportation) [] Telesitter in use [] Sitter present [] Nurse notified []  None          Goals  Short term goals  Time Frame for Short term goals: 7 to 10 days  Short term goal 1: Bed mobility supervision  Short term goal 2: Transfers CGA  Short term goal 3: Ambulation 48' with  walker and CGA  Short term goal 4: Progress to stairs as able. Long term goals  Time Frame for Long term goals : 14 to 18 days  Long term goal 1: all bed mobility modif I  Long term goal 2: all transfers modif I  Long term goal 3: amb 80' with  walker S to modif I  Long term goal 4: 8 steps R rail ascending L descending S  Patient Goals   Patient goals : \"to get up and walk without anything\"    Plan    Plan  Times per week: 5 to 6  Times per day: Twice a day  Plan weeks: 2 to 3  Current Treatment Recommendations: Gait Training, Balance Training, Endurance Training, Stair training  Safety Devices  Type of devices:  All fall risk precautions in place, Gait belt, Left in chair(in wheelchair to return to room with transportation)  Restraints  Initially in place: No     Therapy Time   Individual Concurrent Group Co-treatment   Time In 0815         Time Out 0900         Minutes [address]   Time In 7142     Time Out 1510     Minutes 45            Electronically signed by Beatrice Connell PT on 10/30/2020 at 10:32 AM

## 2020-10-30 NOTE — PLAN OF CARE
signs and symptoms  Description: Will show no infection signs and symptoms  10/30/2020 0505 by Bárbara Baron RN  Outcome: Ongoing  Goal: Absence of new skin breakdown  Description: Absence of new skin breakdown  10/30/2020 0505 by Bárbara Baron RN  Outcome: Ongoing     Problem: Musculor/Skeletal Functional Status  Goal: Highest potential functional level  10/30/2020 0505 by Bárbara Baron RN  Outcome: Ongoing  Goal: Absence of falls  10/30/2020 0505 by Bárbara Baron RN  Outcome: Ongoing     Problem: HEMODYNAMIC STATUS  Goal: Patient has stable vital signs and fluid balance  10/30/2020 0505 by Bárbara Baron RN  Outcome: Ongoing     Problem: FLUID AND ELECTROLYTE IMBALANCE  Goal: Fluid and electrolyte balance are achieved/maintained  10/30/2020 0505 by Bárbara Baron RN  Outcome: Ongoing     Problem: ACTIVITY INTOLERANCE/IMPAIRED MOBILITY  Goal: Mobility/activity is maintained at optimum level for patient  10/30/2020 0505 by Bárbara Baron RN  Outcome: Ongoing

## 2020-10-30 NOTE — PROGRESS NOTES
Department of Internal Medicine  General Internal Medicine  Attending Progress Note    Chief Complaint:low back pain and leg weakness      HPI:   SUBJECTIVE:  81 yo male with lumbar djd and left leg weakness, PAF and ILD who is in acute rehab . He has made some progress walking and reports his back pain is pretty severe and associated with leg weakness improved with bending forward over his walker. He has fallen and is afraid of falling again. Has continued urgency incontinence and started using the urinal to avoid accidents. Seen by all the consultants. Main issue is when can he be dc'd to home safely  Breathing is quite short with a little exertion still bringing up a lot of discolored phlegm. While on rehab he has made gradual progress, is able to walk further with the walker although he still hurts a lot in his low back. The sob muñoz has not improved on the higher dose of lasix. Therapy follows him and his oxygen sat has never gone lower than 89% with exercise. Observed in therapy and today he can only make it 30 ft max before his back pain becomes intolerable and his legs almost give out. He would take anything for the pain at this point    Again today in therapy 10/30 walking even less before his legs want to buckle. Back pain no better with higher dose prednisone. Would not take the tramadol before therapy, now understands the reason for it, to take when active. Has prominent right arm tremor.           Past Medical History:   Diagnosis Date    Acute diastolic CHF (congestive heart failure) ([facility] [address] 10/23/2020    Arthritis     BPH with obstruction/lower urinary tract symptoms     nocturia 3-4 x per night    Chronic atrial fibrillation (HCC)     CKD (chronic kidney disease) 2017    level 3    Coronary arteriosclerosis 2009    2 stents/ Dr Loistine Fothergill in [place]    GERD (gastroesophageal reflux disease)     History of blood transfusion     1945   During deployment    HTN (hypertension) 1990    Left hip pain     schrapnel in hip and intestines/chronic pains left hip impairs walking    Pacemaker 2015    Pulmonary fibrosis ([address] 08/2017    mild amount seen on chest ct    Pulmonary nodule, left 2017    noncalcified 7mm low risk repeat 8/2018 if patient willing    Renal atrophy, right 2017    Renal cyst, right 08/2017    not clearly simple cyst    SOB (shortness of breath) 2017    terrell normal pft and ct chest ? smll amount of pulmonary fibrosis     Past Surgical History:   Procedure Laterality Date    ABDOMEN SURGERY      Scrapnel removed in [facility]  2009    awoke with indigestion    EYE SURGERY Bilateral     cataract    INGUINAL HERNIA REPAIR Right 07/2016    PACEMAKER INSERTION  2014    PACEMAKER PLACEMENT      SMALL INTESTINE SURGERY  1945    schrapnel as ww [employer] vet, part of small intestine moved    TONSILLECTOMY  1960      Family History   Problem Relation Age of Onset    Pacemaker Sister     Pacemaker Brother     No Known Problems Other         lung disease     Social History     Tobacco Use    Smoking status: Never Smoker    Smokeless tobacco: Never Used   Substance Use Topics    Alcohol use: No    Drug use: Not Currently       Prior to Admission medications    Medication Sig Start Date End Date Taking?  Authorizing Provider   losartan (COZAAR) 25 MG tablet Take 1 tablet by mouth daily Hold for systolic less 854 10/96/62  Yes Kathia Clifford MD   furosemide (LASIX) 40 MG tablet Take 1 tablet by mouth daily 10/27/20  Yes Kathia Clifford MD   pantoprazole (PROTONIX) 40 MG tablet Take 1 tablet by mouth nightly 10/24/20  Yes Kathia Clifford MD   potassium chloride (KLOR-CON M) 20 MEQ extended release tablet Take 1 tablet by mouth daily 10/27/20  Yes Kathia Clifford MD   oxybutynin (DITROPAN) 5 MG tablet TAKE 1 TABLET BY MOUTH EVERY DAY 10/21/20  Yes Kathia Clifford MD   fluticasone (FLONASE) 50 MCG/ACT nasal spray SPRAY 2 SPRAY INTO EACH NOSTRIL EVERY DAY 10/9/20  Yes Nicanor Holland MD   albuterol sulfate  (90 Base) MCG/ACT inhaler Inhale 2 puffs into the lungs every 6 hours as needed for Wheezing or Shortness of Breath 8/27/20  Yes Álvaro Browning MD   umeclidinium-vilanterol (ANORO ELLIPTA) 62.5-25 MCG/INH AEPB inhaler Inhale 1 puff into the lungs daily 8/27/20  Yes Álvaro Browning MD   tamsulosin (FLOMAX) 0.4 MG capsule ONE EVERY NIGHT FOR YOUR PROSTATE 4/22/20  Yes Nicanor Holland MD   atorvastatin (LIPITOR) 40 MG tablet Take 1 tablet by mouth daily 2/22/19  Yes Annabel Tracey MD   rivaroxaban (XARELTO) 15 MG TABS tablet Take 1 tablet by mouth daily (with breakfast) 1/8/18  Yes Nicanor Holland MD   aspirin 81 MG tablet Take 81 mg by mouth daily   Yes Historical Provider, MD   Cholecalciferol (VITAMIN D3) 2000 UNITS CAPS Take 2,000 Units by mouth daily   Yes Historical Provider, MD   calcium carbonate (OSCAL) 500 MG TABS tablet Take 600 mg by mouth daily caltrate 600-D3 one daily   Yes Historical Provider, MD   Multiple Vitamins-Minerals (CENTRUM SILVER ADULT 50+) TABS Take 1 tablet by mouth daily   Yes Historical Provider, MD   predniSONE (DELTASONE) 20 MG tablet Take 1 tablet by mouth daily for 10 days Then resume 10mg a day 10/24/20 11/3/20  Nicanor Holland MD   levoFLOXacin (LEVAQUIN) 250 MG tablet Take 1 tablet by mouth daily for 10 days 10/23/20 11/2/20  Nicanor Holland MD   finasteride (PROSCAR) 5 MG tablet TAKE 1 TABLET BY MOUTH EVERY DAY 12/2/19   Nicanor Holland MD         ROS:  A comprehensive review of systems was negative except for: back pain , trouble with balance    OBJECTIVE:      PHYSICAL:  Intake/Output:   Patient Vitals for the past 8 hrs:   BP Temp Temp src Pulse Resp SpO2 Weight   10/30/20 0803 -- -- -- -- -- 99 % --   10/30/20 0731 (!) 174/83 -- -- 72 -- -- --   10/30/20 0445 (!) 168/63 97.5 °F (36.4 °C) Oral 75 16 97 % 176 lb 8.7 oz (80.1 kg)     I/O last 3 completed shifts:   In: 960 [P.O.:960]  Out: -   I/O this shift:  In: 360 [P.O.:360]  Out: - 10/20/2020  Pulmonary interstitial opacities, most likely pulmonary edema superimposed on chronic interstitial lung disease. Indeterminate 42 mm right renal mass may represent a proteinaceous cyst or neoplasm. RECOMMENDATIONS: Only if deemed clinically important to the patient's medical care after considering advanced patient age and comorbidities, further workup of the renal lesion could be obtained with nonemergent/outpatient renal mass protocol CT or MRI     Ct Lumbar Spine Wo Contrast    Result Date: 10/20/2020  No acute fracture or subluxation. 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 L3-4 severe central and moderate bilateral foraminal stenosis. L4-5 grade 1 spondylolisthesis with additional degenerative changes, resulting in moderate central stenosis. Xr Chest Portable    Result Date: 10/20/2020  Patchy infiltrates suggested bilaterally new from prior study. Cta Head Neck W Contrast    Result Date: 10/20/2020  1. Bilateral carotid bifurcation atherosclerotic plaque resulting in approximately 40% bilateral stenosis of the origin of the internal carotid artery. Finding is compatible with 16-49% stenosis per sonographic NASCET index criteria. 2. 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 3. Right vertebral artery termination as the posteroinferior cerebellar artery (PICA). 4. Bilateral internal carotid artery cavernous segment atherosclerotic calcification without significant arterial stenosis. 5. Approximately 50% arterial stenosis of the bilateral vertebral body origin secondary to encroachment by atherosclerotic calcification. 6. Mild cerebral white matter chronic microvascular ischemic disease.          ASSESSMENT AND PLAN    Principal Problem:    Acute pulmonary edema (HCC)  Plan: seems to have improved do not want to overdiurese, Echo showed mod to severe MR and preserved EF, to continue medical managment  Active Problems:    Pacemaker  Plan: ongoing cardio fu HTN (hypertension)  Plan: running a little higher will add coreg and procardia xl, tolerating med changes, will go up on coreg and dc procardia eventually    Coronary arteriosclerosis  Plan: stable    BPH with obstruction/lower urinary tract symptoms  Plan: ongoing severe sxs    Pulmonary fibrosis ([facility] [address]  Plan: ongoing issue, awaiting pulmonary's guidance, oxygen levels good on room air  Need to avoid fluid overload which would worsen his pulmonary pressures, try changing back to lasix 20mg and improve bp control     Parkinson disease ([facility] [address]  Plan: perhaps some cogwheeling in the wrists no where else, not being treated for this, had a brother who  of parkinsons  I do NOT think he has parkinsons. ,more likely essential tremor.  Coreg should help    Paroxysmal atrial fibrillation (HCC)  Plan: ongoing hx. cont same, add coreg    Pulmonary infiltrates  Plan:  Completing levaquin per Dr Peg Acosta advice  CKD 4  Improving on lower dose of lasix check on occas      Appropriate diagnostic studies and consults ordered  Drea Campbell MD     Spent 35 minutes evaluating and discussing case with patient and nursing and planning treatment for today

## 2020-11-01 NOTE — PROGRESS NOTES
97 % 182 lb 1.6 oz (82.6 kg)     I/O last 3 completed shifts: In: 5 [P.O.:840]  Out: -   I/O this shift:  In: 240 [P.O.:240]  Out: -   VITALS:    /64   Pulse 71   Temp 97.6 °F (36.4 °C) (Oral)   Resp 18   Ht 5' 6\" (1.676 m)   Wt 182 lb 1.6 oz (82.6 kg)   SpO2 96%   BMI 29.39 kg/m²   bp repeated 152/50   General Appearance: alert and oriented to person, place and time, well-developed and well-nourished, in no acute distress  Skin: warm and dry, no rash or erythema  Head: normocephalic and atraumatic  Eyes: conjunctivae normal and sclera anicteric  ENT: hearing grossly [place] bilaterally and sinuses non-tender  Neck: neck supple and non tender without mass, no thyromegaly or thyroid nodules, no cervical lymphadenopathy   Pulmonary/Chest: paninsp crackles all the way up  Cardiovascular: normal rate, normal S1 and S2, no gallops and no carotid bruits: paced  Abdomen: soft, non-tender, non-distended, normal bowel sounds, no masses or organomegaly  Extremities: no cyanosis, clubbing or edema  Musculoskeletal: bony swelling and tenderness both knees moving around better  Neurologic: coordination normal and speech normal, moves all 4 extremities    DATA:   Labs:  CBC:   Recent Labs     10/30/20  0657   WBC 12.0*   HGB 10.5*        BMP:    Recent Labs     10/30/20  0657      K 4.6  4.6      CO2 23   BUN 54*   CREATININE 1.7*   GLUCOSE 130*     POC GLUCOSE:    No results for input(s): POCGLU in the last 72 hours. Ca/Mg/Phos:   Recent Labs     10/30/20  0657   CALCIUM 8.4     Hepatic:   No results for input(s): AST, ALT, ALB, BILITOT, ALKPHOS in the last 72 hours. Troponin:   No results for input(s): TROPONINI in the last 72 hours. DIAGNOSTICS:  Ct Head Wo Contrast    Result Date: 10/20/2020  No acute intracranial abnormality.  No significant change from prior study     Ct Chest Wo Contrast    Result Date: 10/20/2020  Pulmonary interstitial opacities, most likely pulmonary edema superimposed on chronic interstitial lung disease. Indeterminate 42 mm right renal mass may represent a proteinaceous cyst or neoplasm. RECOMMENDATIONS: Only if deemed clinically important to the patient's medical care after considering advanced patient age and comorbidities, further workup of the renal lesion could be obtained with nonemergent/outpatient renal mass protocol CT or MRI     Ct Lumbar Spine Wo Contrast    Result Date: 10/20/2020  No acute fracture or subluxation. 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 L3-4 severe central and moderate bilateral foraminal stenosis. L4-5 grade 1 spondylolisthesis with additional degenerative changes, resulting in moderate central stenosis. Xr Chest Portable    Result Date: 10/20/2020  Patchy infiltrates suggested bilaterally new from prior study. Cta Head Neck W Contrast    Result Date: 10/20/2020  1. Bilateral carotid bifurcation atherosclerotic plaque resulting in approximately 40% bilateral stenosis of the origin of the internal carotid artery. Finding is compatible with 16-49% stenosis per sonographic NASCET index criteria. 2. 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 3. Right vertebral artery termination as the posteroinferior cerebellar artery (PICA). 4. Bilateral internal carotid artery cavernous segment atherosclerotic calcification without significant arterial stenosis. 5. Approximately 50% arterial stenosis of the bilateral vertebral body origin secondary to encroachment by atherosclerotic calcification. 6. Mild cerebral white matter chronic microvascular ischemic disease.          ASSESSMENT AND PLAN    Principal Problem:    Acute pulmonary edema (HCC)  Plan: seems to have improved do not want to overdiurese, Echo showed mod to severe MR and preserved EF, to continue medical managment  Active Problems:    Pacemaker  Plan: ongoing cardio fu    HTN (hypertension)  Plan: running a little higher will add coreg and procardia xl, tolerating med changes, will go up on coreg and dc procardia eventually, place holding parameters    Coronary arteriosclerosis  Plan: stable    BPH with obstruction/lower urinary tract symptoms  Plan: ongoing severe sxs    Pulmonary fibrosis ([facility] [address]  Plan: ongoing issue, awaiting pulmonary's guidance, oxygen levels good on room air  Need to avoid fluid overload which would worsen his pulmonary pressures, try changing back to lasix 20mg and improve bp control     Parkinson disease ([facility] [address]  Plan: perhaps some cogwheeling in the wrists no where else, not being treated for this, had a brother who  of parkinsons  I do NOT think he has parkinsons. ,more likely essential tremor.  Coreg should help    Paroxysmal atrial fibrillation (HCC)  Plan: ongoing hx. cont same, add coreg    Pulmonary infiltrates  Plan:  Completing levaquin per Dr Stacie Goltz advice  CKD 4  Improving on lower dose of lasix check on occas      Appropriate diagnostic studies and consults ordered  Lydia Brand MD     Spent 35 minutes evaluating and discussing case with patient and nursing and planning treatment for today over 50% of time counseling

## 2020-11-01 NOTE — PLAN OF CARE
Problem: Falls - Risk of:  Goal: Will remain free from falls  Description: Will remain free from falls  Outcome: Ongoing     Problem: Infection:  Goal: Will remain free from infection  Description: Will remain free from infection  Outcome: Ongoing     Problem: Daily Care:  Goal: Daily care needs are met  Description: Daily care needs are met  Outcome: Ongoing     Problem: Pain:  Goal: Patient's pain/discomfort is manageable  Description: Patient's pain/discomfort is manageable  Outcome: Ongoing     Problem: Skin Integrity:  Goal: Skin integrity will stabilize  Description: Skin integrity will stabilize  Outcome: Ongoing     Problem: Discharge Planning:  Goal: Patients continuum of care needs are met  Description: Patients continuum of care needs are met  Outcome: Ongoing     Problem: HEMODYNAMIC STATUS  Goal: Patient has stable vital signs and fluid balance  Outcome: Ongoing

## 2020-11-01 NOTE — PROGRESS NOTES
Department of Physical Medicine & Rehabilitation  Progress Note    Patient Identification:  Malinda Allan  [id number]  : 1925  Admit date: 10/23/2020    Chief Complaint: Debility    Subjective:   Sitting up in chair, eating lunch, c/o low back pain w/ movement otherwise doing well     ROS: No f/c, n/v, cp     Objective:  Patient Vitals for the past 24 hrs:   BP Temp Temp src Pulse Resp SpO2 Weight   20 0950 -- -- -- -- 18 96 % --   20 0905 124/64 -- -- -- -- -- --   20 0301 (!) 161/70 97.6 °F (36.4 °C) Oral 71 16 97 % 182 lb 1.6 oz (82.6 kg)   10/31/20 2103 -- -- -- -- 18 96 % --   10/31/20 1941 (!) 120/58 97.7 °F (36.5 °C) Oral 76 16 97 % --   10/31/20 1430 (!) 95/54 97.8 °F (36.6 °C) Oral 72 16 97 % --     Const: Alert. No distress, pleasant. HEENT: Normocephalic, atraumatic. Normal sclera/conjunctiva. MMM. CV: Regular rate and rhythm. Resp: No respiratory distress. Lungs with bibasilar crackles   Abd: Soft, nontender, nondistended, NABS+   Ext: +edema. Neuro: Alert, oriented, appropriately interactive. Psych: Cooperative, appropriate mood and affect    Laboratory data: Available via EMR. Last 24 hour lab  No results found for this or any previous visit (from the past 24 hour(s)).     Therapy progress:  PT  Position Activity Restriction  Other position/activity restrictions: [place]- B hearing aides, sabino natanaele  Objective     Sit to Stand: Stand by assistance  Stand to sit: Contact guard assistance(CGA secondary to increased weakness and pain after ambulation)  Bed to Chair: Contact guard assistance(with wheeled walker)  Device: Rolling Walker  Other Apparatus: Wheelchair follow(As Pt. deteriorates quickly and requires sitting)  Assistance: Contact guard assistance  Distance: 39' with two turns, 25' x2 with one turn (instructed to ambulate to chair, turn, and sit)  OT  PT Equipment Recommendations  Equipment Needed: Yes  Mobility Devices: Manuela Escobar: Rolling  Other: Patient has a rollator at home. Toilet - Technique: Ambulating  Equipment Used: Grab bars  Toilet Transfers Comments: RW>< toilet with bilateral grab bars  Assessment        SLP          Body mass index is 29.39 kg/m².     Assessment and Plan:    Impairments: generalized weakness, decreased endurance, balance, respiratory insufficiency     Debility  -PT/OT     Gait dysfunction, LLE pain/weakness  -Work-up for stroke/acute SCI abnormality negative  -Etiology likely radicular in setting of spondylosis and DDD  -Steroid taper  -PT/OT     Acute on chronic dCHF, moderate-severe mitral regurgitation  -EF 50%  -Lasix 40 daily, losartan  -Daily wt     Pneumonia  -Levofloxacin to complete 7 day course     Pulmonary fibrosis  -Per Pulmonology, prednisone 20mg x 7 days, then resume home dose 10 mg  -Bevespi, prn albuterol     Chronic Afib  -s/p PPM for CHB  -Xarelto     Hyperlipidemia  -statin     CKD  -Avoid nephrotoxins, renally dose meds  -Monitor BUN/Cr     Bladder   -High risk retention, h/o BPH   -Monitor PVRs, SC prn >300cc  -Finasteride, Flomax     Bowel   -High risk constipation   -senna+colace BID, PRN miralax, MoM, and bisacodyl supp.     Pain control  -Acetaminophen scheduled, added diclofenac gel, prn low dose tramadol  -Lidocaine patch to low back     PPx  -DVT: Xarelto  -GI: pantoprazole     KAYCE Dangelo - CNP  11/01/20  11:43 AM

## 2020-11-02 NOTE — PLAN OF CARE
Problem: Falls - Risk of:  Goal: Will remain free from falls  Description: Will remain free from falls  Outcome: Ongoing  Goal: Absence of physical injury  Description: Absence of physical injury  Outcome: Ongoing     Problem: Infection:  Goal: Will remain free from infection  Description: Will remain free from infection  Outcome: Ongoing     Problem: Daily Care:  Goal: Daily care needs are met  Description: Daily care needs are met  Outcome: Ongoing     Problem: Pain:  Goal: Patient's pain/discomfort is manageable  Description: Patient's pain/discomfort is manageable  Outcome: Ongoing  Goal: Pain level will decrease  Description: Pain level will decrease  Outcome: Ongoing     Problem: ACTIVITY INTOLERANCE/IMPAIRED MOBILITY  Goal: Mobility/activity is maintained at optimum level for patient  Outcome: Ongoing

## 2020-11-02 NOTE — PROGRESS NOTES
difficulty fully voiding. Transfers-SBA at recliner, to needing CGA to sit safely onto w/c, (after amb to BR 13 ft with RW, and increased fatigued). Pt sat suddenly onto w/c, with posterior lean. In dept, transfer close SBA at bed, commode with rails  Pt required 1 rest break -to sit onto edge of transfer bench in BR when increased fatigued after using commode. CG to safely sit. Bed mob-SB/supervision on std bed in dept. Pt c/o dizziness upon sitting up to EOB, requiring to sit and rest prior to standing. Functional mob with light CG/SBA with walker. Assessment: Pt tolerated session fairly well. Pt c/o dizziness with supine>sit and cues, assist to safely sit onto w/c after amb to BR 13ft.            Safety Device - Type of devices:  []  All fall risk precautions in place [] Bed alarm in place  [x] Call light within reach [x] Chair alarm in place [] Positioning belt [x] Gait belt [] Patient at risk for falls [] Left in bed [] Left in chair [] Telesitter in use [] Sitter present [] Nurse notified []  None      Therapy Time   Individual Co-treatment   Time In 1300     Time Out 1345     Minutes [address] [id number]  r

## 2020-11-02 NOTE — PROGRESS NOTES
Department of Physical Medicine & Rehabilitation  Progress Note    Patient Identification:  Kaitlin Helm  [id number]  : 1925  Admit date: 10/23/2020    Chief Complaint: Debility    Subjective:   No acute events overnight. Patient seen this afternoon sitting up in gym. Denies any new concerns. Remains very motivated for therapies though does have fatigue. ROS: No f/c, n/v, cp     Objective:  Patient Vitals for the past 24 hrs:   BP Temp Temp src Pulse Resp SpO2 Weight   20 1649 119/69 98.5 °F (36.9 °C) Oral 73 15 95 % --   20 0808 -- -- -- -- -- 98 % --   20 0802 124/61 -- -- -- -- -- --   20 0416 119/67 97.5 °F (36.4 °C) Oral 76 16 97 % 183 lb 13.8 oz (83.4 kg)   20 1955 -- -- -- -- 16 95 % --     Const: Alert. No distress, pleasant. HEENT: Normocephalic, atraumatic. Normal sclera/conjunctiva. MMM. CV: Regular rate and rhythm. Resp: No respiratory distress. Lungs with bibasilar crackles   Abd: Soft, nontender, nondistended, NABS+   Ext: +edema. Neuro: Alert, oriented, appropriately interactive. Psych: Cooperative, appropriate mood and affect    Laboratory data: Available via EMR.    Last 24 hour lab  Recent Results (from the past 24 hour(s))   CBC Auto Differential    Collection Time: 20  9:16 AM   Result Value Ref Range    WBC 10.5 4.0 - 11.0 K/uL    RBC 3.96 (L) 4.20 - 5.90 M/uL    Hemoglobin 10.5 (L) 13.5 - 17.5 g/dL    Hematocrit 32.1 (L) 40.5 - 52.5 %    MCV 80.9 80.0 - 100.0 fL    MCH 26.5 26.0 - 34.0 pg    MCHC 32.7 31.0 - 36.0 g/dL    RDW 16.8 (H) 12.4 - 15.4 %    Platelets 965 478 - 156 K/uL    MPV 9.3 5.0 - 10.5 fL    Neutrophils % 70.6 %    Lymphocytes % 18.9 %    Monocytes % 8.6 %    Eosinophils % 1.5 %    Basophils % 0.4 %    Neutrophils Absolute 7.4 1.7 - 7.7 K/uL    Lymphocytes Absolute 2.0 1.0 - 5.1 K/uL    Monocytes Absolute 0.9 0.0 - 1.3 K/uL    Eosinophils Absolute 0.2 0.0 - 0.6 K/uL    Basophils Absolute 0.0 0.0 - 0.2 K/uL   Basic Metabolic Panel w/ Reflex to MG    Collection Time: 11/02/20  9:16 AM   Result Value Ref Range    Sodium 136 136 - 145 mmol/L    Potassium reflex Magnesium 4.1 3.5 - 5.1 mmol/L    Chloride 101 99 - 110 mmol/L    CO2 21 21 - 32 mmol/L    Anion Gap 14 3 - 16    Glucose 134 (H) 70 - 99 mg/dL    BUN 59 (H) 7 - 20 mg/dL    CREATININE 2.0 (H) 0.8 - 1.3 mg/dL    GFR Non- 31 (A) >60    GFR  38 (A) >60    Calcium 8.3 8.3 - 10.6 mg/dL       Therapy progress:  PT  Position Activity Restriction  Other position/activity restrictions: [place]- B hearing aides, sabino hose  Objective     Sit to Stand: Stand by assistance  Stand to sit: Contact guard assistance  Bed to Chair: Contact guard assistance(with wheeled walker)  Device: Rolling Walker  Other Apparatus: Wheelchair follow(As Pt. deteriorates quickly and requires sitting)  Assistance: Contact guard assistance  Distance: 22' to bathroom, 10' to wheelchair  OT  PT Equipment Recommendations  Equipment Needed: Yes  Mobility Devices: Wheelchair  Walker: Rolling  Wheelchair: Light Wells [place]  Other: 18\" lightweight wheelchair, standard cushion, brake extensions, standard arm rests and leg rests. Toilet - Technique: Ambulating  Equipment Used: Standard bedside commode(Also transferred to comfort height toilet w/grab bars.)  Toilet Transfers Comments: Pt completed toilet transfer from RW to bedside commode w/ CGA and RW to comfort height toilet w/ grab bars w/ CGA w/ vc's for safety. Assessment        SLP          Body mass index is 29.68 kg/m².     Assessment and Plan:    Impairments: generalized weakness, decreased endurance, balance, respiratory insufficiency     Debility  -PT/OT     Gait dysfunction, LLE pain/weakness  -Work-up for stroke/acute SCI abnormality negative  -Etiology likely radicular in setting of spondylosis and DDD  -Steroid taper  -PT/OT     Acute on chronic dCHF, moderate-severe mitral regurgitation  -EF 50%  -Lasix 20 daily, losartan  -Daily wt     Pneumonia  -Levofloxacin to complete 7 day course     Pulmonary fibrosis  -Per Pulmonology, prednisone 20mg x 7 days, then resume home dose 10 mg  -Bevespi, prn albuterol     Chronic Afib  -s/p PPM for CHB  -Xarelto     Hyperlipidemia  -statin     CKD  -Avoid nephrotoxins, renally dose meds  -Monitor BUN/Cr     Bladder   -High risk retention, h/o BPH   -Monitor PVRs, SC prn >300cc  -Finasteride, Flomax     Bowel   -High risk constipation   -senna+colace BID, PRN miralax, MoM, and bisacodyl supp.     Pain control  -Acetaminophen scheduled, added diclofenac gel, prn low dose tramadol - reports improvement     PPx  -DVT: Xarelto  -GI: pantoprazole       Rehab Progress: Making gradual progress. Working on endurance, strength, balance. Limited by back pain. Anticipated Dispo: home with daughter  Services: HH PT, OT, RN, Aide  DME: TBD, possible RW, wheelchair  ELOS: 11/4      Brock Ceron.  Katy Sharma MD 11/2/2020, 4:59 PM

## 2020-11-02 NOTE — PROGRESS NOTES
Patient admitted to rehab with debility d/t pnuemonia. A/A/O x4. Transfers with walker x1. On general diet. Medications taken whole in water. On ASA for DVT prophylaxis. Skin warm, dry. On room air. Has been continent of bowel and bladder but can be incontinent at night. LBM 11/1. Chair/bed alarms in use and call light in reach. Will monitor for safety.

## 2020-11-02 NOTE — PROGRESS NOTES
Physical Therapy  Facility/Department: [address] IP REHAB  Daily Treatment Note  NAME: Denia Melo  : 1925  MRN: [id number]    Date of Service: 2020    Discharge Recommendations:  Continue to assess pending progress, Home with assist PRN, Home with Home health PT   PT Equipment Recommendations  Equipment Needed: Yes  Mobility Devices: Wheelchair  Walker: Rolling  Wheelchair: Light [place]  Other: 18\" lightweight wheelchair, standard cushion, brake extensions, standard arm rests and leg rests. Assessment   Body structures, Functions, Activity limitations: Decreased functional mobility ; Decreased endurance;Decreased strength;Decreased balance  Assessment: Patient continues to be limited with ambulation secondary to increasing back pain causing weakness in bilateral LE. Trendelenburg gait continues to present and was increased from Friday. PT has concerns with return home and it is recommended that he always have someone with him when ambulating secondary to this weakness. Patient may be able to function out of a wheelchair independently if he is discharged home and it is now recommended that the patient use a wheelchair at home. This will also allow for him to safely go to and from MD appointments. Patient continues to be unable to go household distances and he varies with his distance ability, especially dependent on fatigue level and pain. PT concerned with increasing fall risk. Patient will benefit from continued skilled therapy for strengthening, gait training, and endurance training to allow for safe return to home with daughter. Treatment Diagnosis: Impaired functional mobility  Prognosis: Good  PT Education: Transfer Training;General Safety;Gait Training;Functional Mobility Training; Adaptive Device Training  Barriers to Learning: Hearing  REQUIRES PT FOLLOW UP: Yes  Activity Tolerance  Activity Tolerance: Patient limited by fatigue;Patient limited by endurance; Patient limited by pain  Activity Tolerance: Back pain and LE fatigue/weakness limit ambulation, also gets SOB that resolves with sitting ~ 1 -2 mins     Patient Diagnosis(es): There were no encounter diagnoses. has a past medical history of Acute diastolic CHF (congestive heart failure) ([address] Arthritis, BPH with obstruction/lower urinary tract symptoms, Chronic atrial fibrillation ([address] CKD (chronic kidney disease), Coronary arteriosclerosis, GERD (gastroesophageal reflux disease), History of blood transfusion, HTN (hypertension), Left hip pain, Pacemaker, Pulmonary fibrosis ([address] Pulmonary nodule, left, Renal atrophy, right, Renal cyst, right, and SOB (shortness of breath). has a past surgical history that includes Pacemaker insertion (2014); Tonsillectomy (1960); Small intestine surgery (1945); Inguinal hernia repair (Right, 07/2016); Coronary angioplasty with stent (2009); pacemaker placement; Colonoscopy; Abdomen surgery; and eye surgery (Bilateral). Social/Functional History  Lives With: Daughter(Dtr Otilio Sherman)  Type of Home: House(condo)  Home Layout: One level  Home Access: Stairs to enter with rails  Entrance Stairs - Number of Steps: 1 R rail. (uses R going up, L going down) + 6 steps down with B far spaced rails.   Bathroom Shower/Tub: Tub/Shower unit, Shower chair without back, Shower chair with back(has a stool outside of shower to sit on and put feet into shower, then a shower chair in tub to sit on: pt's daughter has purchased this since he's been in the hospital and he has not used it, 2 grab bars on front & sidewall in shower)  Bathroom Toilet: Handicap height(vanity near on R & tub on L for support)  Bathroom Equipment: Grab bars in shower, Shower chair(on wall into tub)  Bathroom Accessibility: [employer] accessible(RW will but not 4WW he has)  Home Equipment: Cane, 4 wheeled walker(Jacinda, pt just started using wh walker a few days prior to admit)  ADL Assistance: [facility] 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 Worked for [employer]. Leisure & Hobbies: reading. Additional Comments: Rarely alone. Home with dtr. Per dtr, up until last week, was walking a couple miles a couple times a week using a cane. Dtr states she purchased a rollator for patient use due to his inability to ambulate, and due to patient need to spontaneously sit as he fatigues. Restrictions  Restrictions/Precautions  Restrictions/Precautions: Fall Risk  Position Activity Restriction  Other position/activity restrictions: [place]- B hearing aides, sabino zuly     Subjective   General  Chart Reviewed: Yes  Additional Pertinent Hx: Per Dr. Hummel Prophet note, \"Patient is a 79 yo M with pmh CAD, CHF, Afib, HTN, CKD, pulmonary fibrosis, and lumbar DDD who initially presented 10/20/2020 with difficulty ambulating. Patient reports progressive weakness and shortness of breath over several days. Also with back pain and difficulty putting weight through his left leg. Found to have pulmonary edema, acute on chronic dCHF, and pneumonia. Work-up for stroke or acute spinal cord abnormality was negative. Back and LLE pain/weakness thought to be related to chronic degenerative changes. \"  Response To Previous Treatment: Patient with no complaints from previous session. Family / Caregiver Present: No  Referring Practitioner: Dr. Isrrael Lorenz: Patient reporting he continues to be very limited by his back pain causing weakness. He rates his pain a 5/10 in his low back after mobility. He did receive his tramadol prior to PT.             Objective      Transfers  Sit to Stand: Stand by assistance  Stand to sit: Contact guard assistance  Bed to Chair: Contact guard assistance(with wheeled walker)    Ambulation  Ambulation?: Yes  More Ambulation?: Yes  Ambulation 1  Surface: level tile  Device: Rolling Walker  Assistance: Contact guard assistance  Quality of Gait: Began with slightly flexed posture, but quickly progressed to Stooped posture secondary to chronic back problems.  bilateral step length, decreased step height, step through pattern, decreased gait speed, mild trendelenburg pattern in left hip. also increasing adduction with ambulation. Distance: 25' to bathroom, 10' to wheelchair  Comments: Patient with severe trunk flexion and had great difficulty going last 10' back to wheelchair after use of bathroom. PT had conversation with PT about safety at home and concerns he will not be able to walk the distance he needs to be able to go around the house. PT recommended a wheelchair. PT had to be very descriptive as to why she did not feel ambulation would work with the severe pain the patient is having. Ambulation 2  Surface - 2: level tile  Device 2: (THOMAS walker)  Assistance 2: Contact guard assistance;Minimal assistance  Quality of Gait 2: patient with more upright posture and able to improve bilateral step height and length. No sharp this date but patient reports and increase in weakness that came on quickly. He was unable to ambulate further and required wheelchair be brought to him to allow for return to sit. Gait Deviations: Slow Lia  Distance: 46' with two turns  Comments: Patient continues to be very limited. This walker is not functional and will work for home use. It is being used to try and increase distance ambulating to allow for further strengthening of bilateral LE. Patient still required wheelchair be brought to him because the weakness came on suddenly and he could go no further. He required min assist to maintain balance while another person brought wheelchair to him.     Wheelchair Activities  Wheelchair Size: 20\"  Wheelchair Type: Standard  Wheelchair Cushion: Standard  Pressure Relief Type: Self Adjusts  Level of Assistance for pressure relief activities: Modified independent  Wheelchair Parts Management: Yes  Left Leg Rest Level of Assistance: Unable to assess(comment)(leg rests removed as it would be recommended that the patient not use them at home.)  Right Leg Rest Level of Assistance: Unable to assess(comment)  Left Brakes Level of Assistance: Supervision(cues to unlock and lock them)  Right Brakes Level of Assistance: Supervision  Propulsion: Yes  Propulsion 1  Propulsion: Manual  Level: Level Tile  Method: RLE;LLE;LUE;RUE  Level of Assistance: Supervision  Description/ Details: 80'  Distance: Patient with RAIN and required seated rest break USP through and after completion. Patient reporting considerable ache in back with wheelchair mobility and PT encouraged patient to use bilateral UE more than LE, but he states then his left shoulder bothers him. He was also able to go over the threshold x2 without physical assistance. Due to the extent of RAIN, it is recommended that patient would get a lightweight wheelchair if he returns home. Comment: patient requesting use of the bathroom upon arrival. He was SBA to stand from recliner. CGA to ambulate 22' to the bathroom with wheeled walker but extremely stooped posture and required cueing to keep the walker closer to him. Patient was able to pull pants and depends down. Patient was able to complete shawna care with difficulty in standing, but pain was increasing with severely increased flexed posture and he required assistance to pull depends and pants up. Patient able to ambulate 10' back to wheelchair with CGA, but severely flexed trunk and reporting he felt as if \"his back would give out. \" Patient sat at sink in wheelchair to wash his hands.     PM Session  PT received notification that daughter, Joel Arguello, was in the room and wanted to discuss equipment needs with PT. This PT went bedside and reviewed current status and concern for falls with continued weakness prevent household distance ambulation. PT also discussed patient's varying abilities based on pain and fatigue. Alfreda verbalized understanding. She is concerned with her ability to push the patient on grass in the wheelchair. PT informed her that it has been recommended that patient have a light weight wheelchair as he had difficulty propelling a standard wheelchair on level tile secondary to pain and fatigue limitations. Patient could be much more independent with the use of a light weight wheelchair. Alfreda verbalized understanding. PT reviewed safety with Shabana Kumar and recommended that the patient always have supervision when he is ambulating and that he should not be doing on his own. She agreed with this. PT reviewed that a gait belt could be utilized if the patient has increased difficulty at home and Kostaedouard Kumar verbalized understanding. PT reviewed with the patient that it would be beneficial to have home PT go over and help problem solve the best way to get out of the condo and to the car safely. He agreed that this would be good plan. Patient then went to therapy gym. Patient was able to perform sit>stand with SBA. He ambulated 39' with wheeled walker with CGA-min assist.  Patient began to have increased trunk flexion and increased left trendelenburg gait pattern. PT provided facilitation to left hip abductors to provide improved stabilization at his hip. Patient continues to be unsafe to ambulate on his own. PT switched wheelchair out to an 18\" wheelchair and patient had approx 1\" space on both sides of his hips. He had no rubbing and will be able to use an 18\" wheelchair at home. PT reviewed with daughter, Shabana Kumar, earlier in the session that it is recommended that the patient keep the leg rests off at home so he can use his legs to assist with propulsion.     After short seated rest break, patient was able to ambulate 10' to NuStep with CGA though patient reports he was beginning to have pain down into his thighs. PT assisted with set up on NuStep. Patient performed 8 minutes on NuStep with resistance at 1, seat at 9, bilateral UE at 10, and average SPM at 71. He was then able to ambulate 10' back to wheelchair with CGA, but was becoming increasingly weaker and could not have ambulated further. Patient will benefit from use of wheelchair at home secondary pain in back and weakness in bilateral LE. Safety Device - Type of devices:  [x]  All fall risk precautions in place [] Bed alarm in place  [] Call light within reach [] Chair alarm in place [] Positioning belt [x] Gait belt [] Patient at risk for falls [] Left in bed [x] Left in chair (in wheelchair to return to room with transportation) [] Telesitter in use [] Sitter present [] Nurse notified []  None            Goals  Short term goals  Time Frame for Short term goals: 7 to 10 days  Short term goal 1: Bed mobility supervision  Short term goal 2: Transfers CGA  Short term goal 3: Ambulation 48' with  walker and CGA  Short term goal 4: Progress to stairs as able. Long term goals  Time Frame for Long term goals : 14 to 18 days  Long term goal 1: all bed mobility modif I  Long term goal 2: all transfers modif I  Long term goal 3: amb 80' with  walker S to modif I  Long term goal 4: 8 steps R rail ascending L descending S  Patient Goals   Patient goals : \"to get up and walk without anything\"    Plan    Plan  Times per week: 5 to 6  Times per day: Twice a day  Plan weeks: 2 to 3  Current Treatment Recommendations: Gait Training, Balance Training, Endurance Training, Stair training  Safety Devices  Type of devices:  All fall risk precautions in place, Gait belt, Left in chair, Patient at risk for falls, Call light within reach, Chair alarm in place  Restraints  Initially in place: No     Therapy Time   Individual Concurrent Group Co-treatment   Time In 0815         Time Out 0900         Minutes 39                Second Session Therapy Time     Individual Co-treatment   Time In 1430     Time Out 1515     Minutes 45            Electronically signed by Armani Brower PT on 11/2/2020 at 2:26 PM

## 2020-11-02 NOTE — PROGRESS NOTES
Occupational Therapy  Facility/Department: [address] [facility]  Daily Treatment Note  NAME: Mitzi Welch  : 1925  MRN: [id number]    Date of Service: 2020    Discharge Recommendations:  Patient would benefit from continued therapy after discharge, Continue to assess pending progress, S Level 1, Home with Home health OT, Home with assist PRN       Assessment   Performance deficits / Impairments: Decreased functional mobility ; Decreased ADL status; Decreased strength;Decreased endurance;Decreased balance;Decreased high-level IADLs;Decreased coordination;Decreased safe awareness  Assessment: Discussed w/OTR, pt agreeable to therapy. Pt completed w/c mobility from therapy gym to simulated bedrrom. Pt completed transfer from w/c to EOB w/ CGA. Bed mobility for sit to supine and supine to sit w/ SBA. Pt requiredd CgA for transfer from EOB tobedside commode. Pt then roshan socks and GENESIS hose w/CGA. Pt educated on taking breaks due to SOB during transfers and func mob. Pt then required CGA for toilet trasnfer w/use of grab bars, pt toileted w/min A to manage clothing. Pt transfered from w/c to recliner w/ CGA and vc's for hand placement. Continue w/ POC. Patient Diagnosis(es): There were no encounter diagnoses. has a past medical history of Acute diastolic CHF (congestive heart failure) ([address] Arthritis, BPH with obstruction/lower urinary tract symptoms, Chronic atrial fibrillation ([address] CKD (chronic kidney disease), Coronary arteriosclerosis, GERD (gastroesophageal reflux disease), History of blood transfusion, HTN (hypertension), Left hip pain, Pacemaker, Pulmonary fibrosis ([address] Pulmonary nodule, left, Renal atrophy, right, Renal cyst, right, and SOB (shortness of breath). has a past surgical history that includes Pacemaker insertion (); Tonsillectomy (); Small intestine surgery (1945); Inguinal hernia repair (Right, 2016);  Coronary angioplasty with stent (); pacemaker placement; Colonoscopy; Abdomen surgery; and eye surgery (Bilateral). Restrictions  Restrictions/Precautions  Restrictions/Precautions: Fall Risk  Position Activity Restriction  Other position/activity restrictions: [place]- B hearing aides, genesis caruso  Subjective   General  Chart Reviewed: Yes, Progress Notes, Orders  Patient assessed for rehabilitation services?: Yes  Additional Pertinent Hx: \"Patient is a 79 yo M with pmh CAD, CHF, Afib, HTN, CKD, pulmonary fibrosis, and lumbar DDD who initially presented 10/20/2020 with difficulty ambulating. Patient reports progressive weakness and shortness of breath over several days. Also with back pain and difficulty putting weight through his left leg. Found to have pulmonary edema, acute on chronic dCHF, and pneumonia. Work-up for stroke or acute spinal cord abnormality was negative. Back and LLE pain/weakness thought to be related to chronic degenerative changes. Patient now presents to ARU with impaired mobility and self-care below his baseline. Currently, patient reports improvement in his breathing and left leg strength. He still feels generally weak and fatigued. He denies back pain currently. States he gets low back pain with radiation into the LLE with prolonged standing/ambulating. Denies tingling/numbness. \" Copied per Dr Vassie Peabody 10/23/20 H & P note. Response to previous treatment: Patient reporting fatigue but able to participate  Family / Caregiver Present: No  Referring Practitioner: Dr Katy Sharma  Diagnosis: Debility  Subjective  Subjective: Pt met seated in w/c. Pt noted fatigue but agreeable to therapy. Vital Signs  Patient Currently in Pain: No   Orientation  Orientation  Overall Orientation Status: Within Functional Limits  Objective    ADL  Toileting: Minimal assistance(Min A to manage clothing)  Additional Comments: Pt roshan GENESIS hose and socks w/ CGA at EOB.         Balance  Sitting Balance: Supervision  Standing Balance: Contact guard assistance  Standing Balance  Time: 1 min  Activity: functional mob, transfers  Comment: Pt used RW for functional mobility from bed to bedside commode and to recliner. Functional Mobility  Functional - Mobility Device: Rolling Walker  Activity: To/from bathroom(To recliner)  Assist Level: Contact guard assistance  Functional Mobility Comments: Pt used RW for functional mobility from EOB to bedside commode and from w/c to recliner. Toilet Transfers  Toilet - Technique: Ambulating  Equipment Used: Standard bedside commode(Also transferred to comfort height toilet w/grab bars.)  Toilet Transfer: Contact guard assistance  Toilet Transfers Comments: Pt completed toilet transfer from RW to bedside commode w/ CGA and RW to comfort height toilet w/ grab bars w/ CGA w/ vc's for safety. Bed mobility  Supine to Sit: Stand by assistance  Sit to Supine: Stand by assistance  Scooting: Stand by assistance  Comment: Bed mobility on flat bed. Transfers  Sit to stand: Contact guard assistance  Stand to sit: Contact guard assistance  Transfer Comments: Pt CGA for sit to stand from EOB to RW and w/c to RW w/ vc's for safety. Cognition  Overall Cognitive Status: Exceptions  Attention Span: Difficulty dividing attention  Memory: Decreased short term memory  Safety Judgement: Decreased awareness of need for safety          Assessment   Performance deficits / Impairments: Decreased functional mobility ; Decreased ADL status; Decreased strength;Decreased endurance;Decreased balance;Decreased high-level IADLs;Decreased coordination;Decreased safe awareness  Assessment: Discussed w/OTR, pt agreeable to therapy. Pt completed w/c mobility from therapy gym to simulated bedrrom. Pt completed transfer from w/c to EOB w/ CGA. Bed mobility for sit to supine and supine to sit w/ SBA. Pt requiredd CgA for transfer from EOB tobedside commode. Pt then roshan socks and GENESIS hose w/CGA. Pt educated on taking breaks due to SOB during transfers and func mob.  Pt then required bathe mod ind with shower chair/hand held shower/grab bars/long sponge. Long term goal 2: Pt will dress mod ind including sabino hose/own compression hose with AE as needed. Long term goal 3: Pt will toilet mod ind with RW/elevated toilet with rails  Long term goal 4: Pt will perform functional transfers/mobility mod ind with RW  Long term goal 5: Pt will perform ther ex/HEP to increase activity tolerance to stand for 8-10 minutes for ADL/simple IADL tasks. Long term goals 6: Pt will improve walker safety/attn to task to perform simple IADL tasks such as simple food prep/clothing set-up mod ind from RW. Patient Goals   Patient goals : Pt stated \"I want to be able to get around my house w/o being afraid of falling, take care of myself, be able to go to the park and walk by myself, so I don't hold my daughter back. \"       Therapy Time   Individual Concurrent Group Co-treatment   Time In 5639         Time Out 1200         Minutes 39               JAYNE Francis/MIAH  Electronically signed by Mariposa Boyer, [id number] on 11/2/2020 at 2:25 PM  I attest that I was present for and made a skilled and mindful clinical judgement during the treatment of this patient 11/2/2020

## 2020-11-03 NOTE — PROGRESS NOTES
Occupational Therapy  Discharge Summary     Name:Ankit Guillermo  UTY:[id number]  :1925  Treatment Diagnosis: Impaired: ADL/IADL function, transfers/functional mobility, balance, activity tolerance, safety awareness, L shoulder strength, coordination    Restrictions/Precautions  Restrictions/Precautions: Fall Risk           Position Activity Restriction  Other position/activity restrictions: [place]- B hearing aides, sabino hose     Goals:   Short term goals  Time Frame for Short term goals: 1 week from eval on 10/24/20  Short term goal 1: Pt will bathe CGA to SBA with shower chair/grab bars/long sponge. NOT MET min A for buttocks in stance  Short term goal 2: Pt will dress with assist for sabino hose only using AE as needed. NOT MET, assist for sabino hose/shoes  Short term goal 3: Pt will toilet with CGA to SBA with use of RW/counter/grab bar for support. MET with OT @ CGA  Short term goal 4: Pt will perform functional transfers/mobility with RW SBA and demo good safety skills. NOT MET, CGA for safety, sit gets weak/dizzy @ times, sits down suddenly  Short term goal 5: Pt will perform ther ex/HEP to increase activity tolerance to stand for 5-6 minutes for ADL/simple IADL tasks with min cues for walker safety skills. NOT MET, fatigues after 2 minutes   Long term goals  Time Frame for Long term goals : 2 weeks from eval on 10/24/20  Goals not met D/T low activity tolerance and back pain which affects LE strength, LEs almost give out on pt @ times  Long term goal 1: Pt will bathe mod ind with shower chair/hand held shower/grab bars/long sponge. NOT MET  Long term goal 2: Pt will dress mod ind including sabino hose/own compression hose with AE as needed.   NOT MET  Long term goal 3: Pt will toilet mod ind with RW/elevated toilet with rails   NOT MET  Long term goal 4: Pt will perform functional transfers/mobility mod ind with RW   NOT MET  Long term goal 5: Pt will perform ther ex/HEP to increase activity tolerance to to Supine: Modified independent  Scooting: Modified independent(difficult, but able to do on his own with increased time)  Comment: bed mobility done on regular bed in ADL apartment. Patient was able to set up with wheelchair next to the bed and positioned the wheelchair at the head of the bed before transferring. He appropriately locked the brakes without cueing and then was able to complete the transfer. patient used the arm rest of the wheelchair as a rail to stabilize himself as he laid down and sat back up. Functional Transfers: Toilet Transfers  Toilet - Technique: Ambulating(RW)  Equipment Used: Grab bars  Toilet Transfer: Contact guard assistance  Toilet Transfers Comments: RW><comfort height toilet with grab bars, CGA, no LOB noted. Pt amb slowly w/ hunched over posture. Tub Transfers  Tub - Transfer From: Rolling walker  Tub - Transfer Type: To and From  Tub - Transfer To: Transfer tub bench  Tub - Technique: Ambulating  Tub Transfers: Contact guard  Tub Transfers Comments: DRY transfer. pt ambulated in/out of BR using RW CGA and completed sit and swing technique CGA. pt typically uses stool outside the shower to swing his LEs into the tub, then has shower chair to sit on - will try in future session to assess pt's safety with this method    Shower Transfers  Shower - Transfer From: Sophie tomi - Transfer Type: To and From  Shower - Transfer To: Shower seat with back  Shower - Technique: Ambulating(RW)  Shower Transfers: Contact Guard  Shower Transfers Comments: RW><shower chair, use of grab bars, cues to push up from chair and grab bars when standing, pt appeared unsteady.            Functional Mobility  Functional - Mobility Device: Rolling Walker  Activity: To/from bathroom  Assist Level: Contact guard assistance  Functional Mobility Comments: Pt used w/c for mobility out of bathroom D/T fatigue and SOB, pt required cues to use w/c with brakes and how to propel    Instrumental ADL's  Instrumental ADLs: Yes     Light Housekeeping  Light Housekeeping Level: [place] [employer] Level of Assistance: Minimal assistance  Light Housekeeping: pt performed fxl mobility in ADL suite, retrieved shirts from chair and transported to closet by draping over RW. pt very fatigued while standing at the closet ~x1.5 mins and becoming unsteady so OT pulled up BSC so pt could sit and take rest break. pt put shirts on hangers while seated, then stood x1 min and hung in closet CGA                Perception  Overall Perceptual Status: WFL     Cognition: decreased divided attn & memory         UE Function: Generally L shoulder min decreased ROM, gets a \"catch in it from old injury, has yellow T-band HEP                  Assessment:   Assessment: Pt was SOB thoughout shower ADL session, requiring rest breaks. Pt c/o of dizziness during session. Pt used walker to get into bathroom, CGA, used w/c to get out of bathroom D/T fatigue. Pt required min A for LB dressing, needs cues to use AE. Pt relied on grab bars and sink counter for balance for LB bathing & dressing. Plan to D/C home today with home OT and assist from daughter.   Prognosis: Good  Barriers to Learning: hearing, memory  REQUIRES OT FOLLOW UP: Yes  Discharge Recommendations: Patient would benefit from continued therapy after discharge, S Level 1, Home with Home health OT, Home with assist PRN(supervision for alll amb/standing tasks), supvervision for all standing/amb tasks  HOME HEALTH CARE: LEVEL 1 STANDARD     -Initial home health evaluation to occur within 24-48 hours, in patient home    -Home health agency to establish plan of care for patient over 60 day period    -Medication Reconciliation    -PCP Visit scheduled within seven days of discharge    -PT/OT to evaluate with goal of regaining prior level of functioning    -OT to evaluate if patient has [address] needs for personal care         Equipment Recommendations:  Has TTB,horizontal grab bar front wall of tub & on long side wall of tub, handicapped ht toilet w/BUE support, 4WW, straight cane,   may benefit from [facility] for nighttime toileting & hip kit(reacher/sock aid/long shoe horn)  OT discussed w/dtr Tari Hamlin.   Plan Comment: D/C today(moved up from tomorrow)      Electronically signed by Denia Roa OT on 11/3/2020 at 1:20 PM

## 2020-11-03 NOTE — CARE COORDINATION
Dr Chevy Haynes is to dc patient today vs tomorrow. Call placed to [place], Juan Sebastian to inform. She agrees with this plan for DC today vs tomorrow. IMM letter presented. She wants to use [facility] for home care orders. She requests to have SILVERIO Whitney, back from [facility]. The Plan for Transition of Care is related to the following treatment goals: To continue with his progress in personal care and ambulation needs in home setting. The Patient and/or patient representative, daughter  was provided with a choice of provider and agrees   with the discharge plan. [x] Yes [] No    Freedom of choice list was provided with basic dialogue that supports the patient's individualized plan of care/goals, treatment preferences and shares the quality data associated with the providers. [x] Yes [] No  LSW informed her the wheelchair will be delivered to his room between 11 - 12 pm.  She will transport him home today between 12 - 1pm.  She wants to use [facility] [facility] for scripts.   [place], [place]     Case Management   [phone]    11/3/2020  9:40 AM
Fartun Silverman PT wants a wheelchair ordered. LSW sent referral to [address] [place] [address] for wheelchair.   Jem Phillips, [facility]     Case Management   [phone]    11/2/2020  3:17 PM
Team Conference held today. Team reviewed progress and goals. Team reports he has pain in back, left knee and left shoulder. Team reports the further he walks he gets more fatigued. DME recommendations: [facility] [employer] [employer] basket. Team recommends dc to home on Wed 11-4-2020 with e care services to follow him. LSW met with patient to review. He continues to have back pain and is interested in non narcotic relief. He agrees with plan for continued stay. He wants this worker to call his daughter for update. LSE called Jerod barrera to give update from Conference. She agrees with plan. She reports she wants to use [facility] as they had that the last time. LSW informed her of cms star rating and informed her a list of agencies was provided to her father. LSW informed her that [facility] has a star rating of 3.5 from CMS and a 4 star rating from their patient satisfaction scores. Discussion held regarding wh walker and walker basket. LSW provided education regarding medicare coverage for wh walker but not for basket. She reports she did buy a TTB for her father today.   [facility]     Case Management   [phone]    10/28/2020  5:01 PM
update and plan for DC needs. He does give permission to speak with his daughter regading any discharge plans. LSW informed patient of preferred  time on date of discharge which is between 10 - 12 noon. LSW informed patient of recommendation for PCP visit within 7 days post discharge.         [facility]     Case Management   [phone]    10/26/2020  4:31 PM

## 2020-11-03 NOTE — PROGRESS NOTES
CLINICAL PHARMACY NOTE: MEDS  [employer] Select Patient?: No  Total # of Prescriptions Filled: 4   The following medications were delivered to the patient:  Discharge Medication List as of 11/3/2020 12:50 PM      START taking these medications    Details   traMADol (ULTRAM) 50 MG tablet Take 1 tablet by mouth 2 times daily for 30 days. , Disp-60 tablet,R-0Normal      carvedilol (COREG) 12.5 MG tablet Take 1 tablet by mouth 2 times daily (with meals), Disp-60 tablet,R-0Normal      diclofenac sodium (VOLTAREN) 1 % GEL Apply 4 g topically 3 times daily, Topical, 3 TIMES DAILY Starting Tue 11/3/2020, Disp-1 Tube,R-0, Normal         ·   · Losartan 50mg  Total # of Interventions Completed: 0  Time Spent (min): 30    Additional Documentation:

## 2020-11-03 NOTE — DISCHARGE SUMMARY
balance, endurance. He remains limited by chronic back pain which is impacting his ambulation distance. Now overall modified independent to min assist level. Discharging home with daughter who completed training. Home care services and DME ordered as below. Patient will follow-up with PCP, Pulmonology, Cardiology, and PM&R. Please note, patient initially scheduled for discharge on morning of 11/4. Due to rising hospital census and increasing covid-19 cases, hospital administration requested early discharge of rehab patients. This was discussed with patient and daughter who were in agreement with early discharge. Impairments: generalized weakness, decreased endurance, balance, respiratory insufficiency    Medical Management:  Gait dysfunction, LLE pain/weakness  -Work-up for stroke/acute SCI abnormality negative  -Etiology likely radicular in setting of spondylosis and DDD  -Steroid taper  -PT/OT  -Consider EL[facility]AS as outpatient     Acute on chronic dCHF, moderate-severe mitral regurgitation  -EF 50%  -Lasix 20 daily, losartan, bb -- doses adjusted per Dr. Guajardo Payment  -Daily wt stable ~183 lbs     Pneumonia  -Levofloxacin to complete 7 day course     Pulmonary fibrosis  -Per Pulmonology, prednisone 20mg x 7 days, then resume home dose 10 mg  -Bevespi, prn albuterol     Chronic Afib  -s/p PPM for CHB  -Xarelto, carvedilol     Hyperlipidemia  -statin     CKD  -Avoid nephrotoxins, renally dose meds  -Cr 1.7-2.0     Pain control  -Acetaminophen scheduled, added diclofenac gel, low dose tramadol - reports improvement     PPx  -DVT: Xarelto  -GI: pantoprazole      Discharge Exam:  Const: Alert. No distress, pleasant. HEENT: Normocephalic, atraumatic. Normal sclera/conjunctiva. MMM. CV: Regular rate and rhythm. Resp: No respiratory distress. Lungs with bibasilar crackles   Abd: Soft, nontender, nondistended, NABS+   Ext: +edema, improving.   MSK: Kyphotic posture, decreased spine ROM   Neuro: Alert, oriented, appropriately interactive. Psych: Cooperative, appropriate mood and affect         Discharge Functional Status:    Physical therapy:  Bed Mobility: Scooting: Modified independent(difficult, but able to do on his own with increased time)  Transfers: Sit to Stand: Stand by assistance  Stand to sit: Contact guard assistance  Bed to Chair: Modified independent, Contact guard assistance(Mod I from wheelchair level with increased time, CGA with use of wheeled walker)  Comment: patient requesting use of the bathroom upon arrival. He was SBA to stand from recliner. CGA to ambulate 22' to the bathroom with wheeled walker but extremely stooped posture and required cueing to keep the walker closer to him. Patient was able to pull pants and depends down. Patient was able to complete shawna care with difficulty in standing, but pain was increasing with severly increased flexed posture and he required assistance to pull depends and pants up. Patient able to ambulate 10' back to wheelchair with CGA, but severly flexed trunk and reporting he felt as if \"his back would give out. \" Patient sat at sink in wheelchair to wash his hands. , Ambulation 1  Surface: level tile  Device: Rolling Walker  Other Apparatus: Wheelchair follow(As Pt. deteriorates quickly and requires sitting)  Assistance: Contact guard assistance  Quality of Gait: Began with slightly flexed posture, but quickly progressed to Stooped posture secondary to chronic back problems.  bilateral step length, decreased step height, step through pattern, decreased gait speed, mild trendelenburg pattern in left hip. also increasing adduction with ambulation. Distance: 25' to bathroom, 10' to wheelchair  Comments: Patient with severe trunk flexion and had great difficulty going last 10' back to wheelchair after use of bathroom.  PT had conversation with PT about safety at homeand concerns he will not be able to walk the distance he needs to be able to go aroudn the house.  PT recommended a wheelchair. PT had to be very descriptive as to why she did not feel ambulation would work with the severe pain the patient is having., Stairs  # Steps : 1  Stairs Height: 6\"  Rails: Bilateral  Curbs: 2\"  Device: Rolling walker  Assistance: Contact guard assistance  Comment: patient complete curb step with wheeled walker and moderate verbal cueing for safety. patient has increase in back pain and bilateral LE weakness requiring CGA to maintain balance. Mobility:  , PT Equipment Recommendations  Equipment Needed: Yes  Mobility Devices: Walker, Wheelchair  Walker: Rolling  Wheelchair: [employer]  Other: 18\" lightweight wheelchair, standard cushion, brake extensions, standard arm rests and leg rests. , Assessment: Patient continues to have limitations from pain in low back causing weakness in bilateral LE. He also has SOB with mobility, which is much worse with increased pain. He recovers quickly with seated rest break. Patient is scheduled for discharge to home with home PT. He will need to function out of a wheelchair level to [employer] safety. He is modified independent with use of wheelchair and transferring in and out of it. He is unsteady with ambulation with wheeled walker and is unsafe to ambulate on his own. He has increased pain and sudden onset of pain requiring a quick return to sit. PT has discussed recommendations with patient and his daughter, London He. Both verbalize understanding. Patient has not been able to do multipl steps due to his weakness in bilateral LE. Dr. Eduardo Yeh reporrts her goal is for patient to see a back specialist and see about an epidural injection. He is able to complete a small curb step with CGA and verbal cueing for safety. Plan is for patient to use the back entrance to their condo and be wheeled in the wheelchair. He has small threshold step there he could manage with the walker with CGA.   Patient did not meet all goals while on rehab secondary to continued weakness with increased back pain. Please see goal section for details. Occupational therapy:  ,  , Assessment: Pt was SOB thoughout shower ADL session, requiring rest breaks. Pt c/o of dizziness during session. Pt used walker to get into bathroom, CGA, used w/c to get out of bathroom D/T fatigue. Pt required min A for LB dressing, needs cues to use AE. Pt relied on grab bars and sink counter for balance for LB bathing & dressing. Plan to D/C home today with home OT and assist from daughter. Speech therapy:         Significant Diagnostics:   Lab Results   Component Value Date    CREATININE 2.0 (H) 11/02/2020    BUN 59 (H) 11/02/2020     11/02/2020    K 4.1 11/02/2020     11/02/2020    CO2 21 11/02/2020       Lab Results   Component Value Date    WBC 10.5 11/02/2020    HGB 10.5 (L) 11/02/2020    HCT 32.1 (L) 11/02/2020    MCV 80.9 11/02/2020     11/02/2020       Disposition:  home with daughter  Services:  PT, OT, RN, Aide  DME: lightweight wheelchair    Mitzi Welch was evaluated today and a DME order was entered for a lightweight wheelchair because he requires this to successfully complete daily living tasks of ambulating. A lightweight wheelchair is necessary due to the patient's impaired ambulation and mobility restrictions. The patient would not be able to resolve these daily living tasks using a cane or walker. The patient can self-propel a lightweight wheelchair safely in their home and can maneuver within their home with adequate access. The patient cannot self-propel in a standard wheelchair. The need for this equipment was discussed with the patient and he understands, is in agreement, and has not expressed an unwillingness to use the wheelchair.         Discharge Condition: Stable    Follow-up:  See after visit summary from hospitalization    Scott Gregory MD  In 1 week  PCP follow-up  [address] [facility] [address]   Amelie Torres MD  In 2 weeks  Pulmonolgy follow-up  [address] [facility] [address]   Ad See MD  In 2 weeks  Cardiology follow-up  [address] [facility] [address]   Nish Sharma MD  In 4 weeks  PM&R follow-up for any rehabilitation needs  [address] [facility] [address]  [phone]         Discharge Medications:     Medication List      START taking these medications    carvedilol 12.5 MG tablet  Commonly known as:  COREG  Take 1 tablet by mouth 2 times daily (with meals)     diclofenac sodium 1 % Gel  Commonly known as:  VOLTAREN  Apply 4 g topically 3 times daily     traMADol 50 MG tablet  Commonly known as:  ULTRAM  Take 1 tablet by mouth 2 times daily for 30 days.         CHANGE how you take these medications    furosemide 20 MG tablet  Commonly known as:  LASIX  Take 1 tablet by mouth daily  What changed:    · medication strength  · how much to take     losartan 50 MG tablet  Commonly known as:  COZAAR  Take 1 tablet by mouth daily Hold for systolic less 787  What changed:    · medication strength  · how much to take     predniSONE 10 MG tablet  Commonly known as:  DELTASONE  Take 1 tablet by mouth daily  Start taking on:  November 4, 2020  What changed:    · medication strength  · how much to take  · additional instructions        CONTINUE taking these medications    albuterol sulfate  (90 Base) MCG/ACT inhaler  Inhale 2 puffs into the lungs every 6 hours as needed for Wheezing or Shortness of Breath     Anoro Ellipta 62.5-25 MCG/INH Aepb inhaler  Generic drug:  umeclidinium-vilanterol  Inhale 1 puff into the lungs daily     aspirin 81 MG tablet  Notes to patient:  Blood thinner     atorvastatin 40 MG tablet  Commonly known as:  LIPITOR  Take 1 tablet by mouth daily  Notes to patient:  Cholesterol control     calcium carbonate 500 MG Tabs tablet  Commonly known as:  OSCAL  Notes to patient:  Calcium supplement Centrum Silver Adult 50+ Tabs  Notes to patient:  multivitamin     finasteride 5 MG tablet  Commonly known as:  PROSCAR  TAKE 1 TABLET BY MOUTH EVERY DAY  Notes to patient:  prostate     fluticasone 50 MCG/ACT nasal spray  Commonly known as:  FLONASE  SPRAY 2 SPRAY INTO EACH NOSTRIL EVERY DAY  Notes to patient:  allergies     oxybutynin 5 MG tablet  Commonly known as:  DITROPAN  TAKE 1 TABLET BY MOUTH EVERY DAY  Notes to patient:  Bladder emptying     pantoprazole 40 MG tablet  Commonly known as:  PROTONIX  Take 1 tablet by mouth nightly  Notes to patient:  Stomach acid control     rivaroxaban 15 MG Tabs tablet  Commonly known as:  XARELTO  Take 1 tablet by mouth daily (with breakfast)  Notes to patient:  Blood thinner     tamsulosin 0.4 MG capsule  Commonly known as:  FLOMAX  ONE EVERY NIGHT FOR YOUR PROSTATE  Notes to patient:  Help empty bladder     Vitamin D3 50 MCG (2000 UT) Caps  Notes to patient:  Vitamin D supplement        STOP taking these medications    levoFLOXacin 250 MG tablet  Commonly known as:  LEVAQUIN     potassium chloride 20 MEQ extended release tablet  Commonly known as:  KLOR-CON M           Where to Get Your Medications      These medications were sent to [address] [place] [address], [facility] [phone]  [address] [address] [address] [place] [address]    Phone:  [phone]   · carvedilol 12.5 MG tablet  · diclofenac sodium 1 % Gel  · furosemide 20 MG tablet  · losartan 50 MG tablet  · predniSONE 10 MG tablet  · traMADol 50 MG tablet           I spent over 35 minutes on this discharge encounter between counseling, coordination of care, and medication reconciliation.       Moy Oleary MD

## 2020-11-03 NOTE — PROGRESS NOTES
Discharge instructions given to pt and his daughter Prema Brooks. Prescriptions to be picked up in retail pharmacy. Belongings packed. Wheelchair delivered. No complaints.

## 2020-11-03 NOTE — PLAN OF CARE
Problem: Falls - Risk of:  Goal: Will remain free from falls  Description: Will remain free from falls  11/2/2020 2232 by Osman Gonzalez RN  Outcome: Ongoing  Note: Fall risk band on patient. Orange light on outside of room. Non skid footwear in place. Alarms used appropriately. Patient instructed to call and wait for staff before getting up. Rounding done to anticipate needs. Appropriate safety devices used for transfers.

## 2020-11-03 NOTE — PROGRESS NOTES
sidewall in shower)  Bathroom Toilet: Handicap height(vanity near on R & tub on L for support)  Bathroom Equipment: Grab bars in shower, Shower chair(on wall into tub)  Bathroom Accessibility: Terri Bile accessible(RW will but not 4WW he has)  Home Equipment: Cane, 4 wheeled walker(Jacinda, pt just started using wh walker a few days prior to admit)  ADL Assistance: [address] [facility] [address]: (dtr assists)  Homemaking Responsibilities: No  Ambulation Assistance: Independent(R str cane,)  Transfer Assistance: Independent(w/cane, 0KX recently)  Active : No  Mode of Transportation: [facility]  Education: Dtr drives pt  Occupation: Retired  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 Worked for [employer]. Leisure & Hobbies: reading. Additional Comments: Rarely alone. Home with dtr. Per dtr, up until last week, was walking a couple miles a couple times a week using a cane. Dtr states she purchased a rollator for patient use due to his inability to ambulate, and due to patient need to spontaneously sit as he fatigues. Restrictions  Restrictions/Precautions  Restrictions/Precautions: Fall Risk  Position Activity Restriction  Other position/activity restrictions: [place] hearing aides, sabino caruso     Subjective   General  Chart Reviewed: Yes  Additional Pertinent Hx: Per Dr. Pedro Jensen note, \"Patient is a 79 yo M with pmh CAD, CHF, Afib, HTN, CKD, pulmonary fibrosis, and lumbar DDD who initially presented 10/20/2020 with difficulty ambulating. Patient reports progressive weakness and shortness of breath over several days. Also with back pain and difficulty putting weight through his left leg. Found to have pulmonary edema, acute on chronic dCHF, and pneumonia. Work-up for stroke or acute spinal cord abnormality was negative. Back and LLE pain/weakness thought to be related to chronic degenerative changes. \"  Response To Previous Treatment: Patient with no complaints from previous session.   Family / Caregiver Present: No  Referring Practitioner: Dr. Lcurecia Phillips: Patient reporting he is beginning to ache in low back and left knee. He reports he slept okay, but he feels he continues to \"just get weak. \"  General Comment  Comments: PT noted that patient's pain patch was all stuck up to his shirt. PT notified RN, Antonette Charles. Orientation  Orientation  Overall Orientation Status: Within Functional Limits(BIMs also given and patient scored a 15/15)    Objective   Bed mobility  Rolling to Left: Modified independent  Rolling to Right: Modified independent  Supine to Sit: Modified independent  Sit to Supine: Modified independent  Scooting: Modified independent(difficult, but able to do on his own with increased time)  Comment: bed mobility done on regular bed in ADL apartment. Patient was able to set up with wheelchair next to the bed and positioned the wheelchair at the head of the bed before transferring. He appropriately locked the brakes without cueing and then was able to complete the transfer. patient used the arm rest of the wheelchair as a rail to stabilize himself as he laid down and sat back up. Transfers  Sit to Stand: Stand by assistance  Stand to sit: Contact guard assistance  Bed to Chair: Modified independent;Contact guard assistance(Mod I from wheelchair level with increased time, CGA with use of wheeled walker)  Car Transfer: Stand by assistance;Contact guard assistance(Mock car transfer done from Wheelchair level. He required cueing for safety with hand placement and transfer into the car. CGA with transfer out secondary to increased difficulty lifting body from the chair.)  Comment: PT discussed safety with patient and recommendations to function out of a wheelchair when alone. He is able to line up wheelchair safely and complete transfers from he wheelchair with modified independence.   If he is standing to the walker, it is not recommended that he be alone secondary to quick fatigue and bilateral LE weakness with weight bearing activities. Ambulation  Ambulation?: No  More Ambulation?: No    Stairs/Curb  Stairs?: Yes  Stairs  # Steps : 1  Curbs: 2\"  Device: Rolling walker  Assistance: Contact guard assistance  Comment: patient complete curb step with wheeled walker and moderate verbal cueing for safety. patient has increase in back pain and bilateral LE weakness requiring CGA to maintain balance. Wheelchair Activities  Wheelchair Size: 18\"  Wheelchair Type: Standard  Wheelchair Cushion: Standard  Pressure Relief Type: Self Adjusts  Level of Assistance for pressure relief activities: Modified independent  Wheelchair Parts Management: Yes  Left Leg Rest Level of Assistance: Unable to assess(comment)(PT recommending bilateral leg rests stay off the chair at home so patient is able to more easily navigate about the condo)  Right Leg Rest Level of Assistance: Unable to assess(comment)  Left Brakes Level of Assistance: Modified independent  Right Brakes Level of Assistance: Modified independent  Propulsion: Yes  Propulsion 1  Propulsion: Manual  Level: Level Tile  Method: RLE;LLE;LUE;RUE  Level of Assistance: Modified independent(on level surfaces.)  Description/ Details: 48' to ADL apartment through two doorways, over one thresholde around pieces of furniture and able to do a full turn to back in next to bed without cueing. Patient has difficulty with quick fatigue and will benefit from a lightweight wheelchair to allow for improved independence from the wheelchair level. Distance: 80' with seated rest break [facility] through. Balance  Posture: Fair  Sitting - Static: Good  Sitting - Dynamic: Good  Standing - Static: Fair  Standing - Dynamic: Fair  Comments: standing balance with bilateral UE support      PM Session  Patient is with daughter, Matthew Lewis, and RNCristina, completing discharge instructions because patient's discharge has been bumped forward to today.   Patient devices:  All fall risk precautions in place, Gait belt, Left in chair(in wheelchair to return to room with transportation)  Restraints  Initially in place: No     Therapy Time   Individual Concurrent Group Co-treatment   Time In 0815         Time Out 0900         Minutes 45                  Second Session Therapy Time     Individual Co-treatment   Time In 1300     Time Out 1325     Minutes 25            Electronically signed by Wilbert Garcia PT on 11/3/2020 at 10:50 AM

## 2020-11-03 NOTE — DISCHARGE INSTR - COC
Continuity of Care Form    Patient Name: Mallory Lemus   :  1925  MRN:  [id number]    Admit date:  10/23/2020  Discharge date:  11-3-2020    Code Status Order: Full Code   Advance Directives:   Advance Care Flowsheet Documentation       Date/Time Healthcare Directive Type of Healthcare Directive Copy in [address] Agent's Name Healthcare Agent's Phone Number    10/24/20 [phone]  Yes, patient has an advance directive for healthcare treatment  Durable power of  for health care  No, copy requested from family asked pt to havda daughter bring it in  [facility]  [phone]            Admitting Physician:  Eliecer See MD  PCP: Argentina Medina MD    Discharging Nurse: [facility] Unit/Room#: P6T-7159/6629-22  Discharging Unit Phone Number: [phone]    Emergency Contact:   Extended Emergency Contact Information  Primary Emergency Contact: Natalie Cooley [facility] [address] Phone: [phone]  Mobile Phone: [phone]  Relation: Child    Past Surgical History:  Past Surgical History:   Procedure Laterality Date    ABDOMEN SURGERY      Scrapnel removed in [place]      awoke with indigestion    EYE SURGERY Bilateral     cataract    INGUINAL HERNIA REPAIR Right 2016    PACEMAKER INSERTION  2014    PACEMAKER PLACEMENT      SMALL INTESTINE SURGERY      [facility], part of small intestine moved   [address]       Immunization History:   Immunization History   Administered Date(s) Administered    Influenza Vaccine, unspecified formulation 2006    Influenza, High Dose (Fluzone 65 yrs and older) 09/15/2017, 09/15/2018, 09/10/2019, 09/15/2020    Pneumococcal Conjugate 13-valent (Yhraycv43) 2019    Pneumococcal Polysaccharide (Pcrhcmpiy61) 2015    Tdap (Boostrix, Adacel) 2018       Active Problems:  Patient Active Problem List   Diagnosis Code    Pacemaker Z95.0    Essential hypertension I10    Coronary arteriosclerosis I25.10    Bradycardia R00.1    BPH with obstruction/lower urinary tract symptoms N40.1, N13.8    GERD (gastroesophageal reflux disease) K21.9    Pulmonary fibrosis (HCC) J84.10    SOB (shortness of breath) R06.02    Renal cyst, right N28.1    Interstitial lung disease (HCC) J84.9    Solitary lung nodule R91.1    Arthritis of both knees M17.0    Paroxysmal atrial fibrillation (HCC) I48.0    Pulmonary infiltrates R91.8    Chronic atrial fibrillation ([facility]) I48.20    Moderate to severe mitral regurgitation I34.0    Acute diastolic CHF (congestive heart failure) ([facility]) I50.31    Chronic kidney disease, stage 3b N18.32    Debility R53.81       Isolation/Infection:   Isolation            No Isolation          Patient Infection Status       Infection Onset Added Last Indicated Last Indicated By Review Planned Expiration Resolved Resolved By    None active    Resolved    COVID-19 Rule Out 10/20/20 10/20/20 10/20/20 COVID-19, PCR (Ordered)   10/20/20 Rule-Out Test Resulted            Nurse Assessment:  Last Vital Signs: BP (!) 161/82   Pulse 74   Temp 97.6 °F (36.4 °C) (Oral)   Resp 17   Ht 5' 6\" (1.676 m)   Wt 183 lb 10.3 oz (83.3 kg)   SpO2 94%   BMI 29.64 kg/m²     Last documented pain score (0-10 scale): Pain Level: 5  Last Weight:   Wt Readings from Last 1 Encounters:   11/03/20 183 lb 10.3 oz (83.3 kg)     Mental Status:  oriented and forgetful    IV Access:  - None    Nursing Mobility/ADLs:  Walking   Assisted  Transfer  Assisted  Bathing  Assisted  Dressing  Assisted  Toileting  Assisted  Feeding  Independent  Med Admin  Assisted  Med Delivery   whole    Wound Care Documentation and Therapy:        Elimination:  Continence:   · Bowel:  Yes  · Bladder: Yes  Urinary Catheter: None   Colostomy/Ileostomy/Ileal Conduit: No       Date of Last BM: 11/2    Intake/Output Summary (Last 24 hours) at 11/3/2020 0940  Last data filed at 11/3/2020 0809  Gross per 24 hour   Intake 960 ml   Output 1120 ml   Net -160 ml     I/O last 3 completed shifts: In: 5 [P.O.:720]  Out: 1120 [Urine:1120]    Safety Concerns:     [email or web address] Safety Concerns:[id number]}    Impairments/Disabilities:      [address] [email or web address] Impairments/Disabilities:[id number]}    Nutrition Therapy:  Current Nutrition Therapy:   - Oral Diet:  General    Routes of Feeding: Oral  Liquids: Thin Liquids  Daily Fluid Restriction: no  Last Modified Barium Swallow with Video (Video Swallowing Test): not done    Treatments at the Time of Hospital Discharge:   Respiratory Treatments: no  Oxygen Therapy:  is not on home oxygen therapy. Ventilator:    - No ventilator support    Rehab Therapies: Nurse, Physical Therapy and Occupational Therapy and HHA  Weight Bearing Status/Restrictions: No weight bearing restirctions  Other Medical Equipment (for information only, NOT a DME order):  wheelchair and walker  Other Treatments:     Heart Failure Instructions for Daily Management  Patient was treated for chronic diastolic heart failure. he  will require the following:     Please weigh daily on the same scale and approximately the same time of day. Report weight gain of 3 pounds/day or 5 pounds/week to : [address], [phone].  Please use hospital discharge weight as baseline reference.  Please monitor for signs and symptoms of and report to MD:  o Worsening Heart Failure: sudden weight gain, shortness of breath, lower extremity or general edema/swelling, abdominal bloating/swelling, inability to lie flat, intolerance to usual activity, or cough (especially at night). Report these finding even if no increase in weight.  o Dehydration:  having difficulty or a decrease in urination, dizziness, worsening fatigue, or new onset/worsening of generalized weakness.       Please continue a LOW SODIUM diet and LIMIT fluid intake to 48 - 64 ounces ( 1.5 - 2 liters) per day.  Call Eric Raymundo [phone] and/or Roopa Karen Ceron @ [phone] with any questions or concerns.  Please continue heart failure education to patient and family/support system.  See After Visit Summary for hospital follow up appointment details.  Consider spiritual care referral for support and/or completion of advance directives [phone].    Consider: Home Care [facility] telehealth program if patient agreeable and able to participate, palliative care consult for ongoing goals of care, end of life, and/or chronic disease management discussions and referral to [facility] [phone]) once SNF/HHC complete        HOME HEALTH CARE: LEVEL 3 [address] to establish plan of care for patient over 60 day period   [address] Initial home SN evaluation visit to occur within 24-48 hours for:  1)  medication management  2)  VS and clinical assessment  3)  S&S chronic disease exacerbation education + when to contact MD/NP  4)  care coordination   Medication Reconciliation during 1st SN visit   PT/OT/Speech    Evaluations in home within 24-48 hours of discharge to include DME and home safety   Kit Ades therapy 5 days, then 3x a week    OT to evaluate if patient has [address] needs for personal care     evaluation within 24-48 hours to evaluate resources & insurance for potential AL, [place], LTC, and Medicaid options    Palliative Care referral within 5 days of hospital discharge   PCP Visit scheduled within 3 - 7 days of hospital discharge    [address] (If patient is agreeable and meets guidelines)      Patient's personal belongings (please select all that are sent with patient):  Glasses, Hearing Aides bilateral, Dentures upper    RN SIGNATURE:  Electronically signed by Daniele Tucker RN on 11/3/20 at 10:00 AM EST    CASE MANAGEMENT/SOCIAL WORK SECTION    Inpatient Status Date: 10-    Readmission Risk Assessment Score:  Readmission Risk              Risk of Unplanned Readmission:        20        Discharging to Facility/ Agency   · Name:  [facility]    · Address: [address] [place] [address]., [address] [place] [address]., Kaylee Larkin  · Phone: [phone]  · Fax: [phone]    / signature: Ricky Medel [place]     Case Management   [phone]    11/3/2020  9:41 AM      PHYSICIAN SECTION    Prognosis: Good    Condition at Discharge: Stable    Rehab Potential (if transferring to Rehab): Good    Recommended Labs or Other Treatments After Discharge:   Home care PT, OT, RN, Aide  Follow-up with PCP in 1 week  Follow-up with Pulm and Cards in 2 weeks  Follow-up with PM&R in 4 weeks for rehab needs and consideration for lumbar spine epidural steroid injection    Physician Certification: I certify the above information and transfer of Denia Melo  is necessary for the continuing treatment of the diagnosis listed and that he requires Home Care for less 30 days.      Update Admission H&P: No change in H&P    PHYSICIAN SIGNATURE:  Electronically signed by Joanna Snow MD on 11/3/20 at 12:40 PM EST

## 2020-11-03 NOTE — PROGRESS NOTES
Patient admitted to rehab with debility d/t pneumonia. A/A/O x 4. Transfers with walker x 1. On general diet, tolerating well. Medications taken whole in thin liquids. On ASA for DVT prophylaxis. Skin intact with scattered bruising. On room air. Artur Vicente Has been continent of bowel and bladder. LBM 11/2. Chair/bed alarms in use and call light in reach. Will monitor for safety.

## 2020-11-04 NOTE — PROGRESS NOTES
Department of Internal Medicine  General Internal Medicine  Attending Progress Note    Chief Complaint:low back pain and leg weakness      HPI:   SUBJECTIVE:  81 yo male with lumbar djd and left leg weakness, PAF and ILD who is in acute rehab . He has made some progress walking and reports his back pain is pretty severe and associated with leg weakness improved with bending forward over his walker. He has fallen and is afraid of falling again. Has continued urgency incontinence and started using the urinal to avoid accidents. Seen by all the consultants. Main issue is when can he be dc'd to home safely  Breathing is quite short with a little exertion still bringing up a lot of discolored phlegm. While on rehab he has made gradual progress, is able to walk further with the walker although he still hurts a lot in his low back. The sob muñoz has not improved on the higher dose of lasix. Therapy follows him and his oxygen sat has never gone lower than 89% with exercise. Observed in therapy and today he can only make it 30 ft max before his back pain becomes intolerable and his legs almost give out. He would take anything for the pain at this point    Again today in therapy 10/30 walking even less before his legs want to buckle. Back pain no better with higher dose prednisone. Would not take the tramadol before therapy, now understands the reason for it, to take when active. Has prominent right arm tremor. Still has a lot of pain and worried about falling once home especially since at night goes to the bathroom every 2 hours always with assistance. Discussed with PT now downgraded to activity per wheel chair only because looses strgth in his legs so abruptly when the back pain hits, is not safe.   SOB muñoz more chronic not the reason he mikey       Past Medical History:   Diagnosis Date    Acute diastolic CHF (congestive heart failure) ([facility] [address] 10/23/2020    Arthritis     BPH with obstruction/lower urinary tract symptoms     nocturia 3-4 x per night    Chronic atrial fibrillation (HCC)     CKD (chronic kidney disease) 2017    level 3    Coronary arteriosclerosis 2009    2 stents/ Dr Krystle Manuel [facility] GERD (gastroesophageal reflux disease)     History of blood transfusion     1945   During deployment    HTN (hypertension) 1990    Left hip pain     schrapnel in hip and intestines/chronic pains left hip impairs walking    Pacemaker 2015    Pulmonary fibrosis ([address] 08/2017    mild amount seen on chest ct    Pulmonary nodule, left 2017    noncalcified 7mm low risk repeat 8/2018 if patient willing    Renal atrophy, right 2017    Renal cyst, right 08/2017    not clearly simple cyst    SOB (shortness of breath) 2017    terrell normal pft and ct chest ? smll amount of pulmonary fibrosis     Past Surgical History:   Procedure Laterality Date    ABDOMEN SURGERY      [place]rapnel removed in [place]  2009    awoke with indigestion    EYE SURGERY Bilateral     cataract    INGUINAL HERNIA REPAIR Right 07/2016    PACEMAKER INSERTION  2014    PACEMAKER PLACEMENT      SMALL INTESTINE SURGERY  1945    schrapnel as ww II vet, part of small intestine moved    TONSILLECTOMY  1960      Family History   Problem Relation Age of Onset    Pacemaker Sister     Pacemaker Brother     No Known Problems Other         lung disease     Social History     Tobacco Use    Smoking status: Never Smoker    Smokeless tobacco: Never Used   Substance Use Topics    Alcohol use: No    Drug use: Not Currently       Prior to Admission medications    Medication Sig Start Date End Date Taking? Authorizing Provider   traMADol (ULTRAM) 50 MG tablet Take 1 tablet by mouth 2 times daily for 30 days.  11/3/20 12/3/20 Yes Carmenza Fuentes MD   losartan (COZAAR) 50 MG tablet Take 1 tablet by mouth daily Hold for systolic less 139 16/1/38  Yes Carmenza Fuentes MD   carvedilol ([address]) 12.5 MG tablet Take 1 tablet by mouth 2 times daily (with meals) 11/3/20  Yes Shmuel Bermudez MD   predniSONE (DELTASONE) 10 MG tablet Take 1 tablet by mouth daily 11/4/20 12/4/20 Yes Shmuel Bermudez MD   diclofenac sodium (VOLTAREN) 1 % GEL Apply 4 g topically 3 times daily 11/3/20  Yes Shmuel Bermudez MD   furosemide (LASIX) 20 MG tablet Take 1 tablet by mouth daily 11/3/20  Yes Shmuel Bermudez MD   pantoprazole (PROTONIX) 40 MG tablet Take 1 tablet by mouth nightly 10/24/20  Yes Alvarez Beltre MD   oxybutynin (DITROPAN) 5 MG tablet TAKE 1 TABLET BY MOUTH EVERY DAY 10/21/20  Yes Alvarez Beltre MD   fluticasone Marcelle Living) 50 MCG/ACT nasal spray SPRAY 2 SPRAY INTO EACH NOSTRIL EVERY DAY 10/9/20  Yes Alvarez Beltre MD   albuterol sulfate  (90 Base) MCG/ACT inhaler Inhale 2 puffs into the lungs every 6 hours as needed for Wheezing or Shortness of Breath 8/27/20  Yes Keyona Miranda MD   umeclidinium-vilanterol (ANORO ELLIPTA) 62.5-25 MCG/INH AEPB inhaler Inhale 1 puff into the lungs daily 8/27/20  Yes Keyona Miranda MD   tamsulosin (FLOMAX) 0.4 MG capsule ONE EVERY NIGHT FOR YOUR PROSTATE 4/22/20  Yes Alvarez Beltre MD   atorvastatin (LIPITOR) 40 MG tablet Take 1 tablet by mouth daily 2/22/19  Yes Kenisha Bullock MD   rivaroxaban (XARELTO) 15 MG TABS tablet Take 1 tablet by mouth daily (with breakfast) 1/8/18  Yes Alvarez Beltre MD   aspirin 81 MG tablet Take 81 mg by mouth daily   Yes Historical Provider, MD   Cholecalciferol (VITAMIN D3) 2000 UNITS CAPS Take 2,000 Units by mouth daily   Yes Historical Provider, MD   calcium carbonate (OSCAL) 500 MG TABS tablet Take 600 mg by mouth daily caltrate 600-D3 one daily   Yes Historical Provider, MD   Multiple Vitamins-Minerals (CENTRUM SILVER ADULT 50+) TABS Take 1 tablet by mouth daily   Yes Historical Provider, MD   finasteride (PROSCAR) 5 MG tablet TAKE 1 TABLET BY MOUTH EVERY DAY 12/2/19   Alvarez Beltre MD         ROS:  A comprehensive review of systems was negative except for: back pain , trouble with balance    OBJECTIVE:      PHYSICAL:  Intake/Output:   No data found. I/O last 3 completed shifts: In: 600 [P.O.:600]  Out: 1120 [Urine:1120]  No intake/output data recorded. VITALS:    BP (!) 161/82   Pulse 74   Temp 97.6 °F (36.4 °C) (Oral)   Resp 17   Ht 5' 6\" (1.676 m)   Wt 183 lb 10.3 oz (83.3 kg)   SpO2 94%   BMI 29.64 kg/m²   bp repeated 152/50   General Appearance: alert and oriented to person, place and time, well-developed and well-nourished, in no acute distress  Skin: warm and dry, no rash or erythema  Head: normocephalic and atraumatic  Eyes: conjunctivae normal and sclera anicteric  ENT: hearing grossly [place] bilaterally and sinuses non-tender  Neck: neck supple and non tender without mass, no thyromegaly or thyroid nodules, no cervical lymphadenopathy   Pulmonary/Chest: paninsp crackles all the way up  Cardiovascular: normal rate, normal S1 and S2, no gallops and no carotid bruits: paced  Abdomen: soft, non-tender, non-distended, normal bowel sounds, no masses or organomegaly  Extremities: no cyanosis, clubbing or edema  Musculoskeletal: bony swelling and tenderness both knees moving around better  Neurologic: coordination normal and speech normal, moves all 4 extremities    DATA:   Labs:  CBC:   Recent Labs     11/02/20  0916   WBC 10.5   HGB 10.5*        BMP:    Recent Labs     11/02/20  0916      K 4.1      CO2 21   BUN 59*   CREATININE 2.0*   GLUCOSE 134*     POC GLUCOSE:    No results for input(s): POCGLU in the last 72 hours. Ca/Mg/Phos:   Recent Labs     11/02/20  0916   CALCIUM 8.3     Hepatic:   No results for input(s): AST, ALT, ALB, BILITOT, ALKPHOS in the last 72 hours. Troponin:   No results for input(s): TROPONINI in the last 72 hours. DIAGNOSTICS:  Ct Head Wo Contrast    Result Date: 10/20/2020  No acute intracranial abnormality.  No significant change from prior study     Ct Chest Wo Contrast    Result Date: HTN (hypertension)  Plan: running a little higher will add coreg and procardia xl, tolerating med changes, will go up on coreg and dc procardia eventually, place holding parameters    Coronary arteriosclerosis  Plan: stable    BPH with obstruction/lower urinary tract symptoms  Plan: ongoing severe sxs    Pulmonary fibrosis ([facility] [address]  Plan: ongoing issue, awaiting pulmonary's guidance, oxygen levels good on room air  Need to avoid fluid overload which would worsen his pulmonary pressures, try changing back to lasix 20mg and improve bp control     Parkinson disease ([facility] [address]  Plan: perhaps some cogwheeling in the wrists no where else, not being treated for this, had a brother who  of parkinsons  I do NOT think he has parkinsons. ,more likely essential tremor.  Coreg should help    Paroxysmal atrial fibrillation (HCC)  Plan: ongoing hx. cont same, add coreg    Pulmonary infiltrates  Plan:  Completing levaquin per Dr Peg Acosta advice  CKD 4  Improving on lower dose of lasix check on occas      Appropriate diagnostic studies and consults ordered  Drea Campbell MD     Spent 35 minutes evaluating and discussing case with patient and nursing and planning treatment for today over 50% of time counseling

## 2020-11-04 NOTE — CARE COORDINATION
[facility] Transitions Initial Follow Up Call    Call within 2 business days of discharge: Yes    Patient: Loise Ganser Patient : 1925   MRN: [id number]  Reason for Admission: debility  Discharge Date: 11/3/20 RARS: Readmission Risk Score: 21      Last Discharge [address]       Complaint Diagnosis Description Type Department Provider    10/23/20  Degenerative disc disease, lumbar . .. Admission (Discharged) Omar Giordano MD        Challenges to be reviewed by the provider   Additional needs identified to be addressed with provider No  none    Discussed COVID-19 related testing which was not done at this time. Test results were not done. Patient informed of results, if available? No done         Method of communication with provider : none      Was this a readmission? No    Care Transition Nurse (CTN) contacted the family by telephone to perform post hospital discharge assessment. Verified name and  with family as identifiers. Provided introduction to self, and explanation of the CTN role. CTN reviewed discharge instructions, medical action plan and red flags with family who verbalized understanding. Family given an opportunity to ask questions and does not have any further questions or concerns at this time. Were discharge instructions available to patient? Yes. Reviewed appropriate site of care based on symptoms and resources available to patient including: PCP. The family agrees to contact the PCP office for questions related to their healthcare. Medication reconciliation was not performed with family, daughter did verify new and changed medications. Discussed follow-up appointments. If no appointment was previously scheduled, appointment scheduling offered: NA has appts. Is follow up appointment scheduled within 7 days of discharge?  Yes  Non-[facility] follow up appointment(s):     Plan for follow-up call in 7-10 days based on severity of symptoms and risk factors. Plan for next call: symptom management-pain  CTN provided contact information for future needs. Writer spoke with daughter, Diane (on hippa form). She stated her [facility] and it might be better to speak with her. She stated patient has an appt with a pain management MD on Friday, and she hopes this will help his back and leg pain. She stated her dad now uses a wheelchair and then pivots to a walker to use the bathroom. She stated he is eating and drinking without any issues. She stated they have not heard from [facility].     Writer called and spoke to Zakia at [facility] who confirmed they had the referral.    Non-face-to-face services provided:  Obtained and reviewed discharge summary and/or continuity of care documents  Communication with home health agencies or other community services the patient is currently [facility]    Care Transitions 24 Hour Call    Do you have any ongoing symptoms?:  Yes  Patient-reported symptoms:  Pain  Do you have a copy of your discharge instructions?:  Yes  Do you have all of your prescriptions and are they filled?:  Yes  Have you scheduled your follow up appointment?:  Yes  Were you discharged with any Home Care or Post Acute Services:  Yes  Care Transitions Interventions         Follow Up  Future Appointments   Date Time Provider Tavo Raya   11/6/2020  2:00 PM Trevor Storey [facility]   11/10/2020  4:20 PM Donald Fox MD [facility]   12/3/2020  2:00 PM Meliza Regalado MD [facility]       Roge Nascimento RN

## 2020-11-06 NOTE — TELEPHONE ENCOUNTER
L/m for approval to hold Mimi Rosario for 3 days prior to [facility] on 11/20/20. Patient aware of hold date.

## 2020-11-06 NOTE — PROGRESS NOTES
New Patient: SPINE    Referring Provider:  No ref. provider found    Chief Complaint   Patient presents with    Lower Back Pain     NP LSP - pain since 10/2020. (-) WISAM. c/o constant pain.  Leg Pain     L LEG / KNEE - c/o weakness. trouble walking    Gait Problem     ambulates with wheelchair       HISTORY OF PRESENT ILLNESS:      · The patient is being sent at the request of No ref. provider found in consultation as a new spine patient for low back pain and bilateral leg pain The patient is a 80 y.o. male whom reports low back pain for 20 years however this began to worsen on 10/20/2020. Denies injury or event that may have triggered the recent symptoms however he did have a fall in 2000 when the pain began. The patient rates his pain 5-8/10 and describes it as sharp, aching and weakness. Aggravating factors include movement such as standing and walking. The patient states he cannot stand for longer than 10 minutes without pain. The patient went to the ER when the back pain began and was diagnosed with pneumonia and a blood infection. He went to physical therapy for a week after being discharged from the hospital. The patient was receiving steroid injections in his knees which did help. Recent treatment includes pain medication and tylenol which helps temporarily.      Pain Assessment  Location of Pain: Back(LSP)  Location Modifiers: Left(UPPER LEG / KNEE)  Severity of Pain: 8  Quality of Pain: Sharp, Aching(WEAKNESS)  Duration of Pain: Persistent  Frequency of Pain: Constant  Date Pain First Started: (10/2020)  Aggravating Factors: (MOST MOVEMENT)  Limiting Behavior: Yes  Result of Injury: No  Work-Related Injury: No  Are there other pain locations you wish to document?: No      Associated signs and symptoms:   Neurogenic bowel or bladder symptoms:  no   Perceived weakness:  yes   Difficulty walking:  yes    Recent Imaging (within past one year)   Xrays: no   MRI or CT of spine: yes    Current/Past Treatment: mouth 2 times daily for 30 days. , Disp: 60 tablet, Rfl: 0    losartan (COZAAR) 50 MG tablet, Take 1 tablet by mouth daily Hold for systolic less 940, Disp: 30 tablet, Rfl: 0    carvedilol (COREG) 12.5 MG tablet, Take 1 tablet by mouth 2 times daily (with meals), Disp: 60 tablet, Rfl: 0    predniSONE (DELTASONE) 10 MG tablet, Take 1 tablet by mouth daily, Disp: 30 tablet, Rfl: 0    diclofenac sodium (VOLTAREN) 1 % GEL, Apply 4 g topically 3 times daily, Disp: 1 Tube, Rfl: 0    furosemide (LASIX) 20 MG tablet, Take 1 tablet by mouth daily, Disp: 30 tablet, Rfl: 0    pantoprazole (PROTONIX) 40 MG tablet, Take 1 tablet by mouth nightly, Disp: 30 tablet, Rfl: 3    oxybutynin (DITROPAN) 5 MG tablet, TAKE 1 TABLET BY MOUTH EVERY DAY, Disp: 90 tablet, Rfl: 1    fluticasone (FLONASE) 50 MCG/ACT nasal spray, SPRAY 2 SPRAY INTO EACH NOSTRIL EVERY DAY, Disp: 3 Bottle, Rfl: 1    albuterol sulfate  (90 Base) MCG/ACT inhaler, Inhale 2 puffs into the lungs every 6 hours as needed for Wheezing or Shortness of Breath, Disp: 1 Inhaler, Rfl: 5    umeclidinium-vilanterol (ANORO ELLIPTA) 62.5-25 MCG/INH AEPB inhaler, Inhale 1 puff into the lungs daily, Disp: 1 each, Rfl: 0    tamsulosin (FLOMAX) 0.4 MG capsule, ONE EVERY NIGHT FOR YOUR PROSTATE, Disp: 90 capsule, Rfl: 3    finasteride (PROSCAR) 5 MG tablet, TAKE 1 TABLET BY MOUTH EVERY DAY, Disp: 90 tablet, Rfl: 4    atorvastatin (LIPITOR) 40 MG tablet, Take 1 tablet by mouth daily, Disp: 90 tablet, Rfl: 2    rivaroxaban (XARELTO) 15 MG TABS tablet, Take 1 tablet by mouth daily (with breakfast), Disp: 90 tablet, Rfl: 5    aspirin 81 MG tablet, Take 81 mg by mouth daily, Disp: , Rfl:     Cholecalciferol (VITAMIN D3) 2000 UNITS CAPS, Take 2,000 Units by mouth daily, Disp: , Rfl:     calcium carbonate (OSCAL) 500 MG TABS tablet, Take 600 mg by mouth daily caltrate 600-D3 one daily, Disp: , Rfl:     Multiple Vitamins-Minerals (CENTRUM SILVER ADULT 50+) TABS, Take 1 tablet by mouth daily, Disp: , Rfl:   Allergies:  Claritin [loratadine] and Zestril [lisinopril]  Social History:    reports that he has never smoked. He has never used smokeless tobacco. He reports previous drug use. He reports that he does not drink alcohol. Family History:   Family History   Problem Relation Age of Onset   [address] [facility] Pacemaker Sister     Pacemaker Brother     No Known Problems Other         lung disease       REVIEW OF SYSTEMS: ROS - 14 point    Constitutional: No fevers, chills, night sweats, unexplained weight loss  Eye: No vision changes or diplopia  ENT: No nasal congestion, postnasal drip or sore throat. No tinnitus  Respiratory: No cough or SOB  CV: No chest pain or palpitations  GI: No nausea, abdominal pain, stool changes  : No dysuria or hematuria  Skin: No new or changing skin lesions, no rashes  MSK: No joint swelling, morning stiffness, unusual joint pain  Neurological: No headache, confusion, syncope  Psychiatric: No excessive anxiety or depression  Endocrine: No polyuria or polydipsia  Hematologic: No lymph node enlargement or excessive bleeding  Immunologic:No history of immune deficiency or immunomodulating drugs           PHYSICAL EXAM:    Vitals: Temperature 98.1 °F (36.7 °C), resp. rate 14, height 5' 5.98\" (1.676 m), weight 183 lb 10.3 oz (83.3 kg). GENERAL EXAM:  · General Apparence: Patient is adequately groomed with no evidence of malnutrition. · Psychiatric: Orientation: The patient is oriented to time, place and person. The patient's mood and affect are appropriate   · Vascular: Examination reveals no swelling and palpation reveals no tenderness in upper or lower extremities. Good capillary refill.    · The lymphatic examination of the neck, axillae and groin reveals all areas to be without enlargement or induration   Sensation is intact without deficit in the upper and lower extremities to light touch and pinprick  · Coordination of the upper and lower extremities are normal.  · Additional Examinations:  · RIGHT UPPER EXTREMITY:  Inspection/examination of the right upper extremity does not show any tenderness, deformity or injury. Range of motion is normal and pain-free. There is no gross instability. There are no rashes, ulcerations or lesions. Strength and tone are normal. No atrophy or abnormal movements are noted. · LEFT UPPER EXTREMITY: Inspection/examination of the left upper extremity does not show any tenderness, deformity or injury. Range of motion is normal and pain-free. There is no gross instability. There are no rashes, ulcerations or lesions. Strength and tone are normal. No atrophy or abnormal movements are noted. LUMBAR/SACRAL EXAMINATION:  · Inspection: Local inspection shows no step-off or bruising. Lumbar alignment is normal. No instability is noted. · Palpation:   No evidence of tenderness at the midline. Lumbar paraspinal tenderness Mild L4/5 and L5/S1 tenderness  Bursal tenderness No tenderness bilaterally  There is no paraspinal spasm. · Range of Motion: limited by 50% in all planes due to pain  · Strength:   Strength testing is 5/5 in all muscle groups tested. · Special Tests:   Straight leg raise and crossed SLR negative. · Skin: There are no rashes, ulcerations or lesions. · Reflexes: Reflexes are symmetrically 1+ at the patellar and ankle tendons. Clonus absent bilaterally at the feet. · Gait & station: in a   today  · Additional Examinations:  · RIGHT LOWER EXTREMITY: Inspection/examination of the right lower extremity does not show any tenderness, deformity or injury. Range of motion is unremarkable. There is no gross instability. There are no rashes, ulcerations or lesions. Strength and tone are normal. No atrophy or abnormal movements are noted. · LEFT LOWER EXTREMITY:  Inspection/examination of the left lower extremity does not show any tenderness, deformity or injury. Range of motion is unremarkable.  There is no gross instability. There are no rashes, ulcerations or lesions. Strength and tone are normal. No atrophy or abnormal movements are noted. Diagnostic Testing:      Ct Head Wo Contrast    Result Date: 10/20/2020  No acute intracranial abnormality. No significant change from prior study     Ct Chest Wo Contrast    Result Date: 10/23/2020  Pulmonary interstitial opacities, most likely pulmonary edema superimposed on chronic interstitial lung disease. Indeterminate 42 mm right renal mass may represent a proteinaceous cyst or neoplasm. RECOMMENDATIONS: Only if deemed clinically important to the patient's medical care after considering advanced patient age and comorbidities, further workup of the renal lesion could be obtained with nonemergent/outpatient renal mass protocol CT or MRI     Ct Lumbar Spine Wo Contrast    Result Date: 10/20/2020  No acute fracture or subluxation. 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 L3-4 severe central and moderate bilateral foraminal stenosis. L4-5 grade 1 spondylolisthesis with additional degenerative changes, resulting in moderate central stenosis. Xr Chest Portable    Result Date: 10/23/2020  Improved inspiratory effort with ongoing interstitial opacities which could reflect edema and/or chronic interstitial lung disease based on prior imaging. Xr Chest Portable    Result Date: 10/20/2020  Patchy infiltrates suggested bilaterally new from prior study. Cta Head Neck W Contrast    Result Date: 10/20/2020  1. Bilateral carotid bifurcation atherosclerotic plaque resulting in approximately 40% bilateral stenosis of the origin of the internal carotid artery. Finding is compatible with 16-49% stenosis per sonographic NASCET index criteria. 2. 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 3. Right vertebral artery termination as the posteroinferior cerebellar artery (PICA).  4. Bilateral internal carotid artery cavernous segment atherosclerotic calcification without significant arterial stenosis. 5. Approximately 50% arterial stenosis of the bilateral vertebral body origin secondary to encroachment by atherosclerotic calcification. 6. Mild cerebral white matter chronic microvascular ischemic disease. 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         L3-4 severe central and moderate bilateral foraminal stenosis.         L4-5 grade 1 spondylolisthesis with additional degenerative changes,    resulting in moderate central stenosis.       EMG:  None  Results for orders placed or performed during the hospital encounter of 58/63/81   Basic Metabolic Panel w/ Reflex to MG   Result Value Ref Range    Sodium 138 136 - 145 mmol/L    Potassium reflex Magnesium 4.7 3.5 - 5.1 mmol/L    Chloride 104 99 - 110 mmol/L    CO2 25 21 - 32 mmol/L    Anion Gap 9 3 - 16    Glucose 100 (H) 70 - 99 mg/dL    BUN 58 (H) 7 - 20 mg/dL    CREATININE 1.8 (H) 0.8 - 1.3 mg/dL    GFR Non-African American 35 (A) >60    GFR  43 (A) >60    Calcium 8.4 8.3 - 10.6 mg/dL   CBC auto differential   Result Value Ref Range    WBC 13.2 (H) 4.0 - 11.0 K/uL    RBC 3.93 (L) 4.20 - 5.90 M/uL    Hemoglobin 10.1 (L) 13.5 - 17.5 g/dL    Hematocrit 31.6 (L) 40.5 - 52.5 %    MCV 80.4 80.0 - 100.0 fL    MCH 25.8 (L) 26.0 - 34.0 pg    MCHC 32.1 31.0 - 36.0 g/dL    RDW 17.0 (H) 12.4 - 15.4 %    Platelets 162 203 - 379 K/uL    MPV 9.6 5.0 - 10.5 fL    Neutrophils % 77.7 %    Lymphocytes % 11.1 %    Monocytes % 10.9 %    Eosinophils % 0.2 %    Basophils % 0.1 %    Neutrophils Absolute 10.2 (H) 1.7 - 7.7 K/uL    Lymphocytes Absolute 1.5 1.0 - 5.1 K/uL    Monocytes Absolute 1.4 (H) 0.0 - 1.3 K/uL    Eosinophils Absolute 0.0 0.0 - 0.6 K/uL    Basophils Absolute 0.0 0.0 - 0.2 K/uL   Prealbumin   Result Value Ref Range    Prealbumin 22.4 20.0 - 40.0 mg/dL   Basic Metabolic Panel w/ Reflex to MG   Result Value Ref Range    Sodium 138 136 - 145 mmol/L    Potassium reflex Magnesium 4.9 3.5 - 5.1 mmol/L    Chloride 103 99 - 110 mmol/L    CO2 24 21 - 32 mmol/L    Anion Gap 11 3 - 16    Glucose 91 70 - 99 mg/dL    BUN 53 (H) 7 - 20 mg/dL    CREATININE 1.9 (H) 0.8 - 1.3 mg/dL    GFR Non- 33 (A) >60    GFR  40 (A) >60    Calcium 8.5 8.3 - 10.6 mg/dL   CBC auto differential   Result Value Ref Range    WBC 12.5 (H) 4.0 - 11.0 K/uL    RBC 4.10 (L) 4.20 - 5.90 M/uL    Hemoglobin 10.7 (L) 13.5 - 17.5 g/dL    Hematocrit 33.3 (L) 40.5 - 52.5 %    MCV 81.2 80.0 - 100.0 fL    MCH 26.1 26.0 - 34.0 pg    MCHC 32.1 31.0 - 36.0 g/dL    RDW 16.9 (H) 12.4 - 15.4 %    Platelets 205 226 - 899 K/uL    MPV 9.7 5.0 - 10.5 fL    Neutrophils % 68.6 %    Lymphocytes % 19.0 %    Monocytes % 11.0 %    Eosinophils % 1.2 %    Basophils % 0.2 %    Neutrophils Absolute 8.5 (H) 1.7 - 7.7 K/uL    Lymphocytes Absolute 2.4 1.0 - 5.1 K/uL    Monocytes Absolute 1.4 (H) 0.0 - 1.3 K/uL    Eosinophils Absolute 0.2 0.0 - 0.6 K/uL    Basophils Absolute 0.0 0.0 - 0.2 K/uL   Basic Metabolic Panel w/ Reflex to MG   Result Value Ref Range    Sodium 137 136 - 145 mmol/L    Potassium reflex Magnesium 4.6 3.5 - 5.1 mmol/L    Chloride 104 99 - 110 mmol/L    CO2 24 21 - 32 mmol/L    Anion Gap 9 3 - 16    Glucose 98 70 - 99 mg/dL    BUN 57 (H) 7 - 20 mg/dL    CREATININE 1.8 (H) 0.8 - 1.3 mg/dL    GFR Non-African American 35 (A) >60    GFR  43 (A) >60    Calcium 8.5 8.3 - 10.6 mg/dL   CBC auto differential   Result Value Ref Range    WBC 10.4 4.0 - 11.0 K/uL    RBC 3.91 (L) 4.20 - 5.90 M/uL    Hemoglobin 10.2 (L) 13.5 - 17.5 g/dL    Hematocrit 31.5 (L) 40.5 - 52.5 %    MCV 80.5 80.0 - 100.0 fL    MCH 26.2 26.0 - 34.0 pg    MCHC 32.5 31.0 - 36.0 g/dL    RDW 17.4 (H) 12.4 - 15.4 %    Platelets 804 592 - 322 K/uL    MPV 9.5 5.0 - 10.5 fL    Neutrophils % 67.7 %    Lymphocytes % 19.7 %    Monocytes % 10.4 %    Eosinophils % 1.9 %    Basophils % 0.3 % Neutrophils Absolute 7.0 1.7 - 7.7 K/uL    Lymphocytes Absolute 2.1 1.0 - 5.1 K/uL    Monocytes Absolute 1.1 0.0 - 1.3 K/uL    Eosinophils Absolute 0.2 0.0 - 0.6 K/uL    Basophils Absolute 0.0 0.0 - 0.2 K/uL   Basic Metabolic Panel   Result Value Ref Range    Potassium 4.6 3.5 - 5.1 mmol/L   Basic Metabolic Panel w/ Reflex to MG   Result Value Ref Range    Sodium 138 136 - 145 mmol/L    Potassium reflex Magnesium 4.6 3.5 - 5.1 mmol/L    Chloride 103 99 - 110 mmol/L    CO2 25 21 - 32 mmol/L    Anion Gap 10 3 - 16    Glucose 124 (H) 70 - 99 mg/dL    BUN 52 (H) 7 - 20 mg/dL    CREATININE 1.8 (H) 0.8 - 1.3 mg/dL    GFR Non-African American 35 (A) >60    GFR  43 (A) >60    Calcium 8.9 8.3 - 10.6 mg/dL   CBC auto differential   Result Value Ref Range    WBC 11.9 (H) 4.0 - 11.0 K/uL    RBC 4.41 4.20 - 5.90 M/uL    Hemoglobin 11.5 (L) 13.5 - 17.5 g/dL    Hematocrit 35.6 (L) 40.5 - 52.5 %    MCV 80.7 80.0 - 100.0 fL    MCH 26.1 26.0 - 34.0 pg    MCHC 32.4 31.0 - 36.0 g/dL    RDW 17.4 (H) 12.4 - 15.4 %    Platelets 730 565 - 157 K/uL    MPV 9.5 5.0 - 10.5 fL    Neutrophils % 66.8 %    Lymphocytes % 22.6 %    Monocytes % 8.0 %    Eosinophils % 2.1 %    Basophils % 0.5 %    Neutrophils Absolute 7.9 (H) 1.7 - 7.7 K/uL    Lymphocytes Absolute 2.7 1.0 - 5.1 K/uL    Monocytes Absolute 1.0 0.0 - 1.3 K/uL    Eosinophils Absolute 0.3 0.0 - 0.6 K/uL    Basophils Absolute 0.1 0.0 - 0.2 K/uL   Basic Metabolic Panel   Result Value Ref Range    Potassium 4.6 3.5 - 5.1 mmol/L   Basic Metabolic Panel w/ Reflex to MG   Result Value Ref Range    Sodium 137 136 - 145 mmol/L    Potassium reflex Magnesium NA (AA) 3.5 - 5.1 mmol/L    Chloride 103 99 - 110 mmol/L    CO2 24 21 - 32 mmol/L    Anion Gap 10 3 - 16    Glucose 97 70 - 99 mg/dL    BUN 52 (H) 7 - 20 mg/dL    CREATININE 1.9 (H) 0.8 - 1.3 mg/dL    GFR Non- 33 (A) >60    GFR  40 (A) >60    Calcium 8.8 8.3 - 10.6 mg/dL   CBC auto differential Result Value Ref Range    WBC 11.2 (H) 4.0 - 11.0 K/uL    RBC 4.02 (L) 4.20 - 5.90 M/uL    Hemoglobin 10.5 (L) 13.5 - 17.5 g/dL    Hematocrit 32.4 (L) 40.5 - 52.5 %    MCV 80.4 80.0 - 100.0 fL    MCH 26.1 26.0 - 34.0 pg    MCHC 32.5 31.0 - 36.0 g/dL    RDW 16.9 (H) 12.4 - 15.4 %    Platelets 754 845 - 655 K/uL    MPV 9.3 5.0 - 10.5 fL    Neutrophils % 68.1 %    Lymphocytes % 20.2 %    Monocytes % 9.6 %    Eosinophils % 1.7 %    Basophils % 0.4 %    Neutrophils Absolute 7.6 1.7 - 7.7 K/uL    Lymphocytes Absolute 2.3 1.0 - 5.1 K/uL    Monocytes Absolute 1.1 0.0 - 1.3 K/uL    Eosinophils Absolute 0.2 0.0 - 0.6 K/uL    Basophils Absolute 0.0 0.0 - 0.2 K/uL   Basic Metabolic Panel   Result Value Ref Range    Potassium 4.7 3.5 - 5.1 mmol/L   Basic Metabolic Panel w/ Reflex to MG   Result Value Ref Range    Sodium 139 136 - 145 mmol/L    Potassium reflex Magnesium 4.1 3.5 - 5.1 mmol/L    Chloride 103 99 - 110 mmol/L    CO2 22 21 - 32 mmol/L    Anion Gap 14 3 - 16    Glucose 152 (H) 70 - 99 mg/dL    BUN 52 (H) 7 - 20 mg/dL    CREATININE 1.7 (H) 0.8 - 1.3 mg/dL    GFR Non- 38 (A) >60    GFR  46 (A) >60    Calcium 8.9 8.3 - 10.6 mg/dL   CBC auto differential   Result Value Ref Range    WBC 11.6 (H) 4.0 - 11.0 K/uL    RBC 4.48 4.20 - 5.90 M/uL    Hemoglobin 11.6 (L) 13.5 - 17.5 g/dL    Hematocrit 36.3 (L) 40.5 - 52.5 %    MCV 80.9 80.0 - 100.0 fL    MCH 25.8 (L) 26.0 - 34.0 pg    MCHC 31.9 31.0 - 36.0 g/dL    RDW 17.3 (H) 12.4 - 15.4 %    Platelets 173 139 - 552 K/uL    MPV 9.4 5.0 - 10.5 fL    Neutrophils % 69.1 %    Lymphocytes % 22.4 %    Monocytes % 6.5 %    Eosinophils % 1.5 %    Basophils % 0.5 %    Neutrophils Absolute 8.0 (H) 1.7 - 7.7 K/uL    Lymphocytes Absolute 2.6 1.0 - 5.1 K/uL    Monocytes Absolute 0.8 0.0 - 1.3 K/uL    Eosinophils Absolute 0.2 0.0 - 0.6 K/uL    Basophils Absolute 0.1 0.0 - 0.2 K/uL   Basic Metabolic Panel   Result Value Ref Range    Potassium 4.1 3.5 - 5.1 mmol/L   Basic Metabolic Panel w/ Reflex to MG   Result Value Ref Range    Sodium 136 136 - 145 mmol/L    Potassium reflex Magnesium 4.6 3.5 - 5.1 mmol/L    Chloride 102 99 - 110 mmol/L    CO2 23 21 - 32 mmol/L    Anion Gap 11 3 - 16    Glucose 130 (H) 70 - 99 mg/dL    BUN 54 (H) 7 - 20 mg/dL    CREATININE 1.7 (H) 0.8 - 1.3 mg/dL    GFR Non- 38 (A) >60    GFR  46 (A) >60    Calcium 8.4 8.3 - 10.6 mg/dL   CBC auto differential   Result Value Ref Range    WBC 12.0 (H) 4.0 - 11.0 K/uL    RBC 4.04 (L) 4.20 - 5.90 M/uL    Hemoglobin 10.5 (L) 13.5 - 17.5 g/dL    Hematocrit 32.6 (L) 40.5 - 52.5 %    MCV 80.7 80.0 - 100.0 fL    MCH 26.1 26.0 - 34.0 pg    MCHC 32.4 31.0 - 36.0 g/dL    RDW 16.9 (H) 12.4 - 15.4 %    Platelets 737 885 - 621 K/uL    MPV 9.5 5.0 - 10.5 fL    Neutrophils % 74.9 %    Lymphocytes % 16.1 %    Monocytes % 8.5 %    Eosinophils % 0.4 %    Basophils % 0.1 %    Neutrophils Absolute 9.0 (H) 1.7 - 7.7 K/uL    Lymphocytes Absolute 1.9 1.0 - 5.1 K/uL    Monocytes Absolute 1.0 0.0 - 1.3 K/uL    Eosinophils Absolute 0.1 0.0 - 0.6 K/uL    Basophils Absolute 0.0 0.0 - 0.2 K/uL   Basic Metabolic Panel   Result Value Ref Range    Potassium 4.6 3.5 - 5.1 mmol/L   CBC Auto Differential   Result Value Ref Range    WBC 10.5 4.0 - 11.0 K/uL    RBC 3.96 (L) 4.20 - 5.90 M/uL    Hemoglobin 10.5 (L) 13.5 - 17.5 g/dL    Hematocrit 32.1 (L) 40.5 - 52.5 %    MCV 80.9 80.0 - 100.0 fL    MCH 26.5 26.0 - 34.0 pg    MCHC 32.7 31.0 - 36.0 g/dL    RDW 16.8 (H) 12.4 - 15.4 %    Platelets 584 733 - 385 K/uL    MPV 9.3 5.0 - 10.5 fL    Neutrophils % 70.6 %    Lymphocytes % 18.9 %    Monocytes % 8.6 %    Eosinophils % 1.5 %    Basophils % 0.4 %    Neutrophils Absolute 7.4 1.7 - 7.7 K/uL    Lymphocytes Absolute 2.0 1.0 - 5.1 K/uL    Monocytes Absolute 0.9 0.0 - 1.3 K/uL    Eosinophils Absolute 0.2 0.0 - 0.6 K/uL    Basophils Absolute 0.0 0.0 - 0.2 K/uL   Basic Metabolic Panel w/ Reflex to MG   Result Value Ref Range    Sodium 136 136 - 145 mmol/L    Potassium reflex Magnesium 4.1 3.5 - 5.1 mmol/L    Chloride 101 99 - 110 mmol/L    CO2 21 21 - 32 mmol/L    Anion Gap 14 3 - 16    Glucose 134 (H) 70 - 99 mg/dL    BUN 59 (H) 7 - 20 mg/dL    CREATININE 2.0 (H) 0.8 - 1.3 mg/dL    GFR Non- 31 (A) >60    GFR  38 (A) >60    Calcium 8.3 8.3 - 10.6 mg/dL         Impression (Medical Decision Making):       No diagnosis found. Plan (Medical Decision Making):    I discussed the diagnosis and the treatment options with Denia Lorie today. In Summary:  The various treatment options were outlined and discussed with Denia Melo including:  Conservative care options: physical therapy, ice, medications, bracing, and activity modification. The indications for therapeutic injections. The indications for additional imaging/laboratory studies. The indications for (possible future) interventions. After considering the various options discussed, Denia Lorie elected to pursue a course of treatment that includes the followin. Medications: No further recommendations for new medications. 2. PT:  Hold on PT    3. Further studies: COVID-19 PCR testing       4. Interventional:  We discussed pursuing a bilateral L4 TF epidural steroid injection to address the pain. Radiologic imaging and symptoms confirm the pain etiology. Risks, benefits and alternatives of interventional options were discussed. These include and are not limited to bleeding, infection, spinal headache, nerve injury, increased pain and lack of pain relief. The patient verbalized understanding and would like to proceed. The patient will be scheduled accordingly. After risks benefits and alternatives were discussed a left intra-articular knee injection was undertaken the bedside. The skin overlying the knee was prepped ChloraPrep x3.   A mixture of 80 mg of Kenalog and 3 mils of 1% lidocaine was drawn up and instilled with a 22-gauge needle. The needle was removed and Band-Aid was applied. 5. Healthy Lifestyle Measures:  Patient education material reviewing the following was distributed to Ivania Hinton  Anatomic drawings  Healthy lifestyle education  Osteoporosis prevention,   Back and neck pain educational information   Advanced imaging preparedness    Posture education   Proper lifting and carrying techniques,   Weight management  Quitting smoking and   Minor ways to treat back pain  For further information regarding the spine conditions and to review interventional treatments the patient was directed to [facility].    6.  Follow up:  4-6 weeks    Ivania Hinton was instructed to call the office if his symptoms worsen or if new symptoms appear prior to the next scheduled visit. He is specifically instructed to contact the office between now & his scheduled appointment if he has concerns related to his condition or if he needs assistance in scheduling the above tests. He is welcome to call for an appointment sooner if he has any additional concerns or questions. Ange Mukherjee. Elia Tirado MD, PAUL, [facility]  Board Certified in [address] [place] [address] Certified and Fellowship Trained in [facility] ([facility])     This dictation was performed with a verbal recognition program [facility] ) and it was checked for errors. It is possible that there are still dictated errors within this office note. If so, please bring any errors to my attention for an addendum. All efforts were made to ensure that this office note is accurate.

## 2020-11-06 NOTE — LETTER
Please schedule the following with:     Date:   20 @ 9:00    Account: [de-identified]  Patient: Cesar Lawson    : 1925  Address:  [address] [place] [address]    Phone (H):  [phone] (home)      ----------------------------------------------------------------------------------------------  Diagnosis:     ICD-10-CM   1. Lumbar stenosis with neurogenic claudication  M48.062   2. Degenerative disc disease, lumbar  M51.36   3. Gait abnormality  R26.9   4. Primary osteoarthritis of left knee  M17.12         Levels:L4  Procedure type Transforaminal epidural steroid injection  Side bilateral  CPT Codes 98264    ----------------------------------------------------------------------------------------------  Injection # 1   [facility]    Attending Physician       Merlene Escalante. Eric De La Garza MD.  ----------------------------------------------------------------------------------------------  Injection Scheduled For:    At:    1st Insurance BCBS FED   Pre-Cert#    2nd Insurance MCR    Pre-Cert#    Comments or Special instructions:    COVID TEST: Yes.  Rapid testing per AAS    · Infection control  · Tested positive for MRSA in past 12 months:  no  · Tested positive for MSSA \"staph infection\" in past 12 months: no  · Tested positive for VRE (Vancomycin Resistant Enterococci) in past 12 months:   no  · Currently on any antibiotics for an infection: no  · Anticoagulants:  · On a blood thinner:  Yes, 81 mg    · Any history of bleeding disorder: no   · Advanced Liver disease: no   · Advanced Renal disease: no   · Glaucoma: no   · Diabetes: no     Sedation:  No  -----------------------------------------------------------------------------------------------  Allergies   Allergen Reactions    Claritin [Loratadine]      Reduced urine flow    Zestril [Lisinopril] Other (See Comments)     coughing

## 2020-11-10 PROBLEM — M47.16 OSTEOARTHRITIS OF LUMBAR SPINE WITH MYELOPATHY: Status: ACTIVE | Noted: 2020-01-01

## 2020-11-10 NOTE — TELEPHONE ENCOUNTER
Auth: NPR  Date: 11/20/2020  Reference # None  Spoke with: None  Type of SX: Outpatient  Location: [facility]  CPT 32835, 50 MOD, [id number], [id number]   SX area: Lumbar spine  Insurance: Medicare A&B

## 2020-11-11 PROBLEM — I50.33 ACUTE ON CHRONIC DIASTOLIC CHF (CONGESTIVE HEART FAILURE) (HCC): Status: ACTIVE | Noted: 2020-01-01

## 2020-11-11 PROBLEM — K92.0 HEMATEMESIS: Status: ACTIVE | Noted: 2020-01-01

## 2020-11-11 PROBLEM — R04.2 HEMOPTYSIS: Status: ACTIVE | Noted: 2020-01-01

## 2020-11-11 NOTE — ED NOTES
Initial contact with pt. Pt tolerating BiPap well. Urinal at bedside. NSR on monitor. Pt repositioned in bed. NSR on monitor. Only complaint is chronic back pain.       Toño Bland RN  11/11/20 3269

## 2020-11-11 NOTE — PROGRESS NOTES
0800 - Patient admitted to room 5102. Oriented to the room and call light. Telemetry monitor placed and verified with [facility]. Patient is short of breath, hard of hearing and has upper dentures along with missing teeth on the bottom.

## 2020-11-11 NOTE — CONSULTS
Nutrition Education    Educated \"Heart Failure Nutrition Therapy,\" \"Sodium-free flavoring tips,\" \"Sodium content of foods,\" and \"Nutrition-reading Label reading. \" Daughter states that patient eats home cooked meals and eats out. Patient states she is moderately familiar with diet restrictions and reading nutrition labels. All questions answered at this time. Verbal education with handout provided. Daughter seems to be willing to adhere to this diet. · Verbally reviewed information with Daughter and patient. · Educated on Heart Failure Nutrition Therapy  · Written educational materials provided. · Contact name and number provided. · Refer to Patient Education activity for more details.     Contact: [phone]

## 2020-11-11 NOTE — CONSULTS
Patient is seen at the request of Dr. Dave Sears for respiratory failure    PCP: Annabel Herman MD    HISTORY OF PRESENT ILLNESS: 80y.o. year old male who was admitted on 11/11/20 for respiratory failure. He says he was feeling well earlier on the day of admission, but when he went to bed he was unable to lay flat so he sat up in bed most of the night. After returning from the bathroom in the early morning he had severe shortness of breath that did not improve despite rest.  As a result EMS was called. When EMS arrived he was hypoxic to 80% on room air. He arrived to the ER in respiratory distress and was placed on BiPAP.       PAST MEDICAL HISTORY:  Past Medical History:   Diagnosis Date    Acute diastolic CHF (congestive heart failure) ([facility] 10/23/2020    Arthritis     Atrial fibrillation (HCC)     BPH with obstruction/lower urinary tract symptoms     nocturia 3-4 x per night    Chronic atrial fibrillation (HCC)     CKD (chronic kidney disease) 2017    level 3    Coronary arteriosclerosis 2009    2 stents/ Dr Jun Cano in [facility] GERD (gastroesophageal reflux disease)     History of blood transfusion     1945   During deployment    HTN (hypertension) 1990    Left hip pain     schrapnel in hip and intestines/chronic pains left hip impairs walking    Pacemaker 2015    Pulmonary fibrosis ([address] 08/2017    mild amount seen on chest ct    Pulmonary nodule, left 2017    noncalcified 7mm low risk repeat 8/2018 if patient willing    Renal atrophy, right 2017    Renal cyst, right 08/2017    not clearly simple cyst    SOB (shortness of breath) 2017    terrell normal pft and ct chest ? smll amount of pulmonary fibrosis       PAST SURGICAL HISTORY:  Past Surgical History:   Procedure Laterality Date    ABDOMEN SURGERY      Scrapnel removed in [facility]  2009    awoke with indigestion    EYE SURGERY Bilateral     cataract    INGUINAL HERNIA REPAIR Right 07/2016 PRN  rivaroxaban (XARELTO) tablet 10 mg, 10 mg, Oral, Daily  pantoprazole (PROTONIX) injection 40 mg, 40 mg, Intravenous, Daily **AND** sodium chloride (PF) 0.9 % injection 10 mL, 10 mL, Intravenous, Daily  vancomycin (VANCOCIN) intermittent dosing (placeholder), , Other, RX Placeholder  vancomycin (VANCOCIN) 1500 mg in dextrose 5 % 250 mL IVPB, 1,500 mg, Intravenous, Once      ALLERGIES:  Patient is allergic to claritin [loratadine] and zestril [lisinopril]. FAMILY HISTORY:  family history includes No Known Problems in an other family member; Pacemaker in his brother and sister.     SOCIAL HISTORY:  Social History     Socioeconomic History    Marital status:      Spouse name: Not on file    Number of children: Not on file    Years of education: Not on file    Highest education level: Not on file   Occupational History    Occupation: [employer]   Social Needs    Financial resource strain: Patient refused    Food insecurity     Worry: Patient refused     Inability: Patient refused    Transportation needs     Medical: Patient refused     Non-medical: Patient refused   Tobacco Use    Smoking status: Never Smoker    Smokeless tobacco: Never Used   Substance and Sexual Activity    Alcohol use: No    Drug use: Not Currently    Sexual activity: Not Currently   Lifestyle    Physical activity     Days per week: Not on file     Minutes per session: Not on file    Stress: Not on file   Relationships    Social connections     Talks on phone: Not on file     Gets together: Not on file     Attends [employer] service: Not on file     Active member of club or organization: Not on file     Attends meetings of clubs or organizations: Not on file     Relationship status: Not on file    Intimate partner violence     Fear of current or ex partner: Not on file     Emotionally abused: Not on file     Physically abused: Not on file     Forced sexual activity: Not on file   Other Topics Concern    Not on file   Social History Narrative    Not on file      reports that he has never smoked. He has never used smokeless tobacco.    REVIEW OF SYSTEMS:  Constitutional: Negative for fever  HENT: Negative for sore throat  Eyes: Negative for redness   Respiratory: + for dyspnea, +cough  Cardiovascular: Negative for chest pain  Gastrointestinal: Negative for vomiting, diarrhea   Genitourinary: Negative for hematuria   Musculoskeletal: Negative for arthralgias   Skin: Negative for rash  Neurological: Negative for syncope  Hematological: Negative for adenopathy  Psychiatric/Behavorial: Negative for anxiety    Objective:   PHYSICAL EXAM:  Blood pressure (!) 150/84, pulse 71, temperature 97.3 °F (36.3 °C), temperature source Oral, resp. rate 17, height 5' 6\" (1.676 m), weight 182 lb 8.7 oz (82.8 kg), SpO2 96 %. on 5L NC     Gen: Well developed; well nourished  Eyes: No scleral icterus. No conjunctival injection. ENT:  Oropharynx clear. External appearance of ears and nose normal.  Neck: Trachea midline. No obvious mass. No visible thyroid enlargement    Respiratory: Crackles bilaterally, no accessory muscle use  Cardiovascular: Regular rate and rhythm, no appreciable murmurs. BLE edema. Gastrointestinal: Soft, non-tender. No hernia  Skin: Warm and dry. No rashes or ulcers on visible areas. Normal texture and turgor  Lymphatic: No cervical LAD. No supraclavicular LAD. Musculoskeletal: No cyanosis, clubbing or joint deformity. Psychiatric: Normal mood and affect; exhibits normal insight and judgement       Data Reviewed:   LABS:  CBC:   Recent Labs     11/11/20  0528   WBC 18.2*   HGB 11.3*   HCT 35.9*   MCV 82.1        BMP:   Recent Labs     11/10/20  1700 11/11/20  0528    130*   K 5.6* 5.7*    97*   CO2 22 19*   BUN 54* 53*   CREATININE 1.6* 1.6*     LIVER PROFILE: No results for input(s): AST, ALT, LIPASE, BILIDIR, BILITOT, ALKPHOS in the last 72 hours. Invalid input(s):   AMYLASE,  ALB  PT/INR: No results for input(s): PROTIME, INR in the last 72 hours. APTT: No results for input(s): APTT in the last 72 hours. Images and reports from chest imaging were reviewed by me. My interpretation is:  CXR (11/11/20): Diffuse bilateral infiltrates; pacemaker in place  Chest CT (11/11/20): No LAD; small bilateral pleural effusions; diffuse bilateral infiltrates and septal thickening      ECHO (10/20/20)  Summary   Left ventricular cavity size is normal.   Ejection fraction is visually estimated to be 50%. At least moderate-Severe mitral regurgitation. No evidence of mitral stenosis. Systolic flow reversal in pulmonary veins. Assessment:     Acute hypoxic/hypercarbic respiratory failure  Acute pulmonary edema  Pleural effusions  Pulmonary fibrosis  Mitral regurgitation    Plan:      Acute hypoxic/hypercarbic respiratory failure  -Due to pulmonary edema  -Wean supplemental oxygen as able to keep oxygen saturation greater than 90%  -Repeat VBG    Acute pulmonary edema  -Lasix drip  -Strict I's and O's  -Okay to discontinue antibiotics from a pulmonary perspective    Pleural effusions  -Lasix drip    Pulmonary fibrosis  -Prednisone 10 mg daily    Mitral regurgitation  -Noted to be at least moderate to severe on recent echocardiogram.  Cardiology consulted      Thank you for allowing me to participate in the care of this patient. Will follow.      Ronaldo Leach MD  [facility] Pulmonary, Critical Care and Sleep

## 2020-11-11 NOTE — CARE COORDINATION
Critical access hospital  Patient is active with [facility]   Will follow for new orders upon discharge.    Cheryl Yu LPN  CTN with  [facility],  [address], Fax [phone]

## 2020-11-11 NOTE — PLAN OF CARE
Problem: Pain:  Goal: Pain level will decrease  Description: Pain level will decrease  Outcome: Ongoing  Pain/discomfort being managed with PRN analgesics per MD orders. Pt able to express presence and absence of pain and rate pain appropriately using numerical scale. Problem: Skin Integrity:  Goal: Will show no infection signs and symptoms  Description: Will show no infection signs and symptoms  Outcome: Ongoing   Patient's skin has been assessed per unit protocol and the patient is being repositioned or encouraged to turn every two hours to prevent skin breakdown and promote healing. Problem: Falls - Risk of:  Goal: Will remain free from falls  Description: Will remain free from falls  Outcome: Ongoing   Fall risk assessment completed per unit protocol. Patient's bed is in the lowest position, call light is within reach and the patient's room is free of clutter. The patient has been instructed to call for assistance before getting out of bed or the chair.      Problem: OXYGENATION/RESPIRATORY FUNCTION  Goal: Patient will maintain patent airway  Outcome: Ongoing   Patient is able to breathe comfortably on 5L of oxygen per nasal cannula    Problem: HEMODYNAMIC STATUS  Goal: Patient has stable vital signs and fluid balance  Outcome: Ongoing   VSS    Problem: FLUID AND ELECTROLYTE IMBALANCE  Goal: Fluid and electrolyte balance are achieved/maintained  Outcome: Ongoing   Patient is on strict intake and output monitoring

## 2020-11-11 NOTE — CONSULTS
Clinical Pharmacy Note  Vancomycin Consult    Misti Fry is a 80 y.o. male ordered Vancomycin for Aspiration Pneumonia; consult received from Dr. Justine Leach to manage therapy. Also receiving cefepime. Patient Active Problem List   Diagnosis    Pacemaker    Essential hypertension    Coronary arteriosclerosis    BPH with obstruction/lower urinary tract symptoms    GERD (gastroesophageal reflux disease)    Pulmonary fibrosis (HCC)    SOB (shortness of breath)    Renal cyst, right    Interstitial lung disease ([address]    Solitary lung nodule    Arthritis of both knees    Paroxysmal atrial fibrillation (HCC)    Pulmonary infiltrates    Chronic atrial fibrillation (HCC)    Severe mitral regurgitation    [facility]    Chronic kidney disease, stage 3b    Debility    Osteoarthritis of lumbar spine with myelopathy    Acute on chronic diastolic [facility]congestive heart failure) [facility]    Hematemesis    Hemoptysis       Allergies:  Claritin [loratadine] and Zestril [lisinopril]     Temp max:  Temp (24hrs), Av.9 °F (36.6 °C), Min:97.3 °F (36.3 °C), Max:98.5 °F (36.9 °C)      Recent Labs     20  0528   WBC 18.2*       Recent Labs     11/10/20  1700 20  0528   BUN 54* 53*   CREATININE 1.6* 1.6*         Intake/Output Summary (Last 24 hours) at 2020 0925  Last data filed at 2020 0708  Gross per 24 hour   Intake --   Output 200 ml   Net -200 ml       Culture Results:  pending    Ht Readings from Last 1 Encounters:   20 5' 6\" (1.676 m)        Wt Readings from Last 1 Encounters:   20 182 lb 8.7 oz (82.8 kg)         Estimated Creatinine Clearance: 29 mL/min (A) (based on SCr of 1.6 mg/dL (H)). Assessment/Plan:  Will initiate vancomycin 1500mg IV x 1 dose. We will check a vancomycin level tomorrow, further dose will be determined based on level obtained, culture results, renal function, and clinical response.     Thank you for the consult. Will continue to follow.   Gideon Garsia RPh 11/11/2020 9:25 AM

## 2020-11-11 NOTE — ED PROVIDER NOTES
[facility]      Pt Name: Franck Linn  MRN: [id number]  [place] 12/21/1925  Date of evaluation: 11/11/2020  Provider: Kayla Steen MD    CHIEF COMPLAINT       Chief Complaint   Patient presents with    Shortness of Breath     Recent DC from hospital for back pain. Now with shortness of breath and blood in sputum. 80% on RA, 89% on CPAP per EMS       HISTORY OF PRESENT ILLNESS    Franck Linn is a 80 y.o. male who presents to the emergency department with SOB. Presents from home in acute hypoxic respiratory distress therefore history limited. Nursing Notes were reviewed. Including nursing noted for FM, Surgical History, Past Medical History, Social History, vitals, and allergies; agree with all.      REVIEW OF SYSTEMS       Review of Systems   Unable to perform ROS: Severe respiratory distress     PAST MEDICAL HISTORY     Past Medical History:   Diagnosis Date    Acute diastolic CHF (congestive heart failure) ([address] 10/23/2020    Arthritis     Atrial fibrillation (HCC)     BPH with obstruction/lower urinary tract symptoms     nocturia 3-4 x per night    Chronic atrial fibrillation ([address]     CKD (chronic kidney disease) 2017    level 3    Coronary arteriosclerosis 2009    2 stents/ Dr Anastacia Timmons in [facility] GERD (gastroesophageal reflux disease)     History of blood transfusion     1945   During deployment    HTN (hypertension) 1990    Left hip pain     schrapnel in hip and intestines/chronic pains left hip impairs walking    Pacemaker 2015    Pulmonary fibrosis ([address] 08/2017    mild amount seen on chest ct    Pulmonary nodule, left 2017    noncalcified 7mm low risk repeat 8/2018 if patient willing    Renal atrophy, right 2017    Renal cyst, right 08/2017    not clearly simple cyst    SOB (shortness of breath) 2017    terrell normal pft and ct chest ? smll amount of pulmonary fibrosis       SURGICAL HISTORY       Past Surgical TABLET    Take 1 tablet by mouth 2 times daily for 30 days.     UMECLIDINIUM-VILANTEROL (ANORO ELLIPTA) 62.5-25 MCG/INH AEPB INHALER    Inhale 1 puff into the lungs daily       ALLERGIES     Claritin [loratadine] and Zestril [lisinopril]    FAMILY HISTORY        Family History   Problem Relation Age of Onset    Pacemaker Sister     Pacemaker Brother     No Known Problems Other         lung disease       SOCIAL HISTORY       Social History     Socioeconomic History    Marital status:      Spouse name: None    Number of children: None    Years of education: None    Highest education level: None   Occupational History    Occupation: [email or web address]   Social Needs    Financial resource strain: Patient refused    Food insecurity     Worry: Patient refused     Inability: Patient refused    Transportation needs     Medical: Patient refused     Non-medical: Patient refused   Tobacco Use    Smoking status: Never Smoker    Smokeless tobacco: Never Used   Substance and Sexual Activity    Alcohol use: No    Drug use: Not Currently    Sexual activity: Not Currently   Lifestyle    Physical activity     Days per week: None     Minutes per session: None    Stress: None   Relationships    Social connections     Talks on phone: None     Gets together: None     Attends [employer] service: None     Active member of club or organization: None     Attends meetings of clubs or organizations: None     Relationship status: None    Intimate partner violence     Fear of current or ex partner: None     Emotionally abused: None     Physically abused: None     Forced sexual activity: None   Other Topics Concern    None   Social History Narrative    None       PHYSICAL EXAM       ED Triage Vitals   BP Temp Temp Source Pulse Resp SpO2 Height Weight   11/11/20 0524 11/11/20 0518 11/11/20 0518 11/11/20 0518 11/11/20 0518 11/11/20 0518 11/11/20 0518 11/11/20 0518   (!) 147/84 98.5 °F (36.9 °C) Oral 72 26 (!) 89 % 5' 6\" (1.676 m) 187 lb 6.3 oz (85 kg)     Physical Exam  Constitutional:       General: He is in acute distress. Appearance: He is ill-appearing. HENT:      Head: Normocephalic and atraumatic. Nose: Nose normal. No congestion. Mouth/Throat:      Mouth: Mucous membranes are moist.      Pharynx: No oropharyngeal exudate. Eyes:      Extraocular Movements: Extraocular movements intact. Pupils: Pupils are equal, round, and reactive to light. Neck:      Thyroid: No thyromegaly. Vascular: No hepatojugular reflux. Cardiovascular:      Rate and Rhythm: Normal rate and regular rhythm. Pulmonary:      Effort: Respiratory distress present. Breath sounds: Wheezing present. Abdominal:      General: There is no distension. Tenderness: There is no abdominal tenderness. There is no guarding. Musculoskeletal:         General: No swelling or tenderness. Right lower leg: Edema present. Left lower leg: Edema present. Skin:     Capillary Refill: Capillary refill takes less than 2 seconds. Coloration: Skin is not jaundiced. Findings: No bruising or lesion. Neurological:      General: No focal deficit present. Mental Status: He is oriented to person, place, and time. Psychiatric:         Mood and Affect: Mood normal.         Behavior: Behavior normal.       DIAGNOSTIC RESULTS     EKG: All EKG's are interpreted by the Emergency Department Physician who either signs or Co-signs this chart in the absence of acardiologist.    EKG shows wide QRS paced rhythm     RADIOLOGY:   Non-plain film images such as CT, Ultrasoundand MRI are read by the radiologist. Plain radiographic images are visualized and preliminarily interpreted by the emergency physician with the below findings:    Impression    Heart failure, with severe interstitial/alveolar pulmonary edema and small    effusions.       ED BEDSIDE ULTRASOUND:   Performed by ED Physician - none    LABS:  Labs Reviewed   CBC WITH AUTO DIFFERENTIAL - Abnormal; Notable for the following components:       Result Value    WBC 18.2 (*)     Hemoglobin 11.3 (*)     Hematocrit 35.9 (*)     MCH 25.8 (*)     RDW 17.4 (*)     Neutrophils Absolute 14.4 (*)     All other components within normal limits    Narrative:     Performed at:  [address] [place]   Phone [phone]   BASIC METABOLIC PANEL W/ REFLEX TO MG FOR LOW K - Abnormal; Notable for the following components:    Sodium 130 (*)     Potassium reflex Magnesium 5.7 (*)     Chloride 97 (*)     CO2 19 (*)     Glucose 284 (*)     BUN 53 (*)     CREATININE 1.6 (*)     GFR Non- 40 (*)     GFR  49 (*)     All other components within normal limits    Narrative:     Performed at:  [address] [email or web address]   Phone [phone]   TROPONIN - Abnormal; Notable for the following components:    Troponin 0.03 (*)     All other components within normal limits    Narrative:     Performed at:  [address] [email or web address]   Phone [phone]   BRAIN NATRIURETIC PEPTIDE - Abnormal; Notable for the following components:    Pro-BNP 4,196 (*)     All other components within normal limits    Narrative:     Performed at:  [address] [email or web address]   Phone [phone]   BLOOD GAS, VENOUS - Abnormal; Notable for the following components:    pH, Henrry 7.191 (*)     HCO3, Venous 19 (*)     All other components within normal limits    Narrative:     Yonny Beth,  Chemistry results called to and read back by anne mcgowan rn, 11/11/2020  05:45, by Rossy Solano  Performed at:  [address] [email or web address]   Phone [phone]   RAPID INFLUENZA A/B ANTIGENS PROCALCITONIN   COVID-19   BLOOD GAS, VENOUS       All other labs were withinnormal range or not returned as of this dictation. EMERGENCY DEPARTMENT COURSE and DIFFERENTIAL DIAGNOSIS/MDM:     PMH, Surgical Hx, FH, Social Hx reviewed by myself (ETOH usage, Tobacco usage, Drug usage reviewed by myself, no pertinent Hx)- No Pertinent Hx     Old records were reviewed by me    80 yr old acute hypoxic distress. On BIPAP. Improving. Lasix and steroids given. Admission PCU under Dr Viky Whittington. CRITICAL CARE TIME   Total Critical Caretime was 39 minutes, excluding separately reportable procedures. There was a high probability of clinically significant/life threatening deterioration in the patient's condition which required my urgent intervention. PROCEDURES:  Unlessotherwise noted below, none    FINAL IMPRESSION      1. Acute respiratory failure with hypoxia ([address]    2. Acute pulmonary edema (HCC)    3.  Renal cyst          DISPOSITION/PLAN   DISPOSITION      ADMISSION    (Please note that portions ofthis note were completed with a voice recognition program.  Efforts were made to edit the dictations but occasionally words are mis-transcribed.)    Mary Anne Small MD(electronically signed)  Attending Emergency Physician        Mary Anne Small MD  11/11/20 3282

## 2020-11-11 NOTE — CONSULTS
[address]                                  CONSULTATION    PATIENT NAME: Jayesh Fountain                   :        1925  MED REC NO:   [id number]                          ROOM:       5102  ACCOUNT NO:   [de-identified]                           ADMIT DATE: 2020  PROVIDER:     Rose Ortiz MD    CARDIAC CONSULTATION    CONSULT DATE:  2020    HISTORY OF PRESENT ILLNESS:  This is a 80year-old pleasant male with a  history of hypertension, pulmonary fibrosis, chronic kidney disease,  mitral valvular disease, who came to the hospital with worsening  shortness of breath for the last few days. He has also noticed lower  extremity edema. He denied any chest pain, tightness, pressure to me. He had no fever or chills. He presented to the emergency room and his workup was consistent with  congestive heart failure and he was admitted for further care. REVIEW OF SYSTEMS:  Please see HPI. All other systems are reviewed and  they are negative. PAST MEDICAL HISTORY:  1.  Diastolic heart failure. Last echocardiogram was done in 10/2020  which showed EF of 50% with moderate to severe mitral regurgitation. 2.  Hypertension. 3.  Status post pacemaker. 4.  Coronary artery disease with remote history of stents. 5.  Chronic atrial fibrillation on chronic anticoagulation. 6.  Pulmonary fibrosis. 7.  Renal cyst.  8.  BPH. PAST SURGICAL HISTORY:  The patient had an angioplasty and stents placed  years ago, eye surgery, inguinal hernia repair, pacemaker insertion,  small intestinal surgery, tonsillectomy, abdominal surgery. SOCIAL HISTORY:  He apparently lives with his daughter. No history of  smoking or alcohol abuse. FAMILY HISTORY:  Negative for any early or premature atherosclerosis. MEDICATIONS:  Have been reviewed. ALLERGIES:  Have been reviewed.     PHYSICAL EXAMINATION:  VITAL SIGNS:  His pulse is 71, blood pressure 150/84, respirations are  17, oxygen saturation 97%. CONSTITUTIONAL:  He is alert and oriented but short of breath. HEENT:  His neck is supple. He does have jugular venous distention in a  sitting up position. I could not appreciate any carotid bruit. No  thyromegaly. Eyes show slightly pale conjunctivae. No icterus noted. CARDIAC:  Distant heart tones. He does have 2/6 systolic murmur heard  at the apex with some radiation to the axilla. LUNGS:  Bilateral crackles and diminished breath sounds. ABDOMEN:  Distended with evidence of hepatomegaly. There is also some  skin edema. NEUROLOGICAL:  He is alert, oriented. Cranial nerves II through XII  intact. No focal deficit. SKIN:  No rashes. EXTREMITIES:  Show 3+ generalized lower extremity edema all the way up  to his upper thighs as well as sacral edema. LABORATORY DATA:  Sodium is 130, potassium is 5.7, chloride 97, bicarb  19, BUN is 56, creatinine 1.6. ProBNP is 4106. Troponin was 0.03. White count 18.2, hemoglobin 11.3, hematocrit is 35.9. COVID is  negative. Chest x-ray is suggestive of severe interstitial and alveolar pulmonary  edema. CT scan without the contrast again confirms cardiomegaly and alveolar  edema. EKG shows atrial fibrillation with a paced ventricular rhythm. IMPRESSION:  1. This is a 80-year-old male who has presented with worsening  shortness of breath with hypoxia. Appears to have acute-on-chronic  systolic/diastolic heart failure. Etiology of this remains unclear at  the present time. The etiology is probably related to his underlying  coronary artery disease or mitral valve disease. 2.  Hypertension. 3.  Chronic kidney disease stage IV. 4.  Possible cardiorenal syndrome. 5.  Hypokalemia secondary to renal insufficiency and also being on  losartan therapy. RECOMMENDATION:  1.   We will switch the patient from Lasix b.i.d. to Lasix infusion at 10  mg an hour. 2.  We will follow his Is and Os, daily weight, electrolytes closely and  see whether he responds to this regimen. The patient can potentially  develop worsening renal insufficiency and may require Nephrology input  at that time. 3.  We will institute fluid and sodium restriction, daily weight. 4.  The patient will not be placed on Aldactone therapy secondary to his  hyperkalemia. 5.  We will add Lokelma to the patient's regimen for his hyperkalemia. 6.  We will adjust his Xarelto dose to 15 mg daily after discussing with  the Pharmacy. I appreciate the opportunity to participate in the care of this pleasant  male.         Denia Smart MD    D: 11/11/2020 11:27:56       T: 11/11/2020 14:00:31     [email or web address]  Job#: [id number]     Doc#: [id number]    CC:

## 2020-11-11 NOTE — CONSULTS
Clinical Pharmacy Note  Vancomycin Consult    Michelet Lund is a 80 y.o. male ordered Vancomycin for Aspiration Pneumonia; consult received from Dr. Rasta Thomas to manage therapy. Also receiving cefepime. Patient Active Problem List   Diagnosis    Pacemaker    Essential hypertension    Coronary arteriosclerosis    BPH with obstruction/lower urinary tract symptoms    GERD (gastroesophageal reflux disease)    Pulmonary fibrosis (HCC)    SOB (shortness of breath)    Renal cyst, right    Interstitial lung disease ([address]    Solitary lung nodule    Arthritis of both knees    Paroxysmal atrial fibrillation (HCC)    Pulmonary infiltrates    Chronic atrial fibrillation (HCC)    Severe mitral regurgitation    [facility] failure[facility]    Chronic kidney disease, stage 3b    Debility    Osteoarthritis of lumbar spine with myelopathy    Acute on chronic diastolic CHF (congestive heart failure) ([address]    Hematemesis    Hemoptysis       Allergies:  Claritin [loratadine] and Zestril [lisinopril]     Temp max:  Temp (24hrs), Av.9 °F (36.6 °C), Min:97.3 °F (36.3 °C), Max:98.5 °F (36.9 °C)      Recent Labs     20  0528   WBC 18.2*       Recent Labs     11/10/20  1700 20  0528   BUN 54* 53*   CREATININE 1.6* 1.6*         Intake/Output Summary (Last 24 hours) at 2020 0855  Last data filed at 2020 0708  Gross per 24 hour   Intake --   Output 200 ml   Net -200 ml       Culture Results:  pending    Ht Readings from Last 1 Encounters:   20 5' 6\" (1.676 m)        Wt Readings from Last 1 Encounters:   20 182 lb 8.7 oz (82.8 kg)         Estimated Creatinine Clearance: 29 mL/min (A) (based on SCr of 1.6 mg/dL (H)). Assessment/Plan:  Will initiate vancomycin 750mg IV x 1 dose. We will check a vancomycin level tomorrow, further dose will be determined based on level obtained, culture results, renal function, and clinical response.     Thank you for the consult. Will continue to follow.   Britt Curran RPh 11/11/2020 8:56 AM

## 2020-11-12 NOTE — H&P
Internal Medicine History and Physical  CC:SOB  HPI:89 yo with a history of severe MR, diastolic chf and CAD who awoke suddenly with extreme sob and chest tightness as well as pain all over his body. He came to the ER by squad and was in acute pulmonary edema. He did also have cough productive of small amounts of bloody phlegm. On arrival he was hypoxic and placed on bipap and 9L /NC and given iv lasix. Now up on the floor he is no longer requiring the bipap and his oxygen level is down to 4L. His tightness is still present but less so and his sob is much improved  He is still hurting in his arthritic joints (mainly low back and knees. )   He was discharged last week from acute rehab and did not have leg edema or worsening sob however at home he eats much differently , and doesn't limit salt. He also reports that when in thesevere pain this morning he vomited blood (dark blood). Principal Problem:    Acute on chronic diastolic CHF (congestive heart failure) (HCC)  Active Problems:    Pacemaker    Essential hypertension    Coronary arteriosclerosis    BPH with obstruction/lower urinary tract symptoms    Pulmonary fibrosis (HCC)    Solitary lung nodule    Acute pulmonary edema (HCC)    Severe mitral regurgitation    Hematemesis    Hemoptysis    Acute respiratory failure with hypoxia (HCC)    Hyperkalemia    Atrial fibrillation, controlled ([address]    Acute respiratory failure with hypoxia and hypercapnia (HCC)    Pleural effusion, bilateral    Nonrheumatic mitral valve regurgitation  Resolved Problems:    * No resolved hospital problems.  *    Past Medical History:   Diagnosis Date    Acute diastolic CHF (congestive heart failure) ([address] 10/23/2020    Arthritis     Atrial fibrillation (HCC)     BPH with obstruction/lower urinary tract symptoms     nocturia 3-4 x per night    Chronic atrial fibrillation (HCC)     CKD (chronic kidney disease) 2017    level 3    Coronary arteriosclerosis 2009    2 stents/ Dr Any Minor [facility] GERD (gastroesophageal reflux disease)     History of blood transfusion     1945   During deployment    HTN (hypertension) 1990    Left hip pain     schrapnel in hip and intestines/chronic pains left hip impairs walking    Pacemaker 2015    Pulmonary fibrosis ([address] 08/2017    mild amount seen on chest ct    Pulmonary nodule, left 2017    noncalcified 7mm low risk repeat 8/2018 if patient willing    Renal atrophy, right 2017    Renal cyst, right 08/2017    not clearly simple cyst    SOB (shortness of breath) 2017    terrell normal pft and ct chest ? smll amount of pulmonary fibrosis      Past Surgical History:   Procedure Laterality Date    ABDOMEN SURGERY      Scrapnel removed in [facility]  2009    awoke with indigestion    EYE SURGERY Bilateral     cataract    INGUINAL HERNIA REPAIR Right 07/2016    PACEMAKER INSERTION  2014    PACEMAKER PLACEMENT      SMALL INTESTINE SURGERY  1945    schrapnel as [employer] vet, part of small intestine moved   [address]      Medications Prior to Admission: omeprazole (PRILOSEC) 20 MG delayed release capsule, Take 20 mg by mouth daily  traMADol (ULTRAM) 50 MG tablet, Take 1 tablet by mouth 2 times daily for 30 days.   losartan (COZAAR) 50 MG tablet, Take 1 tablet by mouth daily Hold for systolic less 929  carvedilol (COREG) 12.5 MG tablet, Take 1 tablet by mouth 2 times daily (with meals)  predniSONE (DELTASONE) 10 MG tablet, Take 1 tablet by mouth daily  diclofenac sodium (VOLTAREN) 1 % GEL, Apply 4 g topically 3 times daily  furosemide (LASIX) 20 MG tablet, Take 1 tablet by mouth daily  [DISCONTINUED] pantoprazole (PROTONIX) 40 MG tablet, Take 1 tablet by mouth nightly  oxybutynin (DITROPAN) 5 MG tablet, TAKE 1 TABLET BY MOUTH EVERY DAY  fluticasone (FLONASE) 50 MCG/ACT nasal spray, SPRAY 2 SPRAY INTO EACH NOSTRIL EVERY DAY  albuterol sulfate  (90 Base) MCG/ACT inhaler, Inhale 2 puffs into the lungs every 6 hours as needed for Wheezing or Shortness of Breath  umeclidinium-vilanterol (ANORO ELLIPTA) 62.5-25 MCG/INH AEPB inhaler, Inhale 1 puff into the lungs daily  tamsulosin (FLOMAX) 0.4 MG capsule, ONE EVERY NIGHT FOR YOUR PROSTATE  finasteride (PROSCAR) 5 MG tablet, TAKE 1 TABLET BY MOUTH EVERY DAY  atorvastatin (LIPITOR) 40 MG tablet, Take 1 tablet by mouth daily  rivaroxaban (XARELTO) 15 MG TABS tablet, Take 1 tablet by mouth daily (with breakfast)  aspirin 81 MG tablet, Take 81 mg by mouth daily  Cholecalciferol (VITAMIN D3) 2000 UNITS CAPS, Take 2,000 Units by mouth daily  calcium carbonate (OSCAL) 500 MG TABS tablet, Take 600 mg by mouth daily caltrate 600-D3 one daily  Multiple Vitamins-Minerals (CENTRUM SILVER ADULT 50+) TABS, Take 1 tablet by mouth daily  Allergies   Allergen Reactions    Claritin [Loratadine]      Reduced urine flow    Zestril [Lisinopril] Other (See Comments)     coughing      Social History     Tobacco Use    Smoking status: Never Smoker    Smokeless tobacco: Never Used   Substance Use Topics    Alcohol use: No      Family History   Problem Relation Age of Onset    Pacemaker Sister     Pacemaker Brother     No Known Problems Other         lung disease        Prior to Admission medications    Medication Sig Start Date End Date Taking? Authorizing Provider   omeprazole (PRILOSEC) 20 MG delayed release capsule Take 20 mg by mouth daily   Yes Historical Provider, MD   traMADol (ULTRAM) 50 MG tablet Take 1 tablet by mouth 2 times daily for 30 days.  11/3/20 12/3/20  Abhinav Butler MD   losartan (COZAAR) 50 MG tablet Take 1 tablet by mouth daily Hold for systolic less 439 24/5/44   Abhinav Butler MD   carvedilol (COREG) 12.5 MG tablet Take 1 tablet by mouth 2 times daily (with meals) 11/3/20   Abhinav Butler MD   predniSONE (DELTASONE) 10 MG tablet Take 1 tablet by mouth daily 11/4/20 12/4/20  Abhinav Butler [P.O.:900]  Out: 1175 [Urine:1175]    VITALS:  BP 93/63   Pulse 72   Temp 97.3 °F (36.3 °C) (Oral)   Resp 18   Ht 5' 6\" (1.676 m)   Wt 182 lb 8.7 oz (82.8 kg)   SpO2 91%   BMI 29.46 kg/m²   General Appearance: alert and oriented to person, place and time, well-developed and well-nourished, in no acute distress  Skin: warm and dry, no rash or erythema  Head: normocephalic and atraumatic  Eyes: conjunctivae normal and sclera anicteric  ENT: hearing impaired  Neck: neck supple and non tender without mass, no thyromegaly or thyroid nodules, no cervical lymphadenopathy   Pulmonary: inspiratory crackles diffusely  Cardiovascular: normal rate, normal S1 and S2, no gallops and no carotid bruits irreg ireg rate controlled  Abdomen: soft, non-tender, non-distended, normal bowel sounds, no masses or organomegaly  Extremities: no cyanosis, clubbing orwith 2+ edema to knees  Musculo:  Tender over knees and lumbar  Neurologic: coordination normal and speech normal, moves all 4 extremities        Data Review:     Recent Results (from the past 24 hour(s))   EKG 12 Lead    Collection Time: 11/11/20  5:24 AM   Result Value Ref Range    Ventricular Rate 70 BPM    Atrial Rate 312 BPM    QRS Duration 160 ms    Q-T Interval 444 ms    QTc Calculation (Bazett) 479 ms    R Axis -52 degrees    T Axis 18 degrees    Diagnosis       Atrial fibrillation with V pacingLeft axis deviationConfirmed by Alexsandra CRUZ MD ([id number]) on 11/11/2020 8:12:17 AM   CBC Auto Differential    Collection Time: 11/11/20  5:28 AM   Result Value Ref Range    WBC 18.2 (H) 4.0 - 11.0 K/uL    RBC 4.36 4.20 - 5.90 M/uL    Hemoglobin 11.3 (L) 13.5 - 17.5 g/dL    Hematocrit 35.9 (L) 40.5 - 52.5 %    MCV 82.1 80.0 - 100.0 fL    MCH 25.8 (L) 26.0 - 34.0 pg    MCHC 31.4 31.0 - 36.0 g/dL    RDW 17.4 (H) 12.4 - 15.4 %    Platelets 147 640 - 655 K/uL    MPV 9.8 5.0 - 10.5 fL    Neutrophils % 79.0 %    Lymphocytes % 14.6 %    Monocytes % 5.8 %    Eosinophils % 0.2 % Basophils % 0.4 %    Neutrophils Absolute 14.4 (H) 1.7 - 7.7 K/uL    Lymphocytes Absolute 2.7 1.0 - 5.1 K/uL    Monocytes Absolute 1.1 0.0 - 1.3 K/uL    Eosinophils Absolute 0.0 0.0 - 0.6 K/uL    Basophils Absolute 0.1 0.0 - 0.2 K/uL   Basic Metabolic Panel w/ Reflex to MG    Collection Time: 11/11/20  5:28 AM   Result Value Ref Range    Sodium 130 (L) 136 - 145 mmol/L    Potassium reflex Magnesium 5.7 (H) 3.5 - 5.1 mmol/L    Chloride 97 (L) 99 - 110 mmol/L    CO2 19 (L) 21 - 32 mmol/L    Anion Gap 14 3 - 16    Glucose 284 (H) 70 - 99 mg/dL    BUN 53 (H) 7 - 20 mg/dL    CREATININE 1.6 (H) 0.8 - 1.3 mg/dL    GFR Non- 40 (A) >60    GFR  49 (A) >60    Calcium 9.2 8.3 - 10.6 mg/dL   Troponin    Collection Time: 11/11/20  5:28 AM   Result Value Ref Range    Troponin 0.03 (H) <0.01 ng/mL   Brain Natriuretic Peptide    Collection Time: 11/11/20  5:28 AM   Result Value Ref Range    Pro-BNP 4,196 (H) 0 - 449 pg/mL   Blood Gas, Venous    Collection Time: 11/11/20  5:28 AM   Result Value Ref Range    pH, Henrry 7.191 (LL) 7.350 - 7.450    pCO2, Henrry 49.9 40.0 - 50.0 mmHg    pO2, Henrry 32 Not Established mmHg    HCO3, Venous 19 (L) 23 - 29 mmol/L    Base Excess, Henrry -9.0 Not Established mmol/L    O2 Sat, Henrry 41 Not Established %    Carboxyhemoglobin 0.9 %    MetHgb, Henrry 0.9 <1.5 %    TC02 (Calc), Henrry 21 Not Established mmol/L    O2 Content, Henrry 6 Not Established mL/dL    O2 Therapy Unknown    Procalcitonin    Collection Time: 11/11/20  5:28 AM   Result Value Ref Range    Procalcitonin 0.07 0.00 - 0.15 ng/mL   Rapid influenza A/B antigens    Collection Time: 11/11/20  5:44 AM    Specimen: Nasopharyngeal   Result Value Ref Range    Rapid Influenza A Ag Negative Negative    Rapid Influenza B Ag Negative Negative   COVID-19    Collection Time: 11/11/20  5:44 AM   Result Value Ref Range    SARS-CoV-2, NAAT Not Detected Not Detected   Blood Gas, Venous    Collection Time: 11/11/20  6:29 AM   Result Value Ref Range    pH, Henrry 7.332 (L) 7.350 - 7.450    pCO2, Henrry 54.5 (H) 40.0 - 50.0 mmHg    pO2, Henrry 14 Not Established mmHg    HCO3, Venous 29 23 - 29 mmol/L    Base Excess, Henrry 2.1 Not Established mmol/L    O2 Sat, Henrry 11 Not Established %    Carboxyhemoglobin 0.9 %    MetHgb, Henrry 1.0 <1.5 %    TC02 (Calc), Henrry 31 Not Established mmol/L    O2 Content, Henrry 2 Not Established mL/dL    O2 Therapy Unknown    POCT Glucose    Collection Time: 11/11/20 11:51 AM   Result Value Ref Range    POC Glucose 367 (H) 70 - 99 mg/dl    Performed on ACCU-CHEK    Troponin    Collection Time: 11/11/20  1:44 PM   Result Value Ref Range    Troponin 0.09 (H) <0.01 ng/mL   POCT Glucose    Collection Time: 11/11/20  4:41 PM   Result Value Ref Range    POC Glucose 183 (H) 70 - 99 mg/dl    Performed on ACCU-CHEK    Troponin    Collection Time: 11/11/20  7:12 PM   Result Value Ref Range    Troponin 0.09 (H) <0.01 ng/mL   POCT Glucose    Collection Time: 11/11/20  8:22 PM   Result Value Ref Range    POC Glucose 150 (H) 70 - 99 mg/dl    Performed on ACCU-CHEK       Ct Chest Wo Contrast    Result Date: 11/11/2020  Features of heart failure, with moderate cardiomegaly, severe interstitial/alveolar pulmonary edema, small bilateral effusions and body wall anasarca. Difficult to exclude superimposed atypical or viral infection, which could be present in the appropriate context. Mediastinal lymphadenopathy is presumably reactive. Multivessel coronary calcifications status post PCI. Presbyesophagus. Right renal atrophy with indeterminate cyst.  Consideration/appropriateness for follow-up renal protocol CT/MRI may be based on patient's age, life expectancy and comorbidities. Xr Chest Portable    Result Date: 11/11/2020  Heart failure, with severe interstitial/alveolar pulmonary edema and small effusions.              Assessment:     Principal Problem:    Acute on chronic diastolic CHF (congestive heart failure) (HCC)  Plan: with the elevation in troponin and

## 2020-11-12 NOTE — PROGRESS NOTES
Department of Internal Medicine  General Internal Medicine  Attending Progress Note    Chief Complaint:chest tightness      HPI:   SUBJECTIVE:  79 yo male admitted with chest tightness acute pulmonary edema and hemoptysis feels a lot better today. His sob is much improved on the lasix drip, he urinated a lot. He chest tightness is resolved, the troponins peaked at .09 and are trending back down . He is still coughing up red phlegm but now its dark red and decreased in quantity. The overall whole body pain is gone. He has a kauffman in now so not having to get up to the bathroom, requiring a steady when he goes.      Past Medical History:   Diagnosis Date    Acute diastolic CHF (congestive heart failure) ([facility] 10/23/2020    Arthritis     Atrial fibrillation (HCC)     BPH with obstruction/lower urinary tract symptoms     nocturia 3-4 x per night    Chronic atrial fibrillation (HCC)     CKD (chronic kidney disease) 2017    level 3    Coronary arteriosclerosis 2009    2 stents/ Dr Kyree Venegas [facility] GERD (gastroesophageal reflux disease)     History of blood transfusion     1945   During deployment    HTN (hypertension) 1990    Left hip pain     schrapnel in hip and intestines/chronic pains left hip impairs walking    Pacemaker 2015    Pulmonary fibrosis ([address] 08/2017    mild amount seen on chest ct    Pulmonary nodule, left 2017    noncalcified 7mm low risk repeat 8/2018 if patient willing    Renal atrophy, right 2017    Renal cyst, right 08/2017    not clearly simple cyst    SOB (shortness of breath) 2017    terrell normal pft and ct chest ? smll amount of pulmonary fibrosis     Past Surgical History:   Procedure Laterality Date    ABDOMEN SURGERY      Scrapnel removed in [facility]  2009    awoke with indigestion    EYE SURGERY Bilateral     cataract    INGUINAL HERNIA REPAIR Right 07/2016    PACEMAKER INSERTION  2014    PACEMAKER PLACEMENT  SMALL INTESTINE SURGERY  1945    jelly as ww II vet, part of small intestine moved    TONSILLECTOMY  1960      Family History   Problem Relation Age of Onset    Pacemaker Sister     Pacemaker Brother     No Known Problems Other         lung disease     Social History     Tobacco Use    Smoking status: Never Smoker    Smokeless tobacco: Never Used   Substance Use Topics    Alcohol use: No    Drug use: Not Currently       Prior to Admission medications    Medication Sig Start Date End Date Taking? Authorizing Provider   omeprazole (PRILOSEC) 20 MG delayed release capsule Take 20 mg by mouth daily   Yes Historical Provider, MD   traMADol (ULTRAM) 50 MG tablet Take 1 tablet by mouth 2 times daily for 30 days.  11/3/20 12/3/20  Naveen Saldana MD   losartan (COZAAR) 50 MG tablet Take 1 tablet by mouth daily Hold for systolic less 598 29/1/09   Naveen Saldana MD   carvedilol (COREG) 12.5 MG tablet Take 1 tablet by mouth 2 times daily (with meals) 11/3/20   Naveen Saldana MD   predniSONE (DELTASONE) 10 MG tablet Take 1 tablet by mouth daily 11/4/20 12/4/20  Naveen Saldana MD   diclofenac sodium (VOLTAREN) 1 % GEL Apply 4 g topically 3 times daily 11/3/20   Naveen Saldana MD   furosemide (LASIX) 20 MG tablet Take 1 tablet by mouth daily 11/3/20   Naveen Saldana MD   oxybutynin (DITROPAN) 5 MG tablet TAKE 1 TABLET BY MOUTH EVERY DAY 10/21/20   Jesus Brooks MD   fluticasone [facility]) 50 MCG/ACT nasal spray SPRAY 2 SPRAY INTO EACH NOSTRIL EVERY DAY 10/9/20   Jesus Brooks MD   albuterol sulfate  (90 Base) MCG/ACT inhaler Inhale 2 puffs into the lungs every 6 hours as needed for Wheezing or Shortness of Breath 8/27/20   Judy Chen MD   umeclidinium-vilanterol (ANORO ELLIPTA) 62.5-25 MCG/INH AEPB inhaler Inhale 1 puff into the lungs daily 8/27/20   Judy Chen MD   tamsulosin (FLOMAX) 0.4 MG capsule ONE EVERY NIGHT FOR YOUR PROSTATE 4/22/20   Jesus Brooks MD   finasteride (PROSCAR) 5 MG tablet TAKE 1 TABLET BY MOUTH EVERY DAY 12/2/19   Rena Clifton MD   atorvastatin (LIPITOR) 40 MG tablet Take 1 tablet by mouth daily 2/22/19   Mally Wiley MD   rivaroxaban Lizy Aliment) 15 MG TABS tablet Take 1 tablet by mouth daily (with breakfast) 1/8/18   Rena Clifton MD   aspirin 81 MG tablet Take 81 mg by mouth daily    Historical Provider, MD   Cholecalciferol (VITAMIN D3) 2000 UNITS CAPS Take 2,000 Units by mouth daily    Historical Provider, MD   calcium carbonate (OSCAL) 500 MG TABS tablet Take 600 mg by mouth daily caltrate 600-D3 one daily    Historical Provider, MD   Multiple Vitamins-Minerals (CENTRUM SILVER ADULT 50+) TABS Take 1 tablet by mouth daily    Historical Provider, MD         ROS:  A comprehensive review of systems was negative except for: sob cough    OBJECTIVE:      PHYSICAL:  Intake/Output:   Patient Vitals for the past 8 hrs:   BP Temp Temp src Pulse Resp SpO2 Weight   11/12/20 0747 -- -- -- -- 18 99 % --   11/12/20 0745 115/63 97.6 °F (36.4 °C) Oral 70 18 99 % --   11/12/20 0538 111/66 98.3 °F (36.8 °C) Oral 82 16 98 % 181 lb 10.5 oz (82.4 kg)     I/O last 3 completed shifts: In: 1464.2 [P.O.:1380; I.V.:84.2]  Out: 3800 [Urine:3800]  I/O this shift:   In: 457.6 [P.O.:360; I.V.:97.6]  Out: 1250 [Urine:1250]  VITALS:    /63   Pulse 70   Temp 97.6 °F (36.4 °C) (Oral)   Resp 18   Ht 5' 6\" (1.676 m)   Wt 181 lb 10.5 oz (82.4 kg)   SpO2 99%   BMI 29.32 kg/m²     General Appearance: alert and oriented to person, place and time, well-developed and well-nourished, in no acute distress  Skin: warm and dry, no rash or erythema  Head: normocephalic and atraumatic  Eyes: conjunctivae normal and sclera anicteric  ENT: dorian Koi  Neck: neck supple and non tender without mass, no thyromegaly or thyroid nodules, no cervical lymphadenopathy   Pulmonary/Chest: insp crackles and decreased bs bases  Cardiovascular: normal rate, normal S1 and S2, no gallops and no carotid bruits  Abdomen: soft, non-tender, non-distended, normal bowel sounds, no masses or organomegaly  Extremities: no cyanosis, clubbing or edema  Musculoskeletal: no swollen joints and no tender joints,  Neurologic: coordination normal and speech normal, moves all 4 extremities    DATA:   Labs:  CBC:   Recent Labs     11/11/20  0528 11/11/20  2135   WBC 18.2* 13.5*   HGB 11.3* 9.7*    138     BMP:    Recent Labs     11/10/20  1700 11/11/20 0528 11/12/20  0338    130* 133*   K 5.6* 5.7* 4.5    97* 98*   CO2 22 19* 24   BUN 54* 53* 70*   CREATININE 1.6* 1.6* 1.9*   GLUCOSE 122* 284* 124*     POC GLUCOSE:    Recent Labs     11/11/20  1151 11/11/20  1641 11/11/20 2022 11/12/20  0748   POCGLU 367* 183* 150* 115*     Ca/Mg/Phos:   Recent Labs     11/10/20  1700 11/11/20 0528 11/12/20  0338   CALCIUM 9.2 9.2 8.7     Hepatic: No results for input(s): AST, ALT, ALB, BILITOT, ALKPHOS in the last 72 hours. Troponin:   Recent Labs     11/11/20  1344 11/11/20  1912 11/12/20  0338   TROPONINI 0.09* 0.09* 0.06*     ProBNP:   Recent Labs     11/11/20  0528 11/12/20  0338   PROBNP 4,196* 18,115*         DIAGNOSTICS:  Ct Chest Wo Contrast    Result Date: 11/11/2020  Features of heart failure, with moderate cardiomegaly, severe interstitial/alveolar pulmonary edema, small bilateral effusions and body wall anasarca. Difficult to exclude superimposed atypical or viral infection, which could be present in the appropriate context. Mediastinal lymphadenopathy is presumably reactive. Multivessel coronary calcifications status post PCI. Presbyesophagus. Right renal atrophy with indeterminate cyst.  Consideration/appropriateness for follow-up renal protocol CT/MRI may be based on patient's age, life expectancy and comorbidities. Xr Chest Portable    Result Date: 11/11/2020  Heart failure, with severe interstitial/alveolar pulmonary edema and small effusions.          ASSESSMENT AND PLAN    Principal Problem:    Acute on chronic diastolic CHF (congestive heart failure) (HCC)  Plan: improving clinically lost weight despite elevated BNP still seems to have improved  To continue lasix drip, discussed with Dr Arabella Bailon  Active Problems:    Pacemaker  Plan: intact functioning    Essential hypertension  Plan: bp been controlled on current med to dc losartan because of hyperkalemia and worsening renal function. Will watch bp change with this    Coronary arteriosclerosis  Plan: bump in troponin discussed with Dr Arabella Bailon who is not concerned that the patient had an stemi, starting to trend down     BPH with obstruction/lower urinary tract symptoms  Plan: ongoing issue, kauffman in currently    Pulmonary fibrosis ([address]  Plan: ongoing issue not normally needing oxygen on low dose prednisone    Solitary lung nodule  Plan: ongoing, followed by Dr Prince Ferguson    Severe mitral regurgitation  Plan: also recently evaluated with echo, medical treatmnet    Hematemesis  Plan: no further events, continue to observe, on ivp push protonix one more day    Hemoptysis  Plan: ongoing but starting to dry up    Acute respiratory failure with hypoxia ([address]  Plan: also improving, start tapering    Hyperkalemia  Plan: improving should cont to improve with dc losartan    Atrial fibrillation, controlled ([address]  Plan: ongoing issue    Acute respiratory failure with hypoxia and hypercapnia ([address]  Plan: improving clinicaly repeat vbg much better yesterday    Pleural effusion, bilateral  Plan: small, observe  ?  Pneumonia HAP   Admission presentation cw sepsis and procalcitonin elevated but not reliable  Will stop antibiotics and observe    Appropriate diagnostic studies and consults ordered  Gema Potter MD       Spent 30 minutes with over half the time devoted to treatment discussion Hypothyroidism

## 2020-11-12 NOTE — PLAN OF CARE
Problem: Pain:  Goal: Pain level will decrease  Description: Pain level will decrease  Outcome: Ongoing   Pain/discomfort being managed with PRN analgesics per MD orders. Pt able to express presence and absence of pain and rate pain appropriately using numerical scale. Problem: Skin Integrity:  Goal: Will show no infection signs and symptoms  Description: Will show no infection signs and symptoms  Outcome: Ongoing   Patient's skin has been assessed per unit protocol and the patient is being repositioned or encouraged to turn every two hours to prevent skin breakdown and promote healing. Problem: HEMODYNAMIC STATUS  Goal: Patient has stable vital signs and fluid balance  Outcome: Ongoing   Patient's blood pressure was low this afternoon due to the lasix gtt, MD made aware and decreased the lasix gtt rate and RN held the evening dose of coreg.

## 2020-11-12 NOTE — DISCHARGE INSTR - COC
Continuity of Care Form    Patient Name: Donna Horne   :  1925  MRN:  [id number]    Admit date:  2020  Discharge date:  2020    Code Status Order: Full Code   Advance Directives:   [facility] Documentation       Date/Time Healthcare Directive Type of Healthcare Directive Copy in [address] Agent's Name Healthcare Agent's Phone Number    [phone]  Yes, patient has an advance directive for healthcare treatment  Durable power of  for health care  No, copy requested from family  Adult [facility]  [phone]            Admitting Physician:  Donald Fox MD  PCP: Regino Aase, MD    Discharging Nurse: [facility] Unit/Room#: Z5Z-4302/7599-11  Discharging Unit Phone Number: [phone]    Emergency Contact:   Extended Emergency Contact Information  Primary Emergency Contact: Douglas [address] Phone: [phone]  Mobile Phone: [phone]  Relation: Child    Past Surgical History:  Past Surgical History:   Procedure Laterality Date    ABDOMEN SURGERY      Scrapnel removed in [facility]      awoke with indigestion    EYE SURGERY Bilateral     cataract    INGUINAL HERNIA REPAIR Right 2016    PACEMAKER INSERTION  2014    PACEMAKER PLACEMENT      SMALL INTESTINE SURGERY      jelly rodriguez ww II vet, part of small intestine moved   [address] [facility]       Immunization History:   Immunization History   Administered Date(s) Administered    Influenza Vaccine, unspecified formulation 2006    Influenza, High Dose (Fluzone 65 yrs and older) 09/15/2017, 09/15/2018, 09/10/2019, 09/15/2020    Pneumococcal Conjugate 13-valent (Lengkky82) 2019    Pneumococcal Polysaccharide (Pnupchrej26) 2015    Tdap (Boostrix, Adacel) 2018       Active Problems:  Patient Active Problem List   Diagnosis Code    Pacemaker Z95.0    Essential hypertension I10    Coronary arteriosclerosis I25.10    BPH with obstruction/lower urinary tract symptoms N40.1, N13.8    GERD (gastroesophageal reflux disease) K21.9    Pulmonary fibrosis (HCC) J84.10    SOB (shortness of breath) R06.02    Renal cyst, right N28.1    Interstitial lung disease (HCC) J84.9    Solitary lung nodule R91.1    Arthritis of both knees M17.0    Paroxysmal atrial fibrillation (HCC) I48.0    [facility] pulmonary edema ([facility] J81.0    Pulmonary infiltrates R91.8    Chronic atrial fibrillation ([facility]) I48.20    Severe mitral regurgitation I34.0    Acute diastolic CHF (congestive heart failure) ([facility]) I50.31    Chronic kidney disease, stage 3b N18.32    Debility R53.81    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    Acute on chronic diastolic CHF (congestive heart failure) ([facility]) I50.33    Hematemesis K92.0    Hemoptysis R04.2    Acute respiratory failure with hypoxia ([facility]) J96.01    Hyperkalemia E87.5    Atrial fibrillation, controlled ([facility]) I48.91    Acute respiratory failure with hypoxia and hypercapnia ([facility]) J96.01, J96.02    Pleural effusion, bilateral J90    Nonrheumatic mitral valve regurgitation I34.0       Isolation/Infection:   Isolation            No Isolation          Patient Infection Status       Infection Onset Added Last Indicated Last Indicated By Review Planned Expiration Resolved Resolved By    None active    Resolved    COVID-19 Rule Out 11/11/20 11/11/20 11/11/20 COVID-19 (Ordered)   11/11/20 Rule-Out Test Resulted    COVID-19 Rule Out 10/20/20 10/20/20 10/20/20 COVID-19, PCR (Ordered)   10/20/20 Rule-Out Test Resulted            Nurse Assessment:  Last Vital Signs: BP (!) 93/54   Pulse 75   Temp 97.3 °F (36.3 °C) (Oral)   Resp 17   Ht 5' 6\" (1.676 m)   Wt 181 lb 10.5 oz (82.4 kg)   SpO2 98%   BMI 29.32 kg/m²     Last documented pain score (0-10 scale): Pain Level: 0  Last Weight:   Wt Readings from Last 1 Encounters:   11/12/20 181 lb 10.5 oz (82.4 kg)     Mental Status:  oriented, alert, coherent, logical and thought processes intact    IV Access:  - None    Nursing Mobility/ADLs:  Walking   Assisted  Transfer  Assisted  Bathing  Assisted  Dressing  Assisted  Toileting  Assisted  Feeding  Assisted  Med Admin  Assisted  Med Delivery   whole    Wound Care Documentation and Therapy:        Elimination:  Continence:   · Bowel: Yes  · Bladder: Yes  Urinary Catheter: None   Colostomy/Ileostomy/Ileal Conduit: Yes       Date of Last BM: 11/19/2020      Intake/Output Summary (Last 24 hours) at 11/12/2020 1251  Last data filed at 11/12/2020 1014  Gross per 24 hour   Intake 1681.8 ml   Output 4725 ml   Net -3043.2 ml     I/O last 3 completed shifts: In: 1464.2 [P.O.:1380; I.V.:84.2]  Out: 3800 [Urine:3800]    Safety Concerns: At Risk for Falls    Impairments/Disabilities:      None    Nutrition Therapy:  Current Nutrition Therapy:   - Oral Diet:  Carb Control 5 carbs/meal (2000kcals/day), Low Sodium (2gm) and Low Potassium    Routes of Feeding: Oral  Liquids: Thin Liquids  Daily Fluid Restriction: yes - amount 1200  Last Modified Barium Swallow with Video (Video Swallowing Test): not done    Treatments at the Time of Hospital Discharge:   Respiratory Treatments:   Oxygen Therapy:  is not on home oxygen therapy. Ventilator:    - No ventilator support     Heart Failure Instructions for Daily Management  Patient was treated for acute on chronic diastolic heart failure. he  will require the following:     Please weigh daily on the same scale and approximately the same time of day. Report weight gain of 3 pounds/day or 5 pounds/week to :  Cariology [phone] option 3 and Bird Mcallister -[phone].  Please use hospital discharge weight as baseline reference.     Please monitor for signs and symptoms of and report to MD:  ana Worsening Heart Failure: sudden weight gain, shortness of breath, lower extremity or general edema/swelling, abdominal bloating/swelling, inability to lie flat, intolerance to usual activity, or cough (especially at night). Report these finding even if no increase in weight.  o Dehydration:  having difficulty or a decrease in urination, dizziness, worsening fatigue, or new onset/worsening of generalized weakness.  Please continue a LOW SODIUM diet and LIMIT fluid intake to 48 - 64 ounces ( 1.5 - 2 liters) per day.  Call  Cariology [phone] option 3 and Lydia Brand -[phone] and/or Roopa Ceron @ [phone] with any questions or concerns.  Please continue heart failure education to patient and family/support system.  See After Visit Summary for hospital follow up appointment details.  Consider spiritual care referral for support and/or completion of advance directives [phone].  Consider: [place] [address] telehealth program if patient agreeable and able to participate, palliative care consult for ongoing goals of care, end of life, and/or chronic disease management discussions and referral to [facility] [phone]) once SNF/HHC complete.     Rehab Therapies: Physical Therapy, Occupational Therapy and Nurse  Weight Bearing Status/Restrictions: No weight bearing restirctions  Other Medical Equipment (for information only, NOT a DME order):  walker  Other Treatments: HOME HEALTH CARE: LEVEL 3 [address] to establish plan of care for patient over 60 day period   Nursing  Initial home SN evaluation visit to occur within 24-48 hours for:  1)  medication management  2)  VS and clinical assessment  3)  S&S chronic disease exacerbation education + when to contact MD/NP  4)  care coordination  Medication Reconciliation during 1st SN visit  PT/OT  Evaluations in home within 24-48 hours of discharge to include DME and home safety   Frontload therapy 5 days, then 3x a week   OT to evaluate if patient has Home Health Aide needs for personal care    evaluation within 24-48 hours to evaluate resources & insurance for potential [place], [place], LTC, and Medicaid options   Palliative Care referral within 5 days of hospital discharge   PCP Visit scheduled within 3 - 7 days of hospital discharge [address] (If patient is agreeable and meets guidelines)      Patient's personal belongings (please select all that are sent with patient):  None    RN SIGNATURE:  Electronically signed by Farrah Church RN on 11/20/2020 at 12:29 PM      CASE MANAGEMENT/SOCIAL WORK SECTION    Inpatient Status Date: 11/11/2020     Readmission Risk Assessment Score:  27    Discharging to Facility/ Agency   Name:  [facility]    Address: [address] [place] [address], [address] [place] [address]., [place] [address]  Phone: [phone]  Fax: [phone]    / signature: Electronically signed by Lake Espino on 11/20/2020 at 11:38 AM    PHYSICIAN SECTION    Prognosis: Fair    Condition at Discharge: Stable    Rehab Potential (if transferring to Rehab): Fair    Recommended Labs or Other Treatments After Discharge: close fu with pcp, and get homecare pt/ot vna    Physician Certification: I certify the above information and transfer of Malinda Allan  is necessary for the continuing treatment of the diagnosis listed and that he requires Home Care for greater 30 days.      Update Admission H&P: Changes in H&P as follows - see med list and dc instructions    PHYSICIAN SIGNATURE:  Electronically signed by Lakisha Vera MD on 11/20/20 at 9:09 AM EST

## 2020-11-12 NOTE — PROGRESS NOTES
RN called into the room by PCA to question the patient's blood pressure. Patient's blood pressure was reading 75/30 on the automatic machine. RN rechecked manually and got a reading of 82/45. Patient is resting in bed with no complaints of shortness of breath, dizziness or otherwise feeling different. Patient states he feels \"fine and much better than he did yesterday. \" RN left a message with [facility] for Dr Juliano Maldonado to inform him of the low blood pressure.

## 2020-11-12 NOTE — CARE COORDINATION
INITIAL CASE MANAGEMENT ASSESSMENT    Reviewed chart, met with patient to assess possible discharge needs. Explained Case Management role/services. Living Situation:  Lives in a condo with his daughter and there are 6 steps which up to this week was able use with assistance. Bed and bath are on the same floor and bath has a tube shower unit. ADLs:  Patient uses a bench to get in and out of shower which he has been able to do with daughters assistance. Daughter does the cooking , cleaning, laundry and grocery shopping. Patient tells cm he eats and sleeps and daughter does the rest.  Patient tells cm he has been walking 1/2 a mile three times a week but his legs have been weaker and he has required assistance to use steps and ambulate, he was using a cane but now uses a walker. Patient tells cm he goes out with his daughter and her girlfriends when they go out for  dinner. DME:  Shower chair , wheelchair, walker , cane. PT/OT Recs:  Not ordered but would be good to get therapy evaluation before he goes home. Active Services:  Home care for therapy provided by [facility]. Patient was just discharged from [address] [place], [place] [address]ab on 11/4/2020. Transportation:  Daughter transports to all appointments. Medications:  Daughter manages medication. PCP:  Dr. Franklin Alva       HD/PD:  N/A     PLAN/COMMENTS:  Goal:  Patient wants to return home at discharge and would like to resume therapy with hope of getting back to walking with a cane. Patient tells cm his daughter would like some help with his bathing and dressing. Patient gave cm permission to call his daughter Sarai Other [phone]  to discuss discharge planning further, message left for daughter to call this cm back. SW/CM provided contact information for patient or family to call with any questions. SW/CM will follow and assist as needed.     Electronically signed by Mayelin Aguilar on 11/12/2020 at 12:46

## 2020-11-12 NOTE — PLAN OF CARE
Problem: OXYGENATION/RESPIRATORY FUNCTION  Goal: Patient will maintain patent airway  11/12/2020 0340 by Mo Wagoner RN  Outcome: Ongoing  11/12/2020 0338 by Mo Wagoner RN  Outcome: Ongoing  11/11/2020 1450 by Genoveva Giron RN  Outcome: Ongoing  Goal: Patient will achieve/maintain normal respiratory rate/effort  Description: Respiratory rate and effort will be within normal limits for the patient  11/12/2020 0340 by Mo Wagoner RN  Outcome: Ongoing  11/12/2020 0338 by Mo Wagoner RN  Outcome: Ongoing     Problem: HEMODYNAMIC STATUS  Goal: Patient has stable vital signs and fluid balance  11/12/2020 0340 by Mo Wagoner RN  Outcome: Ongoing  11/12/2020 0338 by Mo Wagoner RN  Outcome: Ongoing  11/11/2020 1450 by Genoveva Giron RN  Outcome: Ongoing     Problem: FLUID AND ELECTROLYTE IMBALANCE  Goal: Fluid and electrolyte balance are achieved/maintained  11/12/2020 0340 by Mo Wagoner RN  Outcome: Ongoing  11/12/2020 0338 by Mo Wagoner RN  Outcome: Ongoing  11/11/2020 1450 by Genoveva Giron RN  Outcome: Ongoing     Problem: ACTIVITY INTOLERANCE/IMPAIRED MOBILITY  Goal: Mobility/activity is maintained at optimum level for patient  11/12/2020 0340 by Mo Wagoner RN  Outcome: Ongoing  11/12/2020 0338 by Mo Wagoner RN  Outcome: Ongoing     Problem: Nutritional:  Goal: Nutritional status will improve  Description: Nutritional status will improve  Outcome: Ongoing     Problem: Physical Regulation:  Goal: Will remain free from infection  Description: Will remain free from infection  Outcome: Ongoing     Problem: Respiratory:  Goal: Ability to maintain normal respiratory secretions will improve  Description: Ability to maintain normal respiratory secretions will improve  Outcome: Ongoing

## 2020-11-12 NOTE — PROGRESS NOTES
[facility]   Daily Progress Note      Admit Date:  11/11/2020    CC: I am breathing better today  This is a 80year-old pleasant male with a  history of hypertension, pulmonary fibrosis, chronic kidney disease,  mitral valvular disease, who came to the hospital with worsening  shortness of breath for the last few days. He has also noticed lower  extremity edema. He denied any chest pain, tightness, pressure to me. He had no fever or chills.     He presented to the emergency room and his workup was consistent with  congestive heart failure and he was admitted for further care.     Subjective:  Patient had modest urine output on IV Lasix drip as -3 L fluid balance. His breathing has improved. Patient states that he feels much better though he continues to remain edematous  . Objective:   /63   Pulse 70   Temp 97.6 °F (36.4 °C) (Oral)   Resp 18   Ht 5' 6\" (1.676 m)   Wt 181 lb 10.5 oz (82.4 kg)   SpO2 99%   BMI 29.32 kg/m²       Intake/Output Summary (Last 24 hours) at 11/12/2020 1014  Last data filed at 11/12/2020 1014  Gross per 24 hour   Intake 1921.8 ml   Output 4850 ml   Net -2928.2 ml     Wt Readings from Last 3 Encounters:   11/12/20 181 lb 10.5 oz (82.4 kg)   11/10/20 183 lb (83 kg)   11/06/20 183 lb 10.3 oz (83.3 kg)     Telemetry: Paced rhythm    Physical Exam:  General:  NAD, Awake, alert and oriented X4  Skin:  Warm and dry  Neck:  Supple, no JVP appreciated, no bruit  Chest:  Clear to auscultation, no wheezes/rhonchi/rales  Cardiovascular:  Regular rate.  J1G9 soft 2/6  systolic murmur at the apex  Abdomen:  Soft, nontender, +bowel sounds  Extremities:  2-3+ upper and LE edema    Cardiac Diagnosis:  hypertension, CHF and atrial fibrillation    Medications:    atorvastatin  40 mg Oral Nightly    carvedilol  12.5 mg Oral BID WC    Vitamin D  2,000 Units Oral Daily    finasteride  5 mg Oral Daily    fluticasone  2 spray Each Nostril Daily    losartan  50 mg Oral Daily    oxybutynin  5 mg Oral Daily    predniSONE  10 mg Oral Daily    tamsulosin  0.4 mg Oral Daily    traMADol  50 mg Oral BID    glycopyrrolate-formoterol  2 puff Inhalation BID    sodium chloride flush  10 mL Intravenous 2 times per day    cefepime  2 g Intravenous Q24H    insulin lispro  0-6 Units Subcutaneous TID WC    insulin lispro  0-3 Units Subcutaneous Nightly    rivaroxaban  10 mg Oral Daily    pantoprazole  40 mg Intravenous Daily    And    sodium chloride (PF)  10 mL Intravenous Daily    vancomycin (VANCOCIN) intermittent dosing (placeholder)   Other RX Placeholder    sodium zirconium cyclosilicate  10 g Oral BID      dextrose      furosemide (LASIX) 1mg/ml infusion 10 mg/hr (11/12/20 0722)     sodium chloride flush, acetaminophen **OR** acetaminophen, polyethylene glycol, promethazine **OR** ondansetron, glucose, dextrose, glucagon (rDNA), dextrose    Lab Data:  CBC:   Recent Labs     11/11/20  0528 11/11/20  2135   WBC 18.2* 13.5*   HGB 11.3* 9.7*    138     BMP:    Recent Labs     11/10/20  1700 11/11/20  0528 11/12/20  0338    130* 133*   K 5.6* 5.7* 4.5   CO2 22 19* 24   BUN 54* 53* 70*   CREATININE 1.6* 1.6* 1.9*     LIVR: No results for input(s): AST, ALT in the last 72 hours. INR:  No results for input(s): INR in the last 72 hours. APTT: No results for input(s): APTT in the last 72 hours. BNP:  No results for input(s): BNP in the last 72 hours. 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 NYHA class III  -Had moderate urine output with Lasix drip  -Renal function unfortunately has worsened  -Patient is still appears fluid overloaded  -  Discussed  with Dr. Eduardo Yeh .   Will wait for nephrology consultation now  -Currently on carvedilol and losartan therapy  -We will hold losartan for now  :  Mitral valve disease  -Patient has evidence of moderate to severe mitral regurgitation with mildly reduced left ventricle ejection fraction at 50% by recent echocardiography  -At his age will continue to monitor and not consider any aggressive interventions    GAGE on CKD  -Renal function has been on IV Lasix therapy  -Consider nephrology consultation.       Atrial fibrillation  -Rate is controlled  -Currently on Xarelto 10 mg daily    Hyperkalemia  -Calcium level has improved to 4.5 on Lokelma and IV Lasix infusion  -Continue to check renal panel daily  -If potassium is stable tomorrow, will discontinue Lokelma and continue to follow potassium closely    Complexity of medical decision making-very high  Electronically signed by Guy Whittaker MD on 11/12/2020 at 10:14 AM

## 2020-11-12 NOTE — PROGRESS NOTES
Pulmonary Progress Note    CC: Acute hypoxic/hypercarbic respiratory failure  Acute pulmonary edema  Pleural effusions  Pulmonary fibrosis  Mitral regurgitation  Hemoptysis     24 hr events:  Says he feels like a new man today. He denies shortness of breath. Had cough with bloody sputum yesterday, but none today. On lasix drip and had 3.8L of urine output in the past 24 hours. ROS:  No SOB  +chronic cough  No vomiting     PHYSICAL EXAM:  Blood pressure 115/63, pulse 70, temperature 97.6 °F (36.4 °C), temperature source Oral, resp. rate 18, height 5' 6\" (1.676 m), weight 181 lb 10.5 oz (82.4 kg), SpO2 99 %. on 3L NC    Intake/Output Summary (Last 24 hours) at 11/12/2020 1068  Last data filed at 11/12/2020 1404  Gross per 24 hour   Intake 1561.8 ml   Output 4850 ml   Net -3288.2 ml       Gen: Well developed; well nourished  Eyes: No scleral icterus. No conjunctival injection. ENT: External appearance of ears and nose normal.  Neck: Trachea midline. No obvious mass. No visible thyroid enlargement    Respiratory: Crackles bilaterally-improved compared to previous, no accessory muscle use  Cardiovascular: Regular rate and rhythm, no appreciable murmurs. BLE edema. Skin: Warm and dry. No rashes or ulcers on visible areas. Normal texture and turgor  Musculoskeletal: No cyanosis, clubbing or joint deformity.     Psychiatric: Normal mood and affect; exhibits normal insight and judgement     Medications:  Current Facility-Administered Medications: atorvastatin (LIPITOR) tablet 40 mg, 40 mg, Oral, Nightly  carvedilol (COREG) tablet 12.5 mg, 12.5 mg, Oral, BID   Vitamin D (CHOLECALCIFEROL) tablet 2,000 Units, 2,000 Units, Oral, Daily  finasteride (PROSCAR) tablet 5 mg, 5 mg, Oral, Daily  fluticasone (FLONASE) 50 MCG/ACT nasal spray 2 spray, 2 spray, Each Nostril, Daily  losartan (COZAAR) tablet 50 mg, 50 mg, Oral, Daily  oxybutynin (DITROPAN) tablet 5 mg, 5 mg, Oral, Daily  predniSONE (DELTASONE) tablet 10 mg, 10 mg,

## 2020-11-12 NOTE — PROGRESS NOTES
Physician Progress Note      Siddharth Clark  CSN #:                  [id number]  :                       [id number]  ADMIT DATE:       2020 5:14 AM  DISCH DATE:  RESPONDING  PROVIDER #:        Mello Harvey MD          QUERY TEXT:    Dear Dr. Viky Whittington,  Patient admitted with CHF and acute respiratory failure and had elevation in   troponin and the chest tightness  . If possible, please document in the   progress notes and discharge summary if you are evaluating and/or treating any   of the following: The medical record reflects the following:  Risk Factors: Hx CHF, A-fib, CKD4, CAD with stents, pulm fibrosis, severe   mitral regurg  Clinical Indicators: Presented to ED after [facility] suddenly with extreme sob   and chest tightness as well as pain all over his body. \"  Admitted with Acute   on chronic diastolic CHF, CSRD=6.63, 0.80, 0.09, 0.06  H and P notes \"with the   elevation in troponin and the chest tightness this could have been an   ischemic event precipitating the pulmonary edema\"  Treatment: Monitor labs, Cardiac monitor, Lasix gtt, Cardiology consult  Thank you,  Beth Hughes RN, CDS  [email or web address]  Options provided:  -- NSTEMI  -- Type 2 MI  -- Demand Ischemia with MI  -- Demand Ischemia only, no MI  -- Unstable Angina  -- Other - I will add my own diagnosis  -- Disagree - Not applicable / Not valid  -- Disagree - Clinically unable to determine / Unknown  -- Refer to Clinical Documentation Reviewer    PROVIDER RESPONSE TEXT:    This patient has an NSTEMI. Query created by: [facility] SPECIALTY [facility] on 2020 9:16 AM      QUERY TEXT:    Dear Dr. Viky Whittington,  Pt admitted with Acute Respiratory Failure and CHF. Pt noted to have   Leukocytosis, Neutrophilia, Hypoxia, elevated glucose. If possible, please   document in the progress notes and discharge summary if you are evaluating and   /or treating any of the following:     The medical record reflects the following:  Risk Factors: Hx CHF,CKD,CAD, Pulm fibrosis, mitral valve regurg  Clinical Indicators: 11/11 WBC=18.2, Neutrophils=14.4,pH=7.191 on VBG, O2   sat=89% on CPAP, Glucose=284, CT scan notes \"severe interstitial/alveolar   pulmonary edema, small bilateral effusions Difficult to exclude superimposed   atypical or viral infection\"  Treatment: on Vancomycin, Maxipime  Thank you,  Brandie Lopez RN, [email or web address]  Options provided:  -- Sepsis, present on admission  -- Sepsis, present on admission, now resolved,  -- Sepsis was ruled out  -- Other - I will add my own diagnosis  -- Disagree - Not applicable / Not valid  -- Disagree - Clinically unable to determine / Unknown  -- Refer to Clinical Documentation Reviewer    PROVIDER RESPONSE TEXT:    This patient has sepsis which was present on admission. Query created by:  Frida Keith on 11/12/2020 9:26 AM      Electronically signed by:  Lakisha Vera MD 11/12/2020 10:49 AM

## 2020-11-13 NOTE — CONSULTS
[facility]  Consult Note           Reason for Consult:  GAGE/CKD  Requesting Physician:  Dr. Vanita Sacks  History Obtained From:  patient, electronic medical record    History of Present Ilness:  79 y/o WM seen for evaluation of worsening renal function  He has h/o CKD stage 3 with recent baseline Cr ~ 1.7 MG  Has h/o ILD, diastolic CHF A fib and MR Recently hospitalized with CHF and Pneumonia  Discharge weight was 174 Readmitted with increasing SOB and decreased endurance Wt was up to 182 lbs on admission   Cr increased from 1.6 to 2.4 mg today  Had been  on ARB and has been diuresed 4.7 L so far  Has had LUT obstructive symptoms  Has had relative hypotension  No exposure to IV Contrast    Past Medical History:        Diagnosis Date    Acute diastolic CHF (congestive heart failure) ([address] 10/23/2020    Arthritis     Atrial fibrillation ([address]     BPH with obstruction/lower urinary tract symptoms     nocturia 3-4 x per night    Chronic atrial fibrillation ([address]     CKD (chronic kidney disease) 2017    level 3    Coronary arteriosclerosis 2009    2 stents/ Dr Loistine Fothergill in [facility] GERD (gastroesophageal reflux disease)     History of blood transfusion     1945   During deployment    HTN (hypertension) 1990    Left hip pain     schrapnel in hip and intestines/chronic pains left hip impairs walking    Pacemaker 2015    Pulmonary fibrosis ([address] 08/2017    mild amount seen on chest ct    Pulmonary nodule, left 2017    noncalcified 7mm low risk repeat 8/2018 if patient willing    Renal atrophy, right 2017    Renal cyst, right 08/2017    not clearly simple cyst    SOB (shortness of breath) 2017    terrell normal pft and ct chest ? smll amount of pulmonary fibrosis       Past Surgical History:        Procedure Laterality Date    ABDOMEN SURGERY      Scrapnel removed in [facility]  2009    awoke with indigestion    EYE SURGERY Bilateral     cataract  INGUINAL HERNIA REPAIR Right 07/2016    PACEMAKER INSERTION  2014    PACEMAKER PLACEMENT      SMALL INTESTINE SURGERY  1945    jelly as ww II vet, part of small intestine moved    TONSILLECTOMY  1960       Home Medications:    No current facility-administered medications on file prior to encounter. Current Outpatient Medications on File Prior to Encounter   Medication Sig Dispense Refill    omeprazole (PRILOSEC) 20 MG delayed release capsule Take 20 mg by mouth daily      traMADol (ULTRAM) 50 MG tablet Take 1 tablet by mouth 2 times daily for 30 days.  60 tablet 0    losartan (COZAAR) 50 MG tablet Take 1 tablet by mouth daily Hold for systolic less 359 30 tablet 0    carvedilol (COREG) 12.5 MG tablet Take 1 tablet by mouth 2 times daily (with meals) 60 tablet 0    predniSONE (DELTASONE) 10 MG tablet Take 1 tablet by mouth daily 30 tablet 0    diclofenac sodium (VOLTAREN) 1 % GEL Apply 4 g topically 3 times daily 1 Tube 0    furosemide (LASIX) 20 MG tablet Take 1 tablet by mouth daily 30 tablet 0    oxybutynin (DITROPAN) 5 MG tablet TAKE 1 TABLET BY MOUTH EVERY DAY 90 tablet 1    fluticasone (FLONASE) 50 MCG/ACT nasal spray SPRAY 2 SPRAY INTO EACH NOSTRIL EVERY DAY 3 Bottle 1    albuterol sulfate  (90 Base) MCG/ACT inhaler Inhale 2 puffs into the lungs every 6 hours as needed for Wheezing or Shortness of Breath 1 Inhaler 5    umeclidinium-vilanterol (ANORO ELLIPTA) 62.5-25 MCG/INH AEPB inhaler Inhale 1 puff into the lungs daily 1 each 0    tamsulosin (FLOMAX) 0.4 MG capsule ONE EVERY NIGHT FOR YOUR PROSTATE 90 capsule 3    finasteride (PROSCAR) 5 MG tablet TAKE 1 TABLET BY MOUTH EVERY DAY 90 tablet 4    atorvastatin (LIPITOR) 40 MG tablet Take 1 tablet by mouth daily 90 tablet 2    rivaroxaban (XARELTO) 15 MG TABS tablet Take 1 tablet by mouth daily (with breakfast) 90 tablet 5    aspirin 81 MG tablet Take 81 mg by mouth daily      Cholecalciferol (VITAMIN D3) 2000 UNITS CAPS Take 2,000 Units by mouth daily      calcium carbonate (OSCAL) 500 MG TABS tablet Take 600 mg by mouth daily caltrate 600-D3 one daily      Multiple Vitamins-Minerals (CENTRUM SILVER ADULT 50+) TABS Take 1 tablet by mouth daily         Allergies:  Claritin [loratadine] and Zestril [lisinopril]    Social History:    Social History     Socioeconomic History    Marital status:      Spouse name: Not on file    Number of children: Not on file    Years of education: Not on file    Highest education level: Not on file   Occupational History    Occupation: [employer]   Social Needs    Financial resource strain: Patient refused    Food insecurity     Worry: Patient refused     Inability: Patient refused    Transportation needs     Medical: Patient refused     Non-medical: Patient refused   Tobacco Use    Smoking status: Never Smoker    Smokeless tobacco: Never Used   Substance and Sexual Activity    Alcohol use: No    Drug use: Not Currently    Sexual activity: Not Currently   Lifestyle    Physical activity     Days per week: Not on file     Minutes per session: Not on file    Stress: Not on file   Relationships    Social connections     Talks on phone: Not on file     Gets together: Not on file     Attends [employer] service: Not on file     Active member of club or organization: Not on file     Attends meetings of clubs or organizations: Not on file     Relationship status: Not on file    Intimate partner violence     Fear of current or ex partner: Not on file     Emotionally abused: Not on file     Physically abused: Not on file     Forced sexual activity: Not on file   Other Topics Concern    Not on file   Social History Narrative    Not on file       Family History:   Family History   Problem Relation Age of Onset    Pacemaker Sister     Pacemaker Brother     No Known Problems Other         lung disease       Review of Systems:   Pertinent positives stated above in HPI.  All other systems were reviewed and were negative. Physical exam:   Constitutional:  VITALS:  /61   Pulse 73   Temp 98 °F (36.7 °C) (Oral)   Resp 20   Ht 5' 6\" (1.676 m)   Wt 182 lb 15.7 oz (83 kg)   SpO2 97%   BMI 29.53 kg/m²   Gen: alert, awake, nad elderly  Skin: no rash, turgor wnl  Heent:  eomi, mmm  Neck: no bruits or jvd noted, thyroid normal  Cardiovascular:  S1, S2 without m/r/g pacemaker L chest   Respiratory: bilateral crackles, inspiratory   Abdomen:  +bs, soft, nt, nd, no hepatosplenomegaly Kauffman in place  Ext: 2+ lower extremity edema  Psychiatric: mood and affect appropriate; judgement and insight intact  Musculoskeletal:  Rom, muscular strength intact; digits, nails normal    Data/  CBC:   Lab Results   Component Value Date    WBC 13.5 11/11/2020    RBC 3.79 11/11/2020    HGB 9.7 11/11/2020    HCT 30.6 11/11/2020    MCV 80.7 11/11/2020    MCH 25.7 11/11/2020    MCHC 31.8 11/11/2020    RDW 17.3 11/11/2020     11/11/2020    MPV 9.8 11/11/2020     BMP:    Lab Results   Component Value Date     11/13/2020    K 4.3 11/13/2020    CL 97 11/13/2020    CO2 29 11/13/2020    BUN 79 11/13/2020    LABALBU 3.8 10/20/2020    CREATININE 2.4 11/13/2020    CALCIUM 7.9 11/13/2020    GFRAA 31 11/13/2020    LABGLOM 25 11/13/2020    GLUCOSE 116 11/13/2020         Assessment/  1-GAGE/CKD suspect hemodynamic in nature with decompensated CHF, relative Hypotension Obstruction cannot be excluded  2-Hyperkalemia resolved off of ARB  3-Diastolic CHF with MV disease  4 ILD  5 Pulmonary HTN  6 BPH with LUT obstructive symptoms    Plan/  1-Keep kauffman  2-Check renal US  3-Avoid Hypotension Hold Coreg for SBP < 120  4 Agree with stopping ARB  5 Continue diuresis  6 place on FR   7 spep negative in 3/20     Thank you for the consultation. Please do not hesitate to call with questions.     Gari Hammans, MD, Ren[facility] Kern

## 2020-11-13 NOTE — PROGRESS NOTES
Occupational Therapy   Occupational Therapy Initial Assessment    This note serves as D/C note in the event pt is discharged prior to next treatment. See most recent treatment notes for status at D/C. Assessment: 81 yo with a history of severe MR, diastolic chf and CAD who awoke suddenly with extreme sob and chest tightness as well as pain all over his body. He came to the ER by squad and was in acute pulmonary edema. He did also have cough productive of small amounts of bloody phlegm. On arrival he was hypoxic and placed on bipap and 9L /NC and given iv lasix. Now up on the floor he is no longer requiring the bipap and his oxygen level is down to 4L. Pt lives in a one level condo with his daughter with one step to enter the home. Pt required some assistance for ADL tasks prior to admission. Today, pt performed OT eval & tx. Pt required max A for LB dressing & CGA-min A for functional mobility/transfers. Pt is currently performing ADL tasks below baseline level. Pt would benefit from home OT services to increase endurance & strength to return to baseline function when D/C home with daughter. Date: 2020   Patient Name: Loise Ganser  MRN: [id number]     : 1925    Date of Service: 2020    Discharge Recommendations:  24 hour supervision or assist, Home with assist PRN, Patient would benefit from continued therapy after discharge, S Level 1, Home with Home health OT, 2-3 sessions per week  OT Equipment Recommendations  Equipment Needed: Yes  Loise Ganser scored a 18/24 on the AM-PAC ADL Inpatient form. Current research shows that an AM-PAC score of 18 or greater is typically associated with a discharge to the patient's home setting. Based on the patient's AM-PAC score, and their current ADL deficits, it is recommended that the patient have 2-3 sessions per week of Occupational Therapy at d/c to increase the patient's independence.   At this time, this patient demonstrates the endurance and safety to discharge home with home services and a follow up treatment frequency of 2-3x/wk. Please see assessment section for further patient specific details. If patient discharges prior to next session this note will serve as a discharge summary. Please see below for the latest assessment towards goals. Assessment   Performance deficits / Impairments: Decreased functional mobility ; Decreased balance;Decreased ADL status; Decreased ROM; Decreased strength;Decreased endurance;Decreased high-level IADLs;Decreased posture;Decreased safe awareness  Assessment: 79 yo with a history of severe MR, diastolic chf and CAD who awoke suddenly with extreme sob and chest tightness as well as pain all over his body. He came to the ER by squad and was in acute pulmonary edema. He did also have cough productive of small amounts of bloody phlegm. On arrival he was hypoxic and placed on bipap and 9L /NC and given iv lasix. Now up on the floor he is no longer requiring the bipap and his oxygen level is down to 4L. Pt lives in a one level condo with his daughter with one step to enter the home. Pt required some assistance for ADL tasks prior to admission. Today, pt performed OT eval & tx. Pt required max A for LB dressing & CGA-min A for functional mobility/transfers. Pt is currently performing ADL tasks below baseline level. Pt would benefit from home OT services to increase endurance & strength to return to baseline function when D/C home with daughter. Treatment Diagnosis: Impared: ADL/IADL function, balance, functional mobility/transfers, endurance, strength, safety awareness, posture  Prognosis: Good  Decision Making: Medium Complexity  History: 79 yo with a history of severe MR, diastolic chf and CAD who awoke suddenly with extreme sob and chest tightness as well as pain all over his body.   Exam: ADL/IADL function, balance, functional mobility/transfers, endurance, strength, cognition, posture  OT Education: OT Role;Plan of Care;Energy Conservation;Transfer Training;ADL Adaptive Strategies  REQUIRES OT FOLLOW UP: Yes  Activity Tolerance  Activity Tolerance: Patient limited by fatigue;Patient Tolerated treatment well  Activity Tolerance: Pt tolerated eval and tx well overall, became SOB during LB dressing and after transfer. Pt required rest breaks for energy conservation  Safety Devices  Safety Devices in place: Yes  Type of devices: Gait belt;Patient at risk for falls; Left in chair;Chair alarm in place;Nurse notified;Call light within reach           Patient Diagnosis(es): The primary encounter diagnosis was Acute respiratory failure with hypoxia ([address] Diagnoses of Acute pulmonary edema (HCC) and Renal cyst were also pertinent to this visit. has a past medical history of Acute diastolic CHF (congestive heart failure) ([address] Arthritis, Atrial fibrillation ([address] BPH with obstruction/lower urinary tract symptoms, Chronic atrial fibrillation ([address] CKD (chronic kidney disease), Coronary arteriosclerosis, GERD (gastroesophageal reflux disease), History of blood transfusion, HTN (hypertension), Left hip pain, Pacemaker, Pulmonary fibrosis ([address] Pulmonary nodule, left, Renal atrophy, right, Renal cyst, right, and SOB (shortness of breath). has a past surgical history that includes Pacemaker insertion (2014); Tonsillectomy (1960); Small intestine surgery (1945); Inguinal hernia repair (Right, 07/2016); Coronary angioplasty with stent (2009); pacemaker placement; Colonoscopy; Abdomen surgery; and eye surgery (Bilateral).     Treatment Diagnosis: Impared: ADL/IADL function, balance, functional mobility/transfers, endurance, strength, safety awareness, posture      Restrictions  Restrictions/Precautions  Restrictions/Precautions: Fall Risk  Position Activity Restriction  Other position/activity restrictions: Say Brower, Diet low sodium 2 GM;1500ml    Subjective   General  Chart Reviewed: Yes, Orders, Progress Notes, History and Physical  Patient assessed for rehabilitation services?: Yes  Additional Pertinent Hx: 81 yo with a history of severe MR, diastolic chf and CAD who awoke suddenly with extreme sob and chest tightness as well as pain all over his body. He came to the ER by squad and was in acute pulmonary edema. He did also have cough productive of small amounts of bloody phlegm. On arrival he was hypoxic and placed on bipap and 9L /NC and given iv lasix. Now up on the floor he is no longer requiring the bipap and his oxygen level is down to 4L. His tightness is still present but less so and his sob is much improved  He is still hurting in his arthritic joints (mainly low back and knees. )  Family / Caregiver Present: Dtr [place] observed 2nd 1/2 of session  Referring Practitioner: Dr. Devorah Prescott  Diagnosis: SOB  Subjective  Subjective: Pt met in room, asleep in bed. Pt easily awoken and pleasant. Pt agreeable to therapy, pt rated pain 3/10 in lower back. Social/Functional History  Social/Functional History  Lives With: Daughter  Type of Home: House(Condo)  Home Layout: One level  Home Access: Stairs to enter with rails  Entrance Stairs - Number of Steps: 1 R rail + 6 steps down with B far spaced rails. 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        Objective        Orientation  Overall Orientation Status: Within Functional Limits  Observation/Palpation  Posture: Fair(Kyphotic posture)  Observation: Heart monitor, kauffman catheter  Balance  Sitting Balance: Stand by assistance  Standing Balance: Contact guard assistance  Standing Balance  Time: ~30-45 seconds  Activity: functional mobility/ LB dressing  Comment: No LOB in static stance, but pt appear SOB  Functional Mobility  Functional - Mobility Device: Rolling Walker  Activity: Other(From bed to recliner)  Assist Level: Contact guard assistance  Functional Mobility Comments: Pt amb few steps from bed>recliner using RW, CGA. S/OT managed catheter. Pt amb slowly. Wheelchair Bed Transfers  Wheelchair/Bed - Technique: Ambulating(RW)  Equipment Used: Bed;Other(Recliner)  Level of Asssistance: Minimal assistance  Wheelchair Transfers Comments: Bed>RW>recliner, CGA to stand cues to push up from bed and not RW, min A to sit & cues to reach back for arm rests. Pt appears SOB. No LOB noted. ADL  UE Dressing: Setup;Stand by assistance(Pt donned t shirt)  LE Dressing: Maximum assistance(Max A to thread pants including catheter, pt able to bring to knees, assist to bring to waist in stance @ RW. Pt able to don/doff footies by crossing BLEs. Assist to don [facility].)  Additional Comments: Based on strength and endurance, anticipate pt would require max A for bathing and dressing, SBA for grooming, min A for toileting.   Tone RUE  RUE Tone: Normotonic  Tone LUE  LUE Tone: Normotonic     Bed mobility  Rolling to Right: Stand by assistance  Sit to Supine: Stand by assistance  Scooting: Stand by assistance  Comment: HOB elevated ~45 degrees, pt used rails        Cognition  Overall Cognitive Status: Exceptions  Cognition Comment: Slowed processing, decreased memory  Perception  Overall Perceptual Status: WFL     Sensation  Overall Sensation Status: WFL        LUE AROM (degrees)  LUE AROM : WFL  Left Hand AROM (degrees)  Left Hand AROM: WFL  RUE AROM (degrees)  RUE AROM : WFL  Right Hand AROM (degrees)  Right Hand AROM: WFL  LUE Strength  Gross LUE Strength: (Exceptions to shoulder)  LUE Strength Comment: Shoulder NT D/T hx of shoulder pain, distally WFL  RUE Strength  Gross RUE Strength: WFL  RUE Strength Comment: Grossly 4/5                   Plan   Plan  Times per week: 3-5x  Current Treatment Recommendations: ROM, Strengthening, Balance Training, Functional Mobility Training, Safety Education & Training, Self-Care / ADL, Neuromuscular Re-education, Endurance Training, Equipment Evaluation, Education, & procurement                                                 AM-PAC Score        AM-PAC Inpatient Daily Activity Raw Score: 18 (11/13/20 1423)  AM-PAC Inpatient ADL T-Scale Score : 38.66 (11/13/20 1423)  ADL Inpatient CMS 0-100% Score: 46.65 (11/13/20 1423)  ADL Inpatient CMS G-Code Modifier : CK (11/13/20 1423)    Goals  Short term goals  Time Frame for Short term goals: Prior to D/C  Short term goal 1: Pt will complete dressing w/ min A including Say Hose  Short term goal 2: Pt will complete bathing w/ min A  Short term goal 3: Pt will toilet w/ min A  Short term goal 4: Pt will complete functional mobility/transfers SBA with RW  Short term goal 5: Pt will complete BUE HEP/ther ex w/ supervision to increase strength & endurance to perform ADL tasks  Long term goals  Time Frame for Long term goals : ltg=stg  Patient Goals   Patient goals : Per pt report, \"I want to get up 6 steps. \" Above goals will work towards this goal.       Therapy Time   Individual Concurrent Group Co-treatment   Time In 1320         Time Out 1415         Minutes 55         Timed Code Treatment Minutes: 40 Minutes(+15 evaluation)       John Pablo S/OT  Therapist was present, directed patients care, made skilled judgement, and was responsible for assessment and treatment of the patient.       Ingrid Queen OTR/L #[id number]

## 2020-11-13 NOTE — PLAN OF CARE
Problem: Pain:  Description: Pain management should include both nonpharmacologic and pharmacologic interventions. Goal: Pain level will decrease  Description: Pain level will decrease  11/13/2020 1036 by Gary Kim RN  Outcome: Ongoing  Pain being managed with PO medications as ordered by MD      Problem: Skin Integrity:  Goal: Will show no infection signs and symptoms  Description: Will show no infection signs and symptoms  11/13/2020 1036 by Gary Kim RN  Outcome: Ongoing  Skin assessment completed every shift per protocol. Pt assessed for incontinence, appropriate barrier cream applied as needed. Pt encouraged to turn/rotate every 2 hours. Assistance provided if pt unable to do so themselves. Will continue to monitor. Problem: Falls - Risk of:  Goal: Will remain free from falls  Description: Will remain free from falls  11/13/2020 1036 by Gary Kim RN  Outcome: Ongoing  Patient is wearing nonskid socks. Bed is alarmed, locked, and in lowest position. Room is free of clutter. Call light is within reach and used appropriately. Fall risk assessed per protocol. Will continue to monitor. Problem: OXYGENATION/RESPIRATORY FUNCTION  Goal: Patient will maintain patent airway  11/13/2020 1036 by Gary Kim RN  Outcome: Ongoing  Weaned off supplemental oxygen      Problem: HEMODYNAMIC STATUS  Goal: Patient has stable vital signs and fluid balance  11/13/2020 1036 by Gary Kim RN  Outcome: Ongoing  Heart rate and rhythm continuously monitored. Vitals taken every four hours. 2+ pitting BLE edema. Palpable pulses. Daily weights. Problem: FLUID AND ELECTROLYTE IMBALANCE  Goal: Fluid and electrolyte balance are achieved/maintained  11/13/2020 1036 by Gary Kim RN  Outcome: Ongoing  Turk cath, d/c IV lasix, Is and Os monitored.       Problem: ACTIVITY INTOLERANCE/IMPAIRED MOBILITY  Goal: Mobility/activity is maintained at optimum level for patient  11/13/2020 1036 by Gary Kim RN  Outcome: Ongoing  Up with assistance - generalized weakness      Problem: Nutritional:  Goal: Nutritional status will improve  Description: Nutritional status will improve  Outcome: Ongoing   Fair appetite, ate 100% of breakfast     Problem: Physical Regulation:  Goal: Will remain free from infection  Description: Will remain free from infection  Outcome: Ongoing   Up with assistance     Problem: Respiratory:  Goal: Ability to maintain normal respiratory secretions will improve  Description: Ability to maintain normal respiratory secretions will improve  11/13/2020 1036 by Nery Argueta RN  Outcome: Ongoing  Weaned off oxygen

## 2020-11-13 NOTE — PROGRESS NOTES
Department of Internal Medicine  General Internal Medicine  Attending Progress Note    Chief Complaint:feeling wacky      HPI:   SUBJECTIVE:  81 yo male admitted with chest tightness acute pulmonary edema and hemoptysis feels a lot better today. His sob is much improved on the lasix drip. Now off oxygen. He chest tightness is resolved, the troponins peaked at .09 and are trending back down . He is still coughing but decreased and now nonproductive. The overall whole body pain is gone. He has a kauffman in now so not having to get up to the bathroom, requiring a steady when he goes. Feels a little wacky though not confused, thinking about funny things and laughing at himself. Still on tramadol still gets lbp.      Past Medical History:   Diagnosis Date    Acute diastolic CHF (congestive heart failure) ([facility] 10/23/2020    Arthritis     Atrial fibrillation (HCC)     BPH with obstruction/lower urinary tract symptoms     nocturia 3-4 x per night    Chronic atrial fibrillation (HCC)     CKD (chronic kidney disease) 2017    level 3    Coronary arteriosclerosis 2009    2 stents/ Dr Columba Galeano in [facility] GERD (gastroesophageal reflux disease)     History of blood transfusion     1945   During deployment    HTN (hypertension) 1990    Left hip pain     schrapnel in hip and intestines/chronic pains left hip impairs walking    Pacemaker 2015    Pulmonary fibrosis ([facility] 08/2017    mild amount seen on chest ct    Pulmonary nodule, left 2017    noncalcified 7mm low risk repeat 8/2018 if patient willing    Renal atrophy, right 2017    Renal cyst, right 08/2017    not clearly simple cyst    SOB (shortness of breath) 2017    terrell normal pft and ct chest ? smll amount of pulmonary fibrosis     Past Surgical History:   Procedure Laterality Date    ABDOMEN SURGERY      Scrapnel removed in [facility]  2009    awoke with indigestion    EYE SURGERY Bilateral cataract    INGUINAL HERNIA REPAIR Right 07/2016    PACEMAKER INSERTION  2014    PACEMAKER PLACEMENT      SMALL INTESTINE SURGERY  1945    schrapnhoney as ww II vet, part of small intestine moved    TONSILLECTOMY  1960      Family History   Problem Relation Age of Onset    Pacemaker Sister     Pacemaker Brother     No Known Problems Other         lung disease     Social History     Tobacco Use    Smoking status: Never Smoker    Smokeless tobacco: Never Used   Substance Use Topics    Alcohol use: No    Drug use: Not Currently       Prior to Admission medications    Medication Sig Start Date End Date Taking? Authorizing Provider   omeprazole (PRILOSEC) 20 MG delayed release capsule Take 20 mg by mouth daily   Yes Historical Provider, MD   traMADol (ULTRAM) 50 MG tablet Take 1 tablet by mouth 2 times daily for 30 days.  11/3/20 12/3/20  Doretha Barrera MD   losartan (COZAAR) 50 MG tablet Take 1 tablet by mouth daily Hold for systolic less 094 51/0/71   Doretha Barrera MD   carvedilol (COREG) 12.5 MG tablet Take 1 tablet by mouth 2 times daily (with meals) 11/3/20   Doretha Barrera MD   predniSONE (DELTASONE) 10 MG tablet Take 1 tablet by mouth daily 11/4/20 12/4/20  Doretha Brarera MD   diclofenac sodium (VOLTAREN) 1 % GEL Apply 4 g topically 3 times daily 11/3/20   Doretha Barrera MD   furosemide (LASIX) 20 MG tablet Take 1 tablet by mouth daily 11/3/20   Doretha Barrera MD   oxybutynin (DITROPAN) 5 MG tablet TAKE 1 TABLET BY MOUTH EVERY DAY 10/21/20   Jeri Lopez MD   fluticasone [facility]) 50 MCG/ACT nasal spray SPRAY 2 SPRAY INTO EACH NOSTRIL EVERY DAY 10/9/20   Jeri Lopez MD   albuterol sulfate  (90 Base) MCG/ACT inhaler Inhale 2 puffs into the lungs every 6 hours as needed for Wheezing or Shortness of Breath 8/27/20   Bulmaro Wright MD   umeclidinium-vilanterol (ANORO ELLIPTA) 62.5-25 MCG/INH AEPB inhaler Inhale 1 puff into the lungs daily 8/27/20   Bulmaro Wright MD   tamsulosin (FLOMAX) 0.4 MG capsule ONE EVERY NIGHT FOR YOUR PROSTATE 4/22/20   Jesus Brooks MD   finasteride (PROSCAR) 5 MG tablet TAKE 1 TABLET BY MOUTH EVERY DAY 12/2/19   Jesus Brooks MD   atorvastatin (LIPITOR) 40 MG tablet Take 1 tablet by mouth daily 2/22/19   Wilma Todd MD   rivaroxaban Johnita Jump) 15 MG TABS tablet Take 1 tablet by mouth daily (with breakfast) 1/8/18   Jesus Brooks MD   aspirin 81 MG tablet Take 81 mg by mouth daily    Historical Provider, MD   Cholecalciferol (VITAMIN D3) 2000 UNITS CAPS Take 2,000 Units by mouth daily    Historical Provider, MD   calcium carbonate (OSCAL) 500 MG TABS tablet Take 600 mg by mouth daily caltrate 600-D3 one daily    Historical Provider, MD   Multiple Vitamins-Minerals (CENTRUM SILVER ADULT 50+) TABS Take 1 tablet by mouth daily    Historical Provider, MD         ROS:  A comprehensive review of systems was negative except for: sob cough    OBJECTIVE:      PHYSICAL:  Intake/Output:   Patient Vitals for the past 8 hrs:   BP Temp Temp src Pulse Resp SpO2   11/13/20 0731 -- -- -- -- -- 97 %   11/13/20 0710 (!) 99/55 97.9 °F (36.6 °C) Oral 71 18 99 %   11/13/20 0600 -- -- -- 72 -- --   11/13/20 0400 -- -- -- 70 -- --   11/13/20 0345 104/63 97.3 °F (36.3 °C) Oral 70 20 100 %     I/O last 3 completed shifts: In: 1429.2 [P.O.:1180;  I.V.:249.2]  Out: 3800 [Urine:3800]  I/O this shift:  In: 180 [P.O.:180]  Out: 1000 [Urine:1000]  VITALS:    BP (!) 99/55   Pulse 71   Temp 97.9 °F (36.6 °C) (Oral)   Resp 18   Ht 5' 6\" (1.676 m)   Wt 182 lb 15.7 oz (83 kg)   SpO2 97%   BMI 29.53 kg/m²     General Appearance: alert and oriented to person, place and time, well-developed and well-nourished, in no acute distress  Skin: warm and dry, no rash or erythema  Head: normocephalic and atraumatic  Eyes: conjunctivae normal and sclera anicteric  ENT: [facility] [place]  Neck: neck supple and non tender without mass, no thyromegaly or thyroid nodules, no cervical lymphadenopathy   Pulmonary/Chest: insp crackles and decreased bs bases  Cardiovascular: normal rate, normal S1 and S2, no gallops and no carotid bruits  Abdomen: soft, non-tender, non-distended, normal bowel sounds, no masses or organomegaly  Extremities: no cyanosis, clubbing or edema  Musculoskeletal: no swollen joints and no tender joints,  Neurologic: coordination normal and speech normal, moves all 4 extremities    DATA:   Labs:  CBC:   Recent Labs     11/11/20  0528 11/11/20  2135   WBC 18.2* 13.5*   HGB 11.3* 9.7*    138     BMP:    Recent Labs     11/11/20  0528 11/12/20  0338 11/13/20  0406   * 133* 136   K 5.7* 4.5 4.3   CL 97* 98* 97*   CO2 19* 24 29   BUN 53* 70* 79*   CREATININE 1.6* 1.9* 2.4*   GLUCOSE 284* 124* 116*     POC GLUCOSE:    Recent Labs     11/12/20  0748 11/12/20  1147 11/12/20  1651 11/12/20  2030 11/13/20  0728   POCGLU 115* 248* 140* 170* 155*     Ca/Mg/Phos:   Recent Labs     11/11/20  0528 11/12/20  0338 11/13/20  0406   CALCIUM 9.2 8.7 7.9*     Hepatic: No results for input(s): AST, ALT, ALB, BILITOT, ALKPHOS in the last 72 hours. Troponin:   Recent Labs     11/11/20  1344 11/11/20  1912 11/12/20  0338   TROPONINI 0.09* 0.09* 0.06*     ProBNP:   Recent Labs     11/11/20  0528 11/12/20  0338   PROBNP 4,196* 18,115*         DIAGNOSTICS:  Ct Chest Wo Contrast    Result Date: 11/11/2020  Features of heart failure, with moderate cardiomegaly, severe interstitial/alveolar pulmonary edema, small bilateral effusions and body wall anasarca. Difficult to exclude superimposed atypical or viral infection, which could be present in the appropriate context. Mediastinal lymphadenopathy is presumably reactive. Multivessel coronary calcifications status post PCI. Presbyesophagus. Right renal atrophy with indeterminate cyst.  Consideration/appropriateness for follow-up renal protocol CT/MRI may be based on patient's age, life expectancy and comorbidities.      Xr Chest Portable    Result Date: 11/11/2020  Heart failure, with

## 2020-11-13 NOTE — PROGRESS NOTES
[facility]   Daily Progress Note      Admit Date:  11/11/2020    CC: SOB    HPI:   Jeremy Reyna is a 80 y.o. male with PMH CAD, Chronic AF, bradycardia/ SSS s/p DC PPM  [facility] 2014 - atrial lead off (Follows with Dr. Mechelle Harrison), GERD, HTN, pulm fibrosis, CKD, mitral valve disease, chronic back pain. Admitted to [facility] with SOB for several days. Associated with BLE edema. Work up consistent with HF. Dr. Nan Jacques consulted. Started on Lasix gtt. Today he admits to progressive weakness and activity intolerance over the last month. 1 month ago he was able to ambulate 1/2 mile with a cane. He does need some assistance at home but was mostly taking care of himself independently. He is resting in bed and denies SOB or CP. He does get mild SOB when laying flat. He also got lightheaded and pale upon sitting back up. It resolved after 1-2 min. He continues with BLE edema but states it is improved. He does have chronic BLE edema and wears compression stockings at home. C/O right low back pain this AM. Yesterday he admits that he had significant generalized pain and isn't able to describe in more depth. Improved today. Very talkative and appropriate. Creat up today to 2.4. Neph is consulted. Review of Systems:   General: Denies fever, chills, +fatigue, +weakness  Skin: Denies skin changes, rash, itching, lesions.   HEENT: +Lightheaded  RESP: Denies cough, sputum, dyspnea, wheeze, snoring  CARD: Denies palpitations,  Murmur, chest pain  GI:Denies nausea, vomiting, heartburn, loss of appetite, change in bowels  : Denies frequency, pain, incontinence, polyuria  VASC: Denies claudication, leg cramps, clots  MUSC/SKEL: Denies pain, stiffness, arthritis  PSYCH: Denies anxiety, depression, stress  NEURO: Denies numbness, tingling, weakness,change in mood or memory  HEME: Denies abn bruising, bleeding, anemia  ENDO: Denies intolerance to heat, cold, excessive thirst or hunger, hx thyroid study.    2. Normal pharmacological stress portion of the study.    3. No evidence of Ischemia by Myocardial Perfusion Imaging.    4. Gated Study shows normal LV size and Systolic function; EF is 70 %. Cardiac Angiography:   Cath: 2009  Stents to  Prox LAD  Mid LAD  Ramus    Assessment/Plan:    1.) Acute on chronic diastolic heart failure, EF 50% with moderate to severe MR: Improving, now complicated with worsening renal fx. BNP may be falsely rising D/T GAGE. NYHA Class III   Stage 3  Diuretic: lasix IV - on hold until Neph consult  Beta Blocker: hold coreg for hypotension  ACEi/ARB/ARNI: none, GAGE, hypotenision  Aldosterone Antagonist: one, GAGE and hypotension  SGLT2 Inhibitor: outpatient  2gm Na diet, daily weight, 64 oz fluid restriction  Avoid NSAIDS and other nephrotoxic meds  Cardiac Rehab: Not indicated for EF>35%   ICD: Not indicated for EF>35%  HF RN referral for education  Palliative Care consult   PT/OT consult    2.) CAD s/p stents to LAD and ramus 2009: No evidence of ischemia  DAPT: none, on eliquis  Beta Blocker: hold for hypotension  ACEi/ARB: held  Anti anginal: none  Lipid management/high intensity statin: lipitor  Risk factor management: high blood pressure, high cholesterol, Diabetes, smoking, obesity, family hx  Lifestyle modification: Heart healthy diet, regular exercise, weight loss, smoking cessation, stress reduction    3. ) Hypotension: Symptomatic with lightheadedness when sitting up. Hold coreg for SBP <110  Hold lasix  Check orthostatic hypotension when gets OOB  Apply BLE compression stockings    3. ) GAGE on CKD: Creat worse today.    Neph consult    4.) Chronic AF: HR stable  Thromboembolic risk reduction: eliquis  Rate Control/ Rhythm Control: coreg when BP stable    5.) SSS s/p PPM    6.) Hyperkalemia: resolved    Electronically signed by KAYCE Manuel CNP on 11/13/2020 at 9:33 AM

## 2020-11-13 NOTE — CARE COORDINATION
SW received phone call from patient's daughter regarding his care. She stated that she was in agreement with home care being ordered again through [facility], however, she doesn't want personal care assistance at this time to minimize the amount of people in the home. She stated that she has the number to call [place] [facility] to start services once [facility] therapy ends. She stated that she could use urinals prior to discharge. SW placed two urinals in the room and she will pick them up today and take them home. SW and family to follow up prior to discharge. Respectfully submitted,    BUBBA Kennedy  [facility]   [phone]    Electronically signed by BUBBA Reynolds on 11/13/2020 at 10:59 AM

## 2020-11-13 NOTE — PROGRESS NOTES
Physical Therapy    Facility/Department: [address] PROGRESSIVE CARE  Initial Assessment    NAME: Wing Lovett  : 1925  MRN: [id number]    Date of Service: 2020    Discharge Recommendations:  S Level 1, Home with Home health PT, 24 hour supervision or assist       Wing Lovett scored a 17/24 on the AM-PAC short mobility form. Current research shows that an AM-PAC score of 18 or greater is typically associated with a discharge to the patient's home setting. Patient has 24 hour assist at home and was functioning out of a wheelchair at home. Based on the patient's AM-PAC score and their current functional mobility deficits, it is recommended that the patient have 2-3 sessions per week of Physical Therapy at d/c to increase the patient's independence. At this time, this patient demonstrates the endurance and safety to discharge home with Home PT and a follow up treatment frequency of 2-3x/wk. Please see assessment section for further patient specific details. If patient discharges prior to next session this note will serve as a discharge summary. Please see below for the latest assessment towards goals. Assessment   Body structures, Functions, Activity limitations: Decreased functional mobility ; Decreased strength;Decreased safe awareness;Decreased endurance;Decreased balance;Decreased posture  Assessment: Patient is a 79 yo male admitted with acute on chronic CHF. Patient is well known to this therapist from previous admission to rehab unit at [facility].  Prior to this admission, patient was functioning at a wheelchair level, but could walk from the bathroom door to the commode with wheeled walker on his own. Daughter, Nicole Valenzuela, reports the patient is scheduled for a lumbar injection next Friday which was the recommendation upon discharge from inpatient rehab.   Patient was greatly limited with ambulation secondary to severe pain in low back and down legs during ambulation, causing him to flex forward. His legs did fatigue quickly. Currently, patient with worsening weakness and having difficulty going any more than 10' with wheeled walker with CGA. He has increased SOB and requires increased time to recover. Patient is slightly below his baseline from when he was discharged from rehab, but prior to that, patient was able to ambulate with single point cane independently. Patient will benefit from continued therapy while in the hospital secondary to worsening weakness and poor activity tolerance, but intense therapy is not recommended secondary to severe back pain with ambulation and in ability to stand fully upright. He may tolerate therapy more once he is able to have his lumbar injection. Treatment Diagnosis: impaired functional mobility with Acute on Chronic CHF  Prognosis: Fair  Decision Making: Medium Complexity  History: see below  Exam: see below  Clinical Presentation: evolving  Safety: Patient left seated in recliner with needs in reach, call light in hand, chair alarm on, and Daughter, Matthew Lewis, present. PT Education: Goals;PT Role;Transfer Training; Adaptive Device Training;General Safety;Gait Training  Barriers to Learning: [place], forgetful  REQUIRES PT FOLLOW UP: Yes  Activity Tolerance  Activity Tolerance: Patient limited by pain; Patient limited by endurance  Activity Tolerance: patient with SOB with activity. This is worse than it was when patient was discharge from rehab. Patient Diagnosis(es): The primary encounter diagnosis was Acute respiratory failure with hypoxia ([address] Diagnoses of Acute pulmonary edema (HCC) and Renal cyst were also pertinent to this visit.      has a past medical history of Acute diastolic CHF (congestive heart failure) ([address] Arthritis, Atrial fibrillation ([address] BPH with obstruction/lower urinary tract symptoms, Chronic atrial fibrillation ([address] CKD (chronic kidney disease), Coronary arteriosclerosis, GERD (gastroesophageal reflux disease), History of blood transfusion, HTN (hypertension), Left hip pain, Pacemaker, Pulmonary fibrosis ([address] Pulmonary nodule, left, Renal atrophy, right, Renal cyst, right, and SOB (shortness of breath). has a past surgical history that includes Pacemaker insertion (2014); Tonsillectomy (1960); Small intestine surgery (1945); Inguinal hernia repair (Right, 07/2016); Coronary angioplasty with stent (2009); pacemaker placement; Colonoscopy; Abdomen surgery; and eye surgery (Bilateral). Restrictions  Restrictions/Precautions  Restrictions/Precautions: Fall Risk  Position Activity Restriction  Other position/activity restrictions: [employer], Diet low sodium 2 GM;1500ml     Vision/Hearing  Vision: Impaired  Vision Exceptions: Wears glasses at all times  Hearing: Exceptions to [facility]  Hearing Exceptions: Bilateral hearing aid       Subjective  General  Chart Reviewed: Yes  Patient assessed for rehabilitation services?: Yes  Additional Pertinent Hx: per Dr. Bennett Wiseman note, \"79 yo male admitted with chest tightness acute pulmonary edema and hemoptysis feels a lot better today. His sob is much improved on the lasix drip. Now off oxygen. He chest tightness is resolved, the troponins peaked at .09 and are trending back down . He is still coughing but decreased and now nonproductive. The overall whole body pain is gone. He has a kauffman in now so not having to get up to the bathroom, requiring a steady when he goes. \"  Response To Previous Treatment: Not applicable  Family / Caregiver Present: Yes(Daughter, Tari Hamlin, present)  Referring Practitioner: Daly Appiah CNP  Referral Date : 11/13/20  Diagnosis: acute on Chronic CHF  Subjective  Subjective: Patient seated in recliner chair upon arrival.  He reports weakness and SOB.   Pain Screening  Patient Currently in Pain: Denies          Orientation  Orientation  Overall Orientation Status: Within Functional Limits(forgetful)     Social/Functional History  Social/Functional History  Lives With: of Gait: flexed posture, quickly fatigues, shuffling gait. Improved posture and strength with second ambulation  Gait Deviations: Slow Lia; Shuffles  Distance: 8'  to stretcher for ultrasound, 15' to recliner from stretcher  Comments: Patient is well know to this therapist from his Rehab stay. Patient has similar gait pattern to his rehab stay. Daughter, [address], reports the patient was doing very minimal walking at home and he is scheduled for a lymbar injection next Friday. Patient has been reliant on the use of the wheelchair. Stairs/Curb  Stairs?: No        Balance  Posture: Fair  Sitting - Static: Good  Sitting - Dynamic: Good  Standing - Static: Fair  Standing - Dynamic: Poor  Comments: Patient fatigues quickly with upright and has weakness in bilateral LE causing a need to sit. Uses a wheeled walker for bilateral UE support        Plan   Plan  Times per week: 3-5 days/week  Current Treatment Recommendations: Strengthening, Balance Training, Functional Mobility Training, Transfer Training, Gait Training, Endurance Training, Home Exercise Program, Safety Education & Training        AM-PAC Score  AM-PAC Inpatient Mobility Raw Score : 17 (11/13/20 1507)  AM-PAC Inpatient T-Scale Score : 42.13 (11/13/20 1507)  Mobility Inpatient CMS 0-100% Score: 50.57 (11/13/20 1507)  Mobility Inpatient CMS G-Code Modifier : CK (11/13/20 1507)          Goals  Short term goals  Time Frame for Short term goals: upon discharge from acute  Short term goal 1: bed mobility with independence  Short term goal 2: transfers with supervision  Short term goal 3: Ambulated 13' with wheeled walker with CGA  Short term goal 4: Tolerate seated ther ex with PT  Patient Goals   Patient goals : \"be able to go home. \"       Therapy Time   Individual Concurrent Group Co-treatment   Time In 1440        Time Out 1500         Minutes 20                Therapy Time     Individual Co-treatment   Time In 1520     Time Out 1530     Minutes 10

## 2020-11-14 NOTE — PROGRESS NOTES
Physical Therapy  Facility/Department: [facility] PROGRESSIVE CARE  Daily Treatment Note  NAME: Cesar Lawson  : 1925  MRN: [id number]    Date of Service: 2020    Discharge Recommendations:  S Level 1, Home with Home health PT, 24 hour supervision or assist   PT Equipment Recommendations  Equipment Needed: Lesly Lawson scored a 17/24 on the AM-PAC short mobility form. Current research shows that an AM-PAC score of 18 or greater is typically associated with a discharge to the patient's home setting. Despite the patient's AM-PAC score and their current functional mobility deficits, it is recommended that the patient have 2-3 sessions per week of Physical Therapy at d/c to increase the patient's independence as he has ample assist at home. At this time, this patient demonstrates the endurance and safety to discharge home with home PT and a follow up treatment frequency of 2-3x/wk. Please see assessment section for further patient specific details. HOME HEALTH CARE: LEVEL 1 STANDARD     -Initial home health evaluation to occur within 24-48 hours, in patient home    -Home health agency to establish plan of care for patient over 60 day period    -Medication Reconciliation    -PCP Visit scheduled within seven days of discharge    -PT/OT to evaluate with goal of regaining prior level of functioning    -OT to evaluate if patient has [id number] needs for personal care     If patient discharges prior to next session this note will serve as a discharge summary. Please see below for the latest assessment towards goals. Assessment   Body structures, Functions, Activity limitations: Decreased functional mobility ; Decreased strength;Decreased safe awareness;Decreased endurance;Decreased balance;Decreased posture  Assessment: pt making slow progress in therapy; reports his daughter is able to assist as needed;  Anticipate pt will be able to return home with 24/7 assist from his daughter and home PT  Treatment Diagnosis: impaired functional mobility with Acute on Chronic CHF  Prognosis: Fair;Good  Decision Making: Medium Complexity  Clinical Presentation: evolving  PT Education: General Safety  Patient Education: reviewed call light and not getting up without assist  Barriers to Learning: [place], forgetful  REQUIRES PT FOLLOW UP: Yes  Activity Tolerance  Activity Tolerance: Patient limited by endurance     Patient Diagnosis(es): The primary encounter diagnosis was Acute respiratory failure with hypoxia ([address] Diagnoses of Acute pulmonary edema (HCC) and Renal cyst were also pertinent to this visit. has a past medical history of Acute diastolic CHF (congestive heart failure) ([address] Arthritis, Atrial fibrillation ([address] BPH with obstruction/lower urinary tract symptoms, Chronic atrial fibrillation ([address] CKD (chronic kidney disease), Coronary arteriosclerosis, GERD (gastroesophageal reflux disease), History of blood transfusion, HTN (hypertension), Left hip pain, Pacemaker, Pulmonary fibrosis ([address] Pulmonary nodule, left, Renal atrophy, right, Renal cyst, right, and SOB (shortness of breath). has a past surgical history that includes Pacemaker insertion (2014); Tonsillectomy (1960); Small intestine surgery (1945); Inguinal hernia repair (Right, 07/2016); Coronary angioplasty with stent (2009); pacemaker placement; Colonoscopy; Abdomen surgery; and eye surgery (Bilateral). Restrictions  Restrictions/Precautions  Restrictions/Precautions: Fall Risk  Position Activity Restriction  Other position/activity restrictions: [employer], Diet low sodium 2 GM;1500ml  Subjective   General  Chart Reviewed: Yes  Additional Pertinent Hx: per Dr. Lucita Jara note, \"81 yo male admitted with chest tightness acute pulmonary edema and hemoptysis feels a lot better today. His sob is much improved on the lasix drip. Now off oxygen. He chest tightness is resolved, the troponins peaked at .09 and are trending back down .  He is still coughing but decreased and now nonproductive. The overall whole body pain is gone. He has a kauffman in now so not having to get up to the bathroom, requiring a steady when he goes. \"  Response To Previous Treatment: Patient with no complaints from previous session. Family / Caregiver Present: No  Referring Practitioner: Armani Styles CNP  Subjective  Subjective: Patient seated in recliner chair upon arrival.  Pt had no c/o during session, but nursing reports earlier he was feeling dizzy          Orientation  Orientation  Overall Orientation Status: Within Functional Limits(forgetful)  Cognition   Cognition  Overall Cognitive Status: Exceptions  Arousal/Alertness: Appropriate responses to stimuli  Memory: Decreased short term memory  Cognition Comment: Slowed processing  Objective   Bed mobility  Comment: pt up in chair at beginning and end of session  Transfers  Sit to Stand: Contact guard assistance  Stand to sit: Contact guard assistance  Ambulation  Ambulation?: Yes  Ambulation 1  Surface: level tile  Device: Rolling Walker  Assistance: Contact guard assistance  Quality of Gait: flexed posture with small shuffling steps; appears to have B hip abd weakness with walking  Gait Deviations: Slow Lia; Shuffles  Distance: 36' x2     Balance  Sitting - Static: Good  Sitting - Dynamic: Good  Standing - Static: Fair  Standing - Dynamic: Fair  Comments: Patient fatigues quickly with upright and has weakness in bilateral LE causing a need to sit and rest between walks  Exercises  Comments: performed B LE ex in sitting x 10 reps ea         Comment: assisted with positioning in chair for comfort with LEs elevated; warmed blanket provided for comfort and all needs in reach           AM-PAC Score  AM-PAC Inpatient Mobility Raw Score : 17 (11/14/20 1210)  AM-PAC Inpatient T-Scale Score : 42.13 (11/14/20 1210)  Mobility Inpatient CMS 0-100% Score: 50.57 (11/14/20 1210)  Mobility Inpatient CMS G-Code Modifier : CK (11/14/20 1210)          Goals  Short term goals  Time Frame for Short term goals: upon discharge from acute  Short term goal 1: bed mobility with independence  Short term goal 2: transfers with supervision  Short term goal 3: Ambulated 13' with wheeled walker with CGA  Short term goal 4: Tolerate seated ther ex with PT  Patient Goals   Patient goals : 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 \"    Plan    Plan  Times per week: 3-5 days/week  Current Treatment Recommendations: Strengthening, Balance Training, Functional Mobility Training, Transfer Training, Gait Training, Endurance Training, Home Exercise Program, Safety Education & Training  Safety Devices  Type of devices: Call light within reach, Chair alarm in place, Left in chair, Nurse notified, Gait belt, All fall risk precautions in place     Therapy Time   Individual Concurrent Group Co-treatment   Time In 1109         Time Out 1210         Minutes 61                 HASEEB MCCOY, PT

## 2020-11-14 NOTE — PLAN OF CARE
Problem: Pain:  Goal: Pain level will decrease  Description: Pain level will decrease  11/13/2020 2256 by Adrián May RN  Outcome: Ongoing  11/13/2020 1036 by Sylvia Azul RN  Outcome: Ongoing     Problem: Falls - Risk of:  Goal: Will remain free from falls  Description: Will remain free from falls  11/13/2020 2256 by Adrián May RN  Outcome: Ongoing  11/13/2020 1036 by Sylvia Azul RN  Outcome: Ongoing     Problem: Falls - Risk of:  Goal: Absence of physical injury  Description: Absence of physical injury  11/13/2020 2256 by Adrián May RN  Outcome: Ongoing  11/13/2020 1036 by Sylvia Azul RN  Outcome: Ongoing

## 2020-11-14 NOTE — PROGRESS NOTES
Cardiology Progress Note     Admit Date: 2020     Reason for follow up: dCHF    HPI and Interval History:   Patient seen and examined. Clinical notes reviewed. Telemetry reviewed - Afib with vpacing 70's bpm. No new complaint today. No major events overnight. Denies having chest pain but complains of shortness of breath, dyspnea on exertion. I/O -2255cc over 24hrs. Review of System:  All other systems reviewed except for that noted above. Pertinent negatives and positives are:     · General: negative for fever, chills   · Ophthalmic ROS: negative for - eye pain or loss of vision  · ENT ROS: negative for - headaches, sore throat   · Respiratory: negative for - cough, sputum  · Cardiovascular: Reviewed in HPI  · Gastrointestinal: negative for - abdominal pain, diarrhea, N/V  · Hematology: negative for - bleeding, blood clots, bruising or jaundice  · Genito-Urinary:  negative for - Dysuria or incontinence  · Musculoskeletal: negative for - Joint swelling, muscle pain  · Neurological: negative for - confusion, dizziness, headaches   · Psychiatric: No anxiety, no depression. · Dermatological: negative for - rash      Physical Examination:  Vitals:    20 0806   BP: (!) 159/78   Pulse: 75   Resp:    Temp:    SpO2: 96%        Intake/Output Summary (Last 24 hours) at 2020 0924  Last data filed at 2020 0559  Gross per 24 hour   Intake 1020 ml   Output 3275 ml   Net -2255 ml     In: 360 [P.O.:360]  Out: 2075    Wt Readings from Last 3 Encounters:   20 181 lb (82.1 kg)   11/10/20 183 lb (83 kg)   20 183 lb 10.3 oz (83.3 kg)     Temp  Av.8 °F (36.6 °C)  Min: 97.4 °F (36.3 °C)  Max: 98 °F (36.7 °C)  Pulse  Av.9  Min: 70  Max: 75  BP  Min: 92/54  Max: 159/78  SpO2  Av.1 %  Min: 94 %  Max: 98 %    · Telemetry: afib with vpacing 70's bpm  · Constitutional: Alert. Oriented to person, place, and time. No distress. · Head: Normocephalic and atraumatic.    · Mouth/Throat: Lips appear moist. Oropharynx is clear and moist.  · Eyes: Conjunctivae normal. EOM are normal.   · Neck: Neck supple. No lymphadenopathy. No rigidity. 17cm JVD present. · Cardiovascular: Normal rate, regular rhythm. Normal S1&S2. Carotid pulse 2+ bilaterally. · Pulmonary/Chest: Bilateral respiratory sounds present. No respiratory accessory muscle use. No wheezes, No rhonchi. + bibasilar rales, L > R.  · Abdominal: Soft. Normal bowel sounds present. No distension, No tenderness. No splenomegaly. No hernia. · Musculoskeletal: No tenderness. 1+ BLE edema    · Lymphadenopathy: Has no cervical adenopathy. · Neurological: Alert and oriented. Cranial nerve II-XII grossly intact, No gross deficit to touch. · Skin: Skin is warm and dry. No rash, lesions, ulcerations noted. · Psychiatric: No anxiety nor agitation. Labs, diagnostic and imaging results reviewed. Reviewed. Recent Labs     11/12/20  0338 11/13/20  0406 11/14/20  0507   * 136 138   K 4.5 4.3 4.1   CL 98* 97* 96*   CO2 24 29 32   BUN 70* 79* 76*   CREATININE 1.9* 2.4* 2.1*     Recent Labs     11/11/20  2135   WBC 13.5*   HGB 9.7*   HCT 30.6*   MCV 80.7        Lab Results   Component Value Date    TROPONINI 0.06 11/12/2020     Estimated Creatinine Clearance: 22 mL/min (A) (based on SCr of 2.1 mg/dL (H)).    No results found for: BNP  Lab Results   Component Value Date    PROTIME 15.9 10/20/2020    INR 1.37 10/20/2020     Lab Results   Component Value Date    CHOL 132 03/03/2020    HDL 66 03/03/2020    TRIG 119 03/03/2020       Scheduled Meds:   famotidine  10 mg Oral Daily    furosemide  40 mg Intravenous BID    apixaban  2.5 mg Oral BID    atorvastatin  40 mg Oral Nightly    carvedilol  12.5 mg Oral BID WC    Vitamin D  2,000 Units Oral Daily    finasteride  5 mg Oral Daily    fluticasone  2 spray Each Nostril Daily    oxybutynin  5 mg Oral Daily    predniSONE  10 mg Oral Daily    tamsulosin  0.4 mg Oral Daily    glycopyrrolate-formoterol  2 puff Inhalation BID    sodium chloride flush  10 mL Intravenous 2 times per day    insulin lispro  0-6 Units Subcutaneous TID WC    insulin lispro  0-3 Units Subcutaneous Nightly     Continuous Infusions:   dextrose       PRN Meds:HYDROcodone 5 mg - acetaminophen, sodium chloride flush, acetaminophen **OR** acetaminophen, polyethylene glycol, promethazine **OR** ondansetron, glucose, dextrose, glucagon (rDNA), dextrose     Patient Active Problem List    Diagnosis Date Noted    Acute on chronic diastolic CHF (congestive heart failure) ([address] 11/11/2020    Hematemesis 11/11/2020    Hemoptysis 11/11/2020    Acute respiratory failure with hypoxia (HCC)     Hyperkalemia     Atrial fibrillation, controlled ([address]     Acute respiratory failure with hypoxia and hypercapnia (HCC)     Pleural effusion, bilateral     Nonrheumatic mitral valve regurgitation     Osteoarthritis of lumbar spine with myelopathy 11/10/2020    Acute diastolic CHF (congestive heart failure) ([address] 10/23/2020    Chronic kidney disease, stage 3b 10/23/2020    Debility 10/23/2020    Chronic atrial fibrillation (HCC)     Severe mitral regurgitation     Acute pulmonary edema ([address] 10/21/2020    Pulmonary infiltrates     Paroxysmal atrial fibrillation ([address] 10/20/2020    Arthritis of both knees 12/03/2019    Interstitial lung disease ([address] 04/05/2019    Solitary lung nodule 04/05/2019    Pulmonary fibrosis ([address]     Renal cyst, right 08/01/2017    BPH with obstruction/lower urinary tract symptoms     GERD (gastroesophageal reflux disease)     SOB (shortness of breath) 01/01/2017    Pacemaker 01/01/2015    Coronary arteriosclerosis 01/01/2009    Essential hypertension 01/01/1990      Active Hospital Problems    Diagnosis Date Noted    Acute on chronic diastolic CHF (congestive heart failure) ([address] [I50.33] 11/11/2020    Hematemesis [K92.0] 11/11/2020    Hemoptysis [R04.2] 11/11/2020    Acute

## 2020-11-14 NOTE — PROGRESS NOTES
Nephrology Progress Note          CC: We are following this patient for GAGE on CKD. Admitted for dyspnea with CHF    h/o CKD stage 3 with recent baseline Cr ~ 1.7 MG  Has h/o ILD, diastolic CHF A fib and MR Recently hospitalized with CHF and Pneumonia  Discharge weight was 174 Readmitted with increasing SOB and decreased endurance Wt was up to 182 lbs on admission   Cr increased from 1.6 to 2.4 mg   Had been  on ARB   Has had LUT obstructive symptoms  Has had relative hypotension  No exposure to IV Contrast      SUBJECTIVE:    He feels better today  Breathing still back to normal  No CP     No cough or wheezing. No N/V, abd pain, or diarrhea. No F/C. Physical Exam:    VITALS:  BP (!) 94/52   Pulse 70   Temp 97.5 °F (36.4 °C) (Oral)   Resp 16   Ht 5' 6\" (1.676 m)   Wt 181 lb (82.1 kg)   SpO2 95%   BMI 29.21 kg/m²   TEMPERATURE:  Current - Temp: 97.5 °F (36.4 °C);  Max - Temp  Av.8 °F (36.6 °C)  Min: 97.5 °F (36.4 °C)  Max: 97.9 °F (36.6 °C)  PULSE RANGE: Pulse  Av.8  Min: 70  Max: 80  BLOOD PRESSURE RANGE:  Systolic (38BDG), OPH:474 , Min:94 , YXT:635   ; Diastolic (21QVM), GDR:94, Min:52, Max:78    PULSE OXIMETRY RANGE: SpO2  Av.1 %  Min: 94 %  Max: 98 %  24HR INTAKE/OUTPUT:    Intake/Output Summary (Last 24 hours) at 2020 1733  Last data filed at 2020 1235  Gross per 24 hour   Intake 600 ml   Output 2825 ml   Net -2225 ml       Constitutional:  Elderly, NAD  HEENT:  No oral lesions  Lungs:  Few fine rales bibasilar  Cardiovascular:  RRR, 2/6 YEN  Abd:  Soft, NT  Ext:  Trace LE edema  Skin:  No lesions  Neuro: non-focal        DATA:    CBC:   Recent Labs     20  2135   WBC 13.5*   RBC 3.79*   HGB 9.7*   HCT 30.6*        BMP:    Recent Labs     20  0338 20  0406 20  0507   * 136 138   K 4.5 4.3 4.1   CL 98* 97* 96*   CO2 24 29 32   BUN 70* 79* 76*   CREATININE 1.9* 2.4* 2.1*   CALCIUM 8.7 7.9* 8.6   GLUCOSE 124* 116* 114*     Last 3 Troponin:    Lab Results   Component Value Date    TROPONINI 0.06 11/12/2020    TROPONINI 0.09 11/11/2020    TROPONINI 0.09 11/11/2020       IMPRESSION/RECOMMENDATIONS:      1. GAGE/CKD suspect hemodynamic in nature with decompensated CHF, relative Hypotension Obstruction cannot be excluded  2. CKD - RK with thinned cortex on US, chronic RK hydronephrosis, unchanged  Essentially single functioning LK  Baseline creatinine near 1.7  3. Hyperkalemia resolved off of ARB  4. Diastolic CHF with MV disease  On IV lasix - high dose  Monitor carefully. Convert to PO quickly  5. ILD  6. Pulmonary HTN  7.  BPH with LUT obstructive symptoms    Thea Mariscal MD

## 2020-11-14 NOTE — PROGRESS NOTES
[facility] Progress Note  11/14/2020 8:02 AM  Subjective:   Admit Date: 11/11/2020      Chief Complaint: Less SOB --my back is starting to hurt     Interval History: diuresis of 2.2 liters past 24 hours   Cr has improved 2.4 to 2.1  BP is stable off losartan     Diet: DIET LOW SODIUM 2 GM; 1200 ml  Medications:   Scheduled Meds:   famotidine  10 mg Oral Daily    furosemide  40 mg Intravenous BID    apixaban  2.5 mg Oral BID    atorvastatin  40 mg Oral Nightly    carvedilol  12.5 mg Oral BID WC    Vitamin D  2,000 Units Oral Daily    finasteride  5 mg Oral Daily    fluticasone  2 spray Each Nostril Daily    oxybutynin  5 mg Oral Daily    predniSONE  10 mg Oral Daily    tamsulosin  0.4 mg Oral Daily    glycopyrrolate-formoterol  2 puff Inhalation BID    sodium chloride flush  10 mL Intravenous 2 times per day    insulin lispro  0-6 Units Subcutaneous TID WC    insulin lispro  0-3 Units Subcutaneous Nightly     Continuous Infusions:   dextrose         Review of Systems -   General ROS: afebrile  Respiratory ROS:SOB with activity   Cardiovascular ROS: no chest pain  Musculoskeletal ROS:positive for - low back pain   Neurological ROS: no TIA or stroke symptoms    Objective:   Vitals: /75   Pulse 74   Temp 97.9 °F (36.6 °C) (Oral)   Resp 16   Ht 5' 6\" (1.676 m)   Wt 181 lb (82.1 kg)   SpO2 98%   BMI 29.21 kg/m²   General appearance: alert and cooperative with exam  HEENT: Head: Normocephalic, no lesions, without obvious abnormality.   Neck: no adenopathy, no carotid bruit, no JVD, supple, symmetrical, trachea midline and thyroid not enlarged, symmetric, no tenderness/mass/nodules  Lungs: diminished breath sounds bibasilar  Heart: regular rate and rhythm, S1, S2 normal, no murmur, click, rub or gallop  Abdomen: soft, non-tender; bowel sounds normal; no masses,  no organomegaly  Extremities: tr edema   Neurologic: Mental status: Alert, oriented, thought content appropriate    Admission on 11/11/2020   Component Date Value Ref Range Status    WBC 11/11/2020 18.2* 4.0 - 11.0 K/uL Final    RBC 11/11/2020 4.36  4.20 - 5.90 M/uL Final    Hemoglobin 11/11/2020 11.3* 13.5 - 17.5 g/dL Final    Hematocrit 11/11/2020 35.9* 40.5 - 52.5 % Final    MCV 11/11/2020 82.1  80.0 - 100.0 fL Final    MCH 11/11/2020 25.8* 26.0 - 34.0 pg Final    MCHC 11/11/2020 31.4  31.0 - 36.0 g/dL Final    RDW 11/11/2020 17.4* 12.4 - 15.4 % Final    Platelets 51/98/9358 187  135 - 450 K/uL Final    MPV 11/11/2020 9.8  5.0 - 10.5 fL Final    Neutrophils % 11/11/2020 79.0  % Final    Lymphocytes % 11/11/2020 14.6  % Final    Monocytes % 11/11/2020 5.8  % Final    Eosinophils % 11/11/2020 0.2  % Final    Basophils % 11/11/2020 0.4  % Final    Neutrophils Absolute 11/11/2020 14.4* 1.7 - 7.7 K/uL Final    Lymphocytes Absolute 11/11/2020 2.7  1.0 - 5.1 K/uL Final    Monocytes Absolute 11/11/2020 1.1  0.0 - 1.3 K/uL Final    Eosinophils Absolute 11/11/2020 0.0  0.0 - 0.6 K/uL Final    Basophils Absolute 11/11/2020 0.1  0.0 - 0.2 K/uL Final    Sodium 11/11/2020 130* 136 - 145 mmol/L Final    Potassium reflex Magnesium 11/11/2020 5.7* 3.5 - 5.1 mmol/L Final    Chloride 11/11/2020 97* 99 - 110 mmol/L Final    CO2 11/11/2020 19* 21 - 32 mmol/L Final    Anion Gap 11/11/2020 14  3 - 16 Final    Glucose 11/11/2020 284* 70 - 99 mg/dL Final    BUN 11/11/2020 53* 7 - 20 mg/dL Final    CREATININE 11/11/2020 1.6* 0.8 - 1.3 mg/dL Final    GFR Non- 11/11/2020 40* >60 Final    Comment: >60 mL/min/1.73m2 EGFR, calc. for ages 25 and older using the  MDRD formula (not corrected for weight), is valid for stable  renal function.  GFR  11/11/2020 49* >60 Final    Comment: Chronic Kidney Disease: less than 60 ml/min/1.73 sq.m. Kidney Failure: less than 15 ml/min/1.73 sq.m. Results valid for patients 18 years and older.       Calcium 11/11/2020 9.2  8.3 - 10.6 mg/dL Final    Troponin 11/11/2020 0.03* <0.01 ng/mL Final    Methodology by Troponin T    Pro-BNP 11/11/2020 4,196* 0 - 449 pg/mL Final    Comment: Methodology by NT-proBNP    An age-independent cutoff point of 300 pg/ml has a 98%  negative predictive value excluding acute heart failure. Values exceeding the age-related cutoff values (450 pg/mL if  age<50, 900 if 50-75 and 1800 if >75) has 90% sensitivity and  84% specificity for diagnosing acute HF. In patients with  renal compromise (eGFR<60) values greater than 1200pg/ml have  a diagnostic sensitivity and specificity of 89% and 72% for  acute HF.       Ventricular Rate 11/11/2020 70  BPM Final    Atrial Rate 11/11/2020 312  BPM Final    QRS Duration 11/11/2020 160  ms Final    Q-T Interval 11/11/2020 444  ms Final    QTc Calculation (Bazett) 11/11/2020 479  ms Final    R Axis 11/11/2020 -52  degrees Final    T Axis 11/11/2020 18  degrees Final    Diagnosis 11/11/2020 Atrial fibrillation with V pacingLeft axis deviationConfirmed by Alexsandra CRUZ MD ([phone]) on 11/11/2020 8:12:17 AM   Final    pH, [facility] 11/11/2020 7.191* 7.350 - 7.450 Final    pCO2, [facility] 11/11/2020 49.9  40.0 - 50.0 mmHg Final    pO2, [facility] 11/11/2020 32  Not Established mmHg Final    HCO3, Venous 11/11/2020 19* 23 - 29 mmol/L Final    Base Excess, [place] 11/11/2020 -9.0  Not Established mmol/L Final    O2 Sat, [facility] 11/11/2020 41  Not Established % Final    Carboxyhemoglobin 11/11/2020 0.9  % Final    Comment:      0.0-1.5  (Smokers 1.5-5.0)      MetHgb, [facility]ry 11/11/2020 0.9  <1.5 % Final    TC02 (Calc), [facility] 11/11/2020 21  Not Established mmol/L Final    O2 Content, [facility] 11/11/2020 6  Not Established mL/dL Final    O2 Therapy 11/11/2020 Unknown   Final    Procalcitonin 11/11/2020 0.07  0.00 - 0.15 ng/mL Final    Comment: Suspected Sepsis:  Low likelihood of sepsis  <.50 ng/mL    Increased likelihood of sepsis 0.50-2.00 ng/mL  Antibiotics encouraged    High risk of sepsis/shock   >2.00 ng/mL  Antibiotics strongly encouraged    Suspected Lower Respiratory Tract Infections:  Low likelihood of bacterial infection  <0.24 ng/mL    Increased likelihood of bacterial infection >0.24 ng/mL  Antibiotics encouraged    With successful antibiotic therapy, PCT levels should decrease  rapidly. (Half-life of 24 to 36 hours.)    Procalcitonin values from samples collected within the first  6 hours of systemic infection may still be low. Retesting may be indicated. Values from day 1 and day 4 can be entered into the Change in  Procalcitonin Calculator to determine the patient's  Mortality Risk Prognosis  (www.[facility]-pct-calculator. gamesGRABR)    In healthy neonates, plasma Procalcitonin (PCT) concentrations  increase gradually after birth, reaching peak values at about  24 hours of age then decrease to normal values below 0.5                            ng/mL  by 48-72 hours of age.  Rapid Influenza A Ag 11/11/2020 Negative  Negative Final    Rapid Influenza B Ag 11/11/2020 Negative  Negative Final    SARS-CoV-2, NAAT 11/11/2020 Not Detected  Not Detected Final    Comment: Rapid NAAT:   Negative results should be treated as presumptive and,  if inconsistent with clinical signs and symptoms or necessary for  patient management, should be tested with an alternative molecular  assay. Negative results do not preclude SARS-CoV-2 infection and  should not be used as the sole basis for patient management decisions. This test has been authorized by the FDA under an Emergency Use  Authorization (EUA) for use by authorized laboratories.     Fact sheet for Healthcare Providers:  Milka.aubrey  Fact sheet for Patients: Milka.aubrey    METHODOLOGY: Isothermal Nucleic Acid Amplification      pH, [place] 11/11/2020 7.332* 7.350 - 7.450 Final    pCO2, [place] 11/11/2020 54.5* 40.0 - 50.0 mmHg Final    pO2, [place] 11/11/2020 14  Not Established mmHg Final    HCO3, Venous 11/11/2020 29  23 - 29 mmol/L Final  Base Excess, [place] 11/11/2020 2.1  Not Established mmol/L Final    O2 Sat, [facility] 11/11/2020 11  Not Established % Final    Carboxyhemoglobin 11/11/2020 0.9  % Final    Comment:      0.0-1.5  (Smokers 1.5-5.0)      MetHgb, [facility] 11/11/2020 1.0  <1.5 % Final    TC02 (Calc), [facility] 11/11/2020 31  Not Established mmol/L Final    O2 Content, [place] 11/11/2020 2  Not Established mL/dL Final    O2 Therapy 11/11/2020 Unknown   Final    Troponin 11/11/2020 0.09* <0.01 ng/mL Final    Methodology by Troponin T    Troponin 11/11/2020 0.09* <0.01 ng/mL Final    Methodology by Troponin T    CULTURE, RESPIRATORY 11/11/2020 Normal respiratory marisol   Final    Gram Stain Result 11/11/2020    Final                    Value:1+ Epithelial Cells  No WBC's seen  2+ Gram positive cocci  1+ Yeast      MRSA Culture Only 11/11/2020    Final                    Value:No Staph aureus MRSA isolated by culture. No Staph aureus MSSA isolated by culture.  POC Glucose 11/11/2020 367* 70 - 99 mg/dl Final    Performed on 11/11/2020 ACCU-CHEK   Final    POC Glucose 11/11/2020 183* 70 - 99 mg/dl Final    Performed on 11/11/2020 ACCU-CHEK   Final    Sodium 11/12/2020 133* 136 - 145 mmol/L Final    Potassium reflex Magnesium 11/12/2020 4.5  3.5 - 5.1 mmol/L Final    Chloride 11/12/2020 98* 99 - 110 mmol/L Final    CO2 11/12/2020 24  21 - 32 mmol/L Final    Anion Gap 11/12/2020 11  3 - 16 Final    Glucose 11/12/2020 124* 70 - 99 mg/dL Final    BUN 11/12/2020 70* 7 - 20 mg/dL Final    CREATININE 11/12/2020 1.9* 0.8 - 1.3 mg/dL Final    GFR Non- 11/12/2020 33* >60 Final    Comment: >60 mL/min/1.73m2 EGFR, calc. for ages 25 and older using the  MDRD formula (not corrected for weight), is valid for stable  renal function.  GFR  11/12/2020 40* >60 Final    Comment: Chronic Kidney Disease: less than 60 ml/min/1.73 sq.m. Kidney Failure: less than 15 ml/min/1.73 sq.m.   Results valid for patients 18 years and older.  Calcium 11/12/2020 8.7  8.3 - 10.6 mg/dL Final    Pro-BNP 11/12/2020 18,115* 0 - 449 pg/mL Final    Comment: Methodology by NT-proBNP    An age-independent cutoff point of 300 pg/ml has a 98%  negative predictive value excluding acute heart failure. Values exceeding the age-related cutoff values (450 pg/mL if  age<50, 900 if 50-75 and 1800 if >75) has 90% sensitivity and  84% specificity for diagnosing acute HF. In patients with  renal compromise (eGFR<60) values greater than 1200pg/ml have  a diagnostic sensitivity and specificity of 89% and 72% for  acute HF.       Vancomycin Rm 11/12/2020 12.4  ug/mL Final    Comment: Trough - 10-20  Toxic  - >20.0      pH, [facility] 11/12/2020 7.448  7.350 - 7.450 Final    pCO2, [facility] 11/12/2020 38.2* 40.0 - 50.0 mmHg Final    pO2, [facility] 11/12/2020 61  Not Established mmHg Final    HCO3, Venous 11/12/2020 26  23 - 29 mmol/L Final    Base Excess, [facility] 11/12/2020 2.3  Not Established mmol/L Final    O2 Sat, [facility] 11/12/2020 91  Not Established % Final    Carboxyhemoglobin 11/12/2020 0.9  % Final    Comment:      0.0-1.5  (Smokers 1.5-5.0)      MetHgb, [place] 11/12/2020 0.6  <1.5 % Final    TC02 (Calc), [facility] 11/12/2020 28  Not Established mmol/L Final    O2 Content, [facility] 11/12/2020 13  Not Established mL/dL Final    O2 Therapy 11/12/2020 Unknown   Final    POC Glucose 11/11/2020 150* 70 - 99 mg/dl Final    Performed on 11/11/2020 ACCU-CHEK   Final    WBC 11/11/2020 13.5* 4.0 - 11.0 K/uL Final    RBC 11/11/2020 3.79* 4.20 - 5.90 M/uL Final    Hemoglobin 11/11/2020 9.7* 13.5 - 17.5 g/dL Final    Hematocrit 11/11/2020 30.6* 40.5 - 52.5 % Final    MCV 11/11/2020 80.7  80.0 - 100.0 fL Final    MCH 11/11/2020 25.7* 26.0 - 34.0 pg Final    MCHC 11/11/2020 31.8  31.0 - 36.0 g/dL Final    RDW 11/11/2020 17.3* 12.4 - 15.4 % Final    Platelets 48/24/8491 138  135 - 450 K/uL Final    MPV 11/11/2020 9.8  5.0 - 10.5 fL Final    Culture, Blood 2 11/11/2020 No Growth to date. Any change in status will be called. Preliminary    Blood Culture, Routine 11/11/2020 No Growth to date. Any change in status will be called. Preliminary    Troponin 11/12/2020 0.06* <0.01 ng/mL Final    Methodology by Troponin T    Procalcitonin 11/12/2020 0.92* 0.00 - 0.15 ng/mL Final    Comment: Suspected Sepsis:  Low likelihood of sepsis  <.50 ng/mL    Increased likelihood of sepsis 0.50-2.00 ng/mL  Antibiotics encouraged    High risk of sepsis/shock   >2.00 ng/mL  Antibiotics strongly encouraged    Suspected Lower Respiratory Tract Infections:  Low likelihood of bacterial infection  <0.24 ng/mL    Increased likelihood of bacterial infection >0.24 ng/mL  Antibiotics encouraged    With successful antibiotic therapy, PCT levels should decrease  rapidly. (Half-life of 24 to 36 hours.)    Procalcitonin values from samples collected within the first  6 hours of systemic infection may still be low. Retesting may be indicated. Values from day 1 and day 4 can be entered into the Change in  Procalcitonin Calculator to determine the patient's  Mortality Risk Prognosis  ([email or web address]-[email or web address]. com)    In healthy neonates, plasma Procalcitonin (PCT) concentrations  increase gradually after birth, reaching peak values at about  24 hours of age then decrease to normal values below 0.5                            ng/mL  by 48-72 hours of age.       POC Glucose 11/12/2020 115* 70 - 99 mg/dl Final    Performed on 11/12/2020 ACCU-CHEK   Final    POC Glucose 11/12/2020 248* 70 - 99 mg/dl Final    Performed on 11/12/2020 ACCU-CHEK   Final    POC Glucose 11/12/2020 140* 70 - 99 mg/dl Final    Performed on 11/12/2020 ACCU-CHEK   Final    Sodium 11/13/2020 136  136 - 145 mmol/L Final    Potassium reflex Magnesium 11/13/2020 4.3  3.5 - 5.1 mmol/L Final    Chloride 11/13/2020 97* 99 - 110 mmol/L Final    CO2 11/13/2020 29  21 - 32 mmol/L Final    Anion Gap 11/13/2020 10  3 - 16 Final    Glucose 11/13/2020 116* 70 - 99 mg/dL Final    BUN 11/13/2020 79* 7 - 20 mg/dL Final    CREATININE 11/13/2020 2.4* 0.8 - 1.3 mg/dL Final    GFR Non- 11/13/2020 25* >60 Final    Comment: >60 mL/min/1.73m2 EGFR, calc. for ages 25 and older using the  MDRD formula (not corrected for weight), is valid for stable  renal function.  GFR  11/13/2020 31* >60 Final    Comment: Chronic Kidney Disease: less than 60 ml/min/1.73 sq.m. Kidney Failure: less than 15 ml/min/1.73 sq.m. Results valid for patients 18 years and older.  Calcium 11/13/2020 7.9* 8.3 - 10.6 mg/dL Final    POC Glucose 11/12/2020 170* 70 - 99 mg/dl Final    Performed on 11/12/2020 ACCU-CHEK   Final    POC Glucose 11/13/2020 155* 70 - 99 mg/dl Final    Performed on 11/13/2020 ACCU-CHEK   Final    POC Glucose 11/13/2020 205* 70 - 99 mg/dl Final    Performed on 11/13/2020 ACCU-CHEK   Final    Color, UA 11/13/2020 YELLOW  Straw/Yellow Final    Clarity, UA 11/13/2020 Clear  Clear Final    Glucose, Ur 11/13/2020 Negative  Negative mg/dL Final    Bilirubin Urine 11/13/2020 Negative  Negative Final    Ketones, Urine 11/13/2020 Negative  Negative mg/dL Final    Specific [place], UA 11/13/2020 1.012  1.005 - 1.030 Final    Blood, Urine 11/13/2020 LARGE* Negative Final    pH, UA 11/13/2020 5.5  5.0 - 8.0 Final    Protein, UA 11/13/2020 TRACE* Negative mg/dL Final    Urobilinogen, Urine 11/13/2020 0.2  <2.0 E.U./dL Final    Nitrite, Urine 11/13/2020 Negative  Negative Final    Leukocyte Esterase, Urine 11/13/2020 SMALL* Negative Final    Microscopic Examination 11/13/2020 YES   Final    Urine Type 11/13/2020 NotGiven   Final    Urine received in a container without preservatives.     Hyaline Casts, UA 11/13/2020 2  0 - 8 /LPF Final    WBC, UA 11/13/2020 6* 0 - 5 /HPF Final    RBC, UA 11/13/2020 355* 0 - 4 /HPF Final    Epithelial Cells, UA 11/13/2020 1  0 - 5 /HPF Final    Comment: Urinalysis microscopic performed using the  automated methodology (AUWI analyzer).  POC Glucose 11/13/2020 152* 70 - 99 mg/dl Final    Performed on 11/13/2020 ACCU-CHEK   Final    Sodium 11/14/2020 138  136 - 145 mmol/L Final    Potassium reflex Magnesium 11/14/2020 4.1  3.5 - 5.1 mmol/L Final    Chloride 11/14/2020 96* 99 - 110 mmol/L Final    CO2 11/14/2020 32  21 - 32 mmol/L Final    Anion Gap 11/14/2020 10  3 - 16 Final    Glucose 11/14/2020 114* 70 - 99 mg/dL Final    BUN 11/14/2020 76* 7 - 20 mg/dL Final    CREATININE 11/14/2020 2.1* 0.8 - 1.3 mg/dL Final    GFR Non- 11/14/2020 29* >60 Final    Comment: >60 mL/min/1.73m2 EGFR, calc. for ages 25 and older using the  MDRD formula (not corrected for weight), is valid for stable  renal function.  GFR  11/14/2020 36* >60 Final    Comment: Chronic Kidney Disease: less than 60 ml/min/1.73 sq.m. Kidney Failure: less than 15 ml/min/1.73 sq.m. Results valid for patients 18 years and older.       Calcium 11/14/2020 8.6  8.3 - 10.6 mg/dL Final    POC Glucose 11/13/2020 145* 70 - 99 mg/dl Final    Performed on 11/13/2020 ACCU-CHEK   Final    POC Glucose 11/14/2020 128* 70 - 99 mg/dl Final    Performed on 11/14/2020 ACCU-CHEK   Final         Assessment & Plan:   Principal Problem:    Acute on chronic diastolic CHF (congestive heart failure) ([facility])--improving--cont diuresis--creat cont to improve   Active Problems:    Pacemaker    Essential hypertension--stablea--off losartan     Coronary arteriosclerosis    BPH with obstruction/lower urinary tract symptoms    Pulmonary fibrosis (HCC)    Solitary lung nodule    Acute pulmonary edema ([facility])    Severe mitral regurgitation    Hematemesis    Hemoptysis    Acute respiratory failure with hypoxia (HCC)    Hyperkalemia    Atrial fibrillation, controlled ([facility])--Continue current therapy      Acute respiratory failure with hypoxia and hypercapnia ([facility])    Pleural effusion,

## 2020-11-15 NOTE — PROGRESS NOTES
Department of Internal Medicine  General Internal Medicine  Attending Progress Note    Chief Complaint:feeling wacky      HPI:   SUBJECTIVE:  81 yo male admitted with chest tightness acute pulmonary edema and hemoptysis feels a lot better from admission. His sob is much improved  Now off oxygen. He chest tightness is resolved, the troponins peaked at .09 . He is still coughing but decreased and now nonproductive. The overall whole body pain is gone. He has a kauffman in now so not having to get up to the bathroom, requiring a steady when he goes. Feels a little wacky though not confused, thinking about funny things and laughing at himself. Switched to norco for pain and still feeling wacky. Leg swelling is down a lot.     Past Medical History:   Diagnosis Date    Acute diastolic CHF (congestive heart failure) ([facility] [address] 10/23/2020    Arthritis     Atrial fibrillation (HCC)     BPH with obstruction/lower urinary tract symptoms     nocturia 3-4 x per night    Chronic atrial fibrillation (HCC)     CKD (chronic kidney disease) 2017    level 3    Coronary arteriosclerosis 2009    2 stents/ Dr Anastacia Timmons in [facility] GERD (gastroesophageal reflux disease)     History of blood transfusion     1945   During deployment    HTN (hypertension) 1990    Left hip pain     schrapnel in hip and intestines/chronic pains left hip impairs walking    Pacemaker 2015    Pulmonary fibrosis ([facility] [address] 08/2017    mild amount seen on chest ct    Pulmonary nodule, left 2017    noncalcified 7mm low risk repeat 8/2018 if patient willing    Renal atrophy, right 2017    Renal cyst, right 08/2017    not clearly simple cyst    SOB (shortness of breath) 2017    terrell normal pft and ct chest ? smll amount of pulmonary fibrosis     Past Surgical History:   Procedure Laterality Date    ABDOMEN SURGERY      Scrapnel removed in [facility]  2009    awoke with indigestion    EYE SURGERY ILD)  Cardiovascular: normal rate, normal S1 and S2, no gallops and no carotid bruits  Abdomen: soft, non-tender, non-distended, normal bowel sounds, no masses or organomegaly  Extremities: no cyanosis, clubbing edema tdown to trace left worse than right  Musculoskeletal: no swollen joints and no tender joints,  Neurologic: coordination normal and speech normal, moves all 4 extremities    DATA:   Labs:  CBC:   No results for input(s): WBC, HGB, PLT in the last 72 hours. BMP:    Recent Labs     11/13/20  0406 11/14/20  0507 11/15/20  0447    138 137   K 4.3 4.1 3.5   CL 97* 96* 96*   CO2 29 32 28   BUN 79* 76* 68*   CREATININE 2.4* 2.1* 2.1*   GLUCOSE 116* 114* 107*     POC GLUCOSE:    Recent Labs     11/14/20  0731 11/14/20  1204 11/14/20  1633 11/14/20  2106 11/15/20  0743   POCGLU 128* 268* 157* 138* 99     Ca/Mg/Phos:   Recent Labs     11/13/20  0406 11/14/20  0507 11/15/20  0447   CALCIUM 7.9* 8.6 8.9   MG  --   --  2.20     Hepatic: No results for input(s): AST, ALT, ALB, BILITOT, ALKPHOS in the last 72 hours. Troponin:   No results for input(s): TROPONINI in the last 72 hours. ProBNP:   Recent Labs     11/15/20  0447   PROBNP 3,742*         DIAGNOSTICS:  Ct Chest Wo Contrast    Result Date: 11/11/2020  Features of heart failure, with moderate cardiomegaly, severe interstitial/alveolar pulmonary edema, small bilateral effusions and body wall anasarca. Difficult to exclude superimposed atypical or viral infection, which could be present in the appropriate context. Mediastinal lymphadenopathy is presumably reactive. Multivessel coronary calcifications status post PCI. Presbyesophagus. Right renal atrophy with indeterminate cyst.  Consideration/appropriateness for follow-up renal protocol CT/MRI may be based on patient's age, life expectancy and comorbidities. Xr Chest Portable    Result Date: 11/11/2020  Heart failure, with severe interstitial/alveolar pulmonary edema and small effusions. ASSESSMENT AND PLAN    Principal Problem:    Acute on chronic diastolic CHF (congestive heart failure) (HCC)  Plan: improving clinically lost weight despite elevated BNP still seems to have improved  Now on lasix up to 80mg ivp bid will follow parameters. Cardiology and Renal following  Active Problems:    Pacemaker  Plan: intact functioning    Essential hypertension  Plan: bp been controlled on current med to dc losartan because of hyperkalemia and worsening renal function.  BP up more will I crease coreg    Coronary arteriosclerosis  Plan: bump in troponin discussed with Dr Mati Mariee who is not concerned that the patient had an stemi, starting to trend down     BPH with obstruction/lower urinary tract symptoms  Plan: ongoing issue, kauffman in currently    Pulmonary fibrosis ([address]  Plan: ongoing on  prednisone    Solitary lung nodule  Plan: ongoing, followed by Dr Coolidge Epley    Severe mitral regurgitation  Plan: also recently evaluated with echo, medical treatmnet    Acute respiratory failure with hypoxia ([address]  Plan: resolved    Atrial fibrillation, controlled ([address]  Plan: ongoing issue    Acute respiratory failure with hypoxia and hypercapnia ([address]  Plan: improving clinicaly repeat vbg much better yesterday    Pleural effusion, bilateral  Plan: small, observe    Appropriate diagnostic studies and consults ordered  Drea Campbell MD       Spent 35 minutes with over half the time devoted to  discussion

## 2020-11-15 NOTE — PROGRESS NOTES
Cardiology Progress Note     Admit Date: 2020     Reason for follow up: dCHF    HPI and Interval History:   Patient seen and examined. Clinical notes reviewed. Telemetry reviewed - Afib with vpacing 70's bpm. No new complaint today. No major events overnight. Denies having chest pain but complains of shortness of breath, dyspnea on exertion. I/O -2255cc over 24hrs. 11/15 - Telemetry shows Afib with vpacing 70's bpm. SBP 75mmHg this AM after getting all of his anti-HTN medications today. I/O -1520 cc net negative over 24hrs. Review of System:  All other systems reviewed except for that noted above. Pertinent negatives and positives are:     · General: negative for fever, chills   · Ophthalmic ROS: negative for - eye pain or loss of vision  · ENT ROS: negative for - headaches, sore throat   · Respiratory: negative for - cough, sputum  · Cardiovascular: Reviewed in HPI  · Gastrointestinal: negative for - abdominal pain, diarrhea, N/V  · Hematology: negative for - bleeding, blood clots, bruising or jaundice  · Genito-Urinary:  negative for - Dysuria or incontinence  · Musculoskeletal: negative for - Joint swelling, muscle pain  · Neurological: negative for - confusion, dizziness, headaches   · Psychiatric: No anxiety, no depression.   · Dermatological: negative for - rash      Physical Examination:  Vitals:    11/15/20 1040   BP: (!) 78/55   Pulse: 71   Resp:    Temp:    SpO2:         Intake/Output Summary (Last 24 hours) at 11/15/2020 1052  Last data filed at 11/15/2020 1029  Gross per 24 hour   Intake 480 ml   Output 2700 ml   Net -2220 ml     In: 240 [P.O.:240]  Out: 1950    Wt Readings from Last 3 Encounters:   11/15/20 180 lb 5.4 oz (81.8 kg)   11/10/20 183 lb (83 kg)   20 183 lb 10.3 oz (83.3 kg)     Temp  Av.9 °F (36.6 °C)  Min: 97.5 °F (36.4 °C)  Max: 98.3 °F (36.8 °C)  Pulse  Av.8  Min: 62  Max: 80  BP  Min: 78/55  Max: 167/80  SpO2  Av.8 %  Min: 95 %  Max: 100 %    · Telemetry: afib with vpacing 70's bpm  · Constitutional: Alert. Oriented to person, place, and time. No distress. · Head: Normocephalic and atraumatic. · Mouth/Throat: Lips appear moist. Oropharynx is clear and moist.  · Eyes: Conjunctivae normal. EOM are normal.   · Neck: Neck supple. No lymphadenopathy. No rigidity. 17cm JVD present. · Cardiovascular: Normal rate, regular rhythm. Normal S1&S2. Carotid pulse 2+ bilaterally. · Pulmonary/Chest: Bilateral respiratory sounds present. No respiratory accessory muscle use. No wheezes, No rhonchi. No rales. · Abdominal: Soft. Normal bowel sounds present. No distension, No tenderness. No splenomegaly. No hernia. · Musculoskeletal: No tenderness. 1+ BLE edema    · Lymphadenopathy: Has no cervical adenopathy. · Neurological: Alert and oriented. Cranial nerve II-XII grossly intact, No gross deficit to touch. · Skin: Skin is warm and dry. No rash, lesions, ulcerations noted. · Psychiatric: No anxiety nor agitation. Labs, diagnostic and imaging results reviewed. Reviewed. Recent Labs     11/13/20  0406 11/14/20  0507 11/15/20  0447    138 137   K 4.3 4.1 3.5   CL 97* 96* 96*   CO2 29 32 28   BUN 79* 76* 68*   CREATININE 2.4* 2.1* 2.1*     No results for input(s): WBC, HGB, HCT, MCV, PLT in the last 72 hours. Lab Results   Component Value Date    TROPONINI 0.06 11/12/2020     Estimated Creatinine Clearance: 22 mL/min (A) (based on SCr of 2.1 mg/dL (H)).    No results found for: BNP  Lab Results   Component Value Date    PROTIME 15.9 10/20/2020    INR 1.37 10/20/2020     Lab Results   Component Value Date    CHOL 132 03/03/2020    HDL 66 03/03/2020    TRIG 119 03/03/2020       Scheduled Meds:   potassium bicarb-citric acid  20 mEq Oral BID WC    carvedilol  6.25 mg Oral BID WC    furosemide  80 mg Intravenous BID    famotidine  10 mg Oral Daily    apixaban  2.5 mg Oral BID    atorvastatin  40 mg Oral Nightly    Vitamin D  2,000 Units Oral Daily    finasteride  5 mg Oral Daily    fluticasone  2 spray Each Nostril Daily    oxybutynin  5 mg Oral Daily    predniSONE  10 mg Oral Daily    tamsulosin  0.4 mg Oral Daily    glycopyrrolate-formoterol  2 puff Inhalation BID    sodium chloride flush  10 mL Intravenous 2 times per day    insulin lispro  0-6 Units Subcutaneous TID WC    insulin lispro  0-3 Units Subcutaneous Nightly     Continuous Infusions:   dextrose       PRN Meds:acetaminophen **OR** acetaminophen, sodium chloride flush, polyethylene glycol, promethazine **OR** ondansetron, glucose, dextrose, glucagon (rDNA), dextrose     Patient Active Problem List    Diagnosis Date Noted    Acute on chronic diastolic CHF (congestive heart failure) ([address] 11/11/2020    Hematemesis 11/11/2020    Hemoptysis 11/11/2020    Acute respiratory failure with hypoxia (HCC)     Hyperkalemia     Atrial fibrillation, controlled ([address]     Acute respiratory failure with hypoxia and hypercapnia (HCC)     Pleural effusion, bilateral     Nonrheumatic mitral valve regurgitation     Osteoarthritis of lumbar spine with myelopathy 11/10/2020    Acute diastolic CHF (congestive heart failure) ([address] 10/23/2020    Chronic kidney disease, stage 3b 10/23/2020    Debility 10/23/2020    Chronic atrial fibrillation (HCC)     Severe mitral regurgitation     Acute pulmonary edema ([address] 10/21/2020    Pulmonary infiltrates     Paroxysmal atrial fibrillation ([address] 10/20/2020    Arthritis of both knees 12/03/2019    Interstitial lung disease ([address] 04/05/2019    Solitary lung nodule 04/05/2019    Pulmonary fibrosis ([address]     Renal cyst, right 08/01/2017    BPH with obstruction/lower urinary tract symptoms     GERD (gastroesophageal reflux disease)     SOB (shortness of breath) 01/01/2017    Pacemaker 01/01/2015    Coronary arteriosclerosis 01/01/2009    Essential hypertension 01/01/1990      Active Hospital Problems    Diagnosis Date Noted    Acute on chronic diastolic CHF (congestive heart failure) ([facility] [I50.33] 11/11/2020    Hematemesis [K92.0] 11/11/2020    Hemoptysis [R04.2] 11/11/2020    Acute respiratory failure with hypoxia (HCC) [J96.01]     Hyperkalemia [E87.5]     Atrial fibrillation, controlled ([address] [I48.91]     Acute respiratory failure with hypoxia and hypercapnia (HCC) [J96.01, J96.02]     Pleural effusion, bilateral [J90]     Nonrheumatic mitral valve regurgitation [I34.0]     Severe mitral regurgitation [I34.0]     Acute pulmonary edema ([facility] [J81.0] 10/21/2020    Solitary lung nodule [R91.1] 04/05/2019    Pulmonary fibrosis ([facility] [J84.10]     BPH with obstruction/lower urinary tract symptoms [N40.1, N13.8]     Pacemaker [Z95.0] 01/01/2015    Coronary arteriosclerosis [I25.10] 01/01/2009    Essential hypertension [I10] 01/01/1990       Assessment and Plan:     Diastolic CHF, acute  -EF 30%  -mod-sev MR  -NYHA III  -improved volume status from yesterday in that lungs are no longer with crackles. However, still demonstrating R-sided CHF with JVD very elevated and 1+ pitting BLE edema. With creatinine being stable and elevated 2.4 (cardio-renal syndrome), would anticipate that cr will down-trend as the patient becomes more euvolemic. Currently urine color is near-white, supporting that the patient is still hypervolemic. Continue lasix to 80mg IV BID for today and re-assess tomorrow. Strict I/O, daily wts. Electrolytes repletion prn. CAD s/p PIC LAD and Ramus 2009  -not complaining of angina at this time.  -coreg held for hypotension, as are ACE    GAGE on CKD  -creatinine stable at 2.4 (elevated). Currently, the patient is exhibiting cardio-renal syndrome. Would anticipate that the patient's Cr will downtrend as he is diuresed.     Afib, permanent  -[id number] score at least 4 and warrants [address] [place] [address] Road for which he is on apixaban 2.5mg BID (renal and age dosed)  -v-pacing chronically     Discussed the above with Dr. Delila Alpers today and we

## 2020-11-15 NOTE — PLAN OF CARE
Problem: Pain:  Goal: Pain level will decrease  Description: Pain level will decrease  11/15/2020 0033 by Ebonie James RN  Outcome: Ongoing  11/14/2020 1128 by Osvaldo Haro RN  Outcome: Ongoing     Problem: Pain:  Goal: Pain level will decrease  Description: Pain level will decrease  11/15/2020 0033 by Ebonie James RN  Outcome: Ongoing  11/14/2020 1128 by Osvaldo Haro RN  Outcome: Ongoing  Goal: Control of acute pain  Description: Control of acute pain  11/15/2020 0033 by Ebonie James RN  Outcome: Ongoing  11/14/2020 1128 by Osvaldo Haro RN  Outcome: Ongoing  Goal: Control of chronic pain  Description: Control of chronic pain  11/15/2020 0033 by Ebonie James RN  Outcome: Ongoing  11/14/2020 1128 by Osvaldo Haro RN  Outcome: Ongoing     Problem: Skin Integrity:  Goal: Will show no infection signs and symptoms  Description: Will show no infection signs and symptoms  11/15/2020 0033 by Ebonie James RN  Outcome: Ongoing  11/14/2020 1128 by Osvaldo Haro RN  Outcome: Ongoing  Goal: Absence of new skin breakdown  Description: Absence of new skin breakdown  11/15/2020 0033 by Ebonie James RN  Outcome: Ongoing  11/14/2020 1128 by Osvaldo Haro RN  Outcome: Ongoing  Goal: Complications related to intravenous access or infusion will be avoided or minimized  Description: Complications related to intravenous access or infusion will be avoided or minimized  11/15/2020 0033 by Ebonie James RN  Outcome: Ongoing  11/14/2020 1128 by Osvaldo Haro RN  Outcome: Ongoing     Problem: Falls - Risk of:  Goal: Will remain free from falls  Description: Will remain free from falls  11/15/2020 0033 by Ebonie James RN  Outcome: Ongoing  11/14/2020 1128 by Osvaldo Haro RN  Outcome: Ongoing  Goal: Absence of physical injury  Description: Absence of physical injury  11/15/2020 0033 by Ebonie James RN  Outcome: Ongoing  11/14/2020 1128 by Billy Glod Norma Trotter RN  Outcome: Ongoing     Problem: OXYGENATION/RESPIRATORY FUNCTION  Goal: Patient will maintain patent airway  11/15/2020 0033 by Eliseo Love RN  Outcome: Ongoing  11/14/2020 1128 by Vasquez Hall RN  Outcome: Ongoing  Goal: Patient will achieve/maintain normal respiratory rate/effort  Description: Respiratory rate and effort will be within normal limits for the patient  11/15/2020 0033 by Eliseo Love RN  Outcome: Ongoing  11/14/2020 1128 by Vasquez Hall RN  Outcome: Ongoing     Problem: HEMODYNAMIC STATUS  Goal: Patient has stable vital signs and fluid balance  11/15/2020 0033 by Eliseo Love RN  Outcome: Ongoing  11/14/2020 1128 by Vasquez Hall RN  Outcome: Ongoing     Problem: FLUID AND ELECTROLYTE IMBALANCE  Goal: Fluid and electrolyte balance are achieved/maintained  11/15/2020 0033 by Eliseo Love RN  Outcome: Ongoing  11/14/2020 1128 by Vasquez Hall RN  Outcome: Ongoing     Problem: ACTIVITY INTOLERANCE/IMPAIRED MOBILITY  Goal: Mobility/activity is maintained at optimum level for patient  11/15/2020 0033 by Eliseo Love RN  Outcome: Ongoing  11/14/2020 1128 by Vasquez Hall RN  Outcome: Ongoing     Problem: Nutritional:  Goal: Nutritional status will improve  Description: Nutritional status will improve  11/15/2020 0033 by Eliseo Love RN  Outcome: Ongoing  11/14/2020 1128 by Vasquez Hall RN  Outcome: Ongoing     Problem: Physical Regulation:  Goal: Will remain free from infection  Description: Will remain free from infection  11/15/2020 0033 by Eliseo Love RN  Outcome: Ongoing  11/14/2020 1128 by Vasquez Hall RN  Outcome: Ongoing     Problem: Respiratory:  Goal: Ability to maintain normal respiratory secretions will improve  Description: Ability to maintain normal respiratory secretions will improve  11/15/2020 0033 by Eliseo Love RN  Outcome: Ongoing  11/14/2020 1128 by Vasquez Hall RN  Outcome: Ongoing

## 2020-11-15 NOTE — PROGRESS NOTES
Nephrology Progress Note          CC: We are following this patient for GAGE on CKD. Admitted for dyspnea with CHF    h/o CKD stage 3 with recent baseline Cr ~ 1.7 MG  Has h/o ILD, diastolic CHF A fib and MR Recently hospitalized with CHF and Pneumonia  Discharge weight was 174 Readmitted with increasing SOB and decreased endurance Wt was up to 182 lbs on admission   Cr increased from 1.6 to 2.4 mg   Had been  on ARB   Has had LUT obstructive symptoms  Has had relative hypotension  No exposure to IV Contrast      SUBJECTIVE:    Gradually  feeling better  Still some RAIN  Less LE edema  BP on lower side intermittently     No cough or wheezing. No N/V, abd pain, or diarrhea. No F/C. Physical Exam:    VITALS:  BP (!) 95/52   Pulse 75   Temp 97.9 °F (36.6 °C) (Oral)   Resp 18   Ht 5' 6\" (1.676 m)   Wt 180 lb 5.4 oz (81.8 kg)   SpO2 97%   BMI 29.11 kg/m²   TEMPERATURE:  Current - Temp: 97.9 °F (36.6 °C); Max - Temp  Av.9 °F (36.6 °C)  Min: 97.5 °F (36.4 °C)  Max: 98.3 °F (36.8 °C)  PULSE RANGE: Pulse  Av.3  Min: 62  Max: 80  BLOOD PRESSURE RANGE:  Systolic (55YQK), ULU:563 , Min:78 , DIE:323   ; Diastolic (30YEJ), EJC:08, Min:45, Max:80    PULSE OXIMETRY RANGE: SpO2  Av.8 %  Min: 95 %  Max: 100 %  24HR INTAKE/OUTPUT:      Intake/Output Summary (Last 24 hours) at 11/15/2020 1410  Last data filed at 11/15/2020 1058  Gross per 24 hour   Intake 480 ml   Output 1950 ml   Net -1470 ml       Constitutional:  Elderly, NAD  HEENT:  No oral lesions  Lungs:  Decreased BS bibasilar, no rales  Cardiovascular:  RRR, 2/6 YEN  Abd:  Soft, NT  Ext:  Trace LE edema  Skin:  No lesions  Neuro: non-focal        DATA:    CBC:   No results for input(s): WBC, RBC, HGB, HCT, PLT in the last 72 hours.   BMP:    Recent Labs     20  0406 20  0507 11/15/20  0447    138 137   K 4.3 4.1 3.5   CL 97* 96* 96*   CO2 29 32 28   BUN 79* 76* 68*   CREATININE 2.4* 2.1* 2.1*   CALCIUM 7.9* 8.6 8.9   GLUCOSE 116* 114* 107*     Last 3 Troponin:    Lab Results   Component Value Date    TROPONINI 0.06 11/12/2020    TROPONINI 0.09 11/11/2020    TROPONINI 0.09 11/11/2020       IMPRESSION/RECOMMENDATIONS:      1. GAGE/CKD suspect hemodynamic in nature with decompensated CHF, relative Hypotension Improved with diuresis - c/w CRS  Continue current lasix  Consider change to oral soon  2. CKD - RK with thinned cortex on US, chronic RK hydronephrosis, unchanged  Essentially single functioning LK  Baseline creatinine near 1.7  3. Hyperkalemia resolved off of ARB  4. Diastolic CHF with MV disease  On IV lasix - high dose  Monitor carefully. Convert to PO soon  5. ILD  6. Pulmonary HTN  7.  BPH with LUT obstructive symptoms    Karrie Brown MD

## 2020-11-15 NOTE — PLAN OF CARE
Pain/discomfort being managed with PRN analgesics per MD orders. Pt able to express presence and absence of pain and rate pain appropriately using numerical scale. Skin assessment completed every shift. Pt assessed for incontinence, appropriate barrier cream applied prn. Pt encouraged to turn/rotate every 2 hours. Assistance provided if pt unable to do so themselves. Educated patient/family on CHF signs/ symptoms, causes, treatments, limited fluid intake, daily weights, discharge medications, low sodium diet, activiety and follow-up. Pt to call physician if weight gain of 3 pounds in one day or 5 pounds in one week.

## 2020-11-16 NOTE — PROGRESS NOTES
unchanged  Essentially single functioning LK  Baseline creatinine near 1.7    3. Hyper-/hypokalemia  - resolved off of ARB  - please do not supplement with Effer-K but rather with KCl    4. ACDHF with MV disease  - on IV lasix   - plan by cardiology to switch to PO diuretics tomorrow (torsemide?)    5. HTN  - BP is marginally low  - I am not sure he can tolerate coreg, would he do better with metoprolol XL? 6. ILD    7. Pulmonary HTN    8.  BPH with LUT obstructive symptoms

## 2020-11-16 NOTE — PROGRESS NOTES
[facility]   Daily Progress Note      Admit Date:  11/11/2020    CC: SOB    HPI:   Misti Fry is a 80 y.o. male with PMH CAD, Chronic AF, bradycardia/ SSS s/p DC PPM  [place] 2014 - atrial lead off (Follows with Dr. Rose Perkins), GERD, HTN, pulm fibrosis, CKD, mitral valve disease, chronic back pain. Admitted to [facility] with SOB for several days. Associated with BLE edema, activity intolerance and weakness for about 1 month. Work up consistent with HF. Dr. Ankita Yap consulted. Started on Lasix gtt. Developed GAGE, most likely CRS and orthostatic hypotension. Neph following. Today he is sittingup in a chair. He gets OOB with assistance,uses walker. Working with PT. He admits to [place] little\" dizziness and SOB with activity. He says \" nothing that worried me. \" Resolved with rest.   Denies CP, palpitations, orthopnea, syncope. Eating breakfast - good appetite. Wearing compression stockings. Edema significantly improved. BP has been dropping with coreg. Meds were spaced out so coreg and lasix given 2 hours apart. Review of Systems:   General: Denies fever, chills, +fatigue, +weakness  Skin: Denies skin changes, rash, itching, lesions.   HEENT: +Lightheaded  RESP: Denies cough, sputum, wheeze, snoring  CARD: Denies palpitations,  Murmur, chest pain  GI:Denies nausea, vomiting, heartburn, loss of appetite, change in bowels  : Denies frequency, pain, incontinence, polyuria  VASC: Denies claudication, leg cramps, clots  MUSC/SKEL: Denies pain, stiffness, arthritis  PSYCH: Denies anxiety, depression, stress  NEURO: Denies numbness, tingling, weakness,change in mood or memory  HEME: Denies abn bruising, bleeding, anemia  ENDO: Denies intolerance to heat, cold, excessive thirst or hunger, hx thyroid disease    Objective:   BP (!) 146/74   Pulse 73   Temp 97.3 °F (36.3 °C) (Oral)   Resp 16   Ht 5' 6\" (1.676 m)   Wt 167 lb 12.3 oz (76.1 kg)   SpO2 98%   BMI 27.08 kg/m²        Intake/Output Summary (Last 24 hours) at 11/16/2020 0828  Last data filed at 11/16/2020 0346  Gross per 24 hour   Intake 720 ml   Output 3450 ml   Net -2730 ml     I/O since adm: Neg 11L    WEIGHT:Admit Weight: 187 lb 6.3 oz (85 kg)         Today  Weight: 167 lb 12.3 oz (76.1 kg)   DRY WEIGHT:  Wt Readings from Last 3 Encounters:   11/16/20 167 lb 12.3 oz (76.1 kg)   11/10/20 183 lb (83 kg)   11/06/20 183 lb 10.3 oz (83.3 kg)       Physical Exam:  GEN: Appears frail, no acute distress  SKIN: Pink, warm, dry. Nails without clubbing. HEENT: PERRLA. Normocephalic, atraumatic. Neck supple. No adenopathy. LUNG: AP diameter normal. Fine crackles bilateral bases. Respiratory effort normal.  HEART: S1S2 A/R. No JVD. No carotid bruit. No murmur, rub or gallop. ABD: Soft, nontender. +BS X 4 quads. No hepatomegaly. EXT: Radial and pedal pulses 2+ and symmetric. Without varicosities. No LE edema. MUSCSKEL: Good ROM X4 extremities. No deformity. NEURO: A/O X3. Calm and cooperative. Telemetry: V pace HR 74    Medications:    carvedilol  6.25 mg Oral BID WC    furosemide  80 mg Intravenous BID    famotidine  10 mg Oral Daily    apixaban  2.5 mg Oral BID    atorvastatin  40 mg Oral Nightly    Vitamin D  2,000 Units Oral Daily    finasteride  5 mg Oral Daily    fluticasone  2 spray Each Nostril Daily    oxybutynin  5 mg Oral Daily    predniSONE  10 mg Oral Daily    tamsulosin  0.4 mg Oral Daily    glycopyrrolate-formoterol  2 puff Inhalation BID    sodium chloride flush  10 mL Intravenous 2 times per day    insulin lispro  0-6 Units Subcutaneous TID     insulin lispro  0-3 Units Subcutaneous Nightly      dextrose       acetaminophen **OR** acetaminophen, sodium chloride flush, polyethylene glycol, promethazine **OR** ondansetron, glucose, dextrose, glucagon (rDNA), dextrose    Lab Data: I have reviewed all labs below today. CBC:   No results for input(s): WBC, HGB, HCT, MCV, PLT in the last 72 hours.   BMP: Recent Labs     11/14/20  0507 11/15/20  0447 11/16/20  0425    137 137   K 4.1 3.5 4.0   CL 96* 96* 95*   CO2 32 28 29   BUN 76* 68* 69*   CREATININE 2.1* 2.1* 1.9*     GLUCOSE:   Recent Labs     11/14/20  0507 11/15/20  0447 11/16/20  0425   GLUCOSE 114* 107* 105*     LIVER PROFILE:   Lab Results   Component Value Date    AST 19 10/20/2020    ALT 14 10/20/2020    LABALBU 3.8 10/20/2020    BILITOT 0.5 10/20/2020    ALKPHOS 50 10/20/2020     PT/INR:   Lab Results   Component Value Date    PROTIME 15.9 10/20/2020    INR 1.37 10/20/2020     APTT:   Lab Results   Component Value Date    APTT 33.5 10/20/2020     Pro-BNP:    Lab Results   Component Value Date    PROBNP 3,084 11/16/2020    PROBNP 3,742 11/15/2020    PROBNP 18,115 11/12/2020     Parameters:   > 450 pg/mL under age 48  > 900 pg/mL ages 54-65  > 1800 pg/mL over age 76    ENZYMES:   Lab Results   Component Value Date    TROPONINI 0.06 11/12/2020     FASTING LIPID PANEL:  Lab Results   Component Value Date    CHOL 132 03/03/2020    HDL 66 03/03/2020    LDLCALC 42 03/03/2020    TRIG 119 03/03/2020       Diagnostics:    EKG: Atrial fibrillation with V pacingLeft axis deviationConfirmed by Brandie CRUZ MD ([id number]) on 11/11/2020 8:12:17 AM     ECHO: 10/20/20:   Summary   Left ventricular cavity size is normal.   Ejection fraction is visually estimated to be 50%. At least moderate-Severe mitral regurgitation. No evidence of mitral stenosis. Systolic flow reversal in pulmonary veins. Mildly reduce RV fx    Stress Test:   Stress Test: 6/29/17  1. Technically a satisfactory study.    2. Normal pharmacological stress portion of the study.    3. No evidence of Ischemia by Myocardial Perfusion Imaging.    4. Gated Study shows normal LV size and Systolic function; EF is 70 %.      Cardiac Angiography:   Cath: 2009  Stents to  Prox LAD  Mid LAD  Ramus    Assessment/Plan:    1.) Acute on chronic diastolic heart failure, EF 50% with moderate to severe MR: Improving. BLE resolved - continue compression stockings. + crackles bilateral bases with SOB associated with activity. Hypotension with meds. ProBNP 64149>4541>8517. Neg 11L Weight 182lbs>167lbs. NYHA Class III   Stage 3  Diuretic: lasix IV - continue today, consider changing to po tomorrow, appreciate Neph rec  Beta Blocker: decrease coreg dose to 3.25mg BID, hold for SBP <120  ACEi/ARB/ARNI: none, GAGE, hypotenision  Aldosterone Antagonist: one, GAGE and hypotension  SGLT2 Inhibitor: outpatient  2gm Na diet, daily weight, 64 oz fluid restriction  Avoid NSAIDS and other nephrotoxic meds  Cardiac Rehab: Not indicated for EF>35%   ICD: Not indicated for EF>35%  HF RN referral for education  Palliative Care consult   PT/OT consult  - Discharge with PT 3-5x/week and [facility] [place] [facility]    2.) CAD s/p stents to LAD and ramus 2009: No evidence of ischemia  DAPT: none, on eliquis  Beta Blocker: coreg  ACEi/ARB: held  Anti anginal: none  Lipid management/high intensity statin: lipitor  Risk factor management: high blood pressure, high cholesterol, Diabetes, smoking, obesity, family hx  Lifestyle modification: Heart healthy diet, regular exercise, weight loss, smoking cessation, stress reduction    3. ) Hypotension: Symptomatic with lightheadedness when sitting up. Repeat orthostatic BP today.    Hold coreg for SBP <120 - decrease dose to 3.125mg BID  Apply BLE compression stockings    3. ) GAGE on CKD: Stable  Neph consult    4.) Chronic AF: HR stable  Thromboembolic risk reduction: eliquis  Rate Control/ Rhythm Control: coreg when BP stable    5.) SSS s/p PPM    Electronically signed by KAYCE Urrutia - CNP on 11/16/2020 at 8:28 AM

## 2020-11-16 NOTE — PLAN OF CARE
secretions will improve  Description: Ability to maintain normal respiratory secretions will improve  Outcome: Ongoing

## 2020-11-16 NOTE — PLAN OF CARE
Problem: Pain:  Goal: Pain level will decrease  Description: Pain level will decrease  11/16/2020 1721 by Chava Riojas RN  Outcome: Ongoing  11/16/2020 0350 by Rex Gama RN  Outcome: Ongoing  Goal: Control of acute pain  Description: Control of acute pain  11/16/2020 1721 by Chava Riojas RN  Outcome: Ongoing  11/16/2020 0350 by Rex Gama RN  Outcome: Ongoing  Goal: Control of chronic pain  Description: Control of chronic pain  11/16/2020 1721 by Chava Riojas RN  Outcome: Ongoing  11/16/2020 0350 by Rex Gama RN  Outcome: Ongoing     Problem: Skin Integrity:  Goal: Will show no infection signs and symptoms  Description: Will show no infection signs and symptoms  11/16/2020 1721 by Chava Riojas RN  Outcome: Ongoing  11/16/2020 0350 by Rex Gama RN  Outcome: Ongoing  Goal: Absence of new skin breakdown  Description: Absence of new skin breakdown  11/16/2020 1721 by Chava Riojas RN  Outcome: Ongoing  11/16/2020 0350 by Rex Gama RN  Outcome: Ongoing  Goal: Complications related to intravenous access or infusion will be avoided or minimized  Description: Complications related to intravenous access or infusion will be avoided or minimized  11/16/2020 1721 by Chava Riojas RN  Outcome: Ongoing  11/16/2020 0350 by Rex Gama RN  Outcome: Ongoing     Problem: Falls - Risk of:  Goal: Will remain free from falls  Description: Will remain free from falls  11/16/2020 1721 by Chava Riojas RN  Outcome: Ongoing  11/16/2020 0350 by Rex Gama RN  Outcome: Ongoing  Goal: Absence of physical injury  Description: Absence of physical injury  11/16/2020 1721 by Chava Riojas RN  Outcome: Ongoing  11/16/2020 0350 by Rex Gama RN  Outcome: Ongoing     Problem: OXYGENATION/RESPIRATORY FUNCTION  Goal: Patient will maintain patent airway  11/16/2020 1721 by Chava Riojas RN  Outcome: Ongoing  11/16/2020 0350 by Kinjal Stoddard SATYA Bhat  Outcome: Ongoing  Goal: Patient will achieve/maintain normal respiratory rate/effort  Description: Respiratory rate and effort will be within normal limits for the patient  11/16/2020 1721 by Karan Blizzard, RN  Outcome: Ongoing  11/16/2020 0350 by Amarjit Baxter RN  Outcome: Ongoing     Problem: HEMODYNAMIC STATUS  Goal: Patient has stable vital signs and fluid balance  11/16/2020 1721 by Karan Blizzard, RN  Outcome: Ongoing  11/16/2020 0350 by Amarjit Baxter RN  Outcome: Ongoing     Problem: FLUID AND ELECTROLYTE IMBALANCE  Goal: Fluid and electrolyte balance are achieved/maintained  11/16/2020 1721 by Karan Blizzard, RN  Outcome: Ongoing  11/16/2020 0350 by Amarjit Baxter RN  Outcome: Ongoing     Problem: ACTIVITY INTOLERANCE/IMPAIRED MOBILITY  Goal: Mobility/activity is maintained at optimum level for patient  11/16/2020 1721 by Karan Blizzard, RN  Outcome: Ongoing  11/16/2020 0350 by Amarjit Baxter RN  Outcome: Ongoing     Problem: Nutritional:  Goal: Nutritional status will improve  Description: Nutritional status will improve  11/16/2020 1721 by Karan Blizzard, RN  Outcome: Ongoing  11/16/2020 0350 by Amarjit Baxter RN  Outcome: Ongoing     Problem: Physical Regulation:  Goal: Will remain free from infection  Description: Will remain free from infection  11/16/2020 1721 by Karan Blizzard, RN  Outcome: Ongoing  11/16/2020 0350 by Amarjit Baxter RN  Outcome: Ongoing     Problem: Respiratory:  Goal: Ability to maintain normal respiratory secretions will improve  Description: Ability to maintain normal respiratory secretions will improve  11/16/2020 1721 by Karan Blizzard, RN  Outcome: Ongoing  11/16/2020 0350 by Amarjit Baxter RN  Outcome: Ongoing

## 2020-11-16 NOTE — PROGRESS NOTES
Physical Therapy  Facility/Department: [address] PROGRESSIVE CARE  Daily Treatment Note  NAME: Tarsha Chapa  : 1925  MRN: [id number]    Date of Service: 2020    Discharge Recommendations:  S Level 1, Home with Home health PT, 24 hour supervision or assist   PT Equipment Recommendations  Equipment Needed: No    HOME HEALTH CARE: LEVEL 1 STANDARD     -Initial home health evaluation to occur within 24-48 hours, in patient home    -Home health agency to establish plan of care for patient over 60 day period    -Medication Reconciliation    -PCP Visit scheduled within seven days of discharge    -PT/OT to evaluate with goal of regaining prior level of functioning    -OT to evaluate if patient has [address] needs for personal care   Tarsha Chapa scored a 17/24 on the AM-PAC short mobility form. Current research shows that an AM-PAC score of 17 or less is typically not associated with a discharge to the patient's home setting. Based on the patient's AM-PAC score and their current functional mobility deficits, it is recommended that the patient have 3-5 sessions per week of Physical Therapy at d/c to increase the patient's independence. Please see assessment section for further patient specific details. If patient discharges prior to next session this note will serve as a discharge summary. Please see below for the latest assessment towards goals. Assessment   Body structures, Functions, Activity limitations: Decreased functional mobility ; Decreased strength;Decreased safe awareness;Decreased endurance;Decreased balance;Decreased posture  Assessment: patient making  progress in therapy; reports his daughter is able to assist as needed; Patient limited by generallized weakness, pain, decreased balance and endurance. Patient benefit from skilled therapy to improve overal strength to lessen assist level for daughter.  Anticipate pt will be able to return home with 24/7 assist from his daughter today.  His sob is much improved on the lasix drip. Now off oxygen. He chest tightness is resolved, the troponins peaked at .09 and are trending back down . He is still coughing but decreased and now nonproductive. The overall whole body pain is gone. He has a kauffman in now so not having to get up to the bathroom, requiring a steady when he goes. \"  Response To Previous Treatment: Patient with no complaints from previous session. Family / Caregiver Present: No  Referring Practitioner: Ignacio Matthews CNP  Subjective  Subjective: Patient seated in recliner chair upon arrival.  Pt agreeable to therapy, reports generallized weakness , just little dizziness and minimal pain B hips with WB. Orientation  Orientation  Overall Orientation Status: Within Functional Limits(forgetful)  Cognition      Objective      Transfers  Sit to Stand: Contact guard assistance  Stand to sit: Contact guard assistance  Ambulation  Ambulation?: Yes  Ambulation 1  Surface: level tile  Device: Rolling Walker  Assistance: Contact guard assistance  Quality of Gait: flexed posture with small shuffling steps; appears to have B hip abd weakness with walking, cues ti stand erect  Gait Deviations: Slow Lia; Shuffles  Distance: 25'x 2, 13'  Comments: Patient sits quickly with cues to reach back     Balance  Sitting - Static: Good  Sitting - Dynamic: Good  Standing - Static: Fair  Standing - Dynamic: Fair  Comments: Patient fatigues quickly with upright and has weakness in bilateral LE causing a need to sit and rest between walks  Exercises  Hip Flexion: 20  Hip Abduction: 15 B LE  Knee Long Arc Quad: 10 + 5, rest break to improve quality B LE  Ankle Pumps: 15  Comments: performed B LE ex in sitting , standing march x 5 then need to sit and rest         Other Activities: Other (see comment)  Comment: Patient to toilet need assist for pericare for BM, small BM, CGA with bars               AM-PAC Score  AM-PAC Inpatient Mobility Raw Score : 17 (11/16/20 1226)  AM-PAC Inpatient T-Scale Score : 42.13 (11/16/20 1226)  Mobility Inpatient CMS 0-100% Score: 50.57 (11/16/20 1226)  Mobility Inpatient CMS G-Code Modifier : CK (11/16/20 1226)          Goals  Short term goals  Time Frame for Short term goals: upon discharge from acute  Short term goal 1: bed mobility with independence  Short term goal 2: transfers with supervision  Short term goal 3: Ambulated 13' with wheeled walker with CGA, increase distance ambulating to 40' with CGA  Short term goal 4: Tolerate seated ther ex with PT  Patient Goals   Patient goals : Shraddha Nation able to go home. \"    Plan    Plan  Times per week: 3-5 days/week  Current Treatment Recommendations: Strengthening, Balance Training, Functional Mobility Training, Transfer Training, Gait Training, Endurance Training, Home Exercise Program, Safety Education & Training  Safety Devices  Type of devices: Call light within reach, Chair alarm in place, Left in chair, Nurse notified, Gait belt, All fall risk precautions in place     Therapy Time   Individual Concurrent Group Co-treatment   Time In 1130         Time Out 1220         Minutes 50         Timed Code Treatment Minutes: [address] [facility] [address], [address]

## 2020-11-16 NOTE — PROGRESS NOTES
Occupational Therapy  Facility/Department: [facility] 5[facility] PROGRESSIVE CARE  Daily Treatment Note  Should patient be discharged prior to another treatment session, this note shall serve as the discharge summary. NAME: Deborah Gerardo  : 1925  MRN: [id number]    Date of Service: 2020    Discharge Recommendations:  Home with assist PRN, Patient would benefit from continued therapy after discharge, S Level 1, Home with Home health OT, 2-3 sessions per week       Assessment   Performance deficits / Impairments: Decreased functional mobility ; Decreased balance;Decreased ADL status; Decreased ROM; Decreased strength;Decreased endurance;Decreased high-level IADLs;Decreased posture;Decreased safe awareness  Assessment: 81 yo with a history of severe MR, diastolic chf and CAD who awoke suddenly with extreme sob and chest tightness as well as pain all over his body. He came to the ER by squad and was in acute pulmonary edema. He did also have cough productive of small amounts of bloody phlegm. On arrival he was hypoxic and placed on bipap and 9L /NC and given iv lasix. Now up on the floor he is no longer requiring the bipap and his oxygen level is down to 4L. Pt lives in a one level condo with his daughter with one step to enter the home. Pt required some assistance for ADL tasks prior to admission. Currently pt is requiring CGA for functional transfers and requires rest breaks. Pt would be safe for d/c home with daugter assist and [address] [place] [address]'[place] when medically stable. Will cont OT POC until d/c  Prognosis: Good  OT Education: OT Role;Plan of Care;Energy Conservation;Transfer Training;ADL Adaptive Strategies  Barriers to Learning: none  REQUIRES OT FOLLOW UP: Yes  Activity Tolerance  Activity Tolerance: Patient limited by fatigue;Patient Tolerated treatment well  Activity Tolerance: Fatigues easily but no safety concerns noted.   Pt reports when he is fatigued that he uses a w/c to get to the bathroom with daughter assist to conserve energy  Safety Devices  Safety Devices in place: Yes  Type of devices: Gait belt;Patient at risk for falls; Left in chair;Chair alarm in place;Nurse notified;Call light within reach         Patient Diagnosis(es): The primary encounter diagnosis was Acute respiratory failure with hypoxia ([address] Diagnoses of Acute pulmonary edema (HCC) and Renal cyst were also pertinent to this visit. has a past medical history of Acute diastolic CHF (congestive heart failure) ([address] Arthritis, Atrial fibrillation ([address] BPH with obstruction/lower urinary tract symptoms, Chronic atrial fibrillation ([address] CKD (chronic kidney disease), Coronary arteriosclerosis, GERD (gastroesophageal reflux disease), History of blood transfusion, HTN (hypertension), Left hip pain, Pacemaker, Pulmonary fibrosis ([address] Pulmonary nodule, left, Renal atrophy, right, Renal cyst, right, and SOB (shortness of breath). has a past surgical history that includes Pacemaker insertion (2014); Tonsillectomy (1960); Small intestine surgery (1945); Inguinal hernia repair (Right, 07/2016); Coronary angioplasty with stent (2009); pacemaker placement; Colonoscopy; Abdomen surgery; and eye surgery (Bilateral). Restrictions  Restrictions/Precautions  Restrictions/Precautions: Fall Risk  Position Activity Restriction  Other position/activity restrictions: [employer], Diet low sodium 2 GM;1500ml  Subjective   General  Chart Reviewed: Yes, Orders, Progress Notes, History and Physical  Patient assessed for rehabilitation services?: Yes  Additional Pertinent Hx: 79 yo with a history of severe MR, diastolic chf and CAD who awoke suddenly with extreme sob and chest tightness as well as pain all over his body. He came to the ER by squad and was in acute pulmonary edema. He did also have cough productive of small amounts of bloody phlegm. On arrival he was hypoxic and placed on bipap and 9L /NC and given iv lasix.   Now up on the floor he is no longer requiring the bipap and his oxygen level is down to 4L. His tightness is still present but less so and his sob is much improved  He is still hurting in his arthritic joints (mainly low back and knees. )  Family / Caregiver Present: No  Referring Practitioner: Dr. Bharath James  Diagnosis: SOB  Subjective  Subjective: Pt seen in room, no  complaints, agreed to OT. Pt concerned about being d/c'd home in time to be able to go to his back injection appointment on Friday because he would not be able to go again for 3 months if he misses that appointment. Orientation  Orientation  Overall Orientation Status: Within Functional Limits  Objective    ADL  Feeding: Modified independent ;Dentures  Toileting: Dependent/Total(catheter currently)        Balance  Sitting Balance: Stand by assistance  Standing Balance: Contact guard assistance  Functional Mobility  Functional - Mobility Device: Rolling Walker  Activity: To/from bathroom  Assist Level: Contact guard assistance  Functional Mobility Comments: Pt completed transfers and mobility with RW with CGA, no overt LOB.   Pt fatigued with activity but safe choices made without cues (i.e. rested prior to standing due to dizziness moving from supine to sit until dizziness passed)  Bed mobility  Supine to Sit: Stand by assistance  Sit to Supine: Unable to assess(left in chair at end of session)  Transfers  Sit to stand: Contact guard assistance  Stand to sit: Contact guard assistance  Transfer Comments: only one reminder for hand placement with walker use, good follow through                                                                 Plan   Plan  Times per week: 3-5x  Current Treatment Recommendations: ROM, Strengthening, Balance Training, Functional Mobility Training, Safety Education & Training, Self-Care / ADL, Neuromuscular Re-education, Endurance Training, Equipment Evaluation, Education, & procurement       OutComes Score    Jt Goel scored a 19/24 on the AM-PAC ADL Inpatient form. Current research shows that an AM-PAC score of 18 or greater is typically associated with a discharge to the patient's home setting. Based on the patient's AM-PAC score, and their current ADL deficits, it is recommended that the patient have 2-3 sessions per week of Occupational Therapy at d/c to increase the patient's independence. At this time, this patient demonstrates the endurance and safety to discharge home with home services and a follow up treatment frequency of 2-3x/wk. Please see assessment section for further patient specific details. If patient discharges prior to next session this note will serve as a discharge summary. Please see below for the latest assessment towards goals. AM-PAC Score        AM-[facility] Inpatient Daily Activity Raw Score: 19 (11/16/20 0925)  AM-PAC Inpatient ADL T-Scale Score : 40.22 (11/16/20 0925)  ADL Inpatient CMS 0-100% Score: 42.8 (11/16/20 0925)  ADL Inpatient CMS G-Code Modifier : CK (11/16/20 0925)    Goals  Short term goals  Time Frame for Short term goals: Prior to D/C  Short term goal 1: Pt will complete dressing w/ min A including Say Hose  Short term goal 2: Pt will complete bathing w/ min A  Short term goal 3: Pt will toilet w/ min A  Short term goal 4: Pt will complete functional mobility/transfers SBA  Short term goal 5: Pt will complete BUE HEP/ther ex w/ supervision to increase strength & endurance to perform ADL tasks  Long term goals  Time Frame for Long term goals : ltg=stg  Patient Goals   Patient goals : Per pt report, \"I want to get up 6 steps. \" Above goals will work towards this goal.       Therapy Time   Individual Concurrent Group Co-treatment   Time In 0800         Time Out 0845         Minutes 45         Timed Code Treatment Minutes: [address] [facility]

## 2020-11-17 NOTE — PROGRESS NOTES
[address] 81   Daily Progress Note      Admit Date:  11/11/2020    CC: I am breathing better today  This is a 80year-old pleasant male with a  history of hypertension, pulmonary fibrosis, chronic kidney disease,  mitral valvular disease, who came to the hospital with worsening  shortness of breath for the last few days. He has also noticed lower  extremity edema. He denied any chest pain, tightness, pressure to me. He had no fever or chills.     He presented to the emergency room and his workup was consistent with  congestive heart failure and he was admitted for further care.     Subjective:  Patient sitting in the chair eating his breakfast.  He denies any significant shortness of breath. He states his swelling has significantly improved. He is remaining mildly hypotensive and has evidence of orthostatic hypotension. Objective:   BP (!) 95/52   Pulse 71   Temp 97.6 °F (36.4 °C) (Oral)   Resp 18   Ht 5' 6\" (1.676 m)   Wt 166 lb 14.2 oz (75.7 kg)   SpO2 93%   BMI 26.94 kg/m²       Intake/Output Summary (Last 24 hours) at 11/17/2020 1129  Last data filed at 11/17/2020 1124  Gross per 24 hour   Intake 430 ml   Output 1025 ml   Net -595 ml     Wt Readings from Last 3 Encounters:   11/17/20 166 lb 14.2 oz (75.7 kg)   11/10/20 183 lb (83 kg)   11/06/20 183 lb 10.3 oz (83.3 kg)     Telemetry: Paced rhythm    Physical Exam:  General:  NAD, Awake, alert and oriented X4  Skin:  Warm and dry  Neck:  Supple, no JVP appreciated, no bruit  Chest:  Clear to auscultation, no wheezes/rhonchi/rales  Cardiovascular:  Regular rate.  O8P4 soft 2/6  systolic murmur at the apex  Abdomen:  Soft, nontender, +bowel sounds  Extremities:  2-3+ upper and LE edema    Cardiac Diagnosis:  hypertension, CHF and atrial fibrillation    Medications:    [Held by provider] furosemide  60 mg Oral BID    carvedilol  3.125 mg Oral BID WC    famotidine  10 mg Oral Daily    apixaban  2.5 mg Oral BID    atorvastatin  40 mg Oral Nightly    Vitamin D  2,000 Units Oral Daily    finasteride  5 mg Oral Daily    fluticasone  2 spray Each Nostril Daily    oxybutynin  5 mg Oral Daily    predniSONE  10 mg Oral Daily    tamsulosin  0.4 mg Oral Daily    glycopyrrolate-formoterol  2 puff Inhalation BID    sodium chloride flush  10 mL Intravenous 2 times per day    insulin lispro  0-6 Units Subcutaneous TID WC    insulin lispro  0-3 Units Subcutaneous Nightly      sodium chloride 100 mL/hr at 11/17/20 1116    dextrose       polyvinyl alcohol-povidone, acetaminophen **OR** acetaminophen, sodium chloride flush, polyethylene glycol, promethazine **OR** ondansetron, glucose, dextrose, glucagon (rDNA), dextrose    Lab Data:  CBC:   No results for input(s): WBC, HGB, PLT in the last 72 hours. BMP:    Recent Labs     11/15/20  0447 11/16/20  0425 11/17/20  0415    137 136   K 3.5 4.0 4.3   CO2 28 29 27   BUN 68* 69* 65*   CREATININE 2.1* 1.9* 2.2*     LIVR: No results for input(s): AST, ALT in the last 72 hours. INR:  No results for input(s): INR in the last 72 hours. APTT: No results for input(s): APTT in the last 72 hours. BNP:  No results for input(s): BNP in the last 72 hours. 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   NYHA class III  -Currently appears mildly volume depleted  -Hold his diuretic therapy for now till his blood pressure and renal function improves   -Will hold morning dose of carvedilol and resume reduced dose of carvedilol in the evening once his blood pressure improves  -Not on ACE inhibitor/ARB/Arni or Aldactone therapy due to hypotension and renal insufficiency  :  Mitral valve disease  -Patient has evidence of moderate to severe mitral regurgitation with mildly reduced left ventricle ejection fraction at 50% by recent echocardiography  -At his age will continue to monitor and not consider any aggressive interventions    GAGE on CKD  -Renal function slightly worse today very likely due to volume depletion  -We will hold diuretic therapy    Atrial fibrillation  -Rate is controlled  -Currently on Xarelto 10 mg daily    Hyperkalemia  -Resolved    Patient should be ready for discharge in the next 24 to 48 hours from cardiology standpoint    Complexity of medical decision making-very high  Electronically signed by Enedelia Salcido MD on 11/17/2020 at 11:29 AM

## 2020-11-17 NOTE — PROGRESS NOTES
Past Surgical History:   Procedure Laterality Date    ABDOMEN SURGERY      Scrapnel removed in [facility]  2009    awoke with indigestion    EYE SURGERY Bilateral     cataract    INGUINAL HERNIA REPAIR Right 07/2016    PACEMAKER INSERTION  2014    PACEMAKER PLACEMENT      SMALL INTESTINE SURGERY  1945    jelly as ww II vet, part of small intestine moved    TONSILLECTOMY  1960      Family History   Problem Relation Age of Onset    Pacemaker Sister     Pacemaker Brother     No Known Problems Other         lung disease     Social History     Tobacco Use    Smoking status: Never Smoker    Smokeless tobacco: Never Used   Substance Use Topics    Alcohol use: No    Drug use: Not Currently       Prior to Admission medications    Medication Sig Start Date End Date Taking? Authorizing Provider   omeprazole (PRILOSEC) 20 MG delayed release capsule Take 20 mg by mouth daily   Yes Historical Provider, MD   traMADol (ULTRAM) 50 MG tablet Take 1 tablet by mouth 2 times daily for 30 days.  11/3/20 12/3/20  Glo Champion MD   losartan (COZAAR) 50 MG tablet Take 1 tablet by mouth daily Hold for systolic less 756 29/3/17   Glo Champion MD   carvedilol (COREG) 12.5 MG tablet Take 1 tablet by mouth 2 times daily (with meals) 11/3/20   Glo Champion MD   predniSONE (DELTASONE) 10 MG tablet Take 1 tablet by mouth daily 11/4/20 12/4/20  Glo Champion MD   diclofenac sodium (VOLTAREN) 1 % GEL Apply 4 g topically 3 times daily 11/3/20   Glo Champion MD   furosemide (LASIX) 20 MG tablet Take 1 tablet by mouth daily 11/3/20   Glo Champion MD   oxybutynin (DITROPAN) 5 MG tablet TAKE 1 TABLET BY MOUTH EVERY DAY 10/21/20   Annia Casillas MD   fluticasone [facility]) 50 MCG/ACT nasal spray SPRAY 2 SPRAY INTO EACH NOSTRIL EVERY DAY 10/9/20   Annia Casillas MD   albuterol sulfate  (90 Base) MCG/ACT inhaler Inhale 2 puffs into the lungs every 6 hours as needed for Wheezing or Shortness of Breath 8/27/20   Ismael Mercer MD   umeclidinium-vilanterol [facility] ELLIPTA) 62.5-25 MCG/INH AEPB inhaler Inhale 1 puff into the lungs daily 8/27/20   Ismael Mercer MD   tamsulosin (FLOMAX) 0.4 MG capsule ONE EVERY NIGHT FOR YOUR PROSTATE 4/22/20   Janet Levi MD   finasteride (PROSCAR) 5 MG tablet TAKE 1 TABLET BY MOUTH EVERY DAY 12/2/19   Janet Levi MD   atorvastatin (LIPITOR) 40 MG tablet Take 1 tablet by mouth daily 2/22/19   Klever Tracy MD   rivaroxaban [facility]) 15 MG TABS tablet Take 1 tablet by mouth daily (with breakfast) 1/8/18   Janet Levi MD   aspirin 81 MG tablet Take 81 mg by mouth daily    Historical Provider, MD   Cholecalciferol (VITAMIN D3) 2000 UNITS CAPS Take 2,000 Units by mouth daily    Historical Provider, MD   calcium carbonate (OSCAL) 500 MG TABS tablet Take 600 mg by mouth daily caltrate 600-D3 one daily    Historical Provider, MD   Multiple Vitamins-Minerals (CENTRUM SILVER ADULT 50+) TABS Take 1 tablet by mouth daily    Historical Provider, MD         ROS:  A comprehensive review of systems was negative except for: low back pain and leg weakness with ambulation    OBJECTIVE:      PHYSICAL:  Intake/Output:   Patient Vitals for the past 8 hrs:   BP Temp Temp src Pulse Resp SpO2 Weight   11/17/20 1235 (!) 89/56 -- -- 72 -- -- --   11/17/20 1150 (!) 91/56 -- -- 72 20 97 % --   11/17/20 1148 (!) 82/45 -- -- 71 20 99 % --   11/17/20 1114 (!) 95/52 -- -- 71 -- -- --   11/17/20 1057 (!) 51/32 97.6 °F (36.4 °C) Oral 70 18 93 % --   11/17/20 0845 -- -- -- -- -- 98 % --   11/17/20 0737 103/62 97.6 °F (36.4 °C) Oral 73 18 91 % --   11/17/20 0513 124/64 97.9 °F (36.6 °C) Oral 70 16 94 % 166 lb 14.2 oz (75.7 kg)     I/O last 3 completed shifts:   In: 56 [P.O.:480; I.V.:10]  Out: 1300 [Urine:1300]  I/O this shift:  In: 180 [P.O.:180]  Out: 200 [Urine:200]  VITALS:    BP (!) 89/56   Pulse 72   Temp 97.6 °F (36.4 °C) (Oral)   Resp 20   Ht 5' 6\" (1.676 m)   Wt 166 lb 14.2 oz (75.7 kg)   SpO2 97%   BMI 26.94 kg/m²     General Appearance: alert and oriented to person, place and time, well-developed and well-nourished, in no acute distress  Skin: warm and dry, no rash or erythema  Head: normocephalic and atraumatic  Eyes: conjunctivae normal and sclera anicteric  ENT: dorian [place]  Neck: neck supple and non tender without mass, no thyromegaly or thyroid nodules, no cervical lymphadenopathy   Pulmonary/Chest: insp crackles and decreased bs bases (crackles chronic from ILD)  Cardiovascular: normal rate, normal S1 and S2, no gallops and no carotid bruits  Abdomen: soft, non-tender, non-distended, normal bowel sounds, no masses or organomegaly  Extremities: no cyanosis, clubbing edema now gone  Musculoskeletal: no swollen joints and no tender joints,  Neurologic: coordination normal and speech normal, moves all 4 extremities    DATA:   Labs:  CBC:   No results for input(s): WBC, HGB, PLT in the last 72 hours. BMP:    Recent Labs     11/15/20  0447 11/16/20  0425 11/17/20  0415    137 136   K 3.5 4.0 4.3   CL 96* 95* 98*   CO2 28 29 27   BUN 68* 69* 65*   CREATININE 2.1* 1.9* 2.2*   GLUCOSE 107* 105* 102*     POC GLUCOSE:    Recent Labs     11/16/20  1123 11/16/20  1655 11/16/20 2013 11/17/20  0739 11/17/20  1156   POCGLU 344* 125* 212* 103* 268*     Ca/Mg/Phos:   Recent Labs     11/15/20  0447 11/16/20  0425 11/17/20  0415   CALCIUM 8.9 8.6 9.0   MG 2.20  --   --      Hepatic: No results for input(s): AST, ALT, ALB, BILITOT, ALKPHOS in the last 72 hours. Troponin:   No results for input(s): TROPONINI in the last 72 hours. ProBNP:   Recent Labs     11/15/20  0447 11/16/20  0425   PROBNP 3,742* 3,084*         DIAGNOSTICS:  Ct Chest Wo Contrast    Result Date: 11/11/2020  Features of heart failure, with moderate cardiomegaly, severe interstitial/alveolar pulmonary edema, small bilateral effusions and body wall anasarca.   Difficult to exclude superimposed atypical or viral infection, which could be present in the appropriate context. Mediastinal lymphadenopathy is presumably reactive. Multivessel coronary calcifications status post PCI. Presbyesophagus. Right renal atrophy with indeterminate cyst.  Consideration/appropriateness for follow-up renal protocol CT/MRI may be based on patient's age, life expectancy and comorbidities. Xr Chest Portable    Result Date: 11/11/2020  Heart failure, with severe interstitial/alveolar pulmonary edema and small effusions. ASSESSMENT AND PLAN    Principal Problem:    Acute on chronic diastolic CHF (congestive heart failure) (HCC)  Plan: now since episode of hypotension and fall in gfr and uo meds on hold,   Active Problems:    Pacemaker  Plan: intact functioning    Essential hypertension  Plan: bp been controlled on current med to dc losartan because of hyperkalemia and worsening renal function.  Episode of hypotension meds being held    Coronary arteriosclerosis  Plan: bump in troponin discussed with Dr Twyla Lamb who is not concerned that the patient had an stemi, starting to trend down     BPH with obstruction/lower urinary tract symptoms  Plan: ongoing issue, kauffman in currently    Pulmonary fibrosis ([address]  Plan: ongoing on  prednisone    Solitary lung nodule  Plan: ongoing, followed by Dr Rafal Lyons    Severe mitral regurgitation  Plan: also recently evaluated with echo, medical treatmnet    Acute respiratory failure with hypoxia ([address]  Plan: resolved    Atrial fibrillation, controlled ([address]  Plan: ongoing issue    Acute respiratory failure with hypoxia and hypercapnia ([address]  Plan: improving clinicaly repeat vbg much better yesterday    Pleural effusion, bilateral  Plan: small, observe    Appropriate diagnostic studies and consults ordered  Bird Mcallister MD       Spent 35 minutes with over half the time devoted to  discussion

## 2020-11-17 NOTE — PROGRESS NOTES
Decreased functional mobility ; Decreased balance;Decreased ADL status; Decreased ROM; Decreased strength;Decreased endurance;Decreased high-level IADLs;Decreased posture;Decreased safe awareness  Assessment: Pt performed ADL session, required max for LB dressing D/T fatigue & catheter. Pt min A to bathe buttocks in stance @ RW, CGA. Pt SBA for UB bathing & dressing. Pt is CGA using RW with cues for functional mobility & transfers, but is limited by SOB and fatigue easily. Pt performed toilet hygiene in stance at [facility] but appears unsteady & presents with poor posture. Pt required rest breaks throughout session and c/o of dizziness at end of session as BP became low. Pt would be safe for d/c home with daughter asssitance and home OT when medically stable. Cont OT tx per POC. Treatment Diagnosis: Impared: ADL/IADL function, balance, functional mobility/transfers, endurance, strength, safety awareness, posture  Prognosis: Good  OT Education: OT Role;Plan of Care;Energy Conservation;Transfer Training;ADL Adaptive Strategies;Orientation  Patient Education: Pt practiced catheter care using green wipes during bathing. Educated on breathing when SOB. Barriers to Learning: Memory, confusion  REQUIRES OT FOLLOW UP: Yes  Activity Tolerance  Activity Tolerance: Patient limited by fatigue;Patient Tolerated treatment well  Activity Tolerance: Pt tolerated ADL session well overall. Pt limited by SOB & fatigue. Pt required rest breaks to catch breath. Pt taking increased time to complete tasks. Pt c/o of dizziness by end of session as BP became low. O2 sat 98 %, BP 78/38. Safety Devices  Safety Devices in place: Yes  Type of devices: Gait belt;Patient at risk for falls; Left in chair;Chair alarm in place;Nurse notified;Call light within reach         Patient Diagnosis(es): The primary encounter diagnosis was Acute respiratory failure with hypoxia ([facility]  Diagnoses of Acute pulmonary edema (HCC) and Renal cyst were also pertinent to this visit. has a past medical history of Acute diastolic CHF (congestive heart failure) ([address] Arthritis, Atrial fibrillation ([address] BPH with obstruction/lower urinary tract symptoms, Chronic atrial fibrillation ([address] CKD (chronic kidney disease), Coronary arteriosclerosis, GERD (gastroesophageal reflux disease), History of blood transfusion, HTN (hypertension), Left hip pain, Pacemaker, Pulmonary fibrosis ([address] Pulmonary nodule, left, Renal atrophy, right, Renal cyst, right, and SOB (shortness of breath). has a past surgical history that includes Pacemaker insertion (2014); Tonsillectomy (1960); Small intestine surgery (1945); Inguinal hernia repair (Right, 07/2016); Coronary angioplasty with stent (2009); pacemaker placement; Colonoscopy; Abdomen surgery; and eye surgery (Bilateral). Restrictions  Restrictions/Precautions  Restrictions/Precautions: Fall Risk  Position Activity Restriction  Other position/activity restrictions: [employer], Diet low sodium 2 GM;1500ml, catheter, telemetry  Subjective   General  Chart Reviewed: Yes, Orders, Progress Notes, History and Physical  Patient assessed for rehabilitation services?: Yes  Additional Pertinent Hx: 81 yo with a history of severe MR, diastolic chf and CAD who awoke suddenly with extreme sob and chest tightness as well as pain all over his body. He came to the ER by squad and was in acute pulmonary edema. He did also have cough productive of small amounts of bloody phlegm. On arrival he was hypoxic and placed on bipap and 9L /NC and given iv lasix. Now up on the floor he is no longer requiring the bipap and his oxygen level is down to 4L. His tightness is still present but less so and his sob is much improved  He is still hurting in his arthritic joints (mainly low back and knees. )  Family / Caregiver Present: No  Referring Practitioner: Dr. Glynn Beard  Diagnosis: SOB  Subjective  Subjective: Pt met in room, seated in recliner.  Nurse present to give meds. Pt denied pain when seated, but stated that he will probably hurt when he moves. Pt agreed to ADL session, stating it would make him feel better. Orientation  Orientation  Overall Orientation Status: Within Functional Limits  Objective    ADL  UE Bathing: Setup;Stand by assistance(Seated EOB, pt bathed all parts.)  LE Bathing: Setup;Minimal assistance;Verbal cueing; Increased time to complete(Seated EOB, pt used green wipes w/ cues to bathe front shawna area & catheter. Pt bathed BLEs & feet by crossing legs. In stance @ RW, assist for buttocks.)  UE Dressing: Setup;Stand by assistance(Pt donned long sleeve shirt seated EOB.)  LE Dressing: Maximum assistance; Increased time to complete(Seated EOB, assist to thread catheter through depends & pants. Pt threaded rest of depends to knees, assist for donning pants D/T fatigue. Pt crosses BLEs to doff/don footies. In stance @ RW, assist to pull up depends & pants.)  Toileting: Contact guard assistance(Pt had small BM, performed hygeine in stance @ RW & grab bar CGA, appeared unsteady. Pt not wearing depends/underwear.)  Additional Comments: Nurse present at beginning of session to give meds. Sponge bathe session at EOB. Pt required rest breaks D/T fatigue & SOB. Pt c/o of dizziness at end of session, O2 sat 98%, BP 78/38 and nurse notified. Balance  Sitting Balance: Stand by assistance  Standing Balance: Contact guard assistance  Standing Balance  Time: ~30 secondsx3, 1:00 minute  Activity: functional mobility/ LB dressing, toileting  Comment: No LOB in static stance, but pt appear SOB and need rest breaks. Pt demonstrates poor posture & appears unsteady performing toilet hygiene. Functional Mobility  Functional - Mobility Device: Rolling Walker  Activity: To/from bathroom  Assist Level: Contact guard assistance  Functional Mobility Comments: Pt amb ><bathroom using RW, CGA, slow and steady gait. No LOB noted.  Pt appears SOB after getting to the bathroom. Toilet Transfers  Toilet - Technique: Ambulating(RW)  Equipment Used: Grab bars  Toilet Transfer: Contact guard assistance  Toilet Transfers Comments: RW><comfort height toilet, pt used L grab bar to sit and stand, cues to push up from grab bar. No LOB. Wheelchair Bed Transfers  Wheelchair/Bed - Technique: Ambulating(RW)  Equipment Used: Other;Bed(recliner)  Level of Asssistance: Contact guard assistance  Wheelchair Transfers Comments: Reclinre><RW><bed, CGA, pt required one cue to push up from surface before standing. No LOB. Cognition  Overall Cognitive Status: Exceptions  Memory: Decreased short term memory  Safety Judgement: Decreased awareness of need for safety  Problem Solving: Assistance required to implement solutions;Assistance required to correct errors made  Sequencing: Requires cues for some  Cognition Comment: Slowed processing. Pt often stating throughout session that he is confused, but when asked what he is confused about pt does not give an answer. Pt aware of when he needs to take a rest break when he starts to fatigue.                                          Plan   Plan  Times per week: 3-5x  Times per day: Daily  Current Treatment Recommendations: ROM, Strengthening, Balance Training, Functional Mobility Training, Safety Education & Training, Self-Care / ADL, Neuromuscular Re-education, Endurance Training, Equipment Evaluation, Education, & procurement                                               AM-PAC Score        AM-[facility] Inpatient Daily Activity Raw Score: 19 (11/17/20 1157)  AM-PAC Inpatient ADL T-Scale Score : 40.22 (11/17/20 1157)  ADL Inpatient CMS 0-100% Score: 42.8 (11/17/20 1157)  ADL Inpatient CMS G-Code Modifier : CK (11/17/20 1157)    Goals  Short term goals  Time Frame for Short term goals: Prior to D/C  Short term goal 1: Pt will complete dressing w/ min A including Say Hose  Short term goal 2: Pt will complete bathing w/ min A  Short term goal 3: Pt will toilet w/ min A  Short term goal 4: Pt will complete functional mobility/transfers SBA  Short term goal 5: Pt will complete BUE HEP/ther ex w/ supervision to increase strength & endurance to perform ADL tasks  Long term goals  Time Frame for Long term goals : ltg=stg  Patient Goals   Patient goals : Per pt report, \"I want to get up 6 steps. \" Above goals will work towards this goal.       Therapy Time   Individual Concurrent Group Co-treatment   Time In 0930         Time Out 1040         Minutes 70         Timed Code Treatment Minutes: [address]/OT  Therapist was present, directed patients care, made skilled judgement, and was responsible for assessment and treatment of the patient.       Ty Rodriguez, OTR/L #[id number]

## 2020-11-17 NOTE — PROGRESS NOTES
Department of Internal Medicine  General Internal Medicine  Attending Progress Note    Chief Complaint:feeling wacky      HPI:   SUBJECTIVE:  79 yo male admitted with chest tightness acute pulmonary edema and hemoptysis feels a lot better from admission. His sob is much improved  Now off oxygen. He chest tightness is resolved, the troponins peaked at .09 . He is still coughing but decreased and now nonproductive. The overall whole body pain is gone. He has a kauffman in now so not having to get up to the bathroom, requiring a steady when he goes. Feels a little wacky though not confused, thinking about funny things and laughing at himself. Switched to norco for pain and still feeling wacky. Now on tylenol and not complaining about worse pain although not been walking   Leg swelling is down a lot.     Past Medical History:   Diagnosis Date    Acute diastolic CHF (congestive heart failure) ([facility] [address] 10/23/2020    Arthritis     Atrial fibrillation (HCC)     BPH with obstruction/lower urinary tract symptoms     nocturia 3-4 x per night    Chronic atrial fibrillation (HCC)     CKD (chronic kidney disease) 2017    level 3    Coronary arteriosclerosis 2009    2 stents/ Dr Campos Royalty in [facility] GERD (gastroesophageal reflux disease)     History of blood transfusion     1945   During deployment    HTN (hypertension) 1990    Left hip pain     schrapnel in hip and intestines/chronic pains left hip impairs walking    Pacemaker 2015    Pulmonary fibrosis ([facility] [address] 08/2017    mild amount seen on chest ct    Pulmonary nodule, left 2017    noncalcified 7mm low risk repeat 8/2018 if patient willing    Renal atrophy, right 2017    Renal cyst, right 08/2017    not clearly simple cyst    SOB (shortness of breath) 2017    terrell normal pft and ct chest ? smll amount of pulmonary fibrosis     Past Surgical History:   Procedure Laterality Date    ABDOMEN SURGERY      Scrapnel removed in [address] ANGIOPLASTY WITH STENT PLACEMENT  2009    awoke with indigestion    EYE SURGERY Bilateral     cataract    INGUINAL HERNIA REPAIR Right 07/2016    PACEMAKER INSERTION  2014    PACEMAKER PLACEMENT      SMALL INTESTINE SURGERY  1945    scheliu as ww II vet, part of small intestine moved    TONSILLECTOMY  1960      Family History   Problem Relation Age of Onset    Pacemaker Sister     Pacemaker Brother     No Known Problems Other         lung disease     Social History     Tobacco Use    Smoking status: Never Smoker    Smokeless tobacco: Never Used   Substance Use Topics    Alcohol use: No    Drug use: Not Currently       Prior to Admission medications    Medication Sig Start Date End Date Taking? Authorizing Provider   omeprazole (PRILOSEC) 20 MG delayed release capsule Take 20 mg by mouth daily   Yes Historical Provider, MD   traMADol (ULTRAM) 50 MG tablet Take 1 tablet by mouth 2 times daily for 30 days.  11/3/20 12/3/20  Shad Burnette MD   losartan (COZAAR) 50 MG tablet Take 1 tablet by mouth daily Hold for systolic less 791 87/7/10   Shad Burnette MD   carvedilol (COREG) 12.5 MG tablet Take 1 tablet by mouth 2 times daily (with meals) 11/3/20   Shad Burnette MD   predniSONE (DELTASONE) 10 MG tablet Take 1 tablet by mouth daily 11/4/20 12/4/20  Shad Burnette MD   diclofenac sodium (VOLTAREN) 1 % GEL Apply 4 g topically 3 times daily 11/3/20   Shad Burnette MD   furosemide (LASIX) 20 MG tablet Take 1 tablet by mouth daily 11/3/20   Shad Burnette MD   oxybutynin (DITROPAN) 5 MG tablet TAKE 1 TABLET BY MOUTH EVERY DAY 10/21/20   Arpit Anderson MD   fluticasone [facility]) 50 MCG/ACT nasal spray SPRAY 2 SPRAY INTO EACH NOSTRIL EVERY DAY 10/9/20   Arpit Anderson MD   albuterol sulfate  (90 Base) MCG/ACT inhaler Inhale 2 puffs into the lungs every 6 hours as needed for Wheezing or Shortness of Breath 8/27/20   Soo Lopez MD   umeclidinium-vilanterol (ANORO ELLIPTA) 62.5-25 MCG/INH AEPB inhaler Inhale 1 puff into the lungs daily 8/27/20   Eliseo Wiggins MD   tamsulosin (FLOMAX) 0.4 MG capsule ONE EVERY NIGHT FOR YOUR PROSTATE 4/22/20   Ami Alonzo MD   finasteride (PROSCAR) 5 MG tablet TAKE 1 TABLET BY MOUTH EVERY DAY 12/2/19   Ami Alonzo MD   atorvastatin (LIPITOR) 40 MG tablet Take 1 tablet by mouth daily 2/22/19   Taz Ashby MD   rivaroxaban [facility]) 15 MG TABS tablet Take 1 tablet by mouth daily (with breakfast) 1/8/18   Ami Alonzo MD   aspirin 81 MG tablet Take 81 mg by mouth daily    Historical Provider, MD   Cholecalciferol (VITAMIN D3) 2000 UNITS CAPS Take 2,000 Units by mouth daily    Historical Provider, MD   calcium carbonate (OSCAL) 500 MG TABS tablet Take 600 mg by mouth daily caltrate 600-D3 one daily    Historical Provider, MD   Multiple Vitamins-Minerals (CENTRUM SILVER ADULT 50+) TABS Take 1 tablet by mouth daily    Historical Provider, MD         ROS:  A comprehensive review of systems was negative except for: sob cough    OBJECTIVE:      PHYSICAL:  Intake/Output:   Patient Vitals for the past 8 hrs:   BP Temp Temp src Pulse Resp SpO2 Weight   11/16/20 1731 -- -- -- -- 16 97 % --   11/16/20 1549 (!) 94/57 98.3 °F (36.8 °C) Oral 73 18 90 % --   11/16/20 1230 -- -- -- -- -- -- 166 lb 7.2 oz (75.5 kg)     I/O last 3 completed shifts:   In: 720 [P.O.:720]  Out: 3225 [Urine:3225]  I/O this shift:  In: -   Out: 150 [Urine:150]  VITALS:    BP (!) 94/57   Pulse 73   Temp 98.3 °F (36.8 °C) (Oral)   Resp 16   Ht 5' 6\" (1.676 m)   Wt 166 lb 7.2 oz (75.5 kg)   SpO2 97%   BMI 26.87 kg/m²     General Appearance: alert and oriented to person, place and time, well-developed and well-nourished, in no acute distress  Skin: warm and dry, no rash or erythema  Head: normocephalic and atraumatic  Eyes: conjunctivae normal and sclera anicteric  ENT: dorian [place]  Neck: neck supple and non tender without mass, no thyromegaly or thyroid nodules, no cervical lymphadenopathy severe interstitial/alveolar pulmonary edema and small effusions. ASSESSMENT AND PLAN    Principal Problem:    Acute on chronic diastolic CHF (congestive heart failure) (HCC)  Plan: improving clinically lost weight despite elevated BNP still seems to have improved  Now on lasix up to 80mg ivp bid will follow parameters. Cardiology and Renal following  Active Problems:    Pacemaker  Plan: intact functioning    Essential hypertension  Plan: bp been controlled on current med to dc losartan because of hyperkalemia and worsening renal function.  BP up more will I crease coreg    Coronary arteriosclerosis  Plan: bump in troponin discussed with Dr Cordelia Asencio who is not concerned that the patient had an stemi, starting to trend down     BPH with obstruction/lower urinary tract symptoms  Plan: ongoing issue, kauffman in currently    Pulmonary fibrosis ([address]  Plan: ongoing on  prednisone    Solitary lung nodule  Plan: ongoing, followed by Dr Joshua Lyons    Severe mitral regurgitation  Plan: also recently evaluated with echo, medical treatmnet    Acute respiratory failure with hypoxia ([address]  Plan: resolved    Atrial fibrillation, controlled ([address]  Plan: ongoing issue    Acute respiratory failure with hypoxia and hypercapnia ([address]  Plan: improving clinicaly repeat vbg much better yesterday    Pleural effusion, bilateral  Plan: small, observe    Appropriate diagnostic studies and consults ordered  Mello Harvey MD       Spent 35 minutes with over half the time devoted to  discussion

## 2020-11-17 NOTE — PROGRESS NOTES
Physical Therapy  PT attempt  [facility]  Attempted PT treatment this PM.  Per RN Tra Zavala, hold PT treatment at this time due to low BP. Will attempt again as medical status permits. See last treatment note for d/c recommendations.     Electronically signed by Destiny Kraft, PT [id number] on 11/17/2020 at 1:33 PM

## 2020-11-17 NOTE — PLAN OF CARE
Problem: Pain:  Goal: Pain level will decrease  Description: Pain level will decrease  11/17/2020 1808 by Zurdo Cagle RN  Outcome: Ongoing  11/17/2020 0545 by Franchesca Bradley RN  Outcome: Ongoing  Goal: Control of acute pain  Description: Control of acute pain  11/17/2020 1808 by Zurdo Cagle RN  Outcome: Ongoing  11/17/2020 0545 by Franchesca Bradley RN  Outcome: Ongoing  Goal: Control of chronic pain  Description: Control of chronic pain  11/17/2020 1808 by Zurdo Cagle RN  Outcome: Ongoing  11/17/2020 0545 by Franchesca Bradley RN  Outcome: Ongoing     Problem: Skin Integrity:  Goal: Will show no infection signs and symptoms  Description: Will show no infection signs and symptoms  11/17/2020 1808 by Zurdo Cagle RN  Outcome: Ongoing  11/17/2020 0545 by Franchesca Bradley RN  Outcome: Ongoing  Goal: Absence of new skin breakdown  Description: Absence of new skin breakdown  11/17/2020 1808 by Zurdo Cagle RN  Outcome: Ongoing  11/17/2020 0545 by Franchesca Bradley RN  Outcome: Ongoing  Goal: Complications related to intravenous access or infusion will be avoided or minimized  Description: Complications related to intravenous access or infusion will be avoided or minimized  11/17/2020 1808 by Zurdo Cagle RN  Outcome: Ongoing  11/17/2020 0545 by Franchesca Bradley RN  Outcome: Ongoing     Problem: Falls - Risk of:  Goal: Will remain free from falls  Description: Will remain free from falls  11/17/2020 1808 by Zurdo Cagle RN  Outcome: Ongoing  11/17/2020 0545 by Franchesca Bradley RN  Outcome: Ongoing  Goal: Absence of physical injury  Description: Absence of physical injury  11/17/2020 1808 by Zurdo Cagle RN  Outcome: Ongoing  11/17/2020 0545 by Franchesca Bradley RN  Outcome: Ongoing     Problem: OXYGENATION/RESPIRATORY FUNCTION  Goal: Patient will maintain patent airway  11/17/2020 1808 by Zurdo Cagle RN  Outcome: Ongoing  11/17/2020 0545 by Franchesca Bradley

## 2020-11-17 NOTE — PROGRESS NOTES
Nephrology Progress Note          CC: We are following this patient for GAGE on CKD. Admitted for dyspnea with CHF    h/o CKD stage 3 with recent baseline Cr ~ 1.7 MG  Has h/o ILD, diastolic CHF A fib and MR Recently hospitalized with CHF and Pneumonia  Discharge weight was 174 Readmitted with increasing SOB and decreased endurance Wt was up to 182 lbs on admission   Cr increased from 1.6 to 2.4 mg   Had been  on ARB   Has had LUT obstructive symptoms  Has had relative hypotension  No exposure to IV Contrast      SUBJECTIVE:    HPI:  Breathing improved. No CP. Some dizziness and dyspnea with exertion. ROS:  In chair. [address]:  medications reviewed. Physical Exam:    BP (!) 89/56   Pulse 72   Temp 97.6 °F (36.4 °C) (Oral)   Resp 20   Ht 5' 6\" (1.676 m)   Wt 166 lb 14.2 oz (75.7 kg)   SpO2 97%   BMI 26.94 kg/m²     24HR INTAKE/OUTPUT:      Intake/Output Summary (Last 24 hours) at 11/17/2020 1324  Last data filed at 11/17/2020 1124  Gross per 24 hour   Intake 430 ml   Output 1025 ml   Net -595 ml       Constitutional:  Elderly, NAD  Lungs:  Decreased BS bibasilar, no rales  Cardiovascular:  RRR, 2/6 YEN  Abd:  Soft, NT  Ext:  Trace LE edema  Skin:  No lesions  Neuro: non-focal      DATA:    CBC:   No results for input(s): WBC, RBC, HGB, HCT, PLT in the last 72 hours. BMP:    Recent Labs     11/15/20  0447 11/16/20  0425 11/17/20  0415    137 136   K 3.5 4.0 4.3   CL 96* 95* 98*   CO2 28 29 27   BUN 68* 69* 65*   CREATININE 2.1* 1.9* 2.2*   CALCIUM 8.9 8.6 9.0   GLUCOSE 107* 105* 102*     Last 3 Troponin:    Lab Results   Component Value Date    TROPONINI 0.06 11/12/2020    TROPONINI 0.09 11/11/2020    TROPONINI 0.09 11/11/2020       IMPRESSION/RECOMMENDATIONS:      1. GAGE/CKD suspect hemodynamic in nature with decompensated CHF, relative Hypotension Improved with diuresis - c/w CRS  - renal function essentially stable    2.  CKD - RK with thinned cortex on US, chronic RK hydronephrosis, unchanged  - Essentially single functioning LK  - Baseline creatinine near 1.7  - he is not a dialysis candidate due to age    1. Hyper-/hypokalemia  - resolved off of ARB  - please do not supplement with Effer-K but rather with KCl, due to HCO3 load    4. ACDHF with MV disease  - diuretics on hold  - I am okay with doing maintenance diuretics    5. HTN  - BP is marginally low  - I am not sure he can tolerate coreg, would he do better with metoprolol XL? 6. ILD    7. Pulmonary HTN    8.  BPH with LUT obstructive symptoms

## 2020-11-17 NOTE — CARE COORDINATION
Case Management:  Called daughter to up date discharge plan which remains to return home with daughter and home care with [facility].   Electronically signed by Rhett Castillo on 11/17/2020 at 9:52 AM

## 2020-11-17 NOTE — PROGRESS NOTES
Physical Therapy  PT attempt  Luz Blanco    Attempted PT treatment, OT in room at this time. Will try again later as schedule permits. Nancy Jauregui, Student PTA  Therapist was present, directed the patient's care, made skilled judgement, and was responsible for assessment and treatment of the patient.       Electronically signed by Maylin Cotton, PT [id number] on 11/17/2020 at 9:35 AM

## 2020-11-18 NOTE — PROGRESS NOTES
Nutrition Assessment     Type and Reason for Visit: Initial    Nutrition Recommendations/Plan:   Low Na/CC (5)/1200 ml fl res  Will monitor nutritional adequacy, nutrition-related labs, weights, BMs, and clinical progress     Nutrition Assessment:  LOS. Pt admitted with CHF. Education was provided on admission to daughter. Pt on Low Na/1200 ml fl res, eating most of meals. Pt denied any nutrition-related issues PTA. Continue current diet. Malnutrition Assessment:  Malnutrition Status: No malnutrition    Nutrition Related Findings: trace edema to BLE, generalized trace edema      Current Nutrition Therapies:    DIET LOW SODIUM 2 GM; Carb Control: 5 carb choices (75 gms)/meal; 1200 ml    Anthropometric Measures:  · Height: 5' 6\" (167.6 cm)  · Current Body Wt: 165 lb (74.8 kg)   · BMI: 26.6    Nutrition Diagnosis:   No nutrition diagnosis at this time     Nutrition Interventions:   Food and/or Nutrient Delivery:  Continue Current Diet  Nutrition Education/Counseling:  No recommendation at this time   Coordination of Nutrition Care:  Continue to monitor while inpatient    Goals:   Tolerate diet and consume greater than 50% of meals this admission       Nutrition Monitoring and Evaluation:   Behavioral-Environmental Outcomes:  None Identified   Food/Nutrient Intake Outcomes:  Food and Nutrient Intake, Supplement Intake  Physical Signs/Symptoms Outcomes:  Biochemical Data, Nutrition Focused Physical Findings, Skin, Weight     Discharge Planning:    Continue current diet     Electronically signed by Ever Emery RD, MANUEL on 11/18/20 at 11:12 AM EST    Contact: [phone]

## 2020-11-18 NOTE — PROGRESS NOTES
Physical Therapy  Facility/Department: [facility]  Daily Treatment Note  NAME: Erik Denver  : 1925  MRN: [id number]    Date of Service: 2020    Discharge Recommendations:  S Level 1, Home with Home health PT, 24 hour supervision or assist   PT Equipment Recommendations  Equipment Needed: No   Erik Denver scored a 17/24 on the AM-PAC short mobility form. Current research shows that an AM-PAC score of 18 or greater is typically associated with a discharge to the patient's home setting. Although pt scores a 17, pt has 24 hour assist from daughter and PT recommends d/c home due to adequate family support. Based on the patient's current functional mobility deficits, it is recommended that the patient have 2-3 sessions per week of Physical Therapy at d/c to increase the patient's independence. At this time, this patient demonstrates the endurance and safety to discharge home with home health PT and a follow up treatment frequency of 2-3x/wk, and family support. Please see assessment section for further patient specific details. If patient discharges prior to next session this note will serve as a discharge summary. Please see below for the latest assessment towards goals. HOME HEALTH CARE: LEVEL 1 STANDARD     -Initial home health evaluation to occur within 24-48 hours, in patient home    -Home health agency to establish plan of care for patient over 60 day period    -Medication Reconciliation    -PCP Visit scheduled within seven days of discharge    -PT/OT to evaluate with goal of regaining prior level of functioning    -OT to evaluate if patient has [address] needs for personal care     Assessment   Body structures, Functions, Activity limitations: Decreased functional mobility ; Decreased strength;Decreased safe awareness;Decreased endurance;Decreased balance;Decreased posture  Assessment: Today pt was pleasant and eager to complete therapy.  He was able to perform sit <>stand transfers at [facility] with CGA and ambulate household distance with RW and CGA. He participated fully in exercises, though became SOB and reported dizziness as BP became low. Pt returned to bed at end of session due to BP being 82/40. RN Traci Connolly aware. He required rest breaks throughout the session due to SOB and fatigue. Patient limited by generallized weakness, pain, decreased balance and endurance. Patient would benefit from skilled therapy to improve overal strength, endurance and to lessen assist level for daughter who will be home. Anticipate pt will be able to return home with 24/7 assist from his daughter and home PT. Treatment Diagnosis: impaired functional mobility with Acute on Chronic CHF  Prognosis: Fair;Good  PT Education: General Safety;Goals;Plan of Care;Gait Training;Transfer Training  Patient Education: Discussed staying hydrated and resting to raise low BP.  REQUIRES PT FOLLOW UP: Yes  Activity Tolerance  Activity Tolerance: Patient limited by endurance  Activity Tolerance: Pt SOB with activity, and reported some dizziness after completing ther ex. BP was 74/47 in R UE  ; pt was helped back in to bed and RN notified. Patient Diagnosis(es): The primary encounter diagnosis was Acute respiratory failure with hypoxia ([address] Diagnoses of Acute pulmonary edema (HCC) and Renal cyst were also pertinent to this visit. has a past medical history of Acute diastolic CHF (congestive heart failure) ([address] Arthritis, Atrial fibrillation ([address] BPH with obstruction/lower urinary tract symptoms, Chronic atrial fibrillation ([address] CKD (chronic kidney disease), Coronary arteriosclerosis, GERD (gastroesophageal reflux disease), History of blood transfusion, HTN (hypertension), Left hip pain, Pacemaker, Pulmonary fibrosis ([address] Pulmonary nodule, left, Renal atrophy, right, Renal cyst, right, and SOB (shortness of breath).    has a past surgical history that includes Pacemaker insertion (2014); Tonsillectomy (1960); Small intestine surgery (1945); Inguinal hernia repair (Right, 07/2016); Coronary angioplasty with stent (2009); pacemaker placement; Colonoscopy; Abdomen surgery; and eye surgery (Bilateral). Restrictions  Restrictions/Precautions  Restrictions/Precautions: Fall Risk  Position Activity Restriction  Other position/activity restrictions: [employer], Diet low sodium 2 GM;1500ml, catheter, telemetry     Social/Functional History  Lives With: Daughter  Type of Home: House(Condo)  Home Layout: One level  Home Access: Stairs to enter with rails  Entrance Stairs - Number of Steps: 1 R rail + 6 steps down with B far spaced rails. 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 Patient has been using the wheelchair at home and daughter, Shabana Kumar, has been pushing him in the wheelchair to the bathroom door and then the patient mambulates into the bathroom with the wheeled walker. Shabana Kumar states patient has continued to be very limited by his back pain and the SOB. Subjective   General  Chart Reviewed: Yes  Additional Pertinent Hx: per Dr. Lupillo Arizmendi note, \"81 yo male admitted with chest tightness acute pulmonary edema and hemoptysis feels a lot better today. His sob is much improved on the lasix drip. Now off oxygen. He chest tightness is resolved, the troponins peaked at .09 and are trending back down .  He is still coughing but decreased and now nonproductive. The overall whole body pain is gone. He has a kauffman in now so not having to get up to the bathroom, requiring a steady when he goes. \"  Response To Previous Treatment: Patient with no complaints from previous session. Family / Caregiver Present: No  Referring Practitioner: Beatriz Marquez CNP  Subjective  Subjective: Pt seated in recliner upon arrival, no complaints. Agreeable to PT treatment today. Objective   Bed mobility  Supine to Sit: Unable to assess(Pt in recliner upon arrival)  Sit to Supine: Stand by assistance  Scooting: Stand by assistance(verbal cues)     Transfers  Sit to Stand: Contact guard assistance  Stand to sit: Contact guard assistance   Comments: cues for hand placement    Ambulation  Ambulation?: Yes  More Ambulation?: No  Ambulation 1  Surface: level tile  Device: Rolling Walker  Assistance: Contact guard assistance  Quality of Gait: slightly flexed posture  Gait Deviations: Slow Lia; Shuffles  Distance: 61' in room  Comments: Pt verbally recalls cues to fix posture, reports it tires him out but does attempt to stand up straight. Balance  Sitting - Static: Good  Sitting - Dynamic: Good  Standing - Static: Fair  Standing - Dynamic: Fair  Comments: Patient fatigues quickly with upright and has weakness in bilateral LE causing a need to sit and rest after short walks.      Exercises  Gluteal Sets: x 10  Hip Flexion: standing marches at RW x 5 + 5 standing rest break  Knee Long Arc Quad: anh x 15  Ankle Pumps: x 5  Comments: performed seated in recliner unless notated above                  AM-PAC Score  AM-PAC Inpatient Mobility Raw Score : 17 (11/18/20 1115)  AM-PAC Inpatient T-Scale Score : 42.13 (11/18/20 1115)  Mobility Inpatient CMS 0-100% Score: 50.57 (11/18/20 1115)  Mobility Inpatient CMS G-Code Modifier : CK (11/18/20 1115)        Goals  Short term goals  Time Frame for Short term goals: upon discharge from acute (goals ongoing as of 11/18 unless noted below)  Short term goal 1: bed mobility with independence  Short term goal 2: transfers with supervision  Short term goal 3: Ambulated 13' with wheeled walker with CGA, increase distance ambulating to 36' with CGA-met 11/18  Short term goal 4: Tolerate seated ther ex with PT  Patient Goals   Patient goals : Shane Whitehead able to go home. \"    Plan    Plan  Times per week: 3-5 days/week  Current Treatment Recommendations: Strengthening, Balance Training, Functional Mobility Training, Transfer Training, Gait Training, Endurance Training, Home Exercise Program, Safety Education & Training  Safety Devices  Type of devices: All fall risk precautions in place, Bed alarm in place, Gait belt, Left in bed, Nurse notified(RN Stefani notified.)     Therapy Time   Individual Concurrent Group Co-treatment   Time In 1000         Time Out 1048         Minutes 48         Timed Code Treatment Minutes: Tom 67, Student PTA  Therapist was present, directed the patient's care, made skilled judgement, and was responsible for assessment and treatment of the patient.         Electronically signed by Roena Gosselin, PT [id number] on 11/18/2020 at 12:12 PM

## 2020-11-18 NOTE — PLAN OF CARE
IMBALANCE  Goal: Fluid and electrolyte balance are achieved/maintained  11/17/2020 2251 by Krystin Sterling RN  Outcome: Ongoing     Problem: ACTIVITY INTOLERANCE/IMPAIRED MOBILITY  Goal: Mobility/activity is maintained at optimum level for patient  11/17/2020 2251 by Krystin Sterling RN  Outcome: Ongoing     Problem: Nutritional:  Goal: Nutritional status will improve  Description: Nutritional status will improve  11/17/2020 2251 by Krystin Sterling RN  Outcome: Ongoing     Problem: Physical Regulation:  Goal: Will remain free from infection  Description: Will remain free from infection  11/17/2020 2251 by Krystin Sterling RN  Outcome: Ongoing     Problem: Respiratory:  Goal: Ability to maintain normal respiratory secretions will improve  Description: Ability to maintain normal respiratory secretions will improve  11/17/2020 2251 by Krystin Sterling RN  Outcome: Ongoing

## 2020-11-18 NOTE — PROGRESS NOTES
[address]   Daily Progress Note      Admit Date:  11/11/2020    CC: SOB    HPI:   Duane Michaels is a 80 y.o. male with PMH CAD, Chronic AF, bradycardia/ SSS s/p DC PPM  [place] 2014 - atrial lead off (Follows with Dr. Don Monroy), GERD, HTN, pulm fibrosis, CKD, mitral valve disease, chronic back pain. Admitted to [facility] with SOB for several days. Associated with BLE edema, activity intolerance and weakness for about 1 month. Work up consistent with HF. Dr. Kev Blanchard consulted. Started on Lasix gtt. Developed GAGE, most likely CRS and orthostatic hypotension. Neph following. He continues with orthostatic hypotension. OOB with PT today. BP 74/47- associated with dizziness. Resolves with rest after sitting/lying down for a few min. Denies CP, palpitations, orthopnea, syncope. Eating breakfast - good appetite. Wearing compression stockings. Edema significantly improved. Review of Systems:   General: Denies fever, chills, +fatigue, +weakness  Skin: Denies skin changes, rash, itching, lesions.   HEENT: +Lightheaded  RESP: Denies cough, sputum, wheeze, snoring  CARD: Denies palpitations,  Murmur, chest pain  GI:Denies nausea, vomiting, heartburn, loss of appetite, change in bowels  : Denies frequency, pain, incontinence, polyuria  VASC: Denies claudication, leg cramps, clots  MUSC/SKEL: Denies pain, stiffness, arthritis  PSYCH: Denies anxiety, depression, stress  NEURO: Denies numbness, tingling, weakness,change in mood or memory  HEME: Denies abn bruising, bleeding, anemia  ENDO: Denies intolerance to heat, cold, excessive thirst or hunger, hx thyroid disease    Objective:   /74   Pulse 74   Temp 97.9 °F (36.6 °C) (Oral)   Resp 20   Ht 5' 6\" (1.676 m)   Wt 165 lb 5.5 oz (75 kg)   SpO2 98%   BMI 26.69 kg/m²        Intake/Output Summary (Last 24 hours) at 11/18/2020 0824  Last data filed at 11/18/2020 0800  Gross per 24 hour   Intake 840 ml   Output 2300 ml   Net -1460 ml     I/O 29 27 29   BUN 69* 65* 61*   CREATININE 1.9* 2.2* 2.0*     GLUCOSE:   Recent Labs     11/16/20  0425 11/17/20  0415 11/18/20  0429   GLUCOSE 105* 102* 106*     LIVER PROFILE:   Lab Results   Component Value Date    AST 19 10/20/2020    ALT 14 10/20/2020    LABALBU 3.8 10/20/2020    BILITOT 0.5 10/20/2020    ALKPHOS 50 10/20/2020     PT/INR:   Lab Results   Component Value Date    PROTIME 15.9 10/20/2020    INR 1.37 10/20/2020     APTT:   Lab Results   Component Value Date    APTT 33.5 10/20/2020     Pro-BNP:    Lab Results   Component Value Date    PROBNP 3,084 11/16/2020    PROBNP 3,742 11/15/2020    PROBNP 18,115 11/12/2020     Parameters:   > 450 pg/mL under age 48  > 900 pg/mL ages 54-65  > 1800 pg/mL over age 76    ENZYMES:   Lab Results   Component Value Date    TROPONINI 0.06 11/12/2020     FASTING LIPID PANEL:  Lab Results   Component Value Date    CHOL 132 03/03/2020    HDL 66 03/03/2020    LDLCALC 42 03/03/2020    TRIG 119 03/03/2020       Diagnostics:    EKG: Atrial fibrillation with V pacingLeft axis deviationConfirmed by NANCY VARGAS, Jose Alvarenga ([id number]) on 11/11/2020 8:12:17 AM     ECHO: 10/20/20:   Summary   Left ventricular cavity size is normal.   Ejection fraction is visually estimated to be 50%. At least moderate-Severe mitral regurgitation. No evidence of mitral stenosis. Systolic flow reversal in pulmonary veins. Mildly reduce RV fx    Stress Test:   Stress Test: 6/29/17  1. Technically a satisfactory study.    2. Normal pharmacological stress portion of the study.    3. No evidence of Ischemia by Myocardial Perfusion Imaging.    4. Gated Study shows normal LV size and Systolic function; EF is 70 %. Cardiac Angiography:   Cath: 2009  Stents to  Prox LAD  Mid LAD  Ramus    Assessment/Plan:    1.) Acute on chronic diastolic heart failure, EF 50% with moderate to severe MR: Improving. BLE resolved - continue compression stockings.  + mild crackles bilateral bases with SOB associated with activity. Hypotension with meds. ProBNP 13016>[id number]>[id number]. Neg 13L Weight 182lbs>165lbs. NYHA Class III   Stage 3  Diuretic: Hold D/T hypotension - intravascularly dry  Beta Blocker: hold hypotension  ACEi/ARB/ARNI: none, GAGE, hypotenision  Aldosterone Antagonist: one, GAGE and hypotension  SGLT2 Inhibitor: outpatient  2gm Na diet, daily weight, 64 oz fluid restriction  Avoid NSAIDS and other nephrotoxic meds  Cardiac Rehab: Not indicated for EF>35%   ICD: Not indicated for EF>35%  HF RN referral for education  Palliative Care consult   PT/OT consult  - Discharge with PT 3-5x/week and [facility] [place] [facility]    2.) CAD s/p stents to LAD and ramus 2009: No evidence of ischemia  DAPT: none, on eliquis  Beta Blocker: hold  ACEi/ARB: held  Anti anginal: none  Lipid management/high intensity statin: lipitor  Risk factor management: high blood pressure, high cholesterol, Diabetes, smoking, obesity, family hx  Lifestyle modification: Heart healthy diet, regular exercise, weight loss, smoking cessation, stress reduction    3. ) Hypotension: orthostatic and symptomatic with lightheadedness.     Hold BB and lasix  Apply BLE compression stockings    3. ) GAGE on CKD: Stable  Neph consult    4.) Chronic AF: HR stable  Thromboembolic risk reduction: eliquis  Rate Control/ Rhythm Control: coreg when BP stable    5.) SSS s/p PPM    Electronically signed by KAYCE Bull - CNP on 11/18/2020 at 8:24 AM

## 2020-11-18 NOTE — PROGRESS NOTES
Department of Internal Medicine  General Internal Medicine  Attending Progress Note    Chief Complaint:feeling wacky      HPI:   SUBJECTIVE:  81 yo male admitted with chest tightness acute pulmonary edema and hemoptysis feels a lot better from admission. His sob is much improved  Now off oxygen. He chest tightness is resolved, the troponins peaked at .09 . He is still coughing but decreased and now nonproductive. The overall whole body pain is gone. He has a kauffman in now so not having to get up to the bathroom, requiring a steady when he goes. Much less sob and swollen although after I saw him his sbp went down a lot ? 52/31 measured. Dr Sandra Ulloa saw him and his diuretics and coreg are being held. The creatinine is also up some today as well. He is still producing urine although it dropped from 3450 the day prior to 1300 yesterday. Still having output but only 200 ml in the last 8 hours! Catheter still in. Nephrology also following.       Past Medical History:   Diagnosis Date    Acute diastolic CHF (congestive heart failure) ([facility] [address] 10/23/2020    Arthritis     Atrial fibrillation (HCC)     BPH with obstruction/lower urinary tract symptoms     nocturia 3-4 x per night    Chronic atrial fibrillation (HCC)     CKD (chronic kidney disease) 2017    level 3    Coronary arteriosclerosis 2009    2 stents/ Dr Gray Joy in [facility] GERD (gastroesophageal reflux disease)     History of blood transfusion     1945   During deployment    HTN (hypertension) 1990    Left hip pain     schrapnel in hip and intestines/chronic pains left hip impairs walking    Pacemaker 2015    Pulmonary fibrosis ([facility] [address] 08/2017    mild amount seen on chest ct    Pulmonary nodule, left 2017    noncalcified 7mm low risk repeat 8/2018 if patient willing    Renal atrophy, right 2017    Renal cyst, right 08/2017    not clearly simple cyst    SOB (shortness of breath) 2017    terrell normal pft and ct chest ? smll amount of pulmonary fibrosis Past Surgical History:   Procedure Laterality Date    ABDOMEN SURGERY      Scrapnel removed in [facility]  2009    awoke with indigestion    EYE SURGERY Bilateral     cataract    INGUINAL HERNIA REPAIR Right 07/2016    PACEMAKER INSERTION  2014    PACEMAKER PLACEMENT      SMALL INTESTINE SURGERY  1945    jelly as ww II vet, part of small intestine moved    TONSILLECTOMY  1960      Family History   Problem Relation Age of Onset    Pacemaker Sister     Pacemaker Brother     No Known Problems Other         lung disease     Social History     Tobacco Use    Smoking status: Never Smoker    Smokeless tobacco: Never Used   Substance Use Topics    Alcohol use: No    Drug use: Not Currently       Prior to Admission medications    Medication Sig Start Date End Date Taking? Authorizing Provider   omeprazole (PRILOSEC) 20 MG delayed release capsule Take 20 mg by mouth daily   Yes Historical Provider, MD   traMADol (ULTRAM) 50 MG tablet Take 1 tablet by mouth 2 times daily for 30 days.  11/3/20 12/3/20  Martin Timmons MD   losartan (COZAAR) 50 MG tablet Take 1 tablet by mouth daily Hold for systolic less 622 61/0/73   Martin Timmons MD   carvedilol (COREG) 12.5 MG tablet Take 1 tablet by mouth 2 times daily (with meals) 11/3/20   Martin Timmons MD   predniSONE (DELTASONE) 10 MG tablet Take 1 tablet by mouth daily 11/4/20 12/4/20  Martin Timmons MD   diclofenac sodium (VOLTAREN) 1 % GEL Apply 4 g topically 3 times daily 11/3/20   Martin Timmons MD   furosemide (LASIX) 20 MG tablet Take 1 tablet by mouth daily 11/3/20   Martin Timmons MD   oxybutynin (DITROPAN) 5 MG tablet TAKE 1 TABLET BY MOUTH EVERY DAY 10/21/20   Julissa Schaeffer MD   fluticasone [facility]) 50 MCG/ACT nasal spray SPRAY 2 SPRAY INTO EACH NOSTRIL EVERY DAY 10/9/20   Julissa Schaeffer MD   albuterol sulfate  (90 Base) MCG/ACT inhaler Inhale 2 puffs into the lungs every 6 hours as needed for Wheezing or Shortness of Breath 8/27/20   Kiya Hill MD   umeclidinium-vilanterol [facility] ELLIPTA) 62.5-25 MCG/INH AEPB inhaler Inhale 1 puff into the lungs daily 8/27/20   Kiya Hill MD   tamsulosin (FLOMAX) 0.4 MG capsule ONE EVERY NIGHT FOR YOUR PROSTATE 4/22/20   Cheri Tobias MD   finasteride (PROSCAR) 5 MG tablet TAKE 1 TABLET BY MOUTH EVERY DAY 12/2/19   Cheri Tobias MD   atorvastatin (LIPITOR) 40 MG tablet Take 1 tablet by mouth daily 2/22/19   Dolan Goltz, MD   rivaroxaban [place]) 15 MG TABS tablet Take 1 tablet by mouth daily (with breakfast) 1/8/18   Cheri Tobias MD   aspirin 81 MG tablet Take 81 mg by mouth daily    Historical Provider, MD   Cholecalciferol (VITAMIN D3) 2000 UNITS CAPS Take 2,000 Units by mouth daily    Historical Provider, MD   calcium carbonate (OSCAL) 500 MG TABS tablet Take 600 mg by mouth daily caltrate 600-D3 one daily    Historical Provider, MD   Multiple Vitamins-Minerals (CENTRUM SILVER ADULT 50+) TABS Take 1 tablet by mouth daily    Historical Provider, MD         ROS:  A comprehensive review of systems was negative except for: low back pain and leg weakness with ambulation    OBJECTIVE:      PHYSICAL:  Intake/Output:   Patient Vitals for the past 8 hrs:   BP Temp Temp src Pulse Resp SpO2 Weight   11/18/20 0802 132/74 97.9 °F (36.6 °C) Oral 74 20 98 % --   11/18/20 0800 -- -- -- -- -- 98 % --   11/18/20 0337 117/71 97.8 °F (36.6 °C) Oral 73 18 96 % --   11/18/20 0327 -- -- -- -- -- -- 165 lb 5.5 oz (75 kg)     I/O last 3 completed shifts:   In: 840 [P.O.:840]  Out: 1850 [Urine:1850]  I/O this shift:  In: -   Out: 450 [Urine:450]  VITALS:    /74   Pulse 74   Temp 97.9 °F (36.6 °C) (Oral)   Resp 20   Ht 5' 6\" (1.676 m)   Wt 165 lb 5.5 oz (75 kg)   SpO2 98%   BMI 26.69 kg/m²     General Appearance: alert and oriented to person, place and time, well-developed and well-nourished, in no acute distress  Skin: warm and dry, no rash or erythema  Head: normocephalic and atraumatic  Eyes: conjunctivae normal and sclera anicteric  ENT: [facility] [place]  Neck: neck supple and non tender without mass, no thyromegaly or thyroid nodules, no cervical lymphadenopathy   Pulmonary/Chest: insp crackles and decreased bs bases (crackles chronic from ILD)  Cardiovascular: normal rate, normal S1 and S2, no gallops and no carotid bruits  Abdomen: soft, non-tender, non-distended, normal bowel sounds, no masses or organomegaly  Extremities: no cyanosis, clubbing edema now gone  Musculoskeletal: no swollen joints and no tender joints,  Neurologic: coordination normal and speech normal, moves all 4 extremities    DATA:   Labs:  CBC:   No results for input(s): WBC, HGB, PLT in the last 72 hours. BMP:    Recent Labs     11/16/20 0425 11/17/20 0415 11/18/20 0429    136 137   K 4.0 4.3 4.8   CL 95* 98* 99   CO2 29 27 29   BUN 69* 65* 61*   CREATININE 1.9* 2.2* 2.0*   GLUCOSE 105* 102* 106*     POC GLUCOSE:    Recent Labs     11/17/20  0739 11/17/20  1156 11/17/20  1654 11/17/20  2024 11/18/20  0734   POCGLU 103* 268* 134* 217* 103*     Ca/Mg/Phos:   Recent Labs     11/16/20 0425 11/17/20 0415 11/18/20  0429   CALCIUM 8.6 9.0 8.8     Hepatic: No results for input(s): AST, ALT, ALB, BILITOT, ALKPHOS in the last 72 hours. Troponin:   No results for input(s): TROPONINI in the last 72 hours. ProBNP:   Recent Labs     11/16/20 0425   PROBNP 3,084*         DIAGNOSTICS:  Ct Chest Wo Contrast    Result Date: 11/11/2020  Features of heart failure, with moderate cardiomegaly, severe interstitial/alveolar pulmonary edema, small bilateral effusions and body wall anasarca. Difficult to exclude superimposed atypical or viral infection, which could be present in the appropriate context. Mediastinal lymphadenopathy is presumably reactive. Multivessel coronary calcifications status post PCI. Presbyesophagus.  Right renal atrophy with indeterminate cyst.  Consideration/appropriateness for follow-up renal protocol CT/MRI may be based on patient's age, life expectancy and comorbidities. Xr Chest Portable    Result Date: 11/11/2020  Heart failure, with severe interstitial/alveolar pulmonary edema and small effusions. ASSESSMENT AND PLAN    Principal Problem:    Acute on chronic diastolic CHF (congestive heart failure) (HCC)  Plan: now since episode of hypotension and fall in gfr and uo meds on hold,   Active Problems:    Pacemaker  Plan: intact functioning    Essential hypertension  Plan: bp been controlled on current med to dc losartan because of hyperkalemia and worsening renal function.  Episode of hypotension meds being held    Coronary arteriosclerosis  Plan: bump in troponin discussed with Dr Britta Alatorre who is not concerned that the patient had an stemi, starting to trend down     BPH with obstruction/lower urinary tract symptoms  Plan: ongoing issue, kauffman in currently    Pulmonary fibrosis ([address]  Plan: ongoing on  prednisone    Solitary lung nodule  Plan: ongoing, followed by Dr Niru Hunter    Severe mitral regurgitation  Plan: also recently evaluated with echo, medical treatmnet    Acute respiratory failure with hypoxia ([address]  Plan: resolved    Atrial fibrillation, controlled ([address]  Plan: ongoing issue    Acute respiratory failure with hypoxia and hypercapnia ([address]  Plan: improving clinicaly repeat vbg much better yesterday    Pleural effusion, bilateral  Plan: small, observe    Appropriate diagnostic studies and consults ordered  Arsen Canchola MD       Spent 35 minutes with over half the time devoted to  discussion

## 2020-11-18 NOTE — PROGRESS NOTES
Department of Internal Medicine  General Internal Medicine  Attending Progress Note    Chief Complaint:feeling wacky      HPI:   SUBJECTIVE:  81 yo male admitted with chest tightness acute pulmonary edema and hemoptysis feels a lot better from admission. His sob is much improved  Now off oxygen. He chest tightness is resolved, the troponins peaked at .09 . He is still coughing but decreased and now nonproductive. The overall whole body pain is gone. He has a kauffman in now so not having to get up to the bathroom, requiring a steady when he goes. Reports feeling great. Breathing fine, legs down to normal.  Low back barely hurts can walk to bathroom and back but by then it will hurt more and his legs will get weaker. No longer wacky off narcs. Still had net negative I/O yesterday. Off diuretic currently. Dr Deborah Petersen suggested switch coreg to toprol since hepatically cleared.       Past Medical History:   Diagnosis Date    Acute diastolic CHF (congestive heart failure) ([facility] [address] 10/23/2020    Arthritis     Atrial fibrillation (HCC)     BPH with obstruction/lower urinary tract symptoms     nocturia 3-4 x per night    Chronic atrial fibrillation (HCC)     CKD (chronic kidney disease) 2017    level 3    Coronary arteriosclerosis 2009    2 stents/ Dr Anastacia Timmons [facility] GERD (gastroesophageal reflux disease)     History of blood transfusion     1945   During deployment    HTN (hypertension) 1990    Left hip pain     schrapnel in hip and intestines/chronic pains left hip impairs walking    Pacemaker 2015    Pulmonary fibrosis ([facility] [address] 08/2017    mild amount seen on chest ct    Pulmonary nodule, left 2017    noncalcified 7mm low risk repeat 8/2018 if patient willing    Renal atrophy, right 2017    Renal cyst, right 08/2017    not clearly simple cyst    SOB (shortness of breath) 2017    terrell normal pft and ct chest ? smll amount of pulmonary fibrosis     Past Surgical History:   Procedure Laterality Date    ABDOMEN SURGERY      Scrapnel [facility] in [facility]  2009    awoke with indigestion    EYE SURGERY Bilateral     cataract    INGUINAL HERNIA REPAIR Right 07/2016    PACEMAKER INSERTION  2014    PACEMAKER PLACEMENT      SMALL INTESTINE SURGERY  1945    jelly as ww II vet, part of small intestine moved    TONSILLECTOMY  1960      Family History   Problem Relation Age of Onset    Pacemaker Sister     Pacemaker Brother     No Known Problems Other         lung disease     Social History     Tobacco Use    Smoking status: Never Smoker    Smokeless tobacco: Never Used   Substance Use Topics    Alcohol use: No    Drug use: Not Currently       Prior to Admission medications    Medication Sig Start Date End Date Taking? Authorizing Provider   omeprazole (PRILOSEC) 20 MG delayed release capsule Take 20 mg by mouth daily   Yes Historical Provider, MD   traMADol (ULTRAM) 50 MG tablet Take 1 tablet by mouth 2 times daily for 30 days.  11/3/20 12/3/20  Patrica Carroll MD   losartan (COZAAR) 50 MG tablet Take 1 tablet by mouth daily Hold for systolic less 570 59/8/38   Patrica Carroll MD   carvedilol (COREG) 12.5 MG tablet Take 1 tablet by mouth 2 times daily (with meals) 11/3/20   Patrica Carroll MD   predniSONE (DELTASONE) 10 MG tablet Take 1 tablet by mouth daily 11/4/20 12/4/20  Patrica Carroll MD   diclofenac sodium (VOLTAREN) 1 % GEL Apply 4 g topically 3 times daily 11/3/20   Patrica Carroll MD   furosemide (LASIX) 20 MG tablet Take 1 tablet by mouth daily 11/3/20   Patrica Carroll MD   oxybutynin (DITROPAN) 5 MG tablet TAKE 1 TABLET BY MOUTH EVERY DAY 10/21/20   Candy Levy MD   fluticasone [facility]) 50 MCG/ACT nasal spray SPRAY 2 SPRAY INTO EACH NOSTRIL EVERY DAY 10/9/20   Candy Levy MD   albuterol sulfate  (90 Base) MCG/ACT inhaler Inhale 2 puffs into the lungs every 6 hours as needed for Wheezing or Shortness of Breath 8/27/20 Ben Gama MD   umeclidinium-vilanterol [facility] ELLIPTA) 62.5-25 MCG/INH AEPB inhaler Inhale 1 puff into the lungs daily 8/27/20   Ben Gama MD   tamsulosin (FLOMAX) 0.4 MG capsule ONE EVERY NIGHT FOR YOUR PROSTATE 4/22/20   Bari Celeste MD   finasteride (PROSCAR) 5 MG tablet TAKE 1 TABLET BY MOUTH EVERY DAY 12/2/19   Bari Celeste MD   atorvastatin (LIPITOR) 40 MG tablet Take 1 tablet by mouth daily 2/22/19   Kathy Huston MD   rivaroxaban [facility]) 15 MG TABS tablet Take 1 tablet by mouth daily (with breakfast) 1/8/18   Brai Celeste MD   aspirin 81 MG tablet Take 81 mg by mouth daily    Historical Provider, MD   Cholecalciferol (VITAMIN D3) 2000 UNITS CAPS Take 2,000 Units by mouth daily    Historical Provider, MD   calcium carbonate (OSCAL) 500 MG TABS tablet Take 600 mg by mouth daily caltrate 600-D3 one daily    Historical Provider, MD   Multiple Vitamins-Minerals (CENTRUM SILVER ADULT 50+) TABS Take 1 tablet by mouth daily    Historical Provider, MD         ROS:  A comprehensive review of systems was negative except for: low back pain and leg weakness with ambulation    OBJECTIVE:      PHYSICAL:  Intake/Output:   Patient Vitals for the past 8 hrs:   BP Temp Temp src Pulse Resp SpO2 Height Weight   11/18/20 1059 -- -- -- -- -- -- 5' 6\" (1.676 m) --   11/18/20 1051 (!) 97/55 97.4 °F (36.3 °C) Axillary 73 20 97 % -- --   11/18/20 0802 132/74 97.9 °F (36.6 °C) Oral 74 20 98 % -- --   11/18/20 0800 -- -- -- -- -- 98 % -- --   11/18/20 0337 117/71 97.8 °F (36.6 °C) Oral 73 18 96 % -- --   11/18/20 0327 -- -- -- -- -- -- -- 165 lb 5.5 oz (75 kg)     I/O last 3 completed shifts:   In: 840 [P.O.:840]  Out: 1850 [Urine:1850]  I/O this shift:  In: -   Out: 450 [Urine:450]  VITALS:    BP (!) 97/55   Pulse 73   Temp 97.4 °F (36.3 °C) (Axillary)   Resp 20   Ht 5' 6\" (1.676 m)   Wt 165 lb 5.5 oz (75 kg)   SpO2 97%   BMI 26.69 kg/m²     General Appearance: alert and oriented to person, place and time, well-developed Right renal atrophy with indeterminate cyst.  Consideration/appropriateness for follow-up renal protocol CT/MRI may be based on patient's age, life expectancy and comorbidities. Xr Chest Portable    Result Date: 11/11/2020  Heart failure, with severe interstitial/alveolar pulmonary edema and small effusions. ASSESSMENT AND PLAN    Principal Problem:    Acute on chronic diastolic CHF (congestive heart failure) (HCC)  Plan: resume toprol at very low dose with holding parameters and start lasix once ok with Dr Sandra Ulloa. Home when ok with Dr Sandra Ulloa, would like to aim for tomorrow if does ok with these changes  Active Problems:  Acute on chronic kidney disease:  Still with elevated Cr and decreased gfr from baseline from volume depletion  Plan : hold lasix aiming for euvolemia    Pacemaker  Plan: intact functioning    Essential hypertension  Plan: bp been controlled on current med to dc losartan because of hyperkalemia and worsening renal function.  Episode of hypotension meds being held    Coronary arteriosclerosis  Plan: bump in troponin discussed with Dr Sandra Ulloa who is not concerned that the patient had an stemi, starting to trend down     BPH with obstruction/lower urinary tract symptoms  Plan: ongoing issue, kauffman in currently    Pulmonary fibrosis ([address]  Plan: ongoing on  prednisone    Solitary lung nodule  Plan: ongoing, followed by Dr Krystal Beard    Severe mitral regurgitation  Plan: also recently evaluated with echo, medical treatmnet    Acute respiratory failure with hypoxia ([address]  Plan: resolved    Atrial fibrillation, controlled ([address]  Plan: ongoing issue    Acute respiratory failure with hypoxia and hypercapnia ([address]  Plan: improving clinicaly repeat vbg much better yesterday    Pleural effusion, bilateral  Plan: small, observe    Appropriate diagnostic studies and consults ordered  Lakisha Vera MD       Spent 35 minutes with over half the time devoted to  discussion

## 2020-11-18 NOTE — PROGRESS NOTES
Nephrology Progress Note          CC: We are following this patient for GAGE on CKD. Admitted for dyspnea with CHF    h/o CKD stage 3 with recent baseline Cr ~ 1.7 MG  Has h/o ILD, diastolic CHF A fib and MR Recently hospitalized with CHF and Pneumonia  Discharge weight was 174 Readmitted with increasing SOB and decreased endurance Wt was up to 182 lbs on admission   Cr increased from 1.6 to 2.4 mg   Had been  on ARB   Has had LUT obstructive symptoms  Has had relative hypotension  No exposure to IV Contrast      SUBJECTIVE:    HPI:  Breathing improved. No CP. Some dizziness and dyspnea with exertion. ROS:  In chair. [address]:  medications reviewed. Physical Exam:    BP (!) 97/55   Pulse 73   Temp 97.4 °F (36.3 °C) (Axillary)   Resp 20   Ht 5' 6\" (1.676 m)   Wt 165 lb 5.5 oz (75 kg)   SpO2 97%   BMI 26.69 kg/m²     24HR INTAKE/OUTPUT:      Intake/Output Summary (Last 24 hours) at 11/18/2020 1355  Last data filed at 11/18/2020 1327  Gross per 24 hour   Intake 1200 ml   Output 2100 ml   Net -900 ml       Constitutional:  Elderly, NAD  Lungs:  Decreased BS bibasilar, no rales  Cardiovascular:  RRR, 2/6 YEN  Abd:  Soft, NT  Ext:  Trace LE edema  Skin:  No lesions  Neuro: non-focal      DATA:    CBC:   No results for input(s): WBC, RBC, HGB, HCT, PLT in the last 72 hours. BMP:    Recent Labs     11/16/20  0425 11/17/20  0415 11/18/20  0429    136 137   K 4.0 4.3 4.8   CL 95* 98* 99   CO2 29 27 29   BUN 69* 65* 61*   CREATININE 1.9* 2.2* 2.0*   CALCIUM 8.6 9.0 8.8   GLUCOSE 105* 102* 106*     Last 3 Troponin:    Lab Results   Component Value Date    TROPONINI 0.06 11/12/2020    TROPONINI 0.09 11/11/2020    TROPONINI 0.09 11/11/2020       IMPRESSION/RECOMMENDATIONS:      1. GAGE/CKD suspect hemodynamic in nature with decompensated CHF, relative Hypotension Improved with diuresis - c/w CRS  - renal function essentially stable    2.  CKD - RK with thinned cortex on US, chronic RK hydronephrosis, unchanged  - Essentially single functioning LK  - Baseline creatinine near 1.7  - he is not a dialysis candidate due to age    1. Hyper-/hypokalemia  - resolved off of ARB  - please do not supplement with Effer-K but rather with KCl, due to HCO3 load    4. ACDHF with MV disease  - diuretics on hold  - I am okay with doing maintenance diuretics    5. HTN  - BP is marginally low  - I am not sure he can tolerate coreg, would he do better with metoprolol XL? 6. ILD    7. Pulmonary HTN    8.  BPH with LUT obstructive symptoms

## 2020-11-18 NOTE — PROGRESS NOTES
Occupational Therapy  Facility/Department: [facility] 5W PROGRESSIVE CARE  Daily Treatment Note    This note serves as D/C note in the event pt is discharged prior to next treatment. See most recent treatment notes for status at D/C. Assessment: Pt required CGA using RW with cues for short functional mobility & transfers, but is limited by SOB and fatigue easily. Pt blood pressure dropped after transfer from sitting EOB to recliner & declined after sitting up in recliner for several minutes. Pt would be safe for d/c home with daughter 25 supervision/asssitance and home OT when medically stable. Cont OT tx per POC. NAME: Lily Saavedra  : 1925  MRN: [id number]    Date of Service: 2020    Discharge Recommendations:  Patient would benefit from continued therapy after discharge, S Level 1, Home with Home health OT, 2-3 sessions per week, 24 hour supervision or assist  OT Equipment Recommendations  Equipment Needed: No  Other: Has needed bathroom DME/RW/ w/c already  Lily Saavedra scored a 19/24 on the AM-PAC ADL Inpatient form. Current research shows that an AM-PAC score of 18 or greater is typically associated with a discharge to the patient's home setting. Based on the patient's AM-PAC score, and their current ADL deficits, it is recommended that the patient have 2-3 sessions per week of Occupational Therapy at d/c to increase the patient's independence. At this time, this patient demonstrates the endurance and safety to discharge home with home services and a follow up treatment frequency of 2-3x/wk. Please see assessment section for further patient specific details. If patient discharges prior to next session this note will serve as a discharge summary. Please see below for the latest assessment towards goals. Assessment   Performance deficits / Impairments: Decreased functional mobility ; Decreased balance;Decreased ADL status; Decreased ROM; Decreased strength;Decreased endurance;Decreased high-level IADLs;Decreased posture;Decreased safe awareness  Assessment: Pt required CGA using RW with cues for short functional mobility & transfers, but is limited by SOB and fatigue easily. Pt blood pressure dropped after transfer from sitting EOB to recliner. Pt would be safe for d/c home with daughter asssitance and home OT when medically stable. Cont OT tx per POC. Treatment Diagnosis: Impared: ADL/IADL function, balance, functional mobility/transfers, endurance, strength, safety awareness, posture  Prognosis: Good  OT Education: OT Role;Plan of Care;Energy Conservation;Transfer Training  REQUIRES OT FOLLOW UP: Yes  Activity Tolerance  Activity Tolerance: Patient limited by fatigue;Patient Tolerated treatment well  Activity Tolerance: Pt tolerated brief bedside tx with rest between each activity. Pt blood pressure 107/66 seated EOB, 105/62 when initially sitting in recliner. Pt BP dropped to 93/55 after seated in recliner for several minutes. Safety Devices  Safety Devices in place: Yes  Type of devices: Gait belt;Patient at risk for falls; Left in chair;Chair alarm in place;Nurse notified;Call light within reach         Patient Diagnosis(es): The primary encounter diagnosis was Acute respiratory failure with hypoxia ([address] Diagnoses of Acute pulmonary edema (HCC) and Renal cyst were also pertinent to this visit. has a past medical history of Acute diastolic CHF (congestive heart failure) ([address] Arthritis, Atrial fibrillation ([address] BPH with obstruction/lower urinary tract symptoms, Chronic atrial fibrillation ([address] CKD (chronic kidney disease), Coronary arteriosclerosis, GERD (gastroesophageal reflux disease), History of blood transfusion, HTN (hypertension), Left hip pain, Pacemaker, Pulmonary fibrosis ([address] Pulmonary nodule, left, Renal atrophy, right, Renal cyst, right, and SOB (shortness of breath).    has a past surgical history that includes Pacemaker insertion (2014); Tonsillectomy (1960); Small intestine surgery (1945); Inguinal hernia repair (Right, 07/2016); Coronary angioplasty with stent (2009); pacemaker placement; Colonoscopy; Abdomen surgery; and eye surgery (Bilateral). Restrictions  Restrictions/Precautions  Restrictions/Precautions: Fall Risk  Position Activity Restriction  Other position/activity restrictions: [employer], Diet low sodium 2 GM;1500ml, catheter, telemetry  Subjective   General  Chart Reviewed: Yes, Orders, Progress Notes, History and Physical  Patient assessed for rehabilitation services?: Yes  Additional Pertinent Hx: 79 yo with a history of severe MR, diastolic chf and CAD who awoke suddenly with extreme sob and chest tightness as well as pain all over his body. He came to the ER by squad and was in acute pulmonary edema. He did also have cough productive of small amounts of bloody phlegm. On arrival he was hypoxic and placed on bipap and 9L /NC and given iv lasix. Now up on the floor he is no longer requiring the bipap and his oxygen level is down to 4L. His tightness is still present but less so and his sob is much improved  He is still hurting in his arthritic joints (mainly low back and knees. )  Response to previous treatment: Patient reporting fatigue but able to participate  Family / Caregiver Present: No  Referring Practitioner: Dr. Malgorzata Brown  Diagnosis: SOB  Subjective  Subjective: Pt met in room, in bed talking with PCA. OT discussed with nurse Bin Vo if OK to work with pt as BP was low after PT earlier. Nurse stated OK to work with pt if BP does not drop below 80 systolic and 50 dyastolic, to check it a few minutes after pt sits up, and if i t is OK she would like pt to be up for lunch. Pt denied pain @ rest, but later in session c/o L shoulder pain.       Orientation  Orientation  Overall Orientation Status: Within Normal Limits  Objective             Balance  Sitting Balance: Supervision  Standing Balance: Contact guard assistance(RW)  Functional Mobility  Functional - Mobility Device: Rolling Walker  Activity: Other  Assist Level: Contact guard assistance  Functional Mobility Comments: Pt amb~ 5' to recliner using RW, CGA, slow and steady gait. Wheelchair Bed Transfers  Wheelchair/Bed - Technique: Ambulating(RW)  Equipment Used: Bed;Other(recliner chair)  Level of Asssistance: Contact guard assistance  Wheelchair Transfers Comments: bed > recliner with RW CGA, no cues for hand placement. Reclined in chair, needs in reach. Bed mobility  Supine to Sit: Stand by assistance(HOB elevated, used rail)  Scooting: Stand by assistance                                               Type of ROM/Therapeutic Exercise  Type of ROM/Therapeutic Exercise: AROM  Comment: Pt performed 4 AROM exercises (shoulder flexion to 90 degrees, shoulder abduction/adduction, forearm pronation/supination, & wrist extension), for 10 repetitions each. Pt has difficulty with L shoulder D/T arthritis. Pt SOB & needed rest breaks in between exercises.                     Plan   Plan  Times per week: 3-5x  Times per day: Daily  Current Treatment Recommendations: ROM, Strengthening, Balance Training, Functional Mobility Training, Safety Education & Training, Self-Care / ADL, Neuromuscular Re-education, Endurance Training, Equipment Evaluation, Education, & procurement                                               AM-PAC Score        AM-[facility] Inpatient Daily Activity Raw Score: 19 (11/18/20 1248)  AM-PAC Inpatient ADL T-Scale Score : 40.22 (11/18/20 1248)  ADL Inpatient CMS 0-100% Score: 42.8 (11/18/20 1248)  ADL Inpatient CMS G-Code Modifier : CK (11/18/20 1248)    Goals  Short term goals  All goals ongoing  Time Frame for Short term goals: Prior to D/C  Short term goal 1: Pt will complete dressing w/ min A including Say Hose  Short term goal 2: Pt will complete bathing w/ min A  Short term goal 3: Pt will toilet w/ min A  Short term goal 4: Pt will complete functional mobility/transfers SBA  Short term goal 5: Pt will complete BUE HEP/ther ex w/ supervision to increase strength & endurance to perform ADL tasks  Long term goals  Time Frame for Long term goals : ltg=stg  Patient Goals   Patient goals : Per pt report, \"I want to get up 6 steps. \" Above goals will work towards this goal.       Therapy Time   Individual Concurrent Group Co-treatment   Time In 1140         Time Out 1220         Minutes 40         Timed Code Treatment Minutes: [address] [facility]   Electronically signed by Renee Turk, OTR/L  License # [id number]

## 2020-11-19 NOTE — PROGRESS NOTES
Nephrology Progress Note          CC: We are following this patient for GAGE on CKD. Admitted for dyspnea with CHF    h/o CKD stage 3 with recent baseline Cr ~ 1.7 MG  Has h/o ILD, diastolic CHF A fib and MR Recently hospitalized with CHF and Pneumonia  Discharge weight was 174 Readmitted with increasing SOB and decreased endurance Wt was up to 182 lbs on admission   Cr increased from 1.6 to 2.4 mg   Had been  on ARB   Has had LUT obstructive symptoms  Has had relative hypotension  No exposure to IV Contrast      SUBJECTIVE:    HPI:  Breathing improved. No CP. Had orthostatic hypotension yesterday  ROS:  In chair. [address]:  medications reviewed. Physical Exam:    /64   Pulse 71   Temp 97.3 °F (36.3 °C) (Oral)   Resp 18   Ht 5' 6\" (1.676 m)   Wt 166 lb 0.1 oz (75.3 kg)   SpO2 94%   BMI 26.79 kg/m²     24HR INTAKE/OUTPUT:      Intake/Output Summary (Last 24 hours) at 11/19/2020 1406  Last data filed at 11/19/2020 1334  Gross per 24 hour   Intake 900 ml   Output 203 ml   Net 697 ml       Constitutional:  Elderly, NAD  Lungs:  Decreased BS bibasilar, no rales  Cardiovascular:  RRR, 2/6 YEN  Abd:  Soft, NT  Ext:  Trace LE edema  Skin:  No lesions  Neuro: non-focal      DATA:    CBC:   No results for input(s): WBC, RBC, HGB, HCT, PLT in the last 72 hours. BMP:    Recent Labs     11/17/20  0415 11/18/20  0429 11/19/20  0405    137 135*   K 4.3 4.8 4.9   CL 98* 99 98*   CO2 27 29 28   BUN 65* 61* 62*   CREATININE 2.2* 2.0* 2.1*   CALCIUM 9.0 8.8 8.6   GLUCOSE 102* 106* 97     Last 3 Troponin:    Lab Results   Component Value Date    TROPONINI 0.06 11/12/2020    TROPONINI 0.09 11/11/2020    TROPONINI 0.09 11/11/2020       IMPRESSION/RECOMMENDATIONS:      1. GAGE/CKD suspect hemodynamic in nature with decompensated CHF, relative Hypotension Improved with diuresis - c/w CRS  - renal function essentially stable    2.  CKD - RK with thinned cortex on US, chronic RK hydronephrosis, unchanged  - Essentially single functioning LK  - Baseline creatinine near 1.7  - he is not a dialysis candidate due to age    1. Hyper-/hypokalemia  - resolved off of ARB  - please do not supplement with Effer-K but rather with KCl, due to HCO3 load    ACDHF with MV disease  - interestingly, his weight has not changed with holding the diuretics, so this may be a situation of intake exceeding urine production capacity, so as long as he sticks with the fluid restriction, he may not need diuretics, so I will just d/c order    4. HTN  - BP is marginally low  - I am not sure he can tolerate coreg, would he do better with metoprolol XL?    5. ILD    6. Pulmonary HTN    7.  BPH with LUT obstructive symptoms

## 2020-11-19 NOTE — PROGRESS NOTES
Physical Therapy  Facility/Department: [facility] 5 PROGRESSIVE CARE  Daily Treatment Note  NAME: Austin Arreaga  : 1925  MRN: [id number]    Date of Service: 2020    Discharge Recommendations:  S Level 1, Home with Home health PT, 24 hour supervision or assist   PT Equipment Recommendations  Equipment Needed: No   Austin Arreaga scored a 18/24 on the AM-PAC short mobility form. Current research shows that an AM-PAC score of 18 or greater is typically associated with a discharge to the patient's home setting. Based on the patient's AM-PAC score and their current functional mobility deficits, it is recommended that the patient have 2-3 sessions per week of Physical Therapy at d/c to increase the patient's independence. At this time, this patient demonstrates the endurance and safety to discharge home with home health PT and a follow up treatment frequency of 2-3x/wk. Please see assessment section for further patient specific details. If patient discharges prior to next session this note will serve as a discharge summary. Please see below for the latest assessment towards goals. HOME HEALTH CARE: LEVEL 1 STANDARD     -Initial home health evaluation to occur within 24-48 hours, in patient home    -Home health agency to establish plan of care for patient over 60 day period    -Medication Reconciliation    -PCP Visit scheduled within seven days of discharge    -PT/OT to evaluate with goal of regaining prior level of functioning    -OT to evaluate if patient has [address] needs for personal care     Assessment   Body structures, Functions, Activity limitations: Decreased functional mobility ; Decreased strength;Decreased safe awareness;Decreased endurance;Decreased balance;Decreased posture  Assessment: Today pt was pleasant and eager to complete therapy.  He was able to perform sit <>stand [place] [facility] [place]BA-CGA and ambulate greater than household distance with RW and CGA at both the beginning and end of his session. He participated fully in exercises. He required seated rest breaks throughout the session due to SOB and fatigue. Patient limited by generallized weakness, pain, decreased balance and endurance. Patient would benefit from skilled therapy to improve overal strength, endurance and to lessen assist level for daughter. Anticipate pt will be able to return home with 24/7 assist from his daughter and home PT. Treatment Diagnosis: impaired functional mobility with Acute on Chronic CHF  PT Education: General Safety;Goals;Plan of Care;Gait Training;Transfer Training;PT Role  REQUIRES PT FOLLOW UP: Yes  Activity Tolerance  Activity Tolerance: Patient Tolerated treatment well;Patient limited by endurance     Patient Diagnosis(es): The primary encounter diagnosis was Acute respiratory failure with hypoxia ([address] Diagnoses of Acute pulmonary edema (HCC) and Renal cyst were also pertinent to this visit. has a past medical history of Acute diastolic CHF (congestive heart failure) ([address] Arthritis, Atrial fibrillation ([address] BPH with obstruction/lower urinary tract symptoms, Chronic atrial fibrillation ([address] CKD (chronic kidney disease), Coronary arteriosclerosis, GERD (gastroesophageal reflux disease), History of blood transfusion, HTN (hypertension), Left hip pain, Pacemaker, Pulmonary fibrosis ([address] Pulmonary nodule, left, Renal atrophy, right, Renal cyst, right, and SOB (shortness of breath). has a past surgical history that includes Pacemaker insertion (2014); Tonsillectomy (1960); Small intestine surgery (1945); Inguinal hernia repair (Right, 07/2016); Coronary angioplasty with stent (2009); pacemaker placement; Colonoscopy; Abdomen surgery; and eye surgery (Bilateral).     Restrictions  Restrictions/Precautions  Restrictions/Precautions: Fall Risk  Position Activity Restriction  Other position/activity restrictions: [employer], Diet low sodium 2 GM;1500ml, telemetry Social/Functional History  Lives With: Daughter  Type of Home: House(Condo)  Home Layout: One level  Home Access: Stairs to enter with rails  Entrance Stairs - Number of Steps: 1 R rail + 6 steps down with B far spaced rails. 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 Patient has been using the wheelchair at home and daughter, Amena Chisholm, has been pushing him in the wheelchair to the bathroom door and then the patient mambulates into the bathroom with the wheeled walker. Amena Chisholm states patient has continued to be very limited by his back pain and the SOB. Subjective   General  Chart Reviewed: Yes  Additional Pertinent Hx: per Dr. Lauren Leon note, \"79 yo male admitted with chest tightness acute pulmonary edema and hemoptysis feels a lot better today. His sob is much improved on the lasix drip. Now off oxygen. He chest tightness is resolved, the troponins peaked at .09 and are trending back down . He is still coughing but decreased and now nonproductive. The overall whole body pain is gone. He has a kauffman in now so not having to get up to the bathroom, requiring a steady when he goes. \"  Subjective  Subjective: Pt seated in recliner upon arrival, denies any pain. Pleasant and agreeable to PT treatment today. Objective   Bed mobility  Comment: Pt up in recliner at beginning and end of session.      Transfers  Sit to Stand: Stand by assistance; Contact guard assistance  Stand to sit: Contact guard assistance  Comment: cues for hand placement    Ambulation  Ambulation?: Yes  More Ambulation?: No  Ambulation 1  Surface: level tile  Device: Rolling Walker  Assistance: Contact guard assistance  Quality of Gait: slightly flexed posture  Gait Deviations: Slow Lia; Shuffles;Decreased step height  Distance: 60' x 2 with seated rest break beginning session; then 60' x 2 with seated rest break end of session. Comments: Pt comments he knows his posture isnt as it should be, but is trying. Stairs/Curb  Stairs?: No     Balance  Sitting - Static: Good  Sitting - Dynamic: Good  Standing - Static: Fair  Standing - Dynamic: Fair  Comments: Patient fatigues quickly with upright and has weakness in bilateral LE causing a need to sit and rest between walks; CGA for standing at RW     Exercises  Hip Flexion: anh x 10  Knee Long Arc Quad: anh x 10  Ankle Pumps: x 10 (heel raises)  Shoulder Active Range of Motion: R flex press up x 10; L flex to 90* forward punch x 10  Comments: performed setaed in recliner; L shoulder ROM limited by pain.          Comment: VS after first ambulation at beginning of session: /65; 99%SpO2; 73bpm          AM-PAC Score  AM-PAC Inpatient Mobility Raw Score : 18 (11/19/20 1418)  AM-PAC Inpatient T-Scale Score : 43.63 (11/19/20 1418)  Mobility Inpatient CMS 0-100% Score: 46.58 (11/19/20 1418)  Mobility Inpatient CMS G-Code Modifier : CK (11/19/20 1418)        Goals  Short term goals  Time Frame for Short term goals: upon discharge from acute (goals ongoing as of 11/19 unless noted below)  Short term goal 1: bed mobility with independence  Short term goal 2: transfers with supervision  Short term goal 3: Ambulated 13' with wheeled walker with CGA, increase distance ambulating to 40' with CGA-met 11/18; new goal ambulate 48' with LRAD and SBA  Short term goal 4: Tolerate seated ther ex with PT met 11-19 new goal 15 reps  Patient Goals   Patient goals : Naz Wick able to go home. \"    Plan    Plan  Times per week: 3-5 days/week  Current Treatment Recommendations: Strengthening, Balance Training, Functional Mobility Training, Transfer Training, Gait Training, Endurance Training, Home Exercise Program, Safety Education & Training  Safety Devices  Type of devices: All fall risk precautions in place, Call light within reach, Chair alarm in place, Gait belt, Left in chair     Therapy Time   Individual Concurrent Group Co-treatment   Time In 1314         Time Out 1410         Minutes [address], Student PTA  Therapist was present, directed the patient's care, made skilled judgement, and was responsible for assessment and treatment of the patient.           Electronically signed by Israel Hector PT on 11/19/2020 at 2:23 PM

## 2020-11-19 NOTE — PROGRESS NOTES
Department of Internal Medicine  General Internal Medicine  Attending Progress Note    Chief Complaint:feeling wacky      HPI:   SUBJECTIVE:  81 yo male admitted with chest tightness acute pulmonary edema and hemoptysis feels a lot better from admission. His sob is much improved  Now off oxygen. He chest tightness is resolved, the troponins peaked at .09 . He is still coughing but decreased and now nonproductive. The overall whole body pain is gone. He has a kauffman in now so not having to get up to the bathroom, requiring a steady when he goes. Reports feeling great. Breathing fine, legs down to normal.  Low back barely hurts can walk to bathroom and back but by then it will hurt more and his legs will get weaker. No longer wacky off narcs. Still had net negative I/O yesterday. Off diuretic currently. Dr Frederick Freeman suggested switch coreg to toprol since hepatically cleared. Yesterday 11/18 developed orthostatic hypotension caught by NP Gilson Mariano, sys dropped to 72 standing. Therefore his lasix and toprol were held another day.       Past Medical History:   Diagnosis Date    Acute diastolic CHF (congestive heart failure) ([facility] 10/23/2020    Arthritis     Atrial fibrillation (HCC)     BPH with obstruction/lower urinary tract symptoms     nocturia 3-4 x per night    Chronic atrial fibrillation (HCC)     CKD (chronic kidney disease) 2017    level 3    Coronary arteriosclerosis 2009    2 stents/ Dr Evon Kwan in [facility] GERD (gastroesophageal reflux disease)     History of blood transfusion     1945   During deployment    HTN (hypertension) 1990    Left hip pain     schrapnel in hip and intestines/chronic pains left hip impairs walking    Pacemaker 2015    Pulmonary fibrosis ([facility] [facility] 08/2017    mild amount seen on chest ct    Pulmonary nodule, left 2017    noncalcified 7mm low risk repeat 8/2018 if patient willing    Renal atrophy, right 2017    Renal cyst, right 08/2017    not clearly simple cyst    SOB (shortness of breath) 2017    terrell normal pft and ct chest ? smll amount of pulmonary fibrosis     Past Surgical History:   Procedure Laterality Date    ABDOMEN SURGERY      Scrapnel removed in [facility]  2009    awoke with indigestion    EYE SURGERY Bilateral     cataract    INGUINAL HERNIA REPAIR Right 07/2016    PACEMAKER INSERTION  2014    PACEMAKER PLACEMENT      SMALL INTESTINE SURGERY  1945    schrapnel as ww II vet, part of small intestine moved    TONSILLECTOMY  1960      Family History   Problem Relation Age of Onset    Pacemaker Sister     Pacemaker Brother     No Known Problems Other         lung disease     Social History     Tobacco Use    Smoking status: Never Smoker    Smokeless tobacco: Never Used   Substance Use Topics    Alcohol use: No    Drug use: Not Currently       Prior to Admission medications    Medication Sig Start Date End Date Taking? Authorizing Provider   omeprazole (PRILOSEC) 20 MG delayed release capsule Take 20 mg by mouth daily   Yes Historical Provider, MD   traMADol (ULTRAM) 50 MG tablet Take 1 tablet by mouth 2 times daily for 30 days.  11/3/20 12/3/20  Alston Schilder, MD   losartan (COZAAR) 50 MG tablet Take 1 tablet by mouth daily Hold for systolic less 992 71/2/98   Alston Schilder, MD   carvedilol (COREG) 12.5 MG tablet Take 1 tablet by mouth 2 times daily (with meals) 11/3/20   Alston Schilder, MD   predniSONE (DELTASONE) 10 MG tablet Take 1 tablet by mouth daily 11/4/20 12/4/20  Alston Schilder, MD   diclofenac sodium (VOLTAREN) 1 % GEL Apply 4 g topically 3 times daily 11/3/20   Alston Schilder, MD   furosemide (LASIX) 20 MG tablet Take 1 tablet by mouth daily 11/3/20   Alston Schilder, MD   oxybutynin (DITROPAN) 5 MG tablet TAKE 1 TABLET BY MOUTH EVERY DAY 10/21/20   Rayne Angulo MD   fluticasone (FLONASE) 50 MCG/ACT nasal spray SPRAY 2 SPRAY INTO EACH NOSTRIL EVERY DAY 10/9/20 Darylene Milks, MD   albuterol sulfate  (90 Base) MCG/ACT inhaler Inhale 2 puffs into the lungs every 6 hours as needed for Wheezing or Shortness of Breath 8/27/20   Desrie Mueller MD   umeclidinium-vilanterol [facility] ELLIPTA) 62.5-25 MCG/INH AEPB inhaler Inhale 1 puff into the lungs daily 8/27/20   Desire Mueller MD   tamsulosin (FLOMAX) 0.4 MG capsule ONE EVERY NIGHT FOR YOUR PROSTATE 4/22/20   Darylene Milks, MD   finasteride (PROSCAR) 5 MG tablet TAKE 1 TABLET BY MOUTH EVERY DAY 12/2/19   Darylene Milks, MD   atorvastatin (LIPITOR) 40 MG tablet Take 1 tablet by mouth daily 2/22/19   Cara Moore MD   rivaroxaban (XARELTO) 15 MG TABS tablet Take 1 tablet by mouth daily (with breakfast) 1/8/18   Darylene Milks, MD   aspirin 81 MG tablet Take 81 mg by mouth daily    Historical Provider, MD   Cholecalciferol (VITAMIN D3) 2000 UNITS CAPS Take 2,000 Units by mouth daily    Historical Provider, MD   calcium carbonate (OSCAL) 500 MG TABS tablet Take 600 mg by mouth daily caltrate 600-D3 one daily    Historical Provider, MD   Multiple Vitamins-Minerals (CENTRUM SILVER ADULT 50+) TABS Take 1 tablet by mouth daily    Historical Provider, MD         ROS:  A comprehensive review of systems was negative except for: low back pain and leg weakness with ambulation    OBJECTIVE:      PHYSICAL:  Intake/Output:   Patient Vitals for the past 8 hrs:   BP Temp Temp src Pulse Resp SpO2   11/19/20 0812 (!) 159/76 97.3 °F (36.3 °C) Axillary 76 18 99 %   11/19/20 0742 -- -- -- -- -- 98 %   11/19/20 0330 112/68 97.3 °F (36.3 °C) Oral 70 18 97 %     I/O last 3 completed shifts: In: 65 [P.O.:660]  Out: 653 [Urine:652; Emesis/NG output:1]  I/O this shift:   In: 480 [P.O.:480]  Out: -   VITALS:    BP (!) 159/76   Pulse 76   Temp 97.3 °F (36.3 °C) (Axillary)   Resp 18   Ht 5' 6\" (1.676 m)   Wt 166 lb 0.1 oz (75.3 kg)   SpO2 99%   BMI 26.79 kg/m²     General Appearance: alert and oriented to person, place and time, well-developed and well-nourished, in no acute distress  Skin: warm and dry, no rash or erythema  Head: normocephalic and atraumatic  Eyes: conjunctivae normal and sclera anicteric  ENT: dorian Resighini  Neck: neck supple and non tender without mass, no thyromegaly or thyroid nodules, no cervical lymphadenopathy   Pulmonary/Chest: insp crackles and decreased bs bases (crackles chronic from ILD)  Cardiovascular: normal rate, normal S1 and S2, no gallops and no carotid bruits  Abdomen: soft, non-tender, non-distended, normal bowel sounds, no masses or organomegaly  Extremities: no cyanosis, clubbing edema now gone  Musculoskeletal: no swollen joints and no tender joints,  Neurologic: coordination normal and speech normal, moves all 4 extremities    DATA:   Labs:  CBC:   No results for input(s): WBC, HGB, PLT in the last 72 hours. BMP:    Recent Labs     11/17/20  0415 11/18/20  0429 11/19/20  0405    137 135*   K 4.3 4.8 4.9   CL 98* 99 98*   CO2 27 29 28   BUN 65* 61* 62*   CREATININE 2.2* 2.0* 2.1*   GLUCOSE 102* 106* 97     POC GLUCOSE:    Recent Labs     11/18/20  0734 11/18/20  1127 11/18/20  1702 11/18/20  2036 11/19/20  0737   POCGLU 103* 171* 149* 137* 106*     Ca/Mg/Phos:   Recent Labs     11/17/20  0415 11/18/20  0429 11/19/20  0405   CALCIUM 9.0 8.8 8.6     Hepatic: No results for input(s): AST, ALT, ALB, BILITOT, ALKPHOS in the last 72 hours. Troponin:   No results for input(s): TROPONINI in the last 72 hours. ProBNP:   No results for input(s): PROBNP in the last 72 hours. DIAGNOSTICS:  Ct Chest Wo Contrast    Result Date: 11/11/2020  Features of heart failure, with moderate cardiomegaly, severe interstitial/alveolar pulmonary edema, small bilateral effusions and body wall anasarca. Difficult to exclude superimposed atypical or viral infection, which could be present in the appropriate context. Mediastinal lymphadenopathy is presumably reactive. Multivessel coronary calcifications status post PCI. Presbyesophagus. Right renal atrophy with indeterminate cyst.  Consideration/appropriateness for follow-up renal protocol CT/MRI may be based on patient's age, life expectancy and comorbidities. Xr Chest Portable    Result Date: 11/11/2020  Heart failure, with severe interstitial/alveolar pulmonary edema and small effusions. ASSESSMENT AND PLAN    Principal Problem:    Acute on chronic diastolic CHF (congestive heart failure) ([address]  Plan: defer to cardiology for restarting the diuretics and toprol. Weight no higher today and bun cr no better. Will be checking orthostatics   Active Problems:  Acute on chronic kidney disease:  Still with elevated Cr and decreased gfr from baseline from volume depletion  Plan : hold lasix aiming for euvolemia    Pacemaker  Plan: intact functioning    Essential hypertension  Plan: bp been controlled on current med to dc losartan because of hyperkalemia and worsening renal function.  Episode of hypotension meds being held    Coronary arteriosclerosis  Plan: bump in troponin discussed with Dr Mati Mariee who is not concerned that the patient had an stemi, starting to trend down     BPH with obstruction/lower urinary tract symptoms  Plan: ongoing issue, kauffman in currently    Pulmonary fibrosis ([address]  Plan: ongoing on  prednisone    Solitary lung nodule  Plan: ongoing, followed by Dr Coolidge Epley    Severe mitral regurgitation  Plan: also recently evaluated with echo, medical treatmnet    Acute respiratory failure with hypoxia ([address]  Plan: resolved    Atrial fibrillation, controlled ([address]  Plan: ongoing issue    Acute respiratory failure with hypoxia and hypercapnia ([address]  Plan: improving clinicaly repeat vbg much better yesterday    Pleural effusion, bilateral  Plan: small, observe    Appropriate diagnostic studies and consults ordered  Drea Campbell MD       Spent 35 minutes with over half the time devoted to  discussion

## 2020-11-19 NOTE — PLAN OF CARE
Problem: Pain:  Goal: Pain level will decrease  Description: Pain level will decrease  Outcome: Ongoing  Goal: Control of acute pain  Description: Control of acute pain  Outcome: Ongoing  Goal: Control of chronic pain  Description: Control of chronic pain  Outcome: Ongoing     Problem: Skin Integrity:  Goal: Will show no infection signs and symptoms  Description: Will show no infection signs and symptoms  Outcome: Ongoing  Goal: Absence of new skin breakdown  Description: Absence of new skin breakdown  Outcome: Ongoing  Goal: Complications related to intravenous access or infusion will be avoided or minimized  Description: Complications related to intravenous access or infusion will be avoided or minimized  Outcome: Ongoing     Problem: Falls - Risk of:  Goal: Will remain free from falls  Description: Will remain free from falls  Outcome: Ongoing  Goal: Absence of physical injury  Description: Absence of physical injury  Outcome: Ongoing     Problem: OXYGENATION/RESPIRATORY FUNCTION  Goal: Patient will maintain patent airway  Outcome: Ongoing  Goal: Patient will achieve/maintain normal respiratory rate/effort  Description: Respiratory rate and effort will be within normal limits for the patient  Outcome: Ongoing     Problem: HEMODYNAMIC STATUS  Goal: Patient has stable vital signs and fluid balance  Outcome: Ongoing     Problem: FLUID AND ELECTROLYTE IMBALANCE  Goal: Fluid and electrolyte balance are achieved/maintained  Outcome: Ongoing     Problem: ACTIVITY INTOLERANCE/IMPAIRED MOBILITY  Goal: Mobility/activity is maintained at optimum level for patient  Outcome: Ongoing     Problem: Nutritional:  Goal: Nutritional status will improve  Description: Nutritional status will improve  Outcome: Ongoing     Problem: Physical Regulation:  Goal: Will remain free from infection  Description: Will remain free from infection  Outcome: Ongoing     Problem: Respiratory:  Goal: Ability to maintain normal respiratory secretions will improve  Description: Ability to maintain normal respiratory secretions will improve  Outcome: Ongoing

## 2020-11-19 NOTE — PROGRESS NOTES
Occupational Therapy  Facility/Department: [facility] 5W PROGRESSIVE CARE  Daily Treatment Note    This note serves as D/C note in the event pt is discharged prior to next treatment. See most recent treatment notes for status at D/C. Assessment: Pt completed functional mobility/transfers using RW CGA, did not appear SOB and blood pressure remained stable throughout session. Pt able to tolerate sitting EOB for ~ 5 minutes to wash face and have S/OT don [employer]. Pt would be safe for D/C home with daughter assistance and home OT when medically stable. NAME: Cesar Lawson  : 1925  MRN: [id number]    Date of Service: 2020    Discharge Recommendations:  Patient would benefit from continued therapy after discharge, S Level 1, Home with Home health OT, 2-3 sessions per week, 24 hour supervision or assist  OT Equipment Recommendations  Other: Has needed bathroom DME/RW/ w/c already   Cesar Lawson scored a 19/24 on the AM-PAC ADL Inpatient form. Current research shows that an AM-PAC score of 18 or greater is typically associated with a discharge to the patient's home setting. Based on the patient's AM-PAC score, and their current ADL deficits, it is recommended that the patient have 2-3 sessions per week of Occupational Therapy at d/c to increase the patient's independence. At this time, this patient demonstrates the endurance and safety to discharge home with home services and a follow up treatment frequency of 2-3x/wk. Please see assessment section for further patient specific details. If patient discharges prior to next session this note will serve as a discharge summary. Please see below for the latest assessment towards goals. Assessment   Performance deficits / Impairments: Decreased functional mobility ; Decreased balance;Decreased ADL status; Decreased ROM; Decreased strength;Decreased endurance;Decreased high-level IADLs;Decreased posture;Decreased safe awareness  Assessment: Pt completed functional mobility/transfers using RW CGA, did not appear SOB and blood pressure remained stable throughout session. Pt able to tolerate sitting EOB for ~ 5 minutes to wash face and have S/OT don [employer]. Pt would be safe for D/C home with daughter assistance and home OT when medically stable. Treatment Diagnosis: Impared: ADL/IADL function, balance, functional mobility/transfers, endurance, strength, safety awareness, posture  Prognosis: Good  OT Education: OT Role;Plan of Care;Energy Conservation;Transfer Training  Barriers to Learning: Memory, confusion  REQUIRES OT FOLLOW UP: Yes  Activity Tolerance  Activity Tolerance: Patient Tolerated treatment well  Activity Tolerance: Blood pressure remained around 119/69 throughout session, pt did not c/o of dizziness or appear SOB. Safety Devices  Safety Devices in place: Yes  Type of devices: Gait belt;Patient at risk for falls; Left in chair;Chair alarm in place;Call light within reach         Patient Diagnosis(es): The primary encounter diagnosis was Acute respiratory failure with hypoxia ([address] Diagnoses of Acute pulmonary edema (HCC) and Renal cyst were also pertinent to this visit. has a past medical history of Acute diastolic CHF (congestive heart failure) ([address] Arthritis, Atrial fibrillation ([address] BPH with obstruction/lower urinary tract symptoms, Chronic atrial fibrillation ([address] CKD (chronic kidney disease), Coronary arteriosclerosis, GERD (gastroesophageal reflux disease), History of blood transfusion, HTN (hypertension), Left hip pain, Pacemaker, Pulmonary fibrosis ([address] Pulmonary nodule, left, Renal atrophy, right, Renal cyst, right, and SOB (shortness of breath). has a past surgical history that includes Pacemaker insertion (2014); Tonsillectomy (1960); Small intestine surgery (1945); Inguinal hernia repair (Right, 07/2016); Coronary angioplasty with stent (2009); pacemaker placement; Colonoscopy;  Abdomen surgery; and eye surgery (Bilateral). Restrictions  Restrictions/Precautions  Restrictions/Precautions: Fall Risk  Position Activity Restriction  Other position/activity restrictions: [employer], Diet low sodium 2 GM;1500ml, catheter, telemetry  Subjective   General  Chart Reviewed: Yes, Orders, Progress Notes, History and Physical  Patient assessed for rehabilitation services?: Yes  Additional Pertinent Hx: 79 yo with a history of severe MR, diastolic chf and CAD who awoke suddenly with extreme sob and chest tightness as well as pain all over his body. He came to the ER by squad and was in acute pulmonary edema. He did also have cough productive of small amounts of bloody phlegm. On arrival he was hypoxic and placed on bipap and 9L /NC and given iv lasix. Now up on the floor he is no longer requiring the bipap and his oxygen level is down to 4L. His tightness is still present but less so and his sob is much improved  He is still hurting in his arthritic joints (mainly low back and knees. )  Response to previous treatment: Patient with no complaints from previous session  Family / Caregiver Present: No  Referring Practitioner: Dr. Trent Moulton  Diagnosis: SOB  Subjective  Subjective: Pt met in room, sitting up in recliner. Pt pleasant upon arrival. Pt denied pain. Objective    ADL  UE Bathing: Setup;Stand by assistance(Pt bathed face seated EOB)  LE Dressing: Maximum assistance(Pt doffed footies seated EOB by crossing BLE's, S/OT donned Say Hose & footies.)  Additional Comments: Pt left in recliner with needs in reach. Pt had no complaints of dizziness & did not appear significantly SOB.         Balance  Sitting Balance: Supervision  Standing Balance: Contact guard assistance(RW)  Standing Balance  Time: ~30 seconds x 2  Activity: functional moblity/transfers  Comment: No overt LOB noted  Functional Mobility  Functional - Mobility Device: Rolling Walker  Activity: Other  Assist Level: Contact guard assistance  Functional Mobility Comments: Pt amb~ 5' from recliner ><bed, using RW, CGA, slow and steady gait. Pt blood pressure 99/63 in recliner at start of session, 119/65 sitting EOB, 116/69 after washing face & after S/OT donned [employer] EOB, then 119/72 once pt settled in recliner at end of session. Wheelchair Bed Transfers  Wheelchair/Bed - Technique: Ambulating(RW)  Equipment Used: Bed;Other(recliner)  Level of Asssistance: Contact guard assistance  Wheelchair Transfers Comments: Recliner><RW><bed, CGA, no cues required for hand placement or walker safety. Cognition  Overall Cognitive Status: Exceptions  Attention Span: Difficulty dividing attention  Memory: Decreased short term memory  Safety Judgement: Decreased awareness of need for safety  Problem Solving: Assistance required to implement solutions;Assistance required to correct errors made  Sequencing: Requires cues for some  Cognition Comment: Pt has slowed processing, pt stated he was confused about his medical situation at the beginning of the session and where to go from here. Pt demonstrates difficulty dividing attention, doing best with simple and clear one step instructions.                                          Plan   Plan  Times per week: 3-5x  Times per day: Daily  Current Treatment Recommendations: ROM, Strengthening, Balance Training, Functional Mobility Training, Safety Education & Training, Self-Care / ADL, Neuromuscular Re-education, Endurance Training, Equipment Evaluation, Education, & procurement                                               AM-PAC Score        AM-[facility] Inpatient Daily Activity Raw Score: 19 (11/19/20 1140)  AM-PAC Inpatient ADL T-Scale Score : 40.22 (11/19/20 1140)  ADL Inpatient CMS 0-100% Score: 42.8 (11/19/20 1140)  ADL Inpatient CMS G-Code Modifier : CK (11/19/20 1140)    Goals  Short term goals     All goals ongoing  Time Frame for Short term goals: Prior to D/C  Short term goal 1: Pt will complete dressing w/ min A including [employer]  Short term goal 2: Pt will complete bathing w/ min A  Short term goal 3: Pt will toilet w/ min A  Short term goal 4: Pt will complete functional mobility/transfers SBA  Short term goal 5: Pt will complete BUE HEP/ther ex w/ supervision to increase strength & endurance to perform ADL tasks  Long term goals  Time Frame for Long term goals : ltg=stg  Patient Goals   Patient goals : Per pt report, \"I want to get up 6 steps. \" Above goals will work towards this goal.       Therapy Time   Individual Concurrent Group Co-treatment   Time In 1050         Time Out 1120         Minutes 30         Timed Code Treatment Minutes: R Chetan Walden 118 S/OT  Therapist was present, directed patients care, made skilled judgement, and was responsible for assessment and treatment of the patient.       Kilo Smith OTR/L #[id number]

## 2020-11-19 NOTE — PLAN OF CARE
Problem: Pain:  Goal: Pain level will decrease  Description: Pain level will decrease  11/19/2020 1815 by Keke Hernandez RN  Outcome: Ongoing     Problem: Pain:  Goal: Control of acute pain  Description: Control of acute pain  11/19/2020 1815 by Keke Hernandez RN  Outcome: Ongoing     Problem: Pain:  Goal: Control of chronic pain  Description: Control of chronic pain  11/19/2020 1815 by Keke Hernandez RN  Outcome: Ongoing     Problem: Skin Integrity:  Goal: Will show no infection signs and symptoms  Description: Will show no infection signs and symptoms  11/19/2020 1815 by Keke Hernandez RN  Outcome: Ongoing     Problem: Skin Integrity:  Goal: Absence of new skin breakdown  Description: Absence of new skin breakdown  11/19/2020 1815 by Keke Hernandez RN  Outcome: Ongoing     Problem: Skin Integrity:  Goal: Complications related to intravenous access or infusion will be avoided or minimized  Description: Complications related to intravenous access or infusion will be avoided or minimized  11/19/2020 1815 by Keke Hernandez RN  Outcome: Ongoing     Problem: ACTIVITY INTOLERANCE/IMPAIRED MOBILITY  Goal: Mobility/activity is maintained at optimum level for patient  11/19/2020 1815 by Keke Hernandez RN  Outcome: Ongoing     Problem: Nutritional:  Goal: Nutritional status will improve  Description: Nutritional status will improve  11/19/2020 1815 by Keke Hernandez RN  Outcome: Ongoing     Problem: Respiratory:  Goal: Ability to maintain normal respiratory secretions will improve  Description: Ability to maintain normal respiratory secretions will improve  11/19/2020 1815 by Keke Hernandez RN  Outcome: Ongoing   Electronically signed by Keke Hernandez RN on 11/19/2020 at 6:16 PM

## 2020-11-19 NOTE — PROGRESS NOTES
[facility]   Daily Progress Note      Admit Date:  11/11/2020    CC: SOB    HPI:   Mono Carlson is a 80 y.o. male with PMH CAD, Chronic AF, bradycardia/ SSS s/p DC PPM  [facility] 2014 - atrial lead off (Follows with Dr. Tyler Villalobos), GERD, HTN, pulm fibrosis, CKD, mitral valve disease, chronic back pain. Admitted to [facility] with SOB for several days. Associated with BLE edema, activity intolerance and weakness for about 1 month. Work up consistent with HF. Dr. Gopi Cristobal consulted. Started on Lasix gtt. Developed GAGE, most likely CRS and orthostatic hypotension. Neph following. Orthostatic hypotension yesterday. OOB with PT today. BP 74/47- associated with dizziness. Resolves with rest after sitting/lying down for a few min. Today orthostatic hypotension resolved. Denies CP, palpitations, orthopnea, syncope. Eating breakfast - good appetite. Wearing compression stockings. Edema resolved. Review of Systems:   General: Denies fever, chills, +fatigue, +weakness  Skin: Denies skin changes, rash, itching, lesions.   HEENT: +Lightheaded  RESP: Denies cough, sputum, wheeze, snoring  CARD: Denies palpitations,  Murmur, chest pain  GI:Denies nausea, vomiting, heartburn, loss of appetite, change in bowels  : Denies frequency, pain, incontinence, polyuria  VASC: Denies claudication, leg cramps, clots  MUSC/SKEL: Denies pain, stiffness, arthritis  PSYCH: Denies anxiety, depression, stress  NEURO: Denies numbness, tingling, weakness,change in mood or memory  HEME: Denies abn bruising, bleeding, anemia  ENDO: Denies intolerance to heat, cold, excessive thirst or hunger, hx thyroid disease    Objective:   BP (!) 159/76   Pulse 76   Temp 97.3 °F (36.3 °C) (Axillary)   Resp 18   Ht 5' 6\" (1.676 m)   Wt 166 lb 0.1 oz (75.3 kg)   SpO2 99%   BMI 26.79 kg/m²      Orthostatic BP:   Lying 105/64 HR 71  Sitting 115/67 HR 73  Standing 121/63 HR 72    Intake/Output Summary (Last 24 hours) at 11/19/2020 6917  Last data filed at 11/19/2020 0558  Gross per 24 hour   Intake 660 ml   Output 203 ml   Net 457 ml     I/O since adm: Neg 13L    WEIGHT:Admit Weight: 187 lb 6.3 oz (85 kg)         Today  Weight: 166 lb 0.1 oz (75.3 kg)   DRY WEIGHT:  Wt Readings from Last 3 Encounters:   11/19/20 166 lb 0.1 oz (75.3 kg)   11/10/20 183 lb (83 kg)   11/06/20 183 lb 10.3 oz (83.3 kg)       Physical Exam:  GEN: Appears frail, no acute distress  SKIN: Pink, warm, dry. Nails without clubbing. HEENT: PERRLA. Normocephalic, atraumatic. Neck supple. No adenopathy. LUNG: AP diameter normal. Fine crackles right bases. Respiratory effort normal.  HEART: S1S2 A/R. No JVD. No carotid bruit. No murmur, rub or gallop. ABD: Soft, nontender. +BS X 4 quads. No hepatomegaly. EXT: Radial and pedal pulses 2+ and symmetric. Without varicosities. No LE edema. MUSCSKEL: Good ROM X4 extremities. No deformity. NEURO: A/O X3. Calm and cooperative. Telemetry: V pace HR 74    Medications:    [Held by provider] metoprolol succinate  12.5 mg Oral Daily    alogliptin  6.25 mg Oral Daily    [Held by provider] furosemide  60 mg Oral BID    famotidine  10 mg Oral Daily    apixaban  2.5 mg Oral BID    atorvastatin  40 mg Oral Nightly    Vitamin D  2,000 Units Oral Daily    finasteride  5 mg Oral Daily    fluticasone  2 spray Each Nostril Daily    oxybutynin  5 mg Oral Daily    predniSONE  10 mg Oral Daily    tamsulosin  0.4 mg Oral Daily    glycopyrrolate-formoterol  2 puff Inhalation BID    sodium chloride flush  10 mL Intravenous 2 times per day    insulin lispro  0-6 Units Subcutaneous TID WC    insulin lispro  0-3 Units Subcutaneous Nightly      dextrose       polyvinyl alcohol-povidone, acetaminophen **OR** acetaminophen, sodium chloride flush, polyethylene glycol, promethazine **OR** ondansetron, glucose, dextrose, glucagon (rDNA), dextrose    Lab Data: I have reviewed all labs below today.    CBC:   No results for input(s): WBC, HGB, HCT, MCV, PLT in the last 72 hours. BMP:   Recent Labs     11/17/20  0415 11/18/20  0429 11/19/20  0405    137 135*   K 4.3 4.8 4.9   CL 98* 99 98*   CO2 27 29 28   BUN 65* 61* 62*   CREATININE 2.2* 2.0* 2.1*     GLUCOSE:   Recent Labs     11/17/20  0415 11/18/20  0429 11/19/20  0405   GLUCOSE 102* 106* 97     LIVER PROFILE:   Lab Results   Component Value Date    AST 19 10/20/2020    ALT 14 10/20/2020    LABALBU 3.8 10/20/2020    BILITOT 0.5 10/20/2020    ALKPHOS 50 10/20/2020     PT/INR:   Lab Results   Component Value Date    PROTIME 15.9 10/20/2020    INR 1.37 10/20/2020     APTT:   Lab Results   Component Value Date    APTT 33.5 10/20/2020     Pro-BNP:    Lab Results   Component Value Date    PROBNP 3,084 11/16/2020    PROBNP 3,742 11/15/2020    PROBNP 18,115 11/12/2020     Parameters:   > 450 pg/mL under age 48  > 900 pg/mL ages 54-65  > 1800 pg/mL over age 76    ENZYMES:   Lab Results   Component Value Date    TROPONINI 0.06 11/12/2020     FASTING LIPID PANEL:  Lab Results   Component Value Date    CHOL 132 03/03/2020    HDL 66 03/03/2020    LDLCALC 42 03/03/2020    TRIG 119 03/03/2020       Diagnostics:    EKG: Atrial fibrillation with V pacingLeft axis deviationConfirmed by NANCY VARGAS, Blanche Kamara ([id number]) on 11/11/2020 8:12:17 AM     ECHO: 10/20/20:   Summary   Left ventricular cavity size is normal.   Ejection fraction is visually estimated to be 50%. At least moderate-Severe mitral regurgitation. No evidence of mitral stenosis. Systolic flow reversal in pulmonary veins. Mildly reduce RV fx    Stress Test:   Stress Test: 6/29/17  1. Technically a satisfactory study.    2. Normal pharmacological stress portion of the study.    3. No evidence of Ischemia by Myocardial Perfusion Imaging.    4. Gated Study shows normal LV size and Systolic function; EF is 70 %.      Cardiac Angiography:   Cath: 2009  Stents to  Prox LAD  Mid LAD  Ramus    Assessment/Plan:    1.) Acute on chronic diastolic heart failure, EF 50% with moderate to severe MR: Improving. BLE resolved - continue compression stockings. + mild crackles right base with SOB associated with activity. Orthostatic hypotension resolved ProBNP 06344>3742>3082. Neg 13L Weight 182lbs>166lbs. Would continue to hold BB and diuretic today. Will need to consider restarting BB and lasix at discharge. Plan is for DC tomorrow. NYHA Class III   Stage 3  Diuretic: Hold D/T hypotension - intravascularly dry  Beta Blocker: hold hypotension  ACEi/ARB/ARNI: none, GAGE, hypotenision  Aldosterone Antagonist: one, GAGE and hypotension  SGLT2 Inhibitor: outpatient  2gm Na diet, daily weight, 64 oz fluid restriction  Avoid NSAIDS and other nephrotoxic meds  Cardiac Rehab: Not indicated for EF>35%   ICD: Not indicated for EF>35%  HF RN referral for education  Palliative Care consult   PT/OT consult  - Discharge with PT 3-5x/week and [facility] [place] [facility]    2.) CAD s/p stents to LAD and ramus 2009: No evidence of ischemia  DAPT: none, on eliquis  Beta Blocker: hold  ACEi/ARB: held  Anti anginal: none  Lipid management/high intensity statin: lipitor  Risk factor management: high blood pressure, high cholesterol, Diabetes, smoking, obesity, family hx  Lifestyle modification: Heart healthy diet, regular exercise, weight loss, smoking cessation, stress reduction    3. ) Hypotension: Resolved.    Hold BB and lasix  Apply BLE compression stockings    3. ) GAGE on CKD: Stable  Neph consult    4.) Chronic AF: HR stable  Thromboembolic risk reduction: eliquis  Rate Control/ Rhythm Control: coreg when BP stable    5.) SSS s/p PPM    Electronically signed by KAYCE Smith - CNP on 11/19/2020 at 8:34 AM

## 2020-11-20 NOTE — PROGRESS NOTES
CLINICAL PHARMACY NOTE: MEDS TO [address] Select Patient?: No  Total # of Prescriptions Filled: 4   The following medications were delivered to the patient:  · Aloglitan benzoate 6.25mg  · Lasix 40mg  · Eliquis 2.5mg  · Metoprolol ER 25mg  Total # of Interventions Completed: 0  Time Spent (min): 30    Additional Documentation:    Patient wife picked up from [facility]

## 2020-11-20 NOTE — PROGRESS NOTES
needed for Wheezing or Shortness of Breath     Anoro Ellipta 62.5-25 MCG/INH Aepb inhaler  Generic drug:  umeclidinium-vilanterol  Inhale 1 puff into the lungs daily     aspirin 81 MG tablet     atorvastatin 40 MG tablet  Commonly known as:  LIPITOR  Take 1 tablet by mouth daily     calcium carbonate 500 MG Tabs tablet  Commonly known as:  OSCAL     Centrum Silver Adult 50+ Tabs     diclofenac sodium 1 % Gel  Commonly known as:  VOLTAREN  Apply 4 g topically 3 times daily     finasteride 5 MG tablet  Commonly known as:  PROSCAR  TAKE 1 TABLET BY MOUTH EVERY DAY     fluticasone 50 MCG/ACT nasal spray  Commonly known as:  FLONASE  SPRAY 2 SPRAY INTO EACH NOSTRIL EVERY DAY     omeprazole 20 MG delayed release capsule  Commonly known as:  PRILOSEC     oxybutynin 5 MG tablet  Commonly known as:  DITROPAN  TAKE 1 TABLET BY MOUTH EVERY DAY     predniSONE 10 MG tablet  Commonly known as:  DELTASONE  Take 1 tablet by mouth daily     tamsulosin 0.4 MG capsule  Commonly known as:  FLOMAX  ONE EVERY NIGHT FOR YOUR PROSTATE     Vitamin D3 50 MCG (2000 UT) Caps            STOP taking these medications      carvedilol 12.5 MG tablet  Commonly known as:  COREG     losartan 50 MG tablet  Commonly known as:  COZAAR     rivaroxaban 15 MG Tabs tablet  Commonly known as:  XARELTO     traMADol 50 MG tablet  Commonly known as:  ULTRAM               Where to Get Your Medications        These medications were sent to [address] [place] [address] [place] Ke[facility] Dinane Keila [place] [phone] [address] [address], [address] [place] [address]      Phone:  [phone]   alogliptin 6.25 MG Tabs tablet  apixaban 2.5 MG Tabs tablet  furosemide 40 MG tablet  metoprolol succinate 25 MG extended release tablet         [facility]  Heart Failure Discharge Medication Reconciliation Program  [phone]

## 2020-11-20 NOTE — PROGRESS NOTES
Occupational Therapy  Facility/Department: [facility] 5W PROGRESSIVE CARE  Daily Treatment Note    This note serves as D/C note in the event pt is discharged prior to next treatment. See most recent treatment notes for status at D/C. Assessment: Pt performed balloon tossing w/ S/OT to encourage BUE AROM to increase UB endurance for participation in ADL tasks. Pt completed [place] state puzzle in increased time with some cues and assitance to problem solve, but pt able to complete puzzle correctly. Pt is safe to D/C home with daughter assitance and home OT for safe transition home. NAME: Mallory Lemus  : 1925  MRN: [id number]    Date of Service: 2020    Discharge Recommendations:  Patient would benefit from continued therapy after discharge, S Level 1, Home with Home health OT, 2-3 sessions per week, 24 hour supervision or assist  OT Equipment Recommendations  Other: Has needed bathroom DME/RW/ w/c already  Mallory Lemus scored a 19/24 on the AM-PAC ADL Inpatient form. Current research shows that an AM-PAC score of 18 or greater is typically associated with a discharge to the patient's home setting. Based on the patient's AM-PAC score, and their current ADL deficits, it is recommended that the patient have 2-3 sessions per week of Occupational Therapy at d/c to increase the patient's independence. At this time, this patient demonstrates the endurance and safety to discharge home with home services and a follow up treatment frequency of 2-3x/wk. Please see assessment section for further patient specific details. If patient discharges prior to next session this note will serve as a discharge summary. Please see below for the latest assessment towards goals. Assessment   Performance deficits / Impairments: Decreased functional mobility ; Decreased balance;Decreased ADL status; Decreased ROM; Decreased strength;Decreased endurance;Decreased high-level IADLs;Decreased posture;Decreased safe awareness  Assessment: Pt performed balloon tossing w/ S/OT to encourage BUE AROM to increase UB endurance for participation in ADL tasks. Pt completed [place] state puzzle in increased time with some cues and assitance to problem solve, but pt able to complete puzzle correctly. Pt is safe to D/C home with daughter assitance and home OT for safe transition home. Treatment Diagnosis: Impared: ADL/IADL function, balance, functional mobility/transfers, endurance, strength, safety awareness, posture  Prognosis: Good  OT Education: OT Role;Plan of Care;Energy Conservation  Barriers to Learning: Memory, confusion  Activity Tolerance  Activity Tolerance: Patient Tolerated treatment well  Safety Devices  Safety Devices in place: Yes  Type of devices: Gait belt;Patient at risk for falls; Left in chair;Chair alarm in place;Call light within reach(daughter present at end of session)         Patient Diagnosis(es): The primary encounter diagnosis was Acute respiratory failure with hypoxia ([address] Diagnoses of Acute pulmonary edema (HCC) and Renal cyst were also pertinent to this visit. has a past medical history of Acute diastolic CHF (congestive heart failure) ([address] Arthritis, Atrial fibrillation ([address] BPH with obstruction/lower urinary tract symptoms, Chronic atrial fibrillation ([address] CKD (chronic kidney disease), Coronary arteriosclerosis, GERD (gastroesophageal reflux disease), History of blood transfusion, HTN (hypertension), Left hip pain, Pacemaker, Pulmonary fibrosis ([address] Pulmonary nodule, left, Renal atrophy, right, Renal cyst, right, and SOB (shortness of breath). has a past surgical history that includes Pacemaker insertion (2014); Tonsillectomy (1960); Small intestine surgery (1945); Inguinal hernia repair (Right, 07/2016); Coronary angioplasty with stent (2009); pacemaker placement; Colonoscopy; Abdomen surgery; and eye surgery (Bilateral).     Restrictions  Restrictions/Precautions  Restrictions/Precautions: Fall Risk  Position Activity Restriction  Other position/activity restrictions: Say Brower, DIET LOW SODIUM 2 GM; Carb Control: 5 carb choices (75 gms)/meal; 1200 ml; Low Potassium , telemetry  Subjective   General  Chart Reviewed: Yes, Orders, Progress Notes, History and Physical  Patient assessed for rehabilitation services?: Yes  Additional Pertinent Hx: 81 yo with a history of severe MR, diastolic chf and CAD who awoke suddenly with extreme sob and chest tightness as well as pain all over his body. He came to the ER by squad and was in acute pulmonary edema. He did also have cough productive of small amounts of bloody phlegm. On arrival he was hypoxic and placed on bipap and 9L /NC and given iv lasix. Now up on the floor he is no longer requiring the bipap and his oxygen level is down to 4L. His tightness is still present but less so and his sob is much improved  He is still hurting in his arthritic joints (mainly low back and knees. )  Response to previous treatment: Patient with no complaints from previous session  Family / Caregiver Present: No  Referring Practitioner: Dr. Hadley Rodriguez  Diagnosis: SOB  Subjective  Subjective: Pt met in room, sitting in recliner as PT and student PT were leaving the room. Pt stated he was feeling good, and denied pain. Objective                                           Cognition  Overall Cognitive Status: Exceptions  Attention Span: Difficulty dividing attention  Memory: Decreased short term memory  Safety Judgement: Decreased awareness of need for safety  Problem Solving: Assistance required to implement solutions;Assistance required to correct errors made  Sequencing: Requires cues for some  Cognition Comment: Pt demonstrates slow processing during conversation. Pt completed [place] state puzzle seated in recliner with increased time, but was able to complete all peices of puzzle correctly.  Pt took ~20 minutes to complete activity with cues at beginning of how to do puzzle and some assistance to find pieces he was looking for. Pt able to correct own errors made. Pt required some assistance to problem solve. Type of ROM/Therapeutic Exercise  Type of ROM/Therapeutic Exercise: AROM  Comment: Pt alternated BUE to hit balloon back and forth with S/OT to facilitate AROM to increase endurance for performance in ADL tasks. Did perform shoulder flexion past 90 degrees of LUE D/T L shoulder arthritis. Pt appeared SOB after balloon tossing for ~ 3 minutes. Plan   Plan  Times per week: 3-5x  Times per day: Daily  Current Treatment Recommendations: ROM, Strengthening, Balance Training, Functional Mobility Training, Safety Education & Training, Self-Care / ADL, Neuromuscular Re-education, Endurance Training, Equipment Evaluation, Education, & procurement                                               AM-PAC Score        AM-[facility] Inpatient Daily Activity Raw Score: 19 (11/19/20 1140)  AM-PAC Inpatient ADL T-Scale Score : 40.22 (11/19/20 1140)  ADL Inpatient CMS 0-100% Score: 42.8 (11/19/20 1140)  ADL Inpatient CMS G-Code Modifier : CK (11/19/20 1140)    Goals  Short term goals  Time Frame for Short term goals: Prior to D/C -ongoing  Short term goal 1: Pt will complete dressing w/ min A including Say Hose  Short term goal 2: Pt will complete bathing w/ min A  Short term goal 3: Pt will toilet w/ min A  Short term goal 4: Pt will complete functional mobility/transfers SBA  Short term goal 5: Pt will complete BUE HEP/ther ex w/ supervision to increase strength & endurance to perform ADL tasks  Long term goals  Time Frame for Long term goals : ltg=stg  Patient Goals   Patient goals : Per pt report, \"I want to get up 6 steps. \" Above goals will work towards this goal.       Therapy Time   Individual Concurrent Group Co-treatment   Time In 1140         Time Out 1210         Minutes 30         Timed Code Treatment Minutes: R Chetan Walden 118 S/OT    Therapist was present, directed patients care, made skilled judgement, and was responsible for assessment and treatment of the patient.       Johnny Huerta OTR/L #[id number]

## 2020-11-20 NOTE — PLAN OF CARE
Respiratory:  Goal: Ability to maintain normal respiratory secretions will improve  Description: Ability to maintain normal respiratory secretions will improve  11/20/2020 0425 by Carol Horton RN  Outcome: Ongoing

## 2020-11-20 NOTE — DISCHARGE SUMMARY
Physician Discharge Summary     Patient ID:   Michelet Lund  [id number]  56 y.o.  12/21/1925    Admit date: 11/11/2020    Discharge date and time: 11/20/2020     Admitting Physician: Jesus Brooks MD     Discharge Physician: Thai Mancini MD    Discharge Diagnoses:   Acute on Chronic diastolic chf  Pacer  PAF  HTN  Acute on chronic renal failure  Pulmonary fibrosis  BPH w LUT  Severe Mitral Regurg  Acute resp failure with hypoxia   Orthostatic hypotension  Chronic lbp with lumbar stenosis and foraminal stenosis      Discharged Condition: fair      Hospital Course: 81 yo male presented to the ER with flash pulmonary edema and acute on chronic respiratory failure. He was placed on lasix drip then changed to high dose intermittent lasix. He diuresed well. At first his renal function improved then his numbers got worse as he became over diuresed. At that point his bp's were dropping at rest.  His coreg and lasix were held but his fluid and salt restriction were continued. By the time of discharge his weight was 168lb and Creatinine 2.1 gfr 29 up from 1.7 with gfr 38 prior to this event. He seemed to be at his dry weight at the time of discharge. BP also no longer tilted; sys 135 sitting 150 standing. Sugars ran in the 100s on nesina and he did not report any adr's. Since his bp came up he was started on a very low dose of toprol 12.5. His sugars ran higher (200-300) and he was started on nesina 6.25mg for this. He and his daughter met with the dietician and were responsive to the diet changes needed for him to avoid salt etc.     He was maintained on prednisone 10 and inhalers for his pulmonary fibrosis. There was a question of sepsis/ pneumonia on presentation but abxs were stopped shortly thereafter as it was all pulmonary edema.      He was supposed to get ELIAS's for his back but they were cancelled due to the hospitalization  Consults:   IP CONSULT TO INTERNAL MEDICINE  IP CONSULT TO CARDIOLOGY  IP CONSULT TO PULMONOLOGY  IP CONSULT TO DIETITIAN  IP CONSULT TO NEPHROLOGY  IP CONSULT TO PALLIATIVE CARE  IP CONSULT TO HOME CARE NEEDS    Discharge Exam:  General Appearance: alert and oriented to person, place and time, well-developed and well-nourished, in no acute distress  Skin: warm and dry, no rash or erythema  Head: normocephalic and atraumatic  Eyes: conjunctivae normal and sclera anicteric  ENT: hearing grossly normal bilaterally and sinuses non-tender  Neck: neck supple and non tender without mass, no thyromegaly or thyroid nodules, no cervical lymphadenopathy   Pulmonary/Chest: some basilar insp crackles ow clear  Cardiovascular: normal rate, normal S1 and S2, no gallops and no carotid bruits  Abdomen: soft, non-tender, non-distended, normal bowel sounds, no masses or organomegaly  Extremities: no cyanosis, clubbing or edema venous stasis changes  Musculoskeletal: severe oa knees and left shoulder and lumbar  Neurologic: coordination normal and speech normal, moves all 4 extremities    Disposition: home  Hospital Meds:  Current Facility-Administered Medications   Medication Dose Route Frequency Provider Last Rate Last Dose    [Held by provider] metoprolol succinate (TOPROL XL) extended release tablet 12.5 mg  12.5 mg Oral Daily Zola Brunner, MD   Stopped at 11/19/20 0900    alogliptin (NESINA) tablet 6.25 mg  6.25 mg Oral Daily Zola Brunner, MD   6.25 mg at 11/20/20 0859    polyvinyl alcohol-povidone 5-6 MG/ML opthalmic solution 1 drop  1 drop Both Eyes PRN Zola Brunner, MD   1 drop at 11/18/20 1362    acetaminophen (TYLENOL) tablet 1,000 mg  1,000 mg Oral Q8H PRN Zola Brunner, MD   1,000 mg at 11/18/20 2042    Or    acetaminophen (TYLENOL) suppository 650 mg  650 mg Rectal Q6H PRN Zola Brunner, MD        famotidine (PEPCID) tablet 10 mg  10 mg Oral Daily Zola Brunner, MD   10 mg at 11/20/20 0859    apixaban (ELIQUIS) tablet 2.5 mg  2.5 mg Oral BID Zola Brunner, MD   2.5 mg at 11/20/20 0858    atorvastatin (LIPITOR) tablet 40 mg  40 mg Oral Nightly Virginia Washburn MD   40 mg at 11/19/20 2235    Vitamin D (CHOLECALCIFEROL) tablet 2,000 Units  2,000 Units Oral Daily Virginia Washburn MD   2,000 Units at 11/20/20 0900    finasteride (PROSCAR) tablet 5 mg  5 mg Oral Daily Virginia Washburn MD   5 mg at 11/20/20 0859    fluticasone (FLONASE) 50 MCG/ACT nasal spray 2 spray  2 spray Each Nostril Daily Virginia Washburn MD   2 spray at 11/20/20 0900    oxybutynin (DITROPAN) tablet 5 mg  5 mg Oral Daily Virginia Washburn MD   5 mg at 11/20/20 0858    predniSONE (DELTASONE) tablet 10 mg  10 mg Oral Daily Virginia Washburn MD   10 mg at 11/20/20 0859    tamsulosin (FLOMAX) capsule 0.4 mg  0.4 mg Oral Daily Virginia Washburn MD   0.4 mg at 11/20/20 0859    glycopyrrolate-formoterol (BEVESPI) 9-4.8 MCG/ACT inhaler 2 puff  2 puff Inhalation BID Virginia Washburn MD   2 puff at 11/20/20 0825    sodium chloride flush 0.9 % injection 10 mL  10 mL Intravenous 2 times per day Virginia Washburn MD   10 mL at 11/19/20 2235    sodium chloride flush 0.9 % injection 10 mL  10 mL Intravenous PRN Virginia Washburn MD        polyethylene glycol [facility]) packet 17 g  17 g Oral Daily PRN Virginia Washburn MD   17 g at 11/11/20 1724    promethazine (PHENERGAN) tablet 12.5 mg  12.5 mg Oral Q6H PRN Virginia Washburn MD        Or    ondansetron [facility]) injection 4 mg  4 mg Intravenous Q6H PRN Virginia Washburn MD           Take Home Meds:  Current Discharge Medication List      START taking these medications    Details   apixaban (ELIQUIS) 2.5 MG TABS tablet Take 1 tablet by mouth 2 times daily  Qty: 60 tablet, Refills: 3      alogliptin (NESINA) 6.25 MG TABS tablet Take 1 tablet by mouth daily For sugar take daily  Qty: 60 tablet, Refills: 3      metoprolol succinate (TOPROL XL) 25 MG extended release tablet Take 0.5 tablets by mouth daily For heart  Qty: 30 tablet, Refills: 3         CONTINUE these medications which have CHANGED    Details   furosemide (LASIX) 40 MG tablet Only take 1 in the a.m. if weight goes up 3 lbs or more, must weight yourself every morning  Qty: 60 tablet, Refills: 3         CONTINUE these medications which have NOT CHANGED    Details   omeprazole (PRILOSEC) 20 MG delayed release capsule Take 20 mg by mouth daily      predniSONE (DELTASONE) 10 MG tablet Take 1 tablet by mouth daily  Qty: 30 tablet, Refills: 0      diclofenac sodium (VOLTAREN) 1 % GEL Apply 4 g topically 3 times daily  Qty: 1 Tube, Refills: 0    Associated Diagnoses: Arthritis of both knees      oxybutynin (DITROPAN) 5 MG tablet TAKE 1 TABLET BY MOUTH EVERY DAY  Qty: 90 tablet, Refills: 1      fluticasone (FLONASE) 50 MCG/ACT nasal spray SPRAY 2 SPRAY INTO EACH NOSTRIL EVERY DAY  Qty: 3 Bottle, Refills: 1      albuterol sulfate  (90 Base) MCG/ACT inhaler Inhale 2 puffs into the lungs every 6 hours as needed for Wheezing or Shortness of Breath  Qty: 1 Inhaler, Refills: 5    Associated Diagnoses: Pulmonary fibrosis (HCC)      umeclidinium-vilanterol (ANORO ELLIPTA) 62.5-25 MCG/INH AEPB inhaler Inhale 1 puff into the lungs daily  Qty: 1 each, Refills: 0    Comments: Lot [id number]  05/2021  Associated Diagnoses: Pulmonary fibrosis (HCC)      tamsulosin (FLOMAX) 0.4 MG capsule ONE EVERY NIGHT FOR YOUR PROSTATE  Qty: 90 capsule, Refills: 3      finasteride (PROSCAR) 5 MG tablet TAKE 1 TABLET BY MOUTH EVERY DAY  Qty: 90 tablet, Refills: 4      atorvastatin (LIPITOR) 40 MG tablet Take 1 tablet by mouth daily  Qty: 90 tablet, Refills: 2      aspirin 81 MG tablet Take 81 mg by mouth daily      Cholecalciferol (VITAMIN D3) 2000 UNITS CAPS Take 2,000 Units by mouth daily      calcium carbonate (OSCAL) 500 MG TABS tablet Take 600 mg by mouth daily caltrate 600-D3 one daily      Multiple Vitamins-Minerals (CENTRUM SILVER ADULT 50+) TABS Take 1 tablet by mouth daily         STOP taking these medications       traMADol (ULTRAM) 50 MG tablet Comments:   Reason for Stopping:         losartan (COZAAR) 50 MG tablet Comments:   Reason for Stopping:         carvedilol (COREG) 12.5 MG tablet Comments:   Reason for Stopping:         pantoprazole (PROTONIX) 40 MG tablet Comments:   Reason for Stopping:         rivaroxaban (XARELTO) 15 MG TABS tablet Comments:   Reason for Stopping:                 Activity: activity as tolerated  Diet: 2 g sodium 1200 ml fluid restriction carb controlled  Wound Care: none needed    Follow-up with Sax next week   Time Spent: over 35 minutes spent performing hospital discharge  Signed:  Lydia Brand MD  11/20/2020  9:11 AM

## 2020-11-20 NOTE — CARE COORDINATION
[facility] liasion spoke with patient's daughter via phone. Demos confirmed. Patient to be seen by 11/22/2020 Orders faxed to [facility]. Electronically signed by Vitaliy Sol LPN on 70/38/55 at 80:44 PM EST

## 2020-11-20 NOTE — PROGRESS NOTES
Pt discharged home at 1322. Discharge instructions given and explained. All questions explained. IV is removed. Dressing applied to site. Transportation to home provided by daughter.     Electronically signed by Violeta Roldan RN on 11/20/2020 at 1:23 PM

## 2020-11-20 NOTE — PROGRESS NOTES
Physical Therapy  Facility/Department: [facility] PROGRESSIVE CARE  Daily Treatment Note  NAME: Wing Lovett  : 1925  MRN: [id number]    Date of Service: 2020    Discharge Recommendations:  S Level 1, Home with Home health PT, 24 hour supervision or assist   PT Equipment Recommendations  Equipment Needed: No   Wing Lovett scored a 18/24 on the AM-PAC short mobility form. Current research shows that an AM-PAC score of 18 or greater is typically associated with a discharge to the patient's home setting. Based on the patient's AM-PAC score and their current functional mobility deficits, it is recommended that the patient have 2-3 sessions per week of Physical Therapy at d/c to increase the patient's independence. At this time, this patient demonstrates the endurance and safety to discharge home with home health PT and a follow up treatment frequency of 2-3x/wk. Please see assessment section for further patient specific details. If patient discharges prior to next session this note will serve as a discharge summary. Please see below for the latest assessment towards goals. HOME HEALTH CARE: LEVEL 1 STANDARD     -Initial home health evaluation to occur within 24-48 hours, in patient home    -Home health agency to establish plan of care for patient over 60 day period    -Medication Reconciliation    -PCP Visit scheduled within seven days of discharge    -PT/OT to evaluate with goal of regaining prior level of functioning    -OT to evaluate if patient has [address] needs for personal care     Assessment   Body structures, Functions, Activity limitations: Decreased functional mobility ; Decreased strength;Decreased safe awareness;Decreased endurance;Decreased balance;Decreased posture  Assessment: Today pt was pleasant and eager to participate in therapy.  He was able to perform sit <>stand transfers at [facility] with SBA-CGA and ambulate greater than household distance with RW and CGA with mild RAIN requiring a seated rest break long-term. He did report dizziness after seated ther ex, but it resolved quickly and pt acknowledges he might have been holidng his breath. Patient limited by generallized weakness, pain, decreased balance and endurance. Patient would benefit from skilled therapy to improve overal strength, endurance and to lessen assist level for daughter. Anticipate pt will be able to return home with 24/7 assist from his daughter and home PT. Treatment Diagnosis: impaired functional mobility with Acute on Chronic CHF  PT Education: Plan of Care;Home Exercise Program;Transfer Training;General Safety;Gait Training; recommendation of continuing to use w/c at home at d/c with assist from daughter  Activity Tolerance  Activity Tolerance: Patient Tolerated treatment well;Patient limited by endurance     Patient Diagnosis(es): The primary encounter diagnosis was Acute respiratory failure with hypoxia ([address] Diagnoses of Acute pulmonary edema (HCC) and Renal cyst were also pertinent to this visit. has a past medical history of Acute diastolic CHF (congestive heart failure) ([address] Arthritis, Atrial fibrillation ([address] BPH with obstruction/lower urinary tract symptoms, Chronic atrial fibrillation ([address] CKD (chronic kidney disease), Coronary arteriosclerosis, GERD (gastroesophageal reflux disease), History of blood transfusion, HTN (hypertension), Left hip pain, Pacemaker, Pulmonary fibrosis ([address] Pulmonary nodule, left, Renal atrophy, right, Renal cyst, right, and SOB (shortness of breath). has a past surgical history that includes Pacemaker insertion (2014); Tonsillectomy (1960); Small intestine surgery (1945); Inguinal hernia repair (Right, 07/2016); Coronary angioplasty with stent (2009); pacemaker placement; Colonoscopy; Abdomen surgery; and eye surgery (Bilateral).     Restrictions  Restrictions/Precautions  Restrictions/Precautions: Fall Risk  Position Activity Restriction  Other position/activity restrictions: [employer], DIET LOW SODIUM 2 GM; Carb Control: 5 carb choices (75 gms)/meal; 1200 ml; Low Potassium , telemetry     Social/Functional History  Lives With: Daughter  Type of Home: House(Condo)  Home Layout: One level  Home Access: Stairs to enter with rails  Entrance Stairs - Number of Steps: 1 R rail + 6 steps down with B far spaced rails. 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 Patient has been using the wheelchair at home and daughter, Ventura Noriega, has been pushing him in the wheelchair to the bathroom door and then the patient mambulates into the bathroom with the wheeled walker. Ventura Noriega states patient has continued to be very limited by his back pain and the SOB. Subjective   General  Chart Reviewed: Yes  Additional Pertinent Hx: per Dr. Juan Carlos Vides note, \"81 yo male admitted with chest tightness acute pulmonary edema and hemoptysis feels a lot better today. His sob is much improved on the lasix drip. Now off oxygen. He chest tightness is resolved, the troponins peaked at .09 and are trending back down . He is still coughing but decreased and now nonproductive. The overall whole body pain is gone. He has a kauffman in now so not having to get up to the bathroom, requiring a steady when he goes. \"  Response To Previous Treatment: Patient with no complaints from previous session.   Family / Caregiver ex with PT  Patient Goals   Patient goals : Shivam Trujillo able to go home. \"    Plan    Plan  Times per week: 3-5 days/week  Current Treatment Recommendations: Strengthening, Balance Training, Functional Mobility Training, Transfer Training, Gait Training, Endurance Training, Home Exercise Program, Safety Education & Training  Safety Devices  Type of devices: All fall risk precautions in place, Call light within reach, Gait belt, Left in chair, Nurse notified(Transferred care to OT at end of session, RN Bonnie aware.)     Therapy Time   Individual Concurrent Group Co-treatment   Time In 1115         Time Out 1150         Minutes 35         Timed Code Treatment Minutes: Henrik 94, Student PTA  Therapist was present, directed the patient's care, made skilled judgement, and was responsible for assessment and treatment of the patient.           Electronically signed by Timothy Obrien, PT [id number] on 11/20/2020 at 1:03 PM

## 2020-11-20 NOTE — CARE COORDINATION
Case Management:  Received discharge order to return home with daughter and [address]. Talked to daughter who is aware and has talked to doctor. Informed daughter to remind he of IMM. Daughter plans to be here around 12:30 pm to transport home, bedside nurse aware. Notified [facility] of discharge.   Electronically signed by Agata Banks on 11/20/2020 at 10:59 AM

## 2020-11-20 NOTE — PLAN OF CARE
Problem: Pain:  Goal: Pain level will decrease  Description: Pain level will decrease  Outcome: Completed  Goal: Control of acute pain  Description: Control of acute pain  Outcome: Completed  Goal: Control of chronic pain  Description: Control of chronic pain  Outcome: Completed     Problem: Skin Integrity:  Goal: Will show no infection signs and symptoms  Description: Will show no infection signs and symptoms  11/20/2020 1230 by Isabela Gama RN  Outcome: Completed  11/20/2020 0425 by Kirby Kam RN  Outcome: Ongoing  Goal: Absence of new skin breakdown  Description: Absence of new skin breakdown  11/20/2020 1230 by Isabela Gama RN  Outcome: Completed  11/20/2020 0425 by Kirby Kam RN  Outcome: Ongoing  Goal: Complications related to intravenous access or infusion will be avoided or minimized  Description: Complications related to intravenous access or infusion will be avoided or minimized  Outcome: Completed     Problem: Falls - Risk of:  Goal: Will remain free from falls  Description: Will remain free from falls  11/20/2020 1230 by Isabela Gama RN  Outcome: Completed  11/20/2020 0425 by Kirby Kam RN  Outcome: Ongoing  Goal: Absence of physical injury  Description: Absence of physical injury  11/20/2020 1230 by Isabela Gama RN  Outcome: Completed  11/20/2020 0425 by Kirby Kam RN  Outcome: Ongoing     Problem: OXYGENATION/RESPIRATORY FUNCTION  Goal: Patient will maintain patent airway  11/20/2020 1230 by Isabela Gama RN  Outcome: Completed  11/20/2020 0425 by Kirby Kam RN  Outcome: Ongoing  Goal: Patient will achieve/maintain normal respiratory rate/effort  Description: Respiratory rate and effort will be within normal limits for the patient  11/20/2020 1230 by Isabela Gama RN  Outcome: Completed  11/20/2020 0425 by Kirby Kam RN  Outcome: Ongoing     Problem: HEMODYNAMIC STATUS  Goal: Patient has stable vital signs and fluid balance  11/20/2020

## 2020-11-21 NOTE — CARE COORDINATION
[facility] Transitions Initial Follow Up Call    Call within 2 business days of discharge: Yes    Patient: Deborah Gerardo Patient : 1925   MRN: [id number]  Reason for Admission: Resp failure  Discharge Date: 20 RARS: Readmission Risk Score: 25      Last Discharge [facility]       Complaint Diagnosis Description Type Department Provider    20 Shortness of Breath Acute respiratory failure with hypoxia ([place]) . .. ED to Hosp-Admission (Discharged) (ADMITTED) Sourav Moore MD; Sylvia Cooley MD           Spoke with: Tracy Shelley (patient)    Facility: [facility]    Non-face-to-face services provided:  Obtained and reviewed discharge summary and/or continuity of care documents    Spoke with Joesph Werner. He states he is doing \"okay\". He is using his w/c for ambulation. He states he has not heard from [facility]. Joesph Werner denies any SOB. Call placed to [facility]. Spoke with on call staff.  They state they have the patient's referral.    Follow Up  Future Appointments   Date Time Provider Tavo Raya   2020  2:40 PM Virginia Washburn MD [facility]   12/3/2020  2:00 PM Ayala Hartman MD [facility] PULM [facility]   2020  1:15 PM Mickey Ruiz MD W CHEST ORTH [facility]       Jesus Manuel Tyler RN

## 2020-11-23 NOTE — TELEPHONE ENCOUNTER
General Question     Subject: PROCEDURE SCHEDULE 11/30/20  Patient and /or Facility Request: Racquel Guzman  Contact Number: [phone]

## 2020-11-23 NOTE — PROGRESS NOTES
PATIENT'S DAUGHTER(Petra) REACHED   YES__X__NO____    PREOP INSTUCTIONS Patient instructed to get their COVID-19 test done as directed by their doctor (5-7 days prior to procedure)  or patient states will get on ___RAPID COVID_______. Patient was notified that they need to have an appointment,number to call provided. The day the COVID test is done is considered day one. Instructed to self quarantine after test until DOS. There is a one visitor policy at [facility] for all surgeries and endoscopies. Whether the visitor can stay or will be asked to wait in the car will depend on the current policy and if social distancing can be maintained. The policy is subject to change at any time. Please make sure the visitor has a cell phone that is on,charged and able to accept calls, as this may be the way that the staff communicates with them. Pain management is NO VISITOR policyThe patients ride is expected to remain in the car with a cell phone for communication. If the ride is leaving the hospital grounds please make sure they are back in time for pickup. Have the patient inform the staff on arrival what their rides plans are while the patient is in the facility. At the MAIN there is one visitor allowed. Please note that the visitor policy is subject to change. DATE__11/30_______ TIME__1010_______ARRIVAL__0910______PLACE__masc__________  NOTHING TO EAT OR DRINK  AFTER MIDNIGHT THE EVENING PRIOR OR AS INSTRUCTED BY YOUR DR.  Jam Jauregui NEED A RESPONSIBLE ADULT AGE 18 OR OLDER TO DRIVE YOU HOME  PLEASE BRING INSURANCE CARD. PICTURE ID AND COMPLETE LIST OF MEDS  WEAR LOOSE COMFORTABLE CLOTHING  FOLLOW ANY INSTRUCTIONS YOUR DRS OFFICE HAS GIVEN YOU,INCLUDING WHAT MEDICATIONS TO TAKE THE AM OF PROCEDURE AND WHEN AND IF YOU NEED TO STOP ANY BLOOD THINNERS. IF YOU HAVE QUESTIONS REGARDING THIS CALL THE OFFICE  THE GOAL BLOOD SUGAR THE AM OF PROCEDURE  OR LESS ABOVE THAT THE PROCEDURE MAY BE CANCELLED  ANY QUESTIONS CALL YOUR DOCTOR. ALSO,PLEASE READ THE INSTRUCTION PACKET FROM YOUR DR IF YOU RECEIVED ONE.   SPINE INTERVENTION NUMBER [phone]

## 2020-11-23 NOTE — TELEPHONE ENCOUNTER
L/m for approval to hold Xarelto for 3 days prior to [facility] on 11/30/20. Patient aware of hold date. Patient previously approved for blood thinner hold but was hospitalized from 11/11/20 to 11/20/20. Called Dr. Jenny Sargent to re-verify okay for xarelto hold due to recent hospitalization. Awaiting return call.

## 2020-11-23 NOTE — TELEPHONE ENCOUNTER
Blood thinner hold approved following patient's recent hospitalization. S/w patient's daughter, as patient was unreachable. They are aware of new hold date for xarelto.

## 2020-11-24 NOTE — TELEPHONE ENCOUNTER
S/w patient's daughter. Confirmed appointment date and time for [facility] on 11/30/20. All questions answered.

## 2020-11-30 NOTE — H&P
HISTORY AND PHYSICAL/PRE-SEDATION ASSESSMENT    Patient:  Angelina Sanches   :  1925  Medical Record No.:  [id number]   Date:  2020  Physician:  Royer Long M.D. Facility: [address] [place] [address]    HISTORY OF PRESENT ILLNESS:                 The patient is a 80 y.o. male whom presents with low back and bilateral leg pain. Review of the imaging and physical exam of the patient confirmed the pre-procedure diagnosis. After a thorough discussion of risks, benefits and alternatives informed consent was obtained.                 Past Medical History:   Past Medical History:   Diagnosis Date    Acute diastolic CHF (congestive heart failure) ([facility] [address] 10/23/2020    Arthritis     Atrial fibrillation (HCC)     BPH with obstruction/lower urinary tract symptoms     nocturia 3-4 x per night    Chronic atrial fibrillation (HCC)     CKD (chronic kidney disease) 2017    level 3    Coronary arteriosclerosis 2009    2 stents/ Dr Lynn Mora in [facility] GERD (gastroesophageal reflux disease)     History of blood transfusion        During deployment    HTN (hypertension)     Left hip pain     schrapnel in hip and intestines/chronic pains left hip impairs walking    Pacemaker 2015    Pulmonary fibrosis ([facility] [address] 2017    mild amount seen on chest ct    Pulmonary nodule, left 2017    noncalcified 7mm low risk repeat 2018 if patient willing    Renal atrophy, right 2017    Renal cyst, right 2017    not clearly simple cyst    SOB (shortness of breath) 2017    terrell normal pft and ct chest ? smll amount of pulmonary fibrosis      Past Surgical History:     Past Surgical History:   Procedure Laterality Date    ABDOMEN SURGERY      Scrapnel removed in [facility]      awoke with indigestion    EYE SURGERY Bilateral     cataract    INGUINAL HERNIA REPAIR Right 2016    PACEMAKER INSERTION  2014    PACEMAKER PLACEMENT      SMALL INTESTINE SURGERY  1945    jelly as ww II vet, part of small intestine moved    TONSILLECTOMY  1960     Current Medications:   Prior to Admission medications    Medication Sig Start Date End Date Taking?  Authorizing Provider   furosemide (LASIX) 40 MG tablet One po qam for weight over 169lbs 11/27/20  Yes Arpit Anderson MD   alogliptin (NESINA) 6.25 MG TABS tablet Take 1 tablet by mouth daily For sugar take daily 11/20/20  Yes Arpit Anderson MD   omeprazole (PRILOSEC) 20 MG delayed release capsule Take 20 mg by mouth daily   Yes Historical Provider, MD   predniSONE (DELTASONE) 10 MG tablet Take 1 tablet by mouth daily 11/4/20 12/4/20 Yes Shad Burnette MD   oxybutynin (DITROPAN) 5 MG tablet TAKE 1 TABLET BY MOUTH EVERY DAY 10/21/20  Yes Arpit Anderson MD   umeclidinium-vilanterol [facility] ELLIPTA) 62.5-25 MCG/INH AEPB inhaler Inhale 1 puff into the lungs daily 8/27/20  Yes Soo Lopez MD   atorvastatin (LIPITOR) 40 MG tablet Take 1 tablet by mouth daily 2/22/19  Yes Lashell Diaz MD   aspirin 81 MG tablet Take 81 mg by mouth daily   Yes Historical Provider, MD   Cholecalciferol (VITAMIN D3) 2000 UNITS CAPS Take 2,000 Units by mouth daily   Yes Historical Provider, MD   apixaban (ELIQUIS) 2.5 MG TABS tablet Take 1 tablet by mouth 2 times daily 11/20/20   Arpit Anderson MD   metoprolol succinate (TOPROL XL) 25 MG extended release tablet Take 0.5 tablets by mouth daily For heart 11/20/20   Arpit Anderson MD   furosemide (LASIX) 40 MG tablet Only take 1 in the a.m. if weight goes up 3 lbs or more, must weight yourself every morning 11/20/20   Arpit Anderson MD   diclofenac sodium (VOLTAREN) 1 % GEL Apply 4 g topically 3 times daily 11/3/20   Shad Burnette MD   fluticasone [facility]) 50 MCG/ACT nasal spray SPRAY 2 SPRAY INTO EACH NOSTRIL EVERY DAY 10/9/20   Arpit Anderson MD   tamsulosin (FLOMAX) 0.4 MG capsule ONE EVERY NIGHT FOR YOUR PROSTATE 4/22/20   Arpit Anderson MD   finasteride (PROSCAR) 5 MG tablet TAKE 1 TABLET BY MOUTH EVERY DAY 12/2/19   Rayne Angulo MD   calcium carbonate (OSCAL) 500 MG TABS tablet Take 600 mg by mouth daily caltrate 600-D3 one daily    Historical Provider, MD   Multiple Vitamins-Minerals (CENTRUM SILVER ADULT 50+) TABS Take 1 tablet by mouth daily    Historical Provider, MD     Allergies:  Claritin [loratadine] and Zestril [lisinopril]  Social History:    reports that he has never smoked. He has never used smokeless tobacco. He reports previous drug use. He reports that he does not drink alcohol. Family History:   Family History   Problem Relation Age of Onset   Natalee Tomlinson Pacemaker Sister     Pacemaker Brother     No Known Problems Other         lung disease       Vitals: Pulse 72, temperature 98.4 °F (36.9 °C), resp. rate 16, height 5' 6\" (1.676 m), weight 168 lb (76.2 kg), SpO2 93 %. PHYSICAL EXAM:including affected areas  HENT: Airway patent and reviewed  Cardiovascular: Normal rate, regular rhythm, normal heart sounds. Pulmonary/Chest: No wheezes. No rhonchi. No rales. Abdominal: Soft. Bowel sounds are normal. No distension. Extremities: Moves all extremities equally  Cervical and Lumbar Spine: Painful range of motion, no midline tenderness       Diagnosis:Lumbar radiculopathy  M48.062   M51.36   R26.9   M17.12    Plan: Proceed with planned procedure      ASA CLASS:         []   I. Normal, healthy adult           [x]   II.  Mild systemic disease            []   III. Severe systemic disease      Mallampati: Mallampati Class II - (soft palate, fauces & uvula are visible)      Sedation plan:   [x]  Local              []  Minimal                  []  General anesthesia    Patient's condition acceptable for planned procedure/sedation. Post Procedure Plan   Return to same level of care   ______________________     The patient was counseled at length about the risks of emily Covid-19 in the shawna-operative and post-operative states including the recovery window of their procedure.   The patient was made

## 2020-11-30 NOTE — OP NOTE
Patient:  Archana Salmon  YOB: 1925  Medical Record #:  [id number]   Place: [address] [place] [address]  Date:  11/30/2020   Physician:  Emmanuel Carrizales MD, PAUL    Procedure: 1. Transforaminal Lumbar Epidural Steroid Injection -  right L4  CPT 08509          2. Transforaminal Lumbar Epidural Steroid Injection -  left L4  CPT Mod 50    ELIAS #1    Pre-Procedure Diagnosis: Lumbar radiculopathy      Post-Procedure Diagnosis: Same    Sedation: Local with 1% Lidocaine 3 ml and 1 mg of IV Versed    EBL: None    Complications: None    Procedure Summary:        The patient was brought to the procedure suite and placed in the prone position. The skin overlying the lumbar spine was prepped and draped in the usual sterile fashion. Using fluoroscopic guidance, the right L4 foramen was identified. Through anesthetized skin, a 22 gauge 3.5 inch curved tip spinal needle was advanced into the foramen. Isovue M 300 was instilled showing an epidurogram/nerve root outline pattern without evidence of vascular or intrathecal spread. Following which, 7.5 mg of Celestone mixed with 1 ml of 0.5% Marcaine was instilled. The needle was removed. Using fluoroscopic guidance, the left L4 foramen was identified. Through anesthetized skin, a 22 gauge 3.5 inch curved tip spinal needle was advanced into the foramen. Isovue M 300 was instilled showing an epidurogram/nerve root outline pattern without evidence of vascular or intrathecal spread. Following which, 7.5 mg of Celestone mixed with 1 ml of 0.5% Marcaine was instilled. The needle was removed and band-aids were applied. The patient was transferred to the post-operative area in stable condition.

## 2020-12-11 NOTE — PROGRESS NOTES
Follow up: Abbey Vann  12/21/1925  [id number]         Chief Complaint   Patient presents with    Lower Back Pain     11/30/2020: R L4 + L L4 TF - nearly 100% improvement of symptoms.  Gait Problem     ambulates with Rollator    Shoulder Pain     c/o L shoulder pain from fall many years ago. [email or web address] would like patient to get an injection today for this. HISTORY OF PRESENT ILLNESS:  Mr. Lola Wan is a 80 y.o. male returns for a follow up visit for multiple medical problems. His current presenting problems are   1. Lumbar stenosis with neurogenic claudication    2. Degenerative disc disease, lumbar    3. Chronic left shoulder pain    . As per information/history obtained from the PADT(patient assessment and documentation tool) - He complains of pain in the lower back with radiation to the shoulders Left, upper leg Left and lower leg Left He rates the pain 2/10 and describes it as dull. Pain is made worse by: movement. He denies side effects from the current pain regimen. Patient reports that since the last follow up visit the physical functioning is better, family/social relationships are better, mood is better and sleep patterns are unchanged, and that the overall functioning is better. Patient denies neurological bowel or bladder. The patient presents today in follow-up after a bilateral L4 transforaminal epidural steroid injection was completed on 11/30/2020. He describes nearly 100% improvement of his symptoms. He does ambulate today with a Rollator. The patient does have some left shoulder pain from a fall many years ago. The patient's daughter is present in the office today and would like the patient to get an injection into his left shoulder. The patient describes that he can sit for an indefinite period of time, stand for 10 minutes, and walk for 15 minutes without a considerable amount of pain.       Associated signs and symptoms:   Neurogenic bowel or bladder symptoms: no   Perceived weakness:  yes   Difficulty walking:  no            Past medical, surgical, social and family history reviewed with the patient.     Past Medical History:   Past Medical History:   Diagnosis Date    Acute diastolic CHF (congestive heart failure) ([facility] [address] 10/23/2020    Arthritis     Atrial fibrillation (HCC)     BPH with obstruction/lower urinary tract symptoms     nocturia 3-4 x per night    Chronic atrial fibrillation (HCC)     CKD (chronic kidney disease) 2017    level 3    Coronary arteriosclerosis 2009    2 stents/ Dr Ele Cornelius in [facility] GERD (gastroesophageal reflux disease)     History of blood transfusion     1945   During deployment    HTN (hypertension) 1990    Left hip pain     schrapnel in hip and intestines/chronic pains left hip impairs walking    Pacemaker 2015    Pulmonary fibrosis ([facility] [address] 08/2017    mild amount seen on chest ct    Pulmonary nodule, left 2017    noncalcified 7mm low risk repeat 8/2018 if patient willing    Renal atrophy, right 2017    Renal cyst, right 08/2017    not clearly simple cyst    SOB (shortness of breath) 2017    terrell normal pft and ct chest ? smll amount of pulmonary fibrosis      Past Surgical History:     Past Surgical History:   Procedure Laterality Date    ABDOMEN SURGERY      Scrapnel removed in [facility]  2009    awoke with indigestion    EYE SURGERY Bilateral     cataract    INGUINAL HERNIA REPAIR Right 07/2016    PACEMAKER INSERTION  2014    PACEMAKER PLACEMENT      PAIN MANAGEMENT PROCEDURE Bilateral 11/30/2020    BILATERAL L4 TRANSFORAMINAL EPIDURAL STEROID INJECTION WITH FLUOROSCOPY performed by Shona Hammer MD at [address] [place] [address] as ww II vet, part of small intestine moved    TONSILLECTOMY  1960     Current Medications:     Current Outpatient Medications:     furosemide (LASIX) 40 MG tablet, One po qam for weight over 169lbs, Disp: 90 tablet, Rfl: 3    apixaban (ELIQUIS) 2.5 MG TABS tablet, Take 1 tablet by mouth 2 times daily, Disp: 60 tablet, Rfl: 3    alogliptin (NESINA) 6.25 MG TABS tablet, Take 1 tablet by mouth daily For sugar take daily, Disp: 60 tablet, Rfl: 3    metoprolol succinate (TOPROL XL) 25 MG extended release tablet, Take 0.5 tablets by mouth daily For heart, Disp: 30 tablet, Rfl: 3    furosemide (LASIX) 40 MG tablet, Only take 1 in the a.m. if weight goes up 3 lbs or more, must weight yourself every morning, Disp: 60 tablet, Rfl: 3    omeprazole (PRILOSEC) 20 MG delayed release capsule, Take 20 mg by mouth daily, Disp: , Rfl:     diclofenac sodium (VOLTAREN) 1 % GEL, Apply 4 g topically 3 times daily, Disp: 1 Tube, Rfl: 0    oxybutynin (DITROPAN) 5 MG tablet, TAKE 1 TABLET BY MOUTH EVERY DAY, Disp: 90 tablet, Rfl: 1    fluticasone (FLONASE) 50 MCG/ACT nasal spray, SPRAY 2 SPRAY INTO EACH NOSTRIL EVERY DAY, Disp: 3 Bottle, Rfl: 1    umeclidinium-vilanterol (ANORO ELLIPTA) 62.5-25 MCG/INH AEPB inhaler, Inhale 1 puff into the lungs daily, Disp: 1 each, Rfl: 0    tamsulosin (FLOMAX) 0.4 MG capsule, ONE EVERY NIGHT FOR YOUR PROSTATE, Disp: 90 capsule, Rfl: 3    finasteride (PROSCAR) 5 MG tablet, TAKE 1 TABLET BY MOUTH EVERY DAY, Disp: 90 tablet, Rfl: 4    atorvastatin (LIPITOR) 40 MG tablet, Take 1 tablet by mouth daily, Disp: 90 tablet, Rfl: 2    aspirin 81 MG tablet, Take 81 mg by mouth daily, Disp: , Rfl:     Cholecalciferol (VITAMIN D3) 2000 UNITS CAPS, Take 2,000 Units by mouth daily, Disp: , Rfl:     calcium carbonate (OSCAL) 500 MG TABS tablet, Take 600 mg by mouth daily caltrate 600-D3 one daily, Disp: , Rfl:     Multiple Vitamins-Minerals (CENTRUM SILVER ADULT 50+) TABS, Take 1 tablet by mouth daily, Disp: , Rfl:   Allergies:  Claritin [loratadine] and Zestril [lisinopril]  Social History:    reports that he has never smoked. He has never used smokeless tobacco. He reports previous drug use.  He reports that he does not drink alcohol. Family History:   Family History   Problem Relation Age of Onset    Pacemaker Sister     Pacemaker Brother     No Known Problems Other         lung disease       REVIEW OF SYSTEMS:   CONSTITUTIONAL: Denies unexplained weight loss, fevers, chills or fatigue  NEUROLOGICAL: Denies unsteady gait or progressive weakness  MUSCULOSKELETAL: Denies joint swelling or redness  GI: Denies nausea, vomiting, diarrhea   : Denies bowel or bladder issues       PHYSICAL EXAM:    Vitals: Temperature 98.6 °F (37 °C), resp. rate 14, height 5' 5.98\" (1.676 m), weight 167 lb 15.9 oz (76.2 kg). GENERAL EXAM:  · General Apparence: Patient is adequately groomed with no evidence of malnutrition. · Psychiatric: Orientation: The patient is oriented to time, place and person. The patient's mood and affect are appropriate   · Vascular: Examination reveals no swelling and palpation reveals no tenderness in upper or lower extremities. Good capillary refill. · Sensation is intact without deficit in the upper and lower extremities to light touch. · Coordination of the upper and lower extremities are normal.  · RIGHT UPPER EXTREMITY:  Inspection/examination of the right upper extremity does not show any tenderness, deformity or injury. Range of motion is unremarkable and pain-free. There is no gross instability. There are no rashes, ulcerations or lesions. Strength and tone are normal. No atrophy or abnormal movements are noted. · LEFT UPPER EXTREMITY: Inspection/examination of the left upper extremity does shoulder tenderness, with no deformity or injury. Range of motion is decreased with increased pain with shoulder ROM. There is no gross instability. There are no rashes, ulcerations or lesions. Strength and tone are normal. No atrophy or abnormal movements are noted. LUMBAR/SACRAL EXAMINATION:  · Inspection: Local inspection shows no step-off or bruising.   Lumbar alignment is normal. No instability is noted. · Palpation:   No evidence of tenderness at the midline. Lumbar paraspinal tenderness: Mild L4/5 and L5/S1 tenderness  Bursal tenderness No tenderness bilaterally  There is no paraspinal spasm. · Range of Motion: limited by 25% in all planes due to pain  · Strength:   Strength testing is 5/5 in all muscle groups tested. · Special Tests:   Straight leg raise and crossed SLR negative. Arthur's testing is negative bilaterally. FADIR's testing is negative bilaterally. · Skin: There are no rashes, ulcerations or lesions. · Reflexes: Reflexes are symmetrically 1+ at the patellar and ankle tendons. Clonus absent bilaterally at the feet. · Gait & station: ambulates with a walker and no ataxia  · Additional Examinations:  · RIGHT LOWER EXTREMITY: Inspection/examination of the right lower extremity does not show any tenderness, deformity or injury. Range of motion is normal and pain-free. There is no gross instability. There are no rashes, ulcerations or lesions. Strength and tone are normal. No atrophy or abnormal movements are noted. · LEFT LOWER EXTREMITY:  Inspection/examination of the left lower extremity does not show any tenderness, deformity or injury. Range of motion is normal and pain-free. There is no gross instability. There are no rashes, ulcerations or lesions. Strength and tone are normal. No atrophy or abnormal movements are noted.       Diagnostic Testing:    CT Scan of the Lumbar Spine from 10/20/20:    FINDINGS:    BONES/ALIGNMENT: At L4-5 there is mild anterolisthesis associated with facet    arthropathy.  No acute fractures are identified.         DEGENERATIVE CHANGES:         Anterolateral spur formation is noted at multiple levels.  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         At T12-L1, L1-2 and L2-3, there is no significant central or foraminal    stenosis.         At L3-4, there is a posterior disc bulge, combined with severe facet and    ligamentum flavum hypertrophy, greater on the right.  This results in severe    central stenosis.  There is moderate bilateral foraminal narrowing.         At L4-5, anterolisthesis is again noted, associated with posterior disc bulge    and facet arthropathy.  There is moderate central stenosis and no significant    foraminal stenosis.         At L5-S1, the central canal and neural foramina remain patent.         SOFT TISSUES/RETROPERITONEUM: There is severe aortoiliac calcification.  No    aneurysm is seen.  Chronic right hydronephrosis is noted.  A previously noted    left simple renal cyst is partially visualized.  No follow-up is recommended.              Impression    No acute fracture or subluxation.         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         L3-4 severe central and moderate bilateral foraminal stenosis.         L4-5 grade 1 spondylolisthesis with additional degenerative changes,    resulting in moderate central stenosis. Results for orders placed or performed during the hospital encounter of 11/11/20   Rapid influenza A/B antigens    Specimen: Nasopharyngeal   Result Value Ref Range    Rapid Influenza A Ag Negative Negative    Rapid Influenza B Ag Negative Negative   Culture, Respiratory    Specimen: Sputum Expectorated   Result Value Ref Range    CULTURE, RESPIRATORY Normal respiratory marisol     Gram Stain Result       1+ Epithelial Cells  No WBC's seen  2+ Gram positive cocci  1+ Yeast     Culture, MRSA, Screening    Specimen: Nares; Nasal   Result Value Ref Range    MRSA Culture Only       No Staph aureus MRSA isolated by culture. No Staph aureus MSSA isolated by culture. Culture, Blood 2    Specimen: Blood   Result Value Ref Range    Culture, Blood 2 No Growth after 4 days of incubation. Culture, Blood 1    Specimen: Blood   Result Value Ref Range    Blood Culture, Routine No Growth after 4 days of incubation.     CBC Auto Differential   Result Value Ref Range    WBC 18.2 (H) 4.0 - 11.0 Henrry 14 Not Established mmHg    HCO3, Venous 29 23 - 29 mmol/L    Base Excess, Henrry 2.1 Not Established mmol/L    O2 Sat, Henrry 11 Not Established %    Carboxyhemoglobin 0.9 %    MetHgb, Henrry 1.0 <1.5 %    TC02 (Calc), Henrry 31 Not Established mmol/L    O2 Content, Henrry 2 Not Established mL/dL    O2 Therapy Unknown    Troponin   Result Value Ref Range    Troponin 0.09 (H) <0.01 ng/mL   Troponin   Result Value Ref Range    Troponin 0.09 (H) <0.01 ng/mL   Basic Metabolic Panel w/ Reflex to MG   Result Value Ref Range    Sodium 133 (L) 136 - 145 mmol/L    Potassium reflex Magnesium 4.5 3.5 - 5.1 mmol/L    Chloride 98 (L) 99 - 110 mmol/L    CO2 24 21 - 32 mmol/L    Anion Gap 11 3 - 16    Glucose 124 (H) 70 - 99 mg/dL    BUN 70 (H) 7 - 20 mg/dL    CREATININE 1.9 (H) 0.8 - 1.3 mg/dL    GFR Non- 33 (A) >60    GFR  40 (A) >60    Calcium 8.7 8.3 - 10.6 mg/dL   Brain Natriuretic Peptide   Result Value Ref Range    Pro-BNP 18,115 (H) 0 - 449 pg/mL   Vancomycin, Random   Result Value Ref Range    Vancomycin Rm 12.4 ug/mL   Blood Gas, Venous   Result Value Ref Range    pH, Henrry 7.448 7.350 - 7.450    pCO2, Henrry 38.2 (L) 40.0 - 50.0 mmHg    pO2, Henrry 61 Not Established mmHg    HCO3, Venous 26 23 - 29 mmol/L    Base Excess, Henrry 2.3 Not Established mmol/L    O2 Sat, Henrry 91 Not Established %    Carboxyhemoglobin 0.9 %    MetHgb, Henrry 0.6 <1.5 %    TC02 (Calc), Henrry 28 Not Established mmol/L    O2 Content, Henrry 13 Not Established mL/dL    O2 Therapy Unknown    CBC   Result Value Ref Range    WBC 13.5 (H) 4.0 - 11.0 K/uL    RBC 3.79 (L) 4.20 - 5.90 M/uL    Hemoglobin 9.7 (L) 13.5 - 17.5 g/dL    Hematocrit 30.6 (L) 40.5 - 52.5 %    MCV 80.7 80.0 - 100.0 fL    MCH 25.7 (L) 26.0 - 34.0 pg    MCHC 31.8 31.0 - 36.0 g/dL    RDW 17.3 (H) 12.4 - 15.4 %    Platelets 695 250 - 571 K/uL    MPV 9.8 5.0 - 10.5 fL   Troponin   Result Value Ref Range    Troponin 0.06 (H) <0.01 ng/mL   Procalcitonin   Result Value Ref Range Procalcitonin 0.92 (H) 0.00 - 0.15 ng/mL   Basic Metabolic Panel w/ Reflex to MG   Result Value Ref Range    Sodium 136 136 - 145 mmol/L    Potassium reflex Magnesium 4.3 3.5 - 5.1 mmol/L    Chloride 97 (L) 99 - 110 mmol/L    CO2 29 21 - 32 mmol/L    Anion Gap 10 3 - 16    Glucose 116 (H) 70 - 99 mg/dL    BUN 79 (H) 7 - 20 mg/dL    CREATININE 2.4 (H) 0.8 - 1.3 mg/dL    GFR Non-African American 25 (A) >60    GFR  31 (A) >60    Calcium 7.9 (L) 8.3 - 10.6 mg/dL   Urinalysis   Result Value Ref Range    Color, UA YELLOW Straw/Yellow    Clarity, UA Clear Clear    Glucose, Ur Negative Negative mg/dL    Bilirubin Urine Negative Negative    Ketones, Urine Negative Negative mg/dL    Specific Gravity, UA 1.012 1.005 - 1.030    Blood, Urine LARGE (A) Negative    pH, UA 5.5 5.0 - 8.0    Protein, UA TRACE (A) Negative mg/dL    Urobilinogen, Urine 0.2 <2.0 E.U./dL    Nitrite, Urine Negative Negative    Leukocyte Esterase, Urine SMALL (A) Negative    Microscopic Examination YES     Urine Type NotGiven    Microscopic Urinalysis   Result Value Ref Range    Hyaline Casts, UA 2 0 - 8 /LPF    WBC, UA 6 (H) 0 - 5 /HPF    RBC,  (H) 0 - 4 /HPF    Epithelial Cells, UA 1 0 - 5 /HPF   Basic Metabolic Panel w/ Reflex to MG   Result Value Ref Range    Sodium 138 136 - 145 mmol/L    Potassium reflex Magnesium 4.1 3.5 - 5.1 mmol/L    Chloride 96 (L) 99 - 110 mmol/L    CO2 32 21 - 32 mmol/L    Anion Gap 10 3 - 16    Glucose 114 (H) 70 - 99 mg/dL    BUN 76 (H) 7 - 20 mg/dL    CREATININE 2.1 (H) 0.8 - 1.3 mg/dL    GFR Non-African American 29 (A) >60    GFR  36 (A) >60    Calcium 8.6 8.3 - 10.6 mg/dL   Basic Metabolic Panel w/ Reflex to MG   Result Value Ref Range    Sodium 137 136 - 145 mmol/L    Potassium reflex Magnesium 3.5 3.5 - 5.1 mmol/L    Chloride 96 (L) 99 - 110 mmol/L    CO2 28 21 - 32 mmol/L    Anion Gap 13 3 - 16    Glucose 107 (H) 70 - 99 mg/dL    BUN 68 (H) 7 - 20 mg/dL    CREATININE 2.1 (H) 0.8 - 1.3 mg/dL    GFR Non-African American 29 (A) >60    GFR  36 (A) >60    Calcium 8.9 8.3 - 10.6 mg/dL   Magnesium   Result Value Ref Range    Magnesium 2.20 1.80 - 2.40 mg/dL   Brain Natriuretic Peptide   Result Value Ref Range    Pro-BNP 3,742 (H) 0 - 449 pg/mL   Basic Metabolic Panel w/ Reflex to MG   Result Value Ref Range    Sodium 137 136 - 145 mmol/L    Potassium reflex Magnesium 4.0 3.5 - 5.1 mmol/L    Chloride 95 (L) 99 - 110 mmol/L    CO2 29 21 - 32 mmol/L    Anion Gap 13 3 - 16    Glucose 105 (H) 70 - 99 mg/dL    BUN 69 (H) 7 - 20 mg/dL    CREATININE 1.9 (H) 0.8 - 1.3 mg/dL    GFR Non- 33 (A) >60    GFR  40 (A) >60    Calcium 8.6 8.3 - 10.6 mg/dL   Brain Natriuretic Peptide   Result Value Ref Range    Pro-BNP 3,084 (H) 0 - 449 pg/mL   Basic Metabolic Panel w/ Reflex to MG   Result Value Ref Range    Sodium 136 136 - 145 mmol/L    Potassium reflex Magnesium 4.3 3.5 - 5.1 mmol/L    Chloride 98 (L) 99 - 110 mmol/L    CO2 27 21 - 32 mmol/L    Anion Gap 11 3 - 16    Glucose 102 (H) 70 - 99 mg/dL    BUN 65 (H) 7 - 20 mg/dL    CREATININE 2.2 (H) 0.8 - 1.3 mg/dL    GFR Non-African American 28 (A) >60    GFR  34 (A) >60    Calcium 9.0 8.3 - 10.6 mg/dL   Basic Metabolic Panel w/ Reflex to MG   Result Value Ref Range    Sodium 137 136 - 145 mmol/L    Potassium reflex Magnesium 4.8 3.5 - 5.1 mmol/L    Chloride 99 99 - 110 mmol/L    CO2 29 21 - 32 mmol/L    Anion Gap 9 3 - 16    Glucose 106 (H) 70 - 99 mg/dL    BUN 61 (H) 7 - 20 mg/dL    CREATININE 2.0 (H) 0.8 - 1.3 mg/dL    GFR Non- 31 (A) >60    GFR  38 (A) >60    Calcium 8.8 8.3 - 10.6 mg/dL   Basic Metabolic Panel w/ Reflex to MG   Result Value Ref Range    Sodium 135 (L) 136 - 145 mmol/L    Potassium reflex Magnesium 4.9 3.5 - 5.1 mmol/L    Chloride 98 (L) 99 - 110 mmol/L    CO2 28 21 - 32 mmol/L    Anion Gap 9 3 - 16    Glucose 97 70 - 99 mg/dL    BUN 62 (H) 7 - 20 mg/dL CREATININE 2.1 (H) 0.8 - 1.3 mg/dL    GFR Non-African American 29 (A) >60    GFR  36 (A) >60    Calcium 8.6 8.3 - 10.6 mg/dL   Basic Metabolic Panel w/ Reflex to MG   Result Value Ref Range    Sodium 135 (L) 136 - 145 mmol/L    Potassium reflex Magnesium 4.6 3.5 - 5.1 mmol/L    Chloride 102 99 - 110 mmol/L    CO2 25 21 - 32 mmol/L    Anion Gap 8 3 - 16    Glucose 107 (H) 70 - 99 mg/dL    BUN 57 (H) 7 - 20 mg/dL    CREATININE 2.1 (H) 0.8 - 1.3 mg/dL    GFR Non-African American 29 (A) >60    GFR  36 (A) >60    Calcium 8.8 8.3 - 10.6 mg/dL   Basic Metabolic Panel   Result Value Ref Range    BUN 46 (H) 8 - 26 mg/dL    Sodium 131 (L) 135 - 145 mEq/L    Potassium 5.4 (H) 3.6 - 5.1 mEq/L    Chloride 100 98 - 111 mEq/L    CO2 22 21 - 31 mmol/L    Glucose 139 (H) 70 - 99 mg/dL    CREATININE 1.97 (H) 0.70 - 1.30 mg/dL    Anion Gap 9 6 - 18 mmol/L    Calcium 8.3 (L) 8.5 - 10.4 mg/dL    GFR Non-African American 27 (L) >59 mL/min/1.73 m2    GFR  32 (L) >59 mL/min/1.73 m2   POCT Glucose   Result Value Ref Range    POC Glucose 367 (H) 70 - 99 mg/dl    Performed on ACCU-CHEK    POCT Glucose   Result Value Ref Range    POC Glucose 183 (H) 70 - 99 mg/dl    Performed on ACCU-CHEK    POCT Glucose   Result Value Ref Range    POC Glucose 150 (H) 70 - 99 mg/dl    Performed on ACCU-CHEK    POCT Glucose   Result Value Ref Range    POC Glucose 115 (H) 70 - 99 mg/dl    Performed on ACCU-CHEK    POCT Glucose   Result Value Ref Range    POC Glucose 248 (H) 70 - 99 mg/dl    Performed on ACCU-CHEK    POCT Glucose   Result Value Ref Range    POC Glucose 140 (H) 70 - 99 mg/dl    Performed on ACCU-CHEK    POCT Glucose   Result Value Ref Range    POC Glucose 170 (H) 70 - 99 mg/dl    Performed on ACCU-CHEK    POCT Glucose   Result Value Ref Range    POC Glucose 155 (H) 70 - 99 mg/dl    Performed on ACCU-CHEK    POCT Glucose   Result Value Ref Range    POC Glucose 205 (H) 70 - 99 mg/dl    Performed on ACCU-CHEK    POCT Glucose   Result Value Ref Range    POC Glucose 152 (H) 70 - 99 mg/dl    Performed on ACCU-CHEK    POCT Glucose   Result Value Ref Range    POC Glucose 145 (H) 70 - 99 mg/dl    Performed on ACCU-CHEK    POCT Glucose   Result Value Ref Range    POC Glucose 128 (H) 70 - 99 mg/dl    Performed on ACCU-CHEK    POCT Glucose   Result Value Ref Range    POC Glucose 268 (H) 70 - 99 mg/dl    Performed on ACCU-CHEK    POCT Glucose   Result Value Ref Range    POC Glucose 157 (H) 70 - 99 mg/dl    Performed on ACCU-CHEK    POCT Glucose   Result Value Ref Range    POC Glucose 138 (H) 70 - 99 mg/dl    Performed on ACCU-CHEK    POCT Glucose   Result Value Ref Range    POC Glucose 99 70 - 99 mg/dl    Performed on ACCU-CHEK    POCT Glucose   Result Value Ref Range    POC Glucose 295 (H) 70 - 99 mg/dl    Performed on ACCU-CHEK    POCT Glucose   Result Value Ref Range    POC Glucose 154 (H) 70 - 99 mg/dl    Performed on ACCU-CHEK    POCT Glucose   Result Value Ref Range    POC Glucose 134 (H) 70 - 99 mg/dl    Performed on ACCU-CHEK    POCT Glucose   Result Value Ref Range    POC Glucose 112 (H) 70 - 99 mg/dl    Performed on ACCU-CHEK    POCT Glucose   Result Value Ref Range    POC Glucose 109 (H) 70 - 99 mg/dl    Performed on ACCU-CHEK    POCT Glucose   Result Value Ref Range    POC Glucose 344 (H) 70 - 99 mg/dl    Performed on ACCU-CHEK    POCT Glucose   Result Value Ref Range    POC Glucose 125 (H) 70 - 99 mg/dl    Performed on ACCU-CHEK    POCT Glucose   Result Value Ref Range    POC Glucose 212 (H) 70 - 99 mg/dl    Performed on ACCU-CHEK    POCT Glucose   Result Value Ref Range    POC Glucose 103 (H) 70 - 99 mg/dl    Performed on ACCU-CHEK    POCT Glucose   Result Value Ref Range    POC Glucose 268 (H) 70 - 99 mg/dl    Performed on ACCU-CHEK    POCT Glucose   Result Value Ref Range    POC Glucose 134 (H) 70 - 99 mg/dl    Performed on ACCU-CHEK    POCT Glucose   Result Value Ref Range    POC Glucose 217 (H) 70 - 99 mg/dl    Performed on ACCU-CHEK    POCT Glucose   Result Value Ref Range    POC Glucose 103 (H) 70 - 99 mg/dl    Performed on ACCU-CHEK    POCT Glucose   Result Value Ref Range    POC Glucose 171 (H) 70 - 99 mg/dl    Performed on ACCU-CHEK    POCT Glucose   Result Value Ref Range    POC Glucose 149 (H) 70 - 99 mg/dl    Performed on ACCU-CHEK    POCT Glucose   Result Value Ref Range    POC Glucose 137 (H) 70 - 99 mg/dl    Performed on ACCU-CHEK    POCT Glucose   Result Value Ref Range    POC Glucose 106 (H) 70 - 99 mg/dl    Performed on ACCU-CHEK    EKG 12 Lead   Result Value Ref Range    Ventricular Rate 70 BPM    Atrial Rate 312 BPM    QRS Duration 160 ms    Q-T Interval 444 ms    QTc Calculation (Bazett) 479 ms    R Axis -52 degrees    T Axis 18 degrees    Diagnosis       Atrial fibrillation with V pacingLeft axis deviationConfirmed by HAQ MD, Ferne Mortimer ([id number]) on 2020 8:12:17 AM     Impression:       1. Lumbar stenosis with neurogenic claudication    2. Degenerative disc disease, lumbar    3. Chronic left shoulder pain        Plan:  Clinical Course: Above diagnoses are improving except diagnosis 3 is worsening. I discussed the diagnosis and the treatment options with Aman Lopes today. In Summary:  The various treatment options were outlined and discussed with Aman Lopes including:  Conservative care options: physical therapy, ice, medications, bracing, and activity modification. The indications for therapeutic injections. The indications for additional imaging/laboratory studies. The indications for (possible future) interventions. After considering the various options discussed, Aman Lopes elected to pursue a course of treatment that includes the followin. Medications:  No further recommendations for new medications. 2. PT:  Encouraged to continue with Home exercise program.    3. Further studies: No further studies.       4. Interventional: The patient is nearly 100% improved following a bilateral L4 transforaminal epidural steroid injection. A left subacromial bursa injection into the left shoulder was discussed with the patient today in the office as well as his daughter. Risk, benefits, alternatives, and side effects were discussed and the patient was willing ready to proceed. The patient was placed in seated position with the left shoulder in full view. 2 fingerbreadths below the left acromium a latanya was placed and then prepped with ChloraPrep x3. Using a 22-gauge needle 80 mg of Kenalog mixed with 4 mL of 1% lidocaine was drawn up and then instilled at the site. The needle was removed and a Band-Aid was applied. This was well-tolerated. 5. Follow up:  4-6 weeks      Wing Lovett was instructed to call the office if his symptoms worsen or if new symptoms appear prior to the next scheduled visit. He is specifically instructed to contact the office between now & his scheduled appointment if he has concerns related to his condition or if he needs assistance in scheduling the above tests. He is welcome to call for an appointment sooner if he has any additional concerns or questions. JESSICA Macias, PA-C  Board Certified by the [employer] on Certification of Physician Assistants  [facility] [facility]  Partner of [facility] ([facility])         This dictation was performed with a verbal recognition program [facility]) and it was checked for errors. It is possible that there are still dictated errors within this office note. If so, please bring any errors to my attention for an addendum. All efforts were made to ensure that this office note is accurate.

## 2020-12-11 NOTE — PROGRESS NOTES
12/11/20 1:37 PM         NDC: [id number]   -   LIDOCAINE 1%    Lot Number: [id number]       Comments: [address]

## 2021-01-01 ENCOUNTER — APPOINTMENT (OUTPATIENT)
Dept: GENERAL RADIOLOGY | Age: 86
DRG: 515 | End: 2021-01-01
Attending: INTERNAL MEDICINE
Payer: MEDICARE

## 2021-01-01 ENCOUNTER — CARE COORDINATION (OUTPATIENT)
Dept: CARE COORDINATION | Age: 86
End: 2021-01-01

## 2021-01-01 ENCOUNTER — OUTSIDE SERVICES (OUTPATIENT)
Dept: WOUND CARE | Age: 86
End: 2021-01-01
Payer: MEDICARE

## 2021-01-01 ENCOUNTER — HOSPITAL ENCOUNTER (INPATIENT)
Age: 86
LOS: 8 days | Discharge: NURSING, REHAB OR LONG-TERM CARE | DRG: 515 | End: 2021-05-20
Attending: INTERNAL MEDICINE | Admitting: INTERNAL MEDICINE
Payer: MEDICARE

## 2021-01-01 ENCOUNTER — APPOINTMENT (OUTPATIENT)
Dept: CT IMAGING | Age: 86
DRG: 515 | End: 2021-01-01
Attending: INTERNAL MEDICINE
Payer: MEDICARE

## 2021-01-01 ENCOUNTER — OUTSIDE SERVICES (OUTPATIENT)
Dept: WOUND CARE | Age: 86
End: 2021-01-01

## 2021-01-01 ENCOUNTER — APPOINTMENT (OUTPATIENT)
Dept: CT IMAGING | Age: 86
DRG: 871 | End: 2021-01-01
Payer: MEDICARE

## 2021-01-01 ENCOUNTER — OFFICE VISIT (OUTPATIENT)
Dept: ORTHOPEDIC SURGERY | Age: 86
End: 2021-01-01
Payer: MEDICARE

## 2021-01-01 ENCOUNTER — APPOINTMENT (OUTPATIENT)
Dept: GENERAL RADIOLOGY | Age: 86
DRG: 871 | End: 2021-01-01
Payer: MEDICARE

## 2021-01-01 ENCOUNTER — IMMUNIZATION (OUTPATIENT)
Dept: PRIMARY CARE CLINIC | Age: 86
End: 2021-01-01
Payer: MEDICARE

## 2021-01-01 ENCOUNTER — HOSPITAL ENCOUNTER (INPATIENT)
Age: 86
LOS: 7 days | Discharge: ANOTHER HOSPITAL | DRG: 871 | End: 2021-05-12
Attending: STUDENT IN AN ORGANIZED HEALTH CARE EDUCATION/TRAINING PROGRAM | Admitting: INTERNAL MEDICINE
Payer: MEDICARE

## 2021-01-01 ENCOUNTER — APPOINTMENT (OUTPATIENT)
Dept: INTERVENTIONAL RADIOLOGY/VASCULAR | Age: 86
DRG: 515 | End: 2021-01-01
Attending: INTERNAL MEDICINE
Payer: MEDICARE

## 2021-01-01 ENCOUNTER — OFFICE VISIT (OUTPATIENT)
Dept: PULMONOLOGY | Age: 86
End: 2021-01-01
Payer: MEDICARE

## 2021-01-01 ENCOUNTER — CARE COORDINATION (OUTPATIENT)
Dept: CASE MANAGEMENT | Age: 86
End: 2021-01-01

## 2021-01-01 VITALS
BODY MASS INDEX: 25.75 KG/M2 | TEMPERATURE: 97.4 F | OXYGEN SATURATION: 96 % | SYSTOLIC BLOOD PRESSURE: 122 MMHG | DIASTOLIC BLOOD PRESSURE: 95 MMHG | HEIGHT: 65 IN | WEIGHT: 154.54 LBS | RESPIRATION RATE: 22 BRPM | HEART RATE: 69 BPM

## 2021-01-01 VITALS
RESPIRATION RATE: 16 BRPM | BODY MASS INDEX: 25.01 KG/M2 | SYSTOLIC BLOOD PRESSURE: 120 MMHG | HEART RATE: 72 BPM | OXYGEN SATURATION: 97 % | WEIGHT: 150.13 LBS | HEIGHT: 65 IN | TEMPERATURE: 97.6 F | DIASTOLIC BLOOD PRESSURE: 63 MMHG

## 2021-01-01 VITALS
HEART RATE: 71 BPM | OXYGEN SATURATION: 96 % | SYSTOLIC BLOOD PRESSURE: 122 MMHG | HEIGHT: 65 IN | BODY MASS INDEX: 26.66 KG/M2 | TEMPERATURE: 97.7 F | RESPIRATION RATE: 16 BRPM | WEIGHT: 160 LBS | DIASTOLIC BLOOD PRESSURE: 70 MMHG

## 2021-01-01 VITALS — TEMPERATURE: 97.2 F | RESPIRATION RATE: 12 BRPM | BODY MASS INDEX: 26.84 KG/M2 | HEIGHT: 66 IN | WEIGHT: 167 LBS

## 2021-01-01 DIAGNOSIS — S41.112D SKIN TEAR OF LEFT UPPER ARM WITHOUT COMPLICATION, SUBSEQUENT ENCOUNTER: ICD-10-CM

## 2021-01-01 DIAGNOSIS — J84.10 PULMONARY FIBROSIS (HCC): ICD-10-CM

## 2021-01-01 DIAGNOSIS — S61.411D SKIN TEAR OF HAND WITHOUT COMPLICATION, RIGHT, SUBSEQUENT ENCOUNTER: ICD-10-CM

## 2021-01-01 DIAGNOSIS — T14.8XXA FRICTION INJURY TO SKIN: ICD-10-CM

## 2021-01-01 DIAGNOSIS — M25.512 CHRONIC LEFT SHOULDER PAIN: ICD-10-CM

## 2021-01-01 DIAGNOSIS — M51.36 DEGENERATIVE DISC DISEASE, LUMBAR: ICD-10-CM

## 2021-01-01 DIAGNOSIS — S41.111D SKIN TEAR OF UPPER ARM WITHOUT COMPLICATION, RIGHT, SUBSEQUENT ENCOUNTER: ICD-10-CM

## 2021-01-01 DIAGNOSIS — T14.8XXA NONTRAUMATIC TEAR OF SKIN: ICD-10-CM

## 2021-01-01 DIAGNOSIS — S51.811D SKIN TEAR OF RIGHT FOREARM WITHOUT COMPLICATION, SUBSEQUENT ENCOUNTER: ICD-10-CM

## 2021-01-01 DIAGNOSIS — G83.4 CAUDA EQUINA SYNDROME (HCC): ICD-10-CM

## 2021-01-01 DIAGNOSIS — R13.10 DYSPHAGIA, UNSPECIFIED TYPE: Primary | ICD-10-CM

## 2021-01-01 DIAGNOSIS — S51.812D SKIN TEAR OF LEFT FOREARM WITHOUT COMPLICATION, SUBSEQUENT ENCOUNTER: ICD-10-CM

## 2021-01-01 DIAGNOSIS — S32.049A CLOSED FRACTURE OF FOURTH LUMBAR VERTEBRA, UNSPECIFIED FRACTURE MORPHOLOGY, INITIAL ENCOUNTER (HCC): Primary | ICD-10-CM

## 2021-01-01 DIAGNOSIS — G93.40 ACUTE ENCEPHALOPATHY: ICD-10-CM

## 2021-01-01 DIAGNOSIS — S41.111D SKIN TEAR OF RIGHT UPPER ARM WITHOUT COMPLICATION, SUBSEQUENT ENCOUNTER: ICD-10-CM

## 2021-01-01 DIAGNOSIS — S51.811D SKIN TEAR OF FOREARM WITHOUT COMPLICATION, RIGHT, SUBSEQUENT ENCOUNTER: ICD-10-CM

## 2021-01-01 DIAGNOSIS — S40.021A HEMATOMA OF ARM, RIGHT, INITIAL ENCOUNTER: ICD-10-CM

## 2021-01-01 DIAGNOSIS — M48.062 LUMBAR STENOSIS WITH NEUROGENIC CLAUDICATION: Primary | ICD-10-CM

## 2021-01-01 DIAGNOSIS — S51.812D SKIN TEAR OF FOREARM WITHOUT COMPLICATION, LEFT, SUBSEQUENT ENCOUNTER: ICD-10-CM

## 2021-01-01 DIAGNOSIS — S61.511A: ICD-10-CM

## 2021-01-01 DIAGNOSIS — G89.29 CHRONIC LEFT SHOULDER PAIN: ICD-10-CM

## 2021-01-01 DIAGNOSIS — D50.9 MICROCYTIC ANEMIA: ICD-10-CM

## 2021-01-01 DIAGNOSIS — R33.8 ACUTE URINARY RETENTION: ICD-10-CM

## 2021-01-01 LAB
ABO/RH: NORMAL
ACANTHOCYTES: ABNORMAL
ACANTHOCYTES: ABNORMAL
ALBUMIN SERPL-MCNC: 2.4 G/DL (ref 3.4–5)
ALBUMIN SERPL-MCNC: 2.7 G/DL (ref 3.4–5)
ALBUMIN SERPL-MCNC: 2.8 G/DL (ref 3.4–5)
ALBUMIN SERPL-MCNC: 2.9 G/DL (ref 3.4–5)
ALBUMIN SERPL-MCNC: 3 G/DL (ref 3.4–5)
ALBUMIN SERPL-MCNC: 3.1 G/DL (ref 3.4–5)
ALBUMIN SERPL-MCNC: 3.2 G/DL (ref 3.4–5)
ALBUMIN SERPL-MCNC: 3.2 G/DL (ref 3.4–5)
ALBUMIN SERPL-MCNC: 3.4 G/DL (ref 3.4–5)
ALP BLD-CCNC: 101 U/L (ref 40–129)
ALP BLD-CCNC: 103 U/L (ref 40–129)
ALP BLD-CCNC: 115 U/L (ref 40–129)
ALP BLD-CCNC: 116 U/L (ref 40–129)
ALP BLD-CCNC: 119 U/L (ref 40–129)
ALP BLD-CCNC: 131 U/L (ref 40–129)
ALP BLD-CCNC: 144 U/L (ref 40–129)
ALP BLD-CCNC: 177 U/L (ref 40–129)
ALT SERPL-CCNC: 109 U/L (ref 10–40)
ALT SERPL-CCNC: 145 U/L (ref 10–40)
ALT SERPL-CCNC: 225 U/L (ref 10–40)
ALT SERPL-CCNC: 293 U/L (ref 10–40)
ALT SERPL-CCNC: 375 U/L (ref 10–40)
ALT SERPL-CCNC: 527 U/L (ref 10–40)
ALT SERPL-CCNC: 628 U/L (ref 10–40)
ALT SERPL-CCNC: 774 U/L (ref 10–40)
AMMONIA: 25 UMOL/L (ref 16–60)
ANION GAP SERPL CALCULATED.3IONS-SCNC: 11 MMOL/L (ref 3–16)
ANION GAP SERPL CALCULATED.3IONS-SCNC: 12 MMOL/L (ref 3–16)
ANION GAP SERPL CALCULATED.3IONS-SCNC: 12 MMOL/L (ref 3–16)
ANION GAP SERPL CALCULATED.3IONS-SCNC: 13 MMOL/L (ref 3–16)
ANION GAP SERPL CALCULATED.3IONS-SCNC: 13 MMOL/L (ref 3–16)
ANION GAP SERPL CALCULATED.3IONS-SCNC: 15 MMOL/L (ref 3–16)
ANION GAP SERPL CALCULATED.3IONS-SCNC: 19 MMOL/L (ref 3–16)
ANION GAP SERPL CALCULATED.3IONS-SCNC: 6 MMOL/L (ref 3–16)
ANION GAP SERPL CALCULATED.3IONS-SCNC: 8 MMOL/L (ref 3–16)
ANION GAP SERPL CALCULATED.3IONS-SCNC: 8 MMOL/L (ref 3–16)
ANION GAP SERPL CALCULATED.3IONS-SCNC: 9 MMOL/L (ref 3–16)
ANISOCYTOSIS: ABNORMAL
ANTIBODY SCREEN: NORMAL
APTT: 110 SEC (ref 24.2–36.2)
APTT: 26.2 SEC (ref 24.2–36.2)
APTT: 27.1 SEC (ref 24.2–36.2)
APTT: 29.6 SEC (ref 24.2–36.2)
APTT: 30.4 SEC (ref 24.2–36.2)
APTT: 31 SEC (ref 24.2–36.2)
APTT: 36.2 SEC (ref 24.2–36.2)
APTT: 57 SEC (ref 24.2–36.2)
APTT: 62.9 SEC (ref 24.2–36.2)
APTT: 66.3 SEC (ref 24.2–36.2)
APTT: 74.5 SEC (ref 24.2–36.2)
APTT: 86.6 SEC (ref 24.2–36.2)
AST SERPL-CCNC: 162 U/L (ref 15–37)
AST SERPL-CCNC: 27 U/L (ref 15–37)
AST SERPL-CCNC: 30 U/L (ref 15–37)
AST SERPL-CCNC: 30 U/L (ref 15–37)
AST SERPL-CCNC: 358 U/L (ref 15–37)
AST SERPL-CCNC: 44 U/L (ref 15–37)
AST SERPL-CCNC: 524 U/L (ref 15–37)
AST SERPL-CCNC: 72 U/L (ref 15–37)
BANDED NEUTROPHILS RELATIVE PERCENT: 2 % (ref 0–7)
BASE EXCESS VENOUS: -4.1 MMOL/L
BASOPHILS ABSOLUTE: 0 K/UL (ref 0–0.2)
BASOPHILS ABSOLUTE: 0.1 K/UL (ref 0–0.2)
BASOPHILS RELATIVE PERCENT: 0 %
BASOPHILS RELATIVE PERCENT: 0.1 %
BASOPHILS RELATIVE PERCENT: 0.2 %
BASOPHILS RELATIVE PERCENT: 0.3 %
BASOPHILS RELATIVE PERCENT: 0.5 %
BASOPHILS RELATIVE PERCENT: 0.5 %
BASOPHILS RELATIVE PERCENT: 0.6 %
BILIRUB SERPL-MCNC: 0.9 MG/DL (ref 0–1)
BILIRUB SERPL-MCNC: 0.9 MG/DL (ref 0–1)
BILIRUB SERPL-MCNC: 1 MG/DL (ref 0–1)
BILIRUB SERPL-MCNC: 1 MG/DL (ref 0–1)
BILIRUB SERPL-MCNC: 1.1 MG/DL (ref 0–1)
BILIRUB SERPL-MCNC: 1.2 MG/DL (ref 0–1)
BILIRUB SERPL-MCNC: 1.3 MG/DL (ref 0–1)
BILIRUB SERPL-MCNC: 1.7 MG/DL (ref 0–1)
BILIRUBIN DIRECT: 0.4 MG/DL (ref 0–0.3)
BILIRUBIN DIRECT: 0.4 MG/DL (ref 0–0.3)
BILIRUBIN DIRECT: 0.5 MG/DL (ref 0–0.3)
BILIRUBIN DIRECT: 0.7 MG/DL (ref 0–0.3)
BILIRUBIN URINE: NEGATIVE
BILIRUBIN URINE: NEGATIVE
BILIRUBIN, INDIRECT: 0.4 MG/DL (ref 0–1)
BILIRUBIN, INDIRECT: 0.5 MG/DL (ref 0–1)
BILIRUBIN, INDIRECT: 0.5 MG/DL (ref 0–1)
BILIRUBIN, INDIRECT: 0.6 MG/DL (ref 0–1)
BILIRUBIN, INDIRECT: 0.6 MG/DL (ref 0–1)
BILIRUBIN, INDIRECT: 0.7 MG/DL (ref 0–1)
BILIRUBIN, INDIRECT: 0.8 MG/DL (ref 0–1)
BILIRUBIN, INDIRECT: 1 MG/DL (ref 0–1)
BLOOD CULTURE, ROUTINE: NORMAL
BLOOD, URINE: ABNORMAL
BLOOD, URINE: ABNORMAL
BUN BLDV-MCNC: 26 MG/DL (ref 7–20)
BUN BLDV-MCNC: 27 MG/DL (ref 7–20)
BUN BLDV-MCNC: 28 MG/DL (ref 7–20)
BUN BLDV-MCNC: 28 MG/DL (ref 7–20)
BUN BLDV-MCNC: 29 MG/DL (ref 7–20)
BUN BLDV-MCNC: 30 MG/DL (ref 7–20)
BUN BLDV-MCNC: 30 MG/DL (ref 7–20)
BUN BLDV-MCNC: 31 MG/DL (ref 7–20)
BUN BLDV-MCNC: 35 MG/DL (ref 7–20)
BUN BLDV-MCNC: 35 MG/DL (ref 7–20)
BUN BLDV-MCNC: 36 MG/DL (ref 7–20)
BUN BLDV-MCNC: 38 MG/DL (ref 7–20)
BUN BLDV-MCNC: 45 MG/DL (ref 7–20)
BUN BLDV-MCNC: 49 MG/DL (ref 7–20)
BURR CELLS: ABNORMAL
CALCIUM SERPL-MCNC: 7.9 MG/DL (ref 8.3–10.6)
CALCIUM SERPL-MCNC: 8.2 MG/DL (ref 8.3–10.6)
CALCIUM SERPL-MCNC: 8.7 MG/DL (ref 8.3–10.6)
CALCIUM SERPL-MCNC: 8.8 MG/DL (ref 8.3–10.6)
CALCIUM SERPL-MCNC: 8.9 MG/DL (ref 8.3–10.6)
CALCIUM SERPL-MCNC: 9 MG/DL (ref 8.3–10.6)
CALCIUM SERPL-MCNC: 9.1 MG/DL (ref 8.3–10.6)
CARBOXYHEMOGLOBIN: 2.1 %
CHLORIDE BLD-SCNC: 100 MMOL/L (ref 99–110)
CHLORIDE BLD-SCNC: 100 MMOL/L (ref 99–110)
CHLORIDE BLD-SCNC: 103 MMOL/L (ref 99–110)
CHLORIDE BLD-SCNC: 105 MMOL/L (ref 99–110)
CHLORIDE BLD-SCNC: 107 MMOL/L (ref 99–110)
CHLORIDE BLD-SCNC: 111 MMOL/L (ref 99–110)
CHLORIDE BLD-SCNC: 112 MMOL/L (ref 99–110)
CHLORIDE BLD-SCNC: 113 MMOL/L (ref 99–110)
CHLORIDE BLD-SCNC: 114 MMOL/L (ref 99–110)
CHLORIDE BLD-SCNC: 116 MMOL/L (ref 99–110)
CHLORIDE BLD-SCNC: 91 MMOL/L (ref 99–110)
CHLORIDE BLD-SCNC: 95 MMOL/L (ref 99–110)
CHLORIDE BLD-SCNC: 97 MMOL/L (ref 99–110)
CHLORIDE BLD-SCNC: 99 MMOL/L (ref 99–110)
CHLORIDE URINE RANDOM: 98 MMOL/L
CLARITY: CLEAR
CLARITY: CLEAR
CO2: 16 MMOL/L (ref 21–32)
CO2: 19 MMOL/L (ref 21–32)
CO2: 20 MMOL/L (ref 21–32)
CO2: 21 MMOL/L (ref 21–32)
CO2: 22 MMOL/L (ref 21–32)
CO2: 23 MMOL/L (ref 21–32)
CO2: 24 MMOL/L (ref 21–32)
CO2: 26 MMOL/L (ref 21–32)
CO2: 26 MMOL/L (ref 21–32)
CO2: 27 MMOL/L (ref 21–32)
CO2: 29 MMOL/L (ref 21–32)
CO2: 31 MMOL/L (ref 21–32)
CO2: 31 MMOL/L (ref 21–32)
COLOR: YELLOW
COLOR: YELLOW
CREAT SERPL-MCNC: 0.8 MG/DL (ref 0.8–1.3)
CREAT SERPL-MCNC: 0.8 MG/DL (ref 0.8–1.3)
CREAT SERPL-MCNC: 0.9 MG/DL (ref 0.8–1.3)
CREAT SERPL-MCNC: 1 MG/DL (ref 0.8–1.3)
CREAT SERPL-MCNC: 1.1 MG/DL (ref 0.8–1.3)
CREAT SERPL-MCNC: 1.1 MG/DL (ref 0.8–1.3)
CREAT SERPL-MCNC: 1.2 MG/DL (ref 0.8–1.3)
CREAT SERPL-MCNC: 1.3 MG/DL (ref 0.8–1.3)
CREAT SERPL-MCNC: 1.3 MG/DL (ref 0.8–1.3)
CULTURE, BLOOD 2: NORMAL
EOSINOPHILS ABSOLUTE: 0 K/UL (ref 0–0.6)
EOSINOPHILS ABSOLUTE: 0.1 K/UL (ref 0–0.6)
EOSINOPHILS ABSOLUTE: 0.2 K/UL (ref 0–0.6)
EOSINOPHILS RELATIVE PERCENT: 0 %
EOSINOPHILS RELATIVE PERCENT: 0 %
EOSINOPHILS RELATIVE PERCENT: 0.1 %
EOSINOPHILS RELATIVE PERCENT: 0.1 %
EOSINOPHILS RELATIVE PERCENT: 0.3 %
EOSINOPHILS RELATIVE PERCENT: 0.4 %
EOSINOPHILS RELATIVE PERCENT: 0.6 %
EOSINOPHILS RELATIVE PERCENT: 0.7 %
EOSINOPHILS RELATIVE PERCENT: 0.7 %
EOSINOPHILS RELATIVE PERCENT: 1 %
EOSINOPHILS RELATIVE PERCENT: 1.1 %
EOSINOPHILS RELATIVE PERCENT: 1.1 %
EOSINOPHILS RELATIVE PERCENT: 1.2 %
EOSINOPHILS RELATIVE PERCENT: 1.2 %
EOSINOPHILS RELATIVE PERCENT: 1.5 %
EOSINOPHILS RELATIVE PERCENT: 1.9 %
EPITHELIAL CELLS, UA: 0 /HPF (ref 0–5)
EPITHELIAL CELLS, UA: 1 /HPF (ref 0–5)
FERRITIN: 101.6 NG/ML (ref 30–400)
GFR AFRICAN AMERICAN: >60
GFR NON-AFRICAN AMERICAN: 51
GFR NON-AFRICAN AMERICAN: 51
GFR NON-AFRICAN AMERICAN: 56
GFR NON-AFRICAN AMERICAN: >60
GLUCOSE BLD-MCNC: 106 MG/DL (ref 70–99)
GLUCOSE BLD-MCNC: 107 MG/DL (ref 70–99)
GLUCOSE BLD-MCNC: 118 MG/DL (ref 70–99)
GLUCOSE BLD-MCNC: 121 MG/DL (ref 70–99)
GLUCOSE BLD-MCNC: 122 MG/DL (ref 70–99)
GLUCOSE BLD-MCNC: 128 MG/DL (ref 70–99)
GLUCOSE BLD-MCNC: 129 MG/DL (ref 70–99)
GLUCOSE BLD-MCNC: 131 MG/DL (ref 70–99)
GLUCOSE BLD-MCNC: 138 MG/DL (ref 70–99)
GLUCOSE BLD-MCNC: 147 MG/DL (ref 70–99)
GLUCOSE BLD-MCNC: 193 MG/DL (ref 70–99)
GLUCOSE BLD-MCNC: 73 MG/DL (ref 70–99)
GLUCOSE BLD-MCNC: 78 MG/DL (ref 70–99)
GLUCOSE BLD-MCNC: 78 MG/DL (ref 70–99)
GLUCOSE BLD-MCNC: 80 MG/DL (ref 70–99)
GLUCOSE BLD-MCNC: 92 MG/DL (ref 70–99)
GLUCOSE URINE: NEGATIVE MG/DL
GLUCOSE URINE: NEGATIVE MG/DL
HCO3 VENOUS: 22 MMOL/L (ref 23–29)
HCT VFR BLD CALC: 32.7 % (ref 40.5–52.5)
HCT VFR BLD CALC: 33.9 % (ref 40.5–52.5)
HCT VFR BLD CALC: 34.5 % (ref 40.5–52.5)
HCT VFR BLD CALC: 35.1 % (ref 40.5–52.5)
HCT VFR BLD CALC: 36.1 % (ref 40.5–52.5)
HCT VFR BLD CALC: 36.5 % (ref 40.5–52.5)
HCT VFR BLD CALC: 36.8 % (ref 40.5–52.5)
HCT VFR BLD CALC: 38.6 % (ref 40.5–52.5)
HCT VFR BLD CALC: 39.6 % (ref 40.5–52.5)
HCT VFR BLD CALC: 39.7 % (ref 40.5–52.5)
HCT VFR BLD CALC: 40.4 % (ref 40.5–52.5)
HCT VFR BLD CALC: 40.5 % (ref 40.5–52.5)
HCT VFR BLD CALC: 41.1 % (ref 40.5–52.5)
HCT VFR BLD CALC: 41.4 % (ref 40.5–52.5)
HCT VFR BLD CALC: 41.7 % (ref 40.5–52.5)
HCT VFR BLD CALC: 42.2 % (ref 40.5–52.5)
HEMOGLOBIN: 10.5 G/DL (ref 13.5–17.5)
HEMOGLOBIN: 10.8 G/DL (ref 13.5–17.5)
HEMOGLOBIN: 11 G/DL (ref 13.5–17.5)
HEMOGLOBIN: 11.1 G/DL (ref 13.5–17.5)
HEMOGLOBIN: 11.5 G/DL (ref 13.5–17.5)
HEMOGLOBIN: 11.6 G/DL (ref 13.5–17.5)
HEMOGLOBIN: 11.7 G/DL (ref 13.5–17.5)
HEMOGLOBIN: 12.1 G/DL (ref 13.5–17.5)
HEMOGLOBIN: 12.4 G/DL (ref 13.5–17.5)
HEMOGLOBIN: 12.5 G/DL (ref 13.5–17.5)
HEMOGLOBIN: 12.6 G/DL (ref 13.5–17.5)
HEMOGLOBIN: 12.8 G/DL (ref 13.5–17.5)
HEMOGLOBIN: 13 G/DL (ref 13.5–17.5)
HEMOGLOBIN: 13.1 G/DL (ref 13.5–17.5)
HEMOGLOBIN: 13.4 G/DL (ref 13.5–17.5)
HEMOGLOBIN: 13.4 G/DL (ref 13.5–17.5)
HYALINE CASTS: 1 /LPF (ref 0–8)
HYALINE CASTS: 2 /LPF (ref 0–8)
INR BLD: 1.17 (ref 0.86–1.14)
INR BLD: 1.25 (ref 0.86–1.14)
INR BLD: 2.03 (ref 0.86–1.14)
IRON SATURATION: 10 % (ref 20–50)
IRON: 33 UG/DL (ref 59–158)
KETONES, URINE: ABNORMAL MG/DL
KETONES, URINE: NEGATIVE MG/DL
LACTIC ACID, SEPSIS: 2 MMOL/L (ref 0.4–1.9)
LACTIC ACID, SEPSIS: 2.1 MMOL/L (ref 0.4–1.9)
LACTIC ACID: 1.1 MMOL/L (ref 0.4–2)
LACTIC ACID: 1.6 MMOL/L (ref 0.4–2)
LACTIC ACID: 4.3 MMOL/L (ref 0.4–2)
LEUKOCYTE ESTERASE, URINE: NEGATIVE
LEUKOCYTE ESTERASE, URINE: NEGATIVE
LIPASE: 14 U/L (ref 13–60)
LYMPHOCYTES ABSOLUTE: 0.3 K/UL (ref 1–5.1)
LYMPHOCYTES ABSOLUTE: 0.4 K/UL (ref 1–5.1)
LYMPHOCYTES ABSOLUTE: 0.5 K/UL (ref 1–5.1)
LYMPHOCYTES ABSOLUTE: 0.7 K/UL (ref 1–5.1)
LYMPHOCYTES ABSOLUTE: 0.8 K/UL (ref 1–5.1)
LYMPHOCYTES ABSOLUTE: 0.9 K/UL (ref 1–5.1)
LYMPHOCYTES ABSOLUTE: 1.2 K/UL (ref 1–5.1)
LYMPHOCYTES RELATIVE PERCENT: 13.3 %
LYMPHOCYTES RELATIVE PERCENT: 2.5 %
LYMPHOCYTES RELATIVE PERCENT: 2.8 %
LYMPHOCYTES RELATIVE PERCENT: 3.8 %
LYMPHOCYTES RELATIVE PERCENT: 4 %
LYMPHOCYTES RELATIVE PERCENT: 4.1 %
LYMPHOCYTES RELATIVE PERCENT: 4.6 %
LYMPHOCYTES RELATIVE PERCENT: 5 %
LYMPHOCYTES RELATIVE PERCENT: 5.3 %
LYMPHOCYTES RELATIVE PERCENT: 5.3 %
LYMPHOCYTES RELATIVE PERCENT: 6.5 %
LYMPHOCYTES RELATIVE PERCENT: 6.6 %
LYMPHOCYTES RELATIVE PERCENT: 6.9 %
LYMPHOCYTES RELATIVE PERCENT: 7.3 %
LYMPHOCYTES RELATIVE PERCENT: 7.6 %
LYMPHOCYTES RELATIVE PERCENT: 9.5 %
MACROCYTES: ABNORMAL
MCH RBC QN AUTO: 24.2 PG (ref 26–34)
MCH RBC QN AUTO: 24.2 PG (ref 26–34)
MCH RBC QN AUTO: 24.3 PG (ref 26–34)
MCH RBC QN AUTO: 24.5 PG (ref 26–34)
MCH RBC QN AUTO: 24.6 PG (ref 26–34)
MCH RBC QN AUTO: 24.6 PG (ref 26–34)
MCH RBC QN AUTO: 24.7 PG (ref 26–34)
MCH RBC QN AUTO: 24.8 PG (ref 26–34)
MCH RBC QN AUTO: 25 PG (ref 26–34)
MCH RBC QN AUTO: 25 PG (ref 26–34)
MCHC RBC AUTO-ENTMCNC: 31.1 G/DL (ref 31–36)
MCHC RBC AUTO-ENTMCNC: 31.2 G/DL (ref 31–36)
MCHC RBC AUTO-ENTMCNC: 31.3 G/DL (ref 31–36)
MCHC RBC AUTO-ENTMCNC: 31.3 G/DL (ref 31–36)
MCHC RBC AUTO-ENTMCNC: 31.4 G/DL (ref 31–36)
MCHC RBC AUTO-ENTMCNC: 31.6 G/DL (ref 31–36)
MCHC RBC AUTO-ENTMCNC: 31.6 G/DL (ref 31–36)
MCHC RBC AUTO-ENTMCNC: 31.7 G/DL (ref 31–36)
MCHC RBC AUTO-ENTMCNC: 31.8 G/DL (ref 31–36)
MCHC RBC AUTO-ENTMCNC: 31.8 G/DL (ref 31–36)
MCHC RBC AUTO-ENTMCNC: 31.9 G/DL (ref 31–36)
MCHC RBC AUTO-ENTMCNC: 32 G/DL (ref 31–36)
MCHC RBC AUTO-ENTMCNC: 32.2 G/DL (ref 31–36)
MCHC RBC AUTO-ENTMCNC: 32.3 G/DL (ref 31–36)
MCV RBC AUTO: 76.4 FL (ref 80–100)
MCV RBC AUTO: 76.7 FL (ref 80–100)
MCV RBC AUTO: 76.8 FL (ref 80–100)
MCV RBC AUTO: 76.9 FL (ref 80–100)
MCV RBC AUTO: 76.9 FL (ref 80–100)
MCV RBC AUTO: 77.1 FL (ref 80–100)
MCV RBC AUTO: 77.4 FL (ref 80–100)
MCV RBC AUTO: 77.9 FL (ref 80–100)
MCV RBC AUTO: 78 FL (ref 80–100)
MCV RBC AUTO: 78.1 FL (ref 80–100)
MCV RBC AUTO: 78.7 FL (ref 80–100)
MCV RBC AUTO: 78.9 FL (ref 80–100)
MCV RBC AUTO: 78.9 FL (ref 80–100)
MCV RBC AUTO: 79.1 FL (ref 80–100)
METHEMOGLOBIN VENOUS: 0.7 %
MICROCYTES: ABNORMAL
MICROSCOPIC EXAMINATION: YES
MICROSCOPIC EXAMINATION: YES
MONOCYTES ABSOLUTE: 0.5 K/UL (ref 0–1.3)
MONOCYTES ABSOLUTE: 0.6 K/UL (ref 0–1.3)
MONOCYTES ABSOLUTE: 0.7 K/UL (ref 0–1.3)
MONOCYTES ABSOLUTE: 0.7 K/UL (ref 0–1.3)
MONOCYTES ABSOLUTE: 0.8 K/UL (ref 0–1.3)
MONOCYTES ABSOLUTE: 0.9 K/UL (ref 0–1.3)
MONOCYTES ABSOLUTE: 1 K/UL (ref 0–1.3)
MONOCYTES ABSOLUTE: 1.1 K/UL (ref 0–1.3)
MONOCYTES ABSOLUTE: 1.2 K/UL (ref 0–1.3)
MONOCYTES ABSOLUTE: 1.4 K/UL (ref 0–1.3)
MONOCYTES RELATIVE PERCENT: 10 %
MONOCYTES RELATIVE PERCENT: 5.5 %
MONOCYTES RELATIVE PERCENT: 6.1 %
MONOCYTES RELATIVE PERCENT: 6.3 %
MONOCYTES RELATIVE PERCENT: 6.5 %
MONOCYTES RELATIVE PERCENT: 7.1 %
MONOCYTES RELATIVE PERCENT: 7.3 %
MONOCYTES RELATIVE PERCENT: 7.4 %
MONOCYTES RELATIVE PERCENT: 7.5 %
MONOCYTES RELATIVE PERCENT: 8 %
MONOCYTES RELATIVE PERCENT: 8.1 %
MONOCYTES RELATIVE PERCENT: 8.8 %
MONOCYTES RELATIVE PERCENT: 8.8 %
MONOCYTES RELATIVE PERCENT: 9.1 %
MONOCYTES RELATIVE PERCENT: 9.4 %
MONOCYTES RELATIVE PERCENT: 9.6 %
MRSA SCREEN RT-PCR: NORMAL
NEUTROPHILS ABSOLUTE: 11.3 K/UL (ref 1.7–7.7)
NEUTROPHILS ABSOLUTE: 11.4 K/UL (ref 1.7–7.7)
NEUTROPHILS ABSOLUTE: 11.4 K/UL (ref 1.7–7.7)
NEUTROPHILS ABSOLUTE: 11.6 K/UL (ref 1.7–7.7)
NEUTROPHILS ABSOLUTE: 13.3 K/UL (ref 1.7–7.7)
NEUTROPHILS ABSOLUTE: 13.6 K/UL (ref 1.7–7.7)
NEUTROPHILS ABSOLUTE: 15.6 K/UL (ref 1.7–7.7)
NEUTROPHILS ABSOLUTE: 6.7 K/UL (ref 1.7–7.7)
NEUTROPHILS ABSOLUTE: 7 K/UL (ref 1.7–7.7)
NEUTROPHILS ABSOLUTE: 7.6 K/UL (ref 1.7–7.7)
NEUTROPHILS ABSOLUTE: 8.6 K/UL (ref 1.7–7.7)
NEUTROPHILS ABSOLUTE: 8.8 K/UL (ref 1.7–7.7)
NEUTROPHILS ABSOLUTE: 8.8 K/UL (ref 1.7–7.7)
NEUTROPHILS ABSOLUTE: 9.4 K/UL (ref 1.7–7.7)
NEUTROPHILS ABSOLUTE: 9.6 K/UL (ref 1.7–7.7)
NEUTROPHILS ABSOLUTE: 9.9 K/UL (ref 1.7–7.7)
NEUTROPHILS RELATIVE PERCENT: 76.5 %
NEUTROPHILS RELATIVE PERCENT: 82 %
NEUTROPHILS RELATIVE PERCENT: 82.2 %
NEUTROPHILS RELATIVE PERCENT: 82.2 %
NEUTROPHILS RELATIVE PERCENT: 82.3 %
NEUTROPHILS RELATIVE PERCENT: 82.8 %
NEUTROPHILS RELATIVE PERCENT: 83.8 %
NEUTROPHILS RELATIVE PERCENT: 84.7 %
NEUTROPHILS RELATIVE PERCENT: 85 %
NEUTROPHILS RELATIVE PERCENT: 86 %
NEUTROPHILS RELATIVE PERCENT: 86.9 %
NEUTROPHILS RELATIVE PERCENT: 88.3 %
NEUTROPHILS RELATIVE PERCENT: 88.4 %
NEUTROPHILS RELATIVE PERCENT: 89.6 %
NEUTROPHILS RELATIVE PERCENT: 89.8 %
NEUTROPHILS RELATIVE PERCENT: 91 %
NITRITE, URINE: NEGATIVE
NITRITE, URINE: NEGATIVE
O2 CONTENT, VEN: 9 ML/DL
O2 SAT, VEN: 60 %
O2 THERAPY: ABNORMAL
OCCULT BLOOD DIAGNOSTIC: NORMAL
OSMOLALITY URINE: 369 MOSM/KG (ref 390–1070)
OSMOLALITY: 292 MOSM/KG (ref 280–301)
OVALOCYTES: ABNORMAL
PCO2, VEN: 42.1 MMHG (ref 40–50)
PDW BLD-RTO: 27.6 % (ref 12.4–15.4)
PDW BLD-RTO: 28.2 % (ref 12.4–15.4)
PDW BLD-RTO: 28.5 % (ref 12.4–15.4)
PDW BLD-RTO: 28.6 % (ref 12.4–15.4)
PDW BLD-RTO: 28.7 % (ref 12.4–15.4)
PDW BLD-RTO: 28.7 % (ref 12.4–15.4)
PDW BLD-RTO: 28.9 % (ref 12.4–15.4)
PDW BLD-RTO: 28.9 % (ref 12.4–15.4)
PDW BLD-RTO: 29 % (ref 12.4–15.4)
PDW BLD-RTO: 29 % (ref 12.4–15.4)
PDW BLD-RTO: 29.1 % (ref 12.4–15.4)
PDW BLD-RTO: 29.3 % (ref 12.4–15.4)
PDW BLD-RTO: 29.4 % (ref 12.4–15.4)
PDW BLD-RTO: 29.7 % (ref 12.4–15.4)
PH UA: 5 (ref 5–8)
PH UA: 6.5 (ref 5–8)
PH VENOUS: 7.32 (ref 7.35–7.45)
PHOSPHORUS: 1.9 MG/DL (ref 2.5–4.9)
PHOSPHORUS: 2.2 MG/DL (ref 2.5–4.9)
PHOSPHORUS: 2.3 MG/DL (ref 2.5–4.9)
PHOSPHORUS: 2.3 MG/DL (ref 2.5–4.9)
PHOSPHORUS: 2.5 MG/DL (ref 2.5–4.9)
PHOSPHORUS: 2.7 MG/DL (ref 2.5–4.9)
PLATELET # BLD: 121 K/UL (ref 135–450)
PLATELET # BLD: 130 K/UL (ref 135–450)
PLATELET # BLD: 130 K/UL (ref 135–450)
PLATELET # BLD: 147 K/UL (ref 135–450)
PLATELET # BLD: 149 K/UL (ref 135–450)
PLATELET # BLD: 150 K/UL (ref 135–450)
PLATELET # BLD: 152 K/UL (ref 135–450)
PLATELET # BLD: 154 K/UL (ref 135–450)
PLATELET # BLD: 163 K/UL (ref 135–450)
PLATELET # BLD: 166 K/UL (ref 135–450)
PLATELET # BLD: 172 K/UL (ref 135–450)
PLATELET # BLD: 206 K/UL (ref 135–450)
PLATELET # BLD: 82 K/UL (ref 135–450)
PLATELET # BLD: 90 K/UL (ref 135–450)
PLATELET # BLD: 90 K/UL (ref 135–450)
PLATELET # BLD: 98 K/UL (ref 135–450)
PLATELET SLIDE REVIEW: ABNORMAL
PMV BLD AUTO: 10.1 FL (ref 5–10.5)
PMV BLD AUTO: 8.4 FL (ref 5–10.5)
PMV BLD AUTO: 8.5 FL (ref 5–10.5)
PMV BLD AUTO: 8.6 FL (ref 5–10.5)
PMV BLD AUTO: 8.7 FL (ref 5–10.5)
PMV BLD AUTO: 8.8 FL (ref 5–10.5)
PMV BLD AUTO: 8.8 FL (ref 5–10.5)
PMV BLD AUTO: 8.9 FL (ref 5–10.5)
PMV BLD AUTO: 9 FL (ref 5–10.5)
PMV BLD AUTO: 9 FL (ref 5–10.5)
PMV BLD AUTO: 9.1 FL (ref 5–10.5)
PMV BLD AUTO: 9.3 FL (ref 5–10.5)
PMV BLD AUTO: 9.7 FL (ref 5–10.5)
PO2, VEN: 39 MMHG
POTASSIUM REFLEX MAGNESIUM: 3.8 MMOL/L (ref 3.5–5.1)
POTASSIUM REFLEX MAGNESIUM: 4 MMOL/L (ref 3.5–5.1)
POTASSIUM REFLEX MAGNESIUM: 4.2 MMOL/L (ref 3.5–5.1)
POTASSIUM REFLEX MAGNESIUM: 4.3 MMOL/L (ref 3.5–5.1)
POTASSIUM REFLEX MAGNESIUM: 4.3 MMOL/L (ref 3.5–5.1)
POTASSIUM REFLEX MAGNESIUM: 4.4 MMOL/L (ref 3.5–5.1)
POTASSIUM REFLEX MAGNESIUM: 4.7 MMOL/L (ref 3.5–5.1)
POTASSIUM REFLEX MAGNESIUM: 5.3 MMOL/L (ref 3.5–5.1)
POTASSIUM SERPL-SCNC: 3.9 MMOL/L (ref 3.5–5.1)
POTASSIUM SERPL-SCNC: 3.9 MMOL/L (ref 3.5–5.1)
POTASSIUM SERPL-SCNC: 4 MMOL/L (ref 3.5–5.1)
POTASSIUM SERPL-SCNC: 4 MMOL/L (ref 3.5–5.1)
POTASSIUM SERPL-SCNC: 4.1 MMOL/L (ref 3.5–5.1)
POTASSIUM SERPL-SCNC: 4.2 MMOL/L (ref 3.5–5.1)
POTASSIUM SERPL-SCNC: 4.2 MMOL/L (ref 3.5–5.1)
POTASSIUM SERPL-SCNC: 4.3 MMOL/L (ref 3.5–5.1)
POTASSIUM, UR: 25.6 MMOL/L
PRO-BNP: 5630 PG/ML (ref 0–449)
PRO-BNP: 6561 PG/ML (ref 0–449)
PRO-BNP: 7871 PG/ML (ref 0–449)
PRO-BNP: ABNORMAL PG/ML (ref 0–449)
PROCALCITONIN: 0.1 NG/ML (ref 0–0.15)
PROCALCITONIN: 0.12 NG/ML (ref 0–0.15)
PROCALCITONIN: 0.14 NG/ML (ref 0–0.15)
PROCALCITONIN: 0.18 NG/ML (ref 0–0.15)
PROTEIN UA: 100 MG/DL
PROTEIN UA: 30 MG/DL
PROTHROMBIN TIME: 13.6 SEC (ref 10–13.2)
PROTHROMBIN TIME: 14.5 SEC (ref 10–13.2)
PROTHROMBIN TIME: 23.7 SEC (ref 10–13.2)
RBC # BLD: 4.29 M/UL (ref 4.2–5.9)
RBC # BLD: 4.34 M/UL (ref 4.2–5.9)
RBC # BLD: 4.49 M/UL (ref 4.2–5.9)
RBC # BLD: 4.55 M/UL (ref 4.2–5.9)
RBC # BLD: 4.59 M/UL (ref 4.2–5.9)
RBC # BLD: 4.68 M/UL (ref 4.2–5.9)
RBC # BLD: 4.8 M/UL (ref 4.2–5.9)
RBC # BLD: 4.88 M/UL (ref 4.2–5.9)
RBC # BLD: 5.08 M/UL (ref 4.2–5.9)
RBC # BLD: 5.13 M/UL (ref 4.2–5.9)
RBC # BLD: 5.13 M/UL (ref 4.2–5.9)
RBC # BLD: 5.19 M/UL (ref 4.2–5.9)
RBC # BLD: 5.29 M/UL (ref 4.2–5.9)
RBC # BLD: 5.35 M/UL (ref 4.2–5.9)
RBC # BLD: 5.38 M/UL (ref 4.2–5.9)
RBC # BLD: 5.42 M/UL (ref 4.2–5.9)
RBC UA: 12 /HPF (ref 0–4)
RBC UA: 5 /HPF (ref 0–4)
REASON FOR REJECTION: NORMAL
REJECTED TEST: NORMAL
SARS-COV-2, NAAT: NOT DETECTED
SARS-COV-2, NAAT: NOT DETECTED
SARS-COV-2: NOT DETECTED
SCHISTOCYTES: ABNORMAL
SLIDE REVIEW: ABNORMAL
SODIUM BLD-SCNC: 126 MMOL/L (ref 136–145)
SODIUM BLD-SCNC: 131 MMOL/L (ref 136–145)
SODIUM BLD-SCNC: 131 MMOL/L (ref 136–145)
SODIUM BLD-SCNC: 135 MMOL/L (ref 136–145)
SODIUM BLD-SCNC: 137 MMOL/L (ref 136–145)
SODIUM BLD-SCNC: 137 MMOL/L (ref 136–145)
SODIUM BLD-SCNC: 138 MMOL/L (ref 136–145)
SODIUM BLD-SCNC: 138 MMOL/L (ref 136–145)
SODIUM BLD-SCNC: 139 MMOL/L (ref 136–145)
SODIUM BLD-SCNC: 140 MMOL/L (ref 136–145)
SODIUM BLD-SCNC: 142 MMOL/L (ref 136–145)
SODIUM BLD-SCNC: 143 MMOL/L (ref 136–145)
SODIUM BLD-SCNC: 146 MMOL/L (ref 136–145)
SODIUM BLD-SCNC: 146 MMOL/L (ref 136–145)
SODIUM BLD-SCNC: 147 MMOL/L (ref 136–145)
SODIUM BLD-SCNC: 147 MMOL/L (ref 136–145)
SODIUM URINE: 97 MMOL/L
SPECIFIC GRAVITY UA: 1.01 (ref 1–1.03)
SPECIFIC GRAVITY UA: 1.01 (ref 1–1.03)
TCO2 CALC VENOUS: 23 MMOL/L
TOTAL CK: 21 U/L (ref 39–308)
TOTAL CK: 323 U/L (ref 39–308)
TOTAL IRON BINDING CAPACITY: 315 UG/DL (ref 260–445)
TOTAL PROTEIN: 5.2 G/DL (ref 6.4–8.2)
TOTAL PROTEIN: 5.2 G/DL (ref 6.4–8.2)
TOTAL PROTEIN: 5.3 G/DL (ref 6.4–8.2)
TOTAL PROTEIN: 5.5 G/DL (ref 6.4–8.2)
TOTAL PROTEIN: 5.8 G/DL (ref 6.4–8.2)
TOTAL PROTEIN: 6 G/DL (ref 6.4–8.2)
TOTAL PROTEIN: 6 G/DL (ref 6.4–8.2)
TOTAL PROTEIN: 6.1 G/DL (ref 6.4–8.2)
TROPONIN: 0.02 NG/ML
TROPONIN: 0.03 NG/ML
TROPONIN: 0.04 NG/ML
TROPONIN: 0.04 NG/ML
TSH SERPL DL<=0.05 MIU/L-ACNC: 3.55 UIU/ML (ref 0.27–4.2)
URINE REFLEX TO CULTURE: ABNORMAL
URINE REFLEX TO CULTURE: ABNORMAL
URINE TYPE: ABNORMAL
URINE TYPE: ABNORMAL
UROBILINOGEN, URINE: 0.2 E.U./DL
UROBILINOGEN, URINE: 0.2 E.U./DL
WBC # BLD: 10.4 K/UL (ref 4–11)
WBC # BLD: 10.7 K/UL (ref 4–11)
WBC # BLD: 10.7 K/UL (ref 4–11)
WBC # BLD: 10.8 K/UL (ref 4–11)
WBC # BLD: 11.2 K/UL (ref 4–11)
WBC # BLD: 11.2 K/UL (ref 4–11)
WBC # BLD: 12.6 K/UL (ref 4–11)
WBC # BLD: 12.9 K/UL (ref 4–11)
WBC # BLD: 13.5 K/UL (ref 4–11)
WBC # BLD: 13.8 K/UL (ref 4–11)
WBC # BLD: 15.3 K/UL (ref 4–11)
WBC # BLD: 15.4 K/UL (ref 4–11)
WBC # BLD: 17.2 K/UL (ref 4–11)
WBC # BLD: 8.5 K/UL (ref 4–11)
WBC # BLD: 8.7 K/UL (ref 4–11)
WBC # BLD: 9 K/UL (ref 4–11)
WBC UA: 0 /HPF (ref 0–5)
WBC UA: 2 /HPF (ref 0–5)

## 2021-01-01 PROCEDURE — 80048 BASIC METABOLIC PNL TOTAL CA: CPT

## 2021-01-01 PROCEDURE — 82436 ASSAY OF URINE CHLORIDE: CPT

## 2021-01-01 PROCEDURE — 6370000000 HC RX 637 (ALT 250 FOR IP): Performed by: INTERNAL MEDICINE

## 2021-01-01 PROCEDURE — 92526 ORAL FUNCTION THERAPY: CPT

## 2021-01-01 PROCEDURE — 81001 URINALYSIS AUTO W/SCOPE: CPT

## 2021-01-01 PROCEDURE — 99308 SBSQ NF CARE LOW MDM 20: CPT | Performed by: CLINICAL NURSE SPECIALIST

## 2021-01-01 PROCEDURE — 36415 COLL VENOUS BLD VENIPUNCTURE: CPT

## 2021-01-01 PROCEDURE — 80076 HEPATIC FUNCTION PANEL: CPT

## 2021-01-01 PROCEDURE — C1713 ANCHOR/SCREW BN/BN,TIS/BN: HCPCS

## 2021-01-01 PROCEDURE — 97129 THER IVNTJ 1ST 15 MIN: CPT

## 2021-01-01 PROCEDURE — C1894 INTRO/SHEATH, NON-LASER: HCPCS

## 2021-01-01 PROCEDURE — 84484 ASSAY OF TROPONIN QUANT: CPT

## 2021-01-01 PROCEDURE — 80069 RENAL FUNCTION PANEL: CPT

## 2021-01-01 PROCEDURE — 6360000002 HC RX W HCPCS: Performed by: INTERNAL MEDICINE

## 2021-01-01 PROCEDURE — 2500000003 HC RX 250 WO HCPCS

## 2021-01-01 PROCEDURE — 2700000000 HC OXYGEN THERAPY PER DAY

## 2021-01-01 PROCEDURE — 2060000000 HC ICU INTERMEDIATE R&B

## 2021-01-01 PROCEDURE — 2580000003 HC RX 258: Performed by: INTERNAL MEDICINE

## 2021-01-01 PROCEDURE — 82550 ASSAY OF CK (CPK): CPT

## 2021-01-01 PROCEDURE — 91300 COVID-19, PFIZER VACCINE 30MCG/0.3ML DOSE: CPT | Performed by: FAMILY MEDICINE

## 2021-01-01 PROCEDURE — 2580000003 HC RX 258: Performed by: NURSE PRACTITIONER

## 2021-01-01 PROCEDURE — 82140 ASSAY OF AMMONIA: CPT

## 2021-01-01 PROCEDURE — G0328 FECAL BLOOD SCRN IMMUNOASSAY: HCPCS

## 2021-01-01 PROCEDURE — 1123F ACP DISCUSS/DSCN MKR DOCD: CPT | Performed by: PHYSICIAN ASSISTANT

## 2021-01-01 PROCEDURE — 99233 SBSQ HOSP IP/OBS HIGH 50: CPT | Performed by: INTERNAL MEDICINE

## 2021-01-01 PROCEDURE — 74018 RADEX ABDOMEN 1 VIEW: CPT

## 2021-01-01 PROCEDURE — 85025 COMPLETE CBC W/AUTO DIFF WBC: CPT

## 2021-01-01 PROCEDURE — 99239 HOSP IP/OBS DSCHRG MGMT >30: CPT | Performed by: INTERNAL MEDICINE

## 2021-01-01 PROCEDURE — G8482 FLU IMMUNIZE ORDER/ADMIN: HCPCS | Performed by: INTERNAL MEDICINE

## 2021-01-01 PROCEDURE — 3209999900 CT LUMBAR SPINE TRAUMA RECONSTRUCTION

## 2021-01-01 PROCEDURE — 71045 X-RAY EXAM CHEST 1 VIEW: CPT

## 2021-01-01 PROCEDURE — 83605 ASSAY OF LACTIC ACID: CPT

## 2021-01-01 PROCEDURE — 51798 US URINE CAPACITY MEASURE: CPT

## 2021-01-01 PROCEDURE — 85610 PROTHROMBIN TIME: CPT

## 2021-01-01 PROCEDURE — 6360000002 HC RX W HCPCS: Performed by: STUDENT IN AN ORGANIZED HEALTH CARE EDUCATION/TRAINING PROGRAM

## 2021-01-01 PROCEDURE — 70450 CT HEAD/BRAIN W/O DYE: CPT

## 2021-01-01 PROCEDURE — 85730 THROMBOPLASTIN TIME PARTIAL: CPT

## 2021-01-01 PROCEDURE — 97167 OT EVAL HIGH COMPLEX 60 MIN: CPT

## 2021-01-01 PROCEDURE — 1123F ACP DISCUSS/DSCN MKR DOCD: CPT | Performed by: INTERNAL MEDICINE

## 2021-01-01 PROCEDURE — 84443 ASSAY THYROID STIM HORMONE: CPT

## 2021-01-01 PROCEDURE — 84145 PROCALCITONIN (PCT): CPT

## 2021-01-01 PROCEDURE — 92610 EVALUATE SWALLOWING FUNCTION: CPT

## 2021-01-01 PROCEDURE — 95813 EEG EXTND MNTR 61-119 MIN: CPT

## 2021-01-01 PROCEDURE — 97530 THERAPEUTIC ACTIVITIES: CPT

## 2021-01-01 PROCEDURE — 0002A PR IMM ADMN SARSCOV2 30MCG/0.3ML DIL RECON 2ND DOSE: CPT | Performed by: NURSE PRACTITIONER

## 2021-01-01 PROCEDURE — U0005 INFEC AGEN DETEC AMPLI PROBE: HCPCS

## 2021-01-01 PROCEDURE — 94761 N-INVAS EAR/PLS OXIMETRY MLT: CPT

## 2021-01-01 PROCEDURE — 2580000003 HC RX 258

## 2021-01-01 PROCEDURE — 99223 1ST HOSP IP/OBS HIGH 75: CPT | Performed by: INTERNAL MEDICINE

## 2021-01-01 PROCEDURE — 22514 PERQ VERTEBRAL AUGMENTATION: CPT | Performed by: RADIOLOGY

## 2021-01-01 PROCEDURE — 74230 X-RAY XM SWLNG FUNCJ C+: CPT

## 2021-01-01 PROCEDURE — 83690 ASSAY OF LIPASE: CPT

## 2021-01-01 PROCEDURE — 6370000000 HC RX 637 (ALT 250 FOR IP): Performed by: STUDENT IN AN ORGANIZED HEALTH CARE EDUCATION/TRAINING PROGRAM

## 2021-01-01 PROCEDURE — 84300 ASSAY OF URINE SODIUM: CPT

## 2021-01-01 PROCEDURE — 71046 X-RAY EXAM CHEST 2 VIEWS: CPT

## 2021-01-01 PROCEDURE — 99221 1ST HOSP IP/OBS SF/LOW 40: CPT | Performed by: NURSE PRACTITIONER

## 2021-01-01 PROCEDURE — 92611 MOTION FLUOROSCOPY/SWALLOW: CPT

## 2021-01-01 PROCEDURE — 83880 ASSAY OF NATRIURETIC PEPTIDE: CPT

## 2021-01-01 PROCEDURE — 87040 BLOOD CULTURE FOR BACTERIA: CPT

## 2021-01-01 PROCEDURE — 0QS03ZZ REPOSITION LUMBAR VERTEBRA, PERCUTANEOUS APPROACH: ICD-10-PCS | Performed by: RADIOLOGY

## 2021-01-01 PROCEDURE — 2709999900 HC NON-CHARGEABLE SUPPLY

## 2021-01-01 PROCEDURE — 99153 MOD SED SAME PHYS/QHP EA: CPT | Performed by: RADIOLOGY

## 2021-01-01 PROCEDURE — 74177 CT ABD & PELVIS W/CONTRAST: CPT

## 2021-01-01 PROCEDURE — 6360000002 HC RX W HCPCS: Performed by: NURSE PRACTITIONER

## 2021-01-01 PROCEDURE — 82803 BLOOD GASES ANY COMBINATION: CPT

## 2021-01-01 PROCEDURE — 96365 THER/PROPH/DIAG IV INF INIT: CPT

## 2021-01-01 PROCEDURE — G8427 DOCREV CUR MEDS BY ELIG CLIN: HCPCS | Performed by: INTERNAL MEDICINE

## 2021-01-01 PROCEDURE — 99152 MOD SED SAME PHYS/QHP 5/>YRS: CPT | Performed by: RADIOLOGY

## 2021-01-01 PROCEDURE — 0QU03JZ SUPPLEMENT LUMBAR VERTEBRA WITH SYNTHETIC SUBSTITUTE, PERCUTANEOUS APPROACH: ICD-10-PCS | Performed by: RADIOLOGY

## 2021-01-01 PROCEDURE — 91300 COVID-19, PFIZER VACCINE 30MCG/0.3ML DOSE: CPT | Performed by: NURSE PRACTITIONER

## 2021-01-01 PROCEDURE — 97162 PT EVAL MOD COMPLEX 30 MIN: CPT

## 2021-01-01 PROCEDURE — G8482 FLU IMMUNIZE ORDER/ADMIN: HCPCS | Performed by: PHYSICIAN ASSISTANT

## 2021-01-01 PROCEDURE — 0001A COVID-19, PFIZER VACCINE 30MCG/0.3ML DOSE: CPT | Performed by: FAMILY MEDICINE

## 2021-01-01 PROCEDURE — 83935 ASSAY OF URINE OSMOLALITY: CPT

## 2021-01-01 PROCEDURE — 84133 ASSAY OF URINE POTASSIUM: CPT

## 2021-01-01 PROCEDURE — 97597 DBRDMT OPN WND 1ST 20 CM/<: CPT | Performed by: CLINICAL NURSE SPECIALIST

## 2021-01-01 PROCEDURE — G8427 DOCREV CUR MEDS BY ELIG CLIN: HCPCS | Performed by: PHYSICIAN ASSISTANT

## 2021-01-01 PROCEDURE — 99232 SBSQ HOSP IP/OBS MODERATE 35: CPT | Performed by: NURSE PRACTITIONER

## 2021-01-01 PROCEDURE — 87635 SARS-COV-2 COVID-19 AMP PRB: CPT

## 2021-01-01 PROCEDURE — 70496 CT ANGIOGRAPHY HEAD: CPT

## 2021-01-01 PROCEDURE — 97535 SELF CARE MNGMENT TRAINING: CPT

## 2021-01-01 PROCEDURE — 1036F TOBACCO NON-USER: CPT | Performed by: INTERNAL MEDICINE

## 2021-01-01 PROCEDURE — 99214 OFFICE O/P EST MOD 30 MIN: CPT | Performed by: INTERNAL MEDICINE

## 2021-01-01 PROCEDURE — 87641 MR-STAPH DNA AMP PROBE: CPT

## 2021-01-01 PROCEDURE — 6360000004 HC RX CONTRAST MEDICATION: Performed by: STUDENT IN AN ORGANIZED HEALTH CARE EDUCATION/TRAINING PROGRAM

## 2021-01-01 PROCEDURE — 1036F TOBACCO NON-USER: CPT | Performed by: PHYSICIAN ASSISTANT

## 2021-01-01 PROCEDURE — 4A10X4Z MONITORING OF CENTRAL NERVOUS ELECTRICAL ACTIVITY, EXTERNAL APPROACH: ICD-10-PCS | Performed by: PSYCHIATRY & NEUROLOGY

## 2021-01-01 PROCEDURE — 4040F PNEUMOC VAC/ADMIN/RCVD: CPT | Performed by: PHYSICIAN ASSISTANT

## 2021-01-01 PROCEDURE — 99213 OFFICE O/P EST LOW 20 MIN: CPT | Performed by: PHYSICIAN ASSISTANT

## 2021-01-01 PROCEDURE — 99284 EMERGENCY DEPT VISIT MOD MDM: CPT

## 2021-01-01 PROCEDURE — 4040F PNEUMOC VAC/ADMIN/RCVD: CPT | Performed by: INTERNAL MEDICINE

## 2021-01-01 PROCEDURE — 95819 EEG AWAKE AND ASLEEP: CPT

## 2021-01-01 PROCEDURE — G8417 CALC BMI ABV UP PARAM F/U: HCPCS | Performed by: PHYSICIAN ASSISTANT

## 2021-01-01 PROCEDURE — 86901 BLOOD TYPING SEROLOGIC RH(D): CPT

## 2021-01-01 PROCEDURE — 6360000004 HC RX CONTRAST MEDICATION: Performed by: NURSE PRACTITIONER

## 2021-01-01 PROCEDURE — C9254 INJECTION, LACOSAMIDE: HCPCS | Performed by: NURSE PRACTITIONER

## 2021-01-01 PROCEDURE — 99232 SBSQ HOSP IP/OBS MODERATE 35: CPT | Performed by: STUDENT IN AN ORGANIZED HEALTH CARE EDUCATION/TRAINING PROGRAM

## 2021-01-01 PROCEDURE — 83930 ASSAY OF BLOOD OSMOLALITY: CPT

## 2021-01-01 PROCEDURE — 1200000000 HC SEMI PRIVATE

## 2021-01-01 PROCEDURE — 97161 PT EVAL LOW COMPLEX 20 MIN: CPT

## 2021-01-01 PROCEDURE — 6360000002 HC RX W HCPCS

## 2021-01-01 PROCEDURE — 51702 INSERT TEMP BLADDER CATH: CPT

## 2021-01-01 PROCEDURE — 86900 BLOOD TYPING SEROLOGIC ABO: CPT

## 2021-01-01 PROCEDURE — 96375 TX/PRO/DX INJ NEW DRUG ADDON: CPT

## 2021-01-01 PROCEDURE — U0003 INFECTIOUS AGENT DETECTION BY NUCLEIC ACID (DNA OR RNA); SEVERE ACUTE RESPIRATORY SYNDROME CORONAVIRUS 2 (SARS-COV-2) (CORONAVIRUS DISEASE [COVID-19]), AMPLIFIED PROBE TECHNIQUE, MAKING USE OF HIGH THROUGHPUT TECHNOLOGIES AS DESCRIBED BY CMS-2020-01-R: HCPCS

## 2021-01-01 PROCEDURE — 86850 RBC ANTIBODY SCREEN: CPT

## 2021-01-01 PROCEDURE — 2580000003 HC RX 258: Performed by: STUDENT IN AN ORGANIZED HEALTH CARE EDUCATION/TRAINING PROGRAM

## 2021-01-01 PROCEDURE — G8417 CALC BMI ABV UP PARAM F/U: HCPCS | Performed by: INTERNAL MEDICINE

## 2021-01-01 RX ORDER — FUROSEMIDE 10 MG/ML
20 INJECTION INTRAMUSCULAR; INTRAVENOUS DAILY
Status: DISCONTINUED | OUTPATIENT
Start: 2021-01-01 | End: 2021-01-01

## 2021-01-01 RX ORDER — FINASTERIDE 5 MG/1
5 TABLET, FILM COATED ORAL DAILY
Status: DISCONTINUED | OUTPATIENT
Start: 2021-01-01 | End: 2021-01-01 | Stop reason: HOSPADM

## 2021-01-01 RX ORDER — SODIUM CHLORIDE 0.9 % (FLUSH) 0.9 %
5-40 SYRINGE (ML) INJECTION EVERY 12 HOURS SCHEDULED
Status: DISCONTINUED | OUTPATIENT
Start: 2021-01-01 | End: 2021-01-01 | Stop reason: HOSPADM

## 2021-01-01 RX ORDER — HEPARIN SODIUM 1000 [USP'U]/ML
4000 INJECTION, SOLUTION INTRAVENOUS; SUBCUTANEOUS PRN
Status: DISCONTINUED | OUTPATIENT
Start: 2021-01-01 | End: 2021-01-01

## 2021-01-01 RX ORDER — OXYCODONE HYDROCHLORIDE 5 MG/1
2.5 TABLET ORAL
Status: DISCONTINUED | OUTPATIENT
Start: 2021-01-01 | End: 2021-01-01

## 2021-01-01 RX ORDER — ATORVASTATIN CALCIUM 40 MG/1
40 TABLET, FILM COATED ORAL NIGHTLY
Status: DISCONTINUED | OUTPATIENT
Start: 2021-01-01 | End: 2021-01-01

## 2021-01-01 RX ORDER — ALBUTEROL SULFATE 90 UG/1
2 AEROSOL, METERED RESPIRATORY (INHALATION) EVERY 6 HOURS PRN
Qty: 1 INHALER | Refills: 1 | Status: SHIPPED | OUTPATIENT
Start: 2021-01-01

## 2021-01-01 RX ORDER — METOPROLOL SUCCINATE 25 MG/1
12.5 TABLET, EXTENDED RELEASE ORAL DAILY
Status: DISCONTINUED | OUTPATIENT
Start: 2021-01-01 | End: 2021-01-01 | Stop reason: ALTCHOICE

## 2021-01-01 RX ORDER — DEXTROSE AND SODIUM CHLORIDE 5; .45 G/100ML; G/100ML
INJECTION, SOLUTION INTRAVENOUS CONTINUOUS
Status: DISCONTINUED | OUTPATIENT
Start: 2021-01-01 | End: 2021-01-01 | Stop reason: HOSPADM

## 2021-01-01 RX ORDER — LORAZEPAM 2 MG/ML
2 INJECTION INTRAMUSCULAR ONCE
Status: COMPLETED | OUTPATIENT
Start: 2021-01-01 | End: 2021-01-01

## 2021-01-01 RX ORDER — ACETAMINOPHEN 650 MG/1
650 SUPPOSITORY RECTAL EVERY 6 HOURS PRN
Status: DISCONTINUED | OUTPATIENT
Start: 2021-01-01 | End: 2021-01-01 | Stop reason: HOSPADM

## 2021-01-01 RX ORDER — VITAMIN B COMPLEX
2000 TABLET ORAL DAILY
Status: DISCONTINUED | OUTPATIENT
Start: 2021-01-01 | End: 2021-01-01 | Stop reason: HOSPADM

## 2021-01-01 RX ORDER — FLUTICASONE PROPIONATE 50 MCG
2 SPRAY, SUSPENSION (ML) NASAL DAILY
Status: DISCONTINUED | OUTPATIENT
Start: 2021-01-01 | End: 2021-01-01 | Stop reason: HOSPADM

## 2021-01-01 RX ORDER — POLYETHYLENE GLYCOL 3350 17 G/17G
17 POWDER, FOR SOLUTION ORAL DAILY PRN
Status: DISCONTINUED | OUTPATIENT
Start: 2021-01-01 | End: 2021-01-01

## 2021-01-01 RX ORDER — SODIUM CHLORIDE 9 MG/ML
INJECTION, SOLUTION INTRAVENOUS CONTINUOUS
Status: DISCONTINUED | OUTPATIENT
Start: 2021-01-01 | End: 2021-01-01

## 2021-01-01 RX ORDER — FUROSEMIDE 10 MG/ML
40 INJECTION INTRAMUSCULAR; INTRAVENOUS ONCE
Status: COMPLETED | OUTPATIENT
Start: 2021-01-01 | End: 2021-01-01

## 2021-01-01 RX ORDER — MORPHINE SULFATE 4 MG/ML
4 INJECTION, SOLUTION INTRAMUSCULAR; INTRAVENOUS ONCE
Status: COMPLETED | OUTPATIENT
Start: 2021-01-01 | End: 2021-01-01

## 2021-01-01 RX ORDER — TAMSULOSIN HYDROCHLORIDE 0.4 MG/1
0.4 CAPSULE ORAL NIGHTLY
Status: DISCONTINUED | OUTPATIENT
Start: 2021-01-01 | End: 2021-01-01 | Stop reason: HOSPADM

## 2021-01-01 RX ORDER — PREDNISONE 10 MG/1
10 TABLET ORAL DAILY
Status: DISCONTINUED | OUTPATIENT
Start: 2021-01-01 | End: 2021-01-01 | Stop reason: HOSPADM

## 2021-01-01 RX ORDER — FERROUS SULFATE 325(65) MG
325 TABLET ORAL NIGHTLY
COMMUNITY

## 2021-01-01 RX ORDER — SODIUM CHLORIDE 9 MG/ML
25 INJECTION, SOLUTION INTRAVENOUS PRN
Status: DISCONTINUED | OUTPATIENT
Start: 2021-01-01 | End: 2021-01-01 | Stop reason: HOSPADM

## 2021-01-01 RX ORDER — METOPROLOL SUCCINATE 25 MG/1
25 TABLET, EXTENDED RELEASE ORAL DAILY
Status: DISCONTINUED | OUTPATIENT
Start: 2021-01-01 | End: 2021-01-01 | Stop reason: HOSPADM

## 2021-01-01 RX ORDER — FLUTICASONE PROPIONATE 50 MCG
1 SPRAY, SUSPENSION (ML) NASAL DAILY
Status: DISCONTINUED | OUTPATIENT
Start: 2021-01-01 | End: 2021-01-01 | Stop reason: HOSPADM

## 2021-01-01 RX ORDER — SODIUM CHLORIDE, SODIUM LACTATE, POTASSIUM CHLORIDE, AND CALCIUM CHLORIDE .6; .31; .03; .02 G/100ML; G/100ML; G/100ML; G/100ML
1000 INJECTION, SOLUTION INTRAVENOUS ONCE
Status: DISCONTINUED | OUTPATIENT
Start: 2021-01-01 | End: 2021-01-01

## 2021-01-01 RX ORDER — FUROSEMIDE 10 MG/ML
20 INJECTION INTRAMUSCULAR; INTRAVENOUS 2 TIMES DAILY
Status: DISCONTINUED | OUTPATIENT
Start: 2021-01-01 | End: 2021-01-01

## 2021-01-01 RX ORDER — HEPARIN SODIUM 10000 [USP'U]/100ML
17 INJECTION, SOLUTION INTRAVENOUS CONTINUOUS
Status: DISCONTINUED | OUTPATIENT
Start: 2021-01-01 | End: 2021-01-01

## 2021-01-01 RX ORDER — POLYETHYLENE GLYCOL 3350 17 G/17G
17 POWDER, FOR SOLUTION ORAL DAILY PRN
Status: DISCONTINUED | OUTPATIENT
Start: 2021-01-01 | End: 2021-01-01 | Stop reason: HOSPADM

## 2021-01-01 RX ORDER — OXYCODONE HYDROCHLORIDE 5 MG/1
5 TABLET ORAL 3 TIMES DAILY
Status: DISCONTINUED | OUTPATIENT
Start: 2021-01-01 | End: 2021-01-01

## 2021-01-01 RX ORDER — ASPIRIN 81 MG/1
81 TABLET, CHEWABLE ORAL DAILY
Status: DISCONTINUED | OUTPATIENT
Start: 2021-01-01 | End: 2021-01-01 | Stop reason: HOSPADM

## 2021-01-01 RX ORDER — PROMETHAZINE HYDROCHLORIDE 25 MG/1
12.5 TABLET ORAL EVERY 6 HOURS PRN
Status: DISCONTINUED | OUTPATIENT
Start: 2021-01-01 | End: 2021-01-01 | Stop reason: HOSPADM

## 2021-01-01 RX ORDER — HEPARIN SODIUM 1000 [USP'U]/ML
2000 INJECTION, SOLUTION INTRAVENOUS; SUBCUTANEOUS PRN
Status: DISCONTINUED | OUTPATIENT
Start: 2021-01-01 | End: 2021-01-01

## 2021-01-01 RX ORDER — PANTOPRAZOLE SODIUM 40 MG/1
40 TABLET, DELAYED RELEASE ORAL
Status: DISCONTINUED | OUTPATIENT
Start: 2021-01-01 | End: 2021-01-01 | Stop reason: HOSPADM

## 2021-01-01 RX ORDER — MORPHINE SULFATE 4 MG/ML
4 INJECTION, SOLUTION INTRAMUSCULAR; INTRAVENOUS
Status: DISCONTINUED | OUTPATIENT
Start: 2021-01-01 | End: 2021-01-01

## 2021-01-01 RX ORDER — LIDOCAINE 4 G/G
1 PATCH TOPICAL DAILY
Status: DISCONTINUED | OUTPATIENT
Start: 2021-01-01 | End: 2021-01-01 | Stop reason: HOSPADM

## 2021-01-01 RX ORDER — 0.9 % SODIUM CHLORIDE 0.9 %
250 INTRAVENOUS SOLUTION INTRAVENOUS ONCE
Status: COMPLETED | OUTPATIENT
Start: 2021-01-01 | End: 2021-01-01

## 2021-01-01 RX ORDER — METOPROLOL SUCCINATE 25 MG/1
12.5 TABLET, EXTENDED RELEASE ORAL DAILY
Status: DISCONTINUED | OUTPATIENT
Start: 2021-01-01 | End: 2021-01-01

## 2021-01-01 RX ORDER — OXYCODONE HYDROCHLORIDE 5 MG/1
2.5 TABLET ORAL EVERY 6 HOURS PRN
Status: DISCONTINUED | OUTPATIENT
Start: 2021-01-01 | End: 2021-01-01

## 2021-01-01 RX ORDER — ONDANSETRON 2 MG/ML
4 INJECTION INTRAMUSCULAR; INTRAVENOUS EVERY 6 HOURS PRN
Status: DISCONTINUED | OUTPATIENT
Start: 2021-01-01 | End: 2021-01-01 | Stop reason: HOSPADM

## 2021-01-01 RX ORDER — LEVETIRACETAM 10 MG/ML
1000 INJECTION INTRAVASCULAR EVERY 12 HOURS
Status: DISCONTINUED | OUTPATIENT
Start: 2021-01-01 | End: 2021-01-01

## 2021-01-01 RX ORDER — SODIUM CHLORIDE 0.9 % (FLUSH) 0.9 %
5-40 SYRINGE (ML) INJECTION PRN
Status: DISCONTINUED | OUTPATIENT
Start: 2021-01-01 | End: 2021-01-01 | Stop reason: HOSPADM

## 2021-01-01 RX ORDER — OXYCODONE HYDROCHLORIDE 5 MG/1
5 TABLET ORAL
Status: DISCONTINUED | OUTPATIENT
Start: 2021-01-01 | End: 2021-01-01

## 2021-01-01 RX ORDER — CLONIDINE HYDROCHLORIDE 0.1 MG/1
0.1 TABLET ORAL EVERY 4 HOURS PRN
Status: DISCONTINUED | OUTPATIENT
Start: 2021-01-01 | End: 2021-01-01 | Stop reason: HOSPADM

## 2021-01-01 RX ORDER — ACETAMINOPHEN 325 MG/1
650 TABLET ORAL EVERY 6 HOURS PRN
Status: DISCONTINUED | OUTPATIENT
Start: 2021-01-01 | End: 2021-01-01 | Stop reason: HOSPADM

## 2021-01-01 RX ORDER — HYDRALAZINE HYDROCHLORIDE 20 MG/ML
10 INJECTION INTRAMUSCULAR; INTRAVENOUS EVERY 6 HOURS PRN
Status: DISCONTINUED | OUTPATIENT
Start: 2021-01-01 | End: 2021-01-01 | Stop reason: HOSPADM

## 2021-01-01 RX ORDER — ATORVASTATIN CALCIUM 40 MG/1
40 TABLET, FILM COATED ORAL NIGHTLY
Status: DISCONTINUED | OUTPATIENT
Start: 2021-01-01 | End: 2021-01-01 | Stop reason: HOSPADM

## 2021-01-01 RX ORDER — ALBUTEROL SULFATE 2.5 MG/3ML
2.5 SOLUTION RESPIRATORY (INHALATION) EVERY 4 HOURS PRN
Status: DISCONTINUED | OUTPATIENT
Start: 2021-01-01 | End: 2021-01-01 | Stop reason: HOSPADM

## 2021-01-01 RX ORDER — POLYETHYLENE GLYCOL 3350 17 G/17G
17 POWDER, FOR SOLUTION ORAL DAILY
Status: DISCONTINUED | OUTPATIENT
Start: 2021-01-01 | End: 2021-01-01 | Stop reason: HOSPADM

## 2021-01-01 RX ORDER — TAMSULOSIN HYDROCHLORIDE 0.4 MG/1
0.8 CAPSULE ORAL NIGHTLY
Status: DISCONTINUED | OUTPATIENT
Start: 2021-01-01 | End: 2021-01-01 | Stop reason: HOSPADM

## 2021-01-01 RX ORDER — ACETAMINOPHEN 325 MG/1
650 TABLET ORAL 2 TIMES DAILY
Status: DISCONTINUED | OUTPATIENT
Start: 2021-01-01 | End: 2021-01-01 | Stop reason: HOSPADM

## 2021-01-01 RX ORDER — HEPARIN SODIUM 1000 [USP'U]/ML
4000 INJECTION, SOLUTION INTRAVENOUS; SUBCUTANEOUS ONCE
Status: COMPLETED | OUTPATIENT
Start: 2021-01-01 | End: 2021-01-01

## 2021-01-01 RX ORDER — TAMSULOSIN HYDROCHLORIDE 0.4 MG/1
0.4 CAPSULE ORAL NIGHTLY
Status: DISCONTINUED | OUTPATIENT
Start: 2021-01-01 | End: 2021-01-01

## 2021-01-01 RX ORDER — FUROSEMIDE 10 MG/ML
20 INJECTION INTRAMUSCULAR; INTRAVENOUS ONCE
Status: COMPLETED | OUTPATIENT
Start: 2021-01-01 | End: 2021-01-01

## 2021-01-01 RX ORDER — HYDRALAZINE HYDROCHLORIDE 10 MG/1
10 TABLET, FILM COATED ORAL EVERY 4 HOURS PRN
Status: DISCONTINUED | OUTPATIENT
Start: 2021-01-01 | End: 2021-01-01

## 2021-01-01 RX ORDER — MORPHINE SULFATE 2 MG/ML
2 INJECTION, SOLUTION INTRAMUSCULAR; INTRAVENOUS
Status: DISCONTINUED | OUTPATIENT
Start: 2021-01-01 | End: 2021-01-01

## 2021-01-01 RX ORDER — FERROUS SULFATE TAB EC 324 MG (65 MG FE EQUIVALENT) 324 (65 FE) MG
324 TABLET DELAYED RESPONSE ORAL NIGHTLY
Status: DISCONTINUED | OUTPATIENT
Start: 2021-01-01 | End: 2021-01-01 | Stop reason: HOSPADM

## 2021-01-01 RX ORDER — ACETAMINOPHEN 325 MG/1
650 TABLET ORAL 3 TIMES DAILY
Status: DISCONTINUED | OUTPATIENT
Start: 2021-01-01 | End: 2021-01-01

## 2021-01-01 RX ADMIN — ACETAMINOPHEN 650 MG: 325 TABLET ORAL at 14:50

## 2021-01-01 RX ADMIN — APIXABAN 2.5 MG: 2.5 TABLET, FILM COATED ORAL at 08:00

## 2021-01-01 RX ADMIN — NYSTATIN 500000 UNITS: 100000 SUSPENSION ORAL at 21:23

## 2021-01-01 RX ADMIN — HYDRALAZINE HYDROCHLORIDE 10 MG: 20 INJECTION, SOLUTION INTRAMUSCULAR; INTRAVENOUS at 09:35

## 2021-01-01 RX ADMIN — ATORVASTATIN CALCIUM 40 MG: 40 TABLET, FILM COATED ORAL at 00:52

## 2021-01-01 RX ADMIN — OXYCODONE 5 MG: 5 TABLET ORAL at 14:52

## 2021-01-01 RX ADMIN — ATORVASTATIN CALCIUM 40 MG: 40 TABLET, FILM COATED ORAL at 20:09

## 2021-01-01 RX ADMIN — FINASTERIDE 5 MG: 5 TABLET, FILM COATED ORAL at 09:52

## 2021-01-01 RX ADMIN — FLUTICASONE PROPIONATE 2 SPRAY: 50 SPRAY, METERED NASAL at 08:24

## 2021-01-01 RX ADMIN — Medication 2000 UNITS: at 09:34

## 2021-01-01 RX ADMIN — ACETAMINOPHEN 650 MG: 325 TABLET ORAL at 15:44

## 2021-01-01 RX ADMIN — Medication 2000 UNITS: at 09:17

## 2021-01-01 RX ADMIN — APIXABAN 2.5 MG: 2.5 TABLET, FILM COATED ORAL at 07:51

## 2021-01-01 RX ADMIN — PREDNISONE 10 MG: 10 TABLET ORAL at 09:34

## 2021-01-01 RX ADMIN — TAMSULOSIN HYDROCHLORIDE 0.4 MG: 0.4 CAPSULE ORAL at 22:00

## 2021-01-01 RX ADMIN — METOPROLOL SUCCINATE 12.5 MG: 25 TABLET, EXTENDED RELEASE ORAL at 08:33

## 2021-01-01 RX ADMIN — FUROSEMIDE 20 MG: 10 INJECTION, SOLUTION INTRAMUSCULAR; INTRAVENOUS at 14:48

## 2021-01-01 RX ADMIN — PREDNISONE 10 MG: 10 TABLET ORAL at 08:41

## 2021-01-01 RX ADMIN — FERROUS SULFATE TAB EC 324 MG (65 MG FE EQUIVALENT) 324 MG: 324 (65 FE) TABLET DELAYED RESPONSE at 22:00

## 2021-01-01 RX ADMIN — PANTOPRAZOLE SODIUM 40 MG: 40 TABLET, DELAYED RELEASE ORAL at 06:19

## 2021-01-01 RX ADMIN — CEFEPIME HYDROCHLORIDE 2000 MG: 2 INJECTION, POWDER, FOR SOLUTION INTRAVENOUS at 17:02

## 2021-01-01 RX ADMIN — HEPARIN SODIUM 10 UNITS/KG/HR: 10000 INJECTION, SOLUTION INTRAVENOUS at 02:07

## 2021-01-01 RX ADMIN — FINASTERIDE 5 MG: 5 TABLET, FILM COATED ORAL at 08:13

## 2021-01-01 RX ADMIN — ONDANSETRON 4 MG: 2 INJECTION INTRAMUSCULAR; INTRAVENOUS at 08:48

## 2021-01-01 RX ADMIN — MORPHINE SULFATE 4 MG: 4 INJECTION, SOLUTION INTRAMUSCULAR; INTRAVENOUS at 20:27

## 2021-01-01 RX ADMIN — Medication 10 ML: at 21:24

## 2021-01-01 RX ADMIN — FUROSEMIDE 20 MG: 10 INJECTION, SOLUTION INTRAMUSCULAR; INTRAVENOUS at 18:12

## 2021-01-01 RX ADMIN — METOPROLOL TARTRATE 12.5 MG: 25 TABLET, FILM COATED ORAL at 07:52

## 2021-01-01 RX ADMIN — APIXABAN 2.5 MG: 2.5 TABLET, FILM COATED ORAL at 21:04

## 2021-01-01 RX ADMIN — MORPHINE SULFATE 4 MG: 4 INJECTION, SOLUTION INTRAMUSCULAR; INTRAVENOUS at 18:00

## 2021-01-01 RX ADMIN — TAMSULOSIN HYDROCHLORIDE 0.4 MG: 0.4 CAPSULE ORAL at 21:23

## 2021-01-01 RX ADMIN — NYSTATIN 500000 UNITS: 100000 SUSPENSION ORAL at 15:47

## 2021-01-01 RX ADMIN — POLYETHYLENE GLYCOL 3350 17 G: 17 POWDER, FOR SOLUTION ORAL at 09:17

## 2021-01-01 RX ADMIN — ACETAMINOPHEN 650 MG: 325 TABLET ORAL at 21:04

## 2021-01-01 RX ADMIN — FUROSEMIDE 20 MG: 10 INJECTION, SOLUTION INTRAMUSCULAR; INTRAVENOUS at 17:22

## 2021-01-01 RX ADMIN — CEFEPIME HYDROCHLORIDE 2000 MG: 2 INJECTION, POWDER, FOR SOLUTION INTRAVENOUS at 04:50

## 2021-01-01 RX ADMIN — LORAZEPAM 2 MG: 2 INJECTION INTRAMUSCULAR; INTRAVENOUS at 16:18

## 2021-01-01 RX ADMIN — PREDNISONE 10 MG: 10 TABLET ORAL at 07:53

## 2021-01-01 RX ADMIN — APIXABAN 2.5 MG: 2.5 TABLET, FILM COATED ORAL at 21:30

## 2021-01-01 RX ADMIN — FUROSEMIDE 20 MG: 10 INJECTION, SOLUTION INTRAMUSCULAR; INTRAVENOUS at 08:22

## 2021-01-01 RX ADMIN — APIXABAN 2.5 MG: 2.5 TABLET, FILM COATED ORAL at 20:00

## 2021-01-01 RX ADMIN — OXYCODONE 2.5 MG: 5 TABLET ORAL at 11:25

## 2021-01-01 RX ADMIN — ATORVASTATIN CALCIUM 40 MG: 40 TABLET, FILM COATED ORAL at 21:30

## 2021-01-01 RX ADMIN — CLONIDINE HYDROCHLORIDE 0.1 MG: 0.1 TABLET ORAL at 05:37

## 2021-01-01 RX ADMIN — Medication 2000 UNITS: at 08:21

## 2021-01-01 RX ADMIN — ASPIRIN 81 MG: 81 TABLET, CHEWABLE ORAL at 08:33

## 2021-01-01 RX ADMIN — DEXTROSE MONOHYDRATE 200 MG: 50 INJECTION, SOLUTION INTRAVENOUS at 10:18

## 2021-01-01 RX ADMIN — FERROUS SULFATE TAB EC 324 MG (65 MG FE EQUIVALENT) 324 MG: 324 (65 FE) TABLET DELAYED RESPONSE at 20:27

## 2021-01-01 RX ADMIN — Medication 10 ML: at 20:00

## 2021-01-01 RX ADMIN — FINASTERIDE 5 MG: 5 TABLET, FILM COATED ORAL at 17:29

## 2021-01-01 RX ADMIN — FLUTICASONE PROPIONATE 2 SPRAY: 50 SPRAY, METERED NASAL at 09:46

## 2021-01-01 RX ADMIN — NYSTATIN 500000 UNITS: 100000 SUSPENSION ORAL at 21:04

## 2021-01-01 RX ADMIN — TAMSULOSIN HYDROCHLORIDE 0.4 MG: 0.4 CAPSULE ORAL at 21:30

## 2021-01-01 RX ADMIN — PREDNISONE 10 MG: 10 TABLET ORAL at 08:00

## 2021-01-01 RX ADMIN — MORPHINE SULFATE 4 MG: 4 INJECTION, SOLUTION INTRAMUSCULAR; INTRAVENOUS at 18:12

## 2021-01-01 RX ADMIN — FERROUS SULFATE TAB EC 324 MG (65 MG FE EQUIVALENT) 324 MG: 324 (65 FE) TABLET DELAYED RESPONSE at 21:04

## 2021-01-01 RX ADMIN — SODIUM CHLORIDE, PRESERVATIVE FREE 10 ML: 5 INJECTION INTRAVENOUS at 20:48

## 2021-01-01 RX ADMIN — ENOXAPARIN SODIUM 30 MG: 30 INJECTION SUBCUTANEOUS at 09:53

## 2021-01-01 RX ADMIN — FINASTERIDE 5 MG: 5 TABLET, FILM COATED ORAL at 08:00

## 2021-01-01 RX ADMIN — ASPIRIN 81 MG: 81 TABLET, CHEWABLE ORAL at 08:00

## 2021-01-01 RX ADMIN — ACETAMINOPHEN 650 MG: 325 TABLET ORAL at 09:52

## 2021-01-01 RX ADMIN — Medication 2000 UNITS: at 08:41

## 2021-01-01 RX ADMIN — SODIUM CHLORIDE: 9 INJECTION, SOLUTION INTRAVENOUS at 20:10

## 2021-01-01 RX ADMIN — SODIUM CHLORIDE, PRESERVATIVE FREE 5 ML: 5 INJECTION INTRAVENOUS at 21:03

## 2021-01-01 RX ADMIN — VANCOMYCIN HYDROCHLORIDE 1000 MG: 1 INJECTION, POWDER, LYOPHILIZED, FOR SOLUTION INTRAVENOUS at 20:38

## 2021-01-01 RX ADMIN — NYSTATIN 500000 UNITS: 100000 SUSPENSION ORAL at 20:09

## 2021-01-01 RX ADMIN — PREDNISONE 10 MG: 10 TABLET ORAL at 09:21

## 2021-01-01 RX ADMIN — OXYCODONE 5 MG: 5 TABLET ORAL at 11:40

## 2021-01-01 RX ADMIN — PREDNISONE 10 MG: 10 TABLET ORAL at 08:13

## 2021-01-01 RX ADMIN — POLYETHYLENE GLYCOL 3350 17 G: 17 POWDER, FOR SOLUTION ORAL at 09:53

## 2021-01-01 RX ADMIN — FUROSEMIDE 20 MG: 10 INJECTION, SOLUTION INTRAMUSCULAR; INTRAVENOUS at 08:41

## 2021-01-01 RX ADMIN — SODIUM CHLORIDE, PRESERVATIVE FREE 10 ML: 5 INJECTION INTRAVENOUS at 08:42

## 2021-01-01 RX ADMIN — FLUTICASONE PROPIONATE 1 SPRAY: 50 SPRAY, METERED NASAL at 10:55

## 2021-01-01 RX ADMIN — FLUTICASONE PROPIONATE 2 SPRAY: 50 SPRAY, METERED NASAL at 08:42

## 2021-01-01 RX ADMIN — ACETAMINOPHEN 650 MG: 325 TABLET ORAL at 08:41

## 2021-01-01 RX ADMIN — SODIUM CHLORIDE, PRESERVATIVE FREE 10 ML: 5 INJECTION INTRAVENOUS at 20:37

## 2021-01-01 RX ADMIN — MORPHINE SULFATE 4 MG: 4 INJECTION, SOLUTION INTRAMUSCULAR; INTRAVENOUS at 00:49

## 2021-01-01 RX ADMIN — ENOXAPARIN SODIUM 30 MG: 40 INJECTION SUBCUTANEOUS at 16:34

## 2021-01-01 RX ADMIN — SODIUM CHLORIDE, PRESERVATIVE FREE 10 ML: 5 INJECTION INTRAVENOUS at 09:17

## 2021-01-01 RX ADMIN — HYDRALAZINE HYDROCHLORIDE 10 MG: 20 INJECTION, SOLUTION INTRAMUSCULAR; INTRAVENOUS at 19:22

## 2021-01-01 RX ADMIN — SODIUM CHLORIDE: 9 INJECTION, SOLUTION INTRAVENOUS at 06:54

## 2021-01-01 RX ADMIN — ACETAMINOPHEN 650 MG: 325 TABLET ORAL at 08:22

## 2021-01-01 RX ADMIN — METOPROLOL SUCCINATE 12.5 MG: 25 TABLET, EXTENDED RELEASE ORAL at 08:41

## 2021-01-01 RX ADMIN — DEXTROSE AND SODIUM CHLORIDE: 5; 450 INJECTION, SOLUTION INTRAVENOUS at 20:09

## 2021-01-01 RX ADMIN — FERROUS SULFATE TAB EC 324 MG (65 MG FE EQUIVALENT) 324 MG: 324 (65 FE) TABLET DELAYED RESPONSE at 20:36

## 2021-01-01 RX ADMIN — LEVETIRACETAM 2000 MG: 100 INJECTION, SOLUTION INTRAVENOUS at 16:17

## 2021-01-01 RX ADMIN — FUROSEMIDE 20 MG: 10 INJECTION, SOLUTION INTRAMUSCULAR; INTRAVENOUS at 09:17

## 2021-01-01 RX ADMIN — TAMSULOSIN HYDROCHLORIDE 0.4 MG: 0.4 CAPSULE ORAL at 21:34

## 2021-01-01 RX ADMIN — ENOXAPARIN SODIUM 30 MG: 30 INJECTION SUBCUTANEOUS at 09:45

## 2021-01-01 RX ADMIN — HYDRALAZINE HYDROCHLORIDE 10 MG: 20 INJECTION, SOLUTION INTRAMUSCULAR; INTRAVENOUS at 23:12

## 2021-01-01 RX ADMIN — METOPROLOL TARTRATE 12.5 MG: 25 TABLET, FILM COATED ORAL at 18:04

## 2021-01-01 RX ADMIN — DEXTROSE AND SODIUM CHLORIDE: 5; 450 INJECTION, SOLUTION INTRAVENOUS at 15:09

## 2021-01-01 RX ADMIN — FERROUS SULFATE TAB EC 324 MG (65 MG FE EQUIVALENT) 324 MG: 324 (65 FE) TABLET DELAYED RESPONSE at 20:38

## 2021-01-01 RX ADMIN — ACETAMINOPHEN 650 MG: 325 TABLET ORAL at 09:23

## 2021-01-01 RX ADMIN — Medication 10 ML: at 20:09

## 2021-01-01 RX ADMIN — FINASTERIDE 5 MG: 5 TABLET, FILM COATED ORAL at 09:34

## 2021-01-01 RX ADMIN — MORPHINE SULFATE 4 MG: 4 INJECTION, SOLUTION INTRAMUSCULAR; INTRAVENOUS at 14:57

## 2021-01-01 RX ADMIN — CEFEPIME HYDROCHLORIDE 2000 MG: 2 INJECTION, POWDER, FOR SOLUTION INTRAVENOUS at 04:57

## 2021-01-01 RX ADMIN — NYSTATIN 500000 UNITS: 100000 SUSPENSION ORAL at 13:48

## 2021-01-01 RX ADMIN — SODIUM CHLORIDE 250 ML: 9 INJECTION, SOLUTION INTRAVENOUS at 19:03

## 2021-01-01 RX ADMIN — NYSTATIN 500000 UNITS: 100000 SUSPENSION ORAL at 14:55

## 2021-01-01 RX ADMIN — TAMSULOSIN HYDROCHLORIDE 0.8 MG: 0.4 CAPSULE ORAL at 21:04

## 2021-01-01 RX ADMIN — NYSTATIN 500000 UNITS: 100000 SUSPENSION ORAL at 08:01

## 2021-01-01 RX ADMIN — ASPIRIN 81 MG: 81 TABLET, CHEWABLE ORAL at 07:52

## 2021-01-01 RX ADMIN — Medication 1500 MG: at 19:00

## 2021-01-01 RX ADMIN — OXYCODONE 5 MG: 5 TABLET ORAL at 20:46

## 2021-01-01 RX ADMIN — CLONIDINE HYDROCHLORIDE 0.1 MG: 0.1 TABLET ORAL at 10:10

## 2021-01-01 RX ADMIN — Medication 10 ML: at 21:04

## 2021-01-01 RX ADMIN — HEPARIN SODIUM 4000 UNITS: 1000 INJECTION INTRAVENOUS; SUBCUTANEOUS at 23:10

## 2021-01-01 RX ADMIN — CEFEPIME HYDROCHLORIDE 2000 MG: 2 INJECTION, POWDER, FOR SOLUTION INTRAVENOUS at 17:00

## 2021-01-01 RX ADMIN — SODIUM CHLORIDE: 9 INJECTION, SOLUTION INTRAVENOUS at 10:10

## 2021-01-01 RX ADMIN — Medication 10 ML: at 21:33

## 2021-01-01 RX ADMIN — HEPARIN SODIUM 4000 UNITS: 1000 INJECTION INTRAVENOUS; SUBCUTANEOUS at 18:04

## 2021-01-01 RX ADMIN — PREDNISONE 10 MG: 10 TABLET ORAL at 09:17

## 2021-01-01 RX ADMIN — HEPARIN SODIUM 8 UNITS/KG/HR: 10000 INJECTION, SOLUTION INTRAVENOUS at 05:42

## 2021-01-01 RX ADMIN — Medication 10 ML: at 08:01

## 2021-01-01 RX ADMIN — CLONIDINE HYDROCHLORIDE 0.1 MG: 0.1 TABLET ORAL at 19:25

## 2021-01-01 RX ADMIN — APIXABAN 2.5 MG: 2.5 TABLET, FILM COATED ORAL at 00:51

## 2021-01-01 RX ADMIN — ONDANSETRON 4 MG: 2 INJECTION INTRAMUSCULAR; INTRAVENOUS at 02:59

## 2021-01-01 RX ADMIN — ACETAMINOPHEN 650 MG: 325 TABLET ORAL at 20:27

## 2021-01-01 RX ADMIN — ATORVASTATIN CALCIUM 40 MG: 40 TABLET, FILM COATED ORAL at 21:34

## 2021-01-01 RX ADMIN — ASPIRIN 81 MG: 81 TABLET, CHEWABLE ORAL at 17:51

## 2021-01-01 RX ADMIN — SODIUM CHLORIDE, PRESERVATIVE FREE 10 ML: 5 INJECTION INTRAVENOUS at 22:00

## 2021-01-01 RX ADMIN — ATORVASTATIN CALCIUM 40 MG: 40 TABLET, FILM COATED ORAL at 21:04

## 2021-01-01 RX ADMIN — FINASTERIDE 5 MG: 5 TABLET, FILM COATED ORAL at 08:22

## 2021-01-01 RX ADMIN — ENOXAPARIN SODIUM 30 MG: 30 INJECTION SUBCUTANEOUS at 08:21

## 2021-01-01 RX ADMIN — PANTOPRAZOLE SODIUM 40 MG: 40 TABLET, DELAYED RELEASE ORAL at 05:06

## 2021-01-01 RX ADMIN — MORPHINE SULFATE 2 MG: 2 INJECTION, SOLUTION INTRAMUSCULAR; INTRAVENOUS at 23:35

## 2021-01-01 RX ADMIN — MORPHINE SULFATE 4 MG: 4 INJECTION, SOLUTION INTRAMUSCULAR; INTRAVENOUS at 13:52

## 2021-01-01 RX ADMIN — ACETAMINOPHEN 650 MG: 325 TABLET ORAL at 19:22

## 2021-01-01 RX ADMIN — Medication 10 ML: at 08:15

## 2021-01-01 RX ADMIN — METOPROLOL SUCCINATE 12.5 MG: 25 TABLET, EXTENDED RELEASE ORAL at 08:22

## 2021-01-01 RX ADMIN — LEVETIRACETAM 1000 MG: 10 INJECTION INTRAVENOUS at 05:35

## 2021-01-01 RX ADMIN — DEXTROSE AND SODIUM CHLORIDE: 5; 450 INJECTION, SOLUTION INTRAVENOUS at 17:51

## 2021-01-01 RX ADMIN — OXYCODONE HYDROCHLORIDE 2.5 MG: 5 TABLET ORAL at 10:50

## 2021-01-01 RX ADMIN — METOPROLOL TARTRATE 12.5 MG: 25 TABLET, FILM COATED ORAL at 08:00

## 2021-01-01 RX ADMIN — METOPROLOL TARTRATE 12.5 MG: 25 TABLET, FILM COATED ORAL at 08:14

## 2021-01-01 RX ADMIN — MORPHINE SULFATE 2 MG: 2 INJECTION, SOLUTION INTRAMUSCULAR; INTRAVENOUS at 12:37

## 2021-01-01 RX ADMIN — CEFEPIME HYDROCHLORIDE 2000 MG: 2 INJECTION, POWDER, FOR SOLUTION INTRAVENOUS at 04:35

## 2021-01-01 RX ADMIN — NYSTATIN 500000 UNITS: 100000 SUSPENSION ORAL at 08:15

## 2021-01-01 RX ADMIN — ATORVASTATIN CALCIUM 40 MG: 40 TABLET, FILM COATED ORAL at 22:00

## 2021-01-01 RX ADMIN — FINASTERIDE 5 MG: 5 TABLET, FILM COATED ORAL at 09:21

## 2021-01-01 RX ADMIN — PREDNISONE 10 MG: 10 TABLET ORAL at 09:53

## 2021-01-01 RX ADMIN — IOPAMIDOL 80 ML: 755 INJECTION, SOLUTION INTRAVENOUS at 14:26

## 2021-01-01 RX ADMIN — HEPARIN SODIUM 12 UNITS/KG/HR: 10000 INJECTION, SOLUTION INTRAVENOUS at 18:09

## 2021-01-01 RX ADMIN — ATORVASTATIN CALCIUM 40 MG: 40 TABLET, FILM COATED ORAL at 20:10

## 2021-01-01 RX ADMIN — PANTOPRAZOLE SODIUM 40 MG: 40 TABLET, DELAYED RELEASE ORAL at 06:29

## 2021-01-01 RX ADMIN — PREDNISONE 10 MG: 10 TABLET ORAL at 08:22

## 2021-01-01 RX ADMIN — IOPAMIDOL 75 ML: 755 INJECTION, SOLUTION INTRAVENOUS at 15:46

## 2021-01-01 RX ADMIN — OXYCODONE 2.5 MG: 5 TABLET ORAL at 20:36

## 2021-01-01 RX ADMIN — METOPROLOL SUCCINATE 25 MG: 25 TABLET, EXTENDED RELEASE ORAL at 09:52

## 2021-01-01 RX ADMIN — FLUTICASONE PROPIONATE 1 SPRAY: 50 SPRAY, METERED NASAL at 15:32

## 2021-01-01 RX ADMIN — ACETAMINOPHEN 650 MG: 325 TABLET ORAL at 16:39

## 2021-01-01 RX ADMIN — FINASTERIDE 5 MG: 5 TABLET, FILM COATED ORAL at 08:41

## 2021-01-01 RX ADMIN — POLYETHYLENE GLYCOL 3350 17 G: 17 POWDER, FOR SOLUTION ORAL at 08:22

## 2021-01-01 RX ADMIN — SODIUM CHLORIDE, PRESERVATIVE FREE 10 ML: 5 INJECTION INTRAVENOUS at 20:30

## 2021-01-01 RX ADMIN — HYDRALAZINE HYDROCHLORIDE 10 MG: 20 INJECTION, SOLUTION INTRAMUSCULAR; INTRAVENOUS at 19:05

## 2021-01-01 RX ADMIN — ACETAMINOPHEN 650 MG: 325 TABLET ORAL at 20:36

## 2021-01-01 RX ADMIN — Medication 2000 UNITS: at 09:52

## 2021-01-01 RX ADMIN — SODIUM CHLORIDE, PRESERVATIVE FREE 10 ML: 5 INJECTION INTRAVENOUS at 09:53

## 2021-01-01 RX ADMIN — NYSTATIN 500000 UNITS: 100000 SUSPENSION ORAL at 08:21

## 2021-01-01 RX ADMIN — Medication 10 ML: at 20:11

## 2021-01-01 RX ADMIN — PANTOPRAZOLE SODIUM 40 MG: 40 TABLET, DELAYED RELEASE ORAL at 04:35

## 2021-01-01 RX ADMIN — TAMSULOSIN HYDROCHLORIDE 0.4 MG: 0.4 CAPSULE ORAL at 20:46

## 2021-01-01 RX ADMIN — FERROUS SULFATE TAB EC 324 MG (65 MG FE EQUIVALENT) 324 MG: 324 (65 FE) TABLET DELAYED RESPONSE at 20:46

## 2021-01-01 RX ADMIN — HYDRALAZINE HYDROCHLORIDE 10 MG: 10 TABLET, FILM COATED ORAL at 03:33

## 2021-01-01 RX ADMIN — Medication 10 ML: at 09:53

## 2021-01-01 RX ADMIN — FLUTICASONE PROPIONATE 1 SPRAY: 50 SPRAY, METERED NASAL at 07:50

## 2021-01-01 RX ADMIN — CEFEPIME HYDROCHLORIDE 2000 MG: 2 INJECTION, POWDER, FOR SOLUTION INTRAVENOUS at 16:13

## 2021-01-01 RX ADMIN — SODIUM CHLORIDE: 9 INJECTION, SOLUTION INTRAVENOUS at 22:04

## 2021-01-01 RX ADMIN — FUROSEMIDE 20 MG: 10 INJECTION, SOLUTION INTRAMUSCULAR; INTRAVENOUS at 17:02

## 2021-01-01 RX ADMIN — METOPROLOL SUCCINATE 12.5 MG: 25 TABLET, EXTENDED RELEASE ORAL at 09:34

## 2021-01-01 RX ADMIN — FUROSEMIDE 40 MG: 10 INJECTION, SOLUTION INTRAMUSCULAR; INTRAVENOUS at 22:08

## 2021-01-01 RX ADMIN — MORPHINE SULFATE 2 MG: 2 INJECTION, SOLUTION INTRAMUSCULAR; INTRAVENOUS at 09:39

## 2021-01-01 RX ADMIN — OXYCODONE 5 MG: 5 TABLET ORAL at 20:45

## 2021-01-01 RX ADMIN — MORPHINE SULFATE 2 MG: 2 INJECTION, SOLUTION INTRAMUSCULAR; INTRAVENOUS at 04:57

## 2021-01-01 RX ADMIN — FUROSEMIDE 20 MG: 10 INJECTION, SOLUTION INTRAMUSCULAR; INTRAVENOUS at 17:56

## 2021-01-01 RX ADMIN — ACETAMINOPHEN 650 MG: 325 TABLET ORAL at 20:46

## 2021-01-01 RX ADMIN — METOPROLOL SUCCINATE 12.5 MG: 25 TABLET, EXTENDED RELEASE ORAL at 08:00

## 2021-01-01 RX ADMIN — PREDNISONE 10 MG: 10 TABLET ORAL at 08:33

## 2021-01-01 RX ADMIN — TAMSULOSIN HYDROCHLORIDE 0.4 MG: 0.4 CAPSULE ORAL at 20:09

## 2021-01-01 RX ADMIN — NYSTATIN 500000 UNITS: 100000 SUSPENSION ORAL at 16:39

## 2021-01-01 RX ADMIN — FINASTERIDE 5 MG: 5 TABLET, FILM COATED ORAL at 08:33

## 2021-01-01 RX ADMIN — ACETAMINOPHEN 650 MG: 325 TABLET ORAL at 20:09

## 2021-01-01 RX ADMIN — FINASTERIDE 5 MG: 5 TABLET, FILM COATED ORAL at 07:52

## 2021-01-01 RX ADMIN — NYSTATIN 500000 UNITS: 100000 SUSPENSION ORAL at 07:53

## 2021-01-01 RX ADMIN — TAMSULOSIN HYDROCHLORIDE 0.4 MG: 0.4 CAPSULE ORAL at 21:04

## 2021-01-01 RX ADMIN — Medication 10 ML: at 22:28

## 2021-01-01 RX ADMIN — TAMSULOSIN HYDROCHLORIDE 0.4 MG: 0.4 CAPSULE ORAL at 20:36

## 2021-01-01 RX ADMIN — APIXABAN 2.5 MG: 2.5 TABLET, FILM COATED ORAL at 08:33

## 2021-01-01 RX ADMIN — FINASTERIDE 5 MG: 5 TABLET, FILM COATED ORAL at 09:17

## 2021-01-01 RX ADMIN — MORPHINE SULFATE 4 MG: 4 INJECTION, SOLUTION INTRAMUSCULAR; INTRAVENOUS at 22:00

## 2021-01-01 RX ADMIN — Medication 10 ML: at 08:22

## 2021-01-01 RX ADMIN — Medication 10 ML: at 09:00

## 2021-01-01 RX ADMIN — CEFEPIME HYDROCHLORIDE 2000 MG: 2 INJECTION, POWDER, FOR SOLUTION INTRAVENOUS at 04:22

## 2021-01-01 RX ADMIN — CEFEPIME HYDROCHLORIDE 2000 MG: 2 INJECTION, POWDER, FOR SOLUTION INTRAVENOUS at 18:12

## 2021-01-01 RX ADMIN — ATORVASTATIN CALCIUM 40 MG: 40 TABLET, FILM COATED ORAL at 20:00

## 2021-01-01 RX ADMIN — DEXTROSE AND SODIUM CHLORIDE: 5; 450 INJECTION, SOLUTION INTRAVENOUS at 11:36

## 2021-01-01 RX ADMIN — NYSTATIN 500000 UNITS: 100000 SUSPENSION ORAL at 21:30

## 2021-01-01 RX ADMIN — FUROSEMIDE 20 MG: 10 INJECTION, SOLUTION INTRAMUSCULAR; INTRAVENOUS at 10:02

## 2021-01-01 RX ADMIN — MORPHINE SULFATE 2 MG: 2 INJECTION, SOLUTION INTRAMUSCULAR; INTRAVENOUS at 08:26

## 2021-01-01 RX ADMIN — FLUTICASONE PROPIONATE 2 SPRAY: 50 SPRAY, METERED NASAL at 09:34

## 2021-01-01 RX ADMIN — CEFEPIME HYDROCHLORIDE 2000 MG: 2 INJECTION, POWDER, FOR SOLUTION INTRAVENOUS at 17:22

## 2021-01-01 RX ADMIN — OXYCODONE 5 MG: 5 TABLET ORAL at 14:30

## 2021-01-01 RX ADMIN — SODIUM CHLORIDE, PRESERVATIVE FREE 10 ML: 5 INJECTION INTRAVENOUS at 08:23

## 2021-01-01 RX ADMIN — MORPHINE SULFATE 2 MG: 2 INJECTION, SOLUTION INTRAMUSCULAR; INTRAVENOUS at 02:59

## 2021-01-01 RX ADMIN — ENOXAPARIN SODIUM 30 MG: 30 INJECTION SUBCUTANEOUS at 12:05

## 2021-01-01 RX ADMIN — POLYETHYLENE GLYCOL 3350 17 G: 17 POWDER, FOR SOLUTION ORAL at 15:10

## 2021-01-01 RX ADMIN — PREDNISONE 10 MG: 10 TABLET ORAL at 17:29

## 2021-01-01 RX ADMIN — CEFEPIME HYDROCHLORIDE 2000 MG: 2 INJECTION, POWDER, FOR SOLUTION INTRAVENOUS at 05:07

## 2021-01-01 RX ADMIN — Medication 10 ML: at 07:54

## 2021-01-01 RX ADMIN — Medication 10 ML: at 08:33

## 2021-01-01 RX ADMIN — POLYETHYLENE GLYCOL 3350 17 G: 17 POWDER, FOR SOLUTION ORAL at 08:41

## 2021-01-01 RX ADMIN — SODIUM CHLORIDE: 9 INJECTION, SOLUTION INTRAVENOUS at 18:47

## 2021-01-01 RX ADMIN — METOPROLOL SUCCINATE 12.5 MG: 25 TABLET, EXTENDED RELEASE ORAL at 09:17

## 2021-01-01 RX ADMIN — HEPARIN SODIUM 4000 UNITS: 1000 INJECTION INTRAVENOUS; SUBCUTANEOUS at 06:06

## 2021-01-01 RX ADMIN — METOPROLOL TARTRATE 12.5 MG: 25 TABLET, FILM COATED ORAL at 09:21

## 2021-01-01 RX ADMIN — OXYCODONE 2.5 MG: 5 TABLET ORAL at 15:44

## 2021-01-01 RX ADMIN — ENOXAPARIN SODIUM 30 MG: 40 INJECTION SUBCUTANEOUS at 09:35

## 2021-01-01 RX ADMIN — ATORVASTATIN CALCIUM 40 MG: 40 TABLET, FILM COATED ORAL at 21:23

## 2021-01-01 RX ADMIN — FLUTICASONE PROPIONATE 2 SPRAY: 50 SPRAY, METERED NASAL at 09:27

## 2021-01-01 RX ADMIN — HEPARIN SODIUM 4000 UNITS: 1000 INJECTION INTRAVENOUS; SUBCUTANEOUS at 06:05

## 2021-01-01 ASSESSMENT — PAIN SCALES - WONG BAKER
WONGBAKER_NUMERICALRESPONSE: 10
WONGBAKER_NUMERICALRESPONSE: 8
WONGBAKER_NUMERICALRESPONSE: 0
WONGBAKER_NUMERICALRESPONSE: 0
WONGBAKER_NUMERICALRESPONSE: 8

## 2021-01-01 ASSESSMENT — PAIN DESCRIPTION - DESCRIPTORS
DESCRIPTORS: ACHING;CONSTANT
DESCRIPTORS: ACHING
DESCRIPTORS: ACHING;CONSTANT
DESCRIPTORS: ACHING
DESCRIPTORS: ACHING;CRUSHING

## 2021-01-01 ASSESSMENT — PAIN SCALES - PAIN ASSESSMENT IN ADVANCED DEMENTIA (PAINAD)
FACIALEXPRESSION: 0
CONSOLABILITY: 0
CONSOLABILITY: 0
NEGVOCALIZATION: 0
BODYLANGUAGE: 0
BODYLANGUAGE: 0
TOTALSCORE: 0
BODYLANGUAGE: 0
CONSOLABILITY: 0
TOTALSCORE: 0
BREATHING: 0
NEGVOCALIZATION: 0
BREATHING: 0
BODYLANGUAGE: 0
BREATHING: 0
FACIALEXPRESSION: 0
TOTALSCORE: 1
BREATHING: 0
NEGVOCALIZATION: 0
BREATHING: 1
NEGVOCALIZATION: 1
BREATHING: 0
BODYLANGUAGE: 0
CONSOLABILITY: 0
TOTALSCORE: 0
BREATHING: 0
TOTALSCORE: 0
FACIALEXPRESSION: 0
TOTALSCORE: 0
FACIALEXPRESSION: 1
BREATHING: 0
TOTALSCORE: 0
BODYLANGUAGE: 0
FACIALEXPRESSION: 1
BODYLANGUAGE: 0
FACIALEXPRESSION: 0
TOTALSCORE: 0
TOTALSCORE: 3
BREATHING: 0
NEGVOCALIZATION: 0
FACIALEXPRESSION: 0
BODYLANGUAGE: 0
BREATHING: 0
CONSOLABILITY: 0
CONSOLABILITY: 0
BREATHING: 0
BODYLANGUAGE: 0
NEGVOCALIZATION: 0
BODYLANGUAGE: 0
CONSOLABILITY: 0
CONSOLABILITY: 0
TOTALSCORE: 0
NEGVOCALIZATION: 0
BODYLANGUAGE: 0
TOTALSCORE: 0
CONSOLABILITY: 0
BODYLANGUAGE: 0

## 2021-01-01 ASSESSMENT — PAIN DESCRIPTION - PROGRESSION
CLINICAL_PROGRESSION: GRADUALLY WORSENING
CLINICAL_PROGRESSION: GRADUALLY WORSENING
CLINICAL_PROGRESSION: NOT CHANGED
CLINICAL_PROGRESSION: RAPIDLY WORSENING
CLINICAL_PROGRESSION: GRADUALLY WORSENING
CLINICAL_PROGRESSION: NOT CHANGED
CLINICAL_PROGRESSION: GRADUALLY WORSENING
CLINICAL_PROGRESSION: NOT CHANGED
CLINICAL_PROGRESSION: GRADUALLY WORSENING
CLINICAL_PROGRESSION: NOT CHANGED

## 2021-01-01 ASSESSMENT — PAIN SCALES - GENERAL
PAINLEVEL_OUTOF10: 8
PAINLEVEL_OUTOF10: 0
PAINLEVEL_OUTOF10: 5
PAINLEVEL_OUTOF10: 7
PAINLEVEL_OUTOF10: 0
PAINLEVEL_OUTOF10: 4
PAINLEVEL_OUTOF10: 10
PAINLEVEL_OUTOF10: 0
PAINLEVEL_OUTOF10: 8
PAINLEVEL_OUTOF10: 6
PAINLEVEL_OUTOF10: 4
PAINLEVEL_OUTOF10: 0
PAINLEVEL_OUTOF10: 0
PAINLEVEL_OUTOF10: 8
PAINLEVEL_OUTOF10: 0
PAINLEVEL_OUTOF10: 3
PAINLEVEL_OUTOF10: 0
PAINLEVEL_OUTOF10: 0
PAINLEVEL_OUTOF10: 5
PAINLEVEL_OUTOF10: 0
PAINLEVEL_OUTOF10: 0
PAINLEVEL_OUTOF10: 4
PAINLEVEL_OUTOF10: 6
PAINLEVEL_OUTOF10: 9
PAINLEVEL_OUTOF10: 0
PAINLEVEL_OUTOF10: 6
PAINLEVEL_OUTOF10: 7
PAINLEVEL_OUTOF10: 0
PAINLEVEL_OUTOF10: 5
PAINLEVEL_OUTOF10: 0
PAINLEVEL_OUTOF10: 2
PAINLEVEL_OUTOF10: 5
PAINLEVEL_OUTOF10: 0
PAINLEVEL_OUTOF10: 6
PAINLEVEL_OUTOF10: 10
PAINLEVEL_OUTOF10: 0
PAINLEVEL_OUTOF10: 0
PAINLEVEL_OUTOF10: 6
PAINLEVEL_OUTOF10: 3
PAINLEVEL_OUTOF10: 8
PAINLEVEL_OUTOF10: 0
PAINLEVEL_OUTOF10: 6
PAINLEVEL_OUTOF10: 4
PAINLEVEL_OUTOF10: 9
PAINLEVEL_OUTOF10: 4
PAINLEVEL_OUTOF10: 0
PAINLEVEL_OUTOF10: 7
PAINLEVEL_OUTOF10: 7
PAINLEVEL_OUTOF10: 0
PAINLEVEL_OUTOF10: 6
PAINLEVEL_OUTOF10: 0
PAINLEVEL_OUTOF10: 3
PAINLEVEL_OUTOF10: 0
PAINLEVEL_OUTOF10: 0
PAINLEVEL_OUTOF10: 8

## 2021-01-01 ASSESSMENT — PAIN DESCRIPTION - LOCATION
LOCATION: BACK
LOCATION: KNEE
LOCATION: BACK

## 2021-01-01 ASSESSMENT — PAIN DESCRIPTION - ONSET
ONSET: ON-GOING

## 2021-01-01 ASSESSMENT — PAIN DESCRIPTION - PAIN TYPE
TYPE: CHRONIC PAIN
TYPE: ACUTE PAIN
TYPE: CHRONIC PAIN
TYPE: ACUTE PAIN
TYPE: ACUTE PAIN
TYPE: CHRONIC PAIN
TYPE: CHRONIC PAIN
TYPE: ACUTE PAIN
TYPE: ACUTE PAIN
TYPE: CHRONIC PAIN
TYPE: ACUTE PAIN;CHRONIC PAIN
TYPE: CHRONIC PAIN
TYPE: CHRONIC PAIN

## 2021-01-01 ASSESSMENT — PAIN - FUNCTIONAL ASSESSMENT
PAIN_FUNCTIONAL_ASSESSMENT: PREVENTS OR INTERFERES WITH MANY ACTIVE NOT PASSIVE ACTIVITIES
PAIN_FUNCTIONAL_ASSESSMENT: PREVENTS OR INTERFERES WITH MANY ACTIVE NOT PASSIVE ACTIVITIES
PAIN_FUNCTIONAL_ASSESSMENT: PREVENTS OR INTERFERES SOME ACTIVE ACTIVITIES AND ADLS
PAIN_FUNCTIONAL_ASSESSMENT: PREVENTS OR INTERFERES SOME ACTIVE ACTIVITIES AND ADLS
PAIN_FUNCTIONAL_ASSESSMENT: PREVENTS OR INTERFERES WITH MANY ACTIVE NOT PASSIVE ACTIVITIES
PAIN_FUNCTIONAL_ASSESSMENT: PREVENTS OR INTERFERES SOME ACTIVE ACTIVITIES AND ADLS
PAIN_FUNCTIONAL_ASSESSMENT: PREVENTS OR INTERFERES WITH MANY ACTIVE NOT PASSIVE ACTIVITIES
PAIN_FUNCTIONAL_ASSESSMENT: PREVENTS OR INTERFERES SOME ACTIVE ACTIVITIES AND ADLS
PAIN_FUNCTIONAL_ASSESSMENT: PREVENTS OR INTERFERES WITH MANY ACTIVE NOT PASSIVE ACTIVITIES
PAIN_FUNCTIONAL_ASSESSMENT: PREVENTS OR INTERFERES WITH MANY ACTIVE NOT PASSIVE ACTIVITIES
PAIN_FUNCTIONAL_ASSESSMENT: PREVENTS OR INTERFERES SOME ACTIVE ACTIVITIES AND ADLS
PAIN_FUNCTIONAL_ASSESSMENT: PREVENTS OR INTERFERES WITH MANY ACTIVE NOT PASSIVE ACTIVITIES

## 2021-01-01 ASSESSMENT — PAIN DESCRIPTION - ORIENTATION
ORIENTATION: LOWER;MID
ORIENTATION: MID;LOWER
ORIENTATION: LOWER;MID
ORIENTATION: MID;LOWER
ORIENTATION: RIGHT
ORIENTATION: LOWER
ORIENTATION: RIGHT;POSTERIOR;LOWER;MID
ORIENTATION: LOWER
ORIENTATION: LOWER
ORIENTATION: MID;LOWER
ORIENTATION: LOWER
ORIENTATION: MID;LOWER

## 2021-01-01 ASSESSMENT — PAIN DESCRIPTION - FREQUENCY
FREQUENCY: CONTINUOUS
FREQUENCY: CONTINUOUS
FREQUENCY: INTERMITTENT
FREQUENCY: CONTINUOUS

## 2021-01-22 NOTE — PROGRESS NOTES
Follow up: Abbey Vann  12/21/1925  [id number]         Chief Complaint   Patient presents with    Lower Back Pain     F/u LSP; LOV 12/11/20    Shoulder Pain     Left shoulder inj given 12/11/20         HISTORY OF PRESENT ILLNESS:  Mr. Noah Scott is a 80 y.o. male returns for a follow up visit for multiple medical problems. His current presenting problems are   1. Lumbar stenosis with neurogenic claudication    2. Degenerative disc disease, lumbar    3. Chronic left shoulder pain    . As per information/history obtained from the PADT(patient assessment and documentation tool) - He complains of pain in the lower back with radiation to the shoulders Left He rates the pain 3/10 and describes it as dull. Pain is made worse by: movement, walking. He denies side effects from the current pain regimen. Patient reports that since the last follow up visit the physical functioning is better, family/social relationships are better, mood is better and sleep patterns are better, and that the overall functioning is better. Patient denies neurological bowel or bladder. The patient presents in the office after last being seen on 12/11/2020. The patient presented to the office at that time after bilateral L4 transforaminal epidural steroid injection was completed on 11/30/2020. The patient describes that he had 100% relief at his last follow-up. The patient underwent a left subacromial bursa injection for the left shoulder on 12/11/2020. The patient states that he is still doing well following the lumbar epidural steroid injection and he describes that the left shoulder injection that was provided to him helped significantly with his pain. The patient just has an overall stiffness in both the shoulder and the lower back.       Associated signs and symptoms:   Neurogenic bowel or bladder symptoms:  no   Perceived weakness:  no   Difficulty walking:  no Past medical, surgical, social and family history reviewed with the patient.      Past Medical History:   Past Medical History:   Diagnosis Date    Acute diastolic CHF (congestive heart failure) ([facility] [address] 10/23/2020    Arthritis     Atrial fibrillation (HCC)     BPH with obstruction/lower urinary tract symptoms     nocturia 3-4 x per night    Chronic atrial fibrillation (HCC)     CKD (chronic kidney disease) 2017    level 3    Coronary arteriosclerosis 2009    2 stents/ Dr Sophie Badillo [facility] GERD (gastroesophageal reflux disease)     History of blood transfusion     1945   During deployment    HTN (hypertension) 1990    Left hip pain     schrapnel in hip and intestines/chronic pains left hip impairs walking    Pacemaker 2015    Pulmonary fibrosis ([facility] [address] 08/2017    mild amount seen on chest ct    Pulmonary nodule, left 2017    noncalcified 7mm low risk repeat 8/2018 if patient willing    Renal atrophy, right 2017    Renal cyst, right 08/2017    not clearly simple cyst    SOB (shortness of breath) 2017    terrell normal pft and ct chest ? smll amount of pulmonary fibrosis      Past Surgical History:     Past Surgical History:   Procedure Laterality Date    ABDOMEN SURGERY      Scrapnel removed in [facility]  2009    awoke with indigestion    EYE SURGERY Bilateral     cataract    INGUINAL HERNIA REPAIR Right 07/2016    PACEMAKER INSERTION  2014    PACEMAKER PLACEMENT      PAIN MANAGEMENT PROCEDURE Bilateral 11/30/2020    BILATERAL L4 TRANSFORAMINAL EPIDURAL STEROID INJECTION WITH FLUOROSCOPY performed by Tamy Schultz MD at [address] [place] [address] as ww II vet, part of small intestine moved    TONSILLECTOMY  1960     Current Medications:     Current Outpatient Medications:     predniSONE (DELTASONE) 10 MG tablet, TAKE 1 TABLET BY MOUTH EVERY DAY, Disp: 30 tablet, Rfl: 0   apixaban (ELIQUIS) 2.5 MG TABS tablet, Take 1 tablet by mouth 2 times daily, Disp: 60 tablet, Rfl: 3    metoprolol succinate (TOPROL XL) 25 MG extended release tablet, Take 0.5 tablets by mouth daily For heart, Disp: 30 tablet, Rfl: 3    atorvastatin (LIPITOR) 40 MG tablet, Take 1 tablet by mouth daily, Disp: 90 tablet, Rfl: 2    finasteride (PROSCAR) 5 MG tablet, TAKE 1 TABLET BY MOUTH EVERY DAY, Disp: 90 tablet, Rfl: 4    furosemide (LASIX) 40 MG tablet, One po qam for weight over 169lbs, Disp: 90 tablet, Rfl: 3    alogliptin (NESINA) 6.25 MG TABS tablet, Take 1 tablet by mouth daily For sugar take daily, Disp: 60 tablet, Rfl: 3    furosemide (LASIX) 40 MG tablet, Only take 1 in the a.m. if weight goes up 3 lbs or more, must weight yourself every morning, Disp: 60 tablet, Rfl: 3    omeprazole (PRILOSEC) 20 MG delayed release capsule, Take 20 mg by mouth daily, Disp: , Rfl:     diclofenac sodium (VOLTAREN) 1 % GEL, Apply 4 g topically 3 times daily, Disp: 1 Tube, Rfl: 0    oxybutynin (DITROPAN) 5 MG tablet, TAKE 1 TABLET BY MOUTH EVERY DAY, Disp: 90 tablet, Rfl: 1    fluticasone (FLONASE) 50 MCG/ACT nasal spray, SPRAY 2 SPRAY INTO EACH NOSTRIL EVERY DAY, Disp: 3 Bottle, Rfl: 1    umeclidinium-vilanterol (ANORO ELLIPTA) 62.5-25 MCG/INH AEPB inhaler, Inhale 1 puff into the lungs daily, Disp: 1 each, Rfl: 0    tamsulosin (FLOMAX) 0.4 MG capsule, ONE EVERY NIGHT FOR YOUR PROSTATE, Disp: 90 capsule, Rfl: 3    aspirin 81 MG tablet, Take 81 mg by mouth daily, Disp: , Rfl:     Cholecalciferol (VITAMIN D3) 2000 UNITS CAPS, Take 2,000 Units by mouth daily, Disp: , Rfl:     calcium carbonate (OSCAL) 500 MG TABS tablet, Take 600 mg by mouth daily caltrate 600-D3 one daily, Disp: , Rfl:     Multiple Vitamins-Minerals (CENTRUM SILVER ADULT 50+) TABS, Take 1 tablet by mouth daily, Disp: , Rfl:   Allergies:  Claritin [loratadine] and Zestril [lisinopril]  Social History: · Inspection: Local inspection shows no step-off or bruising. Lumbar alignment is normal. No instability is noted. · Palpation:   No evidence of tenderness at the midline. Lumbar paraspinal tenderness: Mild L4/5 and L5/S1 tenderness  Bursal tenderness No tenderness bilaterally  There is no paraspinal spasm. · Range of Motion: pain-free ROM  · Strength:   Strength testing is 5/5 in all muscle groups tested. · Special Tests:   Straight leg raise and crossed SLR negative. Arthur's testing is negative bilaterally. FADIR's testing is negative bilaterally. · Skin: There are no rashes, ulcerations or lesions. · Reflexes: Reflexes are symmetrically 1+ at the patellar and ankle tendons. Clonus absent bilaterally at the feet. · Gait & station: ambulates with a walker and no ataxia  · Additional Examinations:  · RIGHT LOWER EXTREMITY: Inspection/examination of the right lower extremity does not show any tenderness, deformity or injury. Range of motion is normal and pain-free. There is no gross instability. There are no rashes, ulcerations or lesions. Strength and tone are normal. No atrophy or abnormal movements are noted. · LEFT LOWER EXTREMITY:  Inspection/examination of the left lower extremity does not show any tenderness, deformity or injury. Range of motion is normal and pain-free. There is no gross instability. There are no rashes, ulcerations or lesions. Strength and tone are normal. No atrophy or abnormal movements are noted. Diagnostic Testing:    CT Scan of the Lumbar Spine from 10/20/20:     FINDINGS:    BONES/ALIGNMENT: At L4-5 there is mild anterolisthesis associated with facet    arthropathy.  No acute fractures are identified.         DEGENERATIVE CHANGES:         Anterolateral spur formation is noted at multiple levels.  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         At T12-L1, L1-2 and L2-3, there is no significant central or foraminal    stenosis.       At L3-4, there is a posterior disc bulge, combined with severe facet and    ligamentum flavum hypertrophy, greater on the right.  This results in severe    central stenosis.  There is moderate bilateral foraminal narrowing.         At L4-5, anterolisthesis is again noted, associated with posterior disc bulge    and facet arthropathy.  There is moderate central stenosis and no significant    foraminal stenosis.         At L5-S1, the central canal and neural foramina remain patent.         SOFT TISSUES/RETROPERITONEUM: There is severe aortoiliac calcification.  No    aneurysm is seen.  Chronic right hydronephrosis is noted.  A previously noted    left simple renal cyst is partially visualized.  No follow-up is recommended.              Impression    No acute fracture or subluxation.         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         L3-4 severe central and moderate bilateral foraminal stenosis.         L4-5 grade 1 spondylolisthesis with additional degenerative changes,    resulting in moderate central stenosis.       Results for orders placed or performed in visit on 12/01/20   Culture, Urine   Result Value Ref Range    Specimen Description SPECIMEN DESCRIPTION:           Urine, Clean Catch     Special Requests SPECIMEN TYPE:                  Urine     Culture Result       CULTURE RESULTS:                Multiple bacterial species isolated from urine consistent with urogenital commensal organisms.     Report Status       REPORT STATUS:                  01/13/2021 FINAL REPORT   Urinalysis   Result Value Ref Range    SOURCE: Urine, Random     Urine Type Urine     Color, UA Yellow Yellow    Clarity, UA Clear Clear    pH, Urine 6.0 5.0 - 8.0    Specific Gravity, UA 1.015 1.005 - 1.029    Protein, Urine NEGATIVE NEGATIVE    Glucose, Ur NEGATIVE NEGATIVE    Ketones, Urine NEGATIVE NEGATIVE    Bilirubin, Urine NEGATIVE NEGATIVE    RBC, UA NEGATIVE NEGATIVE    Urobilinogen, Urine 0.2 <2.0 mg/dL [facility] ([facility])         This dictation was performed with a verbal recognition program ([employer]) and it was checked for errors. It is possible that there are still dictated errors within this office note. If so, please bring any errors to my attention for an addendum. All efforts were made to ensure that this office note is accurate.

## 2021-03-25 PROBLEM — R13.10 DYSPHAGIA: Status: ACTIVE | Noted: 2021-01-01

## 2021-03-25 NOTE — ASSESSMENT & PLAN NOTE
-Presumed UIP based on radiographic pattern  -He remains active, and not interested in antifibrotic agents which I agree with.  -He is on low-dose prednisone which she feels improves his quality of life so we will continue for that reason.   -Given progression of symptoms likely represents progression of disease, however we will check a modified barium swallow for evidence of aspiration  -Continue PPI

## 2021-03-25 NOTE — PROGRESS NOTES
REASON FOR CONSULTATION:  Chief Complaint   Patient presents with    3 Month Follow-Up    Cough    Shortness of Breath        Consult at request of Prosper Clifton MD     PCP: Prosper Clifton MD    HISTORY OF PRESENT ILLNESS: Tonio Baptiste is a 80y.o. year old male with a history of GERD, never smoker, symptomatic bradycardia status post pacemaker who presents for f/u of presumed IPF        -ILD- working diagnosis of IPF based on radiographic findings. PFT TLC of 76%, however his spirometry is normal and PFTs as a whole are unimpressive. 6mwt w/o exertional hypoxemia.  -He is exercise regularly  -not interested in antifibrotic therapies but thinks low dose prednisone is helping, symptoms seem to be progressing now. -on PPI        Objective:   PHYSICAL EXAM:  Blood pressure 122/70, pulse 71, temperature 97.7 °F (36.5 °C), temperature source Temporal, resp. rate 16, height 5' 5\" (1.651 m), weight 160 lb (72.6 kg), SpO2 96 %.'  Gen: No distress. Resp: No accessory muscle use. +b/l crackles. No wheezes. No rhonchi. No dullness to percussion. CV: Regular rate. Regular rhythm. No murmur or rub. No edema. Dinah Profit Psych: Oriented x 3. No anxiety. Awake. Alert. Intact judgement and insight. Data Reviewed:     FLUORO FOR SURGICAL PROCEDURES  Radiology exam is complete. No Radiologist dictation. Please follow up   with ordering provider. PFT 3/2019  Overall Interpretation  PFT does not demonstrates obstruction with FEV1 of 119 %  without   bronchodilator response. There is Mild restriction by Total lung capacity assessment with   associated decrease in diffusion capacity.  Air trapping is not   present. Shirin Gayer is not present. This consistent with Interstitial Lung Disease  (ILD) vs mild   reduction secondary to weigh with decreased ERV.  Clinical   correlation needed. Assessment/Plan:       Diagnosis Orders   1.  Dysphagia, unspecified type  FL MODIFIED BARIUM SWALLOW W VIDEO   2. Pulmonary fibrosis ([facility] [address]           Problem List Items Addressed This Visit        Pulmonary Problems    Pulmonary fibrosis ([facility] [address]     -Presumed UIP based on radiographic pattern  -He remains active, and not interested in antifibrotic agents which I agree with.  -He is on low-dose prednisone which she feels improves his quality of life so we will continue for that reason. -Given progression of symptoms likely represents progression of disease, however we will check a modified barium swallow for evidence of aspiration  -Continue PPI            Other    Dysphagia - Primary    Relevant Orders    FL MODIFIED BARIUM SWALLOW W VIDEO           This note was transcribed using [id number] [employer]. Please disregard any translational errors.     Lam Engel [address] [facility]

## 2021-05-05 PROBLEM — R79.89 ABNORMAL LFTS: Status: ACTIVE | Noted: 2021-01-01

## 2021-05-05 PROBLEM — N28.89 RENAL MASS, RIGHT: Status: ACTIVE | Noted: 2021-01-01

## 2021-05-05 PROBLEM — E87.1 HYPONATREMIA: Status: ACTIVE | Noted: 2021-01-01

## 2021-05-05 PROBLEM — R79.89 ELEVATED BRAIN NATRIURETIC PEPTIDE (BNP) LEVEL: Status: ACTIVE | Noted: 2021-01-01

## 2021-05-05 PROBLEM — R33.8 ACUTE RETENTION OF URINE: Status: ACTIVE | Noted: 2021-01-01

## 2021-05-05 PROBLEM — R79.89 ELEVATED TROPONIN: Status: ACTIVE | Noted: 2021-01-01

## 2021-05-05 PROBLEM — D72.9 NEUTROPHILIC LEUKOCYTOSIS: Status: ACTIVE | Noted: 2021-01-01

## 2021-05-05 PROBLEM — E87.20 LACTIC ACIDOSIS: Status: ACTIVE | Noted: 2021-01-01

## 2021-05-05 PROBLEM — R77.8 ELEVATED TROPONIN: Status: ACTIVE | Noted: 2021-01-01

## 2021-05-05 PROBLEM — S32.040A CLOSED COMPRESSION FRACTURE OF L4 LUMBAR VERTEBRA, INITIAL ENCOUNTER (HCC): Status: ACTIVE | Noted: 2021-01-01

## 2021-05-05 NOTE — ED PROVIDER NOTES
[address] [facility]      Pt Name: Sarita Swift  MRN: [id number]  [place] 12/21/1925  Date of evaluation: 5/5/2021  Provider: Breanne Cade MD    CHIEF COMPLAINT       Back pain      HISTORY OF PRESENT ILLNESS   (Location/Symptom, Timing/Onset,Context/Setting, Quality, Duration, Modifying Factors, Severity)  Note limiting factors. Sarita Swift is a 80 y.o. male who presents to the emergency department c/o back pain that is initially described as chronic but has been acutely worsening over the past 1.5 weeks. He has been receiving epidural steroid injections for his pain as an outpt and family states had increase in his pain after the last injection approx 8 days ago and has subsequently fallen in the interim sustaining multiple bruises and skin tears to his bilateral upper extremities and bilateral chest wall, has had increase in his back pain since the fall as well, was last seen to walk normally approx 5 days ago (walking with his walker at rehab) and has not been able to walk since Monday (2 days ago). Has had decreased strength in his bilateral lower extremities and has had decreased urine output from prior. Family at bedside states he has been hallucinating and less active because of the pain and clarifies that he has not been seeing or hearing things but that he is mainly confused and the content of his conversation has not made as much sense for the past 2 days as well. Pt's concern is the degree of his back pain that he describes as localized to the middle of his lower back and radiating to his hips bilaterally. Exacerbated by movement and position change. No report of fevers. Symptoms not otherwise alleviated or exacerbated by other factors. NursingNotes were reviewed. REVIEW OF SYSTEMS    (2-9 systems for level 4, 10 or more for level 5)       Constitutional: No fever or chills. Eye: No visual disturbances.  No eye pain.  Ear/Nose/Mouth/Throat: No nasal congestion. No sore throat. Respiratory: No cough, No shortness of breath, No sputum production. Cardiovascular: No chest pain. No palpitations. Gastrointestinal: No abdominal pain. No nausea or vomiting  Genitourinary: No dysuria. No hematuria. Hematology/Lymphatics: No bleeding or bruising tendency. Immunologic: No malaise. No swollen glands. Musculoskeletal: + back pain. No joint pain. Integumentary: No rash. No abrasions. Neurologic: No headache. Bilateral leg weakness.       PAST MEDICAL HISTORY     Past Medical History:   Diagnosis Date    Acute diastolic CHF (congestive heart failure) ([facility] [address] 10/23/2020    Arthritis     Atrial fibrillation (HCC)     BPH with obstruction/lower urinary tract symptoms     nocturia 3-4 x per night    Chronic atrial fibrillation ([facility] [address]     CKD (chronic kidney disease) 2017    level 3    Coronary arteriosclerosis 2009    2 stents/ Dr Jacquelyn Lara in [facility] GERD (gastroesophageal reflux disease)     History of blood transfusion     1945   During deployment    HTN (hypertension) 1990    Left hip pain     schrapnel in hip and intestines/chronic pains left hip impairs walking    Pacemaker 2015    Pulmonary fibrosis ([facility] [address] 08/2017    mild amount seen on chest ct    Pulmonary nodule, left 2017    noncalcified 7mm low risk repeat 8/2018 if patient willing    Renal atrophy, right 2017    Renal cyst, right 08/2017    not clearly simple cyst    SOB (shortness of breath) 2017    terrell normal pft and ct chest ? smll amount of pulmonary fibrosis         SURGICALHISTORY       Past Surgical History:   Procedure Laterality Date    ABDOMEN SURGERY      Scrapnel removed in [facility]  2009    awoke with indigestion    EYE SURGERY Bilateral     cataract    INGUINAL HERNIA REPAIR Right 07/2016    PACEMAKER INSERTION  2014    PACEMAKER PLACEMENT      PAIN MANAGEMENT PROCEDURE Bilateral 11/30/2020    BILATERAL L4 TRANSFORAMINAL EPIDURAL STEROID INJECTION WITH FLUOROSCOPY performed by Yaima Silva MD at [address]    rogeriolouannhoney as ww II vet, part of small intestine moved    TONSILLECTOMY  1960         CURRENT MEDICATIONS       Previous Medications    ALBUTEROL SULFATE  (90 BASE) MCG/ACT INHALER    Inhale 2 puffs into the lungs every 6 hours as needed for Wheezing or Shortness of Breath    ASPIRIN 81 MG TABLET    Take 81 mg by mouth daily    ATORVASTATIN (LIPITOR) 40 MG TABLET    Take 1 tablet by mouth daily    CALCIUM CARBONATE (OSCAL) 500 MG TABS TABLET    Take 600 mg by mouth daily caltrate 600-D3 one daily    CHOLECALCIFEROL (VITAMIN D3) 2000 UNITS CAPS    Take 2,000 Units by mouth daily    DICLOFENAC SODIUM (VOLTAREN) 1 % GEL    Apply 4 g topically 3 times daily    ELIQUIS 2.5 MG TABS TABLET    TAKE 1 TABLET BY MOUTH TWICE A DAY    FINASTERIDE (PROSCAR) 5 MG TABLET    TAKE 1 TABLET BY MOUTH EVERY DAY    FLUTICASONE (FLONASE) 50 MCG/ACT NASAL SPRAY    SPRAY 2 SPRAY INTO EACH NOSTRIL EVERY DAY    FUROSEMIDE (LASIX) 40 MG TABLET    One po qam for weight over 169lbs    METOPROLOL SUCCINATE (TOPROL XL) 25 MG EXTENDED RELEASE TABLET    Take 0.5 tablets by mouth daily For heart    MULTIPLE VITAMINS-MINERALS (CENTRUM SILVER ADULT 50+) TABS    Take 1 tablet by mouth daily    OMEPRAZOLE (PRILOSEC) 20 MG DELAYED RELEASE CAPSULE    TAKE 1 CAPSULE BY MOUTH EVERY DAY    OXYBUTYNIN (DITROPAN) 5 MG TABLET    TAKE 1 TABLET BY MOUTH EVERY DAY    PREDNISONE (DELTASONE) 10 MG TABLET    TAKE 1 TABLET BY MOUTH EVERY DAY    TAMSULOSIN (FLOMAX) 0.4 MG CAPSULE    ONE EVERY NIGHT FOR YOUR PROSTATE       ALLERGIES     Claritin [loratadine] and Zestril [lisinopril]    FAMILY HISTORY       Family History   Problem Relation Age of Onset    Pacemaker Sister     Pacemaker Brother     No Known Problems Other         lung disease          SOCIAL HISTORY       Social History Socioeconomic History    Marital status:      Spouse name: None    Number of children: None    Years of education: None    Highest education level: None   Occupational History    Occupation: [employer]   Social Needs    Financial resource strain: Patient refused    Food insecurity     Worry: Patient refused     Inability: Patient refused    Transportation needs     Medical: Patient refused     Non-medical: Patient refused   Tobacco Use    Smoking status: Never Smoker    Smokeless tobacco: Never Used   Substance and Sexual Activity    Alcohol use: No    Drug use: Not Currently    Sexual activity: Not Currently   Lifestyle    Physical activity     Days per week: None     Minutes per session: None    Stress: None   Relationships    Social connections     Talks on phone: None     Gets together: None     Attends Hinduism service: None     Active member of club or organization: None     Attends meetings of clubs or organizations: None     Relationship status: None    Intimate partner violence     Fear of current or ex partner: None     Emotionally abused: None     Physically abused: None     Forced sexual activity: None   Other Topics Concern    None   Social History Narrative    None       SCREENINGS             PHYSICAL EXAM    (up to 7 for level 4, 8 or more for level 5)     ED Triage Vitals [05/05/21 1405]   BP Temp Temp Source Pulse Resp SpO2 Height Weight   (!) 162/76 98.5 °F (36.9 °C) Oral 73 24 100 % -- --       General: Alert and oriented x 4. In mild distress 2/2 pain. Mild tachypnea. Eye: Normal conjunctiva. Pupils equal and reactive. HENT: Oral mucosa is moist.  Normocephalic, atraumatic. Respiratory: Respirations even and tachypneic. Lungs CTAB. Cardiovascular: Normal rate, Regular rhythm. Pacemaker palpable in the Intact peripheral pulses. 1+ edema bilateral lower extremities. Gastrointestinal: Soft, ttp in the lower abdomen diffusely. Non-distended.   Rectal demonstrates external hemorrhoid with recent rupture, currently hemostatic, decreased rectal tone, brown stool in the rectal vault. No melena or hematochezia. Musculoskeletal: Ttp in the inferior lumbar spine in the midline and paraspinally bilaterally. No step-offs. Integumentary: Warm, Dry. Multiple bandages present, taken down for examination to reveal bilateral upper extremities covered in multiple superficial skin tears and multiple ecchymoses, also ecchymosis of the bilateral flanks. Neurologic: Alert and oriented x 4. CN II-XII intact. Bilateral upper extremities 5/5. Bilateral lower extremities 4/5. Intact lower extremity sensation, slight decrease in rectal tone. Psychiatric: Cooperative. DIAGNOSTIC RESULTS         RADIOLOGY:   Non-plain filmimages such as CT, Ultrasound and MRI are read by the radiologist. Plain radiographic images are visualized and preliminarily interpreted by the emergency physician with the below findings:      Interpretation per the Radiologist below, if available at the time ofthis note:    XR CHEST PORTABLE   Final Result   Multifocal airspace disease, consistent with pneumonia. There is mild   improvement since 11/20/2020. Small bilateral pleural effusions. Stable cardiomegaly. CT Head WO Contrast   Final Result   No acute intracranial abnormality. Diffuse atrophic changes with findings suggesting chronic microvascular   ischemia         CT ABDOMEN PELVIS W IV CONTRAST Additional Contrast? None   Final Result   1. Acute appearing fracture seen at the L4 level with about 3 mm of   retropulsion into the central canal.   2. Ground-glass opacity seen at the lung bases could be secondary to an   infectious or inflammatory process. 3. Partially visualized right greater than left pleural fluid. 4. Hyperdense 2.8 x 4.2 cm area arising from the superior pole of the right   kidney, increased in size since 2017.   Recommend further evaluation with ultrasound on a nonemergent basis. 5. Bladder moderately distended with urine. CT LUMBAR SPINE TRAUMA RECONSTRUCTION   Final Result   1. Acute appearing fracture seen at the L4 level with about 3 mm of   retropulsion into the central canal.   2. Ground-glass opacity seen at the lung bases could be secondary to an   infectious or inflammatory process. 3. Partially visualized right greater than left pleural fluid. 4. Hyperdense 2.8 x 4.2 cm area arising from the superior pole of the right   kidney, increased in size since 2017. Recommend further evaluation with   ultrasound on a nonemergent basis. 5. Bladder moderately distended with urine. MRI LUMBAR SPINE W WO CONTRAST    (Results Pending)       LABS:  Labs Reviewed   CBC WITH AUTO DIFFERENTIAL - Abnormal; Notable for the following components:       Result Value    WBC 17.2 (*)     Hemoglobin 11.7 (*)     Hematocrit 36.8 (*)     MCV 76.7 (*)     MCH 24.3 (*)     RDW 27.6 (*)     Neutrophils Absolute 15.6 (*)     Lymphocytes Absolute 0.5 (*)     All other components within normal limits    Narrative:     Performed at:  [address] [facility] [address]   Phone [phone]   BASIC METABOLIC PANEL W/ REFLEX TO MG FOR LOW K - Abnormal; Notable for the following components:    Sodium 126 (*)     Potassium reflex Magnesium 5.3 (*)     Chloride 91 (*)     CO2 16 (*)     Anion Gap 19 (*)     BUN 49 (*)     GFR Non- 51 (*)     All other components within normal limits    Narrative:     Performed at:  [facility]  [address] [employer] [address]   Phone [phone]   HEPATIC FUNCTION PANEL - Abnormal; Notable for the following components:     Total Protein 6.1 (*)     Alkaline Phosphatase 177 (*)      (*)      (*)     Total Bilirubin 1.7 (*)     Bilirubin, Direct 0.7 (*)     All other components within normal limits Narrative:     Performed at:  [address] [address]   Phone [phone]   TROPONIN - Abnormal; Notable for the following components:    Troponin 0.04 (*)     All other components within normal limits    Narrative:     Performed at:  [address] [address]   Phone [phone]   BRAIN NATRIURETIC PEPTIDE - Abnormal; Notable for the following components:    Pro-BNP 16,126 (*)     All other components within normal limits    Narrative:     Performed at:  [address] [address]   Phone [phone]   PROTIME-INR - Abnormal; Notable for the following components:    Protime 23.7 (*)     INR 2.03 (*)     All other components within normal limits    Narrative:     Performed at:  [address]   Phone [phone]   LACTIC ACID, PLASMA - Abnormal; Notable for the following components:    Lactic Acid 4.3 (*)     All other components within normal limits    Narrative:     Gulshan Suarez tel. [phone],  Chemistry results called to and read back by Jenna zamorano rn, 05/05/2021 15:16,  by [facility]  Performed at:  [address] [address]   Phone [phone]   URINE RT REFLEX TO CULTURE - Abnormal; Notable for the following components:    Blood, Urine MODERATE (*)     Protein, UA 30 (*)     All other components within normal limits    Narrative:     Performed at:  [address]   Phone [phone]   BLOOD GAS, VENOUS - Abnormal; Notable for the following components:    pH, Henrry 7.321 (*)     HCO3, Venous 22 (*)     All other components within normal limits    Narrative:     Performed at:  [facility]  [address] [facility]   Phone [phone]   MICROSCOPIC URINALYSIS - Abnormal; Notable for the following components:    RBC, UA 12 (*)     All other components within normal limits    Narrative:     Performed at:  [facility]  [address] [facility] [place] ?    Phone [phone]   OSMOLALITY, URINE - Abnormal; Notable for the following components:    Osmolality, Ur 369 (*)     All other components within normal limits    Narrative:     Performed at:  [facility]  [address] [facility] [place] ?    Phone [phone]   PROCALCITONIN - Abnormal; Notable for the following components:    Procalcitonin 0.18 (*)     All other components within normal limits    Narrative:     Performed at:  [facility]  [address] [facility] [employer]   Phone [phone]   LACTATE, SEPSIS - Abnormal; Notable for the following components:    Lactic Acid, Sepsis 2.0 (*)     All other components within normal limits    Narrative:     Performed at:  [facility]  [address] [facility] [place] ?    Phone [phone]   LACTATE, SEPSIS - Abnormal; Notable for the following components:    Lactic Acid, Sepsis 2.1 (*)     All other components within normal limits    Narrative:     Performed at:  [facility]  [address] [facility] [employer]   Phone [phone]   CK - Abnormal; Notable for the following components:     Total  (*)     All other components within normal limits    Narrative:     Performed at:  [address] [place] ?    Phone [phone]   TROPONIN - Abnormal; Notable for the following components:    Troponin 0.03 (*)     All other components within normal limits    Narrative:     Performed at:  [facility] ([facility]  [address] [facility] [place] [address]   Phone [phone], RAPID    Narrative:     Performed at:  [facility]  [address] [facility] [place] [address]   Phone [phone]   CULTURE, BLOOD 1   CULTURE, BLOOD 2   APTT    Narrative:     Performed at:  [facility]  [address] [facility] [place] [address]   Phone [phone]   LIPASE    Narrative:     Performed at:  [facility]  [address] [facility] [place] [address]   Phone [phone]   BLOOD OCCULT STOOL DIAGNOSTIC    Narrative:     ORDER#: [id number]                          ORDERED BY: Estelita Brian  SOURCE: Stool                              COLLECTED:  05/05/21 14:40  ANTIBIOTICS AT LINO.:                      RECEIVED :  05/05/21 14:56  Performed at:  [facility]  [address] [facility] [place] [address]   Phone [phone]   SPECIMEN REJECTION    Narrative:     Performed at:  [facility]  [address] [facility] [place] [address]   Phone [phone]   OSMOLALITY    Narrative:     Performed at:  [facility]  [address] [facility] [place] [address]   Phone [phone]   ELECTROLYTES URINE RANDOM    Narrative:     Performed at:  [facility]  [address] [facility] [place] [address]   Phone [phone]   TSH WITHOUT REFLEX    Narrative:     Performed at:  [facility]  [address] [facility] [place] [address]   Phone [phone]   PROCALCITONIN    Narrative:     Performed at:  [facility]  [address] [place] [address]   Phone [phone]   BASIC METABOLIC PANEL W/ REFLEX TO MG FOR LOW K   HEPATIC FUNCTION PANEL   LACTIC ACID, PLASMA   CBC WITH AUTO DIFFERENTIAL   TROPONIN TROPONIN   COVID-19   COVID-19   COVID-19   COVID-19   TYPE AND SCREEN    Narrative:     Performed at:  [facility]  [address] [place] [address]   Phone [phone]       All other labs were within normal range or not returned as of this dictation. EMERGENCY DEPARTMENT COURSE and DIFFERENTIAL DIAGNOSIS/MDM:   Vitals:    Vitals:    21 1440 21 1450 21 1500 21 1510   BP:  (!) 162/76  (!) 155/93   Pulse: 72 72 72 70   Resp:    Temp:       TempSrc:       SpO2: 93% 100% 100% 100%         Medical decision makin yo M with Afib on Eliquis who p/w worsening back pain after epidural injection and worsening again s/p fall, no longer ambulatory for at least the past 2 days. Decreased mobility in the bilateral LEs, per family was very confused 2/2 the pain. Currently AOx4 on my initial assessment, also reported BRBPR and found to have hemorrhoid on exam externally with hemostatic ruptured hemorrhoid, no stool in the rectal vault and rectal tone diminished. Exam also with lower abdominal ttp, c/f acute intraabdominal process vs spine fx given fall and e/o multiple ecchymosis throughout, given anticoagulation status, elderly age, recent falls, obtaining CTH, CT a/p, CT L-spine. C/f cauda equina but will start with CT as pt with pacemaker and may not be MRI compatible and if compressive lesion is unlikely an operative candidate given his age and comorbidities. IV meds for pain control with improvement. Found to have significant urinary retention and L4 fx with retropulsion on CT. Turk placed. LA elevated to 4 as is WBC, possibly 2/2 traumatic fx and stress demargination but also possibly 2/2 infection. Has ground glass opacities at the lung bases and since pain started after the epidural injections, possibly associated abscess.   Cxs obtained, covered with abx, given exam and urinary retention, and L4 fx with retropulsion, high suspicion for cauda equina and possibly associated epidural hematoma given his AC status, NSGY consulted, spoke with Dr. Guerrero Torres, pt not an operative candidate therefore MRI can happen as pt is able once more stable with LA clearance and MRI clearance based on cardiology clearance for pacemaker and his retained shrapnel from [place]. Pt informed of findings and need for admission. He is somnolent but arousable on this reassessment and has had improvement in his pain with morphine given. Goals of care d/w family, pt is full code at this time. Admitted to his PCP for further w/u, mgmt, further discussion with NSGY, further goals of care discussions and med mgmt of his potential comorbid infection, pain control. Pt went up in stable but guarded condition. Medications   morphine (PF) injection 4 mg (4 mg Intravenous Given 5/5/21 1457)   iopamidol (ISOVUE-370) 76 % injection 75 mL (75 mLs Intravenous Given 5/5/21 1546)   cefepime (MAXIPIME) 2000 mg IVPB minibag (0 mg Intravenous Stopped 5/5/21 1859)   vancomycin (VANCOCIN) 1500 mg in dextrose 5 % 250 mL IVPB (0 mg Intravenous Stopped 5/5/21 2030)         CRITICAL CARE TIME   Total Critical Care time was 45 minutes, excluding separately reportable procedures. There was a high probability of clinically significant/life threatening deterioration in the patient's condition which required my urgent intervention. CONSULTS:  IP CONSULT TO NEUROSURGERY  IP CONSULT TO PRIMARY CARE PROVIDER  PHARMACY TO DOSE VANCOMYCIN  IP CONSULT TO NEUROSURGERY  IP CONSULT TO PALLIATIVE CARE      FINAL IMPRESSION      1. Closed fracture of fourth lumbar vertebra, unspecified fracture morphology, initial encounter ([facility] [address]    2. Acute encephalopathy    3. Acute urinary retention    4. Cauda equina syndrome ([address]          DISPOSITION/PLAN   DISPOSITION  Admitted.       (Please note that portions of this note were completed with a voice recognition program.Efforts were made to edit the dictations but occasionally words are mis-transcribed.)    Yaquelin Arce MD (electronically signed)  Attending Emergency Physician          Yaquelin Arce MD  05/06/21 0421

## 2021-05-05 NOTE — PROGRESS NOTES
Medication Reconciliation     List of medications patient is currently taking is complete. Source of information: 1. Conversation with family at bedside                                       2. EPIC records      Allergies  Claritin [loratadine] and Zestril [lisinopril]     Notes regarding home medications:   1. Patient received all of his morning home medications prior to arrival to the emergency department today. 2. Pt is currently on Eliquis 2.5 mg BID and has received his AM dose today.      [address] Becki Ceron intern   5/5/2021 5:47 PM

## 2021-05-06 NOTE — PROCEDURES
Patient is unable to receive an MRI due to his pacemaker being non-conditional. Notes were place under his pacemaker implant.

## 2021-05-06 NOTE — PLAN OF CARE
Problem: Airway Clearance - Ineffective  Goal: Achieve or maintain patent airway  5/6/2021 1055 by Emily Barron RN  Outcome: Ongoing  5/6/2021 0114 by Shemar Lynch RN  Outcome: Ongoing     Problem: Gas Exchange - Impaired  Goal: Absence of hypoxia  5/6/2021 1055 by Emily Barron RN  Outcome: Ongoing  5/6/2021 0114 by Shemar Lynch RN  Outcome: Ongoing  Goal: Promote optimal lung function  5/6/2021 1055 by Emily Barron RN  Outcome: Ongoing  5/6/2021 0114 by Shemar Lynch RN  Outcome: Ongoing     Problem: Breathing Pattern - Ineffective  Goal: Ability to achieve and maintain a regular respiratory rate  5/6/2021 1055 by Emily Barron RN  Outcome: Ongoing  5/6/2021 0114 by Shemar Lynch RN  Outcome: Ongoing     Problem:  Body Temperature -  Risk of, Imbalanced  Goal: Ability to maintain a body temperature within defined limits  5/6/2021 1055 by Emily Barron RN  Outcome: Ongoing  5/6/2021 0114 by Shemar Lynch RN  Outcome: Ongoing  Goal: Will regain or maintain usual level of consciousness  5/6/2021 1055 by Emily Barron RN  Outcome: Ongoing  5/6/2021 0114 by Shemar Lynch RN  Outcome: Ongoing  Goal: Complications related to the disease process, condition or treatment will be avoided or minimized  5/6/2021 1055 by Emily Barron RN  Outcome: Ongoing  5/6/2021 0114 by Shemar Lynch RN  Outcome: Ongoing     Problem: Isolation Precautions - Risk of Spread of Infection  Goal: Prevent transmission of infection  5/6/2021 1055 by Emily Barron RN  Outcome: Ongoing  5/6/2021 0114 by Shemar Lynch RN  Outcome: Ongoing     Problem: Nutrition Deficits  Goal: Optimize nutritional status  5/6/2021 1055 by Emily Barron RN  Outcome: Ongoing  5/6/2021 0114 by Shemar Lynch RN  Outcome: Ongoing     Problem: Loneliness or Risk for Loneliness  Goal: Demonstrate positive use of time alone when socialization is not possible  5/6/2021 1055 by Emily Barron RN  Outcome: Ongoing  5/6/2021 0114 by Shemar Lynch RN  Outcome: Ongoing     Problem: Risk for Fluid Volume Deficit  Goal: Maintain normal heart rhythm  5/6/2021 1055 by Cheryl Krause RN  Outcome: Ongoing  5/6/2021 0114 by Bean Jiménez RN  Outcome: Ongoing  Goal: Maintain absence of muscle cramping  5/6/2021 1055 by Cheryl Krause RN  Outcome: Ongoing  5/6/2021 0114 by Bean Jiménez RN  Outcome: Ongoing  Goal: Maintain normal serum potassium, sodium, calcium, phosphorus, and pH  5/6/2021 1055 by Cheryl Krause RN  Outcome: Ongoing  5/6/2021 0114 by Bean Jiménez RN  Outcome: Ongoing     Problem: Patient Education: Go to Patient Education Activity  Goal: Patient/Family Education  5/6/2021 1055 by Cheryl Krause RN  Outcome: Ongoing  5/6/2021 0114 by Bean Jiménez RN  Outcome: Ongoing     Problem: Falls - Risk of:  Goal: Will remain free from falls  Description: Will remain free from falls  Outcome: Ongoing  Goal: Absence of physical injury  Description: Absence of physical injury  Outcome: Ongoing     Problem: OXYGENATION/RESPIRATORY FUNCTION  Goal: Patient will maintain patent airway  Outcome: Ongoing  Goal: Patient will achieve/maintain normal respiratory rate/effort  Description: Respiratory rate and effort will be within normal limits for the patient  Outcome: Ongoing     Problem: HEMODYNAMIC STATUS  Goal: Patient has stable vital signs and fluid balance  Outcome: Ongoing     Problem: FLUID AND ELECTROLYTE IMBALANCE  Goal: Fluid and electrolyte balance are achieved/maintained  Outcome: Ongoing     Problem: ACTIVITY INTOLERANCE/IMPAIRED MOBILITY  Goal: Mobility/activity is maintained at optimum level for patient  Outcome: Ongoing     Problem: Safety:  Goal: Free from accidental physical injury  Description: Free from accidental physical injury  Outcome: Ongoing  Goal: Free from intentional harm  Description: Free from intentional harm  Outcome: Ongoing     Problem: Infection:  Goal: Will remain free from infection  Description: Will remain free from infection  Outcome: Ongoing     Problem: Daily Care:  Goal: Daily care needs are met  Description: Daily care needs are met  Outcome: Ongoing     Problem: Skin Integrity:  Goal: Skin integrity will stabilize  Description: Skin integrity will stabilize  Outcome: Ongoing     Problem: Discharge Planning:  Goal: Patients continuum of care needs are met  Description: Patients continuum of care needs are met  Outcome: Ongoing     Problem: Breathing Pattern - Ineffective:  Goal: Ability to achieve and maintain a regular respiratory rate will improve  Description: Ability to achieve and maintain a regular respiratory rate will improve  Outcome: Ongoing

## 2021-05-06 NOTE — PROGRESS NOTES
Pt brought to room 5268 by stretcher from ER. Pts vitals taken and tele put on. Pt oriented to room and call light in hand.

## 2021-05-06 NOTE — H&P
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 smll amount of pulmonary fibrosis      Past Surgical History:   Procedure Laterality Date    ABDOMEN SURGERY      Scrapnel removed in [facility]  2009    awoke with indigestion    EYE SURGERY Bilateral     cataract    INGUINAL HERNIA REPAIR Right 07/2016    PACEMAKER INSERTION  2014    PACEMAKER PLACEMENT      PAIN MANAGEMENT PROCEDURE Bilateral 11/30/2020    BILATERAL L4 TRANSFORAMINAL EPIDURAL STEROID INJECTION WITH FLUOROSCOPY performed by Delia Abdullahi MD at [address] [place] [address] Said    jelly as ww II vet, part of small intestine moved   [id number] [facility]      Medications Prior to Admission: ferrous sulfate (IRON 325) 325 (65 Fe) MG tablet, Take 325 mg by mouth nightly  ELIQUIS 2.5 MG TABS tablet, TAKE 1 TABLET BY MOUTH TWICE A DAY  tamsulosin (FLOMAX) 0.4 MG capsule, ONE EVERY NIGHT FOR YOUR PROSTATE  omeprazole (PRILOSEC) 20 MG delayed release capsule, TAKE 1 CAPSULE BY MOUTH EVERY DAY  oxybutynin (DITROPAN) 5 MG tablet, TAKE 1 TABLET BY MOUTH EVERY DAY  predniSONE (DELTASONE) 10 MG tablet, TAKE 1 TABLET BY MOUTH EVERY DAY  albuterol sulfate  (90 Base) MCG/ACT inhaler, Inhale 2 puffs into the lungs every 6 hours as needed for Wheezing or Shortness of Breath  metoprolol succinate (TOPROL XL) 25 MG extended release tablet, Take 0.5 tablets by mouth daily For heart  atorvastatin (LIPITOR) 40 MG tablet, Take 1 tablet by mouth daily (Patient taking differently: Take 40 mg by mouth nightly )  finasteride (PROSCAR) 5 MG tablet, TAKE 1 TABLET BY MOUTH EVERY DAY  furosemide (LASIX) 40 MG tablet, One po qam for weight over 169lbs  diclofenac sodium (VOLTAREN) 1 % GEL, Apply 4 g topically 3 times daily  fluticasone (FLONASE) 50 MCG/ACT nasal spray, SPRAY 2 SPRAY INTO [facility] finasteride (PROSCAR) 5 MG tablet TAKE 1 TABLET BY MOUTH EVERY DAY 12/18/20  Yes Arturo Mccollum MD   furosemide (LASIX) 40 MG tablet One po qam for weight over 169lbs 11/27/20  Yes Arturo Mccollum MD   diclofenac sodium (VOLTAREN) 1 % GEL Apply 4 g topically 3 times daily 11/3/20  Yes Mónica Short MD   fluticasone [facility]) 50 MCG/ACT nasal spray SPRAY 2 SPRAY INTO EACH NOSTRIL EVERY DAY 10/9/20  Yes Arturo Mccollum MD   aspirin 81 MG tablet Take 81 mg by mouth daily   Yes Historical Provider, MD   Cholecalciferol (VITAMIN D3) 2000 UNITS CAPS Take 2,000 Units by mouth daily   Yes Historical Provider, MD   calcium carbonate (OSCAL) 500 MG TABS tablet Take 600 mg by mouth daily caltrate 600-D3 one daily   Yes Historical Provider, MD   Multiple Vitamins-Minerals (CENTRUM SILVER ADULT 50+) TABS Take 1 tablet by mouth daily   Yes Historical Provider, MD     Review of Systems  A comprehensive review of systems was negative except for: back pain, rib pain    Objective:     Patient Vitals for the past 8 hrs:   BP Temp Temp src Pulse Resp SpO2 Height Weight   05/06/21 0850 -- -- -- -- 18 100 % -- --   05/06/21 0815 (!) 148/75 97.5 °F (36.4 °C) Oral 72 18 99 % -- --   05/06/21 0645 -- -- -- -- -- -- 5' 5\" (1.651 m) --   05/06/21 0356 (!) 145/92 98 °F (36.7 °C) Oral 71 20 100 % -- 162 lb 11.2 oz (73.8 kg)     I/O last 3 completed shifts:   In: 610 [I.V.:510; IV Piggyback:100]  Out: 850 [Urine:850]  I/O this shift:  In: 120 [P.O.:120]  Out: 1100 [Urine:1100]    VITALS:  BP (!) 148/75   Pulse 72   Temp 97.5 °F (36.4 °C) (Oral)   Resp 18   Ht 5' 5\" (1.651 m)   Wt 162 lb 11.2 oz (73.8 kg)   SpO2 100%   BMI 27.07 kg/m²   General Appearance: alert and oriented to person, place and time, frail, pink color, uncomfortable  Skin: warm and dry, no rash or erythema  Head: normocephalic and atraumatic  Eyes: conjunctivae normal and sclera anicteric  ENT: hearing grossly ab- normal bilaterally and sinuses non-tender head nontender atraumatic  Neck: neck supple and non tender without mass, no thyromegaly or thyroid nodules, no cervical lymphadenopathy   Pulmonary/Chest: inspiratory crackles half way up bilat,  With dec bs  Cardiovascular: normal rate, normal S1 and S2, no gallops and no carotid bruits pos sys m pos hjr  Abdomen: soft, non-tender, non-distended, normal bowel sounds, no masses or organomegaly  Extremities: no cyanosis, clubbing trace pretib pedal edema  Musculoskeletal: multiple areas of ecchymosis over right ribs, right upper arm, tender over lumbar area, gentle raise legs nontender, boots on  Neurologic: coordination normal and speech normal, moves all 4 extremities      Data Review:     Recent Results (from the past 24 hour(s))   CBC Auto Differential    Collection Time: 05/05/21  2:40 PM   Result Value Ref Range    WBC 17.2 (H) 4.0 - 11.0 K/uL    RBC 4.80 4.20 - 5.90 M/uL    Hemoglobin 11.7 (L) 13.5 - 17.5 g/dL    Hematocrit 36.8 (L) 40.5 - 52.5 %    MCV 76.7 (L) 80.0 - 100.0 fL    MCH 24.3 (L) 26.0 - 34.0 pg    MCHC 31.7 31.0 - 36.0 g/dL    RDW 27.6 (H) 12.4 - 15.4 %    Platelets 108 440 - 271 K/uL    MPV 9.0 5.0 - 10.5 fL    Neutrophils % 91.0 %    Lymphocytes % 2.8 %    Monocytes % 6.1 %    Eosinophils % 0.0 %    Basophils % 0.1 %    Neutrophils Absolute 15.6 (H) 1.7 - 7.7 K/uL    Lymphocytes Absolute 0.5 (L) 1.0 - 5.1 K/uL    Monocytes Absolute 1.0 0.0 - 1.3 K/uL    Eosinophils Absolute 0.0 0.0 - 0.6 K/uL    Basophils Absolute 0.0 0.0 - 0.2 K/uL   Basic Metabolic Panel w/ Reflex to MG    Collection Time: 05/05/21  2:40 PM   Result Value Ref Range    Sodium 126 (L) 136 - 145 mmol/L    Potassium reflex Magnesium 5.3 (H) 3.5 - 5.1 mmol/L    Chloride 91 (L) 99 - 110 mmol/L    CO2 16 (L) 21 - 32 mmol/L    Anion Gap 19 (H) 3 - 16    Glucose 78 70 - 99 mg/dL    BUN 49 (H) 7 - 20 mg/dL    CREATININE 1.3 0.8 - 1.3 mg/dL    GFR Non- 51 (A) >60    GFR African American >60 >60    Calcium 9.0 8.3 - 10.6 mg/dL Hepatic Function Panel    Collection Time: 05/05/21  2:40 PM   Result Value Ref Range    Total Protein 6.1 (L) 6.4 - 8.2 g/dL    Albumin 3.4 3.4 - 5.0 g/dL    Alkaline Phosphatase 177 (H) 40 - 129 U/L     (H) 10 - 40 U/L     (H) 15 - 37 U/L    Total Bilirubin 1.7 (H) 0.0 - 1.0 mg/dL    Bilirubin, Direct 0.7 (H) 0.0 - 0.3 mg/dL    Bilirubin, Indirect 1.0 0.0 - 1.0 mg/dL   Troponin    Collection Time: 05/05/21  2:40 PM   Result Value Ref Range    Troponin 0.04 (H) <0.01 ng/mL   Brain Natriuretic Peptide    Collection Time: 05/05/21  2:40 PM   Result Value Ref Range    Pro-BNP 16,126 (H) 0 - 449 pg/mL   Protime-INR    Collection Time: 05/05/21  2:40 PM   Result Value Ref Range    Protime 23.7 (H) 10.0 - 13.2 sec    INR 2.03 (H) 0.86 - 1.14   APTT    Collection Time: 05/05/21  2:40 PM   Result Value Ref Range    aPTT 26.2 24.2 - 36.2 sec   Lactic Acid, Plasma    Collection Time: 05/05/21  2:40 PM   Result Value Ref Range    Lactic Acid 4.3 (HH) 0.4 - 2.0 mmol/L   Lipase    Collection Time: 05/05/21  2:40 PM   Result Value Ref Range    Lipase 14.0 13.0 - 60.0 U/L   TYPE AND SCREEN    Collection Time: 05/05/21  2:40 PM   Result Value Ref Range    ABO/Rh A POS     Antibody Screen NEG    Urinalysis Reflex to Culture    Collection Time: 05/05/21  2:40 PM    Specimen: Urine, clean catch   Result Value Ref Range    Color, UA YELLOW Straw/Yellow    Clarity, UA Clear Clear    Glucose, Ur Negative Negative mg/dL    Bilirubin Urine Negative Negative    Ketones, Urine Negative Negative mg/dL    Specific Gravity, UA 1.010 1.005 - 1.030    Blood, Urine MODERATE (A) Negative    pH, UA 5.0 5.0 - 8.0    Protein, UA 30 (A) Negative mg/dL    Urobilinogen, Urine 0.2 <2.0 E.U./dL    Nitrite, Urine Negative Negative    Leukocyte Esterase, Urine Negative Negative    Microscopic Examination YES     Urine Type NotGiven     Urine Reflex to Culture Not Indicated    Blood Occult Stool #1    Collection Time: 05/05/21  2:40 PM Result Value Ref Range    Occult Blood Diagnostic Result: Negative  Normal range: Negative      Microscopic Urinalysis    Collection Time: 05/05/21  2:40 PM   Result Value Ref Range    Hyaline Casts, UA 1 0 - 8 /LPF    WBC, UA 0 0 - 5 /HPF    RBC, UA 12 (H) 0 - 4 /HPF    Epithelial Cells, UA 0 0 - 5 /HPF   Electrolytes Urine Random    Collection Time: 05/05/21  2:40 PM   Result Value Ref Range    Sodium, Ur 97 Not Established mmol/L    Potassium, Ur 25.6 Not Established mmol/L    Chloride 98 Not Established mmol/L   Osmolality, Urine    Collection Time: 05/05/21  2:40 PM   Result Value Ref Range    Osmolality, Ur 369 (L) 390 - 1070 mOsm/kg   SPECIMEN REJECTION    Collection Time: 05/05/21  3:01 PM   Result Value Ref Range    Rejected Test VBG     Reason for Rejection see below    Blood Gas, Venous    Collection Time: 05/05/21  4:18 PM   Result Value Ref Range    pH, Henrry 7.321 (L) 7.350 - 7.450    pCO2, Henrry 42.1 40.0 - 50.0 mmHg    pO2, Henrry 39 Not Established mmHg    HCO3, Venous 22 (L) 23 - 29 mmol/L    Base Excess, Henrry -4.1 Not Established mmol/L    O2 Sat, Henrry 60 Not Established %    Carboxyhemoglobin 2.1 %    MetHgb, Henrry 0.7 <1.5 %    TC02 (Calc), Henrry 23 Not Established mmol/L    O2 Content, Henrry 9 Not Established mL/dL    O2 Therapy Unknown    Osmolality    Collection Time: 05/05/21  4:19 PM   Result Value Ref Range    Osmolality 292 280 - 301 mOsm/kg   Procalcitonin    Collection Time: 05/05/21  7:30 PM   Result Value Ref Range    Procalcitonin 0.18 (H) 0.00 - 0.15 ng/mL   Lactate, Sepsis    Collection Time: 05/05/21  7:30 PM   Result Value Ref Range    Lactic Acid, Sepsis 2.0 (H) 0.4 - 1.9 mmol/L   COVID-19, Rapid    Collection Time: 05/05/21  8:00 PM   Result Value Ref Range    SARS-CoV-2, NAAT Not Detected Not Detected   Lactate, Sepsis    Collection Time: 05/05/21  9:32 PM   Result Value Ref Range    Lactic Acid, Sepsis 2.1 (H) 0.4 - 1.9 mmol/L   TSH without Reflex    Collection Time: 05/05/21  9:32 PM Eosinophils % 0.1 %    Basophils % 0.2 %    Neutrophils Absolute 11.3 (H) 1.7 - 7.7 K/uL    Lymphocytes Absolute 0.3 (L) 1.0 - 5.1 K/uL    Monocytes Absolute 0.9 0.0 - 1.3 K/uL    Eosinophils Absolute 0.0 0.0 - 0.6 K/uL    Basophils Absolute 0.0 0.0 - 0.2 K/uL   Troponin    Collection Time: 05/06/21  8:53 AM   Result Value Ref Range    Troponin 0.04 (H) <0.01 ng/mL      Ct Head Wo Contrast    Result Date: 5/5/2021  No acute intracranial abnormality. Diffuse atrophic changes with findings suggesting chronic microvascular ischemia     Ct Abdomen Pelvis W Iv Contrast Additional Contrast? None    Result Date: 5/5/2021  1. Acute appearing fracture seen at the L4 level with about 3 mm of retropulsion into the central canal. 2. Ground-glass opacity seen at the lung bases could be secondary to an infectious or inflammatory process. 3. Partially visualized right greater than left pleural fluid. 4. Hyperdense 2.8 x 4.2 cm area arising from the superior pole of the right kidney, increased in size since 2017. Recommend further evaluation with ultrasound on a nonemergent basis. 5. Bladder moderately distended with urine. Xr Chest Portable    Result Date: 5/5/2021  Multifocal airspace disease, consistent with pneumonia. There is mild improvement since 11/20/2020. Small bilateral pleural effusions. Stable cardiomegaly. Ct Lumbar Spine Trauma Reconstruction    Result Date: 5/5/2021  1. Acute appearing fracture seen at the L4 level with about 3 mm of retropulsion into the central canal. 2. Ground-glass opacity seen at the lung bases could be secondary to an infectious or inflammatory process. 3. Partially visualized right greater than left pleural fluid. 4. Hyperdense 2.8 x 4.2 cm area arising from the superior pole of the right kidney, increased in size since 2017. Recommend further evaluation with ultrasound on a nonemergent basis. 5. Bladder moderately distended with urine.              Assessment:     Principal Problem:    Closed compression fracture of L4 lumbar vertebra, initial encounter ([facility] [address]  Plan: new injury superimposed on preexisting djd, not a surgical candidate need clearance to mobilize from neurosurg  Active Problems:    Essential hypertension  Plan: a little higher, adjust as needed    Pulmonary infiltrates  Plan: likely pneumonia superimposed possibly aspiration cont current abx and speech eval    Chronic atrial fibrillation ([facility] [address]  Plan: chronic on anticoag, maybe eliquis should be stopped    Pleural effusion, bilateral  Plan: likely part of acute on chronic diast failure, follow cxr etc    Lactic acidosis  Plan: improving already, cont to follow    Acute retention of urine  Plan: with known bph and luts, keep kauffman to gravity    Elevated brain natriuretic peptide (BNP) level  Plan: baseline bnp [id number], big jump likely due to chf, dc ivfs resume lasix    Renal mass, right  Plan: not being actively etrrell, will need to discuss with family but try to get through acute illness first    Abnormal LFTs  Plan: huge bump in transaminases, ct imaging no obstruction, likely from passive congestion, check viral hep #s and follow, already improving should continue to improve with diuresis    Elevated troponin  Plan: with prior history cad, asymptomatic, not very high, unchanged from baseline    Hyponatremia  Plan: already improving, suspect secondary to his chf    Neutrophilic leukocytosis  Plan: improving likely responding to treatment, ? Benjamín Murray MD

## 2021-05-06 NOTE — PROGRESS NOTES
[facility]), CKD (chronic kidney disease) (2017), Coronary arteriosclerosis (2009), GERD (gastroesophageal reflux disease), History of blood transfusion, HTN (hypertension) (1990), Left hip pain, Pacemaker (2015), Pulmonary fibrosis ([facility] (08/2017), Pulmonary nodule, left (2017), Renal atrophy, right (2017), Renal cyst, right (08/2017), and SOB (shortness of breath) (2017). · Baseline diet: unclear . Pt denies problems swallowing but pt was a poor historian . Dysphagia history: chart review did not yield any insight other than a MBSS was ordered by Dr. Desmond Sheridan 3/25/2021; but does not appear was ever scheduled and/or completed. Pt was unable to provide information. ONSET DATE: 5/5/2021    Date of Eval: 5/6/2021  Evaluating Therapist: Daron Porras      Chart Review  MD History and Physical Documentation revealed:   Medicine History and Physical  CC:back pain  HPI:89 yo male with a history of diastolic chf, ILD, and HTN and caf and chronic lbp who presented to the ER yesterday due to worsening back pain for weeks. After his last epidural his pain got worse and he had fallen multiple times since then and hurts \"all over. \"  Reportedly last able to walk 5 days ago. Reporting weakening in his legs and decreased uo. Daughter reported he was hallucinating /confused. No reports of f/c, sob, cp, palpitations etc.  This morning still hurts all over still denying and sob cp etc.  No cough. Still hurting terribly. Oriented x3  · Principal Problem:    Closed compression fracture of L4 lumbar vertebra, initial encounter ([facility]      · 5/5/2021 Chest XR  Impression   Multifocal airspace disease, consistent with pneumonia.  There is mild   improvement since 11/20/2020. Small bilateral pleural effusions. Stable cardiomegaly. · CT Head: 5/5/2021  Impression   No acute intracranial abnormality.    Diffuse atrophic changes with findings suggesting chronic microvascular   ischemia     · Current Diet level:  Current Diet : Regular  Current Liquid Diet : Thin      Primary Complaint   MD referral for Speech Evaluation for Swallow assessment at the bedside     Reason for Referral  Nelida Singer was referred for a bedside swallow evaluation to assess the efficiency of his swallow function, identify signs and symptoms of aspiration and make recommendations regarding safe dietary consistencies, effective compensatory strategies, and safe eating environment. Assessment Impression  1. Pt was more alert and oriented on 2nd attempt today. Pt was awake in bed. Pt was oriented to self and place. Pt was able to follow simple commands with intermittent moderate assist. Pt is insightful of feeling \"off\" and \"confused \" at times. Pt is on L2 at 4L/minO2 via nasal canula. Pt with generalized oral motor muscular weakness and reduced oral sensory awareness which may all be exacerbated by variable confusion; pain complaints in which is being managed via pain medicine. SLP saw pt x 2 5/6/2021 and can be quite variable in mental status;which can exacerbate deficits/risks. 2.  Dysphagia Diagnosis: Mild to moderate Oropharyngeal Dysphagia characterized by prolonged, at times incomplete; mastication of soft/regular solid foods; disorganized bolus prep/control with premature loss; lingual mashing A-P oral transit with concern for gradual pooling of material to pharynx; delayed initiation of swallow; potential reduced laryngeal elevation. · Pt with inconsistent delayed cough post swallow of thin liquids and post swallow of dry soft/regular solid foods. · Pt had problems self regulating rate of drinking liquids  ·  Pt appeared to better tolerate mildly thick liquids; puree and minced and moist food consistencies and select soft/moist/cohesive food consistencies with no overt clinical s/s post swallow. Suggest diet downgrade to dysphagia minced and moist food consistencies with mildly thick liquids (no straws) with close monitor. Meds with puree. Feed only if alert. May have sips of thin H20 ( no straws) with staff if alert . Needs external pacing when drinking liquids. Hopefully as mental status improves can upgrade and /or if persistent s/s suggest MBSS to further assess pharyngeal phase of the swallow. · Discussed with RN    Dysphagia Outcome Severity Scale: Level 3: Moderate dysphagia- Total assistance, supervision or strategies. Two or more diet consistencies restricted     Treatment Plan  Requires SLP Intervention: Yes  Duration/Frequency of Treatment: 3-5 times a week while on acute medical floor    Recommended Diet and Intervention  Diet Solids Recommendation: Dysphagia Minced and Moist (Dysphagia II)  Liquid Consistency Recommendation: Mildly Thick (Nectar)  Recommended Form of Meds: Meds in puree  Recommendations: (potential MBSS if mental status and ability to sit up improves)  Therapeutic Interventions: Diet tolerance monitoring;Patient/Family education; Therapeutic PO trials with SLP(ongoing monitor for diet upgrade and/or MBSS as mental status improves)    Compensatory Swallowing Strategies  Compensatory Swallowing Strategies: Upright as possible for all oral intake;Assist feed;Small bites/sips;Swallow 2 times per bite/sip; Remain upright for 30-45 minutes after meals; No straws    Treatment/Goals  Dysphagia Goals: The patient will tolerate dysphagia minced and moist food consistencies and mildly thick liquid diet without observed clinical signs of aspiration; The patient/caregiver will demonstrate understanding of compensatory strategies for improved swallowing safety. ;The patient will tolerate thin liquids without signs and symptoms of aspiration 10/10 via cup. Pt will tolerate therapy trials of soft/bite size food consistencies 10/10 without overt clinical s/s of aspiration    General  Chart Reviewed: Yes  Behavior/Cognition: Cooperative;Pleasant mood(episodic confusion; Oriented to self and place and pain. Pt is [place]; has hearing aides.  Pt is distractible due to pain. Pt receptive to po)  Respiratory Status: O2 via nasal cannula(4L/min O2 via nasal canula)  Communication Observation: (expressing basic needs but there is episodic confusion)  Follows Directions: Simple(o to moderate assist)  Dentition: Dentures top; Some missing teeth  Prior Dysphagia History: No reported problems per pt report. There was an order for MBSS back in March of 2021 but does not appear it was ever completed. Pt unable to provide information if ever scheduled/completed elsewhere    Patient Positioning: Upright in bed(approximately 60 to 70 degrees)    Consistencies Administered: Dysphagia Soft and Bite-Sized (Dysphagia III); Dysphagia Minced and Moist (Dysphagia II); Dysphagia Pureed (Dysphagia I); Nectar - cup; Thin - cup;Nectar - teaspoon      Pain: pain complaints \"back\". RN aware and managing via meds and positioning    Vision/Hearing  Vision  Vision: Impaired  Vision Exceptions: Wears glasses at all times  Hearing  Hearing: Exceptions to [facility]  Hearing Exceptions: Hard of hearing/hearing concerns(has hearing aide(s))    Oral Motor Deficits  Oral/Motor  Oral Motor: Exceptions to [facility]  Labial ROM: (generalized weakness)  Labial Strength: Reduced  Lingual ROM: (generalized weakness)  Lingual Strength: Reduced  Lingual Coordination: Reduced  Velum: (fairly symmetrical during phonation of /a/)  Gag: (diminished)   Vocal Quality: (strong)  Volitional Cough: Strong  Volitional Swallow: Delayed(potential reduced laryngeal elevation as noted via palpation)    Oral Phase Dysfunction  Oral Phase  Oral Phase: Exceptions  Oral Phase Dysfunction  Impaired Mastication: Reg Solid; Soft Solid(Prolonged and at times incomplete)  Decreased Anterior to Posterior Transit: (lingual mashing A-P oral transit wtih concern for gradual pooling of material to pharynx)  Suspected Premature Bolus Loss: (suspect for liquids and during prolonged mastication)  Lingual/Palatal Residue: Reg solid; Soft solid  Oral Phase  Oral Phase - Comment: Pt at times clearing material with his fingers     Indicators of Pharyngeal Phase Dysfunction   Pharyngeal Phase  Pharyngeal Phase: Exceptions  Indicators of Pharyngeal Phase Dysfunction  Delayed Swallow: All  Decreased Laryngeal Elevation: (suspect)  Cough - Delayed: Reg Solid; Thin - cup  Pharyngeal Phase   · Pharyngeal: Pt appeared to better tolerate mildly thick liquids and puree and minced and moist or select moist/cohesive food consistencies without overt clinical s/s post swallow and improved ease in mastication and timliness in A-P    Prognosis  Prognosis  Prognosis for safe diet advancement: fair(guarded; medical DX; pain complaints and requiring pain meds to manage which can exacerbate mental status; medical c-morbidities)    Education  Patient Education Response: Verbalizes understanding;Needs reinforcement; No evidence of learning  Safety Devices in place: Yes  Type of devices: All fall risk precautions in place; Bed alarm in place;Call light within reach;Nurse notified       Therapy Time  SLP Individual Minutes  Time In: 6823  Time Out: 4361  Minutes: 40   coded treatment time: 13       Signed   Angely Gonzáles,MS,CCC,SLP [id number]  Speech and Language Pathologist  5/6/2021 3:11 PM

## 2021-05-06 NOTE — CONSULTS
[address] [address]                                  CONSULTATION    PATIENT NAME: Nick Ramirez                   :        1925  MED REC NO:   [id number]                          ROOM:       5268  ACCOUNT NO:   [de-identified]                           ADMIT DATE: 2021  PROVIDER:     Griselda Sanchez MD    CONSULT DATE:  2021    REASON FOR CONSULTATION:  Spine fracture. HISTORY OF PRESENT ILLNESS:  The patient is a 80year-old with  acute-on-chronic low back pain. He was admitted for altered mental  status, bilateral pleural effusions on CT with possible pneumonia, and  back pain. CT imaging consistent with fracture. He reports \"pain all  over. \"  He fell getting out of bed. He has been feeling weak. He lives  with his daughter. He has diffuse ecchymosis over the right dorsal  back, arms, hands, skin tears on arms which are wrapped, taking morphine  for pain control. PAST MEDICAL HISTORY:  Congestive heart failure, arthritis, AFib, BPH,  chronic kidney disease, coronary artery disease, GERD, hypertension,  pacemaker, pulmonary fibrosis, shortness of breath. PAST SURGICAL HISTORY:  Abdominal surgery in 1945, colonoscopy, stent  placement, inguinal hernia repair, pacemaker, pain management  procedures, bowel surgery, tonsillectomy. ALLERGIES:  Rondall [place]. SOCIAL HISTORY:  Nonsmoker. He does not drink alcohol. FAMILY HISTORY:  Lives with family. MEDICATIONS:  Iron, Eliquis, Flomax, Prilosec, Ditropan, Deltasone,  albuterol, metoprolol, Lipitor, Proscar, Lasix, Flonase, vitamin D.    PHYSICAL EXAMINATION:  GENERAL:  He is resting, arousable, not in distress, pain control with  morphine. VITAL SIGNS:  Afebrile. Vital signs stable. HEENT:  Atraumatic, normocephalic. Cranial nerves II through XII  intact. COR:  Regular rate and rhythm. CHEST:  Unlabored breathing. ABDOMEN:  Soft, nondistended. NEUROLOGIC:  Oriented to self, [facility], and year. Hand   strong and equal.  Moves legs equally and spontaneously. DIAGNOSTIC DATA:  CT abdomen reviewed independently by me shows an acute  fracture of L4, 3 mm of retropulsion, significant depression of the  superior endplate with vacuum gas noted in the disk, vertebral body; and  ground glass opacity in the lung bases. IMPRESSION:  1. Lumbar fracture. Neurologically intact. 2.  Pleural fluid concerning for infectious or inflammatory process. RECOMMENDATIONS:  He is not a surgical candidate. Recommend pain  control with conservative measures, pain medication, and bracing. No  surgery. The patient is not a surgical candidate.         Mounika Arndt MD    D: 05/06/2021 12:00:20       T: 05/06/2021 12:09:00     [email or web address]  Job#: [id number]     Doc#: [id number]    CC:

## 2021-05-06 NOTE — CONSULTS
chest wall, has had increase in his back pain since the fall      Palliative Medicine SymptomScreening/ROS:  Review of Systems - History obtained from chart review and the patient  General ROS: positive for  - fatigue  Respiratory ROS: positive for - shortness of breath  Musculoskeletal ROS: positive for - joint pain and muscular weakness  Neurological ROS: positive for - gait disturbance, impaired coordination/balance and weakness  Dermatological ROS: positive for - skin tears and bruising    A complete 10 count ROS was obtained. Pertinent positives mentioned above in HPI/ROS. All others if not mentioned are negative. Pain:  Controlled on current regimen     Home med list and hospital medications reviewed in chart as of 5/6/2021     EXAM     Vitals:    05/06/21 0850   BP:    Pulse:    Resp: 18   Temp:    SpO2: 100%       Physical Examination:   Due to the current efforts to prevent transmission of COVID-19 and also the need to preserve PPE for other caregivers, a face-to-face encounter with the patient was not performed. That being said, all relevant records and diagnostic tests were reviewed, including laboratory results and imaging. Please reference any relevant documentation elsewhere. Care will be coordinated with the primary service.        Current labs in the epic chart reviewed as of 5/6/2021   Review of previous notes, admits, labs, radiology and testing relevant to this consult done in this chart today 5/6/2021    Signed By: Electronically signed by KAYCE Hanks CNP on 5/6/2021 at 10:06 AM  Palliative Medicine   [phone]    May 6, 2021

## 2021-05-06 NOTE — PROGRESS NOTES
4 Eyes Skin Assessment     NAME:  Mis Gonzalez OF BIRTH:  12/21/1925  MEDICAL RECORD NUMBER:  [id number]    The patient is being assess for  Admission    I agree that 2 RN's have performed a thorough Head to Toe Skin Assessment on the patient. ALL assessment sites listed below have been assessed. Areas assessed by both nurses:    Head, Face, Ears, Shoulders, Back, Chest, Arms, Elbows, Hands, Sacrum. Buttock, Coccyx, Ischium and Legs. Feet and Heels        Does the Patient have a Wound?  No noted wound(s)       Yosef Prevention initiated:  No   Wound Care Orders initiated:  No    Pressure Injury (Stage 3,4, Unstageable, DTI, NWPT, and Complex wounds) if present place consult order under [de-identified] No    New and Established Ostomies if present place consult order under : No      Nurse 1 eSignature: Electronically signed by Bean Jiménez RN on 5/6/21 at 1:16 AM EDT    **SHARE this note so that the co-signing nurse is able to place an eSignature**    Nurse 2 eSignature: Electronically signed by Madelyn Enciso RN on 5/6/21 at 1:16 AM EDT

## 2021-05-06 NOTE — CARE COORDINATION
INITIAL CASE MANAGEMENT ASSESSMENT    Reviewed chart, patient sleeping, met with courtney Membreno to assess possible discharge needs. Explained Case Management role/services. Living Situation: Confirmed address, lives in 1st floor condo w/ daughter, level entry     ADLs: Independent, patient still regularly exercised/walked on track, daughter does all homemaking duties     DME: Rolling walker, transfer tub bench, cane    PT/OT Recs: Not yet ordered, need eval & recs     Active Services: None - hx w/ [facility]     Transportation: Patient no longer drives - daughter transports PRN     Medications: Uses [facility] on [address] - no issues    PCP: Av Sanford MD      HD/PD: n/a    PLAN/COMMENTS: Daughter is hoping patient will improve to return home. Needs PT/OT eval for DC recs. If home daughter would like [facility]. SNF list given. If SNF needed, daughter wants referral to [facility]. Referral made. CM provided contact information for patient or family to call with any questions. CM will follow and assist as needed. Electronically signed by Temo Roman RN Case Management [phone] on 5/6/2021 at 4:16 PM    The Plan for Transition of Care is related to the following treatment goals: strengthening    The daughter Deloris Membreno was provided with a choice of provider and agrees   with the discharge plan. [x] Yes [] No    Freedom of choice list was provided with basic dialogue that supports the patient's individualized plan of care/goals, treatment preferences and shares the quality data associated with the providers.  [x] Yes [] No

## 2021-05-06 NOTE — ACP (ADVANCE CARE PLANNING)
Not Rescitate prepared for Provider review and signature  [] POLST/POST/MOLST/MOST prepared for Provider review and signature      Follow-up plan:    [] Schedule follow-up conversation to continue planning  [] Referred individual to Provider for additional questions/concerns   [] Advised patient/agent/surrogate to review completed ACP document and update if needed with changes in condition, patient preferences or care setting    [x] This note routed to one or more involved healthcare providers    Electronically signed by Aaliyah Potter RN Case Management [phone] on 5/6/2021 at 4:23 PM

## 2021-05-06 NOTE — PROGRESS NOTES
Speech Language Pathology      Speech Therapy note regarding Clinical Evaluation of Swallowing    This SLP attempted eval 5/6/2021. NSG present  Pt was asleep on entry to room  Pt was slow to arouse. Pt was receptive to eat (lunch just arrived) but did not tolerate re-positioning (multiple re-positioning to get pt comfortable). Most optimal was side lying in bed at 25 - 35 degree upright at which time pt drifting in and out of sleep. · Discontinued LOLIS attempts  · RN in agreement    Plan: re-attempt later today    Jeanne Gonzáles,MS,CCC,SLP 0840  Speech and Language Pathologist

## 2021-05-06 NOTE — CONSULTS
Clinical Pharmacy Note  Vancomycin Consult    Blanca Phillips is a 80 y.o. male ordered Vancomycin for Pneumonia; consult received from Dr. Lucila Waters to manage therapy. Also receiving cefepime.     Patient Active Problem List   Diagnosis    Pacemaker    Essential hypertension    Coronary arteriosclerosis    BPH with obstruction/lower urinary tract symptoms    GERD (gastroesophageal reflux disease)    Pulmonary fibrosis (HCC)    SOB (shortness of breath)    Renal cyst, right    Interstitial lung disease ([address]    Solitary lung nodule    Arthritis of both knees    Paroxysmal atrial fibrillation (HCC)    Acute pulmonary edema (HCC)    Pulmonary infiltrates    Chronic atrial fibrillation (HCC)    Severe mitral regurgitation    Acute diastolic CHF (congestive heart failure) ([address]    Chronic kidney disease, stage 3b    Debility    Osteoarthritis of lumbar spine with myelopathy    Acute on chronic diastolic CHF (congestive heart failure) ([facility])    Hematemesis    Hemoptysis    Acute respiratory failure with hypoxia (HCC)    Hyperkalemia    Atrial fibrillation, controlled ([address]    Acute respiratory failure with hypoxia and hypercapnia ([facility])    Pleural effusion, bilateral    Nonrheumatic mitral valve regurgitation    Dysphagia    Closed compression [facility] L4 lumbar vertebra[facility]    Lactic acidosis    Acute retention of urine    Elevated brain natriuretic peptide (BNP) level    Renal mass, right    Abnormal LFTs    Elevated troponin    Hyponatremia    Neutrophilic leukocytosis       Allergies:  Claritin [loratadine] and Zestril [lisinopril]     Temp max:  Temp (24hrs), Av.6 °F (36.4 °C), Min:96 °F (35.6 °C), Max:98.5 °F (36.9 °C)      Recent Labs     21  1440   WBC 17.2*       Recent Labs     21  1440   BUN 49*   CREATININE 1.3       No intake or output data in the 24 hours ending 21      Ht Readings from Last 1 Encounters:   21 5' 5\" (1.651 m)        Wt Readings from Last 1 Encounters:   05/05/21 173 lb 15.1 oz (78.9 kg)         CrCl cannot be calculated (Unknown ideal weight. ). Assessment/Plan:  Vancomycin 1000 mg IV every 24 hours ordered. Regimen projects a trough level of 15-20 mg/L. Level ordered for 5/7/21 @1800. Thank you for the consult.    Trever Mclain, LazaroD

## 2021-05-07 NOTE — CARE COORDINATION
Skilled Bed available at [facility] and they can accept if he needs SNF at D/C.     If D/C'd the weekend:  PH# for Report  [phone]  Fax# [phone]    Arlyn El [phone]  Electronically signed by Yue Bowden on 5/7/2021 at 9:50 AM

## 2021-05-07 NOTE — PLAN OF CARE
effort will be within normal limits for the patient  Outcome: Ongoing     Problem: HEMODYNAMIC STATUS  Goal: Patient has stable vital signs and fluid balance  Outcome: Ongoing     Problem: FLUID AND ELECTROLYTE IMBALANCE  Goal: Fluid and electrolyte balance are achieved/maintained  Outcome: Ongoing     Problem: ACTIVITY INTOLERANCE/IMPAIRED MOBILITY  Goal: Mobility/activity is maintained at optimum level for patient  Outcome: Ongoing     Problem: Infection:  Goal: Will remain free from infection  Description: Will remain free from infection  Outcome: Ongoing     Problem: Safety:  Goal: Free from accidental physical injury  Description: Free from accidental physical injury  Outcome: Ongoing  Goal: Free from intentional harm  Description: Free from intentional harm  Outcome: Ongoing     Problem: Daily Care:  Goal: Daily care needs are met  Description: Daily care needs are met  Outcome: Ongoing     Problem: Pain:  Goal: Patient's pain/discomfort is manageable  Description: Patient's pain/discomfort is manageable  Outcome: Ongoing     Problem: Skin Integrity:  Goal: Skin integrity will stabilize  Description: Skin integrity will stabilize  Outcome: Ongoing     Problem: Discharge Planning:  Goal: Patients continuum of care needs are met  Description: Patients continuum of care needs are met  Outcome: Ongoing     Problem: Breathing Pattern - Ineffective:  Goal: Ability to achieve and maintain a regular respiratory rate will improve  Description: Ability to achieve and maintain a regular respiratory rate will improve  Outcome: Ongoing     Problem: Skin Integrity:  Goal: Will show no infection signs and symptoms  Description: Will show no infection signs and symptoms  Outcome: Ongoing  Goal: Absence of new skin breakdown  Description: Absence of new skin breakdown  Outcome: Ongoing

## 2021-05-07 NOTE — PROGRESS NOTES
Call placed to Dr. Jhony Knutson office inquiring about patient's allowed activity level. VM left for call-back. Awaiting for response.  Electronically signed by Corrie Beard RN on 5/7/2021 at 10:19 AM

## 2021-05-07 NOTE — PROGRESS NOTES
Speech Language Pathology    Dysphagia treatment/follow-up attempted. Patient met at bedside with daughter present and lunch tray to the side. Patient continues with difficulty to manage pain with requests for repositioning; adequate postioning for pain tolerance obtained after repeated attempts, but patient then declining PO. ST to re-attempt as schedule permits. Thank you. Daxa Gomes, Hank Jung, #[id number]  Speech-Language Pathologist  Portable phone: [phone]    Unbillable time: 15 minutes    230PM reattempt: pt currently off the floor for KUB. Will reattempt at a later date.     Mahin Cohn MS, CCC-SLP #[id number]  Speech Language Pathologist

## 2021-05-07 NOTE — PROGRESS NOTES
Internal Medicine History and Physical  CC:back pain  HPI:89 yo male with a history of diastolic chf, ILD, and HTN and caf and chronic lbp who presented to the ER yesterday due to worsening back pain for weeks. After his last epidural his pain got worse and he had fallen multiple times since then and hurts \"all over. \"  Reportedly last able to walk 5 days ago. Reporting weakening in his legs and decreased uo. Daughter reported he was hallucinating /confused. No reports of f/c, sob, cp, palpitations etc.  This morning still hurts all over still denying and sob cp etc.  No cough. Still hurting terribly. Oriented x3      Today seems to be doing worse mentally. Almost perseverating keeps repeating over and over that he is in 10/10 pain. Not able to converse. Cleared by Neurosurgery to start moving with brace. Off Oxygen currently Speech and swallow eval sounds like variable performance depending on his mental status  Principal Problem:    Closed compression fracture of L4 lumbar vertebra, initial encounter ([facility])  Active Problems:    Essential hypertension    Pulmonary infiltrates    Chronic atrial fibrillation (HCC)    Pleural effusion, bilateral    Lactic acidosis    Acute retention of urine    Elevated brain natriuretic peptide (BNP) level    Renal mass, right    Abnormal LFTs    Elevated troponin    Hyponatremia    Neutrophilic leukocytosis  Resolved Problems:    * No resolved hospital problems.  *    Past Medical History:   Diagnosis Date    Acute diastolic CHF (congestive heart failure) ([facility] [address] 10/23/2020    Arthritis     Atrial fibrillation (HCC)     BPH with obstruction/lower urinary tract symptoms     nocturia 3-4 x per night    Chronic atrial fibrillation (HCC)     CKD (chronic kidney disease) 2017    level 3    Coronary arteriosclerosis 2009    2 stents/ Dr Jacquelyn Lara in [place]    GERD (gastroesophageal reflux disease)     History of blood transfusion     1945   During deployment  HTN (hypertension) 1990    Left hip pain     schrapnel in hip and intestines/chronic pains left hip impairs walking    Pacemaker 2015    Pulmonary fibrosis ([address] 08/2017    mild amount seen on chest ct    Pulmonary nodule, left 2017    noncalcified 7mm low risk repeat 8/2018 if patient willing    Renal atrophy, right 2017    Renal cyst, right 08/2017    not clearly simple cyst    SOB (shortness of breath) 2017    terrell normal pft and ct chest ? smll amount of pulmonary fibrosis      Past Surgical History:   Procedure Laterality Date    ABDOMEN SURGERY      Scrapnel removed in [facility]  2009    awoke with indigestion    EYE SURGERY Bilateral     cataract    INGUINAL HERNIA REPAIR Right 07/2016    PACEMAKER INSERTION  2014    PACEMAKER PLACEMENT      PAIN MANAGEMENT PROCEDURE Bilateral 11/30/2020    BILATERAL L4 TRANSFORAMINAL EPIDURAL STEROID INJECTION WITH FLUOROSCOPY performed by Dali Dunn MD at [facility] as  II vet, part of small intestine moved   [id number] [facility]      Medications Prior to Admission: ferrous sulfate (IRON 325) 325 (65 Fe) MG tablet, Take 325 mg by mouth nightly  ELIQUIS 2.5 MG TABS tablet, TAKE 1 TABLET BY MOUTH TWICE A DAY  tamsulosin (FLOMAX) 0.4 MG capsule, ONE EVERY NIGHT FOR YOUR PROSTATE  omeprazole (PRILOSEC) 20 MG delayed release capsule, TAKE 1 CAPSULE BY MOUTH EVERY DAY  oxybutynin (DITROPAN) 5 MG tablet, TAKE 1 TABLET BY MOUTH EVERY DAY  predniSONE (DELTASONE) 10 MG tablet, TAKE 1 TABLET BY MOUTH EVERY DAY  albuterol sulfate  (90 Base) MCG/ACT inhaler, Inhale 2 puffs into the lungs every 6 hours as needed for Wheezing or Shortness of Breath  metoprolol succinate (TOPROL XL) 25 MG extended release tablet, Take 0.5 tablets by mouth daily For heart  atorvastatin (LIPITOR) 40 MG tablet, Take 1 tablet by mouth daily (Patient taking differently: Take 40 mg by mouth nightly )  finasteride (PROSCAR) 5 MG tablet, TAKE 1 TABLET BY MOUTH EVERY DAY  furosemide (LASIX) 40 MG tablet, One po qam for weight over 169lbs  diclofenac sodium (VOLTAREN) 1 % GEL, Apply 4 g topically 3 times daily  fluticasone (FLONASE) 50 MCG/ACT nasal spray, SPRAY 2 SPRAY INTO EACH NOSTRIL EVERY DAY  aspirin 81 MG tablet, Take 81 mg by mouth daily  Cholecalciferol (VITAMIN D3) 2000 UNITS CAPS, Take 2,000 Units by mouth daily  calcium carbonate (OSCAL) 500 MG TABS tablet, Take 600 mg by mouth daily caltrate 600-D3 one daily  Multiple Vitamins-Minerals (CENTRUM SILVER ADULT 50+) TABS, Take 1 tablet by mouth daily  Allergies   Allergen Reactions    Claritin [Loratadine]      Reduced urine flow    Zestril [Lisinopril] Other (See Comments)     coughing      Social History     Tobacco Use    Smoking status: Never Smoker    Smokeless tobacco: Never Used   Substance Use Topics    Alcohol use: No      Family History   Problem Relation Age of Onset    Pacemaker Sister     Pacemaker Brother     No Known Problems Other         lung disease        Prior to Admission medications    Medication Sig Start Date End Date Taking?  Authorizing Provider   ferrous sulfate (IRON 325) 325 (65 Fe) MG tablet Take 325 mg by mouth nightly   Yes Historical Provider, MD TORIBIO 2.5 MG TABS tablet TAKE 1 TABLET BY MOUTH TWICE A DAY 4/12/21  Yes Karon Jennings MD   tamsulosin (FLOMAX) 0.4 MG capsule ONE EVERY NIGHT FOR YOUR PROSTATE 3/9/21  Yes Karon Jennings MD   omeprazole (PRILOSEC) 20 MG delayed release capsule TAKE 1 CAPSULE BY MOUTH EVERY DAY 2/12/21  Yes Karon Jennings MD   oxybutynin (DITROPAN) 5 MG tablet TAKE 1 TABLET BY MOUTH EVERY DAY 2/12/21  Yes Karon Jennings MD   predniSONE (DELTASONE) 10 MG tablet TAKE 1 TABLET BY MOUTH EVERY DAY 2/3/21  Yes Karon Jennigns MD   albuterol sulfate  (90 Base) MCG/ACT inhaler Inhale 2 puffs into the lungs every 6 hours as needed for Wheezing or Shortness of Breath 2/3/21  Yes Royer Gaytan Miguel Belle MD   metoprolol succinate (TOPROL XL) 25 MG extended release tablet Take 0.5 tablets by mouth daily For heart 12/21/20  Yes Av Sanford MD   atorvastatin (LIPITOR) 40 MG tablet Take 1 tablet by mouth daily  Patient taking differently: Take 40 mg by mouth nightly  12/21/20  Yes Av Sanford MD   finasteride (PROSCAR) 5 MG tablet TAKE 1 TABLET BY MOUTH EVERY DAY 12/18/20  Yes Av Sanford MD   furosemide (LASIX) 40 MG tablet One po qam for weight over 169lbs 11/27/20  Yes Av Sanford MD   diclofenac sodium (VOLTAREN) 1 % GEL Apply 4 g topically 3 times daily 11/3/20  Yes Shira Hines MD   fluticasone [facility]) 50 MCG/ACT nasal spray SPRAY 2 SPRAY INTO EACH NOSTRIL EVERY DAY 10/9/20  Yes Av Sanford MD   aspirin 81 MG tablet Take 81 mg by mouth daily   Yes Historical Provider, MD   Cholecalciferol (VITAMIN D3) 2000 UNITS CAPS Take 2,000 Units by mouth daily   Yes Historical Provider, MD   calcium carbonate (OSCAL) 500 MG TABS tablet Take 600 mg by mouth daily caltrate 600-D3 one daily   Yes Historical Provider, MD   Multiple Vitamins-Minerals (CENTRUM SILVER ADULT 50+) TABS Take 1 tablet by mouth daily   Yes Historical Provider, MD     Review of Systems  A comprehensive review of systems was negative except for: back pain, rib pain    Objective:     Patient Vitals for the past 8 hrs:   BP Temp Temp src Pulse Resp SpO2 Weight   05/07/21 1206 (!) 145/80 97.4 °F (36.3 °C) Axillary 74 18 97 % --   05/07/21 0918 -- -- -- -- -- 93 % --   05/07/21 0915 -- -- -- -- -- (!) 85 % --   05/07/21 0856 -- -- -- -- -- 95 % --   05/07/21 0815 -- -- -- -- -- 97 % --   05/07/21 0801 133/78 97 °F (36.1 °C) Axillary 76 24 99 % --   05/07/21 0445 -- -- -- -- -- -- 160 lb 0.9 oz (72.6 kg)     I/O last 3 completed shifts: In: 960 [P.O.:660; IV Piggyback:300]  Out: 2800 [Urine:2800]  I/O this shift:  In: 250 [P.O.:240;  I.V.:10]  Out: -     VITALS:  BP (!) 145/80   Pulse 74   Temp 97.4 °F (36.3 °C) (Axillary)   Resp 18   Ht 5' 5\" (1.651 m)   Wt 160 lb 0.9 oz (72.6 kg)   SpO2 97%   BMI 26.63 kg/m²   General Appearance: not oriented, speaking but not interacting  Skin: warm and dry, no rash or erythema  Head: normocephalic and atraumatic  Eyes: conjunctivae normal and sclera anicteric  ENT: hearing grossly ab- normal bilaterally and sinuses non-tender head nontender atraumatic  Neck: neck supple and non tender without mass, no thyromegaly or thyroid nodules, no cervical lymphadenopathy   Pulmonary/Chest: inspiratory crackles half way up bilat,  With dec bs  Cardiovascular: normal rate, normal S1 and S2, no gallops and no carotid bruits pos sys m pos hjr  Abdomen: soft, non-tender, non-distended, normal bowel sounds, no masses or organomegaly  Extremities: no cyanosis, clubbing trace pretib pedal edema  Musculoskeletal: multiple areas of ecchymosis over right ribs, right upper arm, tender over lumbar area, gentle raise legs nontender, boots on  Neurologic: coordination normal and speech normal, moves all 4 extremities      Data Review:     Recent Results (from the past 24 hour(s))   Basic Metabolic Panel w/ Reflex to MG    Collection Time: 05/07/21  5:57 AM   Result Value Ref Range    Sodium 131 (L) 136 - 145 mmol/L    Potassium reflex Magnesium 4.4 3.5 - 5.1 mmol/L    Chloride 99 99 - 110 mmol/L    CO2 24 21 - 32 mmol/L    Anion Gap 8 3 - 16    Glucose 121 (H) 70 - 99 mg/dL    BUN 38 (H) 7 - 20 mg/dL    CREATININE 1.2 0.8 - 1.3 mg/dL    GFR Non- 56 (A) >60    GFR African American >60 >60    Calcium 7.9 (L) 8.3 - 10.6 mg/dL   Hepatic function panel    Collection Time: 05/07/21  5:57 AM   Result Value Ref Range    Total Protein 5.2 (L) 6.4 - 8.2 g/dL    Albumin 3.0 (L) 3.4 - 5.0 g/dL    Alkaline Phosphatase 119 40 - 129 U/L     (H) 10 - 40 U/L     (H) 15 - 37 U/L    Total Bilirubin 1.0 0.0 - 1.0 mg/dL    Bilirubin, Direct 0.4 (H) 0.0 - 0.3 mg/dL    Bilirubin, Indirect 0.6 0.0 - 1.0 mg/dL   CBC Auto Differential    Collection Time: 05/07/21  5:57 AM   Result Value Ref Range    WBC 11.2 (H) 4.0 - 11.0 K/uL    RBC 4.29 4. 20 - 5.90 M/uL    Hemoglobin 10.5 (L) 13.5 - 17.5 g/dL    Hematocrit 32.7 (L) 40.5 - 52.5 %    MCV 76.4 (L) 80.0 - 100.0 fL    MCH 24.5 (L) 26.0 - 34.0 pg    MCHC 32.0 31.0 - 36.0 g/dL    RDW 28.9 (H) 12.4 - 15.4 %    Platelets 965 182 - 141 K/uL    MPV 8.8 5.0 - 10.5 fL    Neutrophils % 88.3 %    Lymphocytes % 4.0 %    Monocytes % 7.5 %    Eosinophils % 0.1 %    Basophils % 0.1 %    Neutrophils Absolute 9.9 (H) 1.7 - 7.7 K/uL    Lymphocytes Absolute 0.4 (L) 1.0 - 5.1 K/uL    Monocytes Absolute 0.8 0.0 - 1.3 K/uL    Eosinophils Absolute 0.0 0.0 - 0.6 K/uL    Basophils Absolute 0.0 0.0 - 0.2 K/uL   Brain Natriuretic Peptide    Collection Time: 05/07/21  5:57 AM   Result Value Ref Range    Pro-BNP 6,561 (H) 0 - 449 pg/mL      Ct Head Wo Contrast    Result Date: 5/5/2021  No acute intracranial abnormality. Diffuse atrophic changes with findings suggesting chronic microvascular ischemia     Ct Abdomen Pelvis W Iv Contrast Additional Contrast? None    Result Date: 5/5/2021  1. Acute appearing fracture seen at the L4 level with about 3 mm of retropulsion into the central canal. 2. Ground-glass opacity seen at the lung bases could be secondary to an infectious or inflammatory process. 3. Partially visualized right greater than left pleural fluid. 4. Hyperdense 2.8 x 4.2 cm area arising from the superior pole of the right kidney, increased in size since 2017. Recommend further evaluation with ultrasound on a nonemergent basis. 5. Bladder moderately distended with urine. Xr Chest Portable    Result Date: 5/5/2021  Multifocal airspace disease, consistent with pneumonia. There is mild improvement since 11/20/2020. Small bilateral pleural effusions. Stable cardiomegaly. Ct Lumbar Spine Trauma Reconstruction    Result Date: 5/5/2021  1.  Acute appearing fracture seen at the L4 level with about 3 mm

## 2021-05-07 NOTE — PROGRESS NOTES
MD Reynoso at bedside stating that patient is confused during her assessment and noted gagging. Per MD, scheduled pain meds d/c'ed at this time. PRN antiemetic administered and effective. Patient stated that he is hungry and tolerated breakfast well. Patient moaning and grimacing continuously saying that he is in a lot of pain . Heating pad applied, repositioning, and PRN tylenol administered and infective. MD Remi Bryant made aware.  Electronically signed by Jax Chen RN on 5/7/2021 at 10:18 AM

## 2021-05-07 NOTE — PROGRESS NOTES
Exercise Program;Transfer Training  Barriers to Learning: Pain; Confusion; Lethargy  REQUIRES PT FOLLOW UP: Yes  Activity Tolerance  Activity Tolerance: Patient limited by fatigue;Patient limited by pain; Patient limited by endurance; Patient limited by cognitive status       Patient Diagnosis(es): The primary encounter diagnosis was Closed fracture of fourth lumbar vertebra, unspecified fracture morphology, initial encounter ([facility] [address] Diagnoses of Acute encephalopathy, Acute urinary retention, and Cauda equina syndrome ([address] were also pertinent to this visit. has a past medical history of Acute diastolic CHF (congestive heart failure) ([address] Arthritis, Atrial fibrillation ([address] BPH with obstruction/lower urinary tract symptoms, Chronic atrial fibrillation ([address] CKD (chronic kidney disease), Coronary arteriosclerosis, GERD (gastroesophageal reflux disease), History of blood transfusion, HTN (hypertension), Left hip pain, Pacemaker, Pulmonary fibrosis ([address] Pulmonary nodule, left, Renal atrophy, right, Renal cyst, right, and SOB (shortness of breath). has a past surgical history that includes Pacemaker insertion (2014); Tonsillectomy (1960); Small intestine surgery (1945); Inguinal hernia repair (Right, 07/2016); Coronary angioplasty with stent (2009); pacemaker placement; Colonoscopy; Abdomen surgery; eye surgery (Bilateral); and Pain management procedure (Bilateral, 11/30/2020). Restrictions  Restrictions/Precautions  Restrictions/Precautions: Fall Risk  Position Activity Restriction  Spinal Precautions: No Bending, No Lifting, No Twisting  Other position/activity restrictions: LSO for OOB activity; L4 compression fx     Vision/Hearing  Vision: Impaired  Vision Exceptions: Wears glasses at all times  Hearing: Exceptions to [facility]  Hearing Exceptions: Hard of hearing/hearing concerns       Subjective  General  Chart Reviewed:  Yes  Additional Pertinent Hx: Per Dr. Kailey Mckee, \":89 yo male with a history of diastolic pt sat EOB. He had extreme difficulty maintaining sitting balance.)     Bed mobility  Supine to Sit: Dependent/Total  Sit to Supine: Dependent/Total  Scooting: Dependent/Total;2 Person assistance     Transfers  Sit to Stand: Unable to assess  Stand to sit: Unable to assess     Ambulation  Ambulation?: No  Stairs/Curb  Stairs?: No     Balance  Comments: Pt required up to Mod A to maintain sitting balance at EOB, and total assist to don LSO. Sitting tolerance limited d/t pain, lethargy, and weakness. Plan   Plan  Times per week: 2-3x  Specific instructions for Next Treatment: Gradually progress EOB/OOB activity  Current Treatment Recommendations: Strengthening, ROM, Balance Training, Endurance Training, Functional Mobility Training, Transfer Training, Gait Training, Stair training, Positioning, Modalities, Equipment Evaluation, Education, & procurement, Patient/Caregiver Education & Training, Safety Education & Training, Home Exercise Program, Neuromuscular Re-education  Safety Devices  Type of devices: All fall risk precautions in place, Call light within reach, Bed alarm in place, Gait belt, Left in bed, Nurse notified  Restraints  Initially in place: No    AM-PAC Score  AM-PAC Inpatient Mobility Raw Score : 6 (05/07/21 1510)  AM-PAC Inpatient T-Scale Score : 23.55 (05/07/21 1510)  Mobility Inpatient CMS 0-100% Score: 100 (05/07/21 1510)  Mobility Inpatient CMS G-Code Modifier : CN (05/07/21 1510)          Goals  Short term goals  Time Frame for Short term goals:  In 2-3 days pt will perform  Short term goal 1: Bed mobility Mod A  Short term goal 2: Transfer to stedy, Mod A  Short term goal 3: Tolerate 2 min. standing with LRAD, Mod A  Long term goals  Time Frame for Long term goals : LTG = STG  Patient Goals   Patient goals : Unable to state       Therapy Time   Individual Concurrent Group Co-treatment   Time In 1445         Time Out 1509         Minutes 24         Timed Code Treatment Minutes: 9 Minutes Evaluation time: 15 min.      Jacoby Encinas PT    Electronically signed by Jacoby Encinas PT [id number] on 5/7/2021 at 3:11 PM

## 2021-05-07 NOTE — DISCHARGE INSTR - COC
Pneumococcal Polysaccharide ([id number]) 03/03/2015    Tdap (Boostrix, Adacel) 11/06/2018       Active Problems:  Patient Active Problem List   Diagnosis Code    Pacemaker Z95.0    Essential hypertension I10    Coronary arteriosclerosis I25.10    BPH with obstruction/lower urinary tract symptoms N40.1, N13.8    GERD (gastroesophageal reflux disease) K21.9    Pulmonary fibrosis ([facility]) J84.10    SOB (shortness of breath) R06.02    Renal cyst, right N28.1    Interstitial lung disease ([facility]) J84.9    Solitary lung nodule R91.1    Arthritis of both knees M17.0    Paroxysmal atrial fibrillation ([facility]) I48.0    Acute pulmonary edema ([facility]) J81.0    Pulmonary infiltrates R91.8    Chronic atrial fibrillation ([facility]) I48.20    Severe mitral regurgitation W52.1    Acute diastolic CHF (congestive heart failure) ([facility]) I50.31    Chronic kidney disease, stage 3b N18.32    Debility R53.81    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    Acute on chronic diastolic CHF (congestive heart failure) ([facility]) I50.33    Hematemesis K92.0    Hemoptysis R04.2    Acute respiratory failure with hypoxia ([facility]) J96.01    Hyperkalemia E87.5    Atrial fibrillation, controlled ([facility] [address] I48.91    Acute respiratory failure with hypoxia and hypercapnia ([facility]) J96.01, J96.02    Pleural effusion, bilateral J90    Nonrheumatic mitral valve regurgitation I34.0    Dysphagia R13.10    Closed compression fracture of L4 lumbar vertebra, initial encounter ([facility]) S32.040A    Lactic acidosis E87.2    Acute retention of urine R33.8    Elevated brain natriuretic peptide (BNP) level R79.89    Renal mass, right N28.89    Abnormal LFTs R94.5    Elevated troponin R77.8    Hyponatremia J61.4    Neutrophilic leukocytosis K98.3       Isolation/Infection:   Isolation            No Isolation          Patient Infection Status       Infection Onset Added Last Indicated Last Indicated By Review Planned Expiration Resolved Resolved By    None active    Resolved    COVID-19 Rule Out 05/06/21 05/06/21 05/06/21 COVID-19 (Ordered)   05/06/21 Rule-Out Test Resulted    COVID-19 Rule Out 05/05/21 05/05/21 05/05/21 COVID-19, Rapid (Ordered)   05/05/21 Rule-Out Test Resulted    COVID-19 Rule Out 11/11/20 11/11/20 11/11/20 COVID-19 (Ordered)   11/11/20 Rule-Out Test Resulted    COVID-19 Rule Out 10/20/20 10/20/20 10/20/20 COVID-19, PCR (Ordered)   10/20/20 Rule-Out Test Resulted            Nurse Assessment:  Last Vital Signs: /78   Pulse 76   Temp 97 °F (36.1 °C) (Axillary)   Resp 24   Ht 5' 5\" (1.651 m)   Wt 160 lb 0.9 oz (72.6 kg)   SpO2 93%   BMI 26.63 kg/m²     Last documented pain score (0-10 scale): Pain Level: 8  Last Weight:   Wt Readings from Last 1 Encounters:   05/07/21 160 lb 0.9 oz (72.6 kg)     Mental Status:  {IP PT MENTAL STATUS:20030}    IV Access:  { MINESH IV ACCESS:400644381}    Nursing Mobility/ADLs:  Walking   {CHP DME ND:771685363}  Transfer  {CHP DME ETWR:750932247}  Bathing  {CHP DME DELFINO:228298769}  Dressing  {CHP DME HPZT:095176865}  Toileting  {CHP DME HGQM:411429405}  Feeding  {CHP DME JGLI:024014557}  Med Admin  {CHP DME LSBP:309725114}  Med Delivery   { MINESH MED Delivery:213504804}    Wound Care Documentation and Therapy:        Elimination:  Continence: Bowel: {YES / UF:67685}  Bladder: {YES / AI:94672}  Urinary Catheter: {Urinary Catheter:380000413}   Colostomy/Ileostomy/Ileal Conduit: {YES / XQ:95081}       Date of Last BM: ***    Intake/Output Summary (Last 24 hours) at 5/7/2021 1009  Last data filed at 5/7/2021 0917  Gross per 24 hour   Intake 850 ml   Output 1700 ml   Net -850 ml     I/O last 3 completed shifts:   In: 960 [P.O.:660; IV Piggyback:300]  Out: 2800 [Urine:2800]    Safety Concerns:     [address] Safety Concerns:505969644}    Impairments/Disabilities:      [address] Impairments/Disabilities:892846758}    Nutrition Therapy:  Current Nutrition Therapy:   [address] Diet List:681688407}    Routes of Feeding: {CHP DME Other Feedings:585717246}  Liquids: {Slp liquid thickness:74836}  Daily Fluid Restriction: {CHP DME Yes amt example:801286479}  Last Modified Barium Swallow with Video (Video Swallowing Test): {Done Not Done AYEN:206589739}    Treatments at the Time of Hospital Discharge:   Respiratory Treatments: ***  Oxygen Therapy:  {Therapy; copd oxygen:00244}  Ventilator:    {MH CC Vent GZYO:683601208}     Heart Failure Instructions for Daily Management  Patient was treated for chronic diastolic heart failure. he  will require the following:    Please weigh daily on the same scale and approximately the same time of day. Report weight gain of 3 pounds/day or 5 pounds/week to : colleen VARGAS and Daniella Engel -[phone]. Please use hospital discharge weight as baseline reference. Please monitor for signs and symptoms of and report to MD:  Worsening Heart Failure: sudden weight gain, shortness of breath, lower extremity or general edema/swelling, abdominal bloating/swelling, inability to lie flat, intolerance to usual activity, or cough (especially at night). Report these finding even if no increase in weight. Dehydration:  having difficulty or a decrease in urination, dizziness, worsening fatigue, or new onset/worsening of generalized weakness. Please continue a LOW SODIUM diet and LIMIT fluid intake to 48 - 64 ounces ( 1.5 - 2 liters) per day. Call Daniella Engel [phone] and colleen VARGAS and/or Roopa Ceron @ [phone] with any questions or concerns. Please continue heart failure education to patient and family/support system. See After Visit Summary for hospital follow up appointment details. Consider spiritual care referral for support and/or completion of advance directives [phone].   Consider: having the facility MD complete required 7 day follow up, [place] [address] telehealth program if patient agreeable and able to participate, palliative care consult for ongoing goals of care, end of life, and/or chronic disease management discussions and referral to [facility] [phone]) once SNF/HHC complete. Rehab Therapies: {THERAPEUTIC INTERVENTION:8040205519}  Weight Bearing Status/Restrictions: { CC Weight Bearin}  Other Medical Equipment (for information only, NOT a DME order):  {EQUIPMENT:559705981}  Other Treatments: ***    Patient's personal belongings (please select all that are sent with patient):  {CHP DME Belongings:139809432}    RN SIGNATURE:  {Esignature:735756885}    CASE MANAGEMENT/SOCIAL WORK SECTION    Inpatient Status Date: ***    Readmission Risk Assessment Score:  Readmission Risk              Risk of Unplanned Readmission:        32           Discharging to Facility/ Agency   Name:   Address:  Phone:  Fax:    Dialysis Facility (if applicable)   Name:  Address:  Dialysis Schedule:  Phone:  Fax:    / signature: {Esignature:807589510}    PHYSICIAN SECTION    Prognosis: Guarded    Condition at Discharge: Unstable    Rehab Potential (if transferring to Rehab): Guarded    Recommended Labs or Other Treatments After Discharge: continuous EEG, NPO for food and meds , resume Keppra 1.5g q12 ivpb and loading with Vimpat per neuro,D51/2NS +10meq KCl at 75 ml /hr for hydration, air mattress    Physician Certification: I certify the above information and transfer of Fiorella Perez  is necessary for the continuing treatment of the diagnosis listed and that he requires [place] for less 30 days.      Update Admission H&P: Changes in H&P as follows - Nonconvulsive status    PHYSICIAN SIGNATURE:  Electronically signed by Jairo Zambrano MD on 21 at 9:22 AM EDT

## 2021-05-07 NOTE — PLAN OF CARE
Problem: Airway Clearance - Ineffective  Goal: Achieve or maintain patent airway  5/7/2021 1010 by Leidy Bunn RN  Outcome: Ongoing  5/7/2021 0442 by Sunshine Rowan RN  Outcome: Ongoing     Problem: Gas Exchange - Impaired  Goal: Absence of hypoxia  5/7/2021 1010 by Leidy Bunn RN  Outcome: Ongoing  5/7/2021 0442 by Sunshine Rowan RN  Outcome: Ongoing  Goal: Promote optimal lung function  5/7/2021 1010 by Leidy Bunn RN  Outcome: Ongoing  5/7/2021 0442 by Sunshine Rowan RN  Outcome: Ongoing     Problem: Breathing Pattern - Ineffective  Goal: Ability to achieve and maintain a regular respiratory rate  5/7/2021 1010 by Leidy Bunn RN  Outcome: Ongoing  5/7/2021 0442 by Sunshine Rowan RN  Outcome: Ongoing     Problem:  Body Temperature -  Risk of, Imbalanced  Goal: Ability to maintain a body temperature within defined limits  5/7/2021 1010 by Leidy Bunn RN  Outcome: Ongoing  5/7/2021 0442 by Sunsihne Rowan RN  Outcome: Ongoing  Goal: Will regain or maintain usual level of consciousness  5/7/2021 1010 by Leidy Bunn RN  Outcome: Ongoing  5/7/2021 0442 by Sunshine Rowan RN  Outcome: Ongoing  Goal: Complications related to the disease process, condition or treatment will be avoided or minimized  5/7/2021 1010 by Leidy Bunn RN  Outcome: Ongoing  5/7/2021 0442 by Sunshine Rowan RN  Outcome: Ongoing     Problem: Isolation Precautions - Risk of Spread of Infection  Goal: Prevent transmission of infection  5/7/2021 1010 by Leidy Bunn RN  Outcome: Ongoing  5/7/2021 0442 by Sunshine Rowan RN  Outcome: Ongoing     Problem: Nutrition Deficits  Goal: Optimize nutritional status  5/7/2021 1010 by Leidy Bunn RN  Outcome: Ongoing  5/7/2021 0442 by Sunshine Rowan RN  Outcome: Ongoing     Problem: Risk for Fluid Volume Deficit  Goal: Maintain normal heart rhythm  5/7/2021 1010 by Leidy Bunn RN  Outcome: Ongoing  5/7/2021 0442 by Neomia Presume, RN  Outcome: Ongoing  Goal: Maintain absence of muscle cramping  5/7/2021 1010 by Leidy Bunn, RN  Outcome: Ongoing  5/7/2021 0442 by Sunshine Rowan RN  Outcome: Ongoing  Goal: Maintain normal serum potassium, sodium, calcium, phosphorus, and pH  5/7/2021 1010 by Leidy Bunn RN  Outcome: Ongoing  5/7/2021 0442 by Sunshine Rowan RN  Outcome: Ongoing     Problem: Loneliness or Risk for Loneliness  Goal: Demonstrate positive use of time alone when socialization is not possible  5/7/2021 1010 by Leidy Bunn RN  Outcome: Ongoing  5/7/2021 0442 by Sunshine Rowan RN  Outcome: Ongoing     Problem: Fatigue  Goal: Verbalize increase energy and improved vitality  5/7/2021 1010 by Leidy Bunn RN  Outcome: Ongoing  5/7/2021 0442 by Sunshine Rowan RN  Outcome: Ongoing     Problem: Patient Education: Go to Patient Education Activity  Goal: Patient/Family Education  5/7/2021 1010 by Leidy Bunn RN  Outcome: Ongoing  5/7/2021 0442 by Sunshine Rowan RN  Outcome: Ongoing     Problem: Falls - Risk of:  Goal: Will remain free from falls  Description: Will remain free from falls  5/7/2021 1010 by Leidy Bunn RN  Outcome: Ongoing  5/7/2021 0442 by Sunshine Rowan RN  Outcome: Ongoing  Goal: Absence of physical injury  Description: Absence of physical injury  5/7/2021 1010 by Leidy Bunn RN  Outcome: Ongoing  5/7/2021 0442 by Sunshine Rowan RN  Outcome: Ongoing     Problem: HEMODYNAMIC STATUS  Goal: Patient has stable vital signs and fluid balance  5/7/2021 1010 by Leidy Bunn RN  Outcome: Ongoing  5/7/2021 0442 by Sunshine Rowan RN  Outcome: Ongoing     Problem: OXYGENATION/RESPIRATORY FUNCTION  Goal: Patient will maintain patent airway  5/7/2021 1010 by Leidy Bunn RN  Outcome: Ongoing  5/7/2021 0442 by Sunshine Rowan RN  Outcome: Ongoing  Goal: Patient will achieve/maintain normal respiratory rate/effort  Description: Respiratory rate and effort will be within normal limits for the patient  5/7/2021 1010 by Leidy Bunn RN  Outcome: Ongoing  5/7/2021 0442 by Sunshine Rowan RN  Outcome: Ongoing     Problem: FLUID AND ELECTROLYTE IMBALANCE  Goal: Fluid and electrolyte balance are achieved/maintained  5/7/2021 1010 by Emily Barron RN  Outcome: Ongoing  5/7/2021 0442 by Shanel Greenberg RN  Outcome: Ongoing     Problem: ACTIVITY INTOLERANCE/IMPAIRED MOBILITY  Goal: Mobility/activity is maintained at optimum level for patient  5/7/2021 1010 by Emily Barron RN  Outcome: Ongoing  5/7/2021 0442 by Shanel Greenberg RN  Outcome: Ongoing     Problem: Infection:  Goal: Will remain free from infection  Description: Will remain free from infection  5/7/2021 1010 by Emily Barron RN  Outcome: Ongoing  5/7/2021 0442 by Shanel Greenberg RN  Outcome: Ongoing     Problem: Daily Care:  Goal: Daily care needs are met  Description: Daily care needs are met  5/7/2021 1010 by Emily Barron RN  Outcome: Ongoing  5/7/2021 0442 by Shanel Greenberg RN  Outcome: Ongoing     Problem: Pain:  Goal: Patient's pain/discomfort is manageable  Description: Patient's pain/discomfort is manageable  5/7/2021 1010 by Emily Barron RN  Outcome: Ongoing  5/7/2021 0442 by Shanel Greenberg RN  Outcome: Ongoing  Goal: Pain level will decrease  Description: Pain level will decrease  Outcome: Ongoing  Goal: Control of acute pain  Description: Control of acute pain  Outcome: Ongoing  Goal: Control of chronic pain  Description: Control of chronic pain  Outcome: Ongoing     Problem: Skin Integrity:  Goal: Skin integrity will stabilize  Description: Skin integrity will stabilize  5/7/2021 1010 by Emily Barron RN  Outcome: Ongoing  5/7/2021 0442 by Shanel Greenberg RN  Outcome: Ongoing     Problem: Discharge Planning:  Goal: Patients continuum of care needs are met  Description: Patients continuum of care needs are met  5/7/2021 1010 by Emily Barron RN  Outcome: Ongoing  5/7/2021 0442 by Shanel Greenberg RN  Outcome: Ongoing     Problem: Skin Integrity:  Goal: Will show no infection signs and symptoms  Description: Will show no infection signs and symptoms  5/7/2021 1010 by Emily Barron RN  Outcome: Ongoing  5/7/2021 4791 by Bob Carlos RN  Outcome: Ongoing  Goal: Absence of new skin breakdown  Description: Absence of new skin breakdown  5/7/2021 1010 by Arvin Wayne RN  Outcome: Ongoing  5/7/2021 0442 by Bob Carlos RN  Outcome: Ongoing

## 2021-05-08 NOTE — PROGRESS NOTES
[facility]   Speech Therapy  Daily Dysphagia Treatment Note        Angie Paris  AGE: 80 y.o.    GENDER: male  : 1925  [id number]  EPISODE DATE:  2021  Patient Active Problem List   Diagnosis    Pacemaker    Essential hypertension    Coronary arteriosclerosis    BPH with obstruction/lower urinary tract symptoms    GERD (gastroesophageal reflux disease)    Pulmonary fibrosis (HCC)    SOB (shortness of breath)    Renal cyst, right    Interstitial lung disease ([address]    Solitary lung nodule    Arthritis of both knees    Paroxysmal atrial fibrillation (HCC)    Acute pulmonary edema (HCC)    Pulmonary infiltrates    Chronic atrial fibrillation (HCC)    Severe mitral regurgitation    Acute diastolic CHF (congestive heart failure) ([address]    Chronic kidney disease, stage 3b    Debility    Osteoarthritis of lumbar spine with myelopathy    Acute on chronic diastolic CHF (congestive heart failure) (HCC)    Hematemesis    Hemoptysis    Acute respiratory failure with hypoxia (HCC)    Hyperkalemia    Atrial fibrillation, controlled ([address]    Acute respiratory failure with hypoxia and hypercapnia (HCC)    Pleural effusion, bilateral    Nonrheumatic mitral valve regurgitation    Dysphagia    Closed compression fracture of L4 lumbar vertebra, initial encounter ([address]    Lactic acidosis    Acute retention of urine    Elevated brain natriuretic peptide (BNP) level    Renal mass, right    Abnormal LFTs    Elevated troponin    Hyponatremia    Neutrophilic leukocytosis     Allergies   Allergen Reactions    Claritin [Loratadine]      Reduced urine flow    Zestril [Lisinopril] Other (See Comments)     coughing     Treatment Diagnosis: Dysphagia     Chart review:     MD History and Physical Documentation revealed:   Medicine History and Physical  CC:back pain  HPI:89 yo male with a history of diastolic chf, ILD, and HTN and caf and chronic lbp who presented to the ER yesterday due to worsening back pain for weeks.  After his last epidural his pain got worse and he had fallen multiple times since then and hurts \"all over. \"  Reportedly last able to walk 5 days ago.  Reporting weakening in his legs and decreased uo.  Daughter reported he was hallucinating /confused.  No reports of f/c, sob, cp, palpitations etc.  This morning still hurts all over still denying and sob cp etc.  No cough.  Still hurting terribly.  Oriented x3  · Principal Problem:    Closed compression fracture of L4 lumbar vertebra, initial encounter ([facility] [address]        · 5/5/2021 Chest XR  Impression   Multifocal airspace disease, consistent with pneumonia.  There is mild   improvement since 11/20/2020. Small bilateral pleural effusions. Stable cardiomegaly.      · CT Head: 5/5/2021  Impression   No acute intracranial abnormality. Diffuse atrophic changes with findings suggesting chronic microvascular   ischemia         Subjective:     Current diet  Pureed  nectar thick (mildly thick) liquids    Comments regarding tolerating Current Diet:     Dtr reports no difficulty with PO prior to admission. She reports pt was very active, walking daily, going out to dinner with dtr and friends. Pt has lived with [facility] in [place] for 4 years since his wife moved into a facility due to dementia. Appears to be tolerating with limited intake per dtr. Nsg requests hold therapy due to pt pain. Pt's dtr asks for thickened water and then proceeds with questions concerning pt's nutrition. Dtr is concerned about pt's nutrition/hydration. Advised to d/w nsg and MD.      Objective:     Pain   Patient Currently in Pain: No    Cognitive/Behavior   Behavior/Cognition: Cooperative, Pleasant mood(episodic confusion; Oriented to self and place and pain. Pt is [place]; has hearing aides. Pt is distractible due to pain.   Pt receptive to po)    Presentations   Consistencies Administered:  Thin-tsp, Nectar - straw by single sips    Positioning partially reclined in bed    PO Trials:  · Thin Liquids: by tsp (ice cream) with cough noted x1 after approximately 15 boluses. Items discontinued. · Nectar thick liquids: by single sips of straw with pt requiring extra time to pull liquid through straw. No signs of aspiration or penetration. Swallow delay suspected. No cough, choke or vocal change noted. · Honey Thick liquids NT  · Puree :NT  · Soft food  NT  · Regular food NT    Dysphagia Tx:   PO trials: as above. Pt is slow with dtr feeding him appropirately. Education/compensatory strategies:  Pt requires cues for single sips and to swallow intermittently. Review of strategies for upright as possible for po, slow rate, small bolus, single sips of liquid. D/W dtr regarding her concerns for nutrition and advised to d/w nsg and that slp will d/w nsg. Dtr is asking for yogurt or boost.    Goals:   Dysphagia Goals: The patient will tolerate recommended diet without observed clinical signs of aspiration,   Appears to tolerate; ongoing  The patient/caregiver will demonstrate understanding of compensatory strategies for improved swallowing safety; Ongoing review. ,   The patient will tolerate thin liquids without signs and symptoms of aspiration 10/10 via cup.cough noted by tsp. Pt will toelrate therapy trials of soft/bite size food consistencies 10/10 without overt clinlical s/s of aspiration:   Not addressed)    Assessment:   Impressions:   Dysphagia Diagnosis:   Moderate oral stage dysphagia, Mild to moderate pharyngeal stage dysphagia  Pt with no overt signs of aspiration this date, however intake is poor and dtr is very concerned about this. Dtr is advised to d/w MD and nsg, and SLP discusses with nsg as well regarding possible dietary consult or dietary supplement. Pt intake appears poor, but per dtrs report this seems more related to lethargy and pain rather than a dislike of the consistencies offered.   Recommend continue current diet with precautions and SLP to continue to follow for tolerance and potential for upgrades. Diet Recommendations:  Dysphagia II diet (minced and moist)  nectar thick (mildly thick) liquids  Recommended Form of Meds: Meds in puree    Strategies:   Compensatory Swallowing Strategies: Upright as possible for all oral intake, Assist feed, Small bites/sips, Swallow 2 times per bite/sip, Remain upright for 30-45 minutes after meals, drinks by single sips    Education:  Patient Education Response: Verbalizes understanding, Needs reinforcement, No evidence of learning    Prognosis:   guarded    Plan:     Continue Dysphagia Therapy:   Interventions: Therapeutic Interventions: Diet tolerance monitoring, Patient/Family education, Therapeutic PO trials with SLP(ongoing monitor for diet upgrade and/or MBSS as mental status improves)  Duration/Frequency of therapy while on unit: Duration/Frequency of Treatment  Duration/Frequency of Treatment: 3-5 times a week santiago on acute medical floor  Discharge Instructions:   Anticipate NEED for further skilled Speech Therapy for Dysphagia at discharge    This note serves as a D/C Summary in the event that this patient is discharged prior to the next therapy session.     Coded treatment time: 10  Total treatment time: 28    Electronically signed by Neo Geller MS CCC/SLP [id number]  Speech Language Pathologist   on 5/8/2021 at 2:47 PM

## 2021-05-08 NOTE — PROGRESS NOTES
Name: Yudi Client  /Age/Sex: 1925 (80 y.o. male)   MRN & CSN: [id number] & [id number]  Admission Date/Time: 2021  2:10 PM   Location:  [address] [place] [address] Day: Hospital Day: 4   Principal Problem: Closed compression fracture of L4 lumbar vertebra, initial encounter [facility])  HPI     Patient seen and examined. He reports pain all over. He is oriented to self but not time or place. He does not report any complaints today besides pain all over. Discussed with nursing. Patient reports he is in nearly constant pain. He was confused overnight. All other review of systems negative unless noted above.   VITALS VITALS RANGE (24 hours)      INS/OUTS WEIGHTS    Intake/Output Summary (Last 24 hours) at 2021 1844  Last data filed at 2021 1519  Gross per 24 hour   Intake 170 ml   Output 2525 ml   Net -2355 ml      I/O this shift:  In: 120 [P.O.:120]  Out: 300 [Urine:300]        PHYSICAL EXAM   Vitals:    21 1515   BP: (!) 159/130   Pulse: 79   Resp: 16   Temp: 97.5 °F (36.4 °C)   SpO2: 100%     General: Sleeping, easily awakened  Head: Atraumatic  Eyes: No conjunctival injection, ocular motions intact  Neck: Supple nontender  Lungs: Poor inspiratory effort, few left-sided crackles  Cardiovascular: Regular rate rhythm no murmurs or gallops, no peripheral edema  Abdomen soft, nontender, nondistended  Musculoskeletal: Right flank ecchymosis, brace intact  Neurologic: Sensation intact in all 4 extremities, moves all extremities freely  LABS   BMP  Recent Labs     21  0618 21  0557 21  0718   * 131* 137   K 4.7 4.4 4.3   CL 97* 99 100   CO2 21 24 26   BUN 45* 38* 36*   CREATININE 1.2 1.2 1.3   CALCIUM 8.2* 7.9* 8.2*     CBC/COAGS  Recent Labs     21  0618 21  0557 21  0718   WBC 12.6* 11.2* 11.2*   HCT 35.1* 32.7* 34.5*    154 147     Liver & Pancreas  Recent Labs     21  0618 21  0557 21  0718   * 162* 72*   * 527* 375*   ALKPHOS 144* 119 116   BILIDIR 0.5* 0.4* 0.5*        IMAGING   No new imaging   Above studies and images were personally reviewed by myself    MEDS   Scheduled Meds:   lidocaine  1 patch Transdermal Daily    acetaminophen  650 mg Oral TID    furosemide  20 mg Intravenous BID    polyethylene glycol  17 g Oral Daily    Vitamin D  2,000 Units Oral Daily    ferrous sulfate  324 mg Oral Nightly    finasteride  5 mg Oral Daily    fluticasone  2 spray Each Nostril Daily    metoprolol succinate  12.5 mg Oral Daily    pantoprazole  40 mg Oral QAM AC    predniSONE  10 mg Oral Daily    tamsulosin  0.4 mg Oral Nightly    sodium chloride flush  5-40 mL Intravenous 2 times per day    cefepime  2,000 mg Intravenous Q12H     Continuous Infusions:   sodium chloride       PRN Meds:oxyCODONE, albuterol, sodium chloride flush, sodium chloride, promethazine **OR** ondansetron, acetaminophen **OR** acetaminophen, morphine **OR** morphine     CURRENT HOSPITAL PROBLEMS     Compression fracture L4 lumbar vertebra  Degenerative joint disease of spine  Pleural effusions  Chronic atrial fibrillation  Essential hypertension  Elevated BNP  Right renal mass  Transaminitis  Elevated troponin  Hyponatremia  Leukocytosis  Acute urinary retention  Metabolic encephalopathy  CKD stage III  Severe mitral regurgitation  Interstitial lung disease  Symptomatic bradycardia status post pacemaker      Compression fracture of L4 lumbar vertebra  -TLSO brace  -Neurosurgery following appreciate recommendations, not a surgical candidate  -Oxycodone 2.5 mg every 3 hours offer may refused  -Scheduled Tylenol  -PT/OT    Leukocytosis  Pneumonia likely aspiration  Mild to moderate pharyngeal stage dysphagia  Moderate oral stage dysphagia  -Cefepime 2 g every 12 hours (started 5/5)  -Vancomycin started and discontinued with negative blood cultures  -Speech consulted    Elevated transaminase  -Trend  improving, may be a component of muscle injury    Chronic atrial fibrillation  CHF, severe MR  -Metoprolol succinate 12.5 mg daily  - lasix IV BID     Urinary retention  -Continue Proscar  -Continue Turk catheter    Iron deficiency anemia  -Continue ferrous sulfate    Hyperlipidemia  -Continue Lipitor    Right renal mass  -Work-up pending improvement in encephalopathy and pain control    ILD  -Continue prednisone  -Continue PPI    Prophylaxis: Eliquis held, Lovenox, PPI  Disposition pending improvement in pain control, improvement in encephalopathy    Ximena Verduzco MD 5/8/2021 6:44 PM

## 2021-05-08 NOTE — PLAN OF CARE
Problem: Airway Clearance - Ineffective  Goal: Achieve or maintain patent airway  Outcome: Ongoing     Problem: Gas Exchange - Impaired  Goal: Absence of hypoxia  Outcome: Ongoing  Goal: Promote optimal lung function  Outcome: Ongoing     Problem: Breathing Pattern - Ineffective  Goal: Ability to achieve and maintain a regular respiratory rate  Outcome: Ongoing     Problem:  Body Temperature -  Risk of, Imbalanced  Goal: Ability to maintain a body temperature within defined limits  Outcome: Ongoing  Goal: Will regain or maintain usual level of consciousness  Outcome: Ongoing  Goal: Complications related to the disease process, condition or treatment will be avoided or minimized  Outcome: Ongoing     Problem: Isolation Precautions - Risk of Spread of Infection  Goal: Prevent transmission of infection  Outcome: Ongoing     Problem: Nutrition Deficits  Goal: Optimize nutritional status  Outcome: Ongoing     Problem: Risk for Fluid Volume Deficit  Goal: Maintain normal heart rhythm  Outcome: Ongoing  Goal: Maintain absence of muscle cramping  Outcome: Ongoing  Goal: Maintain normal serum potassium, sodium, calcium, phosphorus, and pH  Outcome: Ongoing     Problem: Loneliness or Risk for Loneliness  Goal: Demonstrate positive use of time alone when socialization is not possible  Outcome: Ongoing     Problem: Fatigue  Goal: Verbalize increase energy and improved vitality  Outcome: Ongoing     Problem: Patient Education: Go to Patient Education Activity  Goal: Patient/Family Education  Outcome: Ongoing     Problem: Falls - Risk of:  Goal: Will remain free from falls  Description: Will remain free from falls  Outcome: Ongoing  Goal: Absence of physical injury  Description: Absence of physical injury  Outcome: Ongoing     Problem: OXYGENATION/RESPIRATORY FUNCTION  Goal: Patient will maintain patent airway  Outcome: Ongoing  Goal: Patient will achieve/maintain normal respiratory rate/effort  Description: Respiratory rate and symptoms  Outcome: Ongoing  Goal: Absence of new skin breakdown  Description: Absence of new skin breakdown  Outcome: Ongoing

## 2021-05-08 NOTE — PLAN OF CARE
Problem: Airway Clearance - Ineffective  Goal: Achieve or maintain patent airway  5/8/2021 1011 by Socorro Davis RN  Outcome: Ongoing  5/8/2021 0520 by Jose Baeza RN  Outcome: Ongoing     Problem: Gas Exchange - Impaired  Goal: Absence of hypoxia  5/8/2021 1011 by Socorro Davis RN  Outcome: Ongoing  5/8/2021 0520 by Jose Baeza RN  Outcome: Ongoing  Goal: Promote optimal lung function  5/8/2021 1011 by Socorro Davis RN  Outcome: Ongoing  5/8/2021 0520 by Jose Baeza RN  Outcome: Ongoing     Problem: Breathing Pattern - Ineffective  Goal: Ability to achieve and maintain a regular respiratory rate  5/8/2021 1011 by Socorro Davis RN  Outcome: Ongoing  5/8/2021 0520 by Jose Baeza RN  Outcome: Ongoing     Problem:  Body Temperature -  Risk of, Imbalanced  Goal: Ability to maintain a body temperature within defined limits  5/8/2021 1011 by Socorro Davis RN  Outcome: Ongoing  5/8/2021 0520 by Jose Baeza RN  Outcome: Ongoing  Goal: Will regain or maintain usual level of consciousness  5/8/2021 1011 by Socorro Davis RN  Outcome: Ongoing  5/8/2021 0520 by Jose Baeza RN  Outcome: Ongoing  Goal: Complications related to the disease process, condition or treatment will be avoided or minimized  5/8/2021 1011 by Socorro Davis RN  Outcome: Ongoing  5/8/2021 0520 by Jose Baeza RN  Outcome: Ongoing     Problem: Isolation Precautions - Risk of Spread of Infection  Goal: Prevent transmission of infection  5/8/2021 1011 by Socorro Davis RN  Outcome: Ongoing  5/8/2021 0520 by Jose Baeza RN  Outcome: Ongoing     Problem: Nutrition Deficits  Goal: Optimize nutritional status  5/8/2021 1011 by Socorro Davis RN  Outcome: Ongoing  5/8/2021 0520 by Jose Baeza RN  Outcome: Ongoing     Problem: Risk for Fluid Volume Deficit  Goal: Maintain normal heart rhythm  5/8/2021 1011 by Scoorro Davis RN  Outcome: Ongoing  5/8/2021 0520 by Jose Baeza RN  Outcome: Ongoing  Goal: Maintain absence of muscle cramping  5/8/2021 1011 by Kim Ivory RN  Outcome: Ongoing  5/8/2021 0520 by Xavier Yoon RN  Outcome: Ongoing  Goal: Maintain normal serum potassium, sodium, calcium, phosphorus, and pH  5/8/2021 1011 by Kim Ivory RN  Outcome: Ongoing  5/8/2021 0520 by Xavier Yoon RN  Outcome: Ongoing     Problem: Loneliness or Risk for Loneliness  Goal: Demonstrate positive use of time alone when socialization is not possible  5/8/2021 1011 by Kim Ivory RN  Outcome: Ongoing  5/8/2021 0520 by Xavier Yoon RN  Outcome: Ongoing     Problem: Fatigue  Goal: Verbalize increase energy and improved vitality  5/8/2021 1011 by Kim Ivory RN  Outcome: Ongoing  5/8/2021 0520 by Xavier Yoon RN  Outcome: Ongoing     Problem: Patient Education: Go to Patient Education Activity  Goal: Patient/Family Education  5/8/2021 1011 by Kim Ivory RN  Outcome: Ongoing  5/8/2021 0520 by Xavier Yoon RN  Outcome: Ongoing     Problem: Falls - Risk of:  Goal: Will remain free from falls  Description: Will remain free from falls  5/8/2021 1011 by Kim Ivory RN  Outcome: Ongoing  5/8/2021 0520 by Xavier Yoon RN  Outcome: Ongoing  Goal: Absence of physical injury  Description: Absence of physical injury  5/8/2021 1011 by Kim Ivory RN  Outcome: Ongoing  5/8/2021 0520 by Xavier Yoon RN  Outcome: Ongoing     Problem: OXYGENATION/RESPIRATORY FUNCTION  Goal: Patient will maintain patent airway  5/8/2021 1011 by Kim Ivory RN  Outcome: Ongoing  5/8/2021 0520 by Xavier Yoon RN  Outcome: Ongoing  Goal: Patient will achieve/maintain normal respiratory rate/effort  Description: Respiratory rate and effort will be within normal limits for the patient  5/8/2021 1011 by Kim Ivory RN  Outcome: Ongoing  5/8/2021 0520 by Xavier Yoon RN  Outcome: Ongoing     Problem: HEMODYNAMIC STATUS  Goal: Patient has stable vital signs and fluid balance  5/8/2021 1011 by Kim Ivory RN  Outcome: Ongoing  5/8/2021 0520 by Xavier Yoon RN  Outcome: Ongoing     Problem: FLUID AND ELECTROLYTE IMBALANCE  Goal: Fluid and electrolyte balance are achieved/maintained  5/8/2021 1011 by Oliva Acosta RN  Outcome: Ongoing  5/8/2021 0520 by Kary Chacko RN  Outcome: Ongoing     Problem: Safety:  Goal: Free from accidental physical injury  Description: Free from accidental physical injury  5/8/2021 1011 by Oliva Acosta RN  Outcome: Ongoing  5/8/2021 0520 by Kary Chacko RN  Outcome: Ongoing  Goal: Free from intentional harm  Description: Free from intentional harm  5/8/2021 1011 by Oliva Acosta RN  Outcome: Ongoing  5/8/2021 0520 by Kary Chacko RN  Outcome: Ongoing     Problem: Daily Care:  Goal: Daily care needs are met  Description: Daily care needs are met  5/8/2021 1011 by Oliva Acosta RN  Outcome: Ongoing  5/8/2021 0520 by Kary Chacko RN  Outcome: Ongoing     Problem: Pain:  Goal: Patient's pain/discomfort is manageable  Description: Patient's pain/discomfort is manageable  5/8/2021 1011 by Oliva Acosta RN  Outcome: Ongoing  5/8/2021 0520 by Kary Chacko RN  Outcome: Ongoing  Goal: Pain level will decrease  Description: Pain level will decrease  5/8/2021 1011 by Oliva Acosta RN  Outcome: Ongoing  5/8/2021 0520 by Kary Chacko RN  Outcome: Ongoing  Goal: Control of acute pain  Description: Control of acute pain  5/8/2021 1011 by Oliva Acosta RN  Outcome: Ongoing  5/8/2021 0520 by Kary Chacko RN  Outcome: Ongoing  Goal: Control of chronic pain  Description: Control of chronic pain  5/8/2021 1011 by Oliva Acosta RN  Outcome: Ongoing  5/8/2021 0520 by Kary Chacko RN  Outcome: Ongoing     Problem: Skin Integrity:  Goal: Skin integrity will stabilize  Description: Skin integrity will stabilize  5/8/2021 1011 by Oliva Acosta RN  Outcome: Ongoing  5/8/2021 0520 by Kary Chacko RN  Outcome: Ongoing     Problem: Discharge Planning:  Goal: Patients continuum of care needs are met  Description: Patients continuum of care needs are met  5/8/2021 1011 by Carlos Mclean RN  Outcome: Ongoing  5/8/2021 0520 by Moni Mares RN  Outcome: Ongoing     Problem: Breathing Pattern - Ineffective:  Goal: Ability to achieve and maintain a regular respiratory rate will improve  Description: Ability to achieve and maintain a regular respiratory rate will improve  5/8/2021 1011 by Carlos Mclean RN  Outcome: Ongoing  5/8/2021 0520 by Moni Mares RN  Outcome: Ongoing     Problem: Skin Integrity:  Goal: Will show no infection signs and symptoms  Description: Will show no infection signs and symptoms  5/8/2021 1011 by Carlos Mclean RN  Outcome: Ongoing  5/8/2021 0520 by Moni Mares RN  Outcome: Ongoing  Goal: Absence of new skin breakdown  Description: Absence of new skin breakdown  5/8/2021 1011 by Carlos Mclean RN  Outcome: Ongoing  5/8/2021 0520 by Moni Mares RN  Outcome: Ongoing

## 2021-05-08 NOTE — PROGRESS NOTES
Occupational Therapy      05/08/21    Isauro Freitas  12/21/1925  [id number]      OT orders received and patient chart reviewed. OT called RN who requests therapy hold this date d/t pt being uncomfortable and needing sleep. Will attempt tomorrow as therapy schedule permits.      Electronically signed by CARLOS De Leon OTR/L on 5/8/2021 at 12:35 PM

## 2021-05-09 NOTE — PROGRESS NOTES
Name: Estee Slade  /Age/Sex: 1925 (80 y.o. male)   MRN & CSN: [id number] & [id number]  Admission Date/Time: 2021  2:10 PM   Location:  [address] [place] [address] Day: Hospital Day: 5   Principal Problem: Closed compression fracture of L4 lumbar vertebra, initial encounter [facility])  HPI     Patient seen and examined. He continued to have severe pain through the night. He is sitting up in bed eating today, his appetite is good. He does not report any complaints besides lower back pain. Discussed with nursing. He had marginal improvement with offer may refuse pain medication that was started yesterday. He received IV morphine last night. Spoke with daughter Anirudh De Leon today    All other review of systems negative unless noted above. VITALS VITALS RANGE (24 hours)      INS/OUTS WEIGHTS    Intake/Output Summary (Last 24 hours) at 2021 0914  Last data filed at 2021 2328  Gross per 24 hour   Intake 120 ml   Output 1700 ml   Net -1580 ml      No intake/output data recorded.         PHYSICAL EXAM   Vitals:    21 0821   BP: (!) 167/99   Pulse: 73   Resp: 16   Temp: 97.6 °F (36.4 °C)   SpO2: 100%     General: Sleeping, easily awakened  Head: Atraumatic  Eyes: No conjunctival injection, ocular motions intact  Neck: Supple nontender  Lungs: Poor inspiratory effort, few left-sided crackles  Cardiovascular: Regular rate rhythm no murmurs or gallops, no peripheral edema  Abdomen soft, nontender, nondistended  Musculoskeletal: Right flank ecchymosis, brace intact  Neurologic: Sensation intact in all 4 extremities, moves all extremities freely  LABS   BMP  Recent Labs     21  0557 21  0718 21  0520   * 137 139   K 4.4 4.3 4.2   CL 99 100 99   CO2 24 26 31   BUN 38* 36* 28*   CREATININE 1.2 1.3 1.1   CALCIUM 7.9* 8.2* 9.0     CBC/COAGS  Recent Labs     21  0557 21  0718 21  0520   WBC 11.2* 11.2* 13.5*   HCT 32.7* 34.5* 39.7*    147 166 Liver & Pancreas  Recent Labs     05/07/21  0557 05/08/21  0718 05/09/21  0520   * 72* 44*   * 375* 293*   ALKPHOS 119 116 131*   BILIDIR 0.4* 0.5* 0.5*        IMAGING   No new imaging   Above studies and images were personally reviewed by myself    MEDS   Scheduled Meds:   enoxaparin  30 mg Subcutaneous Daily    lidocaine  1 patch Transdermal Daily    acetaminophen  650 mg Oral TID    furosemide  20 mg Intravenous BID    polyethylene glycol  17 g Oral Daily    Vitamin D  2,000 Units Oral Daily    ferrous sulfate  324 mg Oral Nightly    finasteride  5 mg Oral Daily    fluticasone  2 spray Each Nostril Daily    metoprolol succinate  12.5 mg Oral Daily    pantoprazole  40 mg Oral QAM AC    predniSONE  10 mg Oral Daily    tamsulosin  0.4 mg Oral Nightly    sodium chloride flush  5-40 mL Intravenous 2 times per day    cefepime  2,000 mg Intravenous Q12H     Continuous Infusions:   sodium chloride       PRN Meds:oxyCODONE, albuterol, sodium chloride flush, sodium chloride, promethazine **OR** ondansetron, acetaminophen **OR** acetaminophen, morphine **OR** morphine     CURRENT HOSPITAL PROBLEMS     Compression fracture L4 lumbar vertebra  Degenerative joint disease of spine  Pleural effusions  Chronic atrial fibrillation  Essential hypertension  Elevated BNP  Right renal mass  Transaminitis  Elevated troponin  Hyponatremia  Leukocytosis  Acute urinary retention  Metabolic encephalopathy  CKD stage III  Severe mitral regurgitation  Interstitial lung disease  Symptomatic bradycardia status post pacemaker      Compression fracture of L4 lumbar vertebra  -TLSO brace  -Neurosurgery following appreciate recommendations, not a surgical candidate  -Oxycodone 5 mg every 3 hours offer may refused  -Scheduled Tylenol  -PT/OT    Leukocytosis  Pneumonia likely aspiration  Mild to moderate pharyngeal stage dysphagia  Moderate oral stage dysphagia  -Cefepime 2 g every 12 hours (started 5/5)  -Vancomycin started and discontinued with negative blood cultures  -Speech consulted    Elevated transaminase  -Trend  improving, may be a component of muscle injury    Chronic atrial fibrillation  CHF, severe MR  -Metoprolol succinate 12.5 mg daily  - lasix IV BID     Urinary retention  -Continue Proscar  -Continue Turk catheter    Iron deficiency anemia  -Continue ferrous sulfate    Hyperlipidemia  -Continue Lipitor    Right renal mass  -Work-up pending improvement in encephalopathy and pain control    ILD  -Continue prednisone  -Continue PPI    Prophylaxis: Eliquis held, Lovenox, PPI  Disposition pending improvement in pain control, improvement in encephalopathy    Howie Candelaria MD 5/9/2021 9:14 AM

## 2021-05-09 NOTE — PLAN OF CARE
Problem: Airway Clearance - Ineffective  Goal: Achieve or maintain patent airway  5/9/2021 0946 by Clint Moultno RN  Outcome: Completed  5/9/2021 0149 by Ryanne Christopher RN  Outcome: Ongoing     Problem: Gas Exchange - Impaired  Goal: Absence of hypoxia  5/9/2021 0946 by Clint Moulton RN  Outcome: Completed  5/9/2021 0149 by Ryanne Christopher RN  Outcome: Ongoing  Goal: Promote optimal lung function  5/9/2021 0946 by Clint Moulton RN  Outcome: Completed  5/9/2021 0149 by Ryanne Christopher RN  Outcome: Ongoing     Problem: Breathing Pattern - Ineffective  Goal: Ability to achieve and maintain a regular respiratory rate  5/9/2021 0946 by Clint Moulton RN  Outcome: Completed  5/9/2021 0149 by Ryanne Christopher RN  Outcome: Ongoing     Problem:  Body Temperature -  Risk of, Imbalanced  Goal: Ability to maintain a body temperature within defined limits  5/9/2021 0946 by Clint Moulton RN  Outcome: Completed  5/9/2021 0149 by Ryanne Christopher RN  Outcome: Ongoing  Goal: Will regain or maintain usual level of consciousness  5/9/2021 0946 by Clint Moulton RN  Outcome: Completed  5/9/2021 0149 by Ryanne Christopher RN  Outcome: Ongoing  Goal: Complications related to the disease process, condition or treatment will be avoided or minimized  5/9/2021 0946 by Clint Moulton RN  Outcome: Completed  5/9/2021 0149 by Ryanne Christopher RN  Outcome: Ongoing     Problem: Isolation Precautions - Risk of Spread of Infection  Goal: Prevent transmission of infection  5/9/2021 0946 by Clint Moulton RN  Outcome: Completed  5/9/2021 0149 by Ryanne Chritsopher RN  Outcome: Ongoing     Problem: Risk for Fluid Volume Deficit  Goal: Maintain normal heart rhythm  5/9/2021 0946 by Clint Moulton RN  Outcome: Completed  5/9/2021 0149 by Ryanne Christopher RN  Outcome: Ongoing  Goal: Maintain absence of muscle cramping  5/9/2021 0946 by Clitn Moulton RN  Outcome: Completed  5/9/2021 0149 by Ryanne Christopher RN  Outcome: Ongoing  Goal: Maintain normal serum potassium, sodium, calcium, phosphorus, and pH  5/9/2021 0946 by Endy Delgado RN  Outcome: Completed  5/9/2021 0149 by Jarrett Hanson RN  Outcome: Ongoing     Problem: Loneliness or Risk for Loneliness  Goal: Demonstrate positive use of time alone when socialization is not possible  5/9/2021 0946 by Endy Delgado RN  Outcome: Completed  5/9/2021 0149 by Jarrett Hanson RN  Outcome: Ongoing     Problem: Fatigue  Goal: Verbalize increase energy and improved vitality  5/9/2021 0946 by Endy Delgado RN  Outcome: Completed  5/9/2021 0149 by Jarrett Hanson RN  Outcome: Ongoing     Problem: Falls - Risk of:  Goal: Will remain free from falls  Description: Will remain free from falls  5/9/2021 0947 by Endy Delgado RN  Outcome: Ongoing  5/9/2021 0149 by Jarrett Hanson RN  Outcome: Ongoing  Goal: Absence of physical injury  Description: Absence of physical injury  5/9/2021 0947 by Endy Delgado RN  Outcome: Ongoing  5/9/2021 0149 by Jarrett Hanson RN  Outcome: Ongoing     Problem: OXYGENATION/RESPIRATORY FUNCTION  Goal: Patient will maintain patent airway  5/9/2021 0947 by Endy Delgado RN  Outcome: Ongoing  5/9/2021 0149 by Jarrett Hanson RN  Outcome: Ongoing  Goal: Patient will achieve/maintain normal respiratory rate/effort  Description: Respiratory rate and effort will be within normal limits for the patient  5/9/2021 0947 by Endy Delgado RN  Outcome: Ongoing  5/9/2021 0149 by Jarrett Hanson RN  Outcome: Ongoing     Problem: HEMODYNAMIC STATUS  Goal: Patient has stable vital signs and fluid balance  5/9/2021 0947 by Endy Delgado RN  Outcome: Ongoing  5/9/2021 0149 by Jarrett Hanson RN  Outcome: Ongoing     Problem: FLUID AND ELECTROLYTE IMBALANCE  Goal: Fluid and electrolyte balance are achieved/maintained  5/9/2021 0947 by Endy Delgado RN  Outcome: Ongoing  5/9/2021 0149 by Jarrett Hanson RN  Outcome: Ongoing     Problem: ACTIVITY INTOLERANCE/IMPAIRED MOBILITY  Goal: Mobility/activity is maintained

## 2021-05-09 NOTE — PLAN OF CARE
Problem:  Body Temperature -  Risk of, Imbalanced  Goal: Ability to maintain a body temperature within defined limits  Outcome: Ongoing  Goal: Will regain or maintain usual level of consciousness  Outcome: Ongoing  Goal: Complications related to the disease process, condition or treatment will be avoided or minimized  Outcome: Ongoing     Problem: Breathing Pattern - Ineffective  Goal: Ability to achieve and maintain a regular respiratory rate  Outcome: Ongoing     Problem: Gas Exchange - Impaired  Goal: Absence of hypoxia  Outcome: Ongoing  Goal: Promote optimal lung function  Outcome: Ongoing

## 2021-05-10 NOTE — PLAN OF CARE
Problem: Falls - Risk of:  Goal: Will remain free from falls  Description: Will remain free from falls  Outcome: Ongoing     Problem: Falls - Risk of:  Goal: Absence of physical injury  Description: Absence of physical injury  Outcome: Ongoing     Problem: OXYGENATION/RESPIRATORY FUNCTION  Goal: Patient will maintain patent airway  Outcome: Ongoing     Problem: OXYGENATION/RESPIRATORY FUNCTION  Goal: Patient will achieve/maintain normal respiratory rate/effort  Description: Respiratory rate and effort will be within normal limits for the patient  Outcome: Ongoing     Problem: HEMODYNAMIC STATUS  Goal: Patient has stable vital signs and fluid balance  Outcome: Ongoing     Problem: FLUID AND ELECTROLYTE IMBALANCE  Goal: Fluid and electrolyte balance are achieved/maintained  Outcome: Ongoing     Problem: ACTIVITY INTOLERANCE/IMPAIRED MOBILITY  Goal: Mobility/activity is maintained at optimum level for patient  Outcome: Ongoing     Problem: Infection:  Goal: Will remain free from infection  Description: Will remain free from infection  Outcome: Ongoing     Problem: Safety:  Goal: Free from accidental physical injury  Description: Free from accidental physical injury  Outcome: Ongoing     Problem: Safety:  Goal: Free from intentional harm  Description: Free from intentional harm  Outcome: Ongoing     Problem: Daily Care:  Goal: Daily care needs are met  Description: Daily care needs are met  Outcome: Ongoing     Problem: Pain:  Goal: Patient's pain/discomfort is manageable  Description: Patient's pain/discomfort is manageable  Outcome: Ongoing     Problem: Pain:  Goal: Pain level will decrease  Description: Pain level will decrease  Outcome: Ongoing     Problem: Pain:  Goal: Control of acute pain  Description: Control of acute pain  Outcome: Ongoing     Problem: Pain:  Goal: Control of chronic pain  Description: Control of chronic pain  Outcome: Ongoing     Problem: Skin Integrity:  Goal: Skin integrity will stabilize  Description: Skin integrity will stabilize  Outcome: Ongoing     Problem: Discharge Planning:  Goal: Patients continuum of care needs are met  Description: Patients continuum of care needs are met  Outcome: Ongoing     Problem: Breathing Pattern - Ineffective:  Goal: Ability to achieve and maintain a regular respiratory rate will improve  Description: Ability to achieve and maintain a regular respiratory rate will improve  Outcome: Ongoing     Problem: Skin Integrity:  Goal: Will show no infection signs and symptoms  Description: Will show no infection signs and symptoms  Outcome: Ongoing     Problem: Skin Integrity:  Goal: Absence of new skin breakdown  Description: Absence of new skin breakdown  Outcome: Ongoing

## 2021-05-10 NOTE — CARE COORDINATION
Discharge Planning:  SW met with pts dgtr, Heaven Sarmiento and Yong Jones (palliative Care-CNP) to discuss possible d/c plans. Pt was alert and oriented and independent PTA. Initial thought was that this pt may need a skilled stay upon d/c but pt has had a mental status change and is currently not making eye contact or attempting to communicate. Yong Jones discussed goals of care with pts dgtr and the following d/c plans were discussed:    · If pts mental state improves, [facility] for skilled care  · If pt continues to decline, [facility] for Intermediate care with Hospice care. CHRIST contacted Jordyn at [facility] to see if this facility would be willing to accept this pt for long term care with Hospice. Heaven Sarmiento stated that pt has no assets and his monthly income is approximately $3000.00 per month.   Jordyn stated that she will look into this and will get back with this [address] [facility], [place]  [phone]  Electronically signed by Clyde Oneal on 5/10/2021 at 1:21 PM

## 2021-05-10 NOTE — PROGRESS NOTES
Physical Therapy  Facility/Department: [address] PROGRESSIVE CARE  Daily Treatment Note  NAME: Erwin Guillen  : 1925  MRN: [id number]    Date of Service: 5/10/2021  Erwin Guillen scored a 8/24 on the AM-PAC short mobility form. Current research shows that an AM-PAC score of 17 or less is typically not associated with a discharge to the patient's home setting. Based on the patient's AM-PAC score and their current functional mobility deficits, it is recommended that the patient have 3-5 sessions per week of Physical Therapy at d/c to increase the patient's independence. Please see assessment section for further patient specific details. If patient discharges prior to next session this note will serve as a discharge summary. Please see below for the latest assessment towards goals. Discharge Recommendations:  Patient would benefit from continued therapy after discharge, 3-5 sessions per week   PT Equipment Recommendations  Equipment Needed: No  Other: Defer to next level of care    Assessment   Assessment: Pt is a 89 y.o. M. admitted  for progressive low back pain; found to have L4 compression fx. LSO received today. He presents lethargic, experiencing tremors with voluntary movement, and c/o significant LBP. This date, Pt. continues to tolerate limited mobility. He required total assist for bed mobility, and to don LSO. Attempted to stand to the Vevelyn Johnston without success. Pt. Mod A to sit at the EOB with R side lean. He will require continued therapy at low-moderate frequency upon D/C. Prognosis: Guarded; Fair  Decision Making: Medium Complexity  PT Education: Goals; General Safety;PT Role;Orientation;Plan of Care;Home Exercise Program;Transfer Training;Functional Mobility Training  Patient Education: Limited understanding, Will need reinforcement  Barriers to Learning: Pain; Confusion; Lethargy  REQUIRES PT FOLLOW UP: Yes  Activity Tolerance  Activity Tolerance: Patient limited by fatigue;Patient limited by pain; Patient limited by endurance; Patient limited by cognitive status     Patient Diagnosis(es): The primary encounter diagnosis was Closed fracture of fourth lumbar vertebra, unspecified fracture morphology, initial encounter ([facility] [address] Diagnoses of Acute encephalopathy, Acute urinary retention, and Cauda equina syndrome ([address] were also pertinent to this visit. has a past medical history of Acute diastolic CHF (congestive heart failure) ([address] Arthritis, Atrial fibrillation ([address] BPH with obstruction/lower urinary tract symptoms, Chronic atrial fibrillation ([address] CKD (chronic kidney disease), Coronary arteriosclerosis, GERD (gastroesophageal reflux disease), History of blood transfusion, HTN (hypertension), Left hip pain, Pacemaker, Pulmonary fibrosis ([address] Pulmonary nodule, left, Renal atrophy, right, Renal cyst, right, and SOB (shortness of breath). has a past surgical history that includes Pacemaker insertion (2014); Tonsillectomy (1960); Small intestine surgery (1945); Inguinal hernia repair (Right, 07/2016); Coronary angioplasty with stent (2009); pacemaker placement; Colonoscopy; Abdomen surgery; eye surgery (Bilateral); and Pain management procedure (Bilateral, 11/30/2020). Restrictions  Restrictions/Precautions  Restrictions/Precautions: Fall Risk, Modified Diet  Required Braces or Orthoses?: Yes  Required Braces or Orthoses  Spinal: Thoracic Lumbar Sacral Orthotics  Position Activity Restriction  Spinal Precautions: No Bending, No Lifting, No Twisting  Other position/activity restrictions: LSO for OOB activity; L4 compression fx  Subjective   General  Chart Reviewed: Yes  Additional Pertinent Hx: Per Dr. Fortino Saini, \":89 yo male with a history of diastolic chf, ILD, and HTN and caf and chronic lbp who presented to the ER yesterday due to worsening back pain for weeks. After his last epidural his pain got worse and he had fallen multiple times since then and hurts \"all over. \" Reportedly last able to walk 5 days ago. Reporting weakening in his legs and decreased uo. Daughter reported he was hallucinating /confused. No reports of f/c, sob, cp, palpitations etc.  This morning still hurts all over still denying and sob cp etc.  No cough. Still hurting terribly. Oriented x3.\"  Response To Previous Treatment: Not applicable  Referring Practitioner: Dr. Julián Guaman  Subjective  Subjective: Eyes open throughout session but limited ability to follow commands or respond appropriately. Limited communication  Pain Screening  Patient Currently in Pain: Denies  Vital Signs  Patient Currently in Pain: Denies       Orientation  Orientation  Orientation Level: Oriented to person;Disoriented to place; Disoriented to time;Disoriented to situation  Cognition      Objective   Bed mobility  Rolling to Left: Dependent/Total  Rolling to Right: Dependent/Total  Supine to Sit: Dependent/Total  Sit to Supine: Dependent/Total;2 Person assistance  Scooting: Dependent/Total  Comment: Limited by pain and likely confusion  Transfers  Sit to Stand: Unable to assess(Attempted sit to/from stand with the Renata Rosalesa but unable to stand despite Max/Total A x 2)  Stand to sit: Unable to assess  Comment: Unsafe to transfer d/t unable to fully stand, Pt. tremorous with sitting and standing to a mild/moderate degree  Ambulation  Ambulation?: No(Unable to stand at this time)  Stairs/Curb  Stairs?: No     Balance  Sitting - Static: Poor  Sitting - Dynamic: Poor  Standing - Static: Poor  Standing - Dynamic: Poor  Comments: Required Mod A to sit with poor posture, leaning to the right and forward. Total assist to don LSO. Sitting limited by pain, confusion and poor command following            Comment: Pt. repositioned multiple times in bed with attempts to get Pt. more comfortable and replace sheets. Pt. ended up most comfortable in supine.   Attempted with OT to swallow applesauce, but Pt. left in mouth prolonged time and needed much encouragement to swallow. RN notified. AM-PAC Score  AM-PAC Inpatient Mobility Raw Score : 8 (05/10/21 1028)  AM-PAC Inpatient T-Scale Score : 28.52 (05/10/21 1028)  Mobility Inpatient CMS 0-100% Score: 86.62 (05/10/21 1028)  Mobility Inpatient CMS G-Code Modifier : CM (05/10/21 1028)          Goals  Short term goals  Time Frame for Short term goals: In 2-3 days pt will perform  Short term goal 1: Bed mobility Mod A  Short term goal 2: Transfer to stedy, Mod A  Short term goal 3: Tolerate 2 min. standing with LRAD, Mod A  Long term goals  Time Frame for Long term goals : LTG = STG  Patient Goals   Patient goals : Unable to state    Plan    Plan  Times per week: 2-3x  Specific instructions for Next Treatment: Gradually progress EOB/OOB activity  Current Treatment Recommendations: Strengthening, ROM, Balance Training, Endurance Training, Functional Mobility Training, Transfer Training, Gait Training, Stair training, Positioning, Modalities, Equipment Evaluation, Education, & procurement, Patient/Caregiver Education & Training, Safety Education & Training, Home Exercise Program, Neuromuscular Re-education  Safety Devices  Type of devices:  All fall risk precautions in place, Call light within reach, Bed alarm in place, Gait belt, Left in bed, Nurse notified, Patient at risk for falls, Telesitter in use  Restraints  Initially in place: No     Therapy Time   Individual Concurrent Group Co-treatment   Time In 0920         Time Out 1005         Minutes 45         Timed Code Treatment Minutes: [place], [place], [id number]

## 2021-05-10 NOTE — PROGRESS NOTES
Internal Medicine History and Physical  CC:back pain  HPI:89 yo male with a history of diastolic chf, ILD, and HTN and caf and chronic lbp who presented to the ER yesterday due to worsening back pain for weeks. After his last epidural his pain got worse and he had fallen multiple times since then and hurts \"all over. \"  Reportedly last able to walk 5 days ago. Reporting weakening in his legs and decreased uo. Daughter reported he was hallucinating /confused. No reports of f/c, sob, cp, palpitations etc.  This morning still hurts all over still denying and sob cp etc.  No cough. Still hurting terribly. Oriented x3    Has been nonverbal since Sunday, now Monday still nonverbal, no eye contact although he did seem to hear me and although there was some delay he tried to follow me instructions. Ex tried to raise his legs then his arms for me. Principal Problem:    Closed compression fracture of L4 lumbar vertebra, initial encounter [facility])  Active Problems:    Essential hypertension    Pulmonary infiltrates    Chronic atrial fibrillation (HCC)    Pleural effusion, bilateral    Lactic acidosis    Acute retention of urine    Elevated brain natriuretic peptide (BNP) level    Renal mass, right    Abnormal LFTs    Elevated troponin    Hyponatremia    Neutrophilic leukocytosis  Resolved Problems:    * No resolved hospital problems.  *    Past Medical History:   Diagnosis Date    Acute diastolic CHF (congestive heart failure) ([facility] 10/23/2020    Arthritis     Atrial fibrillation (HCC)     BPH with obstruction/lower urinary tract symptoms     nocturia 3-4 x per night    Chronic atrial fibrillation (HCC)     CKD (chronic kidney disease) 2017    level 3    Coronary arteriosclerosis 2009    2 stents/ Dr Rupa Flores in [place]    GERD (gastroesophageal reflux disease)     History of blood transfusion     1945   During deployment    HTN (hypertension) 1990    Left hip pain     schrapnel in hip and intestines/chronic pains left hip impairs walking    Pacemaker 2015    Pulmonary fibrosis ([address] 08/2017    mild amount seen on chest ct    Pulmonary nodule, left 2017    noncalcified 7mm low risk repeat 8/2018 if patient willing    Renal atrophy, right 2017    Renal cyst, right 08/2017    not clearly simple cyst    SOB (shortness of breath) 2017    terrell normal pft and ct chest ? smll amount of pulmonary fibrosis      Past Surgical History:   Procedure Laterality Date    ABDOMEN SURGERY      Scrapnel removed in [facility]  2009    awoke with indigestion    EYE SURGERY Bilateral     cataract    INGUINAL HERNIA REPAIR Right 07/2016    PACEMAKER INSERTION  2014    PACEMAKER PLACEMENT      PAIN MANAGEMENT PROCEDURE Bilateral 11/30/2020    BILATERAL L4 TRANSFORAMINAL EPIDURAL STEROID INJECTION WITH FLUOROSCOPY performed by Khris Hoang MD at [address] [facility] as  II vet, part of small intestine moved   [id number] [facility]      Medications Prior to Admission: ferrous sulfate (IRON 325) 325 (65 Fe) MG tablet, Take 325 mg by mouth nightly  ELIQUIS 2.5 MG TABS tablet, TAKE 1 TABLET BY MOUTH TWICE A DAY  tamsulosin (FLOMAX) 0.4 MG capsule, ONE EVERY NIGHT FOR YOUR PROSTATE  omeprazole (PRILOSEC) 20 MG delayed release capsule, TAKE 1 CAPSULE BY MOUTH EVERY DAY  oxybutynin (DITROPAN) 5 MG tablet, TAKE 1 TABLET BY MOUTH EVERY DAY  albuterol sulfate  (90 Base) MCG/ACT inhaler, Inhale 2 puffs into the lungs every 6 hours as needed for Wheezing or Shortness of Breath  [DISCONTINUED] predniSONE (DELTASONE) 10 MG tablet, TAKE 1 TABLET BY MOUTH EVERY DAY  metoprolol succinate (TOPROL XL) 25 MG extended release tablet, Take 0.5 tablets by mouth daily For heart  atorvastatin (LIPITOR) 40 MG tablet, Take 1 tablet by mouth daily (Patient taking differently: Take 40 mg by mouth nightly )  finasteride (PROSCAR) 5 MG tablet, TAKE 1 TABLET BY MOUTH EVERY DAY  furosemide (LASIX) 40 MG tablet, One po qam for weight over 169lbs  diclofenac sodium (VOLTAREN) 1 % GEL, Apply 4 g topically 3 times daily  fluticasone (FLONASE) 50 MCG/ACT nasal spray, SPRAY 2 SPRAY INTO EACH NOSTRIL EVERY DAY  aspirin 81 MG tablet, Take 81 mg by mouth daily  Cholecalciferol (VITAMIN D3) 2000 UNITS CAPS, Take 2,000 Units by mouth daily  calcium carbonate (OSCAL) 500 MG TABS tablet, Take 600 mg by mouth daily caltrate 600-D3 one daily  Multiple Vitamins-Minerals (CENTRUM SILVER ADULT 50+) TABS, Take 1 tablet by mouth daily  Allergies   Allergen Reactions    Claritin [Loratadine]      Reduced urine flow    Zestril [Lisinopril] Other (See Comments)     coughing      Social History     Tobacco Use    Smoking status: Never Smoker    Smokeless tobacco: Never Used   Substance Use Topics    Alcohol use: No      Family History   Problem Relation Age of Onset    Pacemaker Sister     Pacemaker Brother     No Known Problems Other         lung disease        Prior to Admission medications    Medication Sig Start Date End Date Taking?  Authorizing Provider   ferrous sulfate (IRON 325) 325 (65 Fe) MG tablet Take 325 mg by mouth nightly   Yes Historical Provider, MD TORIBIO 2.5 MG TABS tablet TAKE 1 TABLET BY MOUTH TWICE A DAY 4/12/21  Yes Av Sanford MD   tamsulosin (FLOMAX) 0.4 MG capsule ONE EVERY NIGHT FOR YOUR PROSTATE 3/9/21  Yes Av Sanford MD   omeprazole (PRILOSEC) 20 MG delayed release capsule TAKE 1 CAPSULE BY MOUTH EVERY DAY 2/12/21  Yes Av Sanford MD   oxybutynin (DITROPAN) 5 MG tablet TAKE 1 TABLET BY MOUTH EVERY DAY 2/12/21  Yes Av Sanford MD   albuterol sulfate  (90 Base) MCG/ACT inhaler Inhale 2 puffs into the lungs every 6 hours as needed for Wheezing or Shortness of Breath 2/3/21  Yes Gaviota Guaman MD   metoprolol succinate (TOPROL XL) 25 MG extended release tablet Take 0.5 tablets by mouth daily For heart 12/21/20  Yes Av Sanford MD sinuses non-tender head nontender atraumatic  Neck: neck supple and non tender without mass, no thyromegaly or thyroid nodules, no cervical lymphadenopathy   Pulmonary/Chest: inspiratory crackles half way up bilat,  With dec bs  Cardiovascular: normal rate, normal S1 and S2, no gallops and no carotid bruits pos sys m pos hjr  Abdomen: soft, non-tender, non-distended, normal bowel sounds, no masses or organomegaly  Extremities: no cyanosis, clubbing trace pretib pedal edema  Musculoskeletal: multiple areas of ecchymosis over right ribs, right upper arm, tender over lumbar area, gentle raise legs nontender, boots on  Neurologic: coordination normal and speech normal, moves all 4 extremities      Data Review:     Recent Results (from the past 24 hour(s))   Basic Metabolic Panel w/ Reflex to MG    Collection Time: 05/10/21  5:53 AM   Result Value Ref Range    Sodium 138 136 - 145 mmol/L    Potassium reflex Magnesium 4.0 3.5 - 5.1 mmol/L    Chloride 95 (L) 99 - 110 mmol/L    CO2 31 21 - 32 mmol/L    Anion Gap 12 3 - 16    Glucose 128 (H) 70 - 99 mg/dL    BUN 27 (H) 7 - 20 mg/dL    CREATININE 0.9 0.8 - 1.3 mg/dL    GFR Non-African American >60 >60    GFR African American >60 >60    Calcium 9.1 8.3 - 10.6 mg/dL   Hepatic function panel    Collection Time: 05/10/21  5:53 AM   Result Value Ref Range    Total Protein 6.0 (L) 6.4 - 8.2 g/dL    Albumin 3.2 (L) 3.4 - 5.0 g/dL    Alkaline Phosphatase 115 40 - 129 U/L     (H) 10 - 40 U/L    AST 30 15 - 37 U/L    Total Bilirubin 1.3 (H) 0.0 - 1.0 mg/dL    Bilirubin, Direct 0.5 (H) 0.0 - 0.3 mg/dL    Bilirubin, Indirect 0.8 0.0 - 1.0 mg/dL   CBC Auto Differential    Collection Time: 05/10/21  5:53 AM   Result Value Ref Range    WBC 13.8 (H) 4.0 - 11.0 K/uL    RBC 5.35 4.20 - 5.90 M/uL    Hemoglobin 13.1 (L) 13.5 - 17.5 g/dL    Hematocrit 41.1 40.5 - 52.5 %    MCV 76.9 (L) 80.0 - 100.0 fL    MCH 24.5 (L) 26.0 - 34.0 pg    MCHC 31.8 31.0 - 36.0 g/dL    RDW 28.7 (H) 12.4 - 15.4 %    Platelets 286 662 - 106 K/uL    MPV 8.7 5.0 - 10.5 fL    Neutrophils % 82.2 %    Lymphocytes % 6.5 %    Monocytes % 10.0 %    Eosinophils % 1.1 %    Basophils % 0.2 %    Neutrophils Absolute 11.4 (H) 1.7 - 7.7 K/uL    Lymphocytes Absolute 0.9 (L) 1.0 - 5.1 K/uL    Monocytes Absolute 1.4 (H) 0.0 - 1.3 K/uL    Eosinophils Absolute 0.2 0.0 - 0.6 K/uL    Basophils Absolute 0.0 0.0 - 0.2 K/uL   Ammonia    Collection Time: 05/10/21  3:14 PM   Result Value Ref Range    Ammonia 25 16 - 60 umol/L   Urinalysis Reflex to Culture    Collection Time: 05/10/21  4:00 PM    Specimen: Urine, clean catch   Result Value Ref Range    Color, UA YELLOW Straw/Yellow    Clarity, UA Clear Clear    Glucose, Ur Negative Negative mg/dL    Bilirubin Urine Negative Negative    Ketones, Urine TRACE (A) Negative mg/dL    Specific Gravity, UA 1.013 1.005 - 1.030    Blood, Urine MODERATE (A) Negative    pH, UA 6.5 5.0 - 8.0    Protein,  (A) Negative mg/dL    Urobilinogen, Urine 0.2 <2.0 E.U./dL    Nitrite, Urine Negative Negative    Leukocyte Esterase, Urine Negative Negative    Microscopic Examination YES     Urine Type NotGiven     Urine Reflex to Culture Not Indicated    Microscopic Urinalysis    Collection Time: 05/10/21  4:00 PM   Result Value Ref Range    Hyaline Casts, UA 2 0 - 8 /LPF    WBC, UA 2 0 - 5 /HPF    RBC, UA 5 (H) 0 - 4 /HPF    Epithelial Cells, UA 1 0 - 5 /HPF      Ct Head Wo Contrast    Result Date: 5/5/2021  No acute intracranial abnormality. Diffuse atrophic changes with findings suggesting chronic microvascular ischemia     Ct Abdomen Pelvis W Iv Contrast Additional Contrast? None    Result Date: 5/5/2021  1. Acute appearing fracture seen at the L4 level with about 3 mm of retropulsion into the central canal. 2. Ground-glass opacity seen at the lung bases could be secondary to an infectious or inflammatory process. 3. Partially visualized right greater than left pleural fluid.  4. Hyperdense 2.8 x 4.2 cm area arising from the superior pole of the right kidney, increased in size since 2017. Recommend further evaluation with ultrasound on a nonemergent basis. 5. Bladder moderately distended with urine. Xr Chest Portable    Result Date: 5/5/2021  Multifocal airspace disease, consistent with pneumonia. There is mild improvement since 11/20/2020. Small bilateral pleural effusions. Stable cardiomegaly. Ct Lumbar Spine Trauma Reconstruction    Result Date: 5/5/2021  1. Acute appearing fracture seen at the L4 level with about 3 mm of retropulsion into the central canal. 2. Ground-glass opacity seen at the lung bases could be secondary to an infectious or inflammatory process. 3. Partially visualized right greater than left pleural fluid. 4. Hyperdense 2.8 x 4.2 cm area arising from the superior pole of the right kidney, increased in size since 2017. Recommend further evaluation with ultrasound on a nonemergent basis. 5. Bladder moderately distended with urine.              Assessment:     Principal Problem:    Closed compression fracture of L4 lumbar vertebra, initial encounter ([facility] [address]  Plan: new injury superimposed on preexisting djd, not a surgical candidate now with clearance to mobilize from neurosurg  Narcotics on hold to see if mse clears  Active Problems:    Essential hypertension  Plan: a little higher, adjust as needed    Pulmonary infiltrates  Plan: likely pneumonia and chf superimposed possibly aspiration cont current abx and speech eval/altering diet  Recheck cxr    Chronic atrial fibrillation (HCC)  Plan: chronic on anticoag, maybe eliquis should be stopped    Pleural effusion, bilateral  Plan: likely part of acute on chronic diast failure, follow cxr etc    Lactic acidosis  Plan: improving already, cont to follow    Acute retention of urine  Plan: with known bph and luts, keep kauffman to gravity    Elevated brain natriuretic peptide (BNP) level  Plan: baseline bnp [id number], big jump likely due to chf, dc ivfs resume lasix    Renal mass, right  Plan: not being actively terrell, will need to discuss with family but try to get through acute illness first    Abnormal LFTs  Plan: huge bump in transaminases, ct imaging no obstruction, likely from passive congestion, check viral hep #s and follow, already improving should continue to improve with diuresis    Elevated troponin  Plan: with prior history cad, asymptomatic, not very high, unchanged from baseline    Hyponatremia  Plan: already improving, suspect secondary to his chf    Neutrophilic leukocytosis  Plan: improving likely responding to treatment, ? Sepsis  Also prednisone keeping wbc up as well          Ashely Dow MD

## 2021-05-10 NOTE — CONSULTS
Neurology Consult Note  Reason for Consult: change in mental status, suddenly non verbal and more confused, narcotics or CNS event    Chief complaint: unable to obtain from the patient at this time due to mental status    Dr Priscilla Bacon MD asked me to see Sarita Swift in consultation for evaluation of change in mental status, suddenly non verbal and more confused, narcotics or CNS event    History of Present Illness:  Sarita Swift is a 80 y.o. male who presents with back pain. I obtained my information via chart review and discussion w/ the patient's RN. The patient is unable to provide any information at this time. The patient has chronic back pain. He apparently sustained a fall following an epidural steroid injection and had been complaining of worsening back pain and trouble w/ ambulation after a subsequent fall. BLE strength was worse. Urine output was less. He apparently was confused and hallucinating at one point her family. He eventually came to the ED 5/5 primarily complaining of back pain. CT head was w/out any acute findings. Treatment was initiated for pneumonia. He was noted to have a possible acute L4 fracture on non dedicated imaging. Neurosurgery was involved and recommended conservative/sympatomtic management. WBC, BNP, and LFTs were elevated. Initial Na was 126. He has been receiving pain medications (morphine, oxycodone) frequently over the last few days. His mental status has been fluctuating. He has been complaining of a lot of pain. He does have a pacemaker that apparently is not MRI compatible. Neurology was consulted due to him being less responsive today though RN suggests that may not be much different than previous days(?).      Medical History:  Past Medical History:   Diagnosis Date    Acute diastolic CHF (congestive heart failure) ([facility] 10/23/2020    Arthritis     Atrial fibrillation (HCC)     BPH with obstruction/lower urinary tract symptoms     nocturia 3-4 x per night    Chronic atrial fibrillation (HCC)     CKD (chronic kidney disease) 2017    level 3    Coronary arteriosclerosis 2009    2 stents/ Dr Breanne Baez in [facility] GERD (gastroesophageal reflux disease)     History of blood transfusion     1945   During deployment    HTN (hypertension) 1990    Left hip pain     schrapnel in hip and intestines/chronic pains left hip impairs walking    Pacemaker 2015    Pulmonary fibrosis ([address] 08/2017    mild amount seen on chest ct    Pulmonary nodule, left 2017    noncalcified 7mm low risk repeat 8/2018 if patient willing    Renal atrophy, right 2017    Renal cyst, right 08/2017    not clearly simple cyst    SOB (shortness of breath) 2017    terrell normal pft and ct chest ? smll amount of pulmonary fibrosis     Past Surgical History:   Procedure Laterality Date    ABDOMEN SURGERY      [facility]  2009    awoke with indigestion    EYE SURGERY Bilateral     cataract    INGUINAL HERNIA REPAIR Right 07/2016    PACEMAKER INSERTION  2014    PACEMAKER PLACEMENT      PAIN MANAGEMENT PROCEDURE Bilateral 11/30/2020    BILATERAL L4 TRANSFORAMINAL EPIDURAL STEROID INJECTION WITH FLUOROSCOPY performed by Fermin Bear MD at [address] [place] [address] Said    jelly as ww II vet, part of small intestine moved   [id number] [facility] Meds:   acetaminophen  650 mg Oral BID    tamsulosin  0.8 mg Oral Nightly    metoprolol succinate  25 mg Oral Daily    enoxaparin  30 mg Subcutaneous Daily    lidocaine  1 patch Transdermal Daily    polyethylene glycol  17 g Oral Daily    Vitamin D  2,000 Units Oral Daily    ferrous sulfate  324 mg Oral Nightly    finasteride  5 mg Oral Daily    fluticasone  2 spray Each Nostril Daily    pantoprazole  40 mg Oral QAM AC    predniSONE  10 mg Oral Daily    sodium chloride flush  5-40 mL Intravenous 2 times per day    cefepime  2,000 mg Intravenous Q12H     Medications Prior to Admission:   ferrous sulfate (IRON 325) 325 (65 Fe) MG tablet, Take 325 mg by mouth nightly  ELIQUIS 2.5 MG TABS tablet, TAKE 1 TABLET BY MOUTH TWICE A DAY  tamsulosin (FLOMAX) 0.4 MG capsule, ONE EVERY NIGHT FOR YOUR PROSTATE  omeprazole (PRILOSEC) 20 MG delayed release capsule, TAKE 1 CAPSULE BY MOUTH EVERY DAY  oxybutynin (DITROPAN) 5 MG tablet, TAKE 1 TABLET BY MOUTH EVERY DAY  albuterol sulfate  (90 Base) MCG/ACT inhaler, Inhale 2 puffs into the lungs every 6 hours as needed for Wheezing or Shortness of Breath  predniSONE (DELTASONE) 10 MG tablet, TAKE 1 TABLET BY MOUTH EVERY DAY  metoprolol succinate (TOPROL XL) 25 MG extended release tablet, Take 0.5 tablets by mouth daily For heart  atorvastatin (LIPITOR) 40 MG tablet, Take 1 tablet by mouth daily (Patient taking differently: Take 40 mg by mouth nightly )  finasteride (PROSCAR) 5 MG tablet, TAKE 1 TABLET BY MOUTH EVERY DAY  furosemide (LASIX) 40 MG tablet, One po qam for weight over 169lbs  diclofenac sodium (VOLTAREN) 1 % GEL, Apply 4 g topically 3 times daily  fluticasone (FLONASE) 50 MCG/ACT nasal spray, SPRAY 2 SPRAY INTO EACH NOSTRIL EVERY DAY  aspirin 81 MG tablet, Take 81 mg by mouth daily  Cholecalciferol (VITAMIN D3) 2000 UNITS CAPS, Take 2,000 Units by mouth daily  calcium carbonate (OSCAL) 500 MG TABS tablet, Take 600 mg by mouth daily caltrate 600-D3 one daily  Multiple Vitamins-Minerals (CENTRUM SILVER ADULT 50+) TABS, Take 1 tablet by mouth daily    Allergies   Allergen Reactions    Claritin [Loratadine]      Reduced urine flow    Zestril [Lisinopril] Other (See Comments)     coughing     Family History   Problem Relation Age of Onset    Pacemaker Sister     Pacemaker Brother     No Known Problems Other         lung disease     Social History     Tobacco Use   Smoking Status Never Smoker   Smokeless Tobacco Never Used     Social History     Substance and Sexual Activity   Drug Use Not Currently     Social History     Substance and Sexual Activity   Alcohol Use No     ROS: afebrile. Chart reviewed. Unable to obtain information from the patient at this time. Exam:  Blood pressure (!) 158/85, pulse 70, temperature 97.6 °F (36.4 °C), temperature source Axillary, resp. rate 16, height 5' 5\" (1.651 m), weight 157 lb 10.1 oz (71.5 kg), SpO2 99 %. Constitutional    Vital signs: BP, HR, and RR reviewed   General drowsy though able to be briefly aroused. Eyes: unable to visualize the fundi  Cardiovascular: no peripheral edema. Psychiatric: no psychotic behavior noted. Neurologic  Mental status:   orientation person. [place] other orientation questions due to mental status. General fund of knowledge augustine due to encephalopathy. Memory augustine due to encephalopathy. Attention poor   Language augustine due to encephalopathy. Comprehension follows a couple of simple commands. Cranial nerves:   CN2: no obvious blink to threat though does react when brushing eyelashes. CN 3,4,6: no dysconjugate gaze. No forced gaze deviation or preference. CN5: facial sensation augustine due to encephalopathy. CN7: maybe a bit of flattening of R NLF. Dysarthric. CN8: hearing grossly intact  CN9: palate elevation augustine due to encephalopathy. CN11: trap strength augustine due to encephalopathy. CN12: tongue midline with protrusion  Strength: strong hand grasp to command bilaterally. Otherwise difficult to assess. Deep tendon reflexes: deferred at this time. Sensory: does grimace and react to L trapezius pressure and noxious stimulus when applied to all 4 limbs. Cerebellar/coordination: finger nose finger augustine due to encephalopathy. Tone: no increased tone or rigidity. Gait: deferred at this time given mental status.       Labs  Glucose 128  Na 138  K 4.0  BUN 27  Cr 0.9      AST 30    WBC 13.8K  Hg 13.1  Platelets 629  INR 9.49    COVID negative  Blood cultures NG  UA negative    Studies  CT head w/o 5/10/21, independently reviewed  No acute intracranial abnormality. CT head w/o 5/5/21, independently reviewed  1. No acute intracranial abnormality. 2. Diffuse atrophic changes with findings suggesting chronic microvascular   ischemia     CT abdomen/pelvis 5/5/21  1. Acute appearing fracture seen at the L4 level with about 3 mm of   retropulsion into the central canal.   2. Ground-glass opacity seen at the lung bases could be secondary to an   infectious or inflammatory process. 3. Partially visualized right greater than left pleural fluid. 4. Hyperdense 2.8 x 4.2 cm area arising from the superior pole of the right   kidney, increased in size since 2017.  Recommend further evaluation with   ultrasound on a nonemergent basis. 5. Bladder moderately distended with urine. Impression  1. Encephalopathy/delirium, likely toxic/metabolic primarily related to pain medications. Infection may be a factor. Follow up CT head done this afternoon is w/out any acute findings. 2.  Acute on chronic back pain w/ L4 fracture. 3.  Pneumonia. 4.  Atrial fibrillation. 5.  Elevated LFTs, trending down. Recommendations  1. EEG, ammonia. 2.  Could consider reducing cefepime or an alternative given possible neurotoxic effect. 3.  Pain medication in moderation. 4.  Will follow.      Jane Ro NP  [address] [place] [address] [id number] Neurology    A copy of this note was provided for Dr James Linn MD

## 2021-05-10 NOTE — PROGRESS NOTES
PALLIATIVE MEDICINE PROGRESS NOTE     Patient name:Ankit Negro    TQR:[id number] :[id number]  Room/Bed:V0E-9257/5268-01    LOS: 5 days        ASSESSMENT/RECOMMENDATIONS     80 y.o. male with back pain and debility        Symptom Management:  1. Back pain- pt has chronic back pain for which he gets epidural injs now with acute fracture of L4  2. Debility-  At baseline he is assist x 1 for ADLs. Pt is dependent for all ADLs at this time. 3. Urinary retention- concern for Spinal cord compression being a contributing factor  4. Goals of Care- Pt and daughter both maintain that he wants all aggressive care despite advanced age he is a FULL code and his daughter is to bring in his living will and HCPOA     Patient/Family Goals of Care :    Pt and daughter both maintain that he wants all aggressive care despite advanced age he is a FULL code and his daughter is to bring in his living will and HCPOA    5/10  Pt today has had a mental status change since admission and is making no attempt to speak or make eye contact. Bedside RN reports Dr Caruso is aware. I also sent a message to Dr Caruso. Called placed to pts daughter to again discuss code status left voicemail.        Disposition/Discharge Plan:   pending     Advance Directives:  · Surrogate Decision Maker:Petra-daughter  · Code status:  Full Code     Case discussed with: patient, floor RN, Daughter- Kashif Dale  Thank you for allowing us to participate in the care of this patient. SUBJECTIVE     Chief Complaint: AMS    Last 24 hours:   Pt is non verbal at present and not making eye contact this is a decline from last week. ROS:  Review of Systems - History obtained from chart review     Patient unable to complete full ROS due to current cognitive status.   Information that is obtained from nursing and chart          OBJECTIVE   BP (!) 172/94   Pulse 75   Temp 97.7 °F (36.5 °C) (Axillary)   Resp 14   Ht 5' 5\" (1.651 m)   Wt 157 lb 10.1 oz (71.5 kg)   SpO2 98%   BMI 26.23 kg/m²   I/O last 3 completed shifts:   In: 120 [P.O.:120]  Out: 1400 [Urine:1400]  I/O this shift:  In: -   Out: 450 [Urine:450]      Physical Examination: General appearance - chronically ill appearing  Mental status - non-verbal  Neck - supple, no significant adenopathy  Chest - no tachypnea, retractions or cyanosis  Abdomen - soft, nontender, nondistended, no masses or organomegaly  Neurological - no movement to command pt does track with eyes  Musculoskeletal - no joint tenderness, deformity or swelling, wincing with movement         Signed By: Electronically signed by KAYCE Westbrook CNP on 5/10/2021 at 12:29 PM   Palliative Medicine   [phone]    May 10, 2021

## 2021-05-11 NOTE — PROGRESS NOTES
Physical Therapy  Attempt  Pt is minimally verbal.  Grimaces in pain with any position changes. Unable to follow commands for mobility testing. Will continue to follow.    Electronically signed by Darlyn Cintron, PT [id number] on 5/11/2021 at 10:51 AM

## 2021-05-11 NOTE — PROGRESS NOTES
Progress Note    Updates  No acute events overnight. No real improvement clinically.       Past Medical History:   Diagnosis Date    Arthritis     Atrial fibrillation ([address]     CKD (chronic kidney disease) 2017    History of blood transfusion     HTN (hypertension) 1990    Pulmonary nodule, left 2017    Renal atrophy, right 2017     Past Surgical History:   Procedure Laterality Date    ABDOMEN SURGERY      Scrapnel removed in [address]  2009    awoke with indigestion    EYE SURGERY Bilateral     cataract    INGUINAL HERNIA REPAIR Right 07/2016    PACEMAKER INSERTION  2014    PACEMAKER PLACEMENT      PAIN MANAGEMENT PROCEDURE Bilateral 11/30/2020    BILATERAL L4 TRANSFORAMINAL EPIDURAL STEROID INJECTION WITH FLUOROSCOPY performed by Jah Nam MD at [facility] Said    [facility] as ww II vet, part of small intestine moved   [id number] [facility] Medications:     acetaminophen (TYLENOL) tablet 650 mg, 650 mg, Oral, BID, James Linn MD, 650 mg at 05/10/21 2104    tamsulosin (FLOMAX) capsule 0.8 mg, 0.8 mg, Oral, Nightly, James Linn MD, 0.8 mg at 05/10/21 2104    metoprolol succinate (TOPROL XL) extended release tablet 25 mg, 25 mg, Oral, Daily, James Linn MD, 25 mg at 05/10/21 0952    dextrose 5 % and 0.45 % sodium chloride infusion, , Intravenous, Continuous, James Linn MD, Last Rate: 75 mL/hr at 05/10/21 1509, New Bag at 05/10/21 1509    enoxaparin (LOVENOX) injection 30 mg, 30 mg, Subcutaneous, Daily, Giovanny Alarcon MD, 30 mg at 05/10/21 0953    lidocaine 4 % external patch 1 patch, 1 patch, Transdermal, Daily, James Linn MD, 1 patch at 05/10/21 0954    polyethylene glycol (GLYCOLAX) packet 17 g, 17 g, Oral, Daily, James Linn MD, 17 g at 05/10/21 0953    albuterol (PROVENTIL) nebulizer solution 2.5 mg, 2.5 mg, Nebulization, Q4H PRN, Dot MD Christian Garvin peripheral edema. Psychiatric: no psychotic behavior noted. Neurologic  Mental status:   orientation seems to say his first name a couple of times when repeatedly asked. General fund of knowledge augustine due to encephalopathy. Memory augustine due to encephalopathy. Attention poor              Language augustine due to encephalopathy. Comprehension only able to follow a command or two today. Cranial nerves:   CN2: no obvious blink to threat though does react when brushing eyelashes. CN 3,4,6: no dysconjugate gaze. No forced gaze deviation or preference. CN7: no georgi facial weakness. Dysarthric. CN8: hard of hearing. Has hearing aids. CN12: tongue midline with protrusion  Strength: brief hand grasp w/ R hand though inconsistent. Otherwise difficult to assess given mental status. Sensory: does grimace and react to bilateral trapezius pressure and noxious stimulus when applied to all 4 limbs. Cerebellar/coordination: finger nose finger augustine due to encephalopathy. Tone: no increased tone or rigidity. Gait: deferred at this time given mental status. ROS - afebrile. Unable to obtain from the patient at this time due to mental status. Labs  Glucose 147  Na 135  K 4.3  BUN 31  Cr 0.8      AST 27  Ammonia 25    WBC 10.7K  Hg 13.4  Platelets 468    COVID negative  Blood cultures NG  UA negative    Studies  CT head w/o 5/10/21  No acute intracranial abnormality.       CT head w/o 5/5/21  1. No acute intracranial abnormality. 2. Diffuse atrophic changes with findings suggesting chronic microvascular   ischemia      CT abdomen/pelvis 5/5/21  1. Acute appearing fracture seen at the L4 level with about 3 mm of   retropulsion into the central canal.   2. Ground-glass opacity seen at the lung bases could be secondary to an   infectious or inflammatory process. 3. Partially visualized right greater than left pleural fluid.    4. Hyperdense 2.8

## 2021-05-11 NOTE — PROGRESS NOTES
[facility]   Speech Therapy  Daily Dysphagia Treatment Note        Sandra Espinosa  AGE: 80 y.o.    GENDER: male  : 1925  [id number]  EPISODE DATE:  2021  Patient Active Problem List   Diagnosis    Pacemaker    Essential hypertension    Coronary arteriosclerosis    BPH with obstruction/lower urinary tract symptoms    GERD (gastroesophageal reflux disease)    Pulmonary fibrosis (HCC)    SOB (shortness of breath)    Renal cyst, right    Interstitial lung disease ([address]    Solitary lung nodule    Arthritis of both knees    Paroxysmal atrial fibrillation (HCC)    Acute pulmonary edema (HCC)    Pulmonary infiltrates    Chronic atrial fibrillation (HCC)    Severe mitral regurgitation    Acute diastolic CHF (congestive heart failure) ([address]    Chronic kidney disease, stage 3b    Debility    Osteoarthritis of lumbar spine with myelopathy    Acute on chronic diastolic CHF (congestive heart failure) (HCC)    Hematemesis    Hemoptysis    Acute respiratory failure with hypoxia (HCC)    Hyperkalemia    Atrial fibrillation, controlled ([address]    Acute respiratory failure with hypoxia and hypercapnia (HCC)    Pleural effusion, bilateral    Nonrheumatic mitral valve regurgitation    Dysphagia    Closed compression fracture of L4 lumbar vertebra, initial encounter ([address]    Lactic acidosis    Acute retention of urine    Elevated brain natriuretic peptide (BNP) level    Renal mass, right    Abnormal LFTs    Elevated troponin    Hyponatremia    Neutrophilic leukocytosis     Allergies   Allergen Reactions    Claritin [Loratadine]      Reduced urine flow    Zestril [Lisinopril] Other (See Comments)     coughing     Treatment Diagnosis: Dysphagia     Chart review:     MD History and Physical Documentation revealed:   Medicine History and Physical  CC:back pain  HPI:89 yo male with a history of diastolic chf, ILD, and HTN and caf and chronic lbp who presented to the ER yesterday due to worsening back pain for weeks.  After his last epidural his pain got worse and he had fallen multiple times since then and hurts \"all over. \"  Reportedly last able to walk 5 days ago.  Reporting weakening in his legs and decreased uo.  Daughter reported he was hallucinating /confused.  No reports of f/c, sob, cp, palpitations etc.  This morning still hurts all over still denying and sob cp etc.  No cough.  Still hurting terribly.  Oriented x3  · Principal Problem:    Closed compression fracture of L4 lumbar vertebra, initial encounter ([facility]        · 5/5/2021 Chest XR  Impression   Multifocal airspace disease, consistent with pneumonia.  There is mild   improvement since 11/20/2020. Small bilateral pleural effusions. Stable cardiomegaly.      · CT Head: 5/5/2021  Impression   No acute intracranial abnormality. Diffuse atrophic changes with findings suggesting chronic microvascular   ischemia         Subjective:     Current diet  Minced and moist  nectar thick (mildly thick) liquids    Comments regarding tolerating Current Diet:   RN reports limited intake over past few days 2/2 varied responsiveness and high level of pain; RN reports concern with pt ability to take meds in puree this morning: pt holding PO and pocketing     Objective:     Pain   Patient Currently in Pain: yes; facial grimaces, only verbalizes 'ow,' visible pain when HOB raised    Cognitive/Behavior   Behavior/Cognition: variable alertness, does not follow commands or respond to vast majority of questions. Does say 'yes' x1 and 'ow' multiple times.       Presentations   Consistencies Administered:  Nectar - tsp; puree    Positioning   Upright in bed but decreased over time 2/2 pain level     PO Trials:  · Thin Liquids: DNT  · Nectar thick liquids tsp x2: reduced lip rounding for tsp removal; mild anterior loss; limited and delayed bolus manipulation and AP propulsion; high risk for premature spillage; prolonged oral hold; very delayed swallow initiation; weak laryngeal elevation upon palpation; progressively wet vocal quality with weak throat clear/cough attempts (nonproductive); oral residue   · Honey Thick liquids NT  · Puree x1: reduced lip rounding for tsp removal; limited and delayed bolus manipulation and AP propulsion; high risk for premature spillage; prolonged oral hold; very delayed swallow initiation; weak laryngeal elevation upon palpation; weak throat clear/cough attempts (nonproductive); oral residue with oral care completed after PO   · Soft food  NT  · Regular food NT    Dysphagia Tx:   Pt awake and alert, occasionally provides 'yes' response, and indicated receptiveness to liquid trials. Follows simple directions minimally. Does turn head and track to verbal stimulation. Eyes remain closed. PO trials: limited to nectar tsp x2 and puree x1 due to poor toleration, increasing wet vocal quality, and oral residue requiring oral care after PO. Pt does not appear safe for PO at this time 2/2 decreased alertness and severity of swallow function. Tolerated oral care well; no swallow initiated. Education/compensatory strategies:  Pt unable to participate with education. Total feed. No family present. RN educated after session completion re: NPO recommendation    Goals:   Dysphagia Goals: The patient will tolerate recommended diet without observed clinical signs of aspiration,   Ongoing; recommend NPO with daily reassess  The patient/caregiver will demonstrate understanding of compensatory strategies for improved swallowing safety;   Ongoing    The patient will tolerate thin liquids without signs and symptoms of aspiration 10/10 via cup.hold  Pt will toelrate therapy trials of soft/bite size food consistencies 10/10 without overt clinlical s/s of aspiration  hold    Assessment:   Impressions:   Dysphagia Diagnosis:   Severe oral and severe pharyngeal phase dysphagia  Pt was alert and receptive to PO, responsive to verbal stimuli but minimally verbal and unable to follow commands. PO trials limited to nectar tsp x2 and puree x1, then discontinued 2/2 severity and increasing s/s of aspiration. Dysphagia characterized by reduced lip rounding for tsp removal; limited and delayed bolus manipulation and AP propulsion; high risk for premature spillage; prolonged oral hold; very delayed and at time absent swallow initiation; weak laryngeal elevation upon palpation; progressively wet vocal quality with weak throat clear/cough attempts (nonproductive); oral residue. Chart review reflects poor intake and poor responsiveness x3 days; RN reports difficulty tolerating meds in puree this morning. Recommend NPO with frequent oral care 2/2 high aspiration risk with all PO. ST will follow for re-assessment and additional recommendations. Diet Recommendations:  NPO  meds via IV/rectal    Strategies:   Oral care    Education:  Patient Education Response: No evidence of learning    Prognosis:   guarded    Plan:     Continue Dysphagia Therapy: yes  Interventions: Therapeutic Interventions: Diet tolerance monitoring, Patient/Family education, Therapeutic PO trials with SLP(ongoing monitor for diet upgrade and/or MBSS as mental status improves)  Duration/Frequency of therapy while on unit: Duration/Frequency of Treatment  Duration/Frequency of Treatment: 3-5 times a week santiago on acute medical floor  Discharge Instructions:   Anticipate NEED for further skilled Speech Therapy for Dysphagia at discharge    This note serves as a D/C Summary in the event that this patient is discharged prior to the next therapy session.     Coded treatment time: 10  Total treatment time: 28    Electronically signed by   Magalis Breen MS, CCC-SLP #[id number]  Speech Language Pathologist   on 5/11/2021 at [id number] AM

## 2021-05-11 NOTE — PROGRESS NOTES
PALLIATIVE MEDICINE PROGRESS NOTE     Patient name:Ankit Barrera    A:[id number] :1925  Room/Bed:W0I-3838/5268-01    LOS: 6 days        ASSESSMENT/RECOMMENDATIONS     89 y. o. male with back pain and debility        Symptom Management:  1. Back pain- pt has chronic back pain for which he gets epidural injs now with acute fracture of L4  2. Debility-  At baseline he is assist x 1 for ADLs. Pt is dependent for all ADLs at this time. 3. Urinary retention- concern for Spinal cord compression being a contributing factor  4. Goals of Care- Code status updated to [facility]. Daughter is to bring in his living will and HCPOA     Patient/Family Goals of Care :    Pt and daughter both maintain that he wants all aggressive care despite advanced age he is a FULL code and his daughter is to bring in his living will and HCPOA     5/10  Pt today has had a mental status change since admission and is making no attempt to speak or make eye contact. Meeting with pts daughter yesterday she was agreeable to Hospice care at [facility] and code status change. Dr Mary Anne Stone was consulted and wanted to talk to family first.       Dr Paige Babb with code status change today. Bryn Aviles daughter was ok with this yesterday will update code status to [facility]. Talked to daughter again today about Hospice consult for tomorrow possibly waiting on Dr Mary Anne Stone to OK this option. Plan would be to [facility] with Hospice support.        Disposition/Discharge Plan:   pending     Advance Directives:  · Surrogate Decision Maker:Petra-daughter  Code status:  DNRCC     Case discussed with: patient, floor RN, Daughter- Paula Stefan  Thank you for allowing us to participate in the care of this patient. SUBJECTIVE     Chief Complaint: AMS    Last 24 hours:   Pt continues to be non-verbal today with increased drowsiness    ROS:  Review of Systems -    History obtained from chart review      Patient unable to complete full ROS due to current cognitive status.   Information that is obtained from nursing and chart      OBJECTIVE   BP (!) 163/85   Pulse 72   Temp 97.4 °F (36.3 °C) (Axillary)   Resp 20   Ht 5' 5\" (1.651 m)   Wt 161 lb 13.1 oz (73.4 kg)   SpO2 96%   BMI 26.93 kg/m²   I/O last 3 completed shifts: In: 1350 [I.V.:1000; IV Piggyback:350]  Out: 2942 [Urine:1550]  No intake/output data recorded.       Physical Examination:   General appearance - chronically ill appearing  Mental status - non-verbal  Neck - supple, no significant adenopathy  Chest - no tachypnea, retractions or cyanosis  Abdomen - soft, nontender, nondistended, no masses or organomegaly  Neurological - no movement to command pt does track with eyes  Musculoskeletal - no joint tenderness, deformity or swelling, wincing with movement       Signed By: Electronically signed by KAYCE Mathis CNP on 5/11/2021 at 10:44 AM   Palliative Medicine   [phone]    May 11, 2021

## 2021-05-11 NOTE — PROGRESS NOTES
Occupational Therapy  Pt approached for therapy. Pt in bed, eyes open, yet lethargic, not acknowledging therapy. Speech therapy arrived to room for attempt. Will re-attempt therapy later as schedule permits. 1335: second attempt. Family present. Pt eyes closed, not opening to sound of name; pt unable to participate at this time. Will continue to follow, attempt as able.   [place] KATZ/JAVIER,806

## 2021-05-11 NOTE — PROGRESS NOTES
Preliminary EEG findings discussed w/ reading physician. Study showed generalized periodic discharges suspicious for ictal pattern concerning for status epilepticus. Will give a dose of ativan, load w/ Keppra, and begin maintenance dose. See orders. Monitor respiratory status. If the patient does not improve clinically, would need to consider transfer for cvEEG.       Amalia Bautista NP  [address] [place] [address] [phone] Neurology

## 2021-05-11 NOTE — PLAN OF CARE
Problem: Falls - Risk of:  Goal: Will remain free from falls  Description: Will remain free from falls  5/10/2021 2155 by Florencio Mcburney, RN  Outcome: Ongoing  5/10/2021 1515 by Bill eKnnedy RN  Outcome: Ongoing  Goal: Absence of physical injury  Description: Absence of physical injury  5/10/2021 2155 by Florencio Mcburney, RN  Outcome: Ongoing  5/10/2021 1515 by Bill Kennedy RN  Outcome: Ongoing     Problem: OXYGENATION/RESPIRATORY FUNCTION  Goal: Patient will maintain patent airway  5/10/2021 2155 by Florencio Mcburney, RN  Outcome: Ongoing  5/10/2021 1515 by Bill Kennedy RN  Outcome: Ongoing  Goal: Patient will achieve/maintain normal respiratory rate/effort  Description: Respiratory rate and effort will be within normal limits for the patient  5/10/2021 2155 by Florencio Mcburney, RN  Outcome: Ongoing  5/10/2021 1515 by Bill Kennedy RN  Outcome: Ongoing     Problem: HEMODYNAMIC STATUS  Goal: Patient has stable vital signs and fluid balance  5/10/2021 2155 by Florencio Mcburney, RN  Outcome: Ongoing  5/10/2021 1515 by Bill Kennedy RN  Outcome: Ongoing     Problem: FLUID AND ELECTROLYTE IMBALANCE  Goal: Fluid and electrolyte balance are achieved/maintained  5/10/2021 2155 by Florencio Mcburney, RN  Outcome: Ongoing  5/10/2021 1515 by Bill Kennedy RN  Outcome: Ongoing     Problem: ACTIVITY INTOLERANCE/IMPAIRED MOBILITY  Goal: Mobility/activity is maintained at optimum level for patient  5/10/2021 2155 by Florencio Mcburney, RN  Outcome: Ongoing  5/10/2021 1515 by Bill Kennedy RN  Outcome: Ongoing     Problem: Infection:  Goal: Will remain free from infection  Description: Will remain free from infection  5/10/2021 2155 by Florencio Mcburney, RN  Outcome: Ongoing  5/10/2021 1515 by Bill Kennedy RN  Outcome: Ongoing     Problem: Safety:  Goal: Free from accidental physical injury  Description: Free from accidental physical injury  5/10/2021 2155 by Florencio Mcburney, RN  Outcome: Ongoing  5/10/2021 1515 by Bill Kennedy, RN  Outcome: Ongoing  Goal: Free from intentional symptoms  Description: Will show no infection signs and symptoms  5/10/2021 2155 by Camelia Gonzalez RN  Outcome: Ongoing  5/10/2021 1515 by Artur Bowser RN  Outcome: Ongoing  Goal: Absence of new skin breakdown  Description: Absence of new skin breakdown  5/10/2021 2155 by Camelia Gonzalez RN  Outcome: Ongoing  5/10/2021 1515 by Artur Bowser RN  Outcome: Ongoing

## 2021-05-11 NOTE — PROGRESS NOTES
Internal Medicine History and Physical  CC:back pain  HPI:89 yo male with a history of diastolic chf, ILD, and HTN and caf and chronic lbp who presented to the ER yesterday due to worsening back pain for weeks. After his last epidural his pain got worse and he had fallen multiple times since then and hurts \"all over. \"  Reportedly last able to walk 5 days ago. Reporting weakening in his legs and decreased uo. Daughter reported he was hallucinating /confused. No reports of f/c, sob, cp, palpitations etc.  This morning still hurts all over still denying and sob cp etc.  No cough. Still hurting terribly. Oriented x3    Has been nonverbal since Sunday, now Tuesday still nonverbal, no eye contact . Cannot get MRI head due to pacer. Today less interactive than he was yesterday. Principal Problem:    Closed compression fracture of L4 lumbar vertebra, initial encounter [facility])  Active Problems:    Essential hypertension    Pulmonary infiltrates    Chronic atrial fibrillation (HCC)    Pleural effusion, bilateral    Lactic acidosis    Acute retention of urine    Elevated brain natriuretic peptide (BNP) level    Renal mass, right    Abnormal LFTs    Elevated troponin    Hyponatremia    Neutrophilic leukocytosis  Resolved Problems:    * No resolved hospital problems.  *    Past Medical History:   Diagnosis Date    Acute diastolic CHF (congestive heart failure) ([facility] [place] [address] 10/23/2020    Arthritis     Atrial fibrillation (HCC)     BPH with obstruction/lower urinary tract symptoms     nocturia 3-4 x per night    Chronic atrial fibrillation (HCC)     CKD (chronic kidney disease) 2017    level 3    Coronary arteriosclerosis 2009    2 stents/ [facility] in [place]    GERD (gastroesophageal reflux disease)     History of blood transfusion     1945   During deployment    HTN (hypertension) 1990    Left hip pain     schrapnel in hip and intestines/chronic pains left hip impairs walking    Pacemaker 2015    Pulmonary fibrosis ([address] 08/2017    mild amount seen on chest ct    Pulmonary nodule, left 2017    noncalcified 7mm low risk repeat 8/2018 if patient willing    Renal atrophy, right 2017    Renal cyst, right 08/2017    not clearly simple cyst    SOB (shortness of breath) 2017    terrell normal pft and ct chest ? smll amount of pulmonary fibrosis      Past Surgical History:   Procedure Laterality Date    ABDOMEN SURGERY      Scrapnel removed in [facility]  2009    awoke with indigestion    EYE SURGERY Bilateral     cataract    INGUINAL HERNIA REPAIR Right 07/2016    PACEMAKER INSERTION  2014    PACEMAKER PLACEMENT      PAIN MANAGEMENT PROCEDURE Bilateral 11/30/2020    BILATERAL L4 TRANSFORAMINAL EPIDURAL STEROID INJECTION WITH FLUOROSCOPY performed by William Ortega MD at [facility] as  II vet, part of small intestine moved   [address] [facility]      Medications Prior to Admission: ferrous sulfate (IRON 325) 325 (65 Fe) MG tablet, Take 325 mg by mouth nightly  ELIQUIS 2.5 MG TABS tablet, TAKE 1 TABLET BY MOUTH TWICE A DAY  tamsulosin (FLOMAX) 0.4 MG capsule, ONE EVERY NIGHT FOR YOUR PROSTATE  omeprazole (PRILOSEC) 20 MG delayed release capsule, TAKE 1 CAPSULE BY MOUTH EVERY DAY  oxybutynin (DITROPAN) 5 MG tablet, TAKE 1 TABLET BY MOUTH EVERY DAY  albuterol sulfate  (90 Base) MCG/ACT inhaler, Inhale 2 puffs into the lungs every 6 hours as needed for Wheezing or Shortness of Breath  [DISCONTINUED] predniSONE (DELTASONE) 10 MG tablet, TAKE 1 TABLET BY MOUTH EVERY DAY  metoprolol succinate (TOPROL XL) 25 MG extended release tablet, Take 0.5 tablets by mouth daily For heart  atorvastatin (LIPITOR) 40 MG tablet, Take 1 tablet by mouth daily (Patient taking differently: Take 40 mg by mouth nightly )  finasteride (PROSCAR) 5 MG tablet, TAKE 1 TABLET BY MOUTH EVERY DAY  furosemide (LASIX) 40 MG tablet, One po qam for weight over 169lbs  diclofenac sodium (VOLTAREN) 1 % GEL, Apply 4 g topically 3 times daily  fluticasone (FLONASE) 50 MCG/ACT nasal spray, SPRAY 2 SPRAY INTO EACH NOSTRIL EVERY DAY  aspirin 81 MG tablet, Take 81 mg by mouth daily  Cholecalciferol (VITAMIN D3) 2000 UNITS CAPS, Take 2,000 Units by mouth daily  calcium carbonate (OSCAL) 500 MG TABS tablet, Take 600 mg by mouth daily caltrate 600-D3 one daily  Multiple Vitamins-Minerals (CENTRUM SILVER ADULT 50+) TABS, Take 1 tablet by mouth daily  Allergies   Allergen Reactions    Claritin [Loratadine]      Reduced urine flow    Zestril [Lisinopril] Other (See Comments)     coughing      Social History     Tobacco Use    Smoking status: Never Smoker    Smokeless tobacco: Never Used   Substance Use Topics    Alcohol use: No      Family History   Problem Relation Age of Onset    Pacemaker Sister     Pacemaker Brother     No Known Problems Other         lung disease        Prior to Admission medications    Medication Sig Start Date End Date Taking?  Authorizing Provider   ferrous sulfate (IRON 325) 325 (65 Fe) MG tablet Take 325 mg by mouth nightly   Yes Historical Provider, MD TORIBIO 2.5 MG TABS tablet TAKE 1 TABLET BY MOUTH TWICE A DAY 4/12/21  Yes Jethro Álvarez MD   tamsulosin (FLOMAX) 0.4 MG capsule ONE EVERY NIGHT FOR YOUR PROSTATE 3/9/21  Yes Jethro Álvarez MD   omeprazole (PRILOSEC) 20 MG delayed release capsule TAKE 1 CAPSULE BY MOUTH EVERY DAY 2/12/21  Yes Jethro Álvarez MD   oxybutynin (DITROPAN) 5 MG tablet TAKE 1 TABLET BY MOUTH EVERY DAY 2/12/21  Yes Jethro Álvarez MD   albuterol sulfate  (90 Base) MCG/ACT inhaler Inhale 2 puffs into the lungs every 6 hours as needed for Wheezing or Shortness of Breath 2/3/21  Yes Bob Cornell MD   metoprolol succinate (TOPROL XL) 25 MG extended release tablet Take 0.5 tablets by mouth daily For heart 12/21/20  Yes Jethro Álvarez MD   atorvastatin (LIPITOR) 40 MG tablet Take 1 tablet by mouth daily  Patient taking differently: Take 40 mg by mouth nightly  12/21/20  Yes James Linn MD   finasteride (PROSCAR) 5 MG tablet TAKE 1 TABLET BY MOUTH EVERY DAY 12/18/20  Yes James Linn MD   furosemide (LASIX) 40 MG tablet One po qam for weight over 169lbs 11/27/20  Yes James Linn MD   diclofenac sodium (VOLTAREN) 1 % GEL Apply 4 g topically 3 times daily 11/3/20  Yes Pebbles Castillo MD   fluticasone [facility]) 50 MCG/ACT nasal spray SPRAY 2 SPRAY INTO EACH NOSTRIL EVERY DAY 10/9/20  Yes James Linn MD   aspirin 81 MG tablet Take 81 mg by mouth daily   Yes Historical Provider, MD   Cholecalciferol (VITAMIN D3) 2000 UNITS CAPS Take 2,000 Units by mouth daily   Yes Historical Provider, MD   calcium carbonate (OSCAL) 500 MG TABS tablet Take 600 mg by mouth daily caltrate 600-D3 one daily   Yes Historical Provider, MD   Multiple Vitamins-Minerals (CENTRUM SILVER ADULT 50+) TABS Take 1 tablet by mouth daily   Yes Historical Provider, MD   predniSONE (DELTASONE) 10 MG tablet TAKE 1 TABLET BY MOUTH EVERY DAY 5/10/21   James Linn MD     Review of Systems  A comprehensive review of systems was negative except for: back pain, rib pain    Objective:     Patient Vitals for the past 8 hrs:   BP Temp Temp src Pulse Resp SpO2   05/11/21 1209 (!) 175/82 97.3 °F (36.3 °C) Oral 74 14 99 %   05/11/21 0802 (!) 163/85 97.4 °F (36.3 °C) Axillary 72 20 96 %     I/O last 3 completed shifts: In: 1350 [I.V.:1000; IV Piggyback:350]  Out: 1838 [Urine:1550]  No intake/output data recorded.     VITALS:  BP (!) 175/82   Pulse 74   Temp 97.3 °F (36.3 °C) (Oral)   Resp 14   Ht 5' 5\" (1.651 m)   Wt 161 lb 13.1 oz (73.4 kg)   SpO2 99%   BMI 26.93 kg/m²   General Appearance: not oriented, no longer speaking  Skin: warm and dry, no rash or erythema  Head: normocephalic and atraumatic  Eyes: conjunctivae normal and sclera anicteric  ENT: hearing grossly ab- normal bilaterally and sinuses non-tender head nontender atraumatic  Neck: neck supple and non tender without mass, no thyromegaly or thyroid nodules, no cervical lymphadenopathy   Pulmonary/Chest: inspiratory crackles half way up bilat,  With dec bs  Cardiovascular: normal rate, normal S1 and S2, no gallops and no carotid bruits pos sys m pos hjr  Abdomen: soft, non-tender, non-distended, normal bowel sounds, no masses or organomegaly  Extremities: no cyanosis, clubbing trace pretib pedal edema  Musculoskeletal: multiple areas of ecchymosis over right ribs, right upper arm, tender over lumbar area, gentle raise legs nontender, boots on  Neurologic: occas opens eyes not awakening to sternal rub      Data Review:     Recent Results (from the past 24 hour(s))   Ammonia    Collection Time: 05/10/21  3:14 PM   Result Value Ref Range    Ammonia 25 16 - 60 umol/L   Urinalysis Reflex to Culture    Collection Time: 05/10/21  4:00 PM    Specimen: Urine, clean catch   Result Value Ref Range    Color, UA YELLOW Straw/Yellow    Clarity, UA Clear Clear    Glucose, Ur Negative Negative mg/dL    Bilirubin Urine Negative Negative    Ketones, Urine TRACE (A) Negative mg/dL    Specific Gravity, UA 1.013 1.005 - 1.030    Blood, Urine MODERATE (A) Negative    pH, UA 6.5 5.0 - 8.0    Protein,  (A) Negative mg/dL    Urobilinogen, Urine 0.2 <2.0 E.U./dL    Nitrite, Urine Negative Negative    Leukocyte Esterase, Urine Negative Negative    Microscopic Examination YES     Urine Type NotGiven     Urine Reflex to Culture Not Indicated    Microscopic Urinalysis    Collection Time: 05/10/21  4:00 PM   Result Value Ref Range    Hyaline Casts, UA 2 0 - 8 /LPF    WBC, UA 2 0 - 5 /HPF    RBC, UA 5 (H) 0 - 4 /HPF    Epithelial Cells, UA 1 0 - 5 /HPF   Basic Metabolic Panel w/ Reflex to MG    Collection Time: 05/11/21  5:33 AM   Result Value Ref Range    Sodium 135 (L) 136 - 145 mmol/L    Potassium reflex Magnesium 4.3 3.5 - 5.1 mmol/L    Chloride 99 99 - 110 mmol/L    CO2 27 21 - 32 mmol/L    Anion Gap 9 3 - 16    Glucose 147 (H) 70 - 99 mg/dL    BUN 31 (H) 7 - 20 mg/dL    CREATININE 0.8 0.8 - 1.3 mg/dL    GFR Non-African American >60 >60    GFR African American >60 >60    Calcium 9.1 8.3 - 10.6 mg/dL   Hepatic function panel    Collection Time: 05/11/21  5:33 AM   Result Value Ref Range    Total Protein 5.8 (L) 6.4 - 8.2 g/dL    Albumin 2.9 (L) 3.4 - 5.0 g/dL    Alkaline Phosphatase 103 40 - 129 U/L     (H) 10 - 40 U/L    AST 27 15 - 37 U/L    Total Bilirubin 1.2 (H) 0.0 - 1.0 mg/dL    Bilirubin, Direct 0.5 (H) 0.0 - 0.3 mg/dL    Bilirubin, Indirect 0.7 0.0 - 1.0 mg/dL   CBC Auto Differential    Collection Time: 05/11/21  5:33 AM   Result Value Ref Range    WBC 10.7 4.0 - 11.0 K/uL    RBC 5.38 4.20 - 5.90 M/uL    Hemoglobin 13.4 (L) 13.5 - 17.5 g/dL    Hematocrit 41.4 40.5 - 52.5 %    MCV 76.9 (L) 80.0 - 100.0 fL    MCH 24.8 (L) 26.0 - 34.0 pg    MCHC 32.3 31.0 - 36.0 g/dL    RDW 28.5 (H) 12.4 - 15.4 %    Platelets 619 854 - 036 K/uL    MPV 8.4 5.0 - 10.5 fL    Neutrophils % 82.3 %    Lymphocytes % 6.9 %    Monocytes % 9.6 %    Eosinophils % 1.0 %    Basophils % 0.2 %    Neutrophils Absolute 8.8 (H) 1.7 - 7.7 K/uL    Lymphocytes Absolute 0.7 (L) 1.0 - 5.1 K/uL    Monocytes Absolute 1.0 0.0 - 1.3 K/uL    Eosinophils Absolute 0.1 0.0 - 0.6 K/uL    Basophils Absolute 0.0 0.0 - 0.2 K/uL   Brain Natriuretic Peptide    Collection Time: 05/11/21  7:44 AM   Result Value Ref Range    Pro-BNP 5,630 (H) 0 - 449 pg/mL      Ct Head Wo Contrast    Result Date: 5/5/2021  No acute intracranial abnormality. Diffuse atrophic changes with findings suggesting chronic microvascular ischemia     Ct Abdomen Pelvis W Iv Contrast Additional Contrast? None    Result Date: 5/5/2021  1. Acute appearing fracture seen at the L4 level with about 3 mm of retropulsion into the central canal. 2. Ground-glass opacity seen at the lung bases could be secondary to an infectious or inflammatory process.  3. Partially visualized right greater than left pleural fluid. 4. Hyperdense 2.8 x 4.2 cm area arising from the superior pole of the right kidney, increased in size since 2017. Recommend further evaluation with ultrasound on a nonemergent basis. 5. Bladder moderately distended with urine. Xr Chest Portable    Result Date: 5/5/2021  Multifocal airspace disease, consistent with pneumonia. There is mild improvement since 11/20/2020. Small bilateral pleural effusions. Stable cardiomegaly. Ct Lumbar Spine Trauma Reconstruction    Result Date: 5/5/2021  1. Acute appearing fracture seen at the L4 level with about 3 mm of retropulsion into the central canal. 2. Ground-glass opacity seen at the lung bases could be secondary to an infectious or inflammatory process. 3. Partially visualized right greater than left pleural fluid. 4. Hyperdense 2.8 x 4.2 cm area arising from the superior pole of the right kidney, increased in size since 2017. Recommend further evaluation with ultrasound on a nonemergent basis. 5. Bladder moderately distended with urine. Assessment:     Principal Problem:    Closed compression fracture of L4 lumbar vertebra, initial encounter ([facility] [address]  Plan: new injury superimposed on preexisting djd, not a surgical candidate now with clearance to mobilize from neurosurg  Narcotics on hold to see if mse clears  Active Problems:    Essential hypertension  Plan: a little higher, Has to be NPO right now including pills    Pulmonary infiltrates  Plan: likely pneumonia and chf superimposed possibly aspiration    Repeat cxr not very good technique multifocal asd rotated, will stop cefepime since has been associated with encephalopathy.     Chronic atrial fibrillation (HCC)  Plan: will place on lovenox dvt prophy dosing    Pleural effusion, bilateral still present  Plan: observe    Acute retention of urine  Plan: with known bph and luts, keep kauffman to gravity    Elevated brain natriuretic peptide (BNP) level  Plan: baseline bnp [id number], not far from

## 2021-05-12 PROBLEM — G40.901 STATUS EPILEPTICUS (HCC): Status: ACTIVE | Noted: 2021-01-01

## 2021-05-12 NOTE — CARE COORDINATION
RN notified CM that patient needs to be transferred to [facility] per Dr Brain Wang for continuous EEG. [phone] called, & request initiated.    Electronically signed by Ronnie Everett RN Case Management [phone] on 5/12/2021 at 9:18 AM

## 2021-05-12 NOTE — CARE COORDINATION
Notified [facility]([phone]) with [facility] of patient's transfer.    KAYA Patel, OSCARW, Social Work/Case Management   [phone]  Electronically signed by KAYA Patel, ANNA on 5/12/2021 at 9:35 AM

## 2021-05-12 NOTE — PROCEDURES
[address] [place] [address] 16                          ELECTROENCEPHALOGRAM REPORT    PATIENT NAME: Bashir Valle                   :        1925  MED REC NO:   [id number]                          ROOM:       5268  ACCOUNT NO:   [de-identified]                           ADMIT DATE: 2021  PROVIDER:     Davin Siddiqui DO    DATE OF EE2021    REFERRING PROVIDER:  Joselin Boyce NP    REASON FOR STUDY:  Encephalopathy. BRIEF HISTORY AND NEUROLOGIC FINDINGS:  The patient is a 58-year-old  male being evaluated for reported encephalopathy. MEDICATIONS:  The patient's centrally acting medications listed include  Phenergan and Flomax. EEG FINDINGS:  This is a 20-channel digital EEG performed utilizing  bipolar and referential montages. No clear wakefulness or sleep were  obtained during the recording. This EEG consisted of generalized  periodic discharges of varying frequencies which often appeared to be an  ictal pattern. The frequency of the generalized periodic discharges  again varied and occasionally more than 3 Hz. There was a background  theta and delta frequencies at other times during the recording as well. Significant myogenic artifact was present during the recording which was  somewhat more prominent in the right hemisphere as compared to the left  and often obscured the underlying background rhythms in that region. No  discernible wakefulness or sleep was present during the recording. Photic stimulation was performed at various flash frequencies and did  not evoke any significant posterior driving response. Hyperventilation  exercise was not performed due to the patient's age and clinical  history. A 1-channel EKG rhythm strip was reviewed and showed no significant  cardiac dysrhythmias with the exception of some occasional ectopic beats  and movement artifact.     EEG DIAGNOSIS:  This EEG is abnormal due to the presence of generalized  periodic discharges, often monomorphic with varying frequencies  concerning for an ictal process. Moderately severe background slowing  and disorganization is also noted. CLINICAL INTERPRETATION:  This EEG is highly concerning for an ictal  process and may reflect nonconvulsive status epilepticus. There is also  evidence of a moderately severe encephalopathy which may be seen in  multiple settings including toxic, metabolic, or degenerative conditions  as well as with medication effect and ictal/postictal states. Clinical  correlation is highly advised. The results of this EEG were communicated to the ordering provider at  [phone] this afternoon.         Sherrell Tracey DO    D: 05/11/2021 15:26:46       T: 05/11/2021 15:31:29     JAE/S_[email or web address]_[email or web address]  Job#: [id number]     Doc#: [id number]    CC:

## 2021-05-12 NOTE — PROGRESS NOTES
Comprehensive Nutrition Assessment    Type and Reason for Visit:  Initial(LOS)    Nutrition Recommendations/Plan:   Monitor for start of nutrition. Monitor SLP eval recommendations. Needs bowel regimen, no BM since admission. Nutrition Assessment:  LOS. Pt with PMH of [facility] presented with woresning back pain, hallucinations, and confusion. Pt with compression fracture. SLP following and had recommended Minced and moist with nectar thick on 5/6 but pt declining and less alert, now SLP recommending NPO as of yesterday. D5 running @ 75 ml/hr providing 306 kcal daily. Will monitor for goals of care and start of appropriate nutrition. Malnutrition Assessment:  Malnutrition Status: At risk for malnutrition     Context:  Acute Illness     Findings of the 6 clinical characteristics of malnutrition:  Energy Intake:  7 - 50% or less of estimated energy requirements for 5 or more days  Weight Loss:  Unable to assess(-5.7 L fluid)     Body Fat Loss:  Unable to assess(d/t age factor)     Muscle Mass Loss:  Unable to assess(d/t age factor)    Fluid Accumulation:  No significant fluid accumulation     Strength:  Not Performed    Estimated Daily Nutrient Needs:  Energy (kcal):  0836-5186 kcal (20-25 kcal/kg CBW)  Protein (g):   gm (1.3-2 gm/kg IBW)  Fluid (ml/day):  1 mL/hr or per provider given [facility]    Nutrition Related Findings:  +2 BLE and non pitting BUE edema; no BM since admission, needs bowel regimen; -5.7 L fluid      Wounds:  None       Current Nutrition Therapies:    Diet NPO Effective Now    Anthropometric Measures:  · Height: 5' 5\" (165.1 cm)  · Current Body Weight: 154 lb (69.9 kg)   · Admission Body Weight: 173 lb (78.5 kg)    · Ideal Body Weight: 136 lbs; % Ideal Body Weight 113.2 %   · BMI: 25.6  · BMI Categories: Overweight (BMI 25.0-29. 9)       Nutrition Diagnosis:   · Inadequate oral intake related to swallowing difficulty as evidenced by NPO or clear liquid status due to medical condition      Nutrition Interventions:   Food and/or Nutrient Delivery:  Continue NPO(Monitor for start of nutrition)  Nutrition Education/Counseling:  No recommendation at this time   Coordination of Nutrition Care:  Continue to monitor while inpatient    Goals:  initiate most appropriate form of nutrition per MD       Nutrition Monitoring and Evaluation:   Food/Nutrient Intake Outcomes:  (nutrition advancement)  Physical Signs/Symptoms Outcomes:  Biochemical Data, Weight, Skin, Chewing or Swallowing, Constipation, Diarrhea, Nutrition Focused Physical Findings     Discharge Planning:     Too soon to determine     Electronically signed by Flaquito Appiah RD, LD on 5/12/21 at 9:01 AM EDT    Contact: [phone]

## 2021-05-12 NOTE — PLAN OF CARE
Problem: Falls - Risk of:  Goal: Will remain free from falls  Description: Will remain free from falls  Outcome: Ongoing  Goal: Absence of physical injury  Description: Absence of physical injury  Outcome: Ongoing     Problem: OXYGENATION/RESPIRATORY FUNCTION  Goal: Patient will maintain patent airway  Outcome: Ongoing  Goal: Patient will achieve/maintain normal respiratory rate/effort  Description: Respiratory rate and effort will be within normal limits for the patient  Outcome: Ongoing     Problem: HEMODYNAMIC STATUS  Goal: Patient has stable vital signs and fluid balance  Outcome: Ongoing     Problem: FLUID AND ELECTROLYTE IMBALANCE  Goal: Fluid and electrolyte balance are achieved/maintained  Outcome: Ongoing     Problem: ACTIVITY INTOLERANCE/IMPAIRED MOBILITY  Goal: Mobility/activity is maintained at optimum level for patient  Outcome: Ongoing     Problem: Infection:  Goal: Will remain free from infection  Description: Will remain free from infection  Outcome: Ongoing     Problem: Safety:  Goal: Free from accidental physical injury  Description: Free from accidental physical injury  Outcome: Ongoing  Goal: Free from intentional harm  Description: Free from intentional harm  Outcome: Ongoing     Problem: Daily Care:  Goal: Daily care needs are met  Description: Daily care needs are met  Outcome: Ongoing     Problem: Pain:  Goal: Patient's pain/discomfort is manageable  Description: Patient's pain/discomfort is manageable  Outcome: Ongoing  Goal: Pain level will decrease  Description: Pain level will decrease  Outcome: Ongoing  Goal: Control of acute pain  Description: Control of acute pain  Outcome: Ongoing  Goal: Control of chronic pain  Description: Control of chronic pain  Outcome: Ongoing     Problem: Skin Integrity:  Goal: Skin integrity will stabilize  Description: Skin integrity will stabilize  Outcome: Ongoing     Problem: Discharge Planning:  Goal: Patients continuum of care needs are

## 2021-05-12 NOTE — PROGRESS NOTES
Dr. Clint Huynh was just at bedside and talked to neuro about EEG results and they recommend possibly transfering to  or [facility] for continuous EEG. She will talk to daughter and neuro after they see pt and possibly arrange transfer.

## 2021-05-12 NOTE — PLAN OF CARE
Problem: Urinary Elimination:  Goal: Signs and symptoms of infection will decrease  Description: Signs and symptoms of infection will decrease  Outcome: Ongoing  Note: No signs of infection at this time. Urine is clear, yellow. Turk care is completed Qshift. Will cont to monitor. Goal: Complications related to the disease process, condition or treatment will be avoided or minimized  Description: Complications related to the disease process, condition or treatment will be avoided or minimized  Outcome: Ongoing     Problem: Falls - Risk of:  Goal: Will remain free from falls  Description: Will remain free from falls  Outcome: Ongoing  Note: Pt is free of falls at this time. Bed wheels are locked, bed alarm is on, bed is in lowest position. Call light is within reach. Pt is aware of the risk for falls. Will continue hourly rounding to monitor. Problem: Skin Integrity:  Goal: Will show no infection signs and symptoms  Description: Will show no infection signs and symptoms  Outcome: Ongoing  Note: Pt at risk for skin breakdown. See Yosef score. Pt remains on bedrest. Unable to reposition self in bed. Heels elevated off bed. Sacral heart mepilex intact to protect,  site inspected and intact underneath. Will continue to turn and reposition patient every two hours and as needed. Will continue to keep patient clean and dry, applying skin care cream as needed. Pillows used for repositioning q2hs. Will continue to monitor and assess for skin breakdown.        Problem: Physical Regulation:  Goal: Ability to maintain a stable neurologic state will improve  Description: Ability to maintain a stable neurologic state will improve  Outcome: Ongoing

## 2021-05-12 NOTE — PROGRESS NOTES
PALLIATIVE MEDICINE PROGRESS NOTE     Patient name:Ankit Bolaños    BKU:[id number] :1925  Room/Bed:H0T-9625/5268-01    LOS: 7 days        ASSESSMENT/RECOMMENDATIONS     89 y. o. male with back pain and debility        Symptom Management:  1. Back pain- pt has chronic back pain for which he gets epidural injs now with acute fracture of L4  2. Debility-  At baseline he is assist x 1 for ADLs. Pt is dependent for all ADLs at this time. 3. Urinary retention- concern for Spinal cord compression being a contributing factor  4. Goals of Care- Code status updated to [facility]. Daughter is to bring in his living will and HCPOA     Patient/Family Goals of Care :    Pt and daughter both maintain that he wants all aggressive care despite advanced age he is a FULL code and his daughter is to bring in his living will and HCPOA     5/10  Pt today has had a mental status change since admission and is making no attempt to speak or make eye contact. Meeting with pts daughter yesterday she was agreeable to Hospice care at [facility] and code status change. Dr Ozzie Zamarripa was consulted and wanted to talk to family first.       Dr Chambers Parent with code status change today. Deyanira Peñaloza daughter was ok with this yesterday will update code status to [facility]. Talked to daughter again today about Hospice consult for tomorrow possibly waiting on Dr Ozzie Zamarripa to OK this option. Plan would be to [facility] with Hospice support.   Pt EEG showed status epilepticus. Talked to Dr Gigi Marmolejo is for transfer for continuous EEG.    Disposition/Discharge Plan:   pending     Advance Directives:  · Surrogate Decision Maker:Petra-daughter  Code status:  DNRCC     Case discussed with: patient, floor RN, Daughter- Deyanira Peñaloza  Thank you for allowing us to participate in the care of this patient. SUBJECTIVE     Chief Complaint: AMS    Last 24 hours:   Pt continues to be non-verbal today with increased drowsiness and occasional twitches.      ROS:  Review of Systems -    History obtained from chart review      Patient unable to complete full ROS due to current cognitive status. Information that is obtained from nursing and chart      OBJECTIVE   BP (!) 153/78   Pulse 72   Temp 97.5 °F (36.4 °C) (Oral)   Resp 16   Ht 5' 5\" (1.651 m)   Wt 154 lb 8.7 oz (70.1 kg)   SpO2 95%   BMI 25.72 kg/m²   I/O last 3 completed shifts: In: 1872 [I.V.:1872]  Out: 550 [Urine:550]  No intake/output data recorded.       Physical Examination:   General appearance - chronically ill appearing  Mental status - non-verbal  Neck - supple, no significant adenopathy  Chest - no tachypnea, retractions or cyanosis  Abdomen - soft, nontender, nondistended, no masses or organomegaly  Neurological - no movement to command pt does track with eyes  Musculoskeletal - no joint tenderness, deformity or swelling, wincing with movement       Signed By: Electronically signed by KAYCE Benavides CNP on 5/12/2021 at 9:13 AM   Palliative Medicine   [phone]    May 12, 2021

## 2021-05-12 NOTE — PROGRESS NOTES
Pt admitted to 4508. He is minimally responsive to voice, though he is able to say his name and follow commands. All extremities are weak. VSS. Case discussed with Dr. Shell Friedman. Will call and update pt's daughter. Will cont to monitor.

## 2021-05-12 NOTE — PROGRESS NOTES
First care left with [facility] to [facility]. Iv fluids going and on monitor. Attempted to call report to tamia and she will call me back. Tele removed.

## 2021-05-12 NOTE — DISCHARGE SUMMARY
Physician Discharge Summary     Patient ID:   Blanca Phillips  [id number]  71 y.o.  12/21/1925    Admit date: 5/5/2021    Discharge date and time: 5/12/2021     Admitting Physician: Av Sanford MD     Discharge Physician: Eugene Bhagat MD    Discharge Diagnoses:   Nonconvulsive Status Epilepticus  Probable bilat aspiration pneumonia  Pulmonary Edema  Small Bilat Pleural Effusions  Chronic Afib/Pacer  Severe MR/LVEF 50%  Chronic ILD  Acute L4 comp fx  Chronic low back pain  BPH w LUTs  Renal mass right (not tracy, increased from 2017)  Transaminitis resolving  Hyponatremia resolved  Chronic Prednisone (was on 10mg daily)  Hearing impaired has hearing aids        Discharged Condition: serious      Hospital Course: 79 yo male presented to the ER with worsening lbp, leg weakness and multiple falls. On TRACY in the ER he was found to have Bruising, skin tears, acute fracture L4 with 3mm retropulsion of the central canal, acute multifocal pneumonia and pulmonary edema. He was seen by Neurosurgery who did not feel he was a surgical candidate. He was given a back brace and permitted to be mobilized. He was treated with vanco, cefepime and lasix. His breathing improved and his cxr improved somewhat and bnp came down as did his lactic  Acid and procalcitonin. He was evaluated by speech due to the question of aspiration pneumonia and they altered his diet to pureed and thickened liquids. By day six on the cefepime he developed an abrupt decline in mental status, became nonverbal only responded to pain by grimacing. The Cefepime was stopped. CT head was neg, EEG showed an ictal process possibly reflecting nonconvulsive status. Neuro loaded him with ativan and keppra with no improvement in mental status. Based on this it was recommended that he be moved to [facility] for continuous EEG monitoring. His code status had been full code on admission but was changed to [facility] when he mental status declined.   His daughter Cuco Barry is his 30 mg Subcutaneous Daily Erwin Duran MD   30 mg at 05/11/21 1205    lidocaine 4 % external patch 1 patch  1 patch Transdermal Daily Byron Lang MD   1 patch at 05/11/21 1206    polyethylene glycol (GLYCOLAX) packet 17 g  17 g Oral Daily Byron Lang MD   Stopped at 05/11/21 1113    albuterol (PROVENTIL) nebulizer solution 2.5 mg  2.5 mg Nebulization Q4H PRN Davian Castellanos MD        Vitamin D (CHOLECALCIFEROL) tablet 2,000 Units  2,000 Units Oral Daily Davian Castellanos MD   Stopped at 05/11/21 1113    ferrous sulfate EC tablet 324 mg  324 mg Oral Nightly Davian Castellanos MD   324 mg at 05/10/21 2104    finasteride (PROSCAR) tablet 5 mg  5 mg Oral Daily Davian Castellanos MD   Stopped at 05/11/21 1113    fluticasone (FLONASE) 50 MCG/ACT nasal spray 2 spray  2 spray Each Nostril Daily Davian Castellanos MD   2 spray at 05/09/21 0824    pantoprazole (PROTONIX) tablet 40 mg  40 mg Oral QAM AC Davian Castellanos MD   Stopped at 05/12/21 0700    predniSONE (DELTASONE) tablet 10 mg  10 mg Oral Daily Davian Castellanos MD   Stopped at 05/11/21 1113    sodium chloride flush 0.9 % injection 5-40 mL  5-40 mL Intravenous 2 times per day Davian Castellanos MD   5 mL at 05/10/21 2103    sodium chloride flush 0.9 % injection 5-40 mL  5-40 mL Intravenous PRN Davian Castellanos MD        0.9 % sodium chloride infusion  25 mL Intravenous PRN Davian Castellanos MD        promethazine [facility]) tablet 12.5 mg  12.5 mg Oral Q6H PRN Davian Castellanos MD        Or    ondansetron [facility]) injection 4 mg  4 mg Intravenous Q6H PRN Davian Castellanos MD   4 mg at 05/09/21 0259    acetaminophen (TYLENOL) tablet 650 mg  650 mg Oral Q6H PRN Davian Castellanos MD   650 mg at 05/07/21 8200    Or    acetaminophen (TYLENOL) suppository 650 mg  650 mg Rectal Q6H PRN Davian Castellanos MD           Take Home Meds:  Current Discharge Medication List

## 2021-05-12 NOTE — CONSULTS
Neurology Consult Note  Reason for Consult:***  Chief complaint:***  Dr Elisa Nissen asked me to see Sarita Swift in consultation for evaluation of ***    History of Present Illness:  Sarita Swift is a 80 y.o. male with a history of CHF, atrial fibrillation, CKD, HTN, pacemaker (not MRI compatible), who presented with several days of progressively worsening mental status. History obtained via interview with neurology consultant at another facility as well as chart review, as patient is unable to provide history. The patient was admitted with worsening back pain, found to have L4 fracture and has required a lot of pain medication. He was not a surgical candidate. He was suspected of having pneumonia and was given several days of cefepime though this was stopped yesterday. A routine EEG was concerning for subclinical status epilepticus. He was given 2 mg LZP, 2g LEV bolus dose, followed by 1g BID which was increased to 1500 mg BID. He was given a one time dose of Vimpat 200 mg today. He is no better, clinically. Discharge exam from [facility] with roving eye movements, follows simple commands at times, and reacts to pain. Review of records indicate as of October 2020, he was fully oriented, fluent in conversation, able to follow commands, with intact attention and memory.      Medical History:  Past Medical History:   Diagnosis Date    Acute diastolic CHF (congestive heart failure) ([address] 10/23/2020    Arthritis     Atrial fibrillation (HCC)     BPH with obstruction/lower urinary tract symptoms     nocturia 3-4 x per night    Chronic atrial fibrillation (HCC)     CKD (chronic kidney disease) 2017    level 3    Coronary arteriosclerosis 2009    2 stents/ Dr Arvind Vergara in [place]    GERD (gastroesophageal reflux disease)     History of blood transfusion     1945   During deployment    HTN (hypertension) 1990    Left hip pain     schrapnel in hip and intestines/chronic pains left hip impairs walking    Pacemaker 2015    Pulmonary fibrosis ([address] 08/2017    mild amount seen on chest ct    Pulmonary nodule, left 2017    noncalcified 7mm low risk repeat 8/2018 if patient willing    Renal atrophy, right 2017    Renal cyst, right 08/2017    not clearly simple cyst    SOB (shortness of breath) 2017    terrell normal pft and ct chest ? smll amount of pulmonary fibrosis     Past Surgical History:   Procedure Laterality Date    ABDOMEN SURGERY      Scrapnel removed in [facility]  2009    awoke with indigestion    EYE SURGERY Bilateral     cataract    INGUINAL HERNIA REPAIR Right 07/2016    PACEMAKER INSERTION  2014    PACEMAKER PLACEMENT      PAIN MANAGEMENT PROCEDURE Bilateral 11/30/2020    BILATERAL L4 TRANSFORAMINAL EPIDURAL STEROID INJECTION WITH FLUOROSCOPY performed by Sohan Holcomb MD at [facility] as  II vet, part of small intestine moved   [id number] [facility]     Medications Prior to Admission:   ferrous sulfate (IRON 325) 325 (65 Fe) MG tablet, Take 325 mg by mouth nightly  tamsulosin (FLOMAX) 0.4 MG capsule, ONE EVERY NIGHT FOR YOUR PROSTATE  omeprazole (PRILOSEC) 20 MG delayed release capsule, TAKE 1 CAPSULE BY MOUTH EVERY DAY  oxybutynin (DITROPAN) 5 MG tablet, TAKE 1 TABLET BY MOUTH EVERY DAY  albuterol sulfate  (90 Base) MCG/ACT inhaler, Inhale 2 puffs into the lungs every 6 hours as needed for Wheezing or Shortness of Breath  [DISCONTINUED] predniSONE (DELTASONE) 10 MG tablet, TAKE 1 TABLET BY MOUTH EVERY DAY  metoprolol succinate (TOPROL XL) 25 MG extended release tablet, Take 0.5 tablets by mouth daily For heart  atorvastatin (LIPITOR) 40 MG tablet, Take 1 tablet by mouth daily (Patient taking differently: Take 40 mg by mouth nightly )  finasteride (PROSCAR) 5 MG tablet, TAKE 1 TABLET BY MOUTH EVERY DAY  furosemide (LASIX) 40 MG tablet, One po qam for weight over 169lbs  diclofenac sodium (VOLTAREN) 1 % GEL, Apply 4 g topically 3 times daily  fluticasone (FLONASE) 50 MCG/ACT nasal spray, SPRAY 2 SPRAY INTO EACH NOSTRIL EVERY DAY  aspirin 81 MG tablet, Take 81 mg by mouth daily  Cholecalciferol (VITAMIN D3) 2000 UNITS CAPS, Take 2,000 Units by mouth daily  calcium carbonate (OSCAL) 500 MG TABS tablet, Take 600 mg by mouth daily caltrate 600-D3 one daily  Multiple Vitamins-Minerals (CENTRUM SILVER ADULT 50+) TABS, Take 1 tablet by mouth daily  [DISCONTINUED] ELIQUIS 2.5 MG TABS tablet, TAKE 1 TABLET BY MOUTH TWICE A DAY    Allergies   Allergen Reactions    Claritin [Loratadine]      Reduced urine flow    Zestril [Lisinopril] Other (See Comments)     coughing       Social History     Tobacco Use   Smoking Status Never Smoker   Smokeless Tobacco Never Used     Social History     Substance and Sexual Activity   Drug Use Not Currently     Social History     Substance and Sexual Activity   Alcohol Use No     ROS:  Unable to obtain due to encephalopathy    Exam:  Blood pressure (!) 153/78, pulse 72, temperature 97.5 °F (36.4 °C), temperature source Oral, resp. rate 16, height 5' 5\" (1.651 m), weight 154 lb 8.7 oz (70.1 kg), SpO2 95 %. ***    Labs  Creatinine 0.9 mg/dL  Lactate 1.1 mmol/L  CK 21 U/L  Ammonia 25 ug/dL   U/L  AST 30 U/L  Alk phos 101 U/L  TSH 3.55 uIU/mL  WBC 17.2 (5/5) --> 10.4 (5/12)  Blood cultures x 2 NGTD  COVID-19 not detected  U/A reassuring against infection    Studies  EEG 5/11/21: This EEG is highly concerning for an ictal  process and may reflect nonconvulsive status epilepticus. There is also  evidence of a moderately severe encephalopathy which may be seen in  multiple settings including toxic, metabolic, or degenerative conditions  as well as with medication effect and ictal/postictal states. Clinical  correlation is highly advised. Head CT: no acute intracranial abnormality    EKG: atrial fibrillation    CT abdomen/pelvis  1.  Acute appearing fracture seen at the L4 level with about 3 mm of   retropulsion into the central canal.   2. Ground-glass opacity seen at the lung bases could be secondary to an   infectious or inflammatory process. 3. Partially visualized right greater than left pleural fluid. 4. Hyperdense 2.8 x 4.2 cm area arising from the superior pole of the right   kidney, increased in size since 2017.  Recommend further evaluation with   ultrasound on a nonemergent basis. 5. Bladder moderately distended with urine. CT-A head 10/2020  1. Bilateral carotid bifurcation atherosclerotic plaque resulting in   approximately 40% bilateral stenosis of the origin of the internal carotid   artery.  Finding is compatible with 16-49% stenosis per sonographic NASCET   index criteria. 2. 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 3. Right vertebral artery termination as the posteroinferior cerebellar   artery (PICA). 4. Bilateral internal carotid artery cavernous segment atherosclerotic   calcification without significant arterial stenosis. 5. Approximately 50% arterial stenosis of the bilateral vertebral body origin   secondary to encroachment by atherosclerotic calcification. 6. Mild cerebral white matter chronic microvascular ischemic disease.      Scheduled Medications   levetiracetam  1,500 mg Intravenous Q12H    lacosamide (VIMPAT) IVPB  200 mg Intravenous Once    acetaminophen  650 mg Oral BID    tamsulosin  0.8 mg Oral Nightly    metoprolol succinate  25 mg Oral Daily    enoxaparin  30 mg Subcutaneous Daily    lidocaine  1 patch Transdermal Daily    polyethylene glycol  17 g Oral Daily    Vitamin D  2,000 Units Oral Daily    ferrous sulfate  324 mg Oral Nightly    finasteride  5 mg Oral Daily    fluticasone  2 spray Each Nostril Daily    pantoprazole  40 mg Oral QAM AC    predniSONE  10 mg Oral Daily    sodium chloride flush  5-40 mL Intravenous 2 times per day     Impression:  Blanca Phillips is a 80 y.o. male with L4 fracture requiring pain medication, and possible pneumonia who presented with progressive encephalopathy and rEEG concerning for subclinical status epilepticus. 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 Recommendations:  - cEEG  - If any seizures on cEEG, will plan to add VPA 20 mg/kg ONCE and 500 mg TID thereafter  - Palliative care given conflicting information regarding code status  - Will discuss utility of LP with neurology team. Will be difficult with L4 fracture.   - Vessel imaging   - Renal ultrasound    A copy of this note was provided for MD Josefa Gormna NP  [phone]  Evenings, weekends, and off weeks please discuss neurologist on-call

## 2021-05-12 NOTE — PROGRESS NOTES
Progress Note    Updates  No acute events overnight. No improvement clinically.       Active Ambulatory Problems     Diagnosis Date Noted    Pacemaker 01/01/2015    Essential hypertension 01/01/1990    Coronary arteriosclerosis 01/01/2009    BPH with obstruction/lower urinary tract symptoms     GERD (gastroesophageal reflux disease)     Pulmonary fibrosis (HCC)     SOB (shortness of breath) 01/01/2017    Renal cyst, right 08/01/2017    Interstitial lung disease ([address] 04/05/2019    Solitary lung nodule 04/05/2019    Arthritis of both knees 12/03/2019    Paroxysmal atrial fibrillation ([address] 10/20/2020    Acute pulmonary edema ([address] 10/21/2020    Pulmonary infiltrates     Chronic atrial fibrillation (HCC)     Severe mitral regurgitation     Acute diastolic CHF (congestive heart failure) ([address] 10/23/2020    Chronic kidney disease, stage 3b 10/23/2020    Debility 10/23/2020    Osteoarthritis of lumbar spine with myelopathy 11/10/2020    Acute on chronic diastolic CHF (congestive heart failure) ([address] 11/11/2020    Hematemesis 11/11/2020    Hemoptysis 11/11/2020    Acute respiratory failure with hypoxia (HCC)     Hyperkalemia     Atrial fibrillation, controlled ([address]     Acute respiratory failure with hypoxia and hypercapnia (HCC)     Pleural effusion, bilateral     Nonrheumatic mitral valve regurgitation     Dysphagia 03/25/2021     Past Medical History:   Diagnosis Date    Arthritis     Atrial fibrillation ([address]     CKD (chronic kidney disease) 2017    History of blood transfusion     HTN (hypertension) 1990    Pulmonary nodule, left 2017    Renal atrophy, right 2017     Past Surgical History:   Procedure Laterality Date    ABDOMEN SURGERY      Scrapnel removed in [facility]  2009    awoke with indigestion    EYE SURGERY Bilateral     cataract    INGUINAL HERNIA REPAIR Right 07/2016    PACEMAKER INSERTION  2014    PACEMAKER PLACEMENT      PAIN MANAGEMENT PROCEDURE Bilateral 11/30/2020    BILATERAL L4 TRANSFORAMINAL EPIDURAL STEROID INJECTION WITH FLUOROSCOPY performed by Lola Krueger MD at [facility] [address] as ww II vet, part of small intestine moved   [id number] [facility] Facility-Administered Medications:     levETIRAcetam (KEPPRA) 1000 mg/100 mL IVPB, 1,000 mg, Intravenous, Q12H, Piyush Pepper, APRN - CNP, Stopped at 05/12/21 0551    acetaminophen (TYLENOL) tablet 650 mg, 650 mg, Oral, BID, Maria D Mckeon MD, Stopped at 05/11/21 1114    tamsulosin (FLOMAX) capsule 0.8 mg, 0.8 mg, Oral, Nightly, Maria D Mckeon MD, 0.8 mg at 05/10/21 2104    metoprolol succinate (TOPROL XL) extended release tablet 25 mg, 25 mg, Oral, Daily, Maria D Mckeon MD, Stopped at 05/11/21 1114    dextrose 5 % and 0.45 % sodium chloride infusion, , Intravenous, Continuous, Maria D Mckeon MD, Last Rate: 75 mL/hr at 05/11/21 2009, New Bag at 05/11/21 2009    enoxaparin (LOVENOX) injection 30 mg, 30 mg, Subcutaneous, Daily, Golden Canales MD, 30 mg at 05/11/21 1205    lidocaine 4 % external patch 1 patch, 1 patch, Transdermal, Daily, Maria D Mckeon MD, 1 patch at 05/11/21 1206    polyethylene glycol (GLYCOLAX) packet 17 g, 17 g, Oral, Daily, Maria D Mckeon MD, Stopped at 05/11/21 1113    albuterol (PROVENTIL) nebulizer solution 2.5 mg, 2.5 mg, Nebulization, Q4H PRN, Carl Hall MD    Vitamin D (CHOLECALCIFEROL) tablet 2,000 Units, 2,000 Units, Oral, Daily, Carl Hall MD, Stopped at 05/11/21 1113    ferrous sulfate EC tablet 324 mg, 324 mg, Oral, Nightly, Carl Hall MD, 324 mg at 05/10/21 2104    finasteride (PROSCAR) tablet 5 mg, 5 mg, Oral, Daily, Carl Hall MD, Stopped at 05/11/21 1113    fluticasone (FLONASE) 50 MCG/ACT nasal spray 2 spray, 2 spray, Each Nostril, Daily, Carl Hall MD, 2 spray at 05/09/21 0824    pantoprazole (PROTONIX) tablet 40 mg, 40 mg, Oral, QAM AC, Vamsi Romero MD, Stopped at 05/12/21 0700    predniSONE (DELTASONE) tablet 10 mg, 10 mg, Oral, Daily, Vamsi Romero MD, Stopped at 05/11/21 1113    sodium chloride flush 0.9 % injection 5-40 mL, 5-40 mL, Intravenous, 2 times per day, Vamsi Romero MD, 5 mL at 05/10/21 2103    sodium chloride flush 0.9 % injection 5-40 mL, 5-40 mL, Intravenous, PRN, Vamsi Romero MD    0.9 % sodium chloride infusion, 25 mL, Intravenous, PRN, Vamsi Romero MD    promethazine (PHENERGAN) tablet 12.5 mg, 12.5 mg, Oral, Q6H PRN **OR** ondansetron (ZOFRAN) injection 4 mg, 4 mg, Intravenous, Q6H PRN, Vamsi Romero MD, 4 mg at 05/09/21 0259    acetaminophen (TYLENOL) tablet 650 mg, 650 mg, Oral, Q6H PRN, 650 mg at 05/07/21 0923 **OR** acetaminophen (TYLENOL) suppository 650 mg, 650 mg, Rectal, Q6H PRN, Vamsi Romero MD    Exam  Blood pressure (!) 153/78, pulse 72, temperature 97.5 °F (36.4 °C), temperature source Oral, resp. rate 16, height 5' 5\" (1.651 m), weight 154 lb 8.7 oz (70.1 kg), SpO2 95 %. Constitutional                          Vital signs: BP, HR, and RR reviewed            General drowsy. Opens eyes briefly to voice command. Eyes: unable to visualize the fundi  Cardiovascular: no peripheral edema. Psychiatric: no psychotic behavior noted. Neurologic  Mental status:   orientation augustine due to encephalopathy.                 Attention poor              Language augustine due to encephalopathy.               Comprehension unable to follow commands well today. Cranial nerves:   CN2: no obvious blink to threat though does react when brushing eyelashes.    CN 3,4,6: no dysconjugate gaze.  No forced gaze deviation or preference. Some roving eye movements w/ passive eyelid opening. CN7: face appears symmetric. CN8: hard of hearing. Has hearing aids. CN12: tongue protrusion augustine.     Strength: brief hand grasp w/ L hand though inconsistent. Otherwise difficult to assess given mental status.     Sensory: does grimace and react to bilateral trapezius pressure and noxious stimulus when applied to all 4 limbs.    Cerebellar/coordination: finger nose finger augustine due to encephalopathy.   Tone: no increased tone or rigidity.    Gait: deferred at this time given mental status. ROS - afebrile. Unable to obtain from the patient at this time. Chart reviewed. Labs  Glucose 118  Na 137  K 3.8  BUN 29  Cr 0.9    Ammonia 25    AST 30    WBC 10.4K  Hg 12.5  Platelets 473    COVID negative  Blood cultures NG  UA negative    Studies  EEG 5/11/21  Generalized periodic discharges w/ features concerning for ictal process. CT head w/o 5/10/21  No acute intracranial abnormality.       CT head w/o 5/5/21  1. No acute intracranial abnormality. 2. Diffuse atrophic changes with findings suggesting chronic microvascular   ischemia      CT abdomen/pelvis 5/5/21  1. Acute appearing fracture seen at the L4 level with about 3 mm of   retropulsion into the central canal.   2. Ground-glass opacity seen at the lung bases could be secondary to an   infectious or inflammatory process. 3. Partially visualized right greater than left pleural fluid. 4. Hyperdense 2.8 x 4.2 cm area arising from the superior pole of the right   kidney, increased in size since 2017.  Recommend further evaluation with   ultrasound on a nonemergent basis. 5. Bladder moderately distended with urine.      Impression  1. Persistent encephalopathy. EEG concerning for recurrent subclinical seizures. No improvement clinically w/ benzodiazepine and initiation of Keppra. Recommendations  1. Transfer to [facility] for cvEEG. If unavailable, would try UC or [facility].    2.  Will increase Keppra to 1500 mg BID and give 200 mg Vimpat load. 3.  CK, lactic acid. Consider ABG. 4.  Cefepime has been d/c.       ADDENDUM 1045  The case was discussed w/ the Neurology team at [facility]. cvEEG is available. Primary team will need to speak w/ accepting Physician at [facility] to facilitate transfer.       Bret Huerta NP  [address] [place] [address] [address] Neurology    A copy of this note was provided for Dr Monica Tinsley MD

## 2021-05-12 NOTE — PROGRESS NOTES
Speech Language Pathology    Dysphagia tx attempted. Patient reports patient with poor level of alertness for ST this date as well as plan for transfer to [facility] hospital this date. ST to re-attempt if notified of change in status. Otherwise, defer dysphagia monitor to ST at [facility] after transfer if MD deems to remain indicated. Thank you. Daxa Hdez, [address] [facility] [id number]  Speech-Language Pathologist  Portable phone: [phone]

## 2021-05-12 NOTE — PLAN OF CARE
Problem: Nutrition  Goal: Optimal nutrition therapy  Outcome: Ongoing   Nutrition Problem #1: Inadequate oral intake  Intervention: Food and/or Nutrient Delivery: Continue NPO(Monitor for start of nutrition)  Nutritional Goals: initiate most appropriate form of nutrition per MD

## 2021-05-12 NOTE — H&P
Hospital Medicine History & Physical      PCP: Pierre Abdalla MD    Date of Admission: 5/12/2021    Date of Service: Pt seen/examined on 05/12/21 and Admitted to Inpatient with expected LOS greater than two midnights due to medical therapy. Chief Complaint:  Status epilepticus    History Of Present Illness:      Tanesha Kern is a 80 y.o. male with past medical history as below, including recent admission to [facility] for worsening lbp, LE weakness and falls. He was found to have L4 fracture with 3mm retropulsion, evaluated by neurosurgery. He was also treated for pneumonia with vanc/cefepime. He underwent SLP eval, switched to pureed and thickened liquids. On day 6 while on cefepime he developed acute decline in neurological status. Cefepime was stopped. EEG showed a possible non-convulsive status. He was loaded with ativan and Keppra but had no improvement when so was transferred to [facility] for CvEEG. Patient seems to wake briefly when his name is called but cannot give any history.      Past Medical History:          Diagnosis Date    Acute diastolic CHF (congestive heart failure) ([address] 10/23/2020    Arthritis     Atrial fibrillation (HCC)     BPH with obstruction/lower urinary tract symptoms     nocturia 3-4 x per night    Chronic atrial fibrillation (HCC)     CKD (chronic kidney disease) 2017    level 3    Coronary arteriosclerosis 2009    2 stents/ Dr Ethel Rinne in [facility] GERD (gastroesophageal reflux disease)     History of blood transfusion     1945   During deployment    HTN (hypertension) 1990    Left hip pain     schrapnel in hip and intestines/chronic pains left hip impairs walking    Pacemaker 2015    Pulmonary fibrosis ([address] 08/2017    mild amount seen on chest ct    Pulmonary nodule, left 2017    noncalcified 7mm low risk repeat 8/2018 if patient willing    Renal atrophy, right 2017    Renal cyst, right 08/2017    not clearly simple cyst    SOB (shortness of breath) 2017    terrell normal pft and ct chest ? smll amount of pulmonary fibrosis       Past Surgical History:          Procedure Laterality Date    ABDOMEN SURGERY      Scrapnel removed in [facility]  2009    awoke with indigestion    EYE SURGERY Bilateral     cataract    INGUINAL HERNIA REPAIR Right 07/2016    PACEMAKER INSERTION  2014    PACEMAKER PLACEMENT      PAIN MANAGEMENT PROCEDURE Bilateral 11/30/2020    BILATERAL L4 TRANSFORAMINAL EPIDURAL STEROID INJECTION WITH FLUOROSCOPY performed by Rosemarie Mohamud MD at [address] as ww II vet, part of small intestine moved   [place] [id number]       Medications Prior to Admission:      Prior to Admission medications    Medication Sig Start Date End Date Taking?  Authorizing Provider   enoxaparin (LOVENOX) 30 MG/0.3ML injection Inject 0.3 mLs into the skin daily 5/12/21   Reagan North MD   predniSONE (DELTASONE) 10 MG tablet TAKE 1 TABLET BY MOUTH EVERY DAY 5/10/21   Reagan North MD   ferrous sulfate (IRON 325) 325 (65 Fe) MG tablet Take 325 mg by mouth nightly    Historical Provider, MD   tamsulosin (FLOMAX) 0.4 MG capsule ONE EVERY NIGHT FOR YOUR PROSTATE 3/9/21   Reagan North MD   omeprazole (PRILOSEC) 20 MG delayed release capsule TAKE 1 CAPSULE BY MOUTH EVERY DAY 2/12/21   Reagan North MD   oxybutynin (DITROPAN) 5 MG tablet TAKE 1 TABLET BY MOUTH EVERY DAY 2/12/21   Reagan North MD   albuterol sulfate  (90 Base) MCG/ACT inhaler Inhale 2 puffs into the lungs every 6 hours as needed for Wheezing or Shortness of Breath 2/3/21   Li Gudino MD   metoprolol succinate (TOPROL XL) 25 MG extended release tablet Take 0.5 tablets by mouth daily For heart 12/21/20   Reagan North MD   atorvastatin (LIPITOR) 40 MG tablet Take 1 tablet by mouth daily  Patient taking differently: Take 40 mg by mouth nightly  12/21/20   Reagan North MD   finasteride (PROSCAR) 5 MG tablet TAKE 1 TABLET BY uncertain etiology. Noted to be nonverbal at least since 5/9. Possible cefepime toxicity and/or status epilepticus  CT negative, ? If CVA. Has Afib. MRI w, wo contrast - but could not get MRI due to pacemaker  Ammonia level 25  TSH wnl  Off Cefepime  CvEEG  Neurology consulted, appreciate recs  Telemetry  Repeat CT head today - no recent infarct on CT. Small remote lacunar infarct. Opacification of right mastoid and right middle ear  Antibiotics were stopped yesterday after course of cefepime    Hypoxemic respiratory failure  Suspected Pneumonia, likely bacterial, suspected aspiration  CV19 negative  Tx'd with vancomycin/cefepime  Per notes developed encephalopathy cefepime, stopped on or after 6 days  MRSA probe negative   Tx with lasix for possible fluid overload  Per notes had improvement with lasix    Afib on Eliquis   Appears was just stopped yesterday. Patient was comfort care. Resume Eliquis 2.5 mg BID for now although may need to be reconsidered given age and fall risk pending clinical course. Chronic prednisone use   Continue prednisone 10 mg daily     Continue BPH medications  Transferred with kauffman, consider voiding trial    Abnormal LFTs, improving,  Unclear etiology    DVT Prophylaxis: Eliquis  Diet: No diet orders on file  Code Status: Prior    PT/OT Eval Status: pending    Dispo - Inpatient. Per reports patient changed to [facility] given his age and declining mental status however he has been transferred for further work-up of status epilepticus. Palliative consulted. [facility]     Thank you Keely Baez MD for the opportunity to be involved in this patient's care. If you have any questions or concerns please feel free to contact me at [employer]serve.

## 2021-05-13 NOTE — CONSULTS
pains left hip impairs walking    Pacemaker 2015    Pulmonary fibrosis ([address] 08/2017    mild amount seen on chest ct    Pulmonary nodule, left 2017    noncalcified 7mm low risk repeat 8/2018 if patient willing    Renal atrophy, right 2017    Renal cyst, right 08/2017    not clearly simple cyst    SOB (shortness of breath) 2017    terrell normal pft and ct chest ? smll amount of pulmonary fibrosis     Past Surgical History:   Procedure Laterality Date    ABDOMEN SURGERY      Scrapnel removed in [facility]  2009    awoke with indigestion    EYE SURGERY Bilateral     cataract    INGUINAL HERNIA REPAIR Right 07/2016    PACEMAKER INSERTION  2014    PACEMAKER PLACEMENT      PAIN MANAGEMENT PROCEDURE Bilateral 11/30/2020    BILATERAL L4 TRANSFORAMINAL EPIDURAL STEROID INJECTION WITH FLUOROSCOPY performed by Shaunna Aguilera MD at [facility] as ww II vet, part of small intestine moved   [address] [facility]     Medications Prior to Admission:   ferrous sulfate (IRON 325) 325 (65 Fe) MG tablet, Take 325 mg by mouth nightly  tamsulosin (FLOMAX) 0.4 MG capsule, ONE EVERY NIGHT FOR YOUR PROSTATE  omeprazole (PRILOSEC) 20 MG delayed release capsule, TAKE 1 CAPSULE BY MOUTH EVERY DAY  oxybutynin (DITROPAN) 5 MG tablet, TAKE 1 TABLET BY MOUTH EVERY DAY  albuterol sulfate  (90 Base) MCG/ACT inhaler, Inhale 2 puffs into the lungs every 6 hours as needed for Wheezing or Shortness of Breath  [DISCONTINUED] predniSONE (DELTASONE) 10 MG tablet, TAKE 1 TABLET BY MOUTH EVERY DAY  metoprolol succinate (TOPROL XL) 25 MG extended release tablet, Take 0.5 tablets by mouth daily For heart  atorvastatin (LIPITOR) 40 MG tablet, Take 1 tablet by mouth daily (Patient taking differently: Take 40 mg by mouth nightly )  finasteride (PROSCAR) 5 MG tablet, TAKE 1 TABLET BY MOUTH EVERY DAY  furosemide (LASIX) 40 MG tablet, One po qam for weight over 169lbs  diclofenac sodium (VOLTAREN) 1 % GEL, Apply 4 g topically 3 times daily  fluticasone (FLONASE) 50 MCG/ACT nasal spray, SPRAY 2 SPRAY INTO EACH NOSTRIL EVERY DAY  aspirin 81 MG tablet, Take 81 mg by mouth daily  Cholecalciferol (VITAMIN D3) 2000 UNITS CAPS, Take 2,000 Units by mouth daily  calcium carbonate (OSCAL) 500 MG TABS tablet, Take 600 mg by mouth daily caltrate 600-D3 one daily  Multiple Vitamins-Minerals (CENTRUM SILVER ADULT 50+) TABS, Take 1 tablet by mouth daily  [DISCONTINUED] ELIQUIS 2.5 MG TABS tablet, TAKE 1 TABLET BY MOUTH TWICE A DAY    Allergies   Allergen Reactions    Claritin [Loratadine]      Reduced urine flow    Zestril [Lisinopril] Other (See Comments)     coughing       Social History     Tobacco Use   Smoking Status Never Smoker   Smokeless Tobacco Never Used     Social History     Substance and Sexual Activity   Drug Use Not Currently     Social History     Substance and Sexual Activity   Alcohol Use No     ROS:  Unable to obtain due to encephalopathy    Exam:  Blood pressure (!) 174/113, pulse 70, temperature 97.6 °F (36.4 °C), temperature source Oral, resp. rate 20, height 5' 5\" (1.651 m), weight 150 lb 12.7 oz (68.4 kg), SpO2 93 %. Constitutional    Vital signs: BP, HR, and RR reviewed   General depressed mental status, no distress, well-nourished  Eyes: unable to visualize fundi   Cardiovascular: pulses symmetric in all 4 extremities. No peripheral edema. Psychiatric: limited exam given encephalopathy but not agitated and no signs of hallucinations  Neurologic  Mental status: eyes open to loud voice  orientation unable to assess given comatose state  General fund of knowledge limited exam given encephalopathy   Memory limited exam given encephalopathy  Attention poor  Language moans  Comprehension not following any commands  CN2: Visual Fields: limited evaluation given forced lid closure  CN 3,4,6:  extraocular muscles intact by tracking.  PERRLA 3 mm to 2  CN5: facial sensation limited exam given encephalopathy  CN7:face symmetric   CN8: hearing limited exam given encephalopathy  CN9: limited exam given encephalopathy  CN11: limited exam given encephalopathy  CN12: limited exam given encephalopathy  Strength: very little movement all four limbs and only to pain  Deep tendon reflexes: deferred given inability to relax  Sensory: grimace to pain in all 4 extremities. Cerebellar/coordination:limited exam given encephalopathy  Tone: normal in all 4 extremities  Gait: limited exam given encephalopathy    Labs  Creatinine 0.9 mg/dL  Lactate 1.1 mmol/L  CK 21 U/L  Ammonia 25 ug/dL   U/L  AST 30 U/L  Alk phos 101 U/L  TSH 3.55 uIU/mL  WBC 17.2 (5/5) --> 10.4 (5/12)  Blood cultures x 2 NGTD  COVID-19 not detected  U/A reassuring against infection    Studies  cEEG 5/12-5/13 5/12/2021-5/13/2021  Review 07:00  Seizures - none  Push buttons - none  Background - Continuous generalized low amplitude mixed frequencies, intermittent frontal delta activity. CLINICAL INTERPRETATION: This is an abnormal video EEG study  1. Generalized background abnormalities indicative of an underlying diffuse encephalopathy of non-specific etiology   2. No focal or epileptiform abnormalities. No seizures. No push button events.       EEG 5/11/21: This EEG is highly concerning for an ictal  process and may reflect nonconvulsive status epilepticus. There is also  evidence of a moderately severe encephalopathy which may be seen in  multiple settings including toxic, metabolic, or degenerative conditions  as well as with medication effect and ictal/postictal states. Clinical  correlation is highly advised. Head CT  Impression:   1. No evidence of recent intracranial hemorrhage. 2. Parenchymal volume loss and chronic small vessel ischemic changes in the white matter. 3. 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    4. Opacification of the right mastoid air cells and right middle ear which could reflect age-indeterminate infectious/inflammatory process. EKG: atrial fibrillation    CT abdomen/pelvis  1. Acute appearing fracture seen at the L4 level with about 3 mm of   retropulsion into the central canal.   2. Ground-glass opacity seen at the lung bases could be secondary to an   infectious or inflammatory process. 3. Partially visualized right greater than left pleural fluid. 4. Hyperdense 2.8 x 4.2 cm area arising from the superior pole of the right   kidney, increased in size since 2017.  Recommend further evaluation with   ultrasound on a nonemergent basis. 5. Bladder moderately distended with urine. CT-A head 10/2020  1. Bilateral carotid bifurcation atherosclerotic plaque resulting in   approximately 40% bilateral stenosis of the origin of the internal carotid   artery.  Finding is compatible with 16-49% stenosis per sonographic NASCET   index criteria. 2. 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 3. Right vertebral artery termination as the posteroinferior cerebellar   artery (PICA). 4. Bilateral internal carotid artery cavernous segment atherosclerotic   calcification without significant arterial stenosis. 5. Approximately 50% arterial stenosis of the bilateral vertebral body origin   secondary to encroachment by atherosclerotic calcification. 6. Mild cerebral white matter chronic microvascular ischemic disease. Impression:  Tanesha Kern is a 80 y.o. male with L4 fracture requiring pain medication, and pneumonia who presented with progressive encephalopathy and rEEG concerning for non-convulsive status epilepticus. He has not had any seizures or epileptiform discharges overnight, off AED. Recommendations:  - Continue cEEG. OK to continue observation off AED as we have cEEG to guide. - Will check CT-A head given history of basilar stenosis - may be contributing to semi-comatose state  - Palliative care following.  Given age and multiple medical co-morbidities, prognosis guarded    A copy of this note was provided for MD Tika Foreman NP  [phone]  Evenings, weekends, and off weeks please discuss neurologist on-call

## 2021-05-13 NOTE — PLAN OF CARE
Problem: Falls - Risk of:  Goal: Will remain free from falls  Description: Will remain free from falls  5/13/2021 1715 by Dilma Barbour RN  Outcome: Ongoing  Note: High fall risk. On bed rest d/t cEEG. Fall precautions in place. Bed locked in lowest position with alarm on and 3/4 side rails up. Unable to use call light. Hourly rounding in anticipation of patient needs. On camera for fall/seizure precautions. Floor clean and free from clutter. Room door open. Will continue to monitor. Problem: Skin Integrity:  Goal: Will show no infection signs and symptoms  Description: Will show no infection signs and symptoms  Outcome: Ongoing  Note: Skin inspected frequently. On specialty mattress. Turned and repositioned q2 hours. Heels in multi podus boots and elbows elevated off of bed surface. Sacral heart on coccyx. Kauffman care provided with green kauffman wipes. Incontinent of bowel, shawna care provided upon voiding. Has remained afebrile throughout shift thus far. Will continue to monitor.

## 2021-05-13 NOTE — PROGRESS NOTES
CONTINUOUS VIDEO EEG MONITORING    DATE OF SERVICE: 5/12/2021 23:23 TO 5/13/2021 23:23    NAME: Carolyn Okeefe OF BIRTH: 12/21/1925  SEX: male  MEDICAL RECORD NOQGGI:[id number]    EEG-CCTV study:   The patient is a 80 y.o.y old male monitored at the request of the team for altered mental status and concern for subclinical seizures. EEG-VIDEO MONITORING METHODOLOGY:   Time-locked EEG-video monitoring was performed using the 32-channel [employer] monitoring system. Analyses of the monitoring data were performed using the following techniques:   1. Review of the relevant EEG-video data. 2. Review of events detected by the computer system in detail. 3. Review of clinical seizures, with both detailed review of EEG and video and playback using multiple montages. A variety of referential and bipolar montages were used. CLINICAL AND EEG ANALYSIS:  Video EEG recording was reviewed in real time by a technologist, and then reviewed at least twice a day when available on the  with maximum possible sampling. Results of the monitoring were related to the treating team frequently throughout the study, at least once a day to help guide treatment via verbal or written communication as brief notes or direct text messages or emails. Updates and response to treatment was communicated as requested by the requesting physician or the team.    5/12/2021-5/13/2021      Seizures - none  Push buttons - none  Background - Continuous generalized low amplitude mixed frequencies, intermittent frontal delta activity. CLINICAL INTERPRETATION: This is an abnormal video EEG study  1. Generalized background abnormalities indicative of an underlying diffuse encephalopathy of non-specific etiology   2. No focal or epileptiform abnormalities. No seizures. No push button events.

## 2021-05-13 NOTE — PROGRESS NOTES
MD notified per secure message of continued elevated BP despite PRN PO hydralazine. Waiting for response.

## 2021-05-13 NOTE — PROGRESS NOTES
Hospitalist Progress Note      PCP: Tay Munroe MD    Date of Admission: 5/12/2021    Hospital course:-Raymond Alroy Landau is a 80 y.o. male with past medical history as below, including recent admission to [facility] for worsening lbp, LE weakness and falls. He was found to have L4 fracture with 3mm retropulsion, evaluated by neurosurgery. He was also treated for pneumonia with vanc/cefepime. He underwent SLP eval, switched to pureed and thickened liquids. On day 6 while on cefepime he developed acute decline in neurological status. Cefepime was stopped. EEG showed a possible non-convulsive status. He was loaded with ativan and Keppra but had no improvement when so was transferred to [facility] for CvEEG.    Subjective:  Patient seen and examined  Not responding to commands  Moving spontaneously  On CVEEG      Medications:  Reviewed    Infusion Medications    sodium chloride      sodium chloride 75 mL/hr at 05/13/21 1010     Scheduled Medications    sodium chloride flush  5-40 mL Intravenous 2 times per day    predniSONE  10 mg Oral Daily    metoprolol succinate  12.5 mg Oral Daily    tamsulosin  0.4 mg Oral Nightly    finasteride  5 mg Oral Daily    aspirin  81 mg Oral Daily    atorvastatin  40 mg Oral Nightly    apixaban  2.5 mg Oral BID     PRN Meds: cloNIDine, hydrALAZINE, sodium chloride flush, sodium chloride, promethazine **OR** ondansetron, polyethylene glycol, acetaminophen **OR** acetaminophen      Intake/Output Summary (Last 24 hours) at 5/13/2021 1557  Last data filed at 5/13/2021 1233  Gross per 24 hour   Intake 1152.51 ml   Output 680 ml   Net 472.51 ml       Physical Exam Performed:    BP (!) 141/80   Pulse 70   Temp 97.4 °F (36.3 °C) (Oral)   Resp 18   Ht 5' 5\" (1.651 m)   Wt 150 lb 12.7 oz (68.4 kg)   SpO2 93%   BMI 25.09 kg/m²     General appearance: No apparent distress, appears stated age and cooperative. HEENT: Normal cephalic, atraumatic without obvious deformity.  Pupils equal, round, and reactive to light. Extra ocular muscles intact. Conjunctivae/corneas clear. Neck: Supple, with full range of motion. No jugular venous distention. Trachea midline. Respiratory:  Normal respiratory effort. Clear to auscultation, bilaterally without Rales/Wheezes/Rhonchi. Cardiovascular: Regular rate and rhythm with normal S1/S2 without murmurs, rubs or gallops. Abdomen: Soft, non-tender, non-distended with normal bowel sounds. Musculoskelatal: No clubbing, cyanosis or edema bilaterally. Full range of motion without deformity. Skin: Skin color, texture, turgor normal.  No rashes or lesions. Neurologic:  Neurovascularly intact without any focal sensory/motor deficits. Cranial nerves: II-XII intact, grossly non-focal.  Psychiatric: Alert and oriented, thought content appropriate, normal insight     Labs:   Recent Labs     05/11/21 0533 05/12/21  0532 05/13/21  0538   WBC 10.7 10.4 10.7   HGB 13.4* 12.5* 13.4*   HCT 41.4 39.6* 42.2    149 121*     Recent Labs     05/11/21  0533 05/12/21  0532 05/13/21  0538   * 137 140   K 4.3 3.8 4.2   CL 99 100 103   CO2 27 29 26   BUN 31* 29* 26*   CREATININE 0.8 0.9 0.9   CALCIUM 9.1 8.7 8.8   PHOS  --   --  2.7     Recent Labs     05/11/21  0533 05/12/21  0532   AST 27 30   * 109*   BILIDIR 0.5* 0.4*   BILITOT 1.2* 0.9   ALKPHOS 103 101     No results for input(s): INR in the last 72 hours. Recent Labs     05/12/21  0955   CKTOTAL 21*       Urinalysis:      Lab Results   Component Value Date    NITRU Negative 05/10/2021    WBCUA 2 05/10/2021    RBCUA 5 05/10/2021    RBCUA NEGATIVE 01/11/2021    BLOODU MODERATE 05/10/2021    SPECGRAV 1.013 05/10/2021    GLUCOSEU Negative 05/10/2021       Radiology:  CTA HEAD W WO CONTRAST   Final Result      1. No acute intracranial abnormality. 2.  Moderate cerebral atrophy and mild chronic small vessel ischemic disease.    3.  Wall thickening and luminal narrowing of the distal right vertebral artery V2 and V3 segments may represent vasculitis or less likely atherosclerosis, fibromuscular dysplasia, or dissection. 4.  Severe mid basilar artery stenosis. 5.  Mild to moderate atherosclerotic stenosis of the bilateral middle cerebral artery M1 segments and left posterior cerebral artery P1 and P2 segments. XR ABDOMEN (KUB) (SINGLE AP VIEW)   Final Result   Tip of nasogastric tube in proximal stomach. CT HEAD WO CONTRAST   Final Result   Impression:   1. No evidence of recent intracranial hemorrhage. 2. Parenchymal volume loss and chronic small vessel ischemic changes in the white matter. 3. 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 4. Opacification of the right mastoid air cells and right middle ear which could reflect age-indeterminate infectious/inflammatory process. Assessment/Plan:    Active Hospital Problems    Diagnosis    Status epilepticus ([facility] [address] [G40.901]     1. Encephalopathy, uncertain etiology. Noted to be nonverbal at least since 5/9. Possible cefepime toxicity and/or status epilepticus  MRI w, wo contrast - but could not get MRI due to pacemaker  Ammonia level 25  TSH wnl  Off Cefepime  CvEEG  Neurology consulted, appreciate recs  Telemetry  Repeat CT head today - no recent infarct on CT. Small remote lacunar infarct. Opacification of right mastoid and right middle ear  Antibiotics were stopped yesterday after course of cefepime     2. Hypoxemic respiratory failure  Suspected Pneumonia, likely bacterial, suspected aspiration  CV19 negative  Tx'd with vancomycin/cefepime  Per notes developed encephalopathy cefepime, stopped on or after 6 days  MRSA probe negative   Tx with lasix for possible fluid overload  Per notes had improvement with lasix     3. Afib on Eliquis   Appears was just stopped yesterday. Patient was comfort care.  Resume Eliquis 2.5 mg BID for now although may need to be reconsidered given age and fall risk pending clinical course.     Chronic prednisone use   Continue prednisone 10 mg daily      Continue BPH medications  Transferred with kauffman, consider voiding trial     Abnormal LFTs, improving,  Unclear etiology       DVT Prophylaxis: on eliquis  Diet: Diet NPO Effective Now  Code Status: Limited    PT/OT Eval Status:   Will order when able     Iman Diego MD

## 2021-05-13 NOTE — PROGRESS NOTES
Pt placed on air mattress. Podus boots on bilat heels. SCDs on BLE. Bed alarm on, wheels locked, bed in lowest position, side rails up 2/4, nonskid socks on, call light and bedside table in reach. Will continue to monitor.

## 2021-05-13 NOTE — PROGRESS NOTES
Pt transferred to 5528 from the ICU. Pt responds to voice. PELON orientation. VSS w/exception to RR 22. Turk in place with yellow urine in the collection bag. No belongings reported with pt. Bed alarm on, wheels locked, bed in lowest position, side rails up 2/4, nonskid socks on, call light and bedside table in reach. Will continue to monitor.

## 2021-05-13 NOTE — PROGRESS NOTES
4 Eyes Admission Assessment     I agree as the admission nurse that 2 RN's have performed a thorough Head to Toe Skin Assessment on the patient. ALL assessment sites listed below have been assessed on admission. Areas assessed by both nurses:   [x]   Head, Face, and Ears   [x]   Shoulders, Back, and Chest  [x]   Arms, Elbows, and Hands   [x]   Coccyx, Sacrum, and Ischium  [x]   Legs, Feet, and Heels        Does the Patient have Skin Breakdown?   No       Scattered bruising, abrasions BUE, blanchable redness bilat heels and coccyx  Yosef Prevention initiated:  No   Wound Care Orders initiated:  No      [facility] nurse consulted for Pressure Injury (Stage 3,4, Unstageable, DTI, NWPT, and Complex wounds) or Yosef score 18 or lower:  No      Nurse 1 eSignature: Electronically signed by Eve Fisher RN on 5/12/21 at 9:03 PM EDT    **SHARE this note so that the co-signing nurse is able to place an eSignature**    Nurse 2 eSignature: Electronically signed by Shell Arevalo RN on 5/13/21 at 6:45 AM EDT

## 2021-05-13 NOTE — CONSULTS
[facility]  Palliative Medicine Consultation Note      Date Of Admission:5/12/2021  Date of consult: 05/13/21  Seen by [facility] in the past:  Yes    Recommendations:        Pt was seen by the palliative care team at [facility] the pt at the bedside. He is unable to participate in a goals of care discussion. Spoke with pt's daughter Paulino Lau at the bedside. She reports that she is his HCPOA and will bring in a copy with her tomorrow. Discussed her understanding of the pt's medical condition. She reports that ultimately if pt is found to have a treatable or reversible process she wants him treated, however if his mental status does not improve she would want hospice. She is Ok with temporary tube feedings via the pt's NGT which is already present, but he would not want a PEG tube placed if his mental status does not improve enough for him to eat. We did briefly discuss hospice. 1. Goals of Care/Advanced Care planning/Code status: Limited - DNR/DNI, confirmed today. Continue with current management. Pt's daughter reports that if he is not found to have a reversible process and does not improve she would enroll him in hospice   2. Pain: Pt unable to state but does not appear to be in acute distress  3. SOB: Resting comfortably on room air  4. AMS with uncertain etiology: Pt has been nonverbal since 5/9. Possible cefepime toxicity and/or status epilepticus. Head CT negative, cannot get MRI due to pacemaker. He is here for continuous EEG. D/w neuro APRN.    5. Disposition: TBD     Reason for Consult:         [x]  Goals of Care  [x]  Code Status Discussion/Advanced Care Planning   [x]  Psychosocial/Family Support  []  Symptom Management  []  Other (Specify)    Requesting Physician: Dr. Francis Salcido:  Status Epilepticus    History Obtained From:  electronic medical record, reason patient could not give history:  altered mental status    History of Present Illness:         Ida Sanchez is a 80 y.o. male with PMH as belowing including recent admission to [facility] for lower extremity weakness and falls who was transferred to [facility] for possible nonconvulsive status for cEEG. Upon presentation to [facility] he was found to have L4 fracture with 3mm retropulsion and was evaluated by NSGY. He was also treated for PNA with vanc/cefepime. During SLP eval his diet was downgraded to pureed and thickened liquids. On day 6 of his admission while on cefepime he had an acute decline in his mental status. Cefepime was stopped. There was a plan for pt to go to LTC with hospice services, however EEG showed possible nonconvulsive status, hence he was transferred to [facility] for cEEG. Subjective:         Past Medical History:        Diagnosis Date    Acute diastolic CHF (congestive heart failure) ([facility] [address] 10/23/2020    Arthritis     Atrial fibrillation (HCC)     BPH with obstruction/lower urinary tract symptoms     nocturia 3-4 x per night    Chronic atrial fibrillation (HCC)     CKD (chronic kidney disease) 2017    level 3    Coronary arteriosclerosis 2009    2 stents/ Dr Tee Bro in [address] [place] [address] for imaging to screen for metal prior to MRI 05/12/2021    NOT MRI COMPATIBLE St Von Pacer Model#[id number] implanted on 9/11/2014. Patient NOT able to have an MRI.     GERD (gastroesophageal reflux disease)     History of blood transfusion     1945   During deployment    HTN (hypertension) 1990    Left hip pain     schrapnel in hip and intestines/chronic pains left hip impairs walking    Pacemaker 2015    Pulmonary fibrosis ([facility] [address] 08/2017    mild amount seen on chest ct    Pulmonary nodule, left 2017    noncalcified 7mm low risk repeat 8/2018 if patient willing    Renal atrophy, right 2017    Renal cyst, right 08/2017    not clearly simple cyst    SOB (shortness of breath) 2017    terrell normal pft and ct chest ? smll amount of pulmonary fibrosis       Past Surgical History:        Procedure Laterality Date    ABDOMEN SURGERY      Scrapnel removed in [facility]  2009    awoke with indigestion    EYE SURGERY Bilateral     cataract    INGUINAL HERNIA REPAIR Right 07/2016    PACEMAKER INSERTION  2014    PACEMAKER PLACEMENT      PAIN MANAGEMENT PROCEDURE Bilateral 11/30/2020    BILATERAL L4 TRANSFORAMINAL EPIDURAL STEROID INJECTION WITH FLUOROSCOPY performed by Yaima Silva MD at [facility] reaves as ww II vet, part of small intestine moved    TONSILLECTOMY  1960       Current Medications:    Medications Prior to Admission: enoxaparin (LOVENOX) 30 MG/0.3ML injection, Inject 0.3 mLs into the skin daily  predniSONE (DELTASONE) 10 MG tablet, TAKE 1 TABLET BY MOUTH EVERY DAY  ferrous sulfate (IRON 325) 325 (65 Fe) MG tablet, Take 325 mg by mouth nightly  tamsulosin (FLOMAX) 0.4 MG capsule, ONE EVERY NIGHT FOR YOUR PROSTATE  omeprazole (PRILOSEC) 20 MG delayed release capsule, TAKE 1 CAPSULE BY MOUTH EVERY DAY  oxybutynin (DITROPAN) 5 MG tablet, TAKE 1 TABLET BY MOUTH EVERY DAY  albuterol sulfate  (90 Base) MCG/ACT inhaler, Inhale 2 puffs into the lungs every 6 hours as needed for Wheezing or Shortness of Breath  metoprolol succinate (TOPROL XL) 25 MG extended release tablet, Take 0.5 tablets by mouth daily For heart  atorvastatin (LIPITOR) 40 MG tablet, Take 1 tablet by mouth daily (Patient taking differently: Take 40 mg by mouth nightly )  finasteride (PROSCAR) 5 MG tablet, TAKE 1 TABLET BY MOUTH EVERY DAY  furosemide (LASIX) 40 MG tablet, One po qam for weight over 169lbs  diclofenac sodium (VOLTAREN) 1 % GEL, Apply 4 g topically 3 times daily  fluticasone (FLONASE) 50 MCG/ACT nasal spray, SPRAY 2 SPRAY INTO EACH NOSTRIL EVERY DAY  aspirin 81 MG tablet, Take 81 mg by mouth daily  Cholecalciferol (VITAMIN D3) 2000 UNITS CAPS, Take 2,000 Units by mouth daily  calcium carbonate (OSCAL) 500 MG TABS tablet, Take 600 mg by mouth daily caltrate 600-D3 one daily  Multiple Vitamins-Minerals (CENTRUM SILVER ADULT 50+) TABS, Take 1 tablet by mouth daily    Allergies:  Claritin [loratadine] and Zestril [lisinopril]    Social History:    · TOBACCO: reports that he has never smoked. He has never used smokeless tobacco.  · ETOH:   reports no history of alcohol use. · Patient currently lives with family daughter    Review of Systems -   Review of Systems   Unable to perform ROS: Mental status change       Objective:          Physical Exam  Constitutional:       Appearance: He is ill-appearing. Cardiovascular:      Rate and Rhythm: Normal rate. Heart sounds: Normal heart sounds. Pulmonary:      Effort: Pulmonary effort is normal.      Breath sounds: Normal breath sounds. Abdominal:      General: Bowel sounds are normal.      Palpations: Abdomen is soft. Musculoskeletal:      Right lower leg: No edema. Left lower leg: No edema. Skin:     General: Skin is warm and dry. Neurological:      Comments: obtunded          Palliative Performance Scale:  [] 60% Ambulation reduced; Significant disease; Can't do hobbies/housework; intake normal or reduced; occasional assist; LOC full/confusion  [] 50% Mainly sit/lie; Extensive disease; Can't do any work; Considerable assist; intake normal  Or reduced; LOC full/confusion  [] 40% Mainly in bed; Extensive disease; Mainly assist; intake normal or reduced; occasional assist; LOC full/confusion  [] 30% Bed Bound; Extensive disease; Total care; intake reduced; LOC full/confusion  [x] 20% Bed Bound; Extensive disease; Total care; intake minimal; Drowsy/coma  [] 10% Bed Bound; Extensive disease;  Total care; Mouth care only; Drowsy/coma  [] 0% Death    PPS: 20    Vitals:    BP (!) 174/113   Pulse 70   Temp 97.6 °F (36.4 °C) (Oral)   Resp 20   Ht 5' 5\" (1.651 m)   Wt 150 lb 12.7 oz (68.4 kg)   SpO2 93%   BMI 25.09 kg/m²     Labs:    BMP:   Recent Labs     05/11/21  0533 05/12/21  0532 05/13/21  0538   * 137 140   K 4.3 3.8 4.2   CL 99 100 103   CO2 27 29 26   BUN 31* 29* 26*   CREATININE 0.8 0.9 0.9   GLUCOSE 147* 118* 92     CBC:   Recent Labs     05/11/21  0533 05/12/21  0532 05/13/21  0538   WBC 10.7 10.4 10.7   HGB 13.4* 12.5* 13.4*   HCT 41.4 39.6* 42.2    149 121*       LFT's:   Recent Labs     05/11/21  0533 05/12/21  0532   AST 27 30   * 109*   BILITOT 1.2* 0.9   ALKPHOS 103 101     Troponin: No results for input(s): TROPONINI in the last 72 hours. BNP: No results for input(s): BNP in the last 72 hours. ABGs: No results for input(s): PHART, MRF6WNW, PO2ART in the last 72 hours. INR: No results for input(s): INR in the last 72 hours. U/A:  Recent Labs     05/10/21  1600   COLORU YELLOW   PHUR 6.5   WBCUA 2   RBCUA 5*   CLARITYU Clear   SPECGRAV 1.013   LEUKOCYTESUR Negative   UROBILINOGEN 0.2   BILIRUBINUR Negative   BLOODU MODERATE*   GLUCOSEU Negative       XR ABDOMEN (KUB) (SINGLE AP VIEW)   Final Result   Tip of nasogastric tube in proximal stomach. CT HEAD WO CONTRAST   Final Result   Impression:   1. No evidence of recent intracranial hemorrhage. 2. Parenchymal volume loss and chronic small vessel ischemic changes in the white matter. 3. 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 4. Opacification of the right mastoid air cells and right middle ear which could reflect age-indeterminate infectious/inflammatory process. Conclusion/Time spent:         Recommendations see above    Time spent with patient and/or family: 20  Time reviewing records: 10 min   Time communicating with staff: 5 min     A total of 35 minutes spent with the patient and family on unit greater than 50% in counseling regarding palliative care and in goals of care for the patient. Thank you to Dr. Edwina Azul for this consultation. We will continue to follow Mr. Lorenza Bailey care as needed.     [facility]  Inpatient Palliative Care  [phone]

## 2021-05-13 NOTE — PROGRESS NOTES
Patient opening eyes to voice and responding to painful stimuli. Able to state name this morning but disoriented to place, time, and situation. Not following commands. Vital signs stable with exception to elevated BP. PRN hydralazine available for systolic greater than 242/42. On cEEG throughout shift. NG tube remains in place to R nare and flushing well. Diminished lung sounds, active bowel tones. Turk in place draining adequate amounts of yolanda colored urine. On specialty mattress. Heels in multi podus boots and elbows elevated off of bed surface. Turned and repositioned q2 hours. Fall precautions in place. Bed locked in lowest position with alarm on. Patient not able to use call light. Hourly rounding in anticipation of patient needs. Resting in bed with eyes closed. Will continue to monitor.

## 2021-05-14 NOTE — PROGRESS NOTES
Parenchymal volume loss and chronic small vessel ischemic changes in the white matter. 3. 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 4. Opacification of the right mastoid air cells and right middle ear which could reflect age-indeterminate infectious/inflammatory process. XR CHEST 1 VIEW    (Results Pending)         Assessment/Plan:    Active Hospital Problems    Diagnosis    Status epilepticus ([facility] [address] [G40.901]       Patient is a 77-year-old male with past medical history of atrial fibrillation on Eliquis, chronic steroid use, BPH who presented to hospital as a transfer from [facility] for continuous EEG due to concern of seizures.   Patient was also noted to have vertebral fracture after a fall, IR was consulted for kyphoplasty    Assessment  Acute encephalopathy, concern of seizures  Hypoxic respiratory failure likely secondary to aspiration pneumonia  Atrial fibrillation on Eliquis  Fall, vertebral column fracture  Chronic steroid use  BPH    Plan  On continuous EEG, so for EEG is negative for focal epileptogenic foci, IR consulted for kyphoplasty, plan for kyphoplasty on Monday, POA to give consent  Eliquis on hold for planned procedure on Monday  Pain control  Continue gentle IV fluid therapy, statin, Toprol, home prednisone, Flomax  Procalcitonin is negative, patient is currently on room air, afebrile-holding off on antibiotics for now, check chest x-ray  DVT prophylaxis-Eliquis on hold, will start on subcutaneous heparin until procedure  Diet: Diet NPO Effective Now  Code Status: Limited    PT/OT Eval Status: ordered    Dispo -plan for kyphoplasty on Monday, pain control, PT/OT consulted    Ministerio Nunez MD

## 2021-05-14 NOTE — PROGRESS NOTES
Palliative Care Chart Review  and Check in Note:     NAME:  Selene Vega 664 Date: 5/12/2021  Hospital Day:  Hospital Day: 3   Current Code status: Limited    Palliative care is continuing to following Mr. Indira Currie for symptom management,  and goals of care discussion as needed. Patient's chart reviewed today 5/14/21. Pt is more alert this morning, although extremely dysarthic and difficult to understand. When asked how he is he states \"alright\". He asks \"where am I?\". He was able to follow simple commands on exam. Seems to be improving. No visitors present. The following are the currently established goals/code status, and Symptom management. Goals of care: Continue with current management. Pt's daughter reported that if he is not found to have a reversible process and does not improve she would enroll him in hospice. She confirmed yesterday that he would be OK with temporary feeding via NGT, but would NOT want a PEG if he did not regain the ability to safely swallow on his own. D/w Dr. Houston Hickman.     Code status: Limited - DNR/DNI    Discharge plan: TBD, pending improvement      Shayan Staley [facility]  Inpatient Palliative Care  [phone]

## 2021-05-14 NOTE — PROGRESS NOTES
Daughter at bedside and brought power of  papers. This RN copied papers and placed in chart, original copy given back to daughter.

## 2021-05-14 NOTE — PROGRESS NOTES
Pt is more alert this morning, awake during hourly rounds. Pt still only oriented to self. BP elevated earlier this shift requiring PRN hydralazine and clonidine. Pt following some commands. Pt denies pain and positioned for comfort. Turk with adequate output. All fall precautions in place.

## 2021-05-14 NOTE — PROGRESS NOTES
Patient is more alert this shift. Vital signs stable with exception to elevated BP. cEEG discontinued per neurology. Following more commands and able to state name but still disoriented to place, time, and situation. Weak hand  bilaterally. Moving all extremities spontaneously. Diminished lung sounds, active bowel tones. Medications administered through NG tube which is flushing well. Turk in place and draining adequate amounts of clear, yellow urine. No BM this shift thus far. On specialty mattress. Turned and repositioned q2 hours. Heels in multi podus boots. Unable to use bedside table or call light. Hourly rounding in anticipating of patient needs. Fall precautions in place. Bed locked in lowest position with alarm on. Camera on in room for safety/seizure precautions. Will continue to monitor.

## 2021-05-14 NOTE — PROGRESS NOTES
CONTINUOUS EEG    Name:  [facility] Record Number:  [id number]  Age: 80 y.o. Gender: male  : 1925  Today's Date:  2021  Room:  [id number]  Vital Signs   BP (!) 178/82   Pulse 71   Temp 97.6 °F (36.4 °C) (Axillary)   Resp 16   Ht 5' 5\" (1.651 m)   Wt 150 lb 12.7 oz (68.4 kg)   SpO2 100%   BMI 25.09 kg/m²           Continuous EEG Testing Start Time:      Continuous EEG Testing End Time:       Comments: Per Phil Abreu Neuro N.P. pt test is completed. [facility] contacted via team viewer. Plan of Care: Leads removed.     Electronically signed by Garrett Marcano on 2021 at 3:38 PM

## 2021-05-14 NOTE — PROGRESS NOTES
Interval History:  Mental status improving.      Current Medications:    Current Facility-Administered Medications:     cloNIDine (CATAPRES) tablet 0.1 mg, 0.1 mg, Oral, Q4H PRN, Leidy Garcia MD, 0.1 mg at 05/13/21 1925    hydrALAZINE (APRESOLINE) injection 10 mg, 10 mg, Intravenous, Q6H PRN, Mary Rodriguez MD, 10 mg at 05/13/21 2312    sodium chloride flush 0.9 % injection 5-40 mL, 5-40 mL, Intravenous, 2 times per day, Lauryn Black, DO, 10 mL at 05/14/21 0801    sodium chloride flush 0.9 % injection 5-40 mL, 5-40 mL, Intravenous, PRN, Lauryn Black, DO    0.9 % sodium chloride infusion, 25 mL, Intravenous, PRN, Lauryn Black, DO    promethazine (PHENERGAN) tablet 12.5 mg, 12.5 mg, Oral, Q6H PRN **OR** ondansetron (ZOFRAN) injection 4 mg, 4 mg, Intravenous, Q6H PRN, Lauryn Black, DO    polyethylene glycol (GLYCOLAX) packet 17 g, 17 g, Oral, Daily PRN, Lauryn Black, DO    acetaminophen (TYLENOL) tablet 650 mg, 650 mg, Oral, Q6H PRN **OR** acetaminophen (TYLENOL) suppository 650 mg, 650 mg, Rectal, Q6H PRN, Lauryn Black, DO    0.9 % sodium chloride infusion, , Intravenous, Continuous, Lauryn Black, DO, Last Rate: 75 mL/hr at 05/14/21 0654, New Bag at 05/14/21 0654    predniSONE (DELTASONE) tablet 10 mg, 10 mg, Oral, Daily, Lauryn Black, DO, 10 mg at 05/14/21 0800    metoprolol succinate (TOPROL XL) extended release tablet 12.5 mg, 12.5 mg, Oral, Daily, Lauryn Black, DO, 12.5 mg at 05/14/21 0800    tamsulosin (FLOMAX) capsule 0.4 mg, 0.4 mg, Oral, Nightly, Lauryn Black, DO, 0.4 mg at 05/13/21 2000    finasteride (PROSCAR) tablet 5 mg, 5 mg, Oral, Daily, Lauryn Black, DO, 5 mg at 05/14/21 0800    aspirin chewable tablet 81 mg, 81 mg, Oral, Daily, Lauryn Black, DO, 81 mg at 05/14/21 0800    atorvastatin (LIPITOR) tablet 40 mg, 40 mg, Oral, Nightly, Lauryn Black, DO, 40 mg at 05/13/21 2000    apixaban (ELIQUIS) tablet 2.5 mg, 2.5 mg, Oral, BID, Lauryn Black, DO, 2.5 mg at 05/14/21 0800      Physical Exam  Constitutional  BP (!) 169/82   Pulse 70   Temp 98 °F (36.7 °C) (Axillary)   Resp 16   Ht 5' 5\" (1.651 m)   Wt 150 lb 12.7 oz (68.4 kg)   SpO2 99%   BMI 25.09 kg/m²     Opened eyes spontaneously and started interacting. Mouth dry and words a little garbled from this. EOMs intact. He tells me he's \"not doing too well. \" Endorsing back pain and pain with movement. BLE weak but due to pain-limiting exam.            Images:   CTA head w/wo contrast:  1. No acute intracranial abnormality. 2.  Moderate cerebral atrophy and mild chronic small vessel ischemic disease. 3.  Wall thickening and luminal narrowing of the distal right vertebral artery V2 and V3 segments may represent vasculitis or less likely atherosclerosis, fibromuscular dysplasia, or dissection. 4.  Severe mid basilar artery stenosis. 5.  Mild to moderate atherosclerotic stenosis of the bilateral middle cerebral artery M1 segments and left posterior cerebral artery P1 and P2 segments. CT head wo contrast:    1. No evidence of recent intracranial hemorrhage. 2. Parenchymal volume loss and chronic small vessel ischemic changes in the white matter. 3. 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 4. Opacification of the right mastoid air cells and right middle ear which could reflect age-indeterminate infectious/inflammatory process. CT lumbar spine trauma reconstruction:  1. Acute appearing fracture seen at the L4 level with about 3 mm of retropulsion into the central canal.   2. Ground-glass opacity seen at the lung bases could be secondary to an infectious or inflammatory process. 3. Partially visualized right greater than left pleural fluid. 4. Hyperdense 2.8 x 4.2 cm area arising from the superior pole of the right kidney, increased in size since 2017. Recommend further evaluation with ultrasound on a nonemergent basis. 5. Bladder moderately distended with urine.      5/12/2021-5/13/2021        Seizures - none  Push buttons - none  Background - Continuous generalized low amplitude mixed frequencies, intermittent frontal delta activity. CLINICAL INTERPRETATION: This is an abnormal video EEG study  1. Generalized background abnormalities indicative of an underlying diffuse encephalopathy of non-specific etiology   2. No focal or epileptiform abnormalities. No seizures. No push button events. Maria Elena Campoverde is a 80 y.o. male with L4 fracture requiring pain medication, and pneumonia who presented with progressive encephalopathy and rEEG concerning for non-convulsive status epilepticus. He has not had any seizures or epileptiform discharges overnight, off AED. I suspect his altered mental status was a result of the narcotic pain medication. He is waking up today and endorsing low back pain, he has point tenderness at the lumbar level.   BLE weak due to pain limiting exam.       Plan:    -[id number] Claudia López to start tube feeds, make NPO midnight on Sunday  -Discontinue cvEEG  -Hold eliquis and ASA for kyphoplasty on Monday with Dr. Tohmpson Corporal   -Delirium Precautions: Maintain a regular night-day/sleep-wake cycle: discourage daytime naps, re-orient frequently, shades up during the daytime, family at bedside as often as possible, minimize nighttime awakenings, , utilize relaxation channel on television   -No further inpatient neurologic workup, we will sign off    Angelica Nation, Laly Carrasco

## 2021-05-14 NOTE — PLAN OF CARE
Problem: Falls - Risk of:  Goal: Will remain free from falls  5/14/2021 0219 by Beto Joe RN  Outcome: Ongoing  Note: Patient is a high fall risk. All fall precautions in place: bed alarm on, nonskid socks on pt, call light within reach, bed locked in lowest position. Will continue to monitor pt safety. Problem: Physical Regulation:  Goal: Ability to maintain a stable neurologic state will improve  Outcome: Ongoing  Note: Pt responds to voice, moving all extremities freely.  Pt remains on cEEG

## 2021-05-15 NOTE — PROGRESS NOTES
Pharmacy Progress Note    Heparin low dose weight based infusion is ordered for patient. Per chart review, patient has received an oral Factor Xa inhibitor (Eliquis on 5/14 @0800) within the last 72 hours. As oral Factor Xa inhibitors can impact the anti-Xa test calibrated to unfractionated heparin, Heparin infusion will be monitored and adjusted using aPTT's per [address] [facility] [place] [address]. APTT algorithm:  [unfilled]    **Use original weight used to start heparin for all adjustments**  Maximum initial infusion rate = 1000 units/hr    aPTT < 36         Heparin 60 units/kg bolus     Increase infusion by 4 units/kg/hr                            (maximum 4,000 units)  aPTT 36-48       Heparin 30 units/kg bolus     Increase infusion by 2 units/kg/hr                            (maximum 2,000 units)  aPTT 49-62       No bolus                                No change  aPTT 63-96       No bolus                                Decrease infusion by 2 units/kg/hr  aPTT  > 97        Hold heparin for 1 hour         Decrease infusion by 3 units/kg/hr    Obtain aPTT 6 hours after initial bolus and 6 hours after any dose change until two consecutive therapeutic aPTTs are achieved - then daily. Pharmacy will monitor daily to assist with adjustments & ordering of labs as needed. If patient remains on heparin infusion 72 hours from now, pharmacy will change infusion back to usual monitoring  via the Anti-Xa algorithm per [address] [facility] [place] [address] as the interaction will no longer occur at that time.     Please call pharmacy with any questions -  Pato Tamayo, PharmD  [phone]

## 2021-05-15 NOTE — PROGRESS NOTES
Cor pack in place at 50 cm, respirations regular , unlabored ,pulse ox 94 to 96 percent . , pt calm , kub pending , guide wire in place ,

## 2021-05-15 NOTE — PROCEDURES
CONTINUOUS VIDEO EEG MONITORING - FINAL SUMMARY REPORT    DATE OF SERVICE: 5/12/2021 23:26 TO 5/14/2021 15:18    NAME: Mavis Bi OF BIRTH: 12/21/1925  SEX: male  MEDICAL RECORD LNDMPF:[id number]    EEG-CCTV study:   The patient is a 80 y.o.y old male monitored at the request of the team for altered mental status and concern for subclinical seizures. EEG-VIDEO MONITORING METHODOLOGY:   Time-locked EEG-video monitoring was performed using the 32-channel interspireSubmit monitoring system. Analyses of the monitoring data were performed using the following techniques:   1. Review of the relevant EEG-video data. 2. Review of events detected by the computer system in detail. 3. Review of clinical seizures, with both detailed review of EEG and video and playback using multiple montages. A variety of referential and bipolar montages were used. CLINICAL AND EEG ANALYSIS:  Video EEG recording was reviewed in real time by a technologist, and then reviewed at least twice a day when available on the  with maximum possible sampling. Results of the monitoring were related to the treating team frequently throughout the study, at least once a day to help guide treatment via verbal or written communication as brief notes or direct text messages or emails. Updates and response to treatment was communicated as requested by the requesting physician or the team.        Seizures - none  Push buttons - none  Background - Continuous generalized low amplitude mixed frequencies, intermittent frontal delta activity. CLINICAL INTERPRETATION: This is an abnormal video EEG study  1. Generalized background abnormalities indicative of an underlying diffuse encephalopathy of non-specific etiology   2. No focal or epileptiform abnormalities. No seizures. No push button events.

## 2021-05-15 NOTE — PROGRESS NOTES
CONTINUOUS VIDEO EEG MONITORING    DATE OF SERVICE: 5/13/2021 23:23 TO 5/14/2021 15:18    NAME: Erlin Urrutia OF BIRTH: 12/21/1925  SEX: male  MEDICAL RECORD DXSTVH:[id number]    EEG-CCTV study:   The patient is a 80 y.o.y old male monitored at the request of the team for altered mental status and concern for subclinical seizures. EEG-VIDEO MONITORING METHODOLOGY:   Time-locked EEG-video monitoring was performed using the 32-channel [employer] monitoring system. Analyses of the monitoring data were performed using the following techniques:   1. Review of the relevant EEG-video data. 2. Review of events detected by the computer system in detail. 3. Review of clinical seizures, with both detailed review of EEG and video and playback using multiple montages. A variety of referential and bipolar montages were used. CLINICAL AND EEG ANALYSIS:  Video EEG recording was reviewed in real time by a technologist, and then reviewed at least twice a day when available on the  with maximum possible sampling. Results of the monitoring were related to the treating team frequently throughout the study, at least once a day to help guide treatment via verbal or written communication as brief notes or direct text messages or emails. Updates and response to treatment was communicated as requested by the requesting physician or the team.    5/13/2021-5/14/2021      Seizures - none  Push buttons - none  Background - Continuous generalized low amplitude mixed frequencies, intermittent frontal delta activity. CLINICAL INTERPRETATION: This is an abnormal video EEG study  1. Generalized background abnormalities indicative of an underlying diffuse encephalopathy of non-specific etiology   2. No focal or epileptiform abnormalities. No seizures. No push button events.

## 2021-05-15 NOTE — PROGRESS NOTES
Hospitalist Progress Note      PCP: Kwesi Andino MD    Chief Complaint. Patient is a 80-year-old male with past medical history of atrial fibrillation on Eliquis, chronic steroid use, BPH who presented to hospital as a transfer [facility] [place] for continuous EEG due to concern of seizures. Patient was also noted to have vertebral fracture after a fall, IR was consulted for kyphoplasty    Date of Admission: 5/12/2021    Subjective:   No reported acute events overnight, patient seems better than yesterday and has been more alert and awake, he complained about back pain    Medications:  Reviewed    Infusion Medications    sodium chloride      sodium chloride 75 mL/hr at 05/14/21 2010     Scheduled Medications    enoxaparin  30 mg Subcutaneous Daily    sodium chloride flush  5-40 mL Intravenous 2 times per day    predniSONE  10 mg Oral Daily    metoprolol succinate  12.5 mg Oral Daily    tamsulosin  0.4 mg Oral Nightly    finasteride  5 mg Oral Daily    [Held by provider] aspirin  81 mg Oral Daily    atorvastatin  40 mg Oral Nightly    [Held by provider] apixaban  2.5 mg Oral BID     PRN Meds: cloNIDine, hydrALAZINE, sodium chloride flush, sodium chloride, promethazine **OR** ondansetron, polyethylene glycol, acetaminophen **OR** acetaminophen      Intake/Output Summary (Last 24 hours) at 5/15/2021 1535  Last data filed at 5/15/2021 0515  Gross per 24 hour   Intake 0 ml   Output 1050 ml   Net -1050 ml       Physical Exam Performed:    BP (!) 142/65   Pulse 70   Temp 97.8 °F (36.6 °C) (Oral)   Resp 16   Ht 5' 5\" (1.651 m)   Wt 150 lb 12.7 oz (68.4 kg)   SpO2 98%   BMI 25.09 kg/m²     General appearance: No apparent distress, alert and awake  HEENT:  Conjunctivae/corneas clear. Neck: Supple, with full range of motion. Respiratory:  Normal respiratory effort. Clear to auscultation, bilaterally without Rales/Wheezes/Rhonchi.   Cardiovascular: Regular rate and rhythm with normal S1/S2 without murmurs or rubs  Abdomen: Soft, non-tender, non-distended, normal bowel sounds. Musculoskeletal: No cyanosis or edema bilaterally  Neurologic: Seems confused not actively following commands  Psychiatric: Alert and oriented, Normal mood  Peripheral Pulses: +2 palpable, equal bilaterally       Labs:   Recent Labs     05/13/21  0538 05/14/21  0606 05/15/21  0621   WBC 10.7 15.3* 15.4*   HGB 13.4* 12.8* 13.0*   HCT 42.2 40.5 41.7   * 130* 152     Recent Labs     05/13/21 0538 05/14/21  0606 05/15/21  0621    142 143   K 4.2 4.3 3.9    107 111*   CO2 26 20* 19*   BUN 26* 28* 30*   CREATININE 0.9 0.8 0.9   CALCIUM 8.8 8.9 8.9   PHOS 2.7 2.7 2.3*     No results for input(s): AST, ALT, BILIDIR, BILITOT, ALKPHOS in the last 72 hours. Recent Labs     05/14/21  1334   INR 1.25*     No results for input(s): Meldon Cabrera in the last 72 hours. Urinalysis:      Lab Results   Component Value Date    NITRU Negative 05/10/2021    WBCUA 2 05/10/2021    RBCUA 5 05/10/2021    RBCUA NEGATIVE 01/11/2021    BLOODU MODERATE 05/10/2021    SPECGRAV 1.013 05/10/2021    GLUCOSEU Negative 05/10/2021       Radiology:  XR ABDOMEN (KUB) (SINGLE AP VIEW)   Final Result   Feeding tube is pointed towards the pyloric portion the stomach. XR ABDOMEN (KUB) (SINGLE AP VIEW)   Final Result      1. Feeding tube with tip in the mid stomach. Recommend advancing the tube to ensure its position in the distal stomach/proximal small bowel. XR CHEST 1 VIEW   Final Result   Impression: Cardiomegaly with prominence of the pulmonary interstitium and vascularity. Correlate for edema. CTA HEAD W WO CONTRAST   Final Result      1. No acute intracranial abnormality. 2.  Moderate cerebral atrophy and mild chronic small vessel ischemic disease.    3.  Wall thickening and luminal narrowing of the distal right vertebral artery V2 and V3 segments may represent vasculitis or less likely atherosclerosis, fibromuscular dysplasia, or dissection. 4.  Severe mid basilar artery stenosis. 5.  Mild to moderate atherosclerotic stenosis of the bilateral middle cerebral artery M1 segments and left posterior cerebral artery P1 and P2 segments. XR ABDOMEN (KUB) (SINGLE AP VIEW)   Final Result   Tip of nasogastric tube in proximal stomach. CT HEAD WO CONTRAST   Final Result   Impression:   1. No evidence of recent intracranial hemorrhage. 2. Parenchymal volume loss and chronic small vessel ischemic changes in the white matter. 3. 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 4. Opacification of the right mastoid air cells and right middle ear which could reflect age-indeterminate infectious/inflammatory process. Assessment/Plan:    Active Hospital Problems    Diagnosis     Status epilepticus ([facility] [address] [G40.901]        Patient is a 70-year-old male with past medical history of atrial fibrillation on Eliquis, chronic steroid use, BPH who presented to hospital as a transfer [facility] [place] [facility] [place] for continuous EEG due to concern of seizures.   Patient was also noted to have vertebral fracture after a fall, IR was consulted for kyphoplasty    Assessment  Acute encephalopathy, concern of seizures  Hypoxic respiratory failure likely secondary to aspiration pneumonia  Atrial fibrillation on Eliquis  Fall, vertebral column fracture  Chronic steroid use  BPH    Plan  EEG discontinued, IR consulted for kyphoplasty, plan for kyphoplasty on Monday, POA to give consent  Eliquis on hold for planned procedure on Monday  Pain control  Continue statin, Toprol, home prednisone, Flomax  Procalcitonin is negative, patient is currently on room air, afebrile-holding off on antibiotics for now, check chest x-ray  DVT prophylaxis-Eliquis on hold, will start on subcutaneous heparin until procedure  Diet: Diet NPO Effective Now  Diet NPO, After Midnight  Code Status: Limited    PT/OT Eval Status: ordered    Dispo -plan for kyphoplasty on Monday, pain control, PT/OT consulted    Esther Monreal MD

## 2021-05-16 NOTE — PLAN OF CARE
Problem: Falls - Risk of:  Goal: Will remain free from falls  Description: Will remain free from falls  Outcome: Ongoing  Pt is a high fall risk. High fall precautions are in place: nonskid socks, yellow blanket, fall wristband, call light in reach, bed is locked and in lowest position, bed alarm engaged. Problem: Skin Integrity:  Goal: Will show no infection signs and symptoms  Description: Will show no infection signs and symptoms  Outcome: Ongoing  Turn and reposition q 2 hrs.

## 2021-05-16 NOTE — PLAN OF CARE
Problem: Skin Integrity:  Goal: Will show no infection signs and symptoms  Description: Will show no infection signs and symptoms  Outcome: Ongoing  Note: Thrush on tongue  , with mouth care , dr aware nystatin swish  and spit ordered

## 2021-05-16 NOTE — PROGRESS NOTES
Crackles heard in lung bases. Bolus stopped. Pt does appear to have increase work in breathing. Secure message sent to hospitalist to report above. Awaiting response/orders.

## 2021-05-16 NOTE — PROGRESS NOTES
Normal saline bolus of 250 started per order . Tube feed increased to 30 ml per hour , respirations to 99 percent  regular unlabored .  Lung clear , 02 sat 97

## 2021-05-16 NOTE — PROGRESS NOTES
without murmurs or rubs  Abdomen: Soft, non-tender, non-distended, normal bowel sounds. Musculoskeletal: No cyanosis or edema bilaterally  Neurologic: Seems confused not actively following commands  Psychiatric: Alert and oriented, Normal mood  Peripheral Pulses: +2 palpable, equal bilaterally       Labs:   Recent Labs     05/14/21  0606 05/15/21  0621 05/16/21  0809   WBC 15.3* 15.4* 12.9*   HGB 12.8* 13.0* 12.6*   HCT 40.5 41.7 40.4*   * 152 130*     Recent Labs     05/14/21  0606 05/15/21  0621 05/16/21  0809    143 147*   K 4.3 3.9 3.9    111* 112*   CO2 20* 19* 23   BUN 28* 30* 31*   CREATININE 0.8 0.9 1.0   CALCIUM 8.9 8.9 9.1   PHOS 2.7 2.3* 1.9*     No results for input(s): AST, ALT, BILIDIR, BILITOT, ALKPHOS in the last 72 hours. Recent Labs     05/14/21  1334 05/15/21  1711   INR 1.25* 1.17*     No results for input(s): CKTOTAL, TROPONINI in the last 72 hours. Urinalysis:      Lab Results   Component Value Date    NITRU Negative 05/10/2021    WBCUA 2 05/10/2021    RBCUA 5 05/10/2021    RBCUA NEGATIVE 01/11/2021    BLOODU MODERATE 05/10/2021    SPECGRAV 1.013 05/10/2021    GLUCOSEU Negative 05/10/2021       Radiology:  XR ABDOMEN (KUB) (SINGLE AP VIEW)   Final Result   Feeding tube is pointed towards the pyloric portion the stomach. XR ABDOMEN (KUB) (SINGLE AP VIEW)   Final Result      1. Feeding tube with tip in the mid stomach. Recommend advancing the tube to ensure its position in the distal stomach/proximal small bowel. XR CHEST 1 VIEW   Final Result   Impression: Cardiomegaly with prominence of the pulmonary interstitium and vascularity. Correlate for edema. CTA HEAD W WO CONTRAST   Final Result      1. No acute intracranial abnormality. 2.  Moderate cerebral atrophy and mild chronic small vessel ischemic disease.    3.  Wall thickening and luminal narrowing of the distal right vertebral artery V2 and V3 segments may represent vasculitis or less likely atherosclerosis, fibromuscular dysplasia, or dissection. 4.  Severe mid basilar artery stenosis. 5.  Mild to moderate atherosclerotic stenosis of the bilateral middle cerebral artery M1 segments and left posterior cerebral artery P1 and P2 segments. XR ABDOMEN (KUB) (SINGLE AP VIEW)   Final Result   Tip of nasogastric tube in proximal stomach. CT HEAD WO CONTRAST   Final Result   Impression:   1. No evidence of recent intracranial hemorrhage. 2. Parenchymal volume loss and chronic small vessel ischemic changes in the white matter. 3. 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 4. Opacification of the right mastoid air cells and right middle ear which could reflect age-indeterminate infectious/inflammatory process. Assessment/Plan:    Active Hospital Problems    Diagnosis     Status epilepticus ([facility] [address] [G40.901]      Patient is a 58-year-old male with past medical history of atrial fibrillation on Eliquis, chronic steroid use, BPH who presented to hospital as a transfer [facility] [place] [facility] [place] for continuous EEG due to concern of seizures.   Patient was also noted to have vertebral fracture after a fall, IR was consulted for kyphoplasty    Assessment  Acute encephalopathy, concern of seizures  Hypoxic respiratory failure likely secondary to aspiration pneumonia  Atrial fibrillation on Eliquis  Fall, vertebral column fracture  Hypernatremia  Chronic steroid use  BPH    Plan  EEG discontinued, IR consulted for kyphoplasty, plan for kyphoplasty on Monday, POA to give consent  Eliquis on hold for planned procedure on Monday  Pain control  Ordered free water bolus x 1  Continue statin, Toprol, home prednisone, Flomax  Procalcitonin is negative, patient is currently on room air, afebrile-holding off on antibiotics for now, check chest x-ray  DVT prophylaxis-Eliquis on hold, will start on subcutaneous heparin until procedure  Diet: DIET TUBE FEED CONTINUOUS/CYCLIC NPO; STANDARD WITH

## 2021-05-17 NOTE — PROGRESS NOTES
Patient supposed to have a kyphoplasty with IR today. Tube feed has been running all night but just stopped by this RN. This RN called down to IR to make sure procedure could still be done after having tube feeds going all night. Charge RN said she would ask and report back but also stated that heparin gtt needed to be stopped now. This RN stopped heparin gtt. Will continue to follow up.

## 2021-05-17 NOTE — H&P
Patient:  Felicia Minor   :   1925      Relevant clinical history, particularly as it involves the pending procedure, was reviewed and discussed. The procedure including risks and benefits was discussed at length with the patient (or designated family member) and all questions were answered. Informed consent to proceed with the procedure was given. Vital signs were monitored and documented by the Radiology nurse. Targeted physical examination  Heart : regular rate and rhythm  Lungs : clear, breathing easily  Condition : stable    [facility] nurses notes reviewed and agreed. Past Medical History:        Diagnosis Date    Acute diastolic CHF (congestive heart failure) ([facility] [address] 10/23/2020    Arthritis     Atrial fibrillation (HCC)     BPH with obstruction/lower urinary tract symptoms     nocturia 3-4 x per night    Chronic atrial fibrillation (HCC)     CKD (chronic kidney disease) 2017    level 3    Coronary arteriosclerosis     2 stents/ Dr Jasvir London in [address] [place] [address] for imaging to screen for metal prior to MRI 2021    NOT MRI COMPATIBLE St Von Pacer Model#[id number] implanted on 2014. Patient NOT able to have an MRI.     GERD (gastroesophageal reflux disease)     History of blood transfusion        During deployment    HTN (hypertension)     Left hip pain     schrapnel in hip and intestines/chronic pains left hip impairs walking    Pacemaker     Pulmonary fibrosis ([facility] [address] 2017    mild amount seen on chest ct    Pulmonary nodule, left 2017    noncalcified 7mm low risk repeat 2018 if patient willing    Renal atrophy, right 2017    Renal cyst, right 2017    not clearly simple cyst    SOB (shortness of breath) 2017    terrell normal pft and ct chest ? smll amount of pulmonary fibrosis       Past Surgical History:           Procedure Laterality Date    ABDOMEN SURGERY      Scrapnel removed in [address]  PLACEMENT  2009    awoke with indigestion    EYE SURGERY Bilateral     cataract    INGUINAL HERNIA REPAIR Right 07/2016    PACEMAKER INSERTION  2014    PACEMAKER PLACEMENT      PAIN MANAGEMENT PROCEDURE Bilateral 11/30/2020    BILATERAL L4 TRANSFORAMINAL EPIDURAL STEROID INJECTION WITH FLUOROSCOPY performed by Khris Hoang MD at [address] [place] [address] Jarod beattyángel as ww II vet, part of small intestine moved    TONSILLECTOMY  1960       Allergies:  Claritin [loratadine] and Zestril [lisinopril]    Medications:   Home Meds  No current facility-administered medications on file prior to encounter.      Current Outpatient Medications on File Prior to Encounter   Medication Sig Dispense Refill    enoxaparin (LOVENOX) 30 MG/0.3ML injection Inject 0.3 mLs into the skin daily 0.3 mL 5    predniSONE (DELTASONE) 10 MG tablet TAKE 1 TABLET BY MOUTH EVERY DAY 30 tablet 2    ferrous sulfate (IRON 325) 325 (65 Fe) MG tablet Take 325 mg by mouth nightly      tamsulosin (FLOMAX) 0.4 MG capsule ONE EVERY NIGHT FOR YOUR PROSTATE 90 capsule 0    omeprazole (PRILOSEC) 20 MG delayed release capsule TAKE 1 CAPSULE BY MOUTH EVERY DAY 90 capsule 0    oxybutynin (DITROPAN) 5 MG tablet TAKE 1 TABLET BY MOUTH EVERY DAY 90 tablet 0    albuterol sulfate  (90 Base) MCG/ACT inhaler Inhale 2 puffs into the lungs every 6 hours as needed for Wheezing or Shortness of Breath 1 Inhaler 1    metoprolol succinate (TOPROL XL) 25 MG extended release tablet Take 0.5 tablets by mouth daily For heart 30 tablet 3    atorvastatin (LIPITOR) 40 MG tablet Take 1 tablet by mouth daily (Patient taking differently: Take 40 mg by mouth nightly ) 90 tablet 2    finasteride (PROSCAR) 5 MG tablet TAKE 1 TABLET BY MOUTH EVERY DAY 90 tablet 4    furosemide (LASIX) 40 MG tablet One po qam for weight over 169lbs 90 tablet 3    diclofenac sodium (VOLTAREN) 1 % GEL Apply 4 g topically 3 times daily 1 Tube 0    fluticasone (FLONASE) 50 MCG/ACT nasal spray SPRAY 2 SPRAY INTO EACH NOSTRIL EVERY DAY 3 Bottle 1    aspirin 81 MG tablet Take 81 mg by mouth daily      Cholecalciferol (VITAMIN D3) 2000 UNITS CAPS Take 2,000 Units by mouth daily      calcium carbonate (OSCAL) 500 MG TABS tablet Take 600 mg by mouth daily caltrate 600-D3 one daily      Multiple Vitamins-Minerals (CENTRUM SILVER ADULT 50+) TABS Take 1 tablet by mouth daily         Current Meds  nystatin (MYCOSTATIN) 725382 UNIT/ML suspension 500,000 Units, 4x Daily  heparin (porcine) injection 4,000 Units, PRN  heparin (porcine) injection 2,000 Units, PRN  heparin 25,000 units in dextrose 5% 250 mL (premix) infusion, Continuous  metoprolol tartrate (LOPRESSOR) tablet 12.5 mg, Daily  cloNIDine (CATAPRES) tablet 0.1 mg, Q4H PRN  hydrALAZINE (APRESOLINE) injection 10 mg, Q6H PRN  sodium chloride flush 0.9 % injection 5-40 mL, 2 times per day  sodium chloride flush 0.9 % injection 5-40 mL, PRN  0.9 % sodium chloride infusion, PRN  promethazine (PHENERGAN) tablet 12.5 mg, Q6H PRN   Or  ondansetron (ZOFRAN) injection 4 mg, Q6H PRN  polyethylene glycol (GLYCOLAX) packet 17 g, Daily PRN  acetaminophen (TYLENOL) tablet 650 mg, Q6H PRN   Or  acetaminophen (TYLENOL) suppository 650 mg, Q6H PRN  predniSONE (DELTASONE) tablet 10 mg, Daily  tamsulosin (FLOMAX) capsule 0.4 mg, Nightly  finasteride (PROSCAR) tablet 5 mg, Daily  [Held by provider] aspirin chewable tablet 81 mg, Daily  atorvastatin (LIPITOR) tablet 40 mg, Nightly  [Held by provider] apixaban (ELIQUIS) tablet 2.5 mg, BID          ASA 2 - Patient with mild systemic disease with no functional limitations    II (soft palate, uvula, fauces visible)    Activity:  2 - Able to move 4 extremities voluntarily on command  Respiration:  2 - Able to breathe deeply and cough freely  Circulation:  2 - BP+/- 20mmHg of normal  Consciousness:  2 - Fully awake  Oxygen Saturation (color):  2 - Able to maintain oxygen saturation >92% on room air    Sedation : Moderate sedation planned

## 2021-05-17 NOTE — PROGRESS NOTES
Hospitalist Progress Note      PCP: Teresita Black MD    Chief Complaint. Patient is a 54-year-old male with past medical history of atrial fibrillation on Eliquis, chronic steroid use, BPH who presented to hospital as a transfer from [facility] [place] for continuous EEG due to concern of seizures. Patient was also noted to have vertebral fracture after a fall, IR was consulted for kyphoplasty    Date of Admission: 5/12/2021    Subjective:   No reported acute events overnight,   seen and evaluated at bedside, having back pain with any movement  patient has been more alert and awake, denied CP,    Medications:  Reviewed    Infusion Medications    heparin (PORCINE) Infusion Stopped (05/17/21 0831)    sodium chloride       Scheduled Medications    nystatin  5 mL Oral 4x Daily    metoprolol tartrate  12.5 mg Per G Tube Daily    sodium chloride flush  5-40 mL Intravenous 2 times per day    predniSONE  10 mg Oral Daily    tamsulosin  0.4 mg Oral Nightly    finasteride  5 mg Oral Daily    [Held by provider] aspirin  81 mg Oral Daily    atorvastatin  40 mg Oral Nightly    [Held by provider] apixaban  2.5 mg Oral BID     PRN Meds: heparin (porcine), heparin (porcine), cloNIDine, hydrALAZINE, sodium chloride flush, sodium chloride, promethazine **OR** ondansetron, polyethylene glycol, acetaminophen **OR** acetaminophen      Intake/Output Summary (Last 24 hours) at 5/17/2021 1323  Last data filed at 5/17/2021 1032  Gross per 24 hour   Intake 633 ml   Output 1800 ml   Net -1167 ml       Physical Exam Performed:    BP (!) 155/69   Pulse 72   Temp 97.8 °F (36.6 °C) (Oral)   Resp 16   Ht 5' 5\" (1.651 m)   Wt 150 lb 2.1 oz (68.1 kg)   SpO2 96%   BMI 24.98 kg/m²     General appearance: NAD, alert and awake  HEENT:  Conjunctivae/corneas clear. Neck: Supple, with full range of motion. Respiratory:  Normal respiratory effort. Clear to auscultation, bilaterally without Rales/Wheezes/Rhonchi.   Cardiovascular: Regular rate and rhythm with normal S1/S2 without murmurs or rubs  Abdomen: Soft, non-tender, non-distended, normal bowel sounds. Musculoskeletal: No cyanosis or edema bilaterally  Neurologic: Seems confused not actively following commands  Psychiatric: Alert and oriented, Normal mood  Peripheral Pulses: +2 palpable, equal bilaterally       Labs:   Recent Labs     05/15/21  0621 05/16/21  0809 05/17/21  0435   WBC 15.4* 12.9* 10.8   HGB 13.0* 12.6* 12.1*   HCT 41.7 40.4* 38.6*    130* 98*     Recent Labs     05/15/21  0621 05/16/21  0809 05/17/21  0435    147* 147*   K 3.9 3.9 4.1   * 112* 114*   CO2 19* 23 22   BUN 30* 31* 35*   CREATININE 0.9 1.0 1.1   CALCIUM 8.9 9.1 9.0   PHOS 2.3* 1.9* 2.3*     No results for input(s): AST, ALT, BILIDIR, BILITOT, ALKPHOS in the last 72 hours. Recent Labs     05/14/21  1334 05/15/21  1711   INR 1.25* 1.17*     No results for input(s): CKTOTAL, TROPONINI in the last 72 hours. Urinalysis:      Lab Results   Component Value Date    NITRU Negative 05/10/2021    WBCUA 2 05/10/2021    RBCUA 5 05/10/2021    RBCUA NEGATIVE 01/11/2021    BLOODU MODERATE 05/10/2021    SPECGRAV 1.013 05/10/2021    GLUCOSEU Negative 05/10/2021       Radiology:  XR CHEST PORTABLE   Final Result      Stable mild cardiomegaly and mild interstitial edema. XR ABDOMEN (KUB) (SINGLE AP VIEW)   Final Result   Feeding tube is pointed towards the pyloric portion the stomach. XR ABDOMEN (KUB) (SINGLE AP VIEW)   Final Result      1. Feeding tube with tip in the mid stomach. Recommend advancing the tube to ensure its position in the distal stomach/proximal small bowel. XR CHEST 1 VIEW   Final Result   Impression: Cardiomegaly with prominence of the pulmonary interstitium and vascularity. Correlate for edema. CTA HEAD W WO CONTRAST   Final Result      1. No acute intracranial abnormality. 2.  Moderate cerebral atrophy and mild chronic small vessel ischemic disease.    3. Wall thickening and luminal narrowing of the distal right vertebral artery V2 and V3 segments may represent vasculitis or less likely atherosclerosis, fibromuscular dysplasia, or dissection. 4.  Severe mid basilar artery stenosis. 5.  Mild to moderate atherosclerotic stenosis of the bilateral middle cerebral artery M1 segments and left posterior cerebral artery P1 and P2 segments. XR ABDOMEN (KUB) (SINGLE AP VIEW)   Final Result   Tip of nasogastric tube in proximal stomach. CT HEAD WO CONTRAST   Final Result   Impression:   1. No evidence of recent intracranial hemorrhage. 2. Parenchymal volume loss and chronic small vessel ischemic changes in the white matter. 3. 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 4. Opacification of the right mastoid air cells and right middle ear which could reflect age-indeterminate infectious/inflammatory process. Assessment/Plan:    Active Hospital Problems    Diagnosis     Status epilepticus ([facility] [address] [G40.901]      Patient is a 60-year-old male with past medical history of atrial fibrillation on Eliquis, chronic steroid use, BPH who presented to hospital as a transfer [facility] [place] for continuous EEG due to concern of seizures. Patient was also noted to have vertebral fracture after a fall, IR was consulted for kyphoplasty    Assessment/ plan  1. Acute encephalopathy, concern of seizures:-EEG discontinued    2. . Hypoxic respiratory failure likely secondary to aspiration pneumonia  Procalcitonin is negative, patient is currently on room air,   afebrile-holding off on antibiotics for now, check chest x-ray    3. Atrial fibrillation on Eliquis:-Eliquis on hold for planned procedure on Monday    4. Fall, vertebral column fracture:-  With paijn with any movement, s/p kyphoplasty today, PTOT    5. Hypernatremia:-Ordered free water bolus x 1    6. Continue statin, Toprol, home prednisone, Flomax    7.  Poor nutrition:- currently on tube feeds, consult SLP, might be able to add diet    DVT prophylaxis-Eliquis on hold, will start on subcutaneous heparin until procedure  Diet: DIET TUBE FEED CONTINUOUS/CYCLIC NPO; STANDARD WITH FIBER; Nasogastric; Continuous; 30; 55; 24; Exceptions are: Ice Chips  Code Status: Limited    PT/OT Eval Status: ordered    Dispo -  PT/OT consulted - will likely need SNF placement    Weston Storey MD

## 2021-05-17 NOTE — CARE COORDINATION
Case Management Daily Note                    Date: 5/17/2021     Patient Name: Yudi Client    Date of Admission: 5/12/2021  4:13 PM  YOB: 1925    Length of Stay: 5            Patient Admission Status: Inpatient  Diagnosis:Status epilepticus ([address] [W74.030]     ________________________________________________________________________________________  Discharge Plan: SNF: [facility]  (reviewing)   Was independent at home prior to admission. Rocio Barrera, courtney ([phone]/ Home: [phone])    Insurance: Payor: MEDICARE / Plan: MEDICARE PART A AND B / Product Type: *No Product type* /   Is pre-cert/notification needed: N/A     Tentative discharge date: 5/19 vs 5/20     Current barriers: Medical Clearance. Referrals completed: SNF: [facility]     Resources/ information provided: [facility] List   ________________________________________________________________________________________  PT AM-PAC:   / 24 per last evaluation on: Pending (need evaluation)     OT AM-PAC:   / 24 per last evaluation on: Pending (needs evaluation)    DME Needs for discharge: Defer   ________________________________________________________________________________________  Notes/Plan of Care:   CHRIST received a call from Dr. Natalia Interiano. She was asking about discharge plan. Patient going for Kypho today. Currently has NG in place     SW spoke with David at [facility] ([phone]   ). They do not have any long-term care beds available at this time. If family is pursuing aggressive care they may be unable to to take patient long-term. They will review for short-term needs and follow up with Social Work. CHRIST placed call to patient's daughter. Rocio Barrera [phone]. Rocio Barrera reported her decisions are going to be based on how he functions post procedure, she would take him home. Once he is walking she wants to talk him home.      Rocio Barrera reported patient was on a low sodium diet and stated he was not eating because he was \"out-of-it\" due to pain medication. She noted patient is much more awake and alert. SW dicussed feeding options. She reported she would be agreeable to PEG for short-term needs only, but wants him back to eating pureed food. Daughter's focus right now is he able to walk. CHRIST reached out to Dr. Martir Valera she will check with SLP. Lucia Kaur and/or his family were provided with choice of provider; he and/or his family are in agreement with the discharge plan at this time.     Care Transition Patient: Lesly Perales [place] Venessa, MSW  [facility]   Case Management Department  Ph: [phone]

## 2021-05-17 NOTE — PROGRESS NOTES
Physician Progress Note      Santos Zurita  [facility] #:                  [id number]  :                       1925  ADMIT DATE:       2021 4:13 PM  [address] [place] DATE:  RESPONDING  PROVIDER #:        Tr Choe MD          QUERY TEXT:    Patient admitted with Status epilepticus. Noted documentation of acute   respiratory failure in  PN. In order to support the diagnosis of acute   respiratory failure, please include additional clinical indicators in your   documentation. Or please document if the diagnosis of acute respiratory   failure has been ruled out after further study. The medical record reflects the following:  Risk Factors: 81 y/o male with Status epilepticus and poss aspiration PNA  Clinical Indicators: No documented drop in 02 sats,  only on 2L 02 RR 15-22     PN: \"2. Hypoxemic respiratory failureSuspected? Pneumonia, likely   bacterial, suspected aspiration. \"  Treatment: 02 2l    Acute Respiratory Failure Clinical Indicators per  MS-DRG Training Guide and   Quick Reference Guide:  pO2 < 60 mmHg or SpO2 (pulse oximetry) < 91% breathing room air  pCO2 > 50 and pH < 7.35  P/F ratio (pO2 / FIO2) < 300  pO2 decrease or pCO2 increase by 10 mmHg from baseline (if known)  Supplemental oxygen of 40% or more  Presence of respiratory distress, tachypnea, dyspnea, shortness of breath,   wheezing  Unable to speak in complete sentences  Use of accessory muscles to breathe  Extreme anxiety and feeling of impending doom  Tripod position  Confusion/altered mental status/obtunded  Options provided:  -- Acute Respiratory Failure ruled out after study  -- Acute Respiratory Failure as evidenced by, Please document evidence.   -- Other - I will add my own diagnosis  -- Disagree - Not applicable / Not valid  -- Disagree - Clinically unable to determine / Unknown  -- Refer to Clinical Documentation Reviewer    PROVIDER RESPONSE TEXT:    Acute Respiratory Failure has been ruled out after study.    Query created by: An Camara on 5/14/2021 12:09 PM      Electronically signed by:  Milli Cowan MD 5/17/2021 4:09 PM

## 2021-05-17 NOTE — PROGRESS NOTES
Patient back from IR. Vital signs stable with exception to elevated BP. When asked how his back was feeling, patient stated \"it feels fine\". Heparin gtt restarted and aPTT lab draw scheduled for 6 hours after restart time. Tube feed on hold d/t patient needing to lay flat. Daughter in room and updated on patient's plan of care. Will continue to monitor. Patient able to weakly squeeze hands bilaterally. Not able to follow dorsi/plantar flexion command but is moving BLE spontaneously.

## 2021-05-17 NOTE — CONSULTS
NUTRITION ASSESSMENT  Admission Date: 5/12/2021     Type and Reason for Visit: Initial, Consult    NUTRITION RECOMMENDATIONS:   1. PO Diet: Continue NPO diet   2. Nutrition support   Patient w/HIGH RISK of Refeeding. Therefore, slow initiation of nutrition support and advancement to goal should be considered, along w/daily labs Phos/K+/Mg to determine ability to advance feeding. 1.Suggesting Thiamin + MVI along with start of alternative nutrition to prevent refeeding. · Recommend thiamin supplementation to start DAY 1 of nutrition support. · Suggest 100 mg thiamine daily x 10 days thereafter (Oral or equivalent if IV) to be administered. 2. Slow advancement of nutrition support. Recommend EN formula Standard Formula with Fiber  Jevity 1.2  @ goal rate 55 ml/hr to provide 1320 ml total volume, 1584 kcal, 73 g protein and 1065 ml free water. Meets 100% protein and 100% calorie needs. 3. Day 1/2 (complete): Initiate TF @ initial goal of 25 ml/hr for tolerance. 4. Day 3: Increase by 25 mL/hr q 4 hours until goal of 60 mL/hr is met. 5. Water Bolus: 100 ml every 4 hours when no IVF. 6. Daily labs: Phos, K+, and Mg to monitor for signs of refeeding     NUTRITION ASSESSMENT:  Nutritional summary & status: RD consulted for TF order/management. Pt NPO x6 days (3 days NPO previous adm), RD reports limited intake over past few days prior. TF started 5/15 per MD at initial rate of 25 ml/hr, increased to 30 ml/hr yesterday per RN. RD recommending start of thiamin and MVI to prevent refeeding. Plans for kyphoplasty today. Pt working with other providers or out of room upon attempted visits. RD to follow up with full interview/NFPE as able. Patient admitted d/t: back pain, concern of seizures/continuous EEG    PMH significant for: Afib, chronic steroid use, CHF, HTN    MALNUTRITION ASSESSMENT  Context of Malnutrition: Acute Illness   Malnutrition Status:  At risk for malnutrition (Comment) (decreased po, need for EN)  Findings of the 6 clinical characteristics of malnutrition (Minimum of 2 out of 6 clinical characteristics is required to make the diagnosis of moderate or severe Protein Calorie Malnutrition based on AND/ASPEN Guidelines):  Energy Intake: Less than/equal to 50% of estimated energy requirements    Energy Intake Time: Greater than or equal to 7 days    Weight Loss %: Unable to assess  d/t fluid shifts  Weight loss Time: Unable to assess   Body Fat Loss: Unable to Assess   Body Fat Location: Unable to assess    Body Muscle Loss: Unable to Assess   Body Muscle Loss Location: Unable to assess    Fluid Accumulation: No significant    Fluid Accumulation Location: No significant     Strength: Not Performed; Not Measured     NUTRITION DIAGNOSIS   Problem: Problem #1: Inadequate oral intake   Etiology: Swallowing Difficulty   Signs & Symptoms: Diet history of poor intake , NPO status due to medical condition and Nutrition Support-EN    NUTRITION INTERVENTION  Food and/or Nutrient Delivery: Continue NPO, Start Oral Nutrition Supplement   Nutrition Education/Counseling: No recommendation at this time   Coordination of Nutrition Care: Continue to monitor while inpatient     NUTRITION MONITORING & EVALUATION:  Evaluation:Goals set   Goals:Goals: Pt will tolerate EN to meet 100% of nutrition needs until diet is able to be advanced. Monitoring: Diet advancement , Diet Tolerance , TF Intake  or TF Tolerance      OBJECTIVE DATA: Significant to nutrition assessment  · Nutrition-Focused Physical Findings: generalized trace edema   · Labs: Reviewed;  Na 147, Phos 2.3  · Meds: Reviewed  · Wounds None      ANTHROPOMETRICS  Current Height: 5' 5\" (165.1 cm)  Current Weight: 150 lb 2.1 oz (68.1 kg)    Admission weight: 154 lb 8.7 oz (70.1 kg)  Ideal Bodyweight 136 lbs  Usual Bodyweight ~168 lbs per EMR  Weight Changes -13.9 lbs x 6 months but weight fluctuations likely d/t fluid shifts       BMI BMI (Calculated): 25    Wt Readings from Last 50 Encounters:   05/16/21 150 lb 2.1 oz (68.1 kg)   05/12/21 154 lb 8.7 oz (70.1 kg)   04/07/21 163 lb 3.2 oz (74 kg)   03/25/21 160 lb (72.6 kg)   01/22/21 167 lb (75.8 kg)   12/11/20 167 lb 15.9 oz (76.2 kg)   11/23/20 168 lb (76.2 kg)   11/20/20 168 lb 6.9 oz (76.4 kg)     COMPARATIVE STANDARDS  Estimated Total Kcals/Day : 22-25  Current Bodyweight (68 kg) 7642-5980 kcal/day    Estimated Total Protein (g/day) : 1.0-1.2 Current Bodyweight (68 kg) 68-82 g/day  Estimated Daily Total Fluid (ml/day): >1500 mL per day     Food / Nutrition-Related History  Pre-Admission / Home Diet:  Pre-Admission/Home Diet: General   Home Supplements / Herbals:    none noted  Food Restrictions / Cultural Requests:    none noted    Current Nutrition Therapies   DIET TUBE FEED CONTINUOUS/CYCLIC NPO; STANDARD WITH FIBER; Nasogastric; Continuous; 30; 55; 24; Exceptions are: Ice Chips     Current Tube Feeding (TF) Orders:  · Feeding Route: Nasogastric  · Formula: Standard w/Fiber  · Schedule: Continuous  · Additives/Modulars:    · Water Flushes: Water bolus 100 ml q 4 hours   · Current TF & Flush Orders Provides: 600 ml total volume, 720 kcal, 33 g pro, 484 ml free water  · Goal TF & Flush Orders Provides: 1320 ml total volume, 1584 kcal, 73 g protein and 1065 ml free water.      PO Intake: NPO  PO Supplement: NPO   PO Supplement Intake: NPO  IVF: none    NUTRITION RISK LEVEL: Risk Level: [address], LD  Vimal:  [phone]  Office:  [phone]

## 2021-05-17 NOTE — PROGRESS NOTES
Small amount of bleeding noted from scabs on lips and scant amount from skin tears on L arm. Secure message sent to Dr. Stacy Perdomo as concern is for bleeding with continuous heparin drip. No new orders per MD as it is very small amount of bleeding and ok to continue heparin drip and continue to monitor for bleeding.

## 2021-05-17 NOTE — PROGRESS NOTES
Palliative Care Chart Review  and Check in Note:     NAME:  Selene Vega 89 Date: 5/12/2021  Hospital Day:  Hospital Day: 6   Current Code status: Limited    Palliative care is continuing to following Mr. Hakeem Del Rio for symptom management,  and goals of care discussion as needed. Patient's chart reviewed today 5/17/21. Pt off unit for kyphoplasty    The following are the currently established goals/code status, and Symptom management. Goals of care: Continue with current management. Pt's daughter reported she would be agreeable to short term feeds with NGT, but if the pt's mental status does not improve he would not want tube feeds permanently. Per chart review, it appears his mental status improved over the weekend. Consider another SLP evaluation for swallowing.      Code status: Limited - DNR/DNI    Discharge plan: Possible SNF at [facility] when medically ready for discharge      KAYCE Ríos CNP  05/17/21  3:19 PM

## 2021-05-17 NOTE — PLAN OF CARE
Problem: Falls - Risk of:  Goal: Will remain free from falls  Description: Will remain free from falls  5/17/2021 1034 by Floyd Jewell RN  Outcome: Ongoing  Note: Patient will remain free from falls throughout shift. Maxi move used for transfers. Fall precautions in place. Non-skid socks on. Bed locked in lowest position with alarm on and 2/4 side rails up. Unable to use call light or bedside table. Hourly rounding in anticipation of patient needs. On camera for safety/seizure precautions. Floor clean and free from clutter. Room door open. Will continue to monitor. Problem: Skin Integrity:  Goal: Will show no infection signs and symptoms  Description: Will show no infection signs and symptoms  5/17/2021 1034 by Floyd Jewell RN  Outcome: Ongoing  Note: No new skin breakdown noted. Skin inspected frequently. On specialty mattress. Turned and repositioned q2 hours. Heels in multi podus boots, elbows elevated off of bed surface on pillows. Sacral heart on bottom. Green kauffman wipes used for kauffman care. Incontinent of bowel, shawna care provided upon voiding. Will continue to monitor. Problem: Physical Regulation:  Goal: Ability to maintain a stable neurologic state will improve  Description: Ability to maintain a stable neurologic state will improve  Outcome: Ongoing  Note: No seizure activity noted this shift thus far. Oriented to self only. Frequent reorientation provided. Will continue to monitor.

## 2021-05-17 NOTE — PLAN OF CARE
Nutrition Problem #1: Inadequate oral intake  Intervention: Food and/or Nutrient Delivery: Continue NPO, Start Oral Nutrition Supplement  Nutritional Goals: Pt will tolerate EN to meet 100% of nutrition needs until diet is able to be advanced.

## 2021-05-17 NOTE — PROGRESS NOTES
Consent form for kyphoplasty signed by Anaid Cortez, daughter and POA. Consent form placed in chart.

## 2021-05-17 NOTE — PROCEDURES
IR Brief Postoperative Note    Erwin Guillen  YOB: 1925  [id number]    Pre-operative Diagnosis: L4 compression fxr    Post-operative Diagnosis: Same    Procedure: L4 kyphoplasty    Anesthesia: moderate    Surgeons/Assistants: case    Estimated Blood Loss: Minimal    Complications: none    Specimens: were not obtained    See full procedure dictation to follow      Malik Ny MD MD  5/17/2021

## 2021-05-18 NOTE — PROCEDURES
INSTRUMENTAL SWALLOW REPORT  MODIFIED BARIUM SWALLOW/treatment    NAME: Blanca Phillips   : 1925  MRN: [id number]       Date of Eval: 2021     Ordering Physician: Dr. Daron Plasencia  Radiologist: Dr Alexx Oquendo     Referring Diagnosis(es): Referring Diagnosis: status epilepticus    Past Medical History:  has a past medical history of Acute diastolic CHF (congestive heart failure) ([address] Arthritis, Atrial fibrillation ([address] BPH with obstruction/lower urinary tract symptoms, Chronic atrial fibrillation ([address] CKD (chronic kidney disease), Coronary arteriosclerosis, Encounter for imaging to screen for metal prior to MRI, GERD (gastroesophageal reflux disease), History of blood transfusion, HTN (hypertension), Left hip pain, Pacemaker, Pulmonary fibrosis ([address] Pulmonary nodule, left, Renal atrophy, right, Renal cyst, right, and SOB (shortness of breath). Past Surgical History:  has a past surgical history that includes Pacemaker insertion (); Tonsillectomy (); Small intestine surgery (); Inguinal hernia repair (Right, 2016); Coronary angioplasty with stent (); pacemaker placement; Colonoscopy; Abdomen surgery; eye surgery (Bilateral); Pain management procedure (Bilateral, 2020); and IR KYPHOPLASTY LUMBAR 1 VERTEBRAL BODY (2021). Current Diet Solid Consistency: NPO  Current Diet Liquid Consistency: NPO  Type of Study: Initial MBS      Recent Chest Xray 21      Stable mild cardiomegaly and mild interstitial edema.         CT of head 21  Impression   Impression:   1. No evidence of recent intracranial hemorrhage. 2. Parenchymal volume loss and chronic small vessel ischemic changes in the white matter. 3. 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    4. Opacification of the right mastoid air cells and right middle ear which could reflect age-indeterminate infectious/inflammatory process.      Patient Complaints/Reason for Referral:  Blanca Phillips chin tuck      Safe Swallow Protocol:  Chin tuck with all liquids including soups, ice cream  Upright as possible for all oral intake  Eat/Feed slowly  Effortful swallow    Recommendations/Treatment  Requires SLP Intervention: Yes  D/C Recommendations: To be determined  Postural Changes and/or Swallow Maneuvers: Chin tuck    Recommended Exercises:    Therapeutic Interventions: Diet tolerance monitoring;Oral care; Patient/Family education    Education: Images and recommendations were reviewed with pt following this exam.   Patient Education: pt educated to results of MBS and recommendations  Patient Education Response: Needs reinforcement    Prognosis for safe diet advancement: guarded  Barriers to reach goals: age;severity of dysphagia  Duration/Frequency of Treatment  Duration/Frequency of Treatment: 2-3 x    Goals:    1-The patient will tolerate instrumental swallowing procedure  5/18:goal met     2-The patient/caregiver will demonstrate understanding of dysphagia recommendations/compensatory strategies for improved swallowing safety. 5/18: Educated pt and daughter via phone conversation to purpose of visit, s/s of aspiration, concern if aspiration occurs, rationale for MBS study. Also discussed pt advanced age and eating for quality of life. Daughter wishes to proceed with MBS and stated good understanding. Cont goal  5/18: educated pt to results of MBS and recommendations. Spoke with pt daughter via phone again after study. Explained results of MBS, risk for aspiration of all consistencies, options for PO, diet texture and strategies that reduced risk and instruction to speak with family and MD as appropriate. Daughter stated they would not want a feeding tube. She would like to try diet while pt is in hospital- minced and moist/thin liquids with use of chin tuck. Daughter stated good understanding of information and understands risk for aspiration. She will benefit from cont education.  Pt will require

## 2021-05-18 NOTE — PROGRESS NOTES
Upon entering room NG was out next to patient in bed. He states \"I coughed and it came out\". Dr. Hannon Abo aware and does not want it replaced.

## 2021-05-18 NOTE — PROGRESS NOTES
Hospitalist Progress Note      PCP: Amalia Mendoza MD    Chief Complaint. Patient is a 78-year-old male with past medical history of atrial fibrillation on Eliquis, chronic steroid use, BPH who presented to hospital as a transfer from [facility] [place] for continuous EEG due to concern of seizures. Patient was also noted to have vertebral fracture after a fall, IR was consulted for kyphoplasty    Date of Admission: 5/12/2021    Subjective:     Seen and examined  Daughter at bedside   Pulled out NG, will leave out,  And have him take diet  Pain is better  Will add eliquis and aspirin today    Medications:  Reviewed    Infusion Medications    heparin (PORCINE) Infusion 20 Units/kg/hr (05/18/21 0605)    sodium chloride       Scheduled Medications    nystatin  5 mL Oral 4x Daily    metoprolol tartrate  12.5 mg Per G Tube Daily    sodium chloride flush  5-40 mL Intravenous 2 times per day    predniSONE  10 mg Oral Daily    tamsulosin  0.4 mg Oral Nightly    finasteride  5 mg Oral Daily    [Held by provider] aspirin  81 mg Oral Daily    atorvastatin  40 mg Oral Nightly    [Held by provider] apixaban  2.5 mg Oral BID     PRN Meds: heparin (porcine), heparin (porcine), cloNIDine, hydrALAZINE, sodium chloride flush, sodium chloride, promethazine **OR** ondansetron, polyethylene glycol, acetaminophen **OR** acetaminophen      Intake/Output Summary (Last 24 hours) at 5/18/2021 1139  Last data filed at 5/18/2021 0815  Gross per 24 hour   Intake 50 ml   Output 225 ml   Net -175 ml       Physical Exam Performed:    BP (!) 159/72   Pulse 73   Temp 97.9 °F (36.6 °C) (Oral)   Resp 16   Ht 5' 5\" (1.651 m)   Wt 150 lb 2.1 oz (68.1 kg)   SpO2 99%   BMI 24.98 kg/m²     General appearance: NAD, alert and awake  HEENT:  Conjunctivae/corneas clear. Neck: Supple, with full range of motion. Respiratory:  Normal respiratory effort. Clear to auscultation, bilaterally without Rales/Wheezes/Rhonchi.   Cardiovascular: Recommend advancing the tube to ensure its position in the distal stomach/proximal small bowel. XR CHEST 1 VIEW   Final Result   Impression: Cardiomegaly with prominence of the pulmonary interstitium and vascularity. Correlate for edema. CTA HEAD W WO CONTRAST   Final Result      1. No acute intracranial abnormality. 2.  Moderate cerebral atrophy and mild chronic small vessel ischemic disease. 3.  Wall thickening and luminal narrowing of the distal right vertebral artery V2 and V3 segments may represent vasculitis or less likely atherosclerosis, fibromuscular dysplasia, or dissection. 4.  Severe mid basilar artery stenosis. 5.  Mild to moderate atherosclerotic stenosis of the bilateral middle cerebral artery M1 segments and left posterior cerebral artery P1 and P2 segments. XR ABDOMEN (KUB) (SINGLE AP VIEW)   Final Result   Tip of nasogastric tube in proximal stomach. CT HEAD WO CONTRAST   Final Result   Impression:   1. No evidence of recent intracranial hemorrhage. 2. Parenchymal volume loss and chronic small vessel ischemic changes in the white matter. 3. 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 4. Opacification of the right mastoid air cells and right middle ear which could reflect age-indeterminate infectious/inflammatory process. Assessment/Plan:    Active Hospital Problems    Diagnosis     Status epilepticus ([facility] [G40.901]      Patient is a 27-year-old male with past medical history of atrial fibrillation on Eliquis, chronic steroid use, BPH who presented to hospital as a transfer [facility] [place] for continuous EEG due to concern of seizures. Patient was also noted to have vertebral fracture after a fall, IR was consulted for kyphoplasty    Assessment/ plan  1.  Acute encephalopathy, concern of seizures:-EEG discontinued    2. . Hypoxic respiratory failure likely secondary to aspiration pneumonia  Procalcitonin is negative, patient is

## 2021-05-18 NOTE — CARE COORDINATION
Case Management Daily Note                    Date: 5/18/2021     Patient Name: Savanah Wang    Date of Admission: 5/12/2021  4:13 PM  YOB: 1925    Length of Stay: 6            Patient Admission Status: Inpatient  Diagnosis:Status epilepticus [facility]) [C08.448]     ________________________________________________________________________________________  Discharge Plan: SNF: [facility], [facility] ([phone]/ Home: [phone])    Insurance: Payor: Nazia Novoa / Plan: MEDICARE PART A AND B / Product Type: *No Product type* /   Is pre-cert/notification needed: N/A     Tentative discharge date: 5/19 vs 5/20     Current barriers: Medical Clearance. Referrals completed: SNF: [facility]     Resources/ information provided: [facility] List   ________________________________________________________________________________________  PT AM-PAC:   / 24 per last evaluation on: Pending (need evaluation)     OT AM-PAC:   / 24 per last evaluation on: Pending (needs evaluation)    DME Needs for discharge: Defer   ________________________________________________________________________________________  Notes/Plan of Care:   10:17 AM SW spoke with Jessica Lieberman at [facility], Jessica Lieberman was asking about discharge plans/options. SW noted that daughter plans to take patient home after short rehab stay if the kypo improves patient's ability to walk. CHRIST noted the daughter was not interested in long-term care placement. MBS completed today. UPDATE: 1:26 PM. SW spoke with patient's daughter via phone. She has reviewed options issac other SNF placement if needed. Jill Ryanne reported team told her they are able to removed NG and work on eating foods with some chin down techniques. Daughter is hopeful that his oral intake will be good. CHRIST placed call to Kenny Cruz at [facility] ([phone]) SW provided her with an update.  She reported they would be able to accept patient at [facility] once NG is removed and he is having oral intake with food. Otila Able and/or his family were provided with choice of provider; he and/or his family are in agreement with the discharge plan at this time.     Care Transition Patient: KAYA Arnold  [facility][note 3]   Case Management Department  Ph: [phone]

## 2021-05-18 NOTE — PROGRESS NOTES
infectious/inflammatory process. Date of Eval: 5/18/2021  Evaluating Therapist: GERALD Orourke    Current Diet level:  Current Diet : NPO  Current Liquid Diet : NPO    Primary Complaint  Patient Complaint: not able to state. Daughter via phone conversation reported pt doctor wanted him to have MBS    Pain:  Pain Assessment  Pain Assessment: none indicated through any means    Reason for Referral  Juan C Anthony was referred for a bedside swallow evaluation to assess the efficiency of his swallow function, identify signs and symptoms of aspiration and make recommendations regarding safe dietary consistencies, effective compensatory strategies, and safe eating environment. Impression  Dysphagia Diagnosis: Suspected needs further assessment  Dysphagia Impression : Pt alert, followed simple commands and answered basic questions appropriately, was oriented to month and place. Pt was seen at another [facility] prior to admission here with minced and moist diet recommended initially, however npo then recommended due to poor alertness. Pt analyzed with ice chips, water via tsp/cup and straw and puree texture. Pt exhibited weak cough inconsistently with thin liquids via cup and straw. No cough/throat clear with puree and ice chips. Good swallow movement felt upon palpation of anterior neck. Did not assess solids during bedside eval as there was question of whether pt on puree foods. Spoke with daughter via phone conversation after session. Daughter states pt normally eats soft foods but was placed on puree at the other [facility] facility. Notes state recommendations were actually minced and moist.    Discussed recommendation for MBS study and rationale. Discussed pt advanced age as well as eating for quality of life. Daughter wishes to proceed with MBS to fully assess pt swallow function. She states pt doctor wanted him to have this test done previously.     Dysphagia Outcome Severity Scale: Level 4: Mild moderate dysphagia- Intermittent supervision/cueing. One - two diet consistencies restricted     Treatment Plan  Requires SLP Intervention: Yes  Duration/Frequency of Treatment: TBD after MBS    Recommended Diet and Intervention  Diet Solids Recommendation:  (TBD after MBS)  Recommended Form of Meds: Meds in puree  Recommendations: Modified barium swallow study  Therapeutic Interventions: Oral care; Patient/Family education    Compensatory Swallowing Strategies  TBD    Treatment/Goals  1-The patient will tolerate instrumental swallowing procedure    2-The patient/caregiver will demonstrate understanding of dysphagia recommendations/compensatory strategies for improved swallowing safety. 5/18: Educated pt and daughter via phone conversation to purpose of visit, s/s of aspiration, concern if aspiration occurs, rationale for MBS study. Also discussed pt advanced age and eating for quality of life. Daughter wishes to proceed with MBS and stated good understanding. Cont goal    General  Chart Reviewed: Yes  Behavior/Cognition: Alert; Cooperative  Respiratory Status: O2 via nasual cannula  Communication Observation: Functional  Follows Directions: Simple  Dentition: Some missing teeth  Patient Positioning: Upright in bed  Baseline Vocal Quality: Normal  Volitional Cough: Strong  Prior Dysphagia History: Pt was seen for bedside evaluation/treatments prior to admission here. Dc recommendation was NPO due to poor alertness. Consistencies Administered: Dysphagia Pureed (Dysphagia I); Thin - cup; Thin - teaspoon; Thin - straw; Ice Chips    Vision/Hearing  Vision  Vision: Impaired  Vision Exceptions: Wears glasses at all times  Hearing  Hearing Exceptions: Bilateral hearing aid    Oral Motor Deficits  Oral/Motor  Oral Motor: Within functional limits    Oral Phase Dysfunction  Functional for puree and liquids, adequate labial seal, no anterior loss of bolus.   Plan for assessment of solids during MBS     Indicators of Pharyngeal Phase Dysfunction  Pt analyzed with ice chips, water via tsp/cup and straw and puree texture. Pt exhibited weak cough inconsistently with thin liquids via cup and straw. No cough/throat clear with puree and ice chips. Good swallow movement felt upon palpation of anterior neck. Prognosis  Prognosis  Prognosis for safe diet advancement: fair  Barriers to reach goals: age  Individuals consulted  Consulted and agree with results and recommendations: Patient;RN    Education  Patient Education: pt educated to purpose of visit. Spoke with daughter via phone conversation. Patient Education Response: Needs reinforcement  Safety Devices in place: Yes  Type of devices: Call light within reach       Therapy Time  SLP Individual Minutes  Time In: 6397  Time Out: 0803  Minutes: 20     Plan:  Recommended diet:  TBD after MBS  MBS Today  Pt therapy goal: not able to state  Pt dc goal: not able to state   Raji Steven M.S./[facility]-SLP #[id number]  Pg.  # [phone]  Needs met prior to leaving room, call light within reach, d/w RN \[facility] [place] [facility]\""  This document will serve as a dc summary if pt dc prior to next visit  5/18/2021 8:39 AM

## 2021-05-19 NOTE — CONSULTS
Urology Attending Consult Note      Reason for Consultation: AUR    History: 79yo M with history of BPH admitted to [facility] for seizures. He also underwent kyphoplasty 21 for fractured vertebrae. Per nurse, he has had a catheter in since 21 and has been on flomax for the past week. Family History, Social History, Review of Systems:  Reviewed and agreed to as per chart    Vitals:  /69   Pulse 74   Temp 97.5 °F (36.4 °C) (Oral)   Resp 16   Ht 5' 5\" (1.651 m)   Wt 150 lb 2.1 oz (68.1 kg)   SpO2 97%   BMI 24.98 kg/m²   Temp  Av.8 °F (36.6 °C)  Min: 97.5 °F (36.4 °C)  Max: 98.5 °F (36.9 °C)    Intake/Output Summary (Last 24 hours) at 2021 1059  Last data filed at 2021 0949  Gross per 24 hour   Intake 1260 ml   Output 675 ml   Net 585 ml         Physical:   Well developed, well nourished in no acute distress   Mood indicates no abnormalities. Pt doesnt appear depressed   Orientated to time and place   Neck is supple, trachea is midline   Respiratory effort is normal   Cardiovascular show no extremity swelling   Abdomen no masses or hernias are palpated, there is no tenderness. Liver and Spleen appear normal.   Skin show no abnormal lesions   Lymph nodes are not palpated in the inguinal, neck, or axillary area.      Male :  Turk draining clear urine    Labs:  WBC:    Lab Results   Component Value Date    WBC 8.5 2021     Hemoglobin/Hematocrit:    Lab Results   Component Value Date    HGB 11.5 2021    HCT 36.1 2021     BMP:    Lab Results   Component Value Date     2021    K 4.0 2021    K 3.8 2021     2021    CO2 24 2021    BUN 30 2021    LABALBU 2.8 2021    CREATININE 1.0 2021    CALCIUM 8.9 2021    GFRAA >60 2021    LABGLOM >60 2021     PT/INR:    Lab Results   Component Value Date    PROTIME 13.6 05/15/2021    INR 1.17 05/15/2021     PTT:    Lab Results Component Value Date    APTT 66.3 05/18/2021   [APTT    Impression/Plan: 81 yo M with history of BPH admitted for seizures. We have been consulted for retention.  -Turk in place since the 5th   -Continue flomax  -Patient is not very mobile, so he is at higher risk of retention. We can try and attempt VT first thing tomorrow morning. If patient is unable to void, we can replace catheter and discharge him to SNF.    -Will place orders for cath removal at [address]

## 2021-05-19 NOTE — PROGRESS NOTES
Speech Language Pathology  Facility/Department: [facility] 5T ORTHO/NEURO  Dysphagia Daily Treatment Note- late entry for 820    NAME: Yudi Client  : 1925  MRN: [id number]    Patient Diagnosis(es):   Patient Active Problem List    Diagnosis Date Noted    Status epilepticus ([address] 2021    Closed compression fracture of L4 lumbar vertebra, initial encounter ([address] 2021    Lactic acidosis 2021    Acute retention of urine 2021    Elevated brain natriuretic peptide (BNP) level 2021    Renal mass, right 2021    Abnormal LFTs 2021    Elevated troponin 2021    Hyponatremia     Neutrophilic leukocytosis     Dysphagia 2021    Acute on chronic diastolic CHF (congestive heart failure) ([address] 2020    Hematemesis 2020    Hemoptysis 2020    Acute respiratory failure with hypoxia (HCC)     Hyperkalemia     Atrial fibrillation, controlled ([address]     Acute respiratory failure with hypoxia and hypercapnia (HCC)     Pleural effusion, bilateral     Nonrheumatic mitral valve regurgitation     Osteoarthritis of lumbar spine with myelopathy 11/10/2020    Acute diastolic CHF (congestive heart failure) ([address] 10/23/2020    Chronic kidney disease, stage 3b 10/23/2020    Debility 10/23/2020    Chronic atrial fibrillation (HCC)     Severe mitral regurgitation     Acute pulmonary edema ([address] 10/21/2020    Pulmonary infiltrates     Paroxysmal atrial fibrillation ([address] 10/20/2020    Arthritis of both knees 2019    Interstitial lung disease ([address] 2019    Solitary lung nodule 2019    Pulmonary fibrosis ([address]     Renal cyst, right 2017    BPH with obstruction/lower urinary tract symptoms     GERD (gastroesophageal reflux disease)     SOB (shortness of breath) 2017    Pacemaker 2015    Coronary arteriosclerosis 2009    Essential hypertension 1990     Allergies: Allergies   Allergen Reactions    Claritin [Loratadine]      Reduced urine flow    Zestril [Lisinopril] Other (See Comments)     coughing     Recent Chest Xray 5/16/21      Stable mild cardiomegaly and mild interstitial edema.      CT of head 5/12/21  Impression   Impression:   1. No evidence of recent intracranial hemorrhage. 2. Parenchymal volume loss and chronic small vessel ischemic changes in the white matter. 3. 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 4. Opacification of the right mastoid air cells and right middle ear which could reflect age-indeterminate infectious/inflammatory process.        Previous MBS - none  Chart reviewed. Medical Diagnosis: status epilepticus  Treatment Diagnosis: dysphagia    BSE Impression 5/18/21  Pt alert, followed simple commands and answered basic questions appropriately, was oriented to month and place. Pt was seen at another [facility] prior to admission here with minced and moist diet recommended initially, however npo then recommended due to poor alertness. Pt analyzed with ice chips, water via tsp/cup and straw and puree texture. Pt exhibited weak cough inconsistently with thin liquids via cup and straw. No cough/throat clear with puree and ice chips. Good swallow movement felt upon palpation of anterior neck. Did not assess solids during bedside eval as there was question of whether pt on puree foods. Spoke with daughter via phone conversation after session. Daughter states pt normally eats soft foods but was placed on puree at the other [address] [place] [address] facility. Notes state recommendations were actually minced and moist.    Discussed recommendation for MBS study and rationale. Discussed pt advanced age as well as eating for quality of life. Daughter wishes to proceed with MBS to fully assess pt swallow function. She states pt doctor wanted him to have this test done previously. MBS results 5/18/21  Pt exhibiting severe oropharyngeal dysphagia. Reduced hyolaryngeal mechanics, reduced tongue base retraction and epiglottal closure resulted in aspiration of nectar thick and thin liquids, during the swallow and after from vallecular residue. Pt exhibited mild reflexive cough x 1 and was able to produce volitional cough that did not clear the pen/aspiration. Deep penetration noted after the swallow from the residue with puree consistency. A chin tuck was utilized with puree/nectar and thin liquids. Pt cont to demonstrate mild penetration, though no aspiration was identified with any trials with use of chin tuck. Pt with limited dentition therefore demonstrated prolonged mastication with solids. Daughter states pt eats soft/minced types of foods normally. Treatment Dx and ICD 10: severe oropharyngeal dysphagia    Pain: denies, pt reports feeling a lot better today    Current Diet : dysphagia II minced and moist/thin liquids with use of chin tuck    Treatment:  Pt seen bedside to address the following goals:  1-The patient will tolerate instrumental swallowing procedure  5/18:goal met     2-The patient/caregiver will demonstrate understanding of dysphagia recommendations/compensatory strategies for improved swallowing safety. 5/18: Educated pt and daughter via phone conversation to purpose of visit, s/s of aspiration, concern if aspiration occurs, rationale for MBS study. Also discussed pt advanced age and eating for quality of life. Daughter wishes to proceed with MBS and stated good understanding. Cont goal  5/18: educated pt to results of MBS and recommendations. Spoke with pt daughter via phone again after study. Explained results of MBS, risk for aspiration of all consistencies, options for PO, diet texture and strategies that reduced risk and instruction to speak with family and MD as appropriate. Daughter stated they would not want a feeding tube.   She would like to try diet while pt is in hospital- minced and moist/thin liquids with use of chin tuck. Daughter stated good understanding of information and understands risk for aspiration. She will benefit from cont education. Pt will require supervision/cues when eating for carry over of chin tuck. Cont goal  5/19: pt did not recall specifics of MBS, however stated and demonstrated that he is suppose to put chin down when taking PO. Reviewed rationale for diet and strategies and importance of always using chin tuck with  Liquids. Pt stated comprehension and demonstrated use with min cues. Educated PCA as well to recommendations/strategies. Cont goal     New goal:  3-  Pt will consume PO safely without signs or symptoms of aspiration  5/19: pt sitting up in bed, states he is feeling better. Pt has been placed on PO diet, per palliative care notes, daughter reiterated that she did not want feeding tube. Pt feeding self ground sausage, eggs, oatmeal and various liquids. Pt using chin tuck appropriately with very min cues. Pt exhibited no overt signs of aspiration throughout meal.  Pt demonstrated prolonged but adequate mastication with soft solid, no oral residue. Pt educated to effortful swallow and alternating liquid /solids. Pt completed effortful swallow with mod cues. No respiratory decline per chart review  Cont goal    Patient/Family/Caregiver Education:  As above    Compensatory Strategies:  Chin tuck with all liquids including soups, ice cream  Upright as possible for all oral intake  Eat/Feed slowly  Effortful swallow     Plan:  Continued daily Dysphagia treatment with goals per  plan of care. Diet recommendations: dysphagia II minced and moist/thin liquids with use of chin tuck  DC recommendation: TBD closer to dc  Treatment: 15  D/W nursing Ynes Child and PCA. Needs met prior to leaving room, call button in reach. Nanci Koroma M.S./[facility]-SLP #[id number]  Pg.  # [phone]  If patient is discharged prior to next treatment, this note will serve as the discharge summary

## 2021-05-19 NOTE — PROGRESS NOTES
Hospitalist Progress Note      PCP: Daniella Engel MD    Chief Complaint. Patient is a 61-year-old male with past medical history of atrial fibrillation on Eliquis, chronic steroid use, BPH who presented to hospital as a transfer from [facility] [place] for continuous EEG due to concern of seizures. Patient was also noted to have vertebral fracture after a fall, IR was consulted for kyphoplasty    Date of Admission: 5/12/2021    Subjective:     -Seen and examined  -Feeling better today  Decreased urine output, will try a dose of Lasix  Urology consulted for AUR    Medications:  Reviewed    Infusion Medications    dextrose 5 % and 0.45 % NaCl 50 mL/hr at 05/18/21 1751    sodium chloride       Scheduled Medications    fluticasone  1 spray Each Nostril Daily    nystatin  5 mL Oral 4x Daily    metoprolol tartrate  12.5 mg Per G Tube Daily    sodium chloride flush  5-40 mL Intravenous 2 times per day    predniSONE  10 mg Oral Daily    tamsulosin  0.4 mg Oral Nightly    finasteride  5 mg Oral Daily    aspirin  81 mg Oral Daily    atorvastatin  40 mg Oral Nightly    apixaban  2.5 mg Oral BID     PRN Meds: cloNIDine, hydrALAZINE, sodium chloride flush, sodium chloride, promethazine **OR** ondansetron, polyethylene glycol, acetaminophen **OR** acetaminophen      Intake/Output Summary (Last 24 hours) at 5/19/2021 0947  Last data filed at 5/19/2021 0807  Gross per 24 hour   Intake 1020 ml   Output 675 ml   Net 345 ml       Physical Exam Performed:    /70   Pulse 71   Temp 97.8 °F (36.6 °C) (Oral)   Resp 16   Ht 5' 5\" (1.651 m)   Wt 150 lb 2.1 oz (68.1 kg)   SpO2 97%   BMI 24.98 kg/m²     General appearance: NAD, alert and awake  HEENT:  Conjunctivae/corneas clear. Neck: Supple, with full range of motion. Respiratory:  Normal respiratory effort. Clear to auscultation, bilaterally without Rales/Wheezes/Rhonchi.   Cardiovascular: Regular rate and rhythm with normal S1/S2 without murmurs or rubs  Abdomen: Soft, non-tender, non-distended, normal bowel sounds. Musculoskeletal: No cyanosis or edema bilaterally  Neurologic: following commands  Psychiatric: Alert and oriented, Normal mood  Peripheral Pulses: +2 palpable, equal bilaterally       Labs:   Recent Labs     05/17/21 0435 05/18/21 0458 05/19/21  0602   WBC 10.8 9.0 8.5   HGB 12.1* 11.6* 11.5*   HCT 38.6* 36.5* 36.1*   PLT 98* 90* 90*     Recent Labs     05/17/21 0435 05/18/21  0458 05/19/21  0602   * 146* 146*   K 4.1 4.0 4.0   * 116* 113*   CO2 22 24 24   BUN 35* 35* 30*   CREATININE 1.1 1.2 1.0   CALCIUM 9.0 9.0 8.9   PHOS 2.3* 2.7 2.5     No results for input(s): AST, ALT, BILIDIR, BILITOT, ALKPHOS in the last 72 hours. No results for input(s): INR in the last 72 hours. No results for input(s): Gordan Blacksmith in the last 72 hours. Urinalysis:      Lab Results   Component Value Date    NITRU Negative 05/10/2021    WBCUA 2 05/10/2021    RBCUA 5 05/10/2021    RBCUA NEGATIVE 01/11/2021    BLOODU MODERATE 05/10/2021    SPECGRAV 1.013 05/10/2021    GLUCOSEU Negative 05/10/2021       Radiology:  FL MODIFIED BARIUM SWALLOW W VIDEO   Final Result      Essentially silent aspiration with thin and nectar thick liquids, improved to some degree with chin intact maneuver. Inconsistent penetration with puree. Please correlate with the speech pathology report for additional details. IR KYPHOPLASTY LUMBAR 1 VERTEBRAL BODY   Final Result   1. Satisfactory L4 percutaneous vertebral body augmentation and placement of bone cement - Kyphoplasty         XR CHEST PORTABLE   Final Result      Stable mild cardiomegaly and mild interstitial edema. XR ABDOMEN (KUB) (SINGLE AP VIEW)   Final Result   Feeding tube is pointed towards the pyloric portion the stomach. XR ABDOMEN (KUB) (SINGLE AP VIEW)   Final Result      1. Feeding tube with tip in the mid stomach.   Recommend advancing the tube to ensure its position in the distal stomach/proximal small bowel. XR CHEST 1 VIEW   Final Result   Impression: Cardiomegaly with prominence of the pulmonary interstitium and vascularity. Correlate for edema. CTA HEAD W WO CONTRAST   Final Result      1. No acute intracranial abnormality. 2.  Moderate cerebral atrophy and mild chronic small vessel ischemic disease. 3.  Wall thickening and luminal narrowing of the distal right vertebral artery V2 and V3 segments may represent vasculitis or less likely atherosclerosis, fibromuscular dysplasia, or dissection. 4.  Severe mid basilar artery stenosis. 5.  Mild to moderate atherosclerotic stenosis of the bilateral middle cerebral artery M1 segments and left posterior cerebral artery P1 and P2 segments. XR ABDOMEN (KUB) (SINGLE AP VIEW)   Final Result   Tip of nasogastric tube in proximal stomach. CT HEAD WO CONTRAST   Final Result   Impression:   1. No evidence of recent intracranial hemorrhage. 2. Parenchymal volume loss and chronic small vessel ischemic changes in the white matter. 3. 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 4. Opacification of the right mastoid air cells and right middle ear which could reflect age-indeterminate infectious/inflammatory process. Assessment/Plan:    Active Hospital Problems    Diagnosis     Status epilepticus ([address] [G40.901]      Patient is a 79-year-old male with past medical history of atrial fibrillation on Eliquis, chronic steroid use, BPH who presented to hospital as a transfer from [facility] [place] for continuous EEG due to concern of seizures. Patient was also noted to have vertebral fracture after a fall, IR was consulted for kyphoplasty    Assessment/ plan  1.  Acute encephalopathy, concern of seizures:-EEG discontinued    2. . Hypoxic respiratory failure likely secondary to aspiration pneumonia  Procalcitonin is negative, patient is currently on room air,   afebrile-holding off on antibiotics for now, check chest x-ray    3. Atrial fibrillation on Eliquis:-Eliquis on hold for planned procedure on Monday    4. Fall, vertebral column fracture:-  With paijn with any movement, s/p kyphoplasty today, PTOT    5. Hypernatremia:-Ordered free water bolus x 1    6. Continue statin, Toprol, home prednisone, Flomax    7.  Poor nutrition:- consult SLP, might be able to add diet    DVT prophylaxis-Eliquis on hold, will start on subcutaneous heparin until procedure  Diet: DIET DYSPHAGIA MINCED AND MOIST;  Code Status: Limited    PT/OT Eval Status: ordered    Dispo -today versus tomorrow,  will need SNF placement    Mandy Nation MD

## 2021-05-19 NOTE — PLAN OF CARE
Problem: Falls - Risk of:  Goal: Will remain free from falls  Description: Will remain free from falls  5/19/2021 1150 by Cezar Smith RN  Outcome: Ongoing  5/19/2021 0034 by Cherelle Snow RN  Outcome: Ongoing   Patient with avasys camera on for fall precautions. No attempt to get out of bed or chair by himself this shift. Did call out to get back into bed from chair. Bed alarm is on.

## 2021-05-19 NOTE — PROGRESS NOTES
Continues to deny pain and happy with improvement from procedure. Placed back into bed with max assist x 2 with TOMA and hunter.

## 2021-05-19 NOTE — PROGRESS NOTES
Occupational Therapy   Occupational Therapy Initial Assessment/Tx  Date: 2021   Patient Name: Tanesha Kern  MRN: [id number]     : 1925    Date of Service: 2021    Discharge Recommendations:  Tanesha Kern scored a 10/24 on the AM-PAC ADL Inpatient form. Current research shows that an AM-PAC score of 17 or less is typically not associated with a discharge to the patient's home setting. Based on the patient's AM-PAC score and their current ADL deficits, it is recommended that the patient have 3-5 sessions per week of Occupational Therapy at d/c to increase the patient's independence. Please see assessment section for further patient specific details. If patient discharges prior to next session this note will serve as a discharge summary. Please see below for the latest assessment towards goals. OT Equipment Recommendations  Equipment Needed: No  Other: defer    Assessment   Performance deficits / Impairments: Decreased functional mobility ; Decreased safe awareness;Decreased balance;Decreased ADL status; Decreased cognition;Decreased endurance;Decreased high-level IADLs;Decreased strength  Assessment: Pt reports being independent with ADLs and mobility wiht walker at baseline. Pt currently significantly below baseline related to gross weakness. Required assist of 2 and felisa edmunddy for transfer to chair, poor standing tolerance. Would require assist of 2 for standing ADLs. Pt with no c/o back pain during session. Recommed IP OT tx upon d/c  Treatment Diagnosis: Impaired ADLs, mobility, strength  Decision Making: High Complexity  REQUIRES OT FOLLOW UP: Yes  Activity Tolerance  Activity Tolerance: Patient limited by fatigue  Safety Devices  Safety Devices in place: Yes  Type of devices: Nurse notified; Left in chair;Chair alarm in place;Call light within reach             Treatment Diagnosis: Impaired ADLs, mobility, strength      Restrictions  Position Activity Restriction  Other position/activity restrictions: up with assist    Subjective   General  Chart Reviewed: Yes  Additional Pertinent Hx: Admit 5/5 to OSH with c/o back pain, (+) lumbar fx - neurosurg consulted who rec conservative mgmt; transfer to [facility] 5/12 for cvEEG; 5/17 L4 kyphoplasty; PMHx: CHF, arthritis, HTN, a-fib, CKD, pacemaker, shrapnel in L hip and intestines  Family / Caregiver Present: No  Referring Practitioner: Dr. Alee Buchanan  Diagnosis: status epilepticus  Subjective  Subjective: Pt eating breakfast in bed with PCA sba upon OT entry.  Agreeable to eval, denies pain  Patient Currently in Pain: Denies    Social/Functional History  Social/Functional History  Lives With: Daughter  Type of Home: House  Home Layout: Two level, Able to Live on Main level with bedroom/bathroom (pt sleeps in living room)  Home Access: Stairs to enter with rails  Entrance Stairs - Number of Steps: 6  Bathroom Shower/Tub: Tub/Shower unit (built-in soap dish to hold onto +grab bar)  Bathroom Toilet: Handicap height  Bathroom Equipment: Tub transfer bench, Hand-held shower, Grab bars in [place]: 4 wheeled walker  ADL Assistance: Independent (independent tub transfers and ADLs)  Homemaking Assistance: Needs assistance (daughter completes)  Ambulation Assistance: Independent (using \"stroller\" walker)  Transfer Assistance: Independent  Active : No  Occupation: Retired  Leisure & Hobbies: goes to gym 2x/week- walks 1/3 mile with frequent rest breaks  Additional Comments: daughter is home most of day, \"there's always someone there\"       Objective   Vision: Impaired  Vision Exceptions: Wears glasses at all times  Hearing Exceptions: Bilateral hearing aid      Orientation  Overall Orientation Status: Within Functional Limits     Balance  Sitting Balance: Minimal assistance (initial min A with R lean progressing to sba at eob x20 minutes)  Standing Balance: Dependent/Total (max A of 2 in stedy)     ADL  Feeding: Stand by assistance (VC for chin tuck to improve swallow; self-feeding with increased time/effort)  LE Dressing: Dependent/Total (socks)  Toileting:  (catheter in place)  Additional Comments: Anticipate assist of 1-2 for all ADLs 2/2 gross weakness and poor standing tolerance at [employer] RUE  RUE Tone: Normotonic  Tone LUE  LUE Tone: Normotonic  Coordination  Movements Are Fluid And Coordinated: No (slow and deliberate movement of UEs when feeding self with R UE)       Bed mobility  Supine to Sit: Dependent/Total (mod A x 2 via log roll, HOB slightly elevated with use of rail)  Scooting: Dependent/Total (max A x 2 to scoot back in chair)     Transfers  Sit to stand: Dependent/Total (max A of 2 from elevated bed to stedy and from stedy seat)  Stand to sit: Dependent/Total (max A of 2 from stedy to recliner)  Transfer Comments: Attempted sit>Stand to walker- achieved 1/2 stance with max A of 2. Used Concuity stedy for sit>Stand from elevated bed and for transfer to chair- effortful to achieve full upright stance x~5-10 seconds        Cognition  Overall Cognitive Status: WFL                 LUE AROM (degrees)  LUE AROM : WFL  LUE General AROM: achieved 60 degrees shoulder flex AROM (old injury)  RUE AROM (degrees)  RUE AROM : WFL  LUE Strength  Gross LUE Strength: WFL  RUE Strength  Gross RUE Strength: WFL               Treatment consisted of:  ADLs, transfer training, education on activity promotion and fall precautions.         Plan   Plan  Times per week: 2-5  Times per day: Daily  Current Treatment Recommendations: Strengthening, Endurance Training, Patient/Caregiver Education & Training, Self-Care / ADL, Equipment Evaluation, Education, & procurement, Balance Training, Safety Education & Training, Functional Mobility Training    AM-PAC Score        AM-[facility] Inpatient Daily Activity Raw Score: 10 (05/19/21 1107)  AM-PAC Inpatient ADL T-Scale Score : 27.31 (05/19/21 1107)  ADL Inpatient CMS 0-100% Score: 74.7 (05/19/21 1107)  ADL Inpatient CMS G-Code Modifier : CL (05/19/21 1107)    Goals  Short term goals  Time Frame for Short term goals: discharge  Short term goal 1: complete sit<>Stand with LRAD and mod a - not met  Short term goal 2: tolerate standing x1 minute with B UE supported and mod A balance- not met  Short term goal 3: complete grooming with min A- not met  Patient Goals   Patient goals : get stronger       Therapy Time   Individual Concurrent Group Co-treatment   Time In 0805         Time Out 0903         Minutes 58         Timed Code Treatment Minutes: 43 Minutes +15 min carlito Alvarado, OT

## 2021-05-19 NOTE — PROGRESS NOTES
Physical Therapy    Facility/Department: [facility] 5T ORTHO/NEURO  Initial Assessment / treatment    NAME: Nelida Singer  : 1925  MRN: [id number]    Date of Service: 2021    Discharge Recommendations: Nelida Singer scored a 7/24 on the AM-PAC short mobility form. Current research shows that an AM-PAC score of 17 or less is typically not associated with a discharge to the patient's home setting. Based on the patient's AM-PAC score and their current functional mobility deficits, it is recommended that the patient have 3-5 sessions per week of Physical Therapy at d/c to increase the patient's independence. Please see assessment section for further patient specific details. If patient discharges prior to next session this note will serve as a discharge summary. Please see below for the latest assessment towards goals. PT Equipment Recommendations  Other: Defer to next level of care    Assessment   Body structures, Functions, Activity limitations: Decreased functional mobility ; Decreased strength;Decreased endurance;Decreased balance  Assessment: Pt is 80 y.o. male admit with L4 compression fx and acute encephalopathy. Pt is below his functional baseline of ambulating independently with use of rollator. Pt requires 2 person assist and use of lift equipment today. Mobility limited by generalized weakness. Rec continued IP PT at d/c. Discussed with pt. Treatment Diagnosis: impaired gait and transfers  Prognosis: Good  Decision Making: Low Complexity  PT Education: Goals;PT Role;Plan of Care;Transfer Training;Functional Mobility Training  Patient Education: Pt demos understanding; rec continued reinforcement  REQUIRES PT FOLLOW UP: Yes  Activity Tolerance  Activity Tolerance: Pt reported dizziness initially upon sitting EOB - improved with time. Dizziness returned after 1st sit to stand attempt. BP: 143/78. HR 97, O2 sats 98% on RA.        Patient Diagnosis(es): There were no encounter diagnoses. has a past medical history of Acute diastolic CHF (congestive heart failure) ([address] Arthritis, Atrial fibrillation ([address] BPH with obstruction/lower urinary tract symptoms, Chronic atrial fibrillation ([address] CKD (chronic kidney disease), Coronary arteriosclerosis, Encounter for imaging to screen for metal prior to MRI, GERD (gastroesophageal reflux disease), History of blood transfusion, HTN (hypertension), Left hip pain, Pacemaker, Pulmonary fibrosis ([address] Pulmonary nodule, left, Renal atrophy, right, Renal cyst, right, and SOB (shortness of breath). has a past surgical history that includes Pacemaker insertion (2014); Tonsillectomy (1960); Small intestine surgery (1945); Inguinal hernia repair (Right, 07/2016); Coronary angioplasty with stent (2009); pacemaker placement; Colonoscopy; Abdomen surgery; eye surgery (Bilateral); Pain management procedure (Bilateral, 11/30/2020); and IR KYPHOPLASTY LUMBAR 1 VERTEBRAL BODY (5/17/2021). Restrictions  Position Activity Restriction  Other position/activity restrictions: up with assist  Vision/Hearing  Vision: Impaired  Vision Exceptions: Wears glasses at all times  Hearing: Exceptions to [facility]  Hearing Exceptions: Bilateral hearing aid     Subjective  General  Chart Reviewed: Yes  Additional Pertinent Hx: Admit 5/5 to OSH with c/o back pain, (+) lumbar fx - neurosurg consulted who rec conservative mgmt; transfer to [facility] 5/12 for cvEEG; 5/17 L4 kyphoplasty; PMHx: CHF, arthritis, HTN, a-fib, CKD, pacemaker, shrapnel in L hip and intestines  Family / Caregiver Present: No  Referring Practitioner: Daron Plasencia MD  Diagnosis: acute encephalopathy, L4 compression fx  General Comment  Comments: Spoke with neurosurg NP prior to session who states pt ok for mobility without LSO. NP recommends LSO if needed for pain control. Discussed with RN. Subjective  Subjective: Pt found in bed, finishing breakfast.  Pt is alert and agreeable to PT.   Pain Screening  Patient able to lift hips high enough on 2nd attempt for placement of felisa shafferdy paddles        Balance  Sitting - Static: Fair  Sitting - Dynamic: Fair  Standing - Static: Poor  Comments: pt sat EOB x 20 min total with min A initially due to R sided lean; progressed to SBA with cues      Treatment included bed mobility, balance, and transfer training, pt education. Plan   Plan  Times per week: 2-5  Current Treatment Recommendations: Strengthening, Balance Training, Endurance Training, Functional Mobility Training, Transfer Training, Gait Training, Equipment Evaluation, Education, & procurement, Patient/Caregiver Education & Training, Home Exercise Program  Safety Devices  Type of devices: Left in chair, Call light within reach, Chair alarm in place, Nurse notified, Gait belt - rec 2 person assist with felisa harrison for back to bed - RN notified    G-Code       OutComes Score                                                  AM-PAC Score  AM-PAC Inpatient Mobility Raw Score : 7 (05/19/21 0923)  AM-PAC Inpatient T-Scale Score : 26.42 (05/19/21 0923)  Mobility Inpatient CMS 0-100% Score: 92.36 (05/19/21 0923)  Mobility Inpatient CMS G-Code Modifier : CM (05/19/21 4107)          Goals  Short term goals  Time Frame for Short term goals: discharge  Short term goal 1: Pt will transfer supine <--> sit with mod A x 1 via log roll  Short term goal 2: Pt will transfer sit <--> stand with mod A x 1  Short term goal 3: Pt will transfer bed <--> chair with mod A x 2  Short term goal 4: Pt will participate in LE HEP  Patient Goals   Patient goals : eventually return home       Therapy Time   Individual Concurrent Group Co-treatment   Time In 0810         Time Out 0905         Minutes 55                 Timed Code Treatment Minutes:  40    Total Treatment Minutes:  55    If patient is discharged prior to next treatment, this note will serve as the discharge summary.   Johnna Rios, PT, DPT  [id number]

## 2021-05-19 NOTE — PLAN OF CARE
Problem: Falls - Risk of:  Goal: Will remain free from falls  Description: Will remain free from falls  5/19/2021 0034 by Darlyn Lamas RN  Outcome: Ongoing   Fall risk precautions in place. Call light within reach. Bed in lowest position and locked. Instructed to call for assistance ambulating.       Problem: Urinary Elimination:  Goal: Complications related to the disease process, condition or treatment will be avoided or minimized  Description: Complications related to the disease process, condition or treatment will be avoided or minimized  Outcome: Ongoing   Urology consult for urinary retention

## 2021-05-19 NOTE — CARE COORDINATION
Case Management Daily Note                    Date: 5/19/2021     Patient Name: Susan Buchanan    Date of Admission: 5/12/2021  4:13 PM  YOB: 1925    Length of Stay: 7            Patient Admission Status: Inpatient  Diagnosis:Status epilepticus [facility]) [K25.517]     ________________________________________________________________________________________  Discharge Plan: SNF: [facility], daughter ([phone]/ Home: [phone])    Insurance: Payor: MEDICARE / Plan: MEDICARE PART A AND B / Product Type: *No Product type* /   Is pre-cert/notification needed: N/A     Tentative discharge date: 5/20     Current barriers: Medical Clearance. Referrals completed: SNF: [facility]     Resources/ information provided: SNF List   ________________________________________________________________________________________  PT AM-PAC: 7 / 24 per last evaluation on: 5.19    OT AM-PAC: 10 / 24 per last evaluation on: 5/19    DME Needs for discharge: Defer   ________________________________________________________________________________________  Notes/Plan of Care:   SW rounded and spoke with daughter. Patient was able to work with therapy today. Patient improving with eating and oral intake. Plan is SNF at [facility] once medically ready. Sana Hoffman and/or his family were provided with choice of provider; he and/or his family are in agreement with the discharge plan at this time.     Care Transition Patient: No Celester KAYA Blair  [facility]   Case Management Department  Ph: [phone]

## 2021-05-19 NOTE — PROGRESS NOTES
NUTRITION ASSESSMENT  Admission Date: 5/12/2021     Type and Reason for Visit: Reassess    NUTRITION RECOMMENDATIONS:   1. PO Diet: diet per SLP-minced and moist   2. ONS: Start Ensure BID     NUTRITION ASSESSMENT:  Nutritional summary & status: Pt remains at risk for nutritional compromise AEB poor po intakes. Pt pulled NGT out yesterday. MBS completed with recs for dysphagia diet. Pt was seen eating breakfast this morning, <50%. Discussed addition of ONS to aid in meeting nutritional needs. Encouraged po intakes. Will monitor nutritional status this admission. Patient admitted d/t: back pain, concern of seizures/continuous EEG    PMH significant for: Afib, chronic steroid use, CHF, HTN    MALNUTRITION ASSESSMENT  Context of Malnutrition: Acute Illness   Malnutrition Status: At risk for malnutrition (Comment)  Findings of the 6 clinical characteristics of malnutrition (Minimum of 2 out of 6 clinical characteristics is required to make the diagnosis of moderate or severe Protein Calorie Malnutrition based on AND/ASPEN Guidelines):  Energy Intake: Less than/equal to 75% of estimated energy requirements    Energy Intake Time: Greater than or equal to 7 days    Weight Loss %: Unable to assess  -fluid shifts   Weight loss Time: Unable to assess   Body Fat Loss: Unable to Assess -r/t advanced age   Body Fat Location: Unable to assess    Body Muscle Loss: Unable to Assess -r.t advanced age  Body Muscle Loss Location: Unable to assess    Fluid Accumulation: No significant    Fluid Accumulation Location: No significant     Strength: Not Performed;  Not Measured     NUTRITION DIAGNOSIS   Problem: Problem #1: Inadequate oral intake   Etiology: Swallowing Difficulty   Signs & Symptoms: Diet history of poor intake , Intake 0-25% and Intake 26-50%    NUTRITION INTERVENTION  Food and/or Nutrient Delivery: Continue Current Diet, Start Oral Nutrition Supplement   Nutrition Education/Counseling: No recommendation at this time Coordination of Nutrition Care: Continue to monitor while inpatient     NUTRITION MONITORING & EVALUATION:  Evaluation:Modified current goal   Goals:Goals: Pt will meet >50% of nutritional needs from po intakes of meals and ONS   Monitoring: Chewing/Swallowing , Meal Intake , Supplement Intake  or Weight      OBJECTIVE DATA: Significant to nutrition assessment  · Nutrition-Focused Physical Findings: generalized trace edema   · Labs: Reviewed; Na 146, K/Phos-WNL, VR=218 mg/dL  · Meds: Reviewed  · Wounds None      ANTHROPOMETRICS  Current Height: 5' 5\" (165.1 cm)  Current Weight: 150 lb 2.1 oz (68.1 kg)  Bed scale   Admission weight: 154 lb 8.7 oz (70.1 kg)  Ideal Bodyweight 136 lbs  Usual Bodyweight ~168 lbs per EMR  Weight Changes -10 % x 6 months- augustine, changes r/t fluid shifts       BMI BMI (Calculated): 25    Wt Readings from Last 50 Encounters:   05/16/21 150 lb 2.1 oz (68.1 kg)   05/12/21 154 lb 8.7 oz (70.1 kg)   04/07/21 163 lb 3.2 oz (74 kg)   03/25/21 160 lb (72.6 kg)   01/22/21 167 lb (75.8 kg)   12/11/20 167 lb 15.9 oz (76.2 kg)   11/23/20 168 lb (76.2 kg)   11/20/20 168 lb 6.9 oz (76.4 kg)     COMPARATIVE STANDARDS  Estimated Total Kcals/Day : 22-25  Current Bodyweight (68 kg) 9635-3712 kcal/day    Estimated Total Protein (g/day) : 1.0-1.2 Current Bodyweight (68 kg) 68-82 g/day  Estimated Daily Total Fluid (ml/day): >1500 mL per day     Food / Nutrition-Related History  Pre-Admission / Home Diet:  Pre-Admission/Home Diet: General   Home Supplements / Herbals:    none noted  Food Restrictions / Cultural Requests:    none noted    Current Nutrition Therapies   DIET DYSPHAGIA MINCED AND MOIST;     PO Intake: 1-25%, 26-50% and 51-75%  PO Supplement: None   PO Supplement Intake: None   IVF: 50 ml/hr-5% dex in NaCl    NUTRITION RISK LEVEL: Risk Level:  Moderate     Robbie Nascimento LD  [place]:  [phone]  Office:  [phone]

## 2021-05-20 NOTE — DISCHARGE INSTR - COC
Continuity of Care Form    Patient Name: Dominga Naik   :  1925  MRN:  [id number]    Admit date:  2021  Discharge date:  ***    Code Status Order: Limited   Advance Directives:   Advance Care Flowsheet Documentation       Date/Time Healthcare Directive Type of Healthcare Directive Copy in [address] Agent's Name Healthcare Agent's Phone Number    [phone]  Yes, patient has an advance directive for healthcare treatment  Health care treatment directive  No, copy requested from family  Adult Alek Yoon  [phone]            Admitting Physician:  No admitting provider for patient encounter.   PCP: Alcon Hanley MD    Discharging Nurse: [facility] Unit/Room#: 7917/0896-24  Discharging Unit Phone Number: ***    Emergency Contact:   Extended Emergency Contact Information  Primary Emergency Contact: Franklin Mittal [address] Phone: [phone]  Mobile Phone: [phone]  Relation: Child    Past Surgical History:  Past Surgical History:   Procedure Laterality Date    ABDOMEN SURGERY      Scrapnel removed in [facility]      awoke with indigestion    EYE SURGERY Bilateral     cataract    INGUINAL HERNIA REPAIR Right 2016    IR KYPHOPLASTY LUMBAR FIRST LEVEL  2021    IR KYPHOPLASTY LUMBAR FIRST LEVEL 2021 [facility] SPECIAL PROCEDURES    PACEMAKER INSERTION  2014    PACEMAKER PLACEMENT      PAIN MANAGEMENT PROCEDURE Bilateral 2020    BILATERAL L4 TRANSFORAMINAL EPIDURAL STEROID INJECTION WITH FLUOROSCOPY performed by Bashir Segura MD at [address] [facility] [address] as ww II vet, part of small intestine moved   [address] [facility]       Immunization History:   Immunization History   Administered Date(s) Administered    COVID-19, Spencer Peter, PF, 30mcg/0.3mL 2021, 2021    Influenza Vaccine, unspecified formulation 11/01/2006    Influenza, High Dose (Fluzone 65 yrs and older) 09/15/2017, 09/15/2018, 09/10/2019, 09/15/2020    Pneumococcal Conjugate 13-valent ([id number]) 05/14/2019    Pneumococcal Polysaccharide ([id number]) 03/03/2015    Tdap (Boostrix, Adacel) 11/06/2018       Active Problems:  Patient Active Problem List   Diagnosis Code    Pacemaker Z95.0    Essential hypertension I10    Coronary arteriosclerosis I25.10    BPH with obstruction/lower urinary tract symptoms N40.1, N13.8    GERD (gastroesophageal reflux disease) K21.9    Pulmonary fibrosis ([facility]) J84.10    SOB (shortness of breath) R06.02    Renal cyst, right N28.1    Interstitial lung disease ([facility]) J84.9    Solitary lung nodule R91.1    Arthritis of both knees M17.0    Paroxysmal atrial fibrillation ([facility]) I48.0    Acute pulmonary edema ([facility]) J81.0    Pulmonary infiltrates R91.8    Chronic atrial fibrillation ([facility]) I48.20    Severe mitral regurgitation I34.0    Acute diastolic CHF (congestive heart failure) ([facility]) I50.31    Chronic kidney disease, stage 3b N18.32    Debility R53.81    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    Acute on chronic diastolic CHF (congestive heart failure) ([facility]) I50.33    Hematemesis K92.0    Hemoptysis R04.2    Acute respiratory failure with hypoxia ([facility]) J96.01    Hyperkalemia E87.5    Atrial fibrillation, controlled ([facility]) I48.91    Acute respiratory failure with hypoxia and hypercapnia ([facility]) J96.01, J96.02    Pleural effusion, bilateral J90    Nonrheumatic mitral valve regurgitation I34.0    Dysphagia R13.10    Closed compression fracture of L4 lumbar vertebra, initial encounter ([facility]) S32.040A    Lactic acidosis E87.2    Acute retention of urine R33.8    Elevated brain natriuretic peptide (BNP) level R79.89    Renal mass, right N28.89    Abnormal LFTs R94.5    Elevated troponin R77.8    Hyponatremia Z67.2    Neutrophilic leukocytosis Q00.1    Status epilepticus ([facility] G40.901       Isolation/Infection:   Isolation            No Isolation          Patient Infection Status       Infection Onset Added Last Indicated Last Indicated By Review Planned Expiration Resolved Resolved By    None active    Resolved    COVID-19 Rule Out 05/06/21 05/06/21 05/06/21 COVID-19 (Ordered)   05/06/21 Rule-Out Test Resulted    COVID-19 Rule Out 05/05/21 05/05/21 05/05/21 COVID-19, Rapid (Ordered)   05/05/21 Rule-Out Test Resulted    COVID-19 Rule Out 11/11/20 11/11/20 11/11/20 COVID-19 (Ordered)   11/11/20 Rule-Out Test Resulted    COVID-19 Rule Out 10/20/20 10/20/20 10/20/20 COVID-19, PCR (Ordered)   10/20/20 Rule-Out Test Resulted            Nurse Assessment:  Last Vital Signs: /62   Pulse 73   Temp 97.6 °F (36.4 °C) (Oral)   Resp 16   Ht 5' 5\" (1.651 m)   Wt 150 lb 2.1 oz (68.1 kg)   SpO2 95%   BMI 24.98 kg/m²     Last documented pain score (0-10 scale): Pain Level: 0  Last Weight:   Wt Readings from Last 1 Encounters:   05/16/21 150 lb 2.1 oz (68.1 kg)     Mental Status:  oriented to self and situation, disoriented to place and time    IV Access:  - None    Nursing Mobility/ADLs:  Walking   Assisted  Transfer  Assisted  Bathing  Assisted  Dressing  Assisted  Toileting  Assisted  Feeding  Assisted  Med Admin  Assisted  Med Delivery   whole and prefers mixed with apple sauce    Wound Care Documentation and Therapy:        Elimination:  Continence:   · Bowel: No  · Bladder: No  Urinary Catheter: Insertion Date: 05/05/2021; removed 5/20/21 and has voided on own since removal   Colostomy/Ileostomy/Ileal Conduit: no        Date of Last BM: 05/20/2021    Intake/Output Summary (Last 24 hours) at 5/20/2021 1246  Last data filed at 5/20/2021 1015  Gross per 24 hour   Intake 1010 ml   Output 1800 ml   Net -790 ml     I/O last 3 completed shifts: In: 1010 [P.O.:600; I.V.:410]  Out: 1925 [Urine:1925]    Safety Concerns:      At Risk for Falls and Aspiration Risk    Impairments/Disabilities: Vision and Hearing    Nutrition Therapy:  Current Nutrition Therapy:   - Oral Diet:  Dysphagia 2 mechanically altered    Routes of Feeding: Oral  Liquids: No Restrictions  Daily Fluid Restriction: no  Last Modified Barium Swallow with Video (Video Swallowing Test): done on 05/18/2021/***    Treatments at the Time of Hospital Discharge:   Respiratory Treatments: n/a  Oxygen Therapy:  is not on home oxygen therapy. Ventilator:    - No ventilator support    Rehab Therapies: Physical Therapy, Occupational Therapy and Speech/Language Therapy  Weight Bearing Status/Restrictions: No weight bearing restirctions  Other Medical Equipment (for information only, NOT a DME order):  walker and bedside commode  Other Treatments: n/a    Patient's personal belongings (please select all that are sent with patient):  Hearing Aides bilateral    RN SIGNATURE:  Electronically signed by Valerie Colorado RN on 5/20/21 at 3:17 PM EDT    CASE MANAGEMENT/SOCIAL WORK SECTION    Inpatient Status Date:  5/20/21    Readmission Risk Assessment Score:  Readmission Risk              Risk of Unplanned Readmission:  34           Discharging to Facility/ [facility]               [address] [address]       Phone: [phone]       Fax: [phone]        ·     Dialysis Facility (if applicable)   · Name:  · Address:  · Dialysis Schedule:  · Phone:  · Fax:    / signature: Electronically signed by Kristine Sutherland RN on 5/20/21 at 1:01 PM EDT    PHYSICIAN SECTION    Prognosis: Fair    Condition at Discharge: Stable    Rehab Potential (if transferring to Rehab):  Fair    Recommended Labs or Other Treatments After Discharge: PT, OT ,nursing , CBC in 3 days for low platelet    Physician Certification: I certify the above information and transfer of Felicia Marmolejo  is necessary for the continuing treatment of the diagnosis listed and that he requires [facility] for less 30 days.     Update Admission H&P: No change in H&P    PHYSICIAN SIGNATURE:  Electronically signed by Nicolette Monahan MD on 5/20/21 at 12:47 PM EDT

## 2021-05-20 NOTE — PROGRESS NOTES
Hospitalist Progress Note      PCP: Keely Baez MD    Chief Complaint. Patient is a 29-year-old male with past medical history of atrial fibrillation on Eliquis, chronic steroid use, BPH who presented to hospital as a transfer from [facility] [place] for continuous EEG due to concern of seizures. Patient was also noted to have vertebral fracture after a fall, IR was consulted for kyphoplasty    Date of Admission: 5/12/2021    Subjective:     -Seen and examined  -Feeling better today  Decreased urine output, will try a dose of Lasix  Urology consulted for AUR    Medications:  Reviewed    Infusion Medications    dextrose 5 % and 0.45 % NaCl 50 mL/hr at 05/19/21 1136    sodium chloride       Scheduled Medications    fluticasone  1 spray Each Nostril Daily    nystatin  5 mL Oral 4x Daily    metoprolol tartrate  12.5 mg Per G Tube Daily    sodium chloride flush  5-40 mL Intravenous 2 times per day    predniSONE  10 mg Oral Daily    tamsulosin  0.4 mg Oral Nightly    finasteride  5 mg Oral Daily    aspirin  81 mg Oral Daily    atorvastatin  40 mg Oral Nightly    apixaban  2.5 mg Oral BID     PRN Meds: cloNIDine, hydrALAZINE, sodium chloride flush, sodium chloride, promethazine **OR** ondansetron, polyethylene glycol, acetaminophen **OR** acetaminophen      Intake/Output Summary (Last 24 hours) at 5/20/2021 1013  Last data filed at 5/20/2021 0801  Gross per 24 hour   Intake 770 ml   Output 1850 ml   Net -1080 ml       Physical Exam Performed:    /82   Pulse 70   Temp 97.6 °F (36.4 °C) (Oral)   Resp 16   Ht 5' 5\" (1.651 m)   Wt 150 lb 2.1 oz (68.1 kg)   SpO2 95%   BMI 24.98 kg/m²     General appearance: NAD, alert and awake  HEENT:  Conjunctivae/corneas clear. Neck: Supple, with full range of motion. Respiratory:  Normal respiratory effort. Clear to auscultation, bilaterally without Rales/Wheezes/Rhonchi.   Cardiovascular: Regular rate and rhythm with normal S1/S2 without murmurs or rubs Abdomen: Soft, non-tender, non-distended, normal bowel sounds. Musculoskeletal: No cyanosis or edema bilaterally  Neurologic: following commands  Psychiatric: Alert and oriented, Normal mood  Peripheral Pulses: +2 palpable, equal bilaterally       Labs:   Recent Labs     05/18/21 0458 05/19/21  0602 05/20/21  0538   WBC 9.0 8.5 8.7   HGB 11.6* 11.5* 10.8*   HCT 36.5* 36.1* 33.9*   PLT 90* 90* 82*     Recent Labs     05/18/21 0458 05/19/21  0602 05/20/21  0538   * 146* 138   K 4.0 4.0 4.2   * 113* 105   CO2 24 24 24   BUN 35* 30* 31*   CREATININE 1.2 1.0 1.0   CALCIUM 9.0 8.9 8.2*   PHOS 2.7 2.5 2.2*     No results for input(s): AST, ALT, BILIDIR, BILITOT, ALKPHOS in the last 72 hours. No results for input(s): INR in the last 72 hours. No results for input(s): Lorayne Darby in the last 72 hours. Urinalysis:      Lab Results   Component Value Date    NITRU Negative 05/10/2021    WBCUA 2 05/10/2021    RBCUA 5 05/10/2021    RBCUA NEGATIVE 01/11/2021    BLOODU MODERATE 05/10/2021    SPECGRAV 1.013 05/10/2021    GLUCOSEU Negative 05/10/2021       Radiology:  FL MODIFIED BARIUM SWALLOW W VIDEO   Final Result      Essentially silent aspiration with thin and nectar thick liquids, improved to some degree with chin intact maneuver. Inconsistent penetration with puree. Please correlate with the speech pathology report for additional details. IR KYPHOPLASTY LUMBAR 1 VERTEBRAL BODY   Final Result   1. Satisfactory L4 percutaneous vertebral body augmentation and placement of bone cement - Kyphoplasty         XR CHEST PORTABLE   Final Result      Stable mild cardiomegaly and mild interstitial edema. XR ABDOMEN (KUB) (SINGLE AP VIEW)   Final Result   Feeding tube is pointed towards the pyloric portion the stomach. XR ABDOMEN (KUB) (SINGLE AP VIEW)   Final Result      1. Feeding tube with tip in the mid stomach.   Recommend advancing the tube to ensure its position in the distal check chest x-ray    3. Atrial fibrillation on Eliquis:-Eliquis on hold for planned procedure on Monday    4. Fall, vertebral column fracture:-  With paijn with any movement, s/p kyphoplasty today, PTOT    5. Hypernatremia:-Ordered free water bolus x 1    6. Continue statin, Toprol, home prednisone, Flomax    7. Poor nutrition:- consult SLP, might be able to add diet    DVT prophylaxis-Eliquis on hold, will start on subcutaneous heparin until procedure  Diet: DIET DYSPHAGIA MINCED AND MOIST;   Dietary Nutrition Supplements: Standard High Calorie Oral Supplement  Code Status: Limited    PT/OT Eval Status: ordered    Dispo -today versus tomorrow,  will need SNF placement    Lindsay Cochran MD

## 2021-05-20 NOTE — PLAN OF CARE
Problem: Falls - Risk of:  Goal: Will remain free from falls  Description: Will remain free from falls  Outcome: Ongoing  Note: Patient will remain free from falls throughout shift. Gabby harrison x2 used for ambulating. High fall risk. Fall precautions in place. On camera for safety. Non-skid socks on. Bed locked in lowest position with alarm on and 2/4 side rails up. Bedside table, belongings, and call light within reach. Hourly rounding in anticipation of patient needs. Floor clean and free from clutter. Room door open. Will continue to monitor. Problem: Nutrition  Goal: Optimal nutrition therapy  Outcome: Ongoing  Note: Patient tolerating dysphagia, minced and moist diet. Denies n/v. SLP following patient. Will continue to monitor. Problem: Confusion - Acute:  Goal: Absence of continued neurological deterioration signs and symptoms  Description: Absence of continued neurological deterioration signs and symptoms  Outcome: Ongoing  Note: Patient oriented to self and situation. Reorientation provided frequently. No acute neuro changes throughout shift thus far. Will continue to monitor. Problem: Mood - Altered:  Goal: Mood stable  Description: Mood stable  Outcome: Ongoing  Note: Mood has remained stable throughout shift thus far. Patient is cooperative with care. No abusive behavior noted. Will continue to monitor.

## 2021-05-20 NOTE — PROGRESS NOTES
Speech Language Pathology  Facility/Department: [facility] 5T ORTHO/NEURO  Dysphagia Daily Treatment Note    NAME: Yusuf Jennings  : 1925  MRN: [id number]    Patient Diagnosis(es):   Patient Active Problem List    Diagnosis Date Noted    Status epilepticus ([address] 2021    Closed compression fracture of L4 lumbar vertebra, initial encounter ([address] 2021    Lactic acidosis 2021    Acute retention of urine 2021    Elevated brain natriuretic peptide (BNP) level 2021    Renal mass, right 2021    Abnormal LFTs 2021    Elevated troponin 2021    Hyponatremia     Neutrophilic leukocytosis     Dysphagia 2021    Acute on chronic diastolic CHF (congestive heart failure) ([address] 2020    Hematemesis 2020    Hemoptysis 2020    Acute respiratory failure with hypoxia (HCC)     Hyperkalemia     Atrial fibrillation, controlled ([address]     Acute respiratory failure with hypoxia and hypercapnia (HCC)     Pleural effusion, bilateral     Nonrheumatic mitral valve regurgitation     Osteoarthritis of lumbar spine with myelopathy 11/10/2020    Acute diastolic CHF (congestive heart failure) ([address] 10/23/2020    Chronic kidney disease, stage 3b 10/23/2020    Debility 10/23/2020    Chronic atrial fibrillation (HCC)     Severe mitral regurgitation     Acute pulmonary edema ([address] 10/21/2020    Pulmonary infiltrates     Paroxysmal atrial fibrillation ([address] 10/20/2020    Arthritis of both knees 2019    Interstitial lung disease ([address] 2019    Solitary lung nodule 2019    Pulmonary fibrosis ([address]     Renal cyst, right 2017    BPH with obstruction/lower urinary tract symptoms     GERD (gastroesophageal reflux disease)     SOB (shortness of breath) 2017    Pacemaker 2015    Coronary arteriosclerosis 2009    Essential hypertension 1990     Allergies:    Allergies   Allergen Reactions    Claritin [Loratadine]      Reduced urine flow    Zestril [Lisinopril] Other (See Comments)     coughing     Recent Chest Xray 5/16/21      Stable mild cardiomegaly and mild interstitial edema.      CT of head 5/12/21  Impression   Impression:   1. No evidence of recent intracranial hemorrhage. 2. Parenchymal volume loss and chronic small vessel ischemic changes in the white matter. 3. 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 4. Opacification of the right mastoid air cells and right middle ear which could reflect age-indeterminate infectious/inflammatory process.        Previous MBS - none  Chart reviewed. Medical Diagnosis: status epilepticus  Treatment Diagnosis: dysphagia    BSE Impression 5/18/21  Pt alert, followed simple commands and answered basic questions appropriately, was oriented to month and place. Pt was seen at another [facility] prior to admission here with minced and moist diet recommended initially, however npo then recommended due to poor alertness. Pt analyzed with ice chips, water via tsp/cup and straw and puree texture. Pt exhibited weak cough inconsistently with thin liquids via cup and straw. No cough/throat clear with puree and ice chips. Good swallow movement felt upon palpation of anterior neck. Did not assess solids during bedside eval as there was question of whether pt on puree foods. Spoke with daughter via phone conversation after session. Daughter states pt normally eats soft foods but was placed on puree at the other [facility] facility. Notes state recommendations were actually minced and moist.    Discussed recommendation for MBS study and rationale. Discussed pt advanced age as well as eating for quality of life. Daughter wishes to proceed with MBS to fully assess pt swallow function. She states pt doctor wanted him to have this test done previously. MBS results 5/18/21  Pt exhibiting severe oropharyngeal dysphagia.   Reduced hyolaryngeal mechanics, reduced tongue base retraction and epiglottal closure resulted in aspiration of nectar thick and thin liquids, during the swallow and after from vallecular residue. Pt exhibited mild reflexive cough x 1 and was able to produce volitional cough that did not clear the pen/aspiration. Deep penetration noted after the swallow from the residue with puree consistency. A chin tuck was utilized with puree/nectar and thin liquids. Pt cont to demonstrate mild penetration, though no aspiration was identified with any trials with use of chin tuck. Pt with limited dentition therefore demonstrated prolonged mastication with solids. Daughter states pt eats soft/minced types of foods normally. Treatment Dx and ICD 10: severe oropharyngeal dysphagia    Pain: denies    Current Diet : dysphagia II minced and moist/thin liquids with use of chin tuck    Treatment:  Pt seen bedside to address the following goals:  1-The patient will tolerate instrumental swallowing procedure  5/18:goal met     2-The patient/caregiver will demonstrate understanding of dysphagia recommendations/compensatory strategies for improved swallowing safety. 5/18: Educated pt and daughter via phone conversation to purpose of visit, s/s of aspiration, concern if aspiration occurs, rationale for MBS study. Also discussed pt advanced age and eating for quality of life. Daughter wishes to proceed with MBS and stated good understanding. Cont goal  5/18: educated pt to results of MBS and recommendations. Spoke with pt daughter via phone again after study. Explained results of MBS, risk for aspiration of all consistencies, options for PO, diet texture and strategies that reduced risk and instruction to speak with family and MD as appropriate. Daughter stated they would not want a feeding tube. She would like to try diet while pt is in hospital- minced and moist/thin liquids with use of chin tuck.   Daughter stated good understanding of information and understands risk for aspiration. She will benefit from cont education. Pt will require supervision/cues when eating for carry over of chin tuck. Cont goal  5/19: pt did not recall specifics of MBS, however stated and demonstrated that he is suppose to put chin down when taking PO. Reviewed rationale for diet and strategies and importance of always using chin tuck with  Liquids. Pt stated comprehension and demonstrated use with min cues. Educated PCA as well to recommendations/strategies. Cont goal  5/20: pt with good recall as to use of chin tuck and demonstrates use independently. Pt requires min cues to keep chin down until swallow complete. Reviewed rationale and importance of using with all liquids. Pt instructed to complete effortful swallows throughout the day as well. Pt stated comprehension but will benefit from reminders/cues  Cont goal    New goal:  3-  Pt will consume PO safely without signs or symptoms of aspiration  5/19: pt sitting up in bed, states he is feeling better. Pt has been placed on PO diet, per palliative care notes, daughter reiterated that she did not want feeding tube. Pt feeding self ground sausage, eggs, oatmeal and various liquids. Pt using chin tuck appropriately with very min cues. Pt exhibited no overt signs of aspiration throughout meal.  Pt demonstrated prolonged but adequate mastication with soft solid, no oral residue. Pt educated to effortful swallow and alternating liquid /solids. Pt completed effortful swallow with mod cues. No respiratory decline per chart review  Cont goal  5/20: pt sitting up in bed feeding self. Pt consuming eggs, potatoes and oatmeal, as well as liquids via cup and straw. Pt exhibited no coughing/throat clearing or change in voice with any PO. Pt utilized chin tuck independently, requiring min cues to keep chin down until swallow is completed. Pt instructed to effortful swallow, completed 6 repetitions with min cues. No respiratory decline

## 2021-05-20 NOTE — PROGRESS NOTES
IVs removed. Transport at bedside to take patient to [facility]. Daughter at bedside and will transport belongings. Patient sent with hearing aids in.

## 2021-05-20 NOTE — PROGRESS NOTES
Urology Attending Progress Note      Subjective: No  complaints     Vitals:  /82   Pulse 70   Temp 97.6 °F (36.4 °C) (Oral)   Resp 16   Ht 5' 5\" (1.651 m)   Wt 150 lb 2.1 oz (68.1 kg)   SpO2 95%   BMI 24.98 kg/m²   Temp  Av.8 °F (36.6 °C)  Min: 97.5 °F (36.4 °C)  Max: 97.9 °F (36.6 °C)    Intake/Output Summary (Last 24 hours) at 2021 0946  Last data filed at 2021 0801  Gross per 24 hour   Intake 1010 ml   Output 1850 ml   Net -840 ml       Exam: Awaiting first void    Labs:  WBC:    Lab Results   Component Value Date    WBC 8.7 2021     Hemoglobin/Hematocrit:    Lab Results   Component Value Date    HGB 10.8 2021    HCT 33.9 2021     BMP:    Lab Results   Component Value Date     2021    K 4.2 2021    K 3.8 2021     2021    CO2 24 2021    BUN 31 2021    LABALBU 2.7 2021    CREATININE 1.0 2021    CALCIUM 8.2 2021    GFRAA >60 2021    LABGLOM >60 2021     PT/INR:    Lab Results   Component Value Date    PROTIME 13.6 05/15/2021    INR 1.17 05/15/2021     PTT:    Lab Results   Component Value Date    APTT 66.3 2021   [APTT    Impression/Plan: 81 yo M with history of BPH admitted for seizures. We have been consulted for retention.  -Turk removed this morning for voiding trial, still awaiting first void   -Continue flomax  -Bladder scan patient after first void.  If PVR>450cc, ok to replace catheter and DC to SNF with catheter.  -Call with any questions    TAD Santillan

## 2021-05-20 NOTE — CARE COORDINATION
Case Management Assessment            Discharge Note                    Date / Time of Note: 5/20/2021 1:37 PM                  Discharge Note Completed by: Ewelina Medel RN    Patient Name: Esteban Pandya   YOB: 1925  Diagnosis: Status epilepticus [facility]) [L03.828]   Date / Time: 5/12/2021  4:13 PM    Current PCP: Tay Munroe MD  Clinic patient: No    Hospitalization in the last 30 days: No    Advance Directives:  Code Status: Limited  [place] DNR form completed and on chart: Yes    Financial:  Payor: MEDICARE / Plan: MEDICARE PART A AND B / Product Type: *No Product type* /      Pharmacy:    [facility]/pharmacy #[id number] [facility]. Anuel Ward [phone] - F [phone]  [address] [place] [address]. [place] [id number]  Phone: [phone] Fax: [phone]    [facility] [address] [phone] [facility] [phone]  179-00 [facility] [address]  Phone: [phone] Fax: [phone]      Assistance purchasing medications?: Potential Assistance Purchasing Medications: No  Assistance provided by Case Management: None at this time    Does patient want to participate in local refill/ meds to beds program?: No    Meds To Beds General Rules:  1. Can ONLY be done Monday- Friday between 8:30am-5pm  2. Prescription(s) must be in pharmacy by 3pm to be filled same day  3. Copy of patient's insurance/ prescription drug card and patient face sheet must be sent along with the prescription(s)  4. Cost of Rx cannot be added to hospital bill. If financial assistance is needed, please contact unit  or ;  or  CANNOT provide pharmacy voucher for patients co-pays  5.  Patients can then  the prescription on their way out of the hospital at discharge, or pharmacy can deliver to the bedside if staff is available. (payment due at time of pick-up or delivery - cash, check, or card accepted) Indicated    Dialysis:  Dialysis patient: No    Dialysis Center:  Not Applicable    Hospice Services:  Location: Not Applicable  Agency: Not Applicable    Consents signed: Not Indicated    Referrals made at [facility] for outpatient continued care:  Not Applicable    Additional CM Notes:  CM met with daughter in room. Agreeable to discharge to [facility]. Ambulance transport at 4:30pm.     The Plan for Transition of Care is related to the following treatment goals of Status epilepticus ([facility] [address] [G40.901]    The Patient and/or patient representative aCry Woodruff and his family were provided with a choice of provider and agrees with the discharge plan Not Indicated    Freedom of choice list was provided with basic dialogue that supports the patient's individualized plan of care/goals and shares the quality data associated with the providers.  Not Indicated    Care Transitions patient: No    Berenice Dale RN  [facility]  Case Management Department  Ph: [phone]

## 2021-05-20 NOTE — PROGRESS NOTES
Patient's diaper wet. First void since kauffman removal this AM. Whitney care provided and new bed pad and diaper placed. Bladder scan showed a PVR of 20 mL. Not an indication to place another kauffman. Will continue to monitor.

## 2021-05-20 NOTE — PROGRESS NOTES
Patient stood at bedside with an assist of 2 and the stedy. Voided 175 mL of yolanda colored urine in urinal. Had small BM. Whitney care provided and patient changed into own clothes for discharge.

## 2021-05-20 NOTE — DISCHARGE SUMMARY
Hospital Discharge Summary    Patient's PCP: Alejo Neal MD  Admit Date: 5/12/2021   Discharge Date: 5/20/2021    Admitting Physician: Dr. Merline Garner admitting provider for patient encounter. Discharge Physician: Dr. Maria De Jesus Porras MD     Consults:   IP CONSULT TO PALLIATIVE CARE  IP CONSULT TO NEUROLOGY  IP CONSULT TO INTERVENTIONAL RADIOLOGY  IP CONSULT TO DIETITIAN  IP CONSULT TO UROLOGY    Brief HPI: Natanael Garsia is a 80 y.o. male with past medical history as below, including recent admission to [facility] for worsening lbp, LE weakness and falls. He was found to have L4 fracture with 3mm retropulsion, evaluated by neurosurgery. He was also treated for pneumonia with vanc/cefepime. He underwent SLP eval, switched to pureed and thickened liquids. On day 6 while on cefepime he developed acute decline in neurological status. Cefepime was stopped. EEG showed a possible non-convulsive status. He was loaded with ativan and Keppra but had no improvement when so was transferred to [facility] for CvEEG.    Patient seems to wake briefly when his name is called but cannot give any history.        Brief hospital course:   Assessment/ plan  1. Acute encephalopathy,  Improved at the time of discharge, likely due to cefepime,   -Patient was placed on continuous EEG which did not show any seizures  -Neurology was consulted, off AED. Suspected altered mental status was due to opioid pain medication    2. . Hypoxic respiratory failure likely secondary to aspiration pneumonia  Procalcitonin is negative, patient is currently on room air,   afebrile-holding off on antibiotics for now, -SLP consulted, recommending dysphagia to minced and moist/thin liquids with use of chin tuck     3. Atrial fibrillation on Eliquis:-Rate controlled , I called patient's PCP, discussed with on-call physician about resuming Eliquis , recommending about stopping Eliquis     4.  Fall, vertebral column fracture:-  With paijn with any movement, s/p kyphoplasty PTOT.      5. Hypernatremia:-Ordered free water bolus x 1, improved      6. Continue statin, Toprol, home prednisone, Flomax     7. Poor nutrition:- consult SLP, might be able to add diet     8. AUR:-Catheter was taken out prior to discharge, urology was consulted, follow-up with urology as an outpatient    9. Thrombocytopenia:-continue to monitor, holding eliquis. repeat cbc in 3 days     Invasive procedures:  kyphoplasty    Discharge Diagnoses: Active Problems:    Status epilepticus ([address]  Resolved Problems:    * No resolved hospital problems. *      Physical Exam: /63   Pulse 72   Temp 97.6 °F (36.4 °C) (Oral)   Resp 16   Ht 5' 5\" (1.651 m)   Wt 150 lb 2.1 oz (68.1 kg)   SpO2 97%   BMI 24.98 kg/m²     General appearance: NAD, alert and awake  HEENT:  Conjunctivae/corneas clear. Neck: Supple, with full range of motion. Respiratory:  Normal respiratory effort. Clear to auscultation, bilaterally without Rales/Wheezes/Rhonchi. Cardiovascular: Regular rate and rhythm with normal S1/S2 without murmurs or rubs  Abdomen: Soft, non-tender, non-distended, normal bowel sounds. Musculoskeletal: No cyanosis or edema bilaterally  Neurologic: following commands  Psychiatric: Alert and oriented, Normal mood  Peripheral Pulses: +2 palpable, equal bilaterally     Significant diagnostic studies that may require follow up:  CTA HEAD W WO CONTRAST    Result Date: 5/13/2021  PROCEDURE: CT ANGIOGRAPHY HEAD WITH/WITHOUT CONTRAST INDICATION: basilar stenosis; altered mental status COMPARISON: 5/12/2021 TECHNIQUE: CT head was performed without contrast according to standard protocol. CT angiogram of the head was performed with contrast according to standard protocol. Axial images, multiplanar reformatted images, and maximum intensity projection images were reviewed for CT angiographic technique. Up-to-date CT equipment and radiation dose reduction techniques were employed.  IV contrast: 80 mL Isovue 370 left posterior cerebral artery P1 and P2 segments. XR CHEST (2 VW)    Result Date: 5/10/2021  EXAMINATION: TWO XRAY VIEWS OF THE CHEST 5/10/2021 10:27 am COMPARISON: None. HISTORY: ORDERING SYSTEM PROVIDED HISTORY: follow up on pneumonia and chf TECHNOLOGIST PROVIDED HISTORY: Reason for exam:->follow up on pneumonia and chf FINDINGS: Given differences in technique there is slight interval improvement in multifocal airspace disease. Evaluation of mediastinum is limited due to significant rotation. Overall slight interval improvement in multifocal airspace disease. XR ABDOMEN (KUB) (SINGLE AP VIEW)    Result Date: 5/15/2021  Reason: Status post NG tube placement Supine KUB FINDINGS: A feeding tube identified pointed towards the pyloric portion the stomach. Feeding tube is pointed towards the pyloric portion the stomach. XR ABDOMEN (KUB) (SINGLE AP VIEW)    Result Date: 5/15/2021  Single view abdomen HISTORY: Corpak placement COMPARISON STUDY: None FINDINGS: Corpak feeding tube identified with tip in the mid stomach. Persistent mild patchy airspace disease bilaterally. No change in left-sided pacing device. 1.  Feeding tube with tip in the mid stomach. Recommend advancing the tube to ensure its position in the distal stomach/proximal small bowel. XR ABDOMEN (KUB) (SINGLE AP VIEW)    Result Date: 5/13/2021  Patient: Rosalia Hopkins  Time Out: 00:16 Exam(s): FILM KUB  EXAM:   XR Abdomen, 1 View  CLINICAL HISTORY:   verify NG placement. TECHNIQUE:   Frontal supine view of the abdomen/pelvis. COMPARISON:   No relevant prior studies available. FINDINGS:   Gastrointestinal tract:  Unremarkable. No dilation. Bones/joints:  Unremarkable. Tubes, lines and devices: The tip of the nasogastric tube is in the proximal stomach. Vogel there was no evidence of bowel obstruction. Air and stool are seen within the colon. Other findings:  No abnormal mass-effect.   No pathologic calcification is seen.   Electronically signed by Ronal Campos MD on 05-13-21 at [facility] of nasogastric tube in proximal stomach. XR ABDOMEN (KUB) (SINGLE AP VIEW)    Result Date: 5/7/2021  EXAMINATION: ONE SUPINE XRAY VIEW(S) OF THE ABDOMEN 5/7/2021 3:03 pm COMPARISON: None. HISTORY: ORDERING SYSTEM PROVIDED HISTORY: nausea, constipation TECHNOLOGIST PROVIDED HISTORY: Reason for exam:->nausea, constipation Reason for Exam: nausea, constipation Acuity: Acute Type of Exam: Initial FINDINGS: There is an overall paucity of small bowel gas. There is moderate to large amount of gas and stool throughout the colon. No suspicious calcifications are identified. Indeterminate intestinal gas pattern secondary to paucity of small bowel gas. CT HEAD WO CONTRAST    Result Date: 5/12/2021  CT head without contrast. Indication: Follow-up CT. Persistent alteration of mental status. Comparison study: Report from CT head [facility] [place] [facility] [employer] 12/30/2018. Procedure comments: Without contrast axial CT images obtained through the head. Up-to-date CT equipment and radiation dose reduction techniques utilized. Findings: Mild cerebral parenchymal volume loss. Ventricles mildly dilated likely related to parenchymal volume loss. Mild low-attenuation deep and periventricular white matter. Gray-white matter differentiation intact. Arterial vascular calcification. Small remote lacunar infarct of the posterior right cerebellum on series 601 image 18. Calvarium normal. Complete opacification of the right mastoid air cells and opacification of most of the right middle ear. Visualized paranasal sinuses clear. Bilateral cataract surgery. Impression: 1. No evidence of recent intracranial hemorrhage. 2. Parenchymal volume loss and chronic small vessel ischemic changes in the white matter. 3. 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  4. Opacification of the right mastoid air cells and right middle ear which could reflect age-indeterminate confirmed emergency medical condition->Emergency Medical Condition (MA) Reason for Exam: hallucination Acuity: Acute Type of Exam: Initial FINDINGS: BRAIN/VENTRICLES: The ventricles and sulci are diffusely enlarged. Low attenuation is seen in the periventricular and subcortical white matter. No acute intracranial hemorrhage or acute infarct is identified. ORBITS: The visualized portion of the orbits demonstrate no acute abnormality. SINUSES: The visualized paranasal sinuses and mastoid air cells demonstrate no acute abnormality. SOFT TISSUES/SKULL:  No acute abnormality of the visualized skull or soft tissues. No acute intracranial abnormality. Diffuse atrophic changes with findings suggesting chronic microvascular ischemia     CT ABDOMEN PELVIS W IV CONTRAST Additional Contrast? None    Result Date: 5/5/2021  EXAMINATION: CT OF THE ABDOMEN AND PELVIS WITH CONTRAST; CT OF THE LUMBAR SPINE WITHOUT CONTRAST 5/5/2021 3:33 pm TECHNIQUE: CT of the abdomen and pelvis was performed with the administration of intravenous contrast. Multiplanar reformatted images are provided for review. Dose modulation, iterative reconstruction, and/or weight based adjustment of the mA/kV was utilized to reduce the radiation dose to as low as reasonably achievable.; CT of the lumbar spine was performed without the administration of intravenous contrast. Multiplanar reformatted images are provided for review. Dose modulation, iterative reconstruction, and/or weight based adjustment of the mA/kV was utilized to reduce the radiation dose to as low as reasonably achievable.  COMPARISON: CT lumbar spine 10/20/2020, ultrasound 11/13/2020, CT 08/14/2017 HISTORY: ORDERING SYSTEM PROVIDED HISTORY: altered mental status, lower abdominal pain, back pain TECHNOLOGIST PROVIDED HISTORY: Reason for exam:->altered mental status, lower abdominal pain, back pain Additional Contrast?->None Decision Support Exception - unselect if not a suspected or confirmed emergency medical condition->Emergency Medical Condition (MA) Reason for Exam: severe back pain Acuity: Acute Type of Exam: Initial FINDINGS: Lower Chest: There is partially visualized right greater than left pleural fluid. Diffuse partially visualized ground-glass opacities are seen at the lung base. Organs: No focal hepatic lesions. Gallbladder is normal.  The spleen and adrenals are normal.  Mild fatty atrophy of the pancreas without inflammatory change. 8 cm left renal cyst is again noted. Normal left nephrogram.  No hydronephrosis. There is severe cortical atrophy of the right kidney. Multiple hypodense areas are seen in the right kidney there is a hyperdense 2.8 x 4.2 cm area arising from the superior pole of the right kidney, unchanged since the prior CT, but increased from the CT is in 2017. This was not definitively characterized on the comparison ultrasound. Enlarged right renal pelvis is again noted. GI/Bowel: No dilated loops of small or large bowel. TI is normal.  The appendix is visualized and is normal.  No pericolonic inflammatory change. Scattered colonic diverticula are present. Pelvis: Bladder is moderately distended with urine. Peritoneum/Retroperitoneum: No free fluid or free air. No abdominal aortic aneurysm. Bones/Soft Tissues: No fracture or dislocation of the femurs. Appearance of the soft tissues adjacent to the left greater trochanter with radiodense bodies seen tracking to the left iliac wing are unchanged since 2017. Lumbar spine: Interval development vertebral body height loss seen at the L4 level with approximately 3 mm of retropulsion. There is unchanged grade 1 anterolisthesis seen at L4-5. The remaining vertebral bodies appear maintained. Mild disc degenerative change with minimal grade 1 retrolisthesis at L5-S1 is again noted. Angulation of the sacrum is unchanged.   Facet hypertrophic changes are present at all levels of the lumbar spine but become more upright view of the chest was obtained. An enteric tube is visualized terminating within the gastric body. The heart is enlarged. A left subclavian approach cardiac conduction device is noted. Prominence of the background pulmonary interstitium and central pulmonary vascular structures. No effusion or pneumothorax. Impression: Cardiomegaly with prominence of the pulmonary interstitium and vascularity. Correlate for edema. FL MODIFIED BARIUM SWALLOW W VIDEO    Result Date: 5/18/2021  EXAM: Modified barium swallow with speech pathology. DATE: 5/18/2021 11:25 AM INDICATION:  dysphagia TECHNIQUE: Videofluoroscopic swallowing examination was performed in conjunction with the speech pathology department. Various liquid and solid foods substances were used to assess swallowing including. FLUOROSCOPY TIME: 2.9 minutes IMAGES OBTAINED: One Findings: The patient has a pre-existing feeding tube. Normal oral transmit identified. There is essentially silent aspiration with thin and nectar thick liquids. There is inconsistent penetration with puree consistencies. Findings appear mildly improved using a straw with chin tuck maneuver with thin and nectar thick liquid, however a component of trace aspiration is difficult to exclude. With all consistencies, there is post swallow residual in the valleculae and piriform sinuses. Essentially silent aspiration with thin and nectar thick liquids, improved to some degree with chin intact maneuver. Inconsistent penetration with puree. Please correlate with the speech pathology report for additional details. IR KYPHOPLASTY LUMBAR 1 VERTEBRAL BODY    Result Date: 5/17/2021  PERCUTANEOUS VERTEBRAL BODY AUGMENTATION AND PLACEMENT OF BONE CEMENT PERCUTANEOUS KYPHOPLASTY-  L4  VERTEBRAL BODY. CONSCIOUS SEDATION. 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  Procedure was performed by Dr. Ghassan Cade Risks and benefits were discussed informed consent obtained. Correlation is made to prior MRI demonstrating acute L4 vertebral body compression fracture  CONSCIOUS SEDATION: Intravenous Versed and fentanyl with hemodynamic monitoring and pulse oximetry. Independent nursing observer: Incremental administration  of Versed and fentanyl Time started: 1431 Time completed: 1501 Post sedation exam: No complications, stable vital signs PROCEDURE: Patient's placed prone. Under sterile technique and following 2% lidocaine local anesthetic a bilateral transpedicular approach was performed with placement of 11-gauge percutaneous osteotomes. Satisfactory placement and position was confirmed with hard copy imaging. Tally Night drill was used through both needles prior to balloon placement Kyphon balloon was placed in satisfactory position and inflation performed for cavity creation and bone augmentation. Bone cement was introduced uneventfully with satisfactory filling of the cleft fracture and some interdigitation within the bony matrix. Satisfactory filling was observed on AP and lateral radiographs. The needles were removed uneventfully. . Complications[de-identified] None. EBL: less than 5 cc Fluoroscopy time: 4.3  minute Number of exposures: 6     1. Satisfactory L4 percutaneous vertebral body augmentation and placement of bone cement - Kyphoplasty     CT LUMBAR SPINE TRAUMA RECONSTRUCTION    Result Date: 5/5/2021  EXAMINATION: CT OF THE ABDOMEN AND PELVIS WITH CONTRAST; CT OF THE LUMBAR SPINE WITHOUT CONTRAST 5/5/2021 3:33 pm TECHNIQUE: CT of the abdomen and pelvis was performed with the administration of intravenous contrast. Multiplanar reformatted images are provided for review. Dose modulation, iterative reconstruction, and/or weight based adjustment of the mA/kV was utilized to reduce the radiation dose to as low as reasonably achievable.; CT of the lumbar spine was performed without the administration of intravenous contrast. Multiplanar reformatted images are provided for review.  Dose modulation, iterative reconstruction, and/or weight based adjustment of the mA/kV was utilized to reduce the radiation dose to as low as reasonably achievable. COMPARISON: CT lumbar spine 10/20/2020, ultrasound 11/13/2020, CT 08/14/2017 HISTORY: ORDERING SYSTEM PROVIDED HISTORY: altered mental status, lower abdominal pain, back pain TECHNOLOGIST PROVIDED HISTORY: Reason for exam:->altered mental status, lower abdominal pain, back pain Additional Contrast?->None Decision Support Exception - unselect if not a suspected or confirmed emergency medical condition->Emergency Medical Condition (MA) Reason for Exam: severe back pain Acuity: Acute Type of Exam: Initial FINDINGS: Lower Chest: There is partially visualized right greater than left pleural fluid. Diffuse partially visualized ground-glass opacities are seen at the lung base. Organs: No focal hepatic lesions. Gallbladder is normal.  The spleen and adrenals are normal.  Mild fatty atrophy of the pancreas without inflammatory change. 8 cm left renal cyst is again noted. Normal left nephrogram.  No hydronephrosis. There is severe cortical atrophy of the right kidney. Multiple hypodense areas are seen in the right kidney there is a hyperdense 2.8 x 4.2 cm area arising from the superior pole of the right kidney, unchanged since the prior CT, but increased from the CT is in 2017. This was not definitively characterized on the comparison ultrasound. Enlarged right renal pelvis is again noted. GI/Bowel: No dilated loops of small or large bowel. TI is normal.  The appendix is visualized and is normal.  No pericolonic inflammatory change. Scattered colonic diverticula are present. Pelvis: Bladder is moderately distended with urine. Peritoneum/Retroperitoneum: No free fluid or free air. No abdominal aortic aneurysm. Bones/Soft Tissues: No fracture or dislocation of the femurs.   Appearance of the soft tissues adjacent to the left greater trochanter with radiodense FLONASE  SPRAY 2 SPRAY INTO EACH NOSTRIL EVERY DAY     furosemide 40 MG tablet  Commonly known as: Lasix  One po qam for weight over 169lbs     metoprolol succinate 25 MG extended release tablet  Commonly known as: Toprol XL  Take 0.5 tablets by mouth daily For heart     omeprazole 20 MG delayed release capsule  Commonly known as: PRILOSEC  TAKE 1 CAPSULE BY MOUTH EVERY DAY     predniSONE 10 MG tablet  Commonly known as: DELTASONE  TAKE 1 TABLET BY MOUTH EVERY DAY     tamsulosin 0.4 MG capsule  Commonly known as: FLOMAX  ONE EVERY NIGHT FOR YOUR PROSTATE     Vitamin D3 50 MCG (2000 UT) Caps        STOP taking these medications    enoxaparin 30 MG/0.3ML injection  Commonly known as: LOVENOX     oxybutynin 5 MG tablet  Commonly known as: DITROPAN            Activity: activity as tolerated  Diet: DIET DYSPHAGIA MINCED AND MOIST; Dietary Nutrition Supplements: Standard High Calorie Oral Supplement      Disposition: SNF  Discharged Condition: Stable  Follow Up:   Johnson Vora MD  [address] [facility] [phone]    In 1 week          Code status:  Limited         Total time spent on discharge, finalizing medications, referrals and arranging outpatient follow up was more than 45 minutes      Thank you Dr. Manjeet Lloyd MD for the opportunity to be involved in this patients care.

## 2021-05-21 NOTE — PROGRESS NOTES
Physician Progress Note      Leighton Macario  [facility] #:                  [id number]  :                       1925  ADMIT DATE:       2021 4:13 PM  Tiffany Llamas DATE:        2021 5:55 PM  RESPONDING  PROVIDER #:        Darwin Shaikh MD          QUERY TEXT:    Pt admitted for possible seizures. Pt noted to have Encephalopathy after   initiation of antibx. If possible, please document in progress notes and   discharge summary if you are evaluating and/or treating any of the following: The medical record reflects the following:  Risk Factors: 81 y/o male with hip fracture on antibiotics and pain    medication with hx of Afib  Clinical Indicators:  H&P: recent admission to [facility] for worsening lbp,   LE weakness and falls. He was found to have L4 fracture with 3mm retropulsion,   evaluated by neurosurgery. He was also treated for pneumonia with   vanc/cefepime. On day 6 while on cefepime he developed acute decline in   neurological status. Cefepime was stopped. EEG showed a possible   non-convulsive status. He was loaded with ativan and Keppra but had no   improvement when so was transferred to [place] [id number] for CvEEG. Possible cefepime   toxicity and/or status epilepticus CT negative, ? If CVA. Has Afib. MRI w, wo   c   Neuro notes: He has not had any seizures or epileptiform discharges   overnight, off AED. I suspect his altered mental status was a result of the   narcotic pain medication. He is waking up today and endorsing low back pain,   he has point tenderness at the lumbar level.   BLE weak due to pain limiting   exam.  Treatment: Held Cefepime and opiate pain medicine, CT, Neuro consult,  EEG,   Keppra,  Resume Eliquis 2.5 mg  Options provided:  -- Toxic Encephalopathy due to opiate pain medicine taken as prescribed  -- Toxic Encephalopathy due to Cefepime  taken as prescribed  -- Other - I will add my own diagnosis  -- Disagree - Not applicable / Not valid  -- Disagree - Clinically unable to determine / Unknown  -- Refer to Clinical Documentation Reviewer    PROVIDER RESPONSE TEXT:    Encephalopathy due to opiate pain medicine taken as prescribed.     Query created by: Kassidy Aguilar on 5/20/2021 2:40 PM      Electronically signed by:  Mary Garvin MD 5/20/2021 8:35 PM

## 2021-05-27 NOTE — PROGRESS NOTES
[facility]  Extended Care Program    Patient name: Salty Coleman  :   [id number]  Facility:  [address] [place] [address] Service: skilled nursing facility (00)    Primary diagnosis for wound-care consultation: Multiple open areas: right forearm, left forearm and sacrum. Additional ulcer(s) noted? None    History of Present Illness: Initial visit. Patient here for rehab. Multiple skin tears: Right forearm, proximal, right forearm, distal, left forearm and left upper arm. Right forearm distal with partially necrotic hematoma. Debrided today. Very thin, fragile skin. Marie sleeves ordered. Multiple ecchymotic areas. Friction skin damage to sacrum, superficial.  No signs of infection. Appetite fair. 2021: Albumin 2.8. Order for Ensure Plus 2 times a day. Dietitian consulted. Patient working with OT, PT and [address] [place] [address] . Decreased mobility. Review of Systems: Pertinent systems reviewed in the HPI; all other systems reviewed, and negative. Pertinent elements of past medical, surgical, family, and/or social history: Wedge compression fracture of fourth lumbar vertebrae, muscle weakness, hypertension, anemia, CAD, CHF, A. fib, CKD and pulmonary fibrosis. Medications and allergies are detailed in the nursing home chart, and were reviewed by me today.  _______________________    General Physical exam:    Vital signs:  141/52, 97.5, 81, 18    General Appearance: alert and oriented to person, place and time, in no acute distress  Psychiatric:  Mood and affect appropriate for situation  Skin: warm and dry, no rash.   Multiple ecchymotic areas  Head: normocephalic and atraumatic  Eyes: pupils equal, round, sclerae anicteric, conjunctivae normal  ENT: no thrush or oral ulcers  Neck:No complaints, normal appearance  Pulmonary/Chest: Respirations easy at rest, no cough or respiratory distress  Cardiovascular: No chest pain, normal rate, toes warm, cap refill normal  Abdomen: No nausea or vomiting Extremities: no cyanosis, edema or cellulitis  Musculoskeletal: Working with OT and PT, moves all extremities, no deformities  Neurologic: distal sensation to light touch intact, no allodynia. Wound exam:    Wound location: Sacrum   Length (cm) 0.6   Width (cm) 0.7   Depth (cm) 0.1   Tunneling 0   Undermining 0    Wound type:   Friction  Grade - stage - thickness: Partial thickness     Description of periwound: Intact    Description of wound bed: Wound with red base. Wound bed moist, small amount of serous drainage, edges open and attached. Surrounding tissue and ulcer without signs and symptoms of infection. No purulence, malodor, erythema, increased temperature, or increased pain. Wound location: Right Forearm, Proximal   Length (cm) 2.5   Width (cm) 1   Depth (cm) 0.1   Tunneling 0   Undermining 0    Wound type:   Skin Tear  Grade - stage - thickness: Full thickness     Description of periwound: Thin, fragile skin    Description of wound bed: Wound with pink and red base. Wound bed dry, scant amount of serous drainage, edges open and attached. Surrounding tissue and ulcer without signs and symptoms of infection. No purulence, malodor, erythema, increased temperature, or increased pain. Wound location: Right forearm, distal   Length (cm) 5   Width (cm) 3.5   Depth (cm) 0.3   Tunneling 0   Undermining 0    Wound type:   Trauma  Grade - stage - thickness: Full thickness     Description of periwound: Thin, fragile skin    Description of wound bed: Wound with hematoma, partially necrotic. Clots. Wound bed moist, moderate amount of bloody and old blood drainage, edges open and attached after debridement. Surrounding tissue and ulcer without signs and symptoms of infection. No purulence, malodor, erythema, increased temperature, or increased pain.     Wound location: Left Forearm   Length (cm) 3   Width (cm) 2   Depth (cm) 0.1   Tunneling 0   Undermining 0    Wound type:   Skin Tear  Grade - stage - thickness: Partial thickness     Description of periwound: Thin, fragile skin    Description of wound bed: Wound with pink and red base. Wound bed dry, scant amount of serous drainage, edges open and attached. Surrounding tissue and ulcer without signs and symptoms of infection. No purulence, malodor, erythema, increased temperature, or increased pain. Wound location: Left upper arm   Length (cm) 2   Width (cm) 2   Depth (cm) 0.1   Tunneling 0   Undermining 0    Wound type:   Skin Tear  Grade - stage - thickness: Partial thickness     Description of periwound: Thin, fragile skin    Description of wound bed: Wound with pink base. Wound bed dry, scant amount of serous drainage, edges open and attached. Surrounding tissue and ulcer without signs and symptoms of infection. No purulence, malodor, erythema, increased temperature, or increased pain.  _______________________    Recent labs and data reviewed: 5/19/2021: WBC 8.5, hemoglobin/hematocrit: 11/36, BUN 30, creatinine 1, albumin 2.8  _______________________     Learta Ards diagnoses & assessment: Multiple skin tears: Right forearm, proximal, right forearm, distal, left forearm and left upper arm. Right forearm distal with partially necrotic hematoma. Debrided today. Very thin, fragile skin. Marie sleeves ordered. Multiple ecchymotic areas. Friction skin damage to sacrum, superficial.  No signs of infection. Appetite fair. 5/19/2021: Albumin 2.8. Order for Ensure Plus 2 times a day. Dietitian consulted. Patient working with OT, PT and [address] [place] [address] . Decreased mobility. No fever or chills. Debridement is  indicated today, based on the history and exam above.  _______________________    Procedure:    Consent obtained. Time out performed per [facility]. Topical anesthetic applied: 4% topical lidocaine.     Using a curette, #15 blade scalpel, scissors and forceps, I sharply debrided the right forearm distal ulcer(s) down through and including the removal of epidermis and dermis. The type(s) of tissue debrided included necrotic/eschar and clots. Total Surface Area Debrided: 17.5 sq cm. The ulcers were then irrigated with normal saline solution. The procedure was completed with a small amount of bleeding, and hemostasis was by pressure. The patient tolerated the procedure well, with no significant complications. The patient's level of pain during and after the procedure was monitored. If post-debridement measurements are significantly different from initial measurements, those will be noted here.   _______________________    Recommendations:    - Dressings / Compression / Offloading: Right forearm proximal and distal: Triad, Vaseline gauze and Kerlix daily. Left forearm and left upper arm: Triad, alginate AG and foam.  Sacrum: Triad, alginate AG and hydrocolloid. Marie sleeves. Pressure redistribution mattress. Side to side when bed is much as possible. Pressure redistribution chair cushion. In chair no more than 2 - 3 hours at a time if possible. Leg elevations 2-3 times a day for 30 to 40 minutes. - Labs / Diagnostic studies: None    - Medications / nutritional support: Ensure plus 2 times per day    - Further Consultations recommended: Dietitian consult.   Working with OT, PT and ST.    - Anticipated follow-up: Weekly evaluation  _______________________    Electronically signed by KAYCE Donaldson CNP on 5/27/2021 at 10:54 AM

## 2021-06-04 PROBLEM — R79.89 ELEVATED TROPONIN: Status: RESOLVED | Noted: 2021-01-01 | Resolved: 2021-01-01

## 2021-06-04 PROBLEM — R77.8 ELEVATED TROPONIN: Status: RESOLVED | Noted: 2021-01-01 | Resolved: 2021-01-01

## 2021-06-17 NOTE — PROGRESS NOTES
all extremities, working with therapy  Neurologic: distal sensation to light touch intact, no allodynia. Wound exam:    Wound location: Right forearm, distal   Length (cm) 5.5   Width (cm) 2   Depth (cm) 0.1   Tunneling 0   Undermining 0    Wound type:   Trauma/skin tear  Grade - stage - thickness: Full thickness     Description of periwound: Fragile, thin skin    Description of wound bed: Wound with red granulation. Wound bed moist, small amount of serous drainage, edges open and attached. Surrounding tissue and ulcer without signs and symptoms of infection. No purulence, malodor, erythema, increased temperature, or increased pain. Wound exam:    Wound location: Left forearm   Length (cm) 2   Width (cm) 0.5   Depth (cm) 0.1   Tunneling 0   Undermining 0    Wound type:   Trauma/skin tear  Grade - stage - thickness: Full thickness     Description of periwound: Fragile, thin skin    Description of wound bed: Wound with red granulation. Wound bed moist, small amount of serous drainage, edges open and attached. Surrounding tissue and ulcer without signs and symptoms of infection. No purulence, malodor, erythema, increased temperature, or increased pain. Wound exam:    Wound location: Left upper arm   Length (cm) 5.5   Width (cm) 2   Depth (cm) 0.1   Tunneling 0   Undermining 0    Wound type:   Trauma/skin tear  Grade - stage - thickness: Full thickness     Description of periwound: Fragile, thin skin    Description of wound bed: Wound with red granulation. Wound bed moist, small amount of serous drainage, edges open and attached. Surrounding tissue and ulcer without signs and symptoms of infection. No purulence, malodor, erythema, increased temperature, or increased pain.     Wound exam:    Wound location: Right upper arm, lateral   Length (cm) 6.5   Width (cm) 3   Depth (cm) 0.1   Tunneling 0   Undermining 0    Wound type:   Trauma/skin tear  Grade - stage - thickness: Full thickness     Description of periwound: Fragile, thin skin    Description of wound bed: Wound with red granulation. Wound bed moist, small amount of serous drainage, edges open and attached. Surrounding tissue and ulcer without signs and symptoms of infection. No purulence, malodor, erythema, increased temperature, or increased pain.  _______________________    Recent labs and data reviewed: 6/7/2021: Glucose 127, BUN 29, creatinine 1.1, GFR 62.  6/1/2021: Hemoglobin/hematocrit: 10/32  _______________________     Gunner Llamas diagnoses & assessment: Skin tears to right forearm, distal, left forearm, left upper arm and right upper arm, lateral healing slowly. Current treatment sticking to wounds. Treatment changed to topical antibiotic ointment, Vaseline gauze and adhesive foam.  Friction skin damage with skin abrasion to sacrum resolved today. Patient getting stronger. Working with OT, PT and ST.  Very thin, fragile skin. Padding arms with Marie sleeves or kerlix  No signs of infection. Appetite is improving. Ensure Plus 2 times a day. Debridement is not indicated today, based on the history and exam above.  _______________________    Procedure:    Consent obtained. Time out performed per [facility]. _______________________    Recommendations:    - Dressings / Compression / Offloading: Topical antibiotic ointment, Vaseline gauze and adhesive foam.  Protect arms with Marie sleeves or Kerlix. Pressure redistribution mattress. Side to side when bed is much as possible. Pressure redistribution chair cushion. In chair no more than 2 to 3 hours at a time if possible.     - Labs / Diagnostic studies: None    - Medications / nutritional support: Ensure Plus 2 times a day    - Further Consultations recommended: Working with OT, PT and ST    - Anticipated follow-up: Weekly evaluation  _______________________    Electronically signed by KAYCE Maxwell CNP on 6/17/2021 at 10:03 AM

## 2021-06-24 NOTE — PROGRESS NOTES
sensation to light touch intact, no allodynia. Wound exam:    Wound location: Right Forearm   Length (cm) 0.8   Width (cm) 0.4   Depth (cm) 0.1   Tunneling 0   Undermining 0    Wound type:   Skin Tear  Grade - stage - thickness: Full thickness     Description of periwound: Thin, fragile skin    Description of wound bed: Wound with red granulation. Wound bed moist, small amount of serous drainage, edges open and attached. Surrounding tissue and ulcer without signs and symptoms of infection. No purulence, malodor, erythema, increased temperature, or increased pain. Wound exam:    Wound location: Left Forearm   Length (cm) 2   Width (cm) 1   Depth (cm) 0.1   Tunneling 0   Undermining 0    Wound type:   Skin Tear  Grade - stage - thickness: Full thickness     Description of periwound: Thin, fragile skin    Description of wound bed: Wound with red granulation. Wound bed moist, small amount of serous drainage, edges open and attached. Surrounding tissue and ulcer without signs and symptoms of infection. No purulence, malodor, erythema, increased temperature, or increased pain. Wound exam:    Wound location: Right upper arm, lateral   Length (cm) 6   Width (cm) 3   Depth (cm) 0.1   Tunneling 0   Undermining 0    Wound type:   Skin Tear  Grade - stage - thickness: Full thickness     Description of periwound: Thin, fragile skin    Description of wound bed: Wound with red granulation. Wound bed moist, small amount of serous drainage, edges open and attached. Surrounding tissue and ulcer without signs and symptoms of infection.   No purulence, malodor, erythema, increased temperature, or increased pain.  _______________________    Recent labs and data reviewed: 14 2021: Glucose 90, BUN 52, creatinine 1.1, GFR 62, hemoglobin/hematocrit: 10/34  _______________________     Gilford Martin diagnoses & assessment: Skin tears to right forearm, distal, left forearm, left upper arm and right upper arm, lateral healing slowly. Fragile skin. Marie sleeves in place. No signs of infection. Appetite is improving. Protein supplements provided. Working with OT,  PT and Emily Schmitt Dr. Debridement is not indicated today, based on the history and exam above.  _______________________    Procedure:    Consent obtained. Time out performed per [facility]. _______________________    Recommendations:    - Dressings / Compression / Offloading: Topical antibiotic ointment, Vaseline gauze then adhesive foam.  Protect arms with Marie sleeves or Kerlix. Pressure redistribution mattress. Side to side when bed is much as possible. Pressure redistribution chair cushion. In chair no more than 2 to 3 hours at a time if possible.     - Labs / Diagnostic studies: None    - Medications / nutritional support: Ensure Plus 2 times a day    - Further Consultations recommended: Working with OT, PT and ST weekly evaluation    - Anticipated follow-up: Weekly evaluation  _______________________    Electronically signed by KAYCE Kirby CNP on 6/24/2021 at 8:41 AM

## 2021-07-01 NOTE — PROGRESS NOTES
[facility]    Patient name: Laly Katz  :   [id number]  Facility:  [address] [place] [address] Service: skilled nursing facility (31)    Primary diagnosis for wound-care consultation: Skin tears to right forearm, left forearm and right upper arm    Additional ulcer(s) noted? None    History of Present Illness: Skin tears to right forearm, left forearm and right upper arm, healing slowly. Thin fragile skin. No signs of infection. Discontinue Marie sleeves, too tight. Protect arms with Kerlix. Appetite is improving. Protein supplements provided. Working with OT, PT and [address] [place] [address]  May be moving to long-term care. No fever or chills. Review of Systems: Pertinent systems reviewed in the HPI; all other systems reviewed, and negative. Pertinent elements of past medical, surgical, family, and/or social history: Wedge compression fracture to fourth lumbar vertebrae, muscle weakness, hypertension, anemia, CAD, CHF, A. fib, chronic kidney disease and pulmonary fibrosis.     Medications and allergies are detailed in the nursing home chart, and were reviewed by me today.  _______________________    General Physical exam:    Vital signs:  115/70, 98, 72, 18, 98%    General Appearance: alert and oriented to person, place and time, frail, in no acute distress  Psychiatric:  Mood and affect appropriate for situation  Skin: warm and dry, no rash  Head: normocephalic and atraumatic  Eyes: pupils equal, round, sclerae anicteric, conjunctivae normal  ENT: no thrush or oral ulcers  Neck:No complaints, normal appearance  Pulmonary/Chest: Respirations easy at rest, no cough or respiratory distress  Cardiovascular: No chest pain, normal rate, toes warm, cap refill normal  Abdomen: No nausea or vomiting  Extremities: no cyanosis, edema or cellulitis  Musculoskeletal: Nonambulatory, moves all extremities, no deformities  Neurologic: distal sensation to light touch intact, no allodynia. Wound exam:    Wound location: Right forearm   Length (cm) 1.5   Width (cm) 0.8   Depth (cm) 0.1   Tunneling 0   Undermining 0    Wound type:   Skin Tear  Grade - stage - thickness: Full thickness     Description of periwound: Thin, fragile skin    Description of wound bed: Wound with pink and red granulation. Wound bed moist, small amount of serous drainage, edges open and attached. Surrounding tissue and ulcer without signs and symptoms of infection. No purulence, malodor, erythema, increased temperature, or increased pain. Wound exam:    Wound location: Left forearm   Length (cm) 6.5   Width (cm) 1.5   Depth (cm) 0.1   Tunneling 0   Undermining 0    Wound type:   Skin Tear  Grade - stage - thickness: Full thickness     Description of periwound: Thin, fragile skin    Description of wound bed: Wound with pink and red granulation. Wound bed moist, small amount of serous drainage, edges open and attached. Surrounding tissue and ulcer without signs and symptoms of infection. No purulence, malodor, erythema, increased temperature, or increased pain. Wound exam:    Wound location: Right upper arm, lateral   Length (cm) 6   Width (cm) 2   Depth (cm) 0.1   Tunneling 0   Undermining 0    Wound type:   Skin Tear  Grade - stage - thickness: Full thickness     Description of periwound: Thin, fragile skin    Description of wound bed: Wound with pink and red granulation. Wound bed moist, small amount of serous drainage, edges open and attached. Surrounding tissue and ulcer without signs and symptoms of infection. No purulence, malodor, erythema, increased temperature, or increased pain.    _______________________    Recent labs and data reviewed: No new labs  _______________________     Ananth Bocanegraoter diagnoses & assessment: Skin tears to right forearm, left forearm and right upper arm, healing slowly. Thin fragile skin. No signs of infection. Discontinue Marie sleeves, too tight.   Protect arms with Kerlix. Appetite is improving. Protein supplements provided. Working with OT, PT and [facility] Keshia Hamm May be moving to long-term care. Debridement is not indicated today, based on the history and exam above.  _______________________    Procedure:    Consent obtained. Time out performed per [facility]. _______________________    Recommendations:    - Dressings / Compression / Offloading: Change treatment to triad, alginate AG and adhesive foam.  Protect arms with Kerlix. Pressure redistribution mattress. Side to side when bed is much as possible. Pressure redistribution chair cushion. In chair no more than 2 to 3 hours at a time if possible.     - Labs / Diagnostic studies: None    - Medications / nutritional support: Ensure Plus 2 times a day    - Further Consultations recommended: Work with OT, PT and ST    - Anticipated follow-up: Weekly evaluation  _______________________    Electronically signed by KAYCE Espinosa CNP on 7/1/2021 at 1:27 PM

## 2021-07-02 NOTE — CARE COORDINATION
[facility] Transitions BPCI-A Follow Up Call  Patient transferred from SNF to LTC at [facility] on Date: 7/1/2021    Patient Name:  Tabitha Lesches   YOB: 1925  Discharge Date:  5/20/21  RARS:  Readmission Risk Score: 34    PCP:  Snehal Oleary MD      Challenges to be reviewed by the provider   Additional needs identified to be addressed with provider No  none           Method of communication with provider : none     Anticipated D/C Date: none   Ongoing symptoms or worsening condition:   Appetite: varies   Sleep pattern:good   Cognitive function:alert   Support System:   Medication Needs/Questions:   Transportation Need:     Recent loss of balance, Falls: none    Spoke with [employer]. She stated patient is weak and requires a lift to get up. That was too much for his family to take him home. He uses a wheelchair for mobility. Care Transitions will continue to follow per BPCI-A Program.  Kenya Watkins LPN  Plan for next call:      Future Appointments   Date Time Provider Tavo Raya   9/29/2021  1:40 PM Ismael Mercer [facility]

## 2021-07-09 NOTE — CARE COORDINATION
[facility] Transitions Follow Up Call    2021    Patient: Tarsha Chapa  Patient : 1925   MRN: <[id number]>  Reason for Admission:   Discharge Date: 21 RARS: Readmission Risk Score: [facility], spoke to Bhumika. She states patient is still there. Lucy Garcia will continue to follow for BPCI-A Bundle. Care Transitions Subsequent and Final Call    Subsequent and Final Calls  Care Transitions Interventions  Other Interventions:            Follow Up  Future Appointments   Date Time Provider Tavo Raya   2021  1:40 PM Keyona Miranda MD [facility] CALL[facility] Fuller

## 2021-07-22 NOTE — PROGRESS NOTES
[address] [facility]    Patient name: Darin Rutledge  :   [id number]  Facility:  [address] [place] [address] Service: nursing facility (16)    Primary diagnosis for wound-care consultation: Skin tears to right forearm, left forearm and right upper arm. Additional ulcer(s) noted? New skin tear to left hand and right wrist    History of Present Illness: Skin tear to right forearm and right upper arm resolved today. Skin tear to left forearm healing slowly. To new skin tears to the left hand and right wrist.  Very thin fragile skin. No signs of infection. Appetite fair. Protein supplements provided. 2021: Albumin 2.7. Dietitian consulted. Now residing in long term care. Weak, thin and frail. No fever or chills. Review of Systems: Pertinent systems reviewed in the HPI; all other systems reviewed, and negative. Pertinent elements of past medical, surgical, family, and/or social history: Patient with muscle weakness, hypertension, anemia, CAD, CHF, A. fib, chronic kidney disease and pulmonary fibrosis. Medications and allergies are detailed in the nursing home chart, and were reviewed by me today.  _______________________    General Physical exam:    Vital signs:  131/77, 97.9, 70, 16, 99% on 2 L    General Appearance: alert and oriented to person, place and time, weak, thin, frail, in no acute distress  Psychiatric:  Mood and affect appropriate for situation  Skin: warm and dry, no rash.  Very thin and fragile  Head: normocephalic and atraumatic  Eyes: pupils equal, round, sclerae anicteric, conjunctivae normal  ENT: no thrush or oral ulcers  Neck:No complaints, normal appearance  Pulmonary/Chest: Respirations easy at rest, no cough or respiratory distress  Cardiovascular: No chest pain, normal rate, toes warm, cap refill normal  Abdomen: No nausea or vomiting  Extremities: no cyanosis, edema or cellulitis  Musculoskeletal: Nonambulatory, moves all extremities, no deformities  Neurologic: distal sensation to light touch intact, no allodynia. Wound exam:    Wound location: Left Forearm   Length (cm) 1   Width (cm) 1.5   Depth (cm) 0.1   Tunneling 0   Undermining 0    Wound type:   Skin Tear  Grade - stage - thickness: Full thickness     Description of periwound: Intact    Description of wound bed: Wound with red base. Wound bed moist, scant amount of serous drainage, edges open and attached. Surrounding tissue and ulcer without signs and symptoms of infection. No purulence, malodor, erythema, increased temperature, or increased pain. Wound exam:    Wound location: Left Hand   Length (cm) 1.3   Width (cm) 0.7   Depth (cm) 0.1   Tunneling 0   Undermining 0    Wound type:   Skin Tear  Grade - stage - thickness: Full thickness     Description of periwound: Intact    Description of wound bed: Wound with red base. Wound bed moist, scant amount of serous drainage, edges open and attached. Surrounding tissue and ulcer without signs and symptoms of infection. No purulence, malodor, erythema, increased temperature, or increased pain. Wound exam:    Wound location: Right Wrist   Length (cm) 2   Width (cm) 0.5   Depth (cm) 0.1   Tunneling 0   Undermining 0    Wound type:   Skin Tear  Grade - stage - thickness: Full thickness     Description of periwound: Intact    Description of wound bed: Wound with red base. Wound bed moist, scant amount of serous drainage, edges open and attached. Surrounding tissue and ulcer without signs and symptoms of infection. No purulence, malodor, erythema, increased temperature, or increased pain.  _______________________    Recent labs and data reviewed: 6/20/2021: Glucose 50, BUN 65, creatinine 1.2, GFR 56, albumin 2.7  _______________________     Tata Peon diagnoses & assessment: Skin tear to right forearm and right upper arm resolved today. Skin tear to left forearm healing slowly.   To new skin tears to the left hand and right wrist. Very thin fragile skin. No signs of infection. Appetite fair. Protein supplements provided. 6/20/2021: Albumin 2.7. Dietitian consulted. Now residing in long term care. Weak, thin and frail. Debridement is not indicated today, based on the history and exam above.  _______________________    Procedure:    Consent obtained. Time out performed per [facility]. _______________________    Recommendations:    - Dressings / Compression / Offloading: Triad, alginate AG and adhesive foam.  Protect arms with Kerlix. Pressure redistribution mattress. Side to side when bed is much as possible. Pressure redistribution chair cushion. In chair no more than 2 to 3 hours at a time if possible. - Labs / Diagnostic studies: None    - Medications / nutritional support: Ensure Plus 2 times a day.     - Further Consultations recommended: Dietician consult    - Anticipated follow-up: Weekly evaluation  _______________________    Electronically signed by KAYCE Schafer CNP on 7/22/2021 at 9:36 AM

## 2021-07-26 NOTE — CARE COORDINATION
[facility] Transitions BPCI-A Follow Up Call  Patient transferred from SNF to LTC at [facility] LYNDA rodriges on Date:07/01/2021    Patient Name:  Eva Zimmerman   YOB: 1925  Discharge Date:  5/20/21  RARS:  Readmission Risk Score: 34    PCP:  Loc Bradley MD      Challenges to be reviewed by the provider   Additional needs identified to be addressed with provider No  none           Method of communication with provider : none     Anticipated D/C Date:   Ongoing symptoms or worsening condition:   Appetite:   Sleep pattern:   Cognitive function:   Support System:   Medication Needs/Questions:   Transportation Need:     Recent loss of balance, Falls:    Katelin transferred me to Debra Emerson, who transferred me to [employer] nurse moiz. he's doing ok now, earlier this morning his stats were low as he kept taking his o2 off. Patient saw PT  & OT today. Patient has not had falls. Hi=e is a little forgetful but overall nurse states he is pretty well with it. Will continue to follow for BPCI-A Bundle. Care Transitions will continue to follow per [facility] for next call:      Future Appointments   Date Time Provider Tavo Raya   9/29/2021  1:40 PM True Monk MD [facility] PULM [facility]

## 2021-07-28 NOTE — PROGRESS NOTES
[facility]    Patient name: Kenia Garza  :   [birth year]-  Facility:  [address] [place] [address] Service: nursing facility (31)    Primary diagnosis for wound-care consultation: Skin tears to left forearm, left hand and right wrist.    Additional ulcer(s) noted? None    History of Present Illness: Skin tears to left forearm and left hand resolved today. Skin tear to right wrist with dry, adherent, intact scab. Change treatment to Betadine. Very thin and fragile skin. No signs of infection. Appetite is fair. Protein supplements increased. No fever chills. Review of Systems: Pertinent systems reviewed in the HPI; all other systems reviewed, and negative. Pertinent elements of past medical, surgical, family, and/or social history: Patient with muscle weakness, hypertension, anemia, CAD, CHF, A. fib, chronic kidney disease and pulmonary fibrosis. Medications and allergies are detailed in the nursing home chart, and were reviewed by me today.  _______________________    General Physical exam:    Vital signs:  144/82, 97, 72, 16    General Appearance: alert and oriented to person, place and time, weak, thin, frail, in no acute distress  Psychiatric:  Mood and affect appropriate for situation  Skin: warm and dry, no rash. Skin thin and fragile  Head: normocephalic and atraumatic  Eyes: pupils equal, round, sclerae anicteric, conjunctivae normal  ENT: no thrush or oral ulcers  Neck:No complaints, normal appearance  Pulmonary/Chest: Respirations easy at rest, no cough or respiratory distress  Cardiovascular: No chest pain, normal rate, toes warm, cap refill normal  Abdomen: No nausea or vomiting  Extremities: no cyanosis, edema or cellulitis  Musculoskeletal: Nonambulatory, moves all extremities, no deformities  Neurologic: distal sensation to light touch intact, no allodynia.       Wound exam:    Wound location: Right wrist   Length (cm) 1.4   Width (cm) 0.5   Depth

## 2021-08-05 NOTE — PROGRESS NOTES
88 [facility]    Patient name: Tracy Shelley  :   [id number]  Facility:  [address] [place] [address] Service: nursing facility (99)    Primary diagnosis for wound-care consultation: Skin tear to right wrist    Additional ulcer(s) noted? None    History of Present Illness: Skin tear to right wrist resolved today. Skin is very thin and fragile. No signs of infection. Appetite is fair. Protein supplements are provided. No fever or chills. Review of Systems: Pertinent systems reviewed in the HPI; all other systems reviewed, and negative. Pertinent elements of past medical, surgical, family, and/or social history: Patient with muscle weakness, hypertension, anemia, CAD, CHF, A. fib, chronic kidney disease and pulmonary fibrosis. Medications and allergies are detailed in the nursing home chart, and were reviewed by me today.  _______________________    General Physical exam:    Vital signs:  124/72, 97.6, 72, 20    General Appearance: alert and oriented to person, place and time, weak, thin, frail, in no acute distress  Psychiatric:  Mood and affect appropriate for situation  Skin: warm and dry, no rash  Head: normocephalic and atraumatic  Eyes: pupils equal, round, sclerae anicteric, conjunctivae normal  ENT: no thrush or oral ulcers  Neck:No complaints, normal appearance  Pulmonary/Chest: Respirations easy at rest, no cough or respiratory distress  Cardiovascular: No chest pain, normal rate, toes warm, cap refill normal  Abdomen: No nausea or vomiting  Extremities: no cyanosis, edema or cellulitis  Musculoskeletal: Nonambulatory, moves all extremities, no deformities  Neurologic: distal sensation to light touch intact, no allodynia.       Wound exam:    Wound location: Right wrist skin tear resolved today  _______________________    Recent labs and data reviewed: No new labs  _______________________     [facility] Skin tear to right wrist resolved today. Skin is very thin and fragile. No signs of infection. Appetite is fair. Protein supplements are provided. No fever or chills. Debridement is not indicated today, based on the history and exam above.  _______________________    Procedure:    Consent obtained. Time out performed per [facility]. _______________________    Recommendations:    - Dressings / Compression / Offloading: None    - Labs / Diagnostic studies: None    - Medications / nutritional support: Ensure Plus 2 times a day. Active protein 2 times a day. - Further Consultations recommended: None    - Anticipated follow-up: Nursing to follow.  Reconsult if necessary  _______________________    Electronically signed by KAYCE Amor CNP on 8/5/2021 at 9:18 AM

## 2021-08-16 ENCOUNTER — CARE COORDINATION (OUTPATIENT)
Dept: CASE MANAGEMENT | Age: 86
End: 2021-08-16

## 2022-09-08 NOTE — CARE COORDINATION
INITIAL CASE MANAGEMENT ASSESSMENT    Reviewed chart, spoke with patients daughter Jailyn Thorpe at [phone]. to assess possible discharge needs. Explained Case Management role/services. Living Situation: pt lives with daughter in her [de-identified], 6 steps to enter front or no steps to enter back patio. ADLs: daughter states prior to admit, pt was independent , walking at [place] etc.      DME: recently obtained rollator, pt has 2 canes and shower chair. PT/OT Recs: [facility] has been ordered. Active Services: none , daughter assists 24/7. Transportation: daughter. Medications: [facility] [place]    PCP: Dr Zola Brunner      HD/PD: none    PLAN/COMMENTS: daughter is [facility] in [facility] , then the goal is home with daughter to provide assistance 24/7. Electronically signed by NANA Joseph on 10/22/2020 at 1:27 PM    SW/CM provided contact information for patient or family to call with any questions. SW/CM will follow and assist as needed.
Referral to [address] [place] [address] received. Patient was discussed with PM&R physician and can be accepted when medically discharged. No pre-cert is required. Spoke with the patient and the patient's daughter at the bedside and they are in agreement to come to  ARU.  Answered questions regarding expectations for rehab, LOS, etc.
Left arm;

## 2023-05-22 NOTE — PLAN OF CARE
Problem: Confusion - Acute:  Goal: Absence of continued neurological deterioration signs and symptoms  Description: Absence of continued neurological deterioration signs and symptoms  Outcome: Ongoing   Patient alert to self. Problem: Discharge Planning:  Goal: Ability to perform activities of daily living will improve  Description: Ability to perform activities of daily living will improve  Outcome: Ongoing   Patients plan to discharge to [address] [place] [address],[address] [place]     Problem: Mood - Altered:  Goal: Mood stable  Description: Mood stable  Outcome: Ongoing   Patient is in good spirits this shift. Problem: Sleep Pattern Disturbance:  Goal: Appears well-rested  Description: Appears well-rested  Outcome: Ongoing   Patient able to sleep 5 hours this shift. Xeljanz Counseling: I discussed with the patient the risks of Xeljanz therapy including increased risk of infection, liver issues, headache, diarrhea, or cold symptoms. Live vaccines should be avoided. They were instructed to call if they have any problems.
